# Patient Record
Sex: FEMALE | Race: WHITE | NOT HISPANIC OR LATINO | Employment: OTHER | URBAN - METROPOLITAN AREA
[De-identification: names, ages, dates, MRNs, and addresses within clinical notes are randomized per-mention and may not be internally consistent; named-entity substitution may affect disease eponyms.]

---

## 2017-01-11 ENCOUNTER — ANTICOAGULATION THERAPY VISIT (OUTPATIENT)
Dept: NURSING | Facility: CLINIC | Age: 82
End: 2017-01-11
Payer: COMMERCIAL

## 2017-01-11 DIAGNOSIS — Z79.01 LONG-TERM (CURRENT) USE OF ANTICOAGULANTS: Primary | ICD-10-CM

## 2017-01-11 DIAGNOSIS — I48.91 ATRIAL FIBRILLATION (H): ICD-10-CM

## 2017-01-11 LAB — INR POINT OF CARE: 1.9 (ref 0.86–1.14)

## 2017-01-11 PROCEDURE — 36416 COLLJ CAPILLARY BLOOD SPEC: CPT

## 2017-01-11 PROCEDURE — 99207 ZZC NO CHARGE NURSE ONLY: CPT

## 2017-01-11 PROCEDURE — 85610 PROTHROMBIN TIME: CPT | Mod: QW

## 2017-01-11 NOTE — MR AVS SNAPSHOT
Shelly Rogers   1/11/2017 3:30 PM   Anticoagulation Therapy Visit    Description:  83 year old female   Provider:   ANTICOAGULATION CLINIC   Department:   Nurse           INR as of 1/11/2017     Selected INR 1.9! (1/11/2017)      Anticoagulation Summary as of 1/11/2017     INR goal 2.0-3.0   Selected INR 1.9! (1/11/2017)   Full instructions 1/11: 5 mg; Otherwise 5 mg on Fri; 2.5 mg all other days   Next INR check 2/8/2017    Indications   Long-term (current) use of anticoagulants [Z79.01] [Z79.01]  Atrial fibrillation (H) [I48.91]         Your next Anticoagulation Clinic appointment(s)     Feb 08, 2017  2:45 PM   Anticoagulation [facility] with  [facility] CL[facility]    [address]  [place] [address]   [phone]              Contact Numbers     Clinic Number:         January 2017 Details    Sun Mon Tue Wed Thu Fri Sat     1               2               3               4               5               6               7                 8               9               10               11      5 mg   See details      12      2.5 mg         13      5 mg         14      2.5 mg           15      2.5 mg         16      2.5 mg         17      2.5 mg         18      2.5 mg         19      2.5 mg         20      5 mg         21      2.5 mg           22      2.5 mg         23      2.5 mg         24      2.5 mg         25      2.5 mg         26      2.5 mg         27      5 mg         28      2.5 mg           29      2.5 mg         30      2.5 mg         31      2.5 mg              Date Details   01/11 This INR check               How to take your warfarin dose     To take:  2.5 mg Take 0.5 of a 5 mg tablet.    To take:  5 mg Take 1 of the 5 mg tablets.           February 2017 Details    Sun Mon Tue Wed Thu Fri Sat        1      2.5 mg         2      2.5 mg         3      5 mg         4      2.5 mg           5      2.5 mg         6      2.5 mg         7      2.5  mg         8            9               10               11                 12               13               14               15               16               17               18                 19               20               21               22               23               24               25                 26               27               28                    Date Details   No additional details    Date of next INR:  2/8/2017         How to take your warfarin dose     To take:  2.5 mg Take 0.5 of a 5 mg tablet.    To take:  5 mg Take 1 of the 5 mg tablets.

## 2017-01-11 NOTE — PROGRESS NOTES
ANTICOAGULATION FOLLOW-UP CLINIC VISIT    Patient Name:  Shelly Rogers  Date:  1/11/2017  Contact Type:  Face to Face    SUBJECTIVE:     Patient Findings     Positives No Problem Findings           OBJECTIVE    INR PROTIME   Date Value Ref Range Status   01/11/2017 1.9* 0.86 - 1.14 Final       ASSESSMENT / PLAN  INR assessment THER    Recheck INR In: 4 WEEKS    INR Location Clinic      Anticoagulation Summary as of 1/11/2017     INR goal 2.0-3.0   Selected INR 1.9! (1/11/2017)   Maintenance plan 5 mg (5 mg x 1) on Fri; 2.5 mg (5 mg x 0.5) all other days   Full instructions 1/11: 5 mg; Otherwise 5 mg on Fri; 2.5 mg all other days   Weekly total 20 mg   Plan last modified Monet Jaramillo RN (3/28/2016)   Next INR check 2/8/2017   Priority INR   Target end date     Indications   Long-term (current) use of anticoagulants [Z79.01] [Z79.01]  Atrial fibrillation (H) [I48.91]         Anticoagulation Episode Summary     INR check location     Preferred lab     Send INR reminders to  ANTICOAGULATION    Comments       Anticoagulation Care Providers     Provider Role Specialty Phone number    Halley Huff MD Responsible Internal Medicine [phone]            See the Encounter Report to view Anticoagulation Flowsheet and Dosing Calendar (Go to Encounters tab in chart review, and find the Anticoagulation Therapy Visit)    Dosage adjustment made based on physician directed care plan. Slight increase today only, then resume previous dosing. Recheck 4 weeks (instead of 8).    Tana Brown RN

## 2017-02-15 ENCOUNTER — ANTICOAGULATION THERAPY VISIT (OUTPATIENT)
Dept: NURSING | Facility: CLINIC | Age: 82
End: 2017-02-15
Payer: COMMERCIAL

## 2017-02-15 DIAGNOSIS — Z79.01 LONG-TERM (CURRENT) USE OF ANTICOAGULANTS: ICD-10-CM

## 2017-02-15 DIAGNOSIS — I48.91 ATRIAL FIBRILLATION (H): ICD-10-CM

## 2017-02-15 LAB — INR POINT OF CARE: 1.8 (ref 0.86–1.14)

## 2017-02-15 PROCEDURE — 85610 PROTHROMBIN TIME: CPT | Mod: QW

## 2017-02-15 PROCEDURE — 36416 COLLJ CAPILLARY BLOOD SPEC: CPT

## 2017-02-15 PROCEDURE — 99207 ZZC NO CHARGE NURSE ONLY: CPT

## 2017-02-15 NOTE — PROGRESS NOTES
ANTICOAGULATION FOLLOW-UP CLINIC VISIT    Patient Name:  Shelly Rogers  Date:  2/15/2017  Contact Type:  Face to Face. Dose instructions given to both patient and her daughter Sandhya    SUBJECTIVE:     Patient Findings     Positives No Problem Findings           OBJECTIVE    INR Protime   Date Value Ref Range Status   02/15/2017 1.8 (A) 0.86 - 1.14 Final       ASSESSMENT / PLAN  INR assessment SUB    Recheck INR In: 2 WEEKS    INR Location Clinic      Anticoagulation Summary as of 2/15/2017     INR goal 2.0-3.0   Today's INR 1.8!   Maintenance plan 5 mg (5 mg x 1) on Wed, Sat; 2.5 mg (5 mg x 0.5) all other days   Full instructions 5 mg on Wed, Sat; 2.5 mg all other days   Weekly total 22.5 mg   Plan last modified Arely Dotson RN (2/15/2017)   Next INR check 3/2/2017   Priority INR   Target end date     Indications   Long-term (current) use of anticoagulants [Z79.01] [Z79.01]  Atrial fibrillation (H) [I48.91]         Anticoagulation Episode Summary     INR check location     Preferred lab     Send INR reminders to CS ANTICOAGULATION    Comments       Anticoagulation Care Providers     Provider Role Specialty Phone number    Halley Huff MD Responsible Internal Medicine [phone]            See the Encounter Report to view Anticoagulation Flowsheet and Dosing Calendar (Go to Encounters tab in chart review, and find the Anticoagulation Therapy Visit)    Dosage adjustment made based on physician directed care plan.    Arely Dotson RN

## 2017-02-25 ENCOUNTER — ALLIED HEALTH/NURSE VISIT (OUTPATIENT)
Dept: CARDIOLOGY | Facility: CLINIC | Age: 82
End: 2017-02-25
Payer: COMMERCIAL

## 2017-02-25 DIAGNOSIS — Z95.0 CARDIAC PACEMAKER IN SITU: Primary | ICD-10-CM

## 2017-02-25 PROCEDURE — 93296 REM INTERROG EVL PM/IDS: CPT | Performed by: INTERNAL MEDICINE

## 2017-02-25 PROCEDURE — 93294 REM INTERROG EVL PM/LDLS PM: CPT | Performed by: INTERNAL MEDICINE

## 2017-02-25 NOTE — MR AVS SNAPSHOT
After Visit Summary   2/25/2017    Shelly Rogers    MRN: [id number]           Patient Information     Date Of Birth          1/18/1933        Visit Information        Provider Department      2/25/2017 4:00 PM [facility] TECH1 [facility]        Today's Diagnoses     Cardiac pacemaker in situ    -  1       Follow-ups after your visit        Your next 10 appointments already scheduled     Mar 02, 2017  3:00 PM CST   Anticoagulation Visit with [facility] ANTICOAGULATION CLINIC   [facility] ([facility])    [address]  [facility] [address] [phone]            Jun 02, 2017  7:30 AM CDT   LAB with [facility] LAB   [facility] ([facility])    [address]  [facility] [id number] [phone]           Patient must bring picture ID.  Patient should be prepared to give a urine specimen  Please do not eat 10-12 hours before your appointment if you are coming in fasting for labs on lipids, cholesterol, or glucose (sugar).  Pregnant women should follow their Care Team instructions. Water with medications is okay. Do not drink coffee or other fluids.   If you have concerns about taking  your medications, please ask at office or if scheduling via NPR, send a message by clicking on Secure Messaging, Message Your Care Team.            Jun 02, 2017  8:15 AM CDT   Return Visit with Galindo Del Valle MD   [facility] ([facility])    [address] [place] [address] [address]  [facility] [address] [phone]              Who to contact     If you have questions or need follow up information about today's clinic visit or your schedule please contact [facility] directly at [phone].  Normal or non-critical lab and imaging results will be communicated to you by MyChart, letter or phone within 4 business days after  "the clinic has received the results. If you do not hear from us within 7 days, please contact the clinic through [employer] or phone. If you have a critical or abnormal lab result, we will notify you by phone as soon as possible.  Submit refill requests through [employer] or call your pharmacy and they will forward the refill request to us. Please allow 3 business days for your refill to be completed.          Additional Information About Your Visit        [employer] Information     [employer] lets you send messages to your doctor, view your test results, renew your prescriptions, schedule appointments and more. To sign up, go to [email or web address] . Click on \"Log in\" on the left side of the screen, which will take you to the Welcome page. Then click on \"Sign up Now\" on the right side of the page.     You will be asked to enter the access code listed below, as well as some personal information. Please follow the directions to create your username and password.     Your access code is: [email or web address]  Expires: 2017  9:01 AM     Your access code will  in 90 days. If you need help or a new code, please call your [place] clinic or [phone].        Care EveryWhere ID     This is your Care EveryWhere ID. This could be used by other organizations to access your [place] medical records  [id number]         Blood Pressure from Last 3 Encounters:   16 136/74   16 139/79   16 146/70    Weight from Last 3 Encounters:   16 78.9 kg (174 lb)   16 80.4 kg (177 lb 4.8 oz)   16 78.9 kg (174 lb)              We Performed the Following     INTERROGATION DEVICE EVAL REMOTE, PACER/ICD (53542)     PM DEVICE INTERROGATE REMOTE (35945)        Primary Care Provider Office Phone # Fax #    Halley Huff -[phone]       [facility]    [address]  [facility] [address]        Thank you!     Thank you for choosing [facility] " [facility] [place]  for your care. Our goal is always to provide you with excellent care. Hearing back from our patients is one way we can continue to improve our services. Please take a few minutes to complete the written survey that you may receive in the mail after your visit with us. Thank you!             Your Updated Medication List - Protect others around you: Learn how to safely use, store and throw away your medicines at www.disposemymeds.org.          This list is accurate as of: 2/25/17 11:59 PM.  Always use your most recent med list.                   Brand Name Dispense Instructions for use    ACE/ARB NOT PRESCRIBED (INTENTIONAL)      ACE & ARB not prescribed due to Other: HFpEF       ACETAMINOPHEN PO      Take 500 mg by mouth 3 times daily as needed (Takes at least 6 hours apart).       AMOXICILLIN PO      Take 2 g by mouth. 30 minutes prior to dental appointment.       ASPIRIN NOT PRESCRIBED    INTENTIONAL    0 each    Antiplatelet medication not prescribed intentionally due to Current anticoagulant therapy (warfarin/enoxaparin)       blood glucose monitoring lancets     1 Box    Use to check blood sugars daily       blood glucose monitoring meter device kit     1 kit    1 Device daily.       blood glucose monitoring test strip    ACCU-CHEK SMARTVIEW    1 Box    1 strip by In Vitro route daily       CENTRUM SILVER PO      Take by mouth daily       * furosemide 20 MG tablet    LASIX     Take 20 mg by mouth 2 times daily Omits PM dose occassionally       * furosemide 20 MG tablet    LASIX    180 tablet    Take 1 tablet (20 mg) by mouth 2 times daily Due for office visit. Call clinic and make appointment       glimepiride 2 MG tablet    AMARYL    90 tablet    Take 1 tablet (2 mg) by mouth every morning (before breakfast)       * levothyroxine 75 MCG tablet    SYNTHROID/LEVOTHROID    90 tablet    Take 1 tablet (75 mcg) by mouth daily Profile Rx: patient will contact pharmacy when needed       *  levothyroxine 75 MCG tablet    SYNTHROID/LEVOTHROID    90 tablet    TAKE 1 TABLET BY MOUTH DAILY       metFORMIN 500 MG tablet    GLUCOPHAGE    270 tablet    Take 1 tablet (500 mg) by mouth 3 times daily (with meals)       metoprolol 25 MG 24 hr tablet    TOPROL-XL    180 tablet    TAKE 1 TABLET BY MOUTH TWICE DAILY       NASACORT ALLERGY 24HR 55 MCG/ACT Inhaler   Generic drug:  triamcinolone      Spray 2 sprays into both nostrils daily       * order for DME     2 Package    Equipment being ordered: Compression Stockings Knee high 20/30 compression       * order for DME     1 Units    Equipment being ordered: cane with quad or prongs       STATIN NOT PRESCRIBED (INTENTIONAL)     0 each    continuous prn. Statin not prescribed intentionally due to Other: Not needed, LDL at goal <100mg/dL without therapy       warfarin 5 MG tablet    COUMADIN    90 tablet    TAKE 1/2 TO 1 TABLET BY MOUTH DAILY OR AS DIRECTED BY INR CLINIC       * Notice:  This list has 6 medication(s) that are the same as other medications prescribed for you. Read the directions carefully, and ask your doctor or other care provider to review them with you.

## 2017-02-27 NOTE — PROGRESS NOTES
Medtronic Sensia (D) Remote PPM Device Check  AP: 19 % : <1 %  Mode: DDDR        Presenting Rhythm: AP/VS  Heart Rate: Adequate rates per histogram  Sensing: Stable    Pacing Threshold: Stable    Impedance: Stable  Battery Status: 7-10.5 years  Atrial Arrhythmia: 54 mode switch episodes comprising <1% of the time. Longest episode lasted 2 hours. EGMs show mostly As=Vs with occasional brief bursts of As>Vs. Taking Warfarin. Overall ventricular rates controlled.   Ventricular Arrhythmia: None     Care Plan:F/u PPM Carelink q 3 months. Gave patients daughter results over the phone, Jeronimo,CVT

## 2017-03-02 ENCOUNTER — ANTICOAGULATION THERAPY VISIT (OUTPATIENT)
Dept: NURSING | Facility: CLINIC | Age: 82
End: 2017-03-02
Payer: COMMERCIAL

## 2017-03-02 DIAGNOSIS — Z79.01 LONG-TERM (CURRENT) USE OF ANTICOAGULANTS: ICD-10-CM

## 2017-03-02 DIAGNOSIS — I48.91 ATRIAL FIBRILLATION (H): ICD-10-CM

## 2017-03-02 LAB — INR POINT OF CARE: 2.5 (ref 0.86–1.14)

## 2017-03-02 PROCEDURE — 85610 PROTHROMBIN TIME: CPT | Mod: QW

## 2017-03-02 PROCEDURE — 99207 ZZC NO CHARGE NURSE ONLY: CPT

## 2017-03-02 PROCEDURE — 36416 COLLJ CAPILLARY BLOOD SPEC: CPT

## 2017-03-02 NOTE — PROGRESS NOTES
ANTICOAGULATION FOLLOW-UP CLINIC VISIT    Patient Name:  Shelly Rogers  Date:  3/2/2017  Contact Type:  Face to Face    SUBJECTIVE:     Patient Findings     Positives No Problem Findings           OBJECTIVE    INR Protime   Date Value Ref Range Status   03/02/2017 2.5 (A) 0.86 - 1.14 Final       ASSESSMENT / PLAN  INR assessment THER    Recheck INR In: 4 WEEKS    INR Location Clinic      Anticoagulation Summary as of 3/2/2017     INR goal 2.0-3.0   Today's INR 2.5   Maintenance plan 5 mg (5 mg x 1) on Wed, Sat; 2.5 mg (5 mg x 0.5) all other days   Full instructions 5 mg on Wed, Sat; 2.5 mg all other days   Weekly total 22.5 mg   No change documented Mana Kaplan RN   Plan last modified Arely Dotson RN (2/15/2017)   Next INR check 3/30/2017   Priority INR   Target end date     Indications   Long-term (current) use of anticoagulants [Z79.01] [Z79.01]  Atrial fibrillation (H) [I48.91]         Anticoagulation Episode Summary     INR check location     Preferred lab     Send INR reminders to  ANTICOAGULATION    Comments       Anticoagulation Care Providers     Provider Role Specialty Phone number    Halley Huff MD Responsible Internal Medicine [phone]            See the Encounter Report to view Anticoagulation Flowsheet and Dosing Calendar (Go to Encounters tab in chart review, and find the Anticoagulation Therapy Visit)    Dosage adjustment made based on physician directed care plan.    Mana Kaplan RN

## 2017-03-02 NOTE — MR AVS SNAPSHOT
Shelly Rogers   3/2/2017 3:00 PM   Anticoagulation Therapy Visit    Description:  84 year old female   Provider:  [facility]   Department:  Cs Nurse           INR as of 3/2/2017     Today's INR 2.5      Anticoagulation Summary as of 3/2/2017     INR goal 2.0-3.0   Today's INR 2.5   Full instructions 5 mg on Wed, Sat; 2.5 mg all other days   Next INR check 3/30/2017    Indications   Long-term (current) use of anticoagulants [Z79.01] [Z79.01]  Atrial fibrillation (H) [I48.91]         Your next Anticoagulation Clinic appointment(s)     Mar 30, 2017  3:00 PM CDT   Anticoagulation Visit with  ANTICOAGULATION CLINIC   [facility]    [address]  [place] [address]   [phone]              Contact Numbers     Clinic Number:         March 2017 Details    Sun Mon Tue Wed Thu Fri Sat        1               2      2.5 mg   See details      3      2.5 mg         4      5 mg           5      2.5 mg         6      2.5 mg         7      2.5 mg         8      5 mg         9      2.5 mg         10      2.5 mg         11      5 mg           12      2.5 mg         13      2.5 mg         14      2.5 mg         15      5 mg         16      2.5 mg         17      2.5 mg         18      5 mg           19      2.5 mg         20      2.5 mg         21      2.5 mg         22      5 mg         23      2.5 mg         24      2.5 mg         25      5 mg           26      2.5 mg         27      2.5 mg         28      2.5 mg         29      5 mg         30            31                 Date Details   03/02 This INR check       Date of next INR:  3/30/2017         How to take your warfarin dose     To take:  2.5 mg Take 0.5 of a 5 mg tablet.    To take:  5 mg Take 1 of the 5 mg tablets.

## 2017-03-24 ENCOUNTER — HOSPITAL ENCOUNTER (OUTPATIENT)
Facility: CLINIC | Age: 82
End: 2017-03-24
Attending: OPHTHALMOLOGY | Admitting: OPHTHALMOLOGY
Payer: MEDICARE

## 2017-03-24 DIAGNOSIS — E11.9 TYPE 2 DIABETES MELLITUS WITHOUT COMPLICATIONS (H): ICD-10-CM

## 2017-03-27 RX ORDER — GLIMEPIRIDE 2 MG/1
TABLET ORAL
Qty: 90 TABLET | Refills: 1 | Status: SHIPPED | OUTPATIENT
Start: 2017-03-27 | End: 2017-04-06

## 2017-03-27 NOTE — TELEPHONE ENCOUNTER
Prescription approved per [facility].  Verna Lujan RN

## 2017-03-27 NOTE — TELEPHONE ENCOUNTER
Pending Prescriptions:                       Disp   Refills    glimepiride (AMARYL) 2 MG tablet [Pharmac*90 tab*0            Sig: TAKE 1 TABLET BY MOUTH EVERY MORNING BEFORE           BREAKFAST             Last Written Prescription Date: 9/14/16  Last Fill Quantity: 90, # refills: 1  Last Office Visit with [facility] or [facility] prescribing provider:  9/12/16   Next 5 appointments (look out 90 days)     Apr 06, 2017  3:00 PM CDT   Pre-Op physical with Halley Huff MD   [facility]    [address] [address]   [phone]            Jun 02, 2017  8:15 AM CDT   Return Visit with Galindo Del Valle MD   [facility] [place] ([facility])    [id number]  [facility] [address] [phone]                   BP Readings from Last 3 Encounters:   09/21/16 136/74   09/12/16 139/79   05/13/16 146/70     Lab Results   Component Value Date    MICROL 9 10/13/2015     No results found for: MICROALBUMIN  Creatinine   Date Value Ref Range Status   09/12/2016 1.21 (H) 0.52 - 1.04 mg/dL Final   ]  GFR Estimate   Date Value Ref Range Status   09/12/2016 42 (L) >60 mL/min/1.7m2 Final     Comment:     Non  GFR Calc   10/13/2015 65 >60 mL/min/1.7m2 Final     Comment:     Non  GFR Calc   10/30/2014 66 >60 mL/min/1.7m2 Final     Comment:     Non  GFR Calc     GFR Estimate If Black   Date Value Ref Range Status   09/12/2016 51 (L) >60 mL/min/1.7m2 Final     Comment:      GFR Calc   10/13/2015 78 >60 mL/min/1.7m2 Final     Comment:      GFR Calc   10/30/2014 79 >60 mL/min/1.7m2 Final     Comment:      GFR Calc     Lab Results   Component Value Date    CHOL 204 10/13/2015     Lab Results   Component Value Date    HDL 44 10/13/2015     Lab Results   Component Value Date    LDL 88 10/13/2015     Lab Results   Component Value Date     TRIG 359 10/13/2015     Lab Results   Component Value Date    CHOLHDLRATIO 4.6 10/13/2015     Lab Results   Component Value Date    AST 9 09/12/2016     Lab Results   Component Value Date    ALT 21 09/12/2016     Lab Results   Component Value Date    A1C 8.0 09/12/2016    A1C 7.5 02/16/2016    A1C 9.2 10/27/2015    A1C 7.9 10/30/2014    A1C 7.6 05/19/2014     Potassium   Date Value Ref Range Status   09/12/2016 4.3 3.4 - 5.3 mmol/L Final

## 2017-04-04 DIAGNOSIS — I50.32 CHRONIC DIASTOLIC CONGESTIVE HEART FAILURE (H): ICD-10-CM

## 2017-04-04 NOTE — TELEPHONE ENCOUNTER
metoprolol (TOPROL-XL) 25 MG 24 hr tablet        Last Written Prescription Date: 12/27/2016  Last Fill Quantity: 180, # refills: 0    Last Office Visit with [facility], [facility] or [facility] prescribing provider:  9/12/2016   Future Office Visit:    Next 5 appointments (look out 90 days)     Apr 06, 2017  3:00 PM CDT   Pre-Op physical with Halley Huff MD   [facility]    [address] [address] [phone]            Jun 02, 2017  8:15 AM CDT   Return Visit with Galindo Del Valle MD   [facility] [place] ([facility]    [address]  [facility] [address] [phone]                    BP Readings from Last 3 Encounters:   09/21/16 136/74   09/12/16 139/79   05/13/16 146/70

## 2017-04-05 ENCOUNTER — ANTICOAGULATION THERAPY VISIT (OUTPATIENT)
Dept: NURSING | Facility: CLINIC | Age: 82
End: 2017-04-05
Payer: COMMERCIAL

## 2017-04-05 DIAGNOSIS — Z79.01 LONG-TERM (CURRENT) USE OF ANTICOAGULANTS: ICD-10-CM

## 2017-04-05 DIAGNOSIS — I48.91 ATRIAL FIBRILLATION (H): ICD-10-CM

## 2017-04-05 LAB — INR POINT OF CARE: 2 (ref 0.86–1.14)

## 2017-04-05 PROCEDURE — 85610 PROTHROMBIN TIME: CPT | Mod: QW

## 2017-04-05 PROCEDURE — 36416 COLLJ CAPILLARY BLOOD SPEC: CPT

## 2017-04-05 PROCEDURE — 99207 ZZC NO CHARGE NURSE ONLY: CPT

## 2017-04-05 RX ORDER — METOPROLOL SUCCINATE 25 MG/1
TABLET, EXTENDED RELEASE ORAL
Start: 2017-04-05

## 2017-04-05 NOTE — MR AVS SNAPSHOT
Shelly Rogers   4/5/2017 3:00 PM   Anticoagulation Therapy Visit    Description:  84 year old female   Provider:  [facility]   Department:  [facility] Nurse           INR as of 4/5/2017     Today's INR 2.0      Anticoagulation Summary as of 4/5/2017     INR goal 2.0-3.0   Today's INR 2.0   Full instructions 5 mg on Wed, Sat; 2.5 mg all other days   Next INR check 5/17/2017    Indications   Long-term (current) use of anticoagulants [Z79.01] [Z79.01]  Atrial fibrillation (H) [I48.91]         Your next Anticoagulation Clinic appointment(s)     May 17, 2017  3:00 PM CDT   Anticoagulation Visit with  ANTICOAGULATION CLINIC   [facility]    [address]  [place] [address]   [phone]              Contact Numbers     Clinic Number:         April 2017 Details    Sun Mon Tue Wed Thu Fri Sat           1                 2               3               4               5      5 mg   See details      6      2.5 mg         7      2.5 mg         8      5 mg           9      2.5 mg         10      2.5 mg         11      2.5 mg         12      5 mg         13      2.5 mg         14      2.5 mg         15      5 mg           16      2.5 mg         17      2.5 mg         18      2.5 mg         19      5 mg         20      2.5 mg         21      2.5 mg         22      5 mg           23      2.5 mg         24      2.5 mg         25      2.5 mg         26      5 mg         27      2.5 mg         28      2.5 mg         29      5 mg           30      2.5 mg                Date Details   04/05 This INR check               How to take your warfarin dose     To take:  2.5 mg Take 0.5 of a 5 mg tablet.    To take:  5 mg Take 1 of the 5 mg tablets.           May 2017 Details    Sun Mon Tue Wed Thu Fri Sat      1      2.5 mg         2      2.5 mg         3      5 mg         4      2.5 mg         5      2.5 mg         6      5 mg           7      2.5 mg         8      2.5 mg         9       2.5 mg         10      5 mg         11      2.5 mg         12      2.5 mg         13      5 mg           14      2.5 mg         15      2.5 mg         16      2.5 mg         17            18               19               20                 21               22               23               24               25               26               27                 28               29               30               31                   Date Details   No additional details    Date of next INR:  5/17/2017         How to take your warfarin dose     To take:  2.5 mg Take 0.5 of a 5 mg tablet.    To take:  5 mg Take 1 of the 5 mg tablets.

## 2017-04-05 NOTE — PROGRESS NOTES
ANTICOAGULATION FOLLOW-UP CLINIC VISIT    Patient Name:  Shelly Rogers  Date:  4/5/2017  Contact Type:  Face to Face    SUBJECTIVE:     Patient Findings     Positives No Problem Findings    Comments Patient having cataract surgery (right eye) on 4/11.  Pre-op with PCP tomorrow.           OBJECTIVE    INR Protime   Date Value Ref Range Status   04/05/2017 2.0 (A) 0.86 - 1.14 Final       ASSESSMENT / PLAN  INR assessment THER    Recheck INR In: 6 WEEKS    INR Location Clinic      Anticoagulation Summary as of 4/5/2017     INR goal 2.0-3.0   Today's INR 2.0   Maintenance plan 5 mg (5 mg x 1) on Wed, Sat; 2.5 mg (5 mg x 0.5) all other days   Full instructions 5 mg on Wed, Sat; 2.5 mg all other days   Weekly total 22.5 mg   No change documented Arely Dotson RN   Plan last modified Arely Dotson RN (2/15/2017)   Next INR check 5/17/2017   Priority INR   Target end date     Indications   Long-term (current) use of anticoagulants [Z79.01] [Z79.01]  Atrial fibrillation (H) [I48.91]         Anticoagulation Episode Summary     INR check location     Preferred lab     Send INR reminders to [facility] ANTICOAGULATION    Comments       Anticoagulation Care Providers     Provider Role Specialty Phone number    Halley Huff MD [facility] Internal Medicine [phone]            See the Encounter Report to view Anticoagulation Flowsheet and Dosing Calendar (Go to Encounters tab in chart review, and find the Anticoagulation Therapy Visit)    Dosage adjustment made based on physician directed care plan.    Arely Dotson RN

## 2017-04-06 ENCOUNTER — OFFICE VISIT (OUTPATIENT)
Dept: FAMILY MEDICINE | Facility: CLINIC | Age: 82
End: 2017-04-06
Payer: COMMERCIAL

## 2017-04-06 ENCOUNTER — TELEPHONE (OUTPATIENT)
Dept: FAMILY MEDICINE | Facility: CLINIC | Age: 82
End: 2017-04-06

## 2017-04-06 VITALS
SYSTOLIC BLOOD PRESSURE: 134 MMHG | HEART RATE: 85 BPM | WEIGHT: 176 LBS | DIASTOLIC BLOOD PRESSURE: 74 MMHG | HEIGHT: 62 IN | OXYGEN SATURATION: 96 % | TEMPERATURE: 98.4 F | BODY MASS INDEX: 32.39 KG/M2

## 2017-04-06 DIAGNOSIS — Z13.0 SCREENING FOR DEFICIENCY ANEMIA: ICD-10-CM

## 2017-04-06 DIAGNOSIS — I50.32 CHRONIC DIASTOLIC CONGESTIVE HEART FAILURE (H): ICD-10-CM

## 2017-04-06 DIAGNOSIS — E11.9 TYPE 2 DIABETES MELLITUS WITHOUT COMPLICATION, WITHOUT LONG-TERM CURRENT USE OF INSULIN (H): ICD-10-CM

## 2017-04-06 DIAGNOSIS — H25.9 SENILE CATARACT OF RIGHT EYE: ICD-10-CM

## 2017-04-06 DIAGNOSIS — E03.9 HYPOTHYROIDISM, UNSPECIFIED TYPE: ICD-10-CM

## 2017-04-06 DIAGNOSIS — Z01.818 PREOP GENERAL PHYSICAL EXAM: Primary | ICD-10-CM

## 2017-04-06 DIAGNOSIS — Z79.2 PROPHYLACTIC ANTIBIOTIC: Primary | ICD-10-CM

## 2017-04-06 LAB
ALBUMIN SERPL-MCNC: 3.8 G/DL (ref 3.4–5)
ALP SERPL-CCNC: 82 U/L (ref 40–150)
ALT SERPL W P-5'-P-CCNC: 17 U/L (ref 0–50)
ANION GAP SERPL CALCULATED.3IONS-SCNC: 8 MMOL/L (ref 3–14)
AST SERPL W P-5'-P-CCNC: 8 U/L (ref 0–45)
BILIRUB SERPL-MCNC: 0.5 MG/DL (ref 0.2–1.3)
BUN SERPL-MCNC: 18 MG/DL (ref 7–30)
CALCIUM SERPL-MCNC: 9.1 MG/DL (ref 8.5–10.1)
CHLORIDE SERPL-SCNC: 101 MMOL/L (ref 94–109)
CO2 SERPL-SCNC: 28 MMOL/L (ref 20–32)
CREAT SERPL-MCNC: 1 MG/DL (ref 0.52–1.04)
ERYTHROCYTE [DISTWIDTH] IN BLOOD BY AUTOMATED COUNT: 14.9 % (ref 10–15)
GFR SERPL CREATININE-BSD FRML MDRD: 53 ML/MIN/1.7M2
GLUCOSE SERPL-MCNC: 175 MG/DL (ref 70–99)
HCT VFR BLD AUTO: 40.6 % (ref 35–47)
HGB BLD-MCNC: 13.3 G/DL (ref 11.7–15.7)
MCH RBC QN AUTO: 31.6 PG (ref 26.5–33)
MCHC RBC AUTO-ENTMCNC: 32.8 G/DL (ref 31.5–36.5)
MCV RBC AUTO: 96 FL (ref 78–100)
PLATELET # BLD AUTO: 346 10E9/L (ref 150–450)
POTASSIUM SERPL-SCNC: 4 MMOL/L (ref 3.4–5.3)
PROT SERPL-MCNC: 7.4 G/DL (ref 6.8–8.8)
RBC # BLD AUTO: 4.21 10E12/L (ref 3.8–5.2)
SODIUM SERPL-SCNC: 137 MMOL/L (ref 133–144)
TSH SERPL DL<=0.05 MIU/L-ACNC: 2.09 MU/L (ref 0.4–4)
WBC # BLD AUTO: 9.4 10E9/L (ref 4–11)

## 2017-04-06 PROCEDURE — 99214 OFFICE O/P EST MOD 30 MIN: CPT | Performed by: INTERNAL MEDICINE

## 2017-04-06 PROCEDURE — 85027 COMPLETE CBC AUTOMATED: CPT | Performed by: INTERNAL MEDICINE

## 2017-04-06 PROCEDURE — 80053 COMPREHEN METABOLIC PANEL: CPT | Performed by: INTERNAL MEDICINE

## 2017-04-06 PROCEDURE — 83036 HEMOGLOBIN GLYCOSYLATED A1C: CPT | Performed by: INTERNAL MEDICINE

## 2017-04-06 PROCEDURE — 82728 ASSAY OF FERRITIN: CPT | Performed by: INTERNAL MEDICINE

## 2017-04-06 PROCEDURE — 84443 ASSAY THYROID STIM HORMONE: CPT | Performed by: INTERNAL MEDICINE

## 2017-04-06 PROCEDURE — 36415 COLL VENOUS BLD VENIPUNCTURE: CPT | Performed by: INTERNAL MEDICINE

## 2017-04-06 RX ORDER — METOPROLOL SUCCINATE 25 MG/1
25 TABLET, EXTENDED RELEASE ORAL 2 TIMES DAILY
Qty: 180 TABLET | Refills: 3 | Status: SHIPPED | OUTPATIENT
Start: 2017-04-06 | End: 2017-06-02

## 2017-04-06 RX ORDER — FUROSEMIDE 20 MG
20 TABLET ORAL 2 TIMES DAILY
Qty: 180 TABLET | Refills: 3 | Status: SHIPPED | OUTPATIENT
Start: 2017-04-06 | End: 2017-06-02

## 2017-04-06 RX ORDER — AMOXICILLIN 500 MG/1
2000 CAPSULE ORAL ONCE
Qty: 4 CAPSULE | Refills: 3 | Status: SHIPPED | OUTPATIENT
Start: 2017-04-06 | End: 2017-09-11

## 2017-04-06 RX ORDER — GLIMEPIRIDE 2 MG/1
2 TABLET ORAL
Qty: 90 TABLET | Refills: 3 | Status: SHIPPED | OUTPATIENT
Start: 2017-04-06 | End: 2017-04-11

## 2017-04-06 NOTE — TELEPHONE ENCOUNTER
Please review message below as patient is requesting ABX for emergency dental procedure tomorrow.   Tanja Templeton CMA

## 2017-04-06 NOTE — PROGRESS NOTES
[address] [address]  [phone]  Dept: [phone]    PRE-OP EVALUATION:  Today's date: 2017    Shelly Rogers (: 1933) presents for pre-operative evaluation assessment as requested by Dr. Dominguez.  She requires evaluation and anesthesia risk assessment prior to undergoing surgery/procedure for treatment of Right  eye Cataracts .  Proposed procedure: RIGHT EYE  FEMTOSECOND LASER CAPSULOTOMY, LENS FRAGMENTATION, ARCUATE INCISIONS;RIGHT EYE FEMTOSECOND LASER ASSISTED, PHACOEMULSIFICATION CLEAR CORNEA WITH STANDARD INTRAOCULAR LENS IMPLANT (MAC/TOPICAL)    Date of Surgery/ Procedure: 2017  Time of Surgery/ Procedure: 01:10pm  Hospital/Surgical Facility: [facility]  Fax number for surgical facility:   Primary Physician: Halley Huff  Type of Anesthesia Anticipated: COMBINED MAC WITH TOPICAL    Patient has a Health Care Directive or Living Will:  YES FULL CODE IN EPIC    1. YES - DO YOU HAVE A HISTORY OF HEART ATTACK, STROKE, STENT, BYPASS OR SURGERY ON AN ARTERY IN THE HEAD, NECK, HEART OR LEG? Pacemaker  2. NO - Do you ever have any pain or discomfort in your chest?  3. YES - DO YOU HAVE A HISTORY OF HEART FAILURE? CHF DX and Afib  4. YES - ARE YOUR TROUBLED BY SHORTNESS OF BREATH WHEN WALKING ON THE LEVEL, UP A SLIGHT HILL OR AT NIGHT? minimal  5. NO - Do you currently have a cold, bronchitis or other respiratory infection?  6. YES - DO YOU HAVE A COUGH, SHORTNESS OF BREATH OR WHEEZING? Slight cough  7. NO - Do you sometimes get pains in the calves of your legs when you walk?  8. NO - Do you or anyone in your family have previous history of blood clots?  9. NO - Do you or does anyone in your family have a serious bleeding problem such as prolonged bleeding following surgeries or cuts?  10. NO - Have you ever had problems with anemia or been told to take iron pills?  11. NO - Have you had any abnormal blood loss such as black, tarry or bloody  stools, or abnormal vaginal bleeding?  12. YES - HAVE YOU EVER HAD A BLOOD TRANSFUSION? HX of blood transfusion x 50+years ago post birth.  13. NO - Have you or any of your relatives ever had problems with anesthesia?  14. YES - Do you have sleep apnea, excessive snoring or daytime drowsiness? EXCESSIVE SNORING, daughter believes it is related to deviated nasal septum  15. NO - Do you have any prosthetic heart valves?  16. NO - Do you have prosthetic joints?  17. NO - Is there any chance that you may be pregnant?    HPI:                                                      Brief HPI related to upcoming procedure: Shelly is here with her daughter. Patient has long-standing history of cataracts that are significantly interfering with vision.      See problem list for active medical problems.  Problems all longstanding and stable, except as noted/documented.  See ROS for pertinent symptoms related to these conditions.                                                                                                  .    MEDICAL HISTORY:                                                      Patient Active Problem List    Diagnosis Date Noted     Long-term (current) use of anticoagulants [Z79.01] 03/28/2016     Priority: Medium     Paroxysmal atrial fibrillation (H) 10/13/2015     Priority: Medium     Chronic diastolic congestive heart failure (H) 10/13/2015     Priority: Medium     Hypothyroidism 10/13/2015     Priority: Medium     Atrial fibrillation (H) 01/21/2014     Type 2 diabetes mellitus without complications (H) 01/14/2014     DNS (deviated nasal septum) 01/14/2014     Uterine cancer (H)      s/p hyst       OA (osteoarthritis)      Health Care Home 04/19/2013     Patient Care Plan      About Me    Patient Name     Date of Birth 1/18/1933 Age 80   [place] [facility] [place] [facility] [place] [facility] [place]  Phone Call jim Arthur [phone]   Address:  Email address    Insurance  Insurance I.MILLER    [place]       Emergency Contact Ginger Terwilliger Phone [phone]   Parent/Guardian  Phone      :     My Access Plan    Medical Emergency 911   Primary Clinic Line    24 Hour Appointment Line [phone] or  [phone]) (toll-free)   24 Hour Nurse Line [phone] (toll-free)   Preferred Urgent Care    Preferred Hospital [facility]   Preferred Pharmacy              My Care Team Members    Care Team Member  Name/Specialty Clinic, Address, Phone, Fax, E-mail Date of release on file   Primary Care Provider:  Dr. Senait Elmore Clinic:[address] [facility]  [address]  [place] [address] [phone] (phone #)  [phone] (fax #) [facility]   Primary Care Coordinator: Myrtle Hutchinson RN, [facility] Care Coordinator  [phone]     [facility]  [address] [place], [place] [address]e [facility]  [address]  [facility] [address] [phone] (phone #)  [phone] (fax #) [facility]   For specialists see Care Teams                Health Care Home Complexity Tier:           Care Coordination Start Date: 4/19/2013   Frequency of Care Coordination:monthly   Form Last Updated: 4/19/2013   Patient Care Plan    Patient Acknowledgement of Plan of Care: Yes     Presenting Problem Treatment Plan and Discharge Instructions   CHF  reviwed CHF symptoms and booklet with patient 4/19/2013         The provider seeing you for these problems in the emergency setting may determine that a different course of care is necessary based on the situation.    Team Communication/Sticky Note: (Take items out when done)  Date Noted Care Team Member TO DO/Alerts to be completed                     Health care home/care coordination was discussed with the patient and he/she agrees to participate in care coordination. The patient was introduced to the care coordinator and given a patient packet to review and complete. The care coordinator will contact the patient to start care planning process.  Care coordination start date:   2013    Frequency of care coordination with care team:  monthly  SELF MANAGEMENT PLAN  Presenting Problem Signs and Symptoms Treatment Plan    chf None currently; but if  SOB, edema  call PCP with wt gain; patient has booklet and understands green, yellow, red zones    diabetes type 2  none currently  monitor glucose every morning; keep a diary and bring with to PCP visits                   Advanced Care Directives:  on file  Action Plans on File:      Shelly Marcumcker   [facility] Rec #: [id number]   Health Insurance/Plan:   : 1933   ID: [unfilled]   Primary Care Provider: Vianey Sapp       Date form completed: 2013       Primary Ethnic Culture:  American   Primary Language:   English     needed? No Language: English   Name of preferred :   Phone #:   Preferred Mode of Communication: Phone   Preferred Method of Communication: verbal   Health Care Home Complexity Tier:         Contact Information:   Mother's Name: Data Unavailable   Father's Name: Data Unavailable   Phone: [phone] (home)    Preferred Contact: daughters Sandhya Terwilliger [phone] or Malu Prajapati [phone]   Address:   [address] [place] [id number]   Siblings/Relatives: daughters    Lives with: currently daughter Sandhya     My Story  I like to be called Shelly  Health Maintenance/Preventative Procedures and Immunizations are addressed in the Health Maintenance List and should be updated  Integrated Medical Plan    Additional Standing Lab Orders (Use Health Maintenance When Possible):        Therapies:  N/A    My Multidisciplinary Care Team Members  Specialty of Care Team Member Name, Clinic, Address, Phone #, Fax #, E-mail   Primary care provider: Vianey Sapp  [address] [place] [address] [address]  [facility] [address]  [phone] (phone #)  [phone] (fax #)    Specialists with [facility]; see Care Team                    School:  N/A    Care Givers  Type of  Care Giver Phone/Fax E-mail   PCA:        Home Health Agency:       Nurse Name:       :       Guardian:         Persons You Can Contact About Me and My Medical Care  Name Relation Phone/Fax E-mail KIRSTIN Date    see above                                                 This care plan is a documentation of the individual s care as directed by the family, educators, therapists and diverse medical providers.  Contributors to the care plan include the patient, the patient s family and Vianey Sapp and the health care home team at [facility].  Please indicate any changes made to the care of this patient on this care plan and fax it to the care coordinator above.  Thank you for your attention.  Patient: Shelly Rogers__________________  SenaitVianey sanchez___________________  April 19, 2013___________________           CARDIOVASCULAR SCREENING; LDL GOAL LESS THAN 100 04/02/2013     CHF (congestive heart failure) (H) 03/19/2013     Pacemaker 03/01/2013      Past Medical History:   Diagnosis Date     Atrial fibrillation (H)     Nl LVEF, s/p ablation      Congestive heart failure, unspecified      Diabetes mellitus (H) 2004     Hemorrhoids     intermittent bleeding     Hypertension      Macular degeneration      OA (osteoarthritis)      Pacemaker 3/2013     Uterine cancer (H)     s/p hyst     Past Surgical History:   Procedure Laterality Date     C ANESTH,PACEMAKER INSERTION      dual chamber 3/27/13     CHOLECYSTECTOMY  8/2004     GYN SURGERY       HYSTERECTOMY      Ut ca      JOINT REPLACEMENT       TKR      bilat     Current Outpatient Prescriptions   Medication Sig Dispense Refill     glimepiride (AMARYL) 2 MG tablet TAKE 1 TABLET BY MOUTH EVERY MORNING BEFORE BREAKFAST 90 tablet 1     furosemide (LASIX) 20 MG tablet Take 1 tablet (20 mg) by mouth 2 times daily Due for office visit.  Call clinic and make appointment 180 tablet 0     metoprolol (TOPROL-XL) 25 MG 24 hr tablet TAKE 1 TABLET BY  MOUTH TWICE DAILY 180 tablet 0     levothyroxine (SYNTHROID/LEVOTHROID) 75 MCG tablet TAKE 1 TABLET BY MOUTH DAILY 90 tablet 3     warfarin (COUMADIN) 5 MG tablet TAKE 1/2 TO 1 TABLET BY MOUTH DAILY OR AS DIRECTED BY INR CLINIC 90 tablet 1     metFORMIN (GLUCOPHAGE) 500 MG tablet Take 1 tablet (500 mg) by mouth 3 times daily (with meals) 270 tablet 1     furosemide (LASIX) 20 MG tablet Take 20 mg by mouth 2 times daily Omits PM dose occassionally       Multiple Vitamins-Minerals (CENTRUM SILVER PO) Take by mouth daily       ASPIRIN NOT PRESCRIBED, INTENTIONAL, Antiplatelet medication not prescribed intentionally due to Current anticoagulant therapy (warfarin/enoxaparin) 0 each 0     ACE/ARB NOT PRESCRIBED, INTENTIONAL, ACE & ARB not prescribed due to Other: HFpEF       levothyroxine (SYNTHROID, LEVOTHROID) 75 MCG tablet Take 1 tablet (75 mcg) by mouth daily Profile Rx: patient will contact pharmacy when needed 90 tablet 3     order for DME Equipment being ordered: Compression Stockings  Knee high  20/30 compression 2 Package 1     order for DME Equipment being ordered: cane with quad or prongs 1 Units 1     triamcinolone (NASACORT ALLERGY 24HR) 55 MCG/ACT nasal inhaler Spray 2 sprays into both nostrils daily       blood glucose monitoring (ACCU-CHEK FASTCLIX) lancets Use to check blood sugars daily 1 Box prn     blood glucose (ACCU-CHEK SMARTVIEW) test strip 1 strip by In Vitro route daily 1 Box prn     STATIN NOT PRESCRIBED, INTENTIONAL, continuous prn. Statin not prescribed intentionally due to Other: Not needed, LDL at goal <100mg/dL without therapy 0 each 0     Blood Glucose Monitoring Suppl (ACCU-CHEK LAKIA SMARTVIEW) W/DEVICE KIT 1 Device daily. 1 kit 0     ACETAMINOPHEN PO Take 500 mg by mouth 3 times daily as needed (Takes at least 6 hours apart).        AMOXICILLIN PO Take 2 g by mouth. 30 minutes prior to dental appointment.       OTC products: None, except as noted above    Allergies   Allergen Reactions  "    Shellfish Allergy Difficulty breathing     Cats      Sneezing, watery eyes     Lisinopril Cough     No Clinical Screening - See Comments Other (See Comments)     Pt stated allergic to flowers, pt gets itchy watery eyes and stuffy nose     Seasonal Allergies Itching     Flowers     Sulfa Drugs Hives      Latex Allergy: unknown     Social History   Substance Use Topics     Smoking status: Former Smoker     Quit date: 1/1/1990     Smokeless tobacco: Never Used     Alcohol use No     History   Drug Use No       REVIEW OF SYSTEMS:                                                    C: NEGATIVE for fever, chills, change in weight  E/M: NEGATIVE for ear, mouth and throat problems  R: NEGATIVE for significant cough or SOB  CV: NEGATIVE for chest pain, palpitations or peripheral edema    Anesthesia risk assessment   No hx of anesthesia complications  No family hx of anesthesia complications    This document serves as a record of the services and decisions personally performed and made by Halley Huff MD. It was created on her behalf by Breanna Spencer, a trained medical scribe. The creation of this document is based the provider's statements to the medical scribe.    Scribmike Spencer 3:22 PM, April 6, 2017    EXAM:                                                    /74 (BP Location: Right arm, Patient Position: Chair, Cuff Size: Adult Regular)  Pulse 85  Temp 98.4  F (36.9  C) (Oral)  Ht 5' 2\" (1.575 m)  Wt 176 lb (79.8 kg)  SpO2 96%  Breastfeeding? No  BMI 32.19 kg/m2  GENERAL APPEARANCE: healthy, alert and no distress  HENT: ear canals and TM's normal, left ear canal with significant ceruminosis, nose shows deviated nasal septum and mouth without ulcers or lesions  RESP: lungs clear to auscultation - no rales, rhonchi or wheezes  CV: regular rate and rhythm, normal S1 S2, no S3 or S4 and positive  murmur, click or rub   ABDOMEN: soft, nontender, no HSM or masses and bowel sounds normal  NEURO: " Normal strength and tone for age, sensory exam grossly normal, mentation intact and speech normal    DIAGNOSTICS:                                                    No labs or EKG required for low risk surgery (cataract, skin procedure, breast biopsy, etc)    Recent Labs   Lab Test 04/05/17 03/02/17 09/12/16   1709   02/16/16   1507   10/13/15   1454  10/06/15   1536   HGB   --    --    --   13.3   --    --    --    --   13.1   PLT   --    --    --   328   --    --    --    --   334   INR  2.0*  2.5*   < >   --    < >   --    < >   --    --    NA   --    --    --   139   --    --    --   137   --    POTASSIUM   --    --    --   4.3   --    --    --   4.6   --    CR   --    --    --   1.21*   --    --    --   0.84   --    A1C   --    --    --   8.0*   --   7.5*   < >   --    --     < > = values in this interval not displayed.      IMPRESSION:                                                    Reason for surgery/procedure: cataracts   Diagnosis/reason for consult: preop clearance     The proposed surgical procedure is considered LOW risk.    REVISED CARDIAC RISK INDEX  The patient has the following serious cardiovascular risks for perioperative complications such as (MI, PE, VFib and 3  AV Block):  No serious cardiac risks  INTERPRETATION: 0 risks: Class I (very low risk - 0.4% complication rate)    The patient has the following additional risks for perioperative complications:  No identified additional risks    Shelly was seen today for pre-op exam.    Diagnoses and all orders for this visit:    Preop general physical exam  -     CBC with platelets    Senile cataract of right eye  She will be getting surgery for this     Chronic diastolic congestive heart failure (H)  Well compensated. Doing well with medication.  -     metoprolol (TOPROL-XL) 25 MG 24 hr tablet; Take 1 tablet (25 mg) by mouth 2 times daily  -     furosemide (LASIX) 20 MG tablet; Take 1 tablet (20 mg) by mouth 2 times daily    Type 2 diabetes  mellitus without complication, without long-term current use of insulin (H)  Working on better control. Will follow up on A1c.  -     Hemoglobin A1c; Future  -     glimepiride (AMARYL) 2 MG tablet; Take 1 tablet (2 mg) by mouth every morning (before breakfast)  -     Comprehensive metabolic panel  -     metFORMIN (GLUCOPHAGE) 500 MG tablet; Take 1 tablet (500 mg) by mouth 3 times daily (with meals)  Lab Results   Component Value Date    A1C 8.0 09/12/2016    A1C 7.5 02/16/2016    A1C 9.2 10/27/2015    A1C 7.9 10/30/2014    A1C 7.6 05/19/2014     Hypothyroidism, unspecified type  Controlled   -     TSH    Screening for deficiency anemia  -     Ferritin    Other orders  -     Cancel: Random Glucose; Future    RECOMMENDATIONS:                                                    --Patient is to take all scheduled medications on the day of surgery EXCEPT for modifications listed below.    Diabetes Medication Use  Glimepiride-----Hold usual  oral diabetic meds (e.g. Metformin, Actos, Glipizide) while NPO.       ACE Inhibitor or Angiotensin Receptor Blocker (ARB) Use  HOld Lasix on the morning of surgery      Patient is OPTIMAL for the proposed procedure     The information in this document, created by the medical scribe for me, accurately reflects the services I personally performed and the decisions made by me. I have reviewed and approved this document for accuracy prior to leaving the patient care area.  Halley Huff MD  3:32 PM, 04/06/17    Signed Electronically by: Halley Huff MD    Copy of this evaluation report is provided to requesting physician.    [facility] Preop Guidelines

## 2017-04-06 NOTE — TELEPHONE ENCOUNTER
Reason for Call:  Medication or medication refill:    Do you use a [facility]?  Name of the pharmacy and phone number for the current request:    [facility] [address] [place], [place] - [address] AT [facility] [address]    Name of the medication requested: Antibiotic like Amoxacillin     Other request: patient has a dental emergency and is having a dental procedure done tomorrow.  She is requesting an antibiotic due to artificial knees....    Can we leave a detailed message on this number? YES    Phone number Sandhya fernandez, can be reached at: Cell number on file:    Telephone Information:   Mobile [phone]       Best Time: any time    Call taken on 4/6/2017 at 12:57 PM by Bita Alvarez  .

## 2017-04-06 NOTE — NURSING NOTE
"Chief Complaint   Patient presents with     Pre-Op Exam       Initial /74 (BP Location: Right arm, Patient Position: Chair, Cuff Size: Adult Regular)  Pulse 85  Temp 98.4  F (36.9  C) (Oral)  Ht 5' 2\" (1.575 m)  Wt 176 lb (79.8 kg)  SpO2 96%  Breastfeeding? No  BMI 32.19 kg/m2 Estimated body mass index is 32.19 kg/(m^2) as calculated from the following:    Height as of this encounter: 5' 2\" (1.575 m).    Weight as of this encounter: 176 lb (79.8 kg).  Medication Reconciliation: complete   Tanja Roberts- CMA      "

## 2017-04-06 NOTE — MR AVS SNAPSHOT
After Visit Summary   4/6/2017    Shelly Rogers    MRN: [id number]           Patient Information     Date Of Birth          1/18/1933        Visit Information        Provider Department      4/6/2017 3:00 PM Halley Huff MD [facility]        Today's Diagnoses     Preop general physical exam    -  1    Senile cataract of right eye        Chronic diastolic congestive heart failure (H)        Type 2 diabetes mellitus without complication, without long-term current use of insulin (H)        Hypothyroidism, unspecified type        Screening for deficiency anemia          Care Instructions      Before Your Surgery      Call your surgeon if there is any change in your health. This includes signs of a cold or flu (such as a sore throat, runny nose, cough, rash or fever).    Do not smoke, drink alcohol or take over the counter medicine (unless your surgeon or primary care doctor tells you to) for the 24 hours before and after surgery.    If you take prescribed drugs: Follow your doctor s orders about which medicines to take and which to stop until after surgery.    Eating and drinking prior to surgery: follow the instructions from your surgeon    Take a shower or bath the night before surgery. Use the soap your surgeon gave you to gently clean your skin. If you do not have soap from your surgeon, use your regular soap. Do not shave or scrub the surgery site.  Wear clean pajamas and have clean sheets on your bed.    Labs today  Hold Lasix on the morning of the surgery  Hold Glimepiride on the morning of surgery   Resume after the surgery             Follow-ups after your visit        Your next 10 appointments already scheduled     Apr 11, 2017   Procedure with Calvin Dominguez MD   [facility] (--)    [address]  [facility] [address]   [phone]            Apr 11, 2017   Procedure with Calvin Dominguez MD   [facility]  (--)    [address]  [place] [address] [phone]            May 17, 2017  3:00 PM CDT   Anticoagulation Visit with [facility]   [facility] [place] ([facility] [place])    [address] [place] [address] [phone]            Jun 01, 2017  2:15 PM CDT   Remote PPM Check with LEVY TECH1   [facility] ([facility])    [facility]  [place] [address] [phone]           This appointment is for a remote check of your pacemaker.  This is not an appointment at the office.            Jun 02, 2017  7:30 AM CDT   LAB with LEVY LAB   [facility] ([facility])    [id number]  [place] [id number] [phone]           Patient must bring picture ID.  Patient should be prepared to give a urine specimen  Please do not eat 10-12 hours before your appointment if you are coming in fasting for labs on lipids, cholesterol, or glucose (sugar).  Pregnant women should follow their Care Team instructions. Water with medications is okay. Do not drink coffee or other fluids.   If you have concerns about taking  your medications, please ask at office or if scheduling via [employer], send a message by clicking on Secure Messaging, Message Your Care Team.            Jun 02, 2017  8:15 AM CDT   Return Visit with Galindo Del Valle MD   [facility] ([facility])    [id number]  [place] [address] [phone]              Future tests that were ordered for you today     Open Future Orders        Priority Expected Expires Ordered    Hemoglobin A1c Routine  4/6/2018 4/6/2017            Who to contact     If you have questions or need follow up information about today's clinic visit or your schedule please [facility] directly at [phone].  Normal or non-critical lab  "and imaging results will be communicated to you by MyChart, letter or phone within 4 business days after the clinic has received the results. If you do not hear from us within 7 days, please contact the clinic through [employer] or phone. If you have a critical or abnormal lab result, we will notify you by phone as soon as possible.  Submit refill requests through [employer] or call your pharmacy and they will forward the refill request to us. Please allow 3 business days for your refill to be completed.          Additional Information About Your Visit        [employer] Information     [employer] lets you send messages to your doctor, view your test results, renew your prescriptions, schedule appointments and more. To sign up, go to [email or web address] . Click on \"Log in\" on the left side of the screen, which will take you to the Welcome page. Then click on \"Sign up Now\" on the right side of the page.     You will be asked to enter the access code listed below, as well as some personal information. Please follow the directions to create your username and password.     Your access code is: [email or web address]  Expires: 2017 10:01 AM     Your access code will  in 90 days. If you need help or a new code, please call your [place] clinic or [phone].        Care EveryWhere ID     This is your Care EveryWhere ID. This could be used by other organizations to access your [place] medical records  [id number]        Your Vitals Were     Pulse Temperature Height Pulse Oximetry Breastfeeding? BMI (Body Mass Index)    85 98.4  F (36.9  C) (Oral) 5' 2\" (1.575 m) 96% No 32.19 kg/m2       Blood Pressure from Last 3 Encounters:   17 134/74   16 136/74   16 139/79    Weight from Last 3 Encounters:   17 176 lb (79.8 kg)   16 174 lb (78.9 kg)   16 177 lb 4.8 oz (80.4 kg)              We Performed the Following     CBC with platelets     Comprehensive metabolic panel     Ferritin     TSH        "   Today's Medication Changes          These changes are accurate as of: 4/6/17  3:33 PM.  If you have any questions, ask your nurse or doctor.               Start taking these medicines.        Dose/Directions    amoxicillin 500 MG capsule   Commonly known as:  AMOXIL   Used for:  Prophylactic antibiotic   Started by:  Halley Huff MD        Dose:  2000 mg   Take 4 capsules (2,000 mg) by mouth once for 1 dose   Quantity:  4 capsule   Refills:  3         These medicines have changed or have updated prescriptions.        Dose/Directions    furosemide 20 MG tablet   Commonly known as:  LASIX   This may have changed:  additional instructions   Used for:  Chronic diastolic congestive heart failure (H)   Changed by:  Halley Huff MD        Dose:  20 mg   Take 1 tablet (20 mg) by mouth 2 times daily   Quantity:  180 tablet   Refills:  3       glimepiride 2 MG tablet   Commonly known as:  AMARYL   This may have changed:  See the new instructions.   Used for:  Type 2 diabetes mellitus without complication, without long-term current use of insulin (H)   Changed by:  Halley Huff MD        Dose:  2 mg   Take 1 tablet (2 mg) by mouth every morning (before breakfast)   Quantity:  90 tablet   Refills:  3       metoprolol 25 MG 24 hr tablet   Commonly known as:  TOPROL-XL   This may have changed:  See the new instructions.   Used for:  Chronic diastolic congestive heart failure (H)   Changed by:  Halley Huff MD        Dose:  25 mg   Take 1 tablet (25 mg) by mouth 2 times daily   Quantity:  180 tablet   Refills:  3            Where to get your medicines      [facility] [address] [place] [address] [place] [address] [facility] [address]  [address], [place] [address]     Phone:  [phone]     amoxicillin 500 MG capsule    furosemide 20 MG tablet    glimepiride 2 MG tablet    metFORMIN 500 MG tablet    metoprolol 25 MG 24 hr tablet                Primary Care  Provider Office Phone # Fax #    Halley Huff [phone]       [facility]    [address] [facility] [address]  [facility] [address]        Thank you!     Thank you for choosing [facility]  for your care. Our goal is always to provide you with excellent care. Hearing back from our patients is one way we can continue to improve our services. Please take a few minutes to complete the written survey that you may receive in the mail after your visit with us. Thank you!             Your Updated Medication List - Protect others around you: Learn how to safely use, store and throw away your medicines at www.disposemymeds.org.          This list is accurate as of: 4/6/17  3:33 PM.  Always use your most recent med list.                   Brand Name Dispense Instructions for use    ACE/ARB NOT PRESCRIBED (INTENTIONAL)      ACE & ARB not prescribed due to Other: HFpEF       ACETAMINOPHEN PO      Take 500 mg by mouth 3 times daily as needed (Takes at least 6 hours apart).       amoxicillin 500 MG capsule    AMOXIL    4 capsule    Take 4 capsules (2,000 mg) by mouth once for 1 dose       ASPIRIN NOT PRESCRIBED    INTENTIONAL    0 each    Antiplatelet medication not prescribed intentionally due to Current anticoagulant therapy (warfarin/enoxaparin)       blood glucose monitoring lancets     1 Box    Use to check blood sugars daily       blood glucose monitoring meter device kit     1 kit    1 Device daily.       blood glucose monitoring test strip    ACCU-CHEK SMARTVIEW    1 Box    1 strip by In Vitro route daily       CENTRUM SILVER PO      Take by mouth daily       furosemide 20 MG tablet    LASIX    180 tablet    Take 1 tablet (20 mg) by mouth 2 times daily       glimepiride 2 MG tablet    AMARYL    90 tablet    Take 1 tablet (2 mg) by mouth every morning (before breakfast)       levothyroxine 75 MCG tablet    SYNTHROID/LEVOTHROID    90 tablet    TAKE 1 TABLET BY MOUTH DAILY        metFORMIN 500 MG tablet    GLUCOPHAGE    270 tablet    Take 1 tablet (500 mg) by mouth 3 times daily (with meals)       metoprolol 25 MG 24 hr tablet    TOPROL-XL    180 tablet    Take 1 tablet (25 mg) by mouth 2 times daily       NASACORT ALLERGY 24HR 55 MCG/ACT Inhaler   Generic drug:  triamcinolone      Spray 2 sprays into both nostrils daily       * order for DME     2 Package    Equipment being ordered: Compression Stockings Knee high 20/30 compression       * order for DME     1 Units    Equipment being ordered: cane with quad or prongs       STATIN NOT PRESCRIBED (INTENTIONAL)     0 each    continuous prn. Statin not prescribed intentionally due to Other: Not needed, LDL at goal <100mg/dL without therapy       warfarin 5 MG tablet    COUMADIN    90 tablet    TAKE 1/2 TO 1 TABLET BY MOUTH DAILY OR AS DIRECTED BY INR CLINIC       * Notice:  This list has 2 medication(s) that are the same as other medications prescribed for you. Read the directions carefully, and ask your doctor or other care provider to review them with you.

## 2017-04-06 NOTE — PATIENT INSTRUCTIONS
Before Your Surgery      Call your surgeon if there is any change in your health. This includes signs of a cold or flu (such as a sore throat, runny nose, cough, rash or fever).    Do not smoke, drink alcohol or take over the counter medicine (unless your surgeon or primary care doctor tells you to) for the 24 hours before and after surgery.    If you take prescribed drugs: Follow your doctor s orders about which medicines to take and which to stop until after surgery.    Eating and drinking prior to surgery: follow the instructions from your surgeon    Take a shower or bath the night before surgery. Use the soap your surgeon gave you to gently clean your skin. If you do not have soap from your surgeon, use your regular soap. Do not shave or scrub the surgery site.  Wear clean pajamas and have clean sheets on your bed.    Labs today  Hold Lasix on the morning of the surgery  Hold Glimepiride on the morning of surgery   Resume after the surgery

## 2017-04-07 LAB — FERRITIN SERPL-MCNC: 18 NG/ML (ref 8–252)

## 2017-04-07 RX ORDER — PROPARACAINE HYDROCHLORIDE 5 MG/ML
1 SOLUTION/ DROPS OPHTHALMIC ONCE
Status: CANCELLED | OUTPATIENT
Start: 2017-04-07 | End: 2017-04-07

## 2017-04-07 RX ORDER — DICLOFENAC SODIUM 1 MG/ML
1 SOLUTION/ DROPS OPHTHALMIC
Status: CANCELLED | OUTPATIENT
Start: 2017-04-07

## 2017-04-07 RX ORDER — TROPICAMIDE 10 MG/ML
1 SOLUTION/ DROPS OPHTHALMIC
Status: CANCELLED | OUTPATIENT
Start: 2017-04-07

## 2017-04-07 RX ORDER — MOXIFLOXACIN 5 MG/ML
1 SOLUTION/ DROPS OPHTHALMIC
Status: CANCELLED | OUTPATIENT
Start: 2017-04-07

## 2017-04-07 RX ORDER — PHENYLEPHRINE HYDROCHLORIDE 25 MG/ML
1 SOLUTION/ DROPS OPHTHALMIC
Status: CANCELLED | OUTPATIENT
Start: 2017-04-07

## 2017-04-11 ENCOUNTER — SURGERY (OUTPATIENT)
Age: 82
End: 2017-04-11

## 2017-04-11 ENCOUNTER — HOSPITAL ENCOUNTER (OUTPATIENT)
Facility: CLINIC | Age: 82
Discharge: HOME | End: 2017-04-11
Attending: OPHTHALMOLOGY | Admitting: OPHTHALMOLOGY
Payer: MEDICARE

## 2017-04-11 ENCOUNTER — ANESTHESIA (OUTPATIENT)
Dept: SURGERY | Facility: CLINIC | Age: 82
End: 2017-04-11
Payer: MEDICARE

## 2017-04-11 ENCOUNTER — APPOINTMENT (OUTPATIENT)
Dept: ADMISSION | Facility: CLINIC | Age: 82
End: 2017-04-11
Attending: OPHTHALMOLOGY
Payer: COMMERCIAL

## 2017-04-11 ENCOUNTER — ANESTHESIA EVENT (OUTPATIENT)
Dept: SURGERY | Facility: CLINIC | Age: 82
End: 2017-04-11
Payer: MEDICARE

## 2017-04-11 VITALS
OXYGEN SATURATION: 95 % | RESPIRATION RATE: 16 BRPM | SYSTOLIC BLOOD PRESSURE: 147 MMHG | TEMPERATURE: 98.2 F | DIASTOLIC BLOOD PRESSURE: 74 MMHG

## 2017-04-11 DIAGNOSIS — E11.9 TYPE 2 DIABETES MELLITUS WITHOUT COMPLICATION, WITHOUT LONG-TERM CURRENT USE OF INSULIN (H): ICD-10-CM

## 2017-04-11 LAB
GLUCOSE BLDC GLUCOMTR-MCNC: 139 MG/DL (ref 70–99)
HBA1C MFR BLD: 8.1 % (ref 4.3–6)

## 2017-04-11 PROCEDURE — 71000028 ZZH EYE RECOVERY PHASE 2 EACH 15 MINS: Performed by: OPHTHALMOLOGY

## 2017-04-11 PROCEDURE — 25000125 ZZHC RX 250: Performed by: OPHTHALMOLOGY

## 2017-04-11 PROCEDURE — 25000132 ZZH RX MED GY IP 250 OP 250 PS 637: Mod: GY | Performed by: OPHTHALMOLOGY

## 2017-04-11 PROCEDURE — 36000135 ZZH KERATOTOMY ARCUATE W FEMTOSECOND LASER/IMAGING FOR ATIOL: Performed by: OPHTHALMOLOGY

## 2017-04-11 PROCEDURE — 25800025 ZZH RX 258: Performed by: ANESTHESIOLOGY

## 2017-04-11 PROCEDURE — 25000125 ZZHC RX 250: Performed by: NURSE ANESTHETIST, CERTIFIED REGISTERED

## 2017-04-11 PROCEDURE — V2632 POST CHMBR INTRAOCULAR LENS: HCPCS | Performed by: OPHTHALMOLOGY

## 2017-04-11 PROCEDURE — 25000128 H RX IP 250 OP 636: Performed by: NURSE ANESTHETIST, CERTIFIED REGISTERED

## 2017-04-11 PROCEDURE — 82962 GLUCOSE BLOOD TEST: CPT

## 2017-04-11 PROCEDURE — 36000101 ZZH EYE SURGERY LEVEL 3 1ST 30 MIN: Performed by: OPHTHALMOLOGY

## 2017-04-11 PROCEDURE — 37000008 ZZH ANESTHESIA TECHNICAL FEE, 1ST 30 MIN: Performed by: OPHTHALMOLOGY

## 2017-04-11 PROCEDURE — 27210794 ZZH OR GENERAL SUPPLY STERILE: Performed by: OPHTHALMOLOGY

## 2017-04-11 PROCEDURE — 40000170 ZZH STATISTIC PRE-PROCEDURE ASSESSMENT II: Performed by: OPHTHALMOLOGY

## 2017-04-11 PROCEDURE — 25000125 ZZHC RX 250: Performed by: ANESTHESIOLOGY

## 2017-04-11 DEVICE — EYE IMP IOL AMO PCL TECNIS ZCB00 19.0: Type: IMPLANTABLE DEVICE | Site: EYE | Status: FUNCTIONAL

## 2017-04-11 RX ORDER — BALANCED SALT SOLUTION 6.4; .75; .48; .3; 3.9; 1.7 MG/ML; MG/ML; MG/ML; MG/ML; MG/ML; MG/ML
SOLUTION OPHTHALMIC PRN
Status: DISCONTINUED | OUTPATIENT
Start: 2017-04-11 | End: 2017-04-11 | Stop reason: HOSPADM

## 2017-04-11 RX ORDER — PROPARACAINE HYDROCHLORIDE 5 MG/ML
SOLUTION/ DROPS OPHTHALMIC PRN
Status: DISCONTINUED | OUTPATIENT
Start: 2017-04-11 | End: 2017-04-11 | Stop reason: HOSPADM

## 2017-04-11 RX ORDER — GLIMEPIRIDE 2 MG/1
2 TABLET ORAL 2 TIMES DAILY
Qty: 180 TABLET | Refills: 3 | Status: SHIPPED | OUTPATIENT
Start: 2017-04-11 | End: 2018-02-27

## 2017-04-11 RX ORDER — PREDNISOLONE ACETATE 1 %
SUSPENSION, DROPS(FINAL DOSAGE FORM)(ML) OPHTHALMIC (EYE) PRN
Status: DISCONTINUED | OUTPATIENT
Start: 2017-04-11 | End: 2017-04-11 | Stop reason: HOSPADM

## 2017-04-11 RX ORDER — PROPARACAINE HYDROCHLORIDE 5 MG/ML
1 SOLUTION/ DROPS OPHTHALMIC ONCE
Status: DISCONTINUED | OUTPATIENT
Start: 2017-04-11 | End: 2017-04-11 | Stop reason: HOSPADM

## 2017-04-11 RX ORDER — LIDOCAINE HYDROCHLORIDE 10 MG/ML
INJECTION, SOLUTION EPIDURAL; INFILTRATION; INTRACAUDAL; PERINEURAL PRN
Status: DISCONTINUED | OUTPATIENT
Start: 2017-04-11 | End: 2017-04-11 | Stop reason: HOSPADM

## 2017-04-11 RX ORDER — MOXIFLOXACIN 5 MG/ML
1 SOLUTION/ DROPS OPHTHALMIC
Status: COMPLETED | OUTPATIENT
Start: 2017-04-11 | End: 2017-04-11

## 2017-04-11 RX ORDER — TROPICAMIDE 10 MG/ML
1 SOLUTION/ DROPS OPHTHALMIC
Status: COMPLETED | OUTPATIENT
Start: 2017-04-11 | End: 2017-04-11

## 2017-04-11 RX ORDER — DICLOFENAC SODIUM 1 MG/ML
1 SOLUTION/ DROPS OPHTHALMIC
Status: COMPLETED | OUTPATIENT
Start: 2017-04-11 | End: 2017-04-11

## 2017-04-11 RX ORDER — SODIUM CHLORIDE, SODIUM LACTATE, POTASSIUM CHLORIDE, CALCIUM CHLORIDE 600; 310; 30; 20 MG/100ML; MG/100ML; MG/100ML; MG/100ML
500 INJECTION, SOLUTION INTRAVENOUS CONTINUOUS
Status: DISCONTINUED | OUTPATIENT
Start: 2017-04-11 | End: 2017-04-11 | Stop reason: HOSPADM

## 2017-04-11 RX ORDER — PHENYLEPHRINE HYDROCHLORIDE 25 MG/ML
1 SOLUTION/ DROPS OPHTHALMIC
Status: COMPLETED | OUTPATIENT
Start: 2017-04-11 | End: 2017-04-11

## 2017-04-11 RX ORDER — PROPARACAINE HYDROCHLORIDE 5 MG/ML
1 SOLUTION/ DROPS OPHTHALMIC ONCE
Status: COMPLETED | OUTPATIENT
Start: 2017-04-11 | End: 2017-04-11

## 2017-04-11 RX ADMIN — TROPICAMIDE 1 DROP: 10 SOLUTION/ DROPS OPHTHALMIC at 12:04

## 2017-04-11 RX ADMIN — MOXIFLOXACIN HYDROCHLORIDE 1 DROP: 5 SOLUTION/ DROPS OPHTHALMIC at 12:08

## 2017-04-11 RX ADMIN — DEXMEDETOMIDINE 4 MCG: 100 INJECTION, SOLUTION, CONCENTRATE INTRAVENOUS at 13:57

## 2017-04-11 RX ADMIN — LIDOCAINE HYDROCHLORIDE 0.5 ML: 35 GEL OPHTHALMIC at 13:57

## 2017-04-11 RX ADMIN — TROPICAMIDE 1 DROP: 10 SOLUTION/ DROPS OPHTHALMIC at 12:08

## 2017-04-11 RX ADMIN — PROPARACAINE HYDROCHLORIDE 2 DROP: 5 SOLUTION/ DROPS OPHTHALMIC at 13:43

## 2017-04-11 RX ADMIN — MOXIFLOXACIN HYDROCHLORIDE 1 DROP: 5 SOLUTION/ DROPS OPHTHALMIC at 12:04

## 2017-04-11 RX ADMIN — DICLOFENAC SODIUM 1 DROP: 1 SOLUTION/ DROPS OPHTHALMIC at 12:04

## 2017-04-11 RX ADMIN — LIDOCAINE HYDROCHLORIDE 1 ML: 10 INJECTION, SOLUTION EPIDURAL; INFILTRATION; INTRACAUDAL; PERINEURAL at 12:16

## 2017-04-11 RX ADMIN — Medication 1 DROP: at 14:05

## 2017-04-11 RX ADMIN — PROPARACAINE HYDROCHLORIDE 2 DROP: 5 SOLUTION/ DROPS OPHTHALMIC at 13:41

## 2017-04-11 RX ADMIN — MOXIFLOXACIN HYDROCHLORIDE 1 DROP: 5 SOLUTION/ DROPS OPHTHALMIC at 11:57

## 2017-04-11 RX ADMIN — PROPARACAINE HYDROCHLORIDE 1 DROP: 5 SOLUTION/ DROPS OPHTHALMIC at 11:57

## 2017-04-11 RX ADMIN — PHENYLEPHRINE HYDROCHLORIDE 1 DROP: 2.5 SOLUTION/ DROPS OPHTHALMIC at 11:57

## 2017-04-11 RX ADMIN — BALANCED SALT SOLUTION 15 ML: 6.4; .75; .48; .3; 3.9; 1.7 SOLUTION OPHTHALMIC at 13:57

## 2017-04-11 RX ADMIN — Medication 1 APPLICATOR: at 13:58

## 2017-04-11 RX ADMIN — TROPICAMIDE 1 DROP: 10 SOLUTION/ DROPS OPHTHALMIC at 11:57

## 2017-04-11 RX ADMIN — PHENYLEPHRINE HYDROCHLORIDE 1 DROP: 2.5 SOLUTION/ DROPS OPHTHALMIC at 12:04

## 2017-04-11 RX ADMIN — PHENYLEPHRINE HYDROCHLORIDE 1 DROP: 2.5 SOLUTION/ DROPS OPHTHALMIC at 12:08

## 2017-04-11 RX ADMIN — BALANCED SALT SOLUTION 15 ML: 6.4; .75; .48; .3; 3.9; 1.7 SOLUTION OPHTHALMIC at 13:52

## 2017-04-11 RX ADMIN — LIDOCAINE HYDROCHLORIDE 1 ML: 10 INJECTION, SOLUTION EPIDURAL; INFILTRATION; INTRACAUDAL; PERINEURAL at 13:57

## 2017-04-11 RX ADMIN — DICLOFENAC SODIUM 1 DROP: 1 SOLUTION/ DROPS OPHTHALMIC at 11:57

## 2017-04-11 RX ADMIN — SODIUM CHLORIDE, SODIUM LACTATE, POTASSIUM CHLORIDE, CALCIUM CHLORIDE: 600; 310; 30; 20 INJECTION, SOLUTION INTRAVENOUS at 13:50

## 2017-04-11 RX ADMIN — DEXMEDETOMIDINE 12 MCG: 100 INJECTION, SOLUTION, CONCENTRATE INTRAVENOUS at 13:53

## 2017-04-11 RX ADMIN — EPINEPHRINE 500 ML: 1 INJECTION INTRAMUSCULAR; INTRAVENOUS; SUBCUTANEOUS at 13:57

## 2017-04-11 RX ADMIN — DICLOFENAC SODIUM 1 DROP: 1 SOLUTION/ DROPS OPHTHALMIC at 12:08

## 2017-04-11 ASSESSMENT — COPD QUESTIONNAIRES: COPD: 0

## 2017-04-11 ASSESSMENT — ENCOUNTER SYMPTOMS
SEIZURES: 0
DYSRHYTHMIAS: 1

## 2017-04-11 ASSESSMENT — LIFESTYLE VARIABLES: TOBACCO_USE: 0

## 2017-04-11 NOTE — PROGRESS NOTES
Patient ' s daughter was notified   Please send result note with copy of lab result    Ferritin which is iron stores in the body is low.  We want Ferritin level greater than 50  Take OTC Ferrous Sulfate 325 mg po once daily if you tolerate.  Take with vit C as vit C helps to absorb iron.  Iron can make you constipated so take stool softener.  Recheck ferritin in 4 months  She will need work up including colonoscopy and endoscopy  Let me know if you want me to put orders for that for work up of iron deficiency  Low iron could be from hemorrhoidal bleeding.  But work up is still important.  HbA1c which is average glucose during last 3 months is above the goal  Lab Results       Component                Value               Date                       A1C                      8.1                 04/06/2017                 A1C                      8.0                 09/12/2016                 A1C                      7.5                 02/16/2016                 A1C                      9.2                 10/27/2015                 A1C                      7.9                 10/30/2014            Increase the glimepride to 2 mg twice daily  As dose is increased watch for hypoglycemic symptoms  TSH which is thyroid hormone is normal.  Stay on current dose of levothyroxine.  Recheck TSH in 6 months  CBC result which includes white count Hemoglobin and  Platelet Counts is normal.   The testing of your kidney function, liver function and electrolytes was satisfactory   Follow up appointment in 3-4 months  Please send the copy of lab results also to this patient.

## 2017-04-11 NOTE — OP NOTE
PREOPERATIVE DIAGNOSIS: Cataract, Right eye.   POSTOPERATIVE DIAGNOSIS: Cataract, Right eye.   OPERATION: Femto assisted Phacoemulsification with implantation of posterior chamber intraocular lens, Right eye.   ANESTHESIA: Monitored anesthesia care.   INDICATIONS FOR SURGERY: Shelly Rogers has noted a progressive decline in the vision of her Right eye secondary to a cataract. This has affected her ability to perform routine functions including reading. The patient has progressive cataract changes consistent with her vision and symptoms.     PROCEDURE: Informed consent was obtained from the patient preoperatively with the risks and alternatives reviewed, including the possibility of loss of vision. In the preoperative area, the patient was administered topical anesthetic consisting of 2% Xylocaine jelly. The patient was taken to the operating room. The face was prepped and draped in the usual sterile fashion. Attention was directed to the Right eye. A stab incision was made at the limbus with a 15-degree blade. Viscoat was used to replace aqueous. A keratome was used to make a limbal self-sealing incision 2.5 mm in diameter. A curvilinear capsulorrhexis was performed with the Utrata forceps. Hydrodissection was carried out. The nucleus was removed with the phacoemulsification handpiece in a four-quadrant cracking technique. The cortex was removed with the irrigation and aspiration handpiece. The posterior capsule remained intact. Provisc was used to inflate the capsular bag. A posterior chamber intraocular lens was taken from its case and inspected. It was free of defects and it was folded into the shooter. The lens was then injected into the eye by directing the leading haptic into the capsular bag. The trailing haptic was then placed in the eye with a haptic . The lens centered well. The Provisc was removed from the eye with the I&A handpiece. The eye was inflated with balanced salt solution. The wound was  inspected and found to be watertight. Topical Vigamox and Pred Forte were applied. An eye shield was placed over the eye. The patient tolerated the procedure well and left the operating area in good condition.       Implant Name Type Inv. Item Serial No.  Lot No. LRB No. Used   EYE IMP IOL EMILIA PCL TECNIS ZCB00 19.0 Lens/Eye Implant EYE IMP IOL EMILIA PCL TECNIS ZCB00 19.0 [id number] [employer]   Right 1       Calvin Dominguez M.D.

## 2017-04-11 NOTE — IP AVS SNAPSHOT
MRN:[id number]                      After Visit Summary   4/11/2017    Shelly Rogers    MRN: [id number]           Thank you!     Thank you for choosing [facility] for your care. Our goal is always to provide you with excellent care. Hearing back from our patients is one way we can continue to improve our services. Please take a few minutes to complete the written survey that you may receive in the mail after you visit with us. Thank you!        Patient Information     Date Of Birth          1/18/1933        About your hospital stay     You were admitted on:  April 11, 2017 You last received care in the:  [facility]    You were discharged on:  April 11, 2017       Who to Call     For medical emergencies, please call 911.  For non-urgent questions about your medical care, please call your primary care provider or clinic, [phone]  For questions related to your surgery, please call your surgery clinic        Attending Provider     Provider Specialty    Calvin Dominguez MD Ophthalmology       Primary Care Provider Office Phone # Fax #    Halley ALLI Huff [phone]       [facility]    [address]  [facility] [address]        Your next 10 appointments already scheduled     May 17, 2017  3:00 PM CDT   Anticoagulation Visit with [facility] ANTICOAGULATION CLINIC   [facility] ([facility])    [address] [place] [address] [phone]            Jun 01, 2017  2:15 PM CDT   Remote PPM Check with LEVY TECH1   [facility] ([facility])    [address] [place] [address] [address]  [place] [address] [phone]           This appointment is for a remote check of your pacemaker.  This is not an appointment at the office.            Jun 02, 2017  7:30 AM CDT   LAB with LEVY LAB   [facility] ([facility])    [address] [place] [address]  [place] [id number]  [facility] [address] [phone]           Patient must bring picture ID.  Patient should be prepared to give a urine specimen  Please do not eat 10-12 hours before your appointment if you are coming in fasting for labs on lipids, cholesterol, or glucose (sugar).  Pregnant women should follow their Care Team instructions. Water with medications is okay. Do not drink coffee or other fluids.   If you have concerns about taking  your medications, please ask at office or if scheduling via [employer], send a message by clicking on Secure Messaging, Message Your Care Team.            Jun 02, 2017  8:15 AM CDT   Return Visit with Galindo Del Valle MD   [facility] ([facility])    [id number]  [facility] [address] [phone]              Further instructions from your care team       [facility] Discharge  Instructions  Anesthesia ([facility])   Adult Discharge Instructions    For 24 hours after surgery    1. Get plenty of rest.  Make arrangements to have a responsible adult stay with you for at least 6 hours after you leave the hospital.  2. Do not drive or use heavy equipment for 24 hours.    3. Do not drink alcohol for 24 hours.  4. Do not sign legal documents or make important decisions for 24 hours.  5. Avoid strenuous or risky activities. You may feel lightheaded.  If so, sit for a few minutes before standing.  Have someone help you get up.   6. Conscious sedation patients may resume a regular diet..  7. Any questions of medical nature, call your physician.    [facility]  Cataract Surgery Discharge Instructions  [facility] MD BIANCA Navarro MD J. Hasan, MD C. Nichols, MD S. Schaefer, MD J. Stephens, MD J. O'Neill, MD      Start using drops when you arrive at home today    Vigamox (Tan top) - one drop in surgical eye  "3 times per day until gone.    Prednisolone acetate 1.0% (pink or white top) - one drop in surgical eye 3 times per day until gone.    Diclofenac (Grey top) - one drop in surgical eye 3 times per day until gone.      Place shield over surgical eye at bedtime for 3 nights.      The eye will feel itchy, scratchy, and vision will be blurred, you may take Tylenol for the scratchy feeling if this is bothersome.      No eye rubbing or swimming for I week.      You may resume all prescription medications as directed by your primary doctor.      Call if increasing pain, progressively worsening vision or worsening redness of surgical eye.    On-call doctor can be reached at [phone].    Pending Results     No orders found from 2017 to 2017.            Admission Information     Date & Time Provider Department Dept. Phone    2017 Calvin Dominguez MD [facility] [phone]      Your Vitals Were     Blood Pressure Temperature Respirations Pulse Oximetry          147/74 98.2  F (36.8  C) (Temporal) 16 95%        MyChart Information     [employer] lets you send messages to your doctor, view your test results, renew your prescriptions, schedule appointments and more. To sign up, go to [email or web address] . Click on \"Log in\" on the left side of the screen, which will take you to the Welcome page. Then click on \"Sign up Now\" on the right side of the page.     You will be asked to enter the access code listed below, as well as some personal information. Please follow the directions to create your username and password.     Your access code is: [email or web address]  Expires: 2017 10:01 AM     Your access code will  in 90 days. If you need help or a new code, please call your [place] clinic or [phone].        Care EveryWhere ID     This is your Care EveryWhere ID. This could be used by other organizations to access your [place] medical records  [id number]           Review of your " medicines      CONTINUE these medicines which have NOT CHANGED        Dose / Directions    ACE/ARB NOT PRESCRIBED (INTENTIONAL)   Used for:  Chronic diastolic congestive heart failure (H)        ACE & ARB not prescribed due to Other: HFpEF   Refills:  0       ACETAMINOPHEN PO        Dose:  500 mg   Take 500 mg by mouth 3 times daily as needed (Takes at least 6 hours apart).   Refills:  0       ASPIRIN NOT PRESCRIBED   Commonly known as:  INTENTIONAL   Used for:  Type 2 diabetes mellitus without complications (H)        Antiplatelet medication not prescribed intentionally due to Current anticoagulant therapy (warfarin/enoxaparin)   Quantity:  0 each   Refills:  0       blood glucose monitoring lancets   Used for:  Type 2 diabetes, HbA1c goal < 7% (H)        Use to check blood sugars daily   Quantity:  1 Box   Refills:  prn       blood glucose monitoring meter device kit   Used for:  Type 2 diabetes, HbA1c goal < 7% (H)        Dose:  1 Device   1 Device daily.   Quantity:  1 kit   Refills:  0       blood glucose monitoring test strip   Commonly known as:  ACCU-CHEK SMARTVIEW   Used for:  Type 2 diabetes, HbA1c goal < 7% (H)        Dose:  1 strip   1 strip by In Vitro route daily   Quantity:  1 Box   Refills:  prn       CENTRUM SILVER PO        Take by mouth daily   Refills:  0       furosemide 20 MG tablet   Commonly known as:  LASIX   Used for:  Chronic diastolic congestive heart failure (H)        Dose:  20 mg   Take 1 tablet (20 mg) by mouth 2 times daily   Quantity:  180 tablet   Refills:  3       glimepiride 2 MG tablet   Commonly known as:  AMARYL   Used for:  Type 2 diabetes mellitus without complication, without long-term current use of insulin (H)        Dose:  2 mg   Take 1 tablet (2 mg) by mouth 2 times daily   Quantity:  180 tablet   Refills:  3       levothyroxine 75 MCG tablet   Commonly known as:  SYNTHROID/LEVOTHROID   Used for:  Hypothyroidism, unspecified type        TAKE 1 TABLET BY MOUTH DAILY    Quantity:  90 tablet   Refills:  3       metFORMIN 500 MG tablet   Commonly known as:  GLUCOPHAGE   Used for:  Type 2 diabetes mellitus without complication, without long-term current use of insulin (H)        Dose:  500 mg   Take 1 tablet (500 mg) by mouth 3 times daily (with meals)   Quantity:  270 tablet   Refills:  2       metoprolol 25 MG 24 hr tablet   Commonly known as:  TOPROL-XL   Used for:  Chronic diastolic congestive heart failure (H)        Dose:  25 mg   Take 1 tablet (25 mg) by mouth 2 times daily   Quantity:  180 tablet   Refills:  3       NASACORT ALLERGY 24HR 55 MCG/ACT Inhaler   Generic drug:  triamcinolone        Dose:  2 spray   Spray 2 sprays into both nostrils daily   Refills:  0       * order for DME   Used for:  Bilateral edema of lower extremity        Equipment being ordered: Compression Stockings Knee high 20/30 compression   Quantity:  2 Package   Refills:  1       * order for DME   Used for:  Unsteadiness on feet        Equipment being ordered: cane with quad or prongs   Quantity:  1 Units   Refills:  1       STATIN NOT PRESCRIBED (INTENTIONAL)        continuous prn. Statin not prescribed intentionally due to Other: Not needed, LDL at goal <100mg/dL without therapy   Quantity:  0 each   Refills:  0       warfarin 5 MG tablet   Commonly known as:  COUMADIN   Used for:  Paroxysmal atrial fibrillation (H)        TAKE 1/2 TO 1 TABLET BY MOUTH DAILY OR AS DIRECTED BY INR CLINIC   Quantity:  90 tablet   Refills:  1       * Notice:  This list has 2 medication(s) that are the same as other medications prescribed for you. Read the directions carefully, and ask your doctor or other care provider to review them with you.             Protect others around you: Learn how to safely use, store and throw away your medicines at www.disposemymeds.org.             Medication List: This is a list of all your medications and when to take them. Check marks below indicate your daily home schedule. Keep this  list as a reference.      Medications           Morning Afternoon Evening Bedtime As Needed    ACE/ARB NOT PRESCRIBED (INTENTIONAL)   ACE & ARB not prescribed due to Other: HFpEF                                ACETAMINOPHEN PO   Take 500 mg by mouth 3 times daily as needed (Takes at least 6 hours apart).                                ASPIRIN NOT PRESCRIBED   Commonly known as:  INTENTIONAL   Antiplatelet medication not prescribed intentionally due to Current anticoagulant therapy (warfarin/enoxaparin)                                blood glucose monitoring lancets   Use to check blood sugars daily                                blood glucose monitoring meter device kit   1 Device daily.                                blood glucose monitoring test strip   Commonly known as:  ACCU-CHEK SMARTVIEW   1 strip by In Vitro route daily                                CENTRUM SILVER PO   Take by mouth daily                                furosemide 20 MG tablet   Commonly known as:  LASIX   Take 1 tablet (20 mg) by mouth 2 times daily                                glimepiride 2 MG tablet   Commonly known as:  AMARYL   Take 1 tablet (2 mg) by mouth 2 times daily                                levothyroxine 75 MCG tablet   Commonly known as:  SYNTHROID/LEVOTHROID   TAKE 1 TABLET BY MOUTH DAILY                                metFORMIN 500 MG tablet   Commonly known as:  GLUCOPHAGE   Take 1 tablet (500 mg) by mouth 3 times daily (with meals)                                metoprolol 25 MG 24 hr tablet   Commonly known as:  TOPROL-XL   Take 1 tablet (25 mg) by mouth 2 times daily                                NASACORT ALLERGY 24HR 55 MCG/ACT Inhaler   Spray 2 sprays into both nostrils daily   Generic drug:  triamcinolone                                * order for DME   Equipment being ordered: Compression Stockings Knee high 20/30 compression                                * order for DME   Equipment being ordered: cane with  quad or prongs                                STATIN NOT PRESCRIBED (INTENTIONAL)   continuous prn. Statin not prescribed intentionally due to Other: Not needed, LDL at goal <100mg/dL without therapy                                warfarin 5 MG tablet   Commonly known as:  COUMADIN   TAKE 1/2 TO 1 TABLET BY MOUTH DAILY OR AS DIRECTED BY INR CLINIC                                * Notice:  This list has 2 medication(s) that are the same as other medications prescribed for you. Read the directions carefully, and ask your doctor or other care provider to review them with you.

## 2017-04-11 NOTE — ANESTHESIA PREPROCEDURE EVALUATION
Procedure: Procedure(s):  PHACOEMULSIFICATION CLEAR CORNEA WITH STANDARD INTRAOCULAR LENS IMPLANT  Preop diagnosis: RIGHT EYE CATARACT    Allergies   Allergen Reactions     Shellfish Allergy Difficulty breathing     Cats      Sneezing, watery eyes     Lisinopril Cough     No Clinical Screening - See Comments Other (See Comments)     Pt stated allergic to flowers, pt gets itchy watery eyes and stuffy nose     Seasonal Allergies Itching     Flowers     Sulfa Drugs Hives     Past Medical History:   Diagnosis Date     Atrial fibrillation (H)     Nl LVEF, s/p ablation      Congestive heart failure, unspecified      Diabetes mellitus (H) 2004     Hemorrhoids     intermittent bleeding     Hypertension      Macular degeneration      OA (osteoarthritis)      Pacemaker 3/2013     Uterine cancer (H)     s/p hyst     Past Surgical History:   Procedure Laterality Date     C ANESTH,PACEMAKER INSERTION      dual chamber 3/27/13     CHOLECYSTECTOMY  8/2004     GYN SURGERY       HYSTERECTOMY      Ut ca      JOINT REPLACEMENT       TKR      bilat     Prior to Admission medications    Medication Sig Start Date End Date Taking? Authorizing Provider   glimepiride (AMARYL) 2 MG tablet Take 1 tablet (2 mg) by mouth 2 times daily 4/11/17  Yes Halley Huff MD   metoprolol (TOPROL-XL) 25 MG 24 hr tablet Take 1 tablet (25 mg) by mouth 2 times daily 4/6/17  Yes Halley Huff MD   furosemide (LASIX) 20 MG tablet Take 1 tablet (20 mg) by mouth 2 times daily 4/6/17  Yes Halley Huff MD   metFORMIN (GLUCOPHAGE) 500 MG tablet Take 1 tablet (500 mg) by mouth 3 times daily (with meals) 4/6/17  Yes Halley Huff MD   levothyroxine (SYNTHROID/LEVOTHROID) 75 MCG tablet TAKE 1 TABLET BY MOUTH DAILY 12/6/16  Yes Halley Huff MD   warfarin (COUMADIN) 5 MG tablet TAKE 1/2 TO 1 TABLET BY MOUTH DAILY OR AS DIRECTED BY INR CLINIC 12/6/16  Yes Halley Huff MD   Multiple Vitamins-Minerals (CENTRUM SILVER PO) Take by mouth daily   Yes  Reported, Patient   blood glucose monitoring (ACCU-CHEK FASTCLIX) lancets Use to check blood sugars daily 12/12/14  Yes Halley Huff MD   blood glucose (ACCU-CHEK SMARTVIEW) test strip 1 strip by In Vitro route daily 12/12/14  Yes Halley Huff MD   ASPIRIN NOT PRESCRIBED, INTENTIONAL, Antiplatelet medication not prescribed intentionally due to Current anticoagulant therapy (warfarin/enoxaparin) 12/30/15   Halley Huff MD   ACE/ARB NOT PRESCRIBED, INTENTIONAL, ACE & ARB not prescribed due to Other: HFpEF 12/30/15   Halley Huff MD   order for DME Equipment being ordered: Compression Stockings  Knee high  20/30 compression 10/13/15   Halley Huff MD   order for DME Equipment being ordered: cane with quad or prongs 10/13/15   Halley Huff MD   triamcinolone (NASACORT ALLERGY 24HR) 55 MCG/ACT nasal inhaler Spray 2 sprays into both nostrils daily    Reported, Patient   STATIN NOT PRESCRIBED, INTENTIONAL, continuous prn. Statin not prescribed intentionally due to Other: Not needed, LDL at goal <100mg/dL without therapy 4/2/13   Vianey Sapp MD   Blood Glucose Monitoring Suppl (ACCU-CHEK [place] SMARTVIEW) W/DEVICE KIT 1 Device daily. 4/1/13   Vianey Sapp MD   ACETAMINOPHEN PO Take 500 mg by mouth 3 times daily as needed (Takes at least 6 hours apart).     Unknown, Entered By History     Current Facility-Administered Medications Ordered in Epic   Medication Dose Route Frequency Last Rate Last Dose     lidocaine (AKTEN) ophthalmic gel 0.5 mL  0.5 mL Ophthalmic Once         povidone-iodine 5 % ophthalmic solution 1 drop  1 drop Ophthalmic Once         proparacaine (ALCAINE) 0.5 % ophthalmic solution 1 drop  1 drop Ophthalmic Once         lidocaine 1 % 1 mL  1 mL Other Q1H PRN   1 mL at 04/11/17 1216     sodium chloride (PF) 0.9% PF flush 3 mL  3 mL Intracatheter Q1H PRN   3 mL at 04/11/17 1216     lactated ringers infusion  500 mL Intravenous Continuous         Current  Outpatient Prescriptions Ordered in Epic   Medication     [DISCONTINUED] glimepiride (AMARYL) 2 MG tablet     Wt Readings from Last 1 Encounters:   04/06/17 79.8 kg (176 lb)     Temp Readings from Last 1 Encounters:   04/11/17 36.8  C (98.2  F) (Temporal)     BP Readings from Last 6 Encounters:   04/11/17 138/71   04/06/17 134/74   09/21/16 136/74   09/12/16 139/79   05/13/16 146/70   12/30/15 125/64     Pulse Readings from Last 4 Encounters:   04/06/17 85   09/21/16 83   09/12/16 87   05/13/16 80     Resp Readings from Last 1 Encounters:   04/11/17 16     SpO2 Readings from Last 1 Encounters:   04/11/17 95%     Recent Labs   Lab Test  04/06/17   1541  09/12/16   1709   NA  137  139   POTASSIUM  4.0  4.3   CHLORIDE  101  105   CO2  28  24   ANIONGAP  8  10   GLC  175*  146*   BUN  18  20   CR  1.00  1.21*   ASTRID  9.1  8.8     Recent Labs   Lab Test  04/06/17   1541  09/12/16   1709   WBC  9.4  7.8   HGB  13.3  13.3   PLT  346  328     Recent Labs   Lab Test 04/05/17 03/02/17   INR  2.0*  2.5*        ECHO: Left ventricular systolic function is normal. The visual ejection fraction is  estimated at 60-65%.  There is mild concentric left ventricular hypertrophy. The left atrium is  borderline dilated.  There is trace to mild mitral regurgitation.  There is moderate (2+) tricuspid regurgitation.  Right ventricular systolic pressure is elevated, consistent with mild  pulmonary hypertension.  The rhythm was normal sinus.  Contrast was used without apparent complications. TR may be mildly increased  compared to the prior study.        Anesthesia Evaluation     . Pt has had prior anesthetic. Type: General    No history of anesthetic complications          ROS/MED HX    ENT/Pulmonary:     (+)SYLVIE risk factors snores loudly, hypertension, , . .   (-) tobacco use, asthma, COPD and sleep apnea   Neurologic:      (-) seizures, CVA and migraines   Cardiovascular:     (+) hypertension----. Taking blood thinners : . CHF . CROCKETT, .  pacemaker :. dysrhythmias a-fib, .       METS/Exercise Tolerance:  3 - Able to walk 1-2 blocks without stopping   Hematologic:        (-) history of blood clots and other hematologic disorder   Musculoskeletal:   (+) arthritis, , , -       GI/Hepatic:         Renal/Genitourinary:         Endo:     (+) type II DM thyroid problem hypothyroidism, Obesity, .   (-) Type I DM   Psychiatric:         Infectious Disease:        (-) Recent Fever   Malignancy:   (+) Malignancy History of Other  Other CA uterine Remission status post         Other:                     Physical Exam  Normal systems: cardiovascular and dental    Airway   Mallampati: III  TM distance: >3 FB  Neck ROM: limited    Dental   (+) caps    Cardiovascular   Rhythm and rate: irregular and normal  (-) no murmur    Pulmonary    breath sounds clear to auscultation                    Anesthesia Plan      History & Physical Review      ASA Status:  3 .    NPO Status:  > 8 hours    Plan for MAC Reason for MAC:  Deep or markedly invasive procedure (G8)  PONV prophylaxis:  Ondansetron (or other 5HT-3)  Precedex, fentanyl      Postoperative Care  Postoperative pain management:  Oral pain medications.      Consents  Anesthetic plan, risks, benefits and alternatives discussed with:  Patient..                          .

## 2017-04-11 NOTE — H&P (VIEW-ONLY)
[address] [address]  [phone]  Dept: [phone]    PRE-OP EVALUATION:  Today's date: 2017    Shelly Rogers (: 1933) presents for pre-operative evaluation assessment as requested by Dr. Dominguez.  She requires evaluation and anesthesia risk assessment prior to undergoing surgery/procedure for treatment of Right  eye Cataracts .  Proposed procedure: RIGHT EYE  FEMTOSECOND LASER CAPSULOTOMY, LENS FRAGMENTATION, ARCUATE INCISIONS;RIGHT EYE FEMTOSECOND LASER ASSISTED, PHACOEMULSIFICATION CLEAR CORNEA WITH STANDARD INTRAOCULAR LENS IMPLANT (MAC/TOPICAL)    Date of Surgery/ Procedure: 2017  Time of Surgery/ Procedure: 01:10pm  Hospital/Surgical Facility: [facility]  Fax number for surgical facility:   Primary Physician: Halley Huff  Type of Anesthesia Anticipated: COMBINED MAC WITH TOPICAL    Patient has a Health Care Directive or Living Will:  YES FULL CODE IN EPIC    1. YES - DO YOU HAVE A HISTORY OF HEART ATTACK, STROKE, STENT, BYPASS OR SURGERY ON AN ARTERY IN THE HEAD, NECK, HEART OR LEG? Pacemaker  2. NO - Do you ever have any pain or discomfort in your chest?  3. YES - DO YOU HAVE A HISTORY OF HEART FAILURE? CHF DX and Afib  4. YES - ARE YOUR TROUBLED BY SHORTNESS OF BREATH WHEN WALKING ON THE LEVEL, UP A SLIGHT HILL OR AT NIGHT? minimal  5. NO - Do you currently have a cold, bronchitis or other respiratory infection?  6. YES - DO YOU HAVE A COUGH, SHORTNESS OF BREATH OR WHEEZING? Slight cough  7. NO - Do you sometimes get pains in the calves of your legs when you walk?  8. NO - Do you or anyone in your family have previous history of blood clots?  9. NO - Do you or does anyone in your family have a serious bleeding problem such as prolonged bleeding following surgeries or cuts?  10. NO - Have you ever had problems with anemia or been told to take iron pills?  11. NO - Have you had any abnormal blood loss such as black, tarry or bloody  stools, or abnormal vaginal bleeding?  12. YES - HAVE YOU EVER HAD A BLOOD TRANSFUSION? HX of blood transfusion x 50+years ago post birth.  13. NO - Have you or any of your relatives ever had problems with anesthesia?  14. YES - Do you have sleep apnea, excessive snoring or daytime drowsiness? EXCESSIVE SNORING, daughter believes it is related to deviated nasal septum  15. NO - Do you have any prosthetic heart valves?  16. NO - Do you have prosthetic joints?  17. NO - Is there any chance that you may be pregnant?    HPI:                                                      Brief HPI related to upcoming procedure: Shelly is here with her daughter. Patient has long-standing history of cataracts that are significantly interfering with vision.      See problem list for active medical problems.  Problems all longstanding and stable, except as noted/documented.  See ROS for pertinent symptoms related to these conditions.                                                                                                  .    MEDICAL HISTORY:                                                      Patient Active Problem List    Diagnosis Date Noted     Long-term (current) use of anticoagulants [Z79.01] 03/28/2016     Priority: Medium     Paroxysmal atrial fibrillation (H) 10/13/2015     Priority: Medium     Chronic diastolic congestive heart failure (H) 10/13/2015     Priority: Medium     Hypothyroidism 10/13/2015     Priority: Medium     Atrial fibrillation (H) 01/21/2014     Type 2 diabetes mellitus without complications (H) 01/14/2014     DNS (deviated nasal septum) 01/14/2014     Uterine cancer (H)      s/p hyst       OA (osteoarthritis)      Health Care Home 04/19/2013     Patient Care Plan      About Me    Patient Name     Date of Birth 1/18/1933 Age 80   [facility]  Phone Call jim Arthur [phone]   Address:  Email address    Insurance  Insurance I.MILLER    [place] BENNY       Emergency Contact Ginger Terwilliger Phone [phone]   Parent/Guardian  Phone      :     My Access Plan    Medical Emergency 911   Primary Clinic Line    24 Hour Appointment Line [phone] or  [phone]) (toll-free)   24 Hour Nurse Line [phone] (toll-free)   Preferred Urgent Care    Preferred Hospital [facility]   Preferred Pharmacy              My Care Team Members    Care Team Member  Name/Specialty Clinic, Address, Phone, Fax, E-mail Date of release on file   Primary Care Provider:  Dr. Senait Elmore Clinic:[address] [facility]  [address]  [place] [address] [phone] (phone #)  [phone] (fax #) [facility]   Primary Care Coordinator: Myrtle Hutchinson RN, [facility] Care Coordinator  [phone]     [facility]  [address] [place], [place] [address]e [facility]  [address]  [facility] [address] [phone] (phone #)  [phone] (fax #) [facility]   For specialists see Care Teams                Health Care Home Complexity Tier:           Care Coordination Start Date: 4/19/2013   Frequency of Care Coordination:monthly   Form Last Updated: 4/19/2013   Patient Care Plan    Patient Acknowledgement of Plan of Care: Yes     Presenting Problem Treatment Plan and Discharge Instructions   CHF  reviwed CHF symptoms and booklet with patient 4/19/2013         The provider seeing you for these problems in the emergency setting may determine that a different course of care is necessary based on the situation.    Team Communication/Sticky Note: (Take items out when done)  Date Noted Care Team Member TO DO/Alerts to be completed                     Health care home/care coordination was discussed with the patient and he/she agrees to participate in care coordination. The patient was introduced to the care coordinator and given a patient packet to review and complete. The care coordinator will contact the patient to start care planning process.  Care coordination start date:   2013    Frequency of care coordination with care team:  monthly  SELF MANAGEMENT PLAN  Presenting Problem Signs and Symptoms Treatment Plan    chf None currently; but if  SOB, edema  call PCP with wt gain; patient has booklet and understands green, yellow, red zones    diabetes type 2  none currently  monitor glucose every morning; keep a diary and bring with to PCP visits                   Advanced Care Directives:  on file  Action Plans on File:      Shelly Marcumcker   [facility] Rec #: [id number]   Health Insurance/Plan:   : 1933   ID: [unfilled]   Primary Care Provider: Vianey Sapp       Date form completed: 2013       Primary Ethnic Culture:  American   Primary Language:   English     needed? No Language: English   Name of preferred :   Phone #:   Preferred Mode of Communication: Phone   Preferred Method of Communication: verbal   Health Care Home Complexity Tier:         Contact Information:   Mother's Name: Data Unavailable   Father's Name: Data Unavailable   Phone: [phone] (home)    Preferred Contact: daughters Sandhya Terwilliger [phone] or Malu Prajapati [phone]   Address:   [address] [place] [address]   Siblings/Relatives: daughters    Lives with: currently daughter Sandhya     My Story  I like to be called Shelly  Health Maintenance/Preventative Procedures and Immunizations are addressed in the Health Maintenance List and should be updated  Integrated Medical Plan    Additional Standing Lab Orders (Use Health Maintenance When Possible):        Therapies:  N/A    My Multidisciplinary Care Team Members  Specialty of Care Team Member Name, Clinic, Address, Phone #, Fax #, E-mail   Primary care provider: Vianey Sapp  [address] [place] [address] [address]  [facility] [address]  [phone] (phone #)  [phone] (fax #)    Specialists with [facility]; see Care Team                    School:  N/A    Care Givers  Type of  Care Giver Phone/Fax E-mail   PCA:        Home Health Agency:       Nurse Name:       :       Guardian:         Persons You Can Contact About Me and My Medical Care  Name Relation Phone/Fax E-mail KIRSTIN Date    see above                                                 This care plan is a documentation of the individual s care as directed by the family, educators, therapists and diverse medical providers.  Contributors to the care plan include the patient, the patient s family and Vianey Sapp and the health care home team at [facility].  Please indicate any changes made to the care of this patient on this care plan and fax it to the care coordinator above.  Thank you for your attention.  Patient: Shelly Rogers__________________  SenaitVianey sanchez___________________  April 19, 2013___________________           CARDIOVASCULAR SCREENING; LDL GOAL LESS THAN 100 04/02/2013     CHF (congestive heart failure) (H) 03/19/2013     Pacemaker 03/01/2013      Past Medical History:   Diagnosis Date     Atrial fibrillation (H)     Nl LVEF, s/p ablation      Congestive heart failure, unspecified      Diabetes mellitus (H) 2004     Hemorrhoids     intermittent bleeding     Hypertension      Macular degeneration      OA (osteoarthritis)      Pacemaker 3/2013     Uterine cancer (H)     s/p hyst     Past Surgical History:   Procedure Laterality Date     C ANESTH,PACEMAKER INSERTION      dual chamber 3/27/13     CHOLECYSTECTOMY  8/2004     GYN SURGERY       HYSTERECTOMY      Ut ca      JOINT REPLACEMENT       TKR      bilat     Current Outpatient Prescriptions   Medication Sig Dispense Refill     glimepiride (AMARYL) 2 MG tablet TAKE 1 TABLET BY MOUTH EVERY MORNING BEFORE BREAKFAST 90 tablet 1     furosemide (LASIX) 20 MG tablet Take 1 tablet (20 mg) by mouth 2 times daily Due for office visit.  Call clinic and make appointment 180 tablet 0     metoprolol (TOPROL-XL) 25 MG 24 hr tablet TAKE 1 TABLET BY  MOUTH TWICE DAILY 180 tablet 0     levothyroxine (SYNTHROID/LEVOTHROID) 75 MCG tablet TAKE 1 TABLET BY MOUTH DAILY 90 tablet 3     warfarin (COUMADIN) 5 MG tablet TAKE 1/2 TO 1 TABLET BY MOUTH DAILY OR AS DIRECTED BY INR CLINIC 90 tablet 1     metFORMIN (GLUCOPHAGE) 500 MG tablet Take 1 tablet (500 mg) by mouth 3 times daily (with meals) 270 tablet 1     furosemide (LASIX) 20 MG tablet Take 20 mg by mouth 2 times daily Omits PM dose occassionally       Multiple Vitamins-Minerals (CENTRUM SILVER PO) Take by mouth daily       ASPIRIN NOT PRESCRIBED, INTENTIONAL, Antiplatelet medication not prescribed intentionally due to Current anticoagulant therapy (warfarin/enoxaparin) 0 each 0     ACE/ARB NOT PRESCRIBED, INTENTIONAL, ACE & ARB not prescribed due to Other: HFpEF       levothyroxine (SYNTHROID, LEVOTHROID) 75 MCG tablet Take 1 tablet (75 mcg) by mouth daily Profile Rx: patient will contact pharmacy when needed 90 tablet 3     order for DME Equipment being ordered: Compression Stockings  Knee high  20/30 compression 2 Package 1     order for DME Equipment being ordered: cane with quad or prongs 1 Units 1     triamcinolone (NASACORT ALLERGY 24HR) 55 MCG/ACT nasal inhaler Spray 2 sprays into both nostrils daily       blood glucose monitoring (ACCU-CHEK FASTCLIX) lancets Use to check blood sugars daily 1 Box prn     blood glucose (ACCU-CHEK SMARTVIEW) test strip 1 strip by In Vitro route daily 1 Box prn     STATIN NOT PRESCRIBED, INTENTIONAL, continuous prn. Statin not prescribed intentionally due to Other: Not needed, LDL at goal <100mg/dL without therapy 0 each 0     Blood Glucose Monitoring Suppl (ACCU-CHEK LAKIA SMARTVIEW) W/DEVICE KIT 1 Device daily. 1 kit 0     ACETAMINOPHEN PO Take 500 mg by mouth 3 times daily as needed (Takes at least 6 hours apart).        AMOXICILLIN PO Take 2 g by mouth. 30 minutes prior to dental appointment.       OTC products: None, except as noted above    Allergies   Allergen Reactions  "    Shellfish Allergy Difficulty breathing     Cats      Sneezing, watery eyes     Lisinopril Cough     No Clinical Screening - See Comments Other (See Comments)     Pt stated allergic to flowers, pt gets itchy watery eyes and stuffy nose     Seasonal Allergies Itching     Flowers     Sulfa Drugs Hives      Latex Allergy: unknown     Social History   Substance Use Topics     Smoking status: Former Smoker     Quit date: 1/1/1990     Smokeless tobacco: Never Used     Alcohol use No     History   Drug Use No       REVIEW OF SYSTEMS:                                                    C: NEGATIVE for fever, chills, change in weight  E/M: NEGATIVE for ear, mouth and throat problems  R: NEGATIVE for significant cough or SOB  CV: NEGATIVE for chest pain, palpitations or peripheral edema    Anesthesia risk assessment   No hx of anesthesia complications  No family hx of anesthesia complications    This document serves as a record of the services and decisions personally performed and made by Halley Huff MD. It was created on her behalf by Breanna Spencer, a trained medical scribe. The creation of this document is based the provider's statements to the medical scribe.    Scribmike Spencer 3:22 PM, April 6, 2017    EXAM:                                                    /74 (BP Location: Right arm, Patient Position: Chair, Cuff Size: Adult Regular)  Pulse 85  Temp 98.4  F (36.9  C) (Oral)  Ht 5' 2\" (1.575 m)  Wt 176 lb (79.8 kg)  SpO2 96%  Breastfeeding? No  BMI 32.19 kg/m2  GENERAL APPEARANCE: healthy, alert and no distress  HENT: ear canals and TM's normal, left ear canal with significant ceruminosis, nose shows deviated nasal septum and mouth without ulcers or lesions  RESP: lungs clear to auscultation - no rales, rhonchi or wheezes  CV: regular rate and rhythm, normal S1 S2, no S3 or S4 and positive  murmur, click or rub   ABDOMEN: soft, nontender, no HSM or masses and bowel sounds normal  NEURO: " Normal strength and tone for age, sensory exam grossly normal, mentation intact and speech normal    DIAGNOSTICS:                                                    No labs or EKG required for low risk surgery (cataract, skin procedure, breast biopsy, etc)    Recent Labs   Lab Test 04/05/17 03/02/17 09/12/16   1709   02/16/16   1507   10/13/15   1454  10/06/15   1536   HGB   --    --    --   13.3   --    --    --    --   13.1   PLT   --    --    --   328   --    --    --    --   334   INR  2.0*  2.5*   < >   --    < >   --    < >   --    --    NA   --    --    --   139   --    --    --   137   --    POTASSIUM   --    --    --   4.3   --    --    --   4.6   --    CR   --    --    --   1.21*   --    --    --   0.84   --    A1C   --    --    --   8.0*   --   7.5*   < >   --    --     < > = values in this interval not displayed.      IMPRESSION:                                                    Reason for surgery/procedure: cataracts   Diagnosis/reason for consult: preop clearance     The proposed surgical procedure is considered LOW risk.    REVISED CARDIAC RISK INDEX  The patient has the following serious cardiovascular risks for perioperative complications such as (MI, PE, VFib and 3  AV Block):  No serious cardiac risks  INTERPRETATION: 0 risks: Class I (very low risk - 0.4% complication rate)    The patient has the following additional risks for perioperative complications:  No identified additional risks    Shelly was seen today for pre-op exam.    Diagnoses and all orders for this visit:    Preop general physical exam  -     CBC with platelets    Senile cataract of right eye  She will be getting surgery for this     Chronic diastolic congestive heart failure (H)  Well compensated. Doing well with medication.  -     metoprolol (TOPROL-XL) 25 MG 24 hr tablet; Take 1 tablet (25 mg) by mouth 2 times daily  -     furosemide (LASIX) 20 MG tablet; Take 1 tablet (20 mg) by mouth 2 times daily    Type 2 diabetes  mellitus without complication, without long-term current use of insulin (H)  Working on better control. Will follow up on A1c.  -     Hemoglobin A1c; Future  -     glimepiride (AMARYL) 2 MG tablet; Take 1 tablet (2 mg) by mouth every morning (before breakfast)  -     Comprehensive metabolic panel  -     metFORMIN (GLUCOPHAGE) 500 MG tablet; Take 1 tablet (500 mg) by mouth 3 times daily (with meals)  Lab Results   Component Value Date    A1C 8.0 09/12/2016    A1C 7.5 02/16/2016    A1C 9.2 10/27/2015    A1C 7.9 10/30/2014    A1C 7.6 05/19/2014     Hypothyroidism, unspecified type  Controlled   -     TSH    Screening for deficiency anemia  -     Ferritin    Other orders  -     Cancel: Random Glucose; Future    RECOMMENDATIONS:                                                    --Patient is to take all scheduled medications on the day of surgery EXCEPT for modifications listed below.    Diabetes Medication Use  Glimepiride-----Hold usual  oral diabetic meds (e.g. Metformin, Actos, Glipizide) while NPO.       ACE Inhibitor or Angiotensin Receptor Blocker (ARB) Use  HOld Lasix on the morning of surgery      Patient is OPTIMAL for the proposed procedure     The information in this document, created by the medical scribe for me, accurately reflects the services I personally performed and the decisions made by me. I have reviewed and approved this document for accuracy prior to leaving the patient care area.  Halley Huff MD  3:32 PM, 04/06/17    Signed Electronically by: Halley Huff MD    Copy of this evaluation report is provided to requesting physician.    [facility] Preop Guidelines

## 2017-04-11 NOTE — DISCHARGE INSTRUCTIONS
[facility] Discharge  Instructions  Anesthesia ([facility])   Adult Discharge Instructions    For 24 hours after surgery    1. Get plenty of rest.  Make arrangements to have a responsible adult stay with you for at least 6 hours after you leave the hospital.  2. Do not drive or use heavy equipment for 24 hours.    3. Do not drink alcohol for 24 hours.  4. Do not sign legal documents or make important decisions for 24 hours.  5. Avoid strenuous or risky activities. You may feel lightheaded.  If so, sit for a few minutes before standing.  Have someone help you get up.   6. Conscious sedation patients may resume a regular diet..  7. Any questions of medical nature, call your physician.    [facility]  [facility] Surgery Discharge Instructions  [facility] MD BIANCA Navarro MD J. Hasan, MD C. Nichols, MD S. Schaefer, MD J. Stephens, MD J. O'Neill, MD      Start using drops when you arrive at home today    Vigamox (Tan top) - one drop in surgical eye 3 times per day until gone.    Prednisolone acetate 1.0% (pink or white top) - one drop in surgical eye 3 times per day until gone.    Diclofenac (Grey top) - one drop in surgical eye 3 times per day until gone.      Place shield over surgical eye at bedtime for 3 nights.      The eye will feel itchy, scratchy, and vision will be blurred, you may take Tylenol for the scratchy feeling if this is bothersome.      No eye rubbing or swimming for I week.      You may resume all prescription medications as directed by your primary doctor.      Call if increasing pain, progressively worsening vision or worsening redness of surgical eye.    On-call doctor can be reached at [phone].

## 2017-04-11 NOTE — ANESTHESIA CARE TRANSFER NOTE
Patient: Shelly Rogers    Procedure(s):  RIGHT EYE FEMTOSECOND LASER ASSISTED, PHACOEMULSIFICATION CLEAR CORNEA WITH STANDARD INTRAOCULAR LENS IMPLANT  - Wound Class: I-Clean    Diagnosis: RIGHT EYE CATARACT  Diagnosis Additional Information: No value filed.    Anesthesia Type:   MAC     Note:  Airway :Room Air  Patient transferred to:PACU  Comments: Transferred to [facility] recovery room in recliner with armrests up, spontaneous respirations, O2 saturation >92% on RA. All monitors and alarms on and functioning, clinically stable vital signs. Report given to recovery RN and questions answered. Patient alert and following verbal directions.      Vitals: (Last set prior to Anesthesia Care Transfer)    [facility] VITALS  4/11/2017 1340 - 4/11/2017 1413      4/11/2017             NIBP: 141/79    Pulse: 70    NIBP Mean: 89    Ht Rate: 70    SpO2: 97 %    Resp Rate (set): 10                Electronically Signed By: MERLYN Ayala CRNA  April 11, 2017  2:13 PM

## 2017-04-11 NOTE — IP AVS SNAPSHOT
[facility]    [address]    [place] [address]    Phone:  [phone]    Fax:  [phone]                                       After Visit Summary   4/11/2017    Shelly Rogers    MRN: [id number]           After Visit Summary Signature Page     I have received my discharge instructions, and my questions have been answered. I have discussed any challenges I see with this plan with the nurse or doctor.    ..........................................................................................................................................  Patient/Patient Representative Signature      ..........................................................................................................................................  Patient Representative Print Name and Relationship to Patient    ..................................................               ................................................  Date                                            Time    ..........................................................................................................................................  Reviewed by Signature/Title    ...................................................              ..............................................  Date                                                            Time

## 2017-05-22 ENCOUNTER — ANTICOAGULATION THERAPY VISIT (OUTPATIENT)
Dept: NURSING | Facility: CLINIC | Age: 82
End: 2017-05-22
Payer: COMMERCIAL

## 2017-05-22 DIAGNOSIS — Z79.01 LONG-TERM (CURRENT) USE OF ANTICOAGULANTS: ICD-10-CM

## 2017-05-22 DIAGNOSIS — I48.91 ATRIAL FIBRILLATION, UNSPECIFIED TYPE (H): ICD-10-CM

## 2017-05-22 LAB — INR POINT OF CARE: 3.4 (ref 0.86–1.14)

## 2017-05-22 PROCEDURE — 36416 COLLJ CAPILLARY BLOOD SPEC: CPT

## 2017-05-22 PROCEDURE — 85610 PROTHROMBIN TIME: CPT | Mod: QW

## 2017-05-22 PROCEDURE — 99207 ZZC NO CHARGE NURSE ONLY: CPT

## 2017-05-22 NOTE — PROGRESS NOTES
ANTICOAGULATION FOLLOW-UP CLINIC VISIT    Patient Name:  Shelly Rogers  Date:  5/22/2017  Contact Type:  Face to Face    SUBJECTIVE:     Patient Findings     Positives Change in diet/appetite, Herbal/Supplements, OTC meds    Comments Patient started taking Fish oil and Vitamin C about three weeks ago, she thought Dr. Huff told her to.  I looked back through the chart and found she was to take the Vitamin C with the Iron for absorption but she refuses to take the Iron and there  Was nothing about taking Fish oil.  She will DC both Fish Oil and Vitamin C. She takes a MVI and states she believes her Ferritin is low because she quit eating meat. She has since started to eat red meat again and will induldge in Liver and liver products going forward.  She needs to let Dr. Huff know her plan and I instructed her to do that.             OBJECTIVE    INR Protime   Date Value Ref Range Status   04/05/2017 2.0 (A) 0.86 - 1.14 Final       ASSESSMENT / PLAN  INR assessment SUPRA    Recheck INR In: 4 WEEKS    INR Location Clinic      Anticoagulation Summary as of 5/22/2017     INR goal 2.0-3.0   Today's INR    Maintenance plan 5 mg (5 mg x 1) on Wed, Sat; 2.5 mg (5 mg x 0.5) all other days   Full instructions 5 mg on Wed, Sat; 2.5 mg all other days   Weekly total 22.5 mg   Plan last modified Arely Dotson, RN (2/15/2017)   Next INR check    Priority INR   Target end date     Indications   Long-term (current) use of anticoagulants [Z79.01] [Z79.01]  Atrial fibrillation (H) [I48.91]         Anticoagulation Episode Summary     INR check location     Preferred lab     Send INR reminders to  ANTICOAGULATION    Comments       Anticoagulation Care Providers     Provider Role Specialty Phone number    Halley Huff MD Responsible Internal Medicine [phone]            See the Encounter Report to view Anticoagulation Flowsheet and Dosing Calendar (Go to Encounters tab in chart review, and find the Anticoagulation  Therapy Visit)    Dosage adjustment made based on physician directed care plan.    Valentina Scott RN

## 2017-05-22 NOTE — MR AVS SNAPSHOT
Shelly ANN Rogers   5/22/2017 3:30 PM   Anticoagulation Therapy Visit    Description:  84 year old female   Provider:   ANTICOAGULATION CLINIC   Department:   Nurse           INR as of 5/22/2017     Today's INR 3.4!      Anticoagulation Summary as of 5/22/2017     INR goal 2.0-3.0   Today's INR 3.4!   Full instructions 5 mg on Wed, Sat; 2.5 mg all other days   Next INR check 6/19/2017    Indications   Long-term (current) use of anticoagulants [Z79.01] [Z79.01]  Atrial fibrillation (H) [I48.91]         Description     Patient started taking Fish oil and Vitamin C about three weeks ago, she thought Dr. Huff told her to.  I looked back through the chart and found she was to take the Vitamin C with the Iron for absorption but she refuses to take the Iron and there  Was nothing about taking Fish oil.  She will DC both Fish Oil and Vitamin C. She takes a MVI and states she believes her Ferritin is low because she quit eating meat. She has since started to eat red meat again and will induldge in Liver and liver products going forward.  She needs to let Dr. Huff know her plan and I instructed her to do that.      Your next Anticoagulation Clinic appointment(s)     Jun 19, 2017  3:00 PM CDT   Anticoagulation Visit with  ANTICOAGULATION CLINIC   [facility])    [address]  [place] [address]   [phone]              Contact Numbers     Clinic Number:         May 2017 Details    Sun Mon Tue Wed Thu Fri Sat      1               2               3               4               5               6                 7               8               9               10               11               12               13                 14               15               16               17               18               19               20                 21               22      2.5 mg   See details      23      2.5 mg         24      5 mg         25      2.5 mg         26      2.5 mg          27      5 mg           28      2.5 mg         29      2.5 mg         30      2.5 mg         31      5 mg             Date Details   05/22 This INR check               How to take your warfarin dose     To take:  2.5 mg Take 0.5 of a 5 mg tablet.    To take:  5 mg Take 1 of the 5 mg tablets.           June 2017 Details    Sun Mon Tue Wed Thu Fri Sat         1      2.5 mg         2      2.5 mg         3      5 mg           4      2.5 mg         5      2.5 mg         6      2.5 mg         7      5 mg         8      2.5 mg         9      2.5 mg         10      5 mg           11      2.5 mg         12      2.5 mg         13      2.5 mg         14      5 mg         15      2.5 mg         16      2.5 mg         17      5 mg           18      2.5 mg         19            20               21               22               23               24                 25               26               27               28               29               30                 Date Details   No additional details    Date of next INR:  6/19/2017         How to take your warfarin dose     To take:  2.5 mg Take 0.5 of a 5 mg tablet.    To take:  5 mg Take 1 of the 5 mg tablets.

## 2017-06-01 ENCOUNTER — ALLIED HEALTH/NURSE VISIT (OUTPATIENT)
Dept: CARDIOLOGY | Facility: CLINIC | Age: 82
End: 2017-06-01
Payer: COMMERCIAL

## 2017-06-01 DIAGNOSIS — Z95.0 CARDIAC PACEMAKER IN SITU: Primary | ICD-10-CM

## 2017-06-01 PROCEDURE — 93296 REM INTERROG EVL PM/IDS: CPT | Performed by: INTERNAL MEDICINE

## 2017-06-01 PROCEDURE — 93294 REM INTERROG EVL PM/LDLS PM: CPT | Performed by: INTERNAL MEDICINE

## 2017-06-01 NOTE — MR AVS SNAPSHOT
After Visit Summary   6/1/2017    Shelly Rogers    MRN: [id number]           Patient Information     Date Of Birth          1/18/1933        Visit Information        Provider Department      6/1/2017 2:15 PM LEVY TECH1 [facility]        Today's Diagnoses     Cardiac pacemaker in situ    -  1       Follow-ups after your visit        Your next 10 appointments already scheduled     Jun 02, 2017  7:30 AM CDT   LAB with LEVY LAB   [facility] ([facility])    [address] [place] [address] [address]  [facility] [address] [phone]           Patient must bring picture ID.  Patient should be prepared to give a urine specimen  Please do not eat 10-12 hours before your appointment if you are coming in fasting for labs on lipids, cholesterol, or glucose (sugar).  Pregnant women should follow their Care Team instructions. Water with medications is okay. Do not drink coffee or other fluids.   If you have concerns about taking  your medications, please ask at office or if scheduling via [employer], send a message by clicking on Secure Messaging, Message Your Care Team.            Jun 02, 2017  8:15 AM CDT   Return Visit with Galindo Del Valle MD   [facility] ([facility])    [facility] [facility] [facility] [address]  [facility] [address] [phone]            Jun 19, 2017  3:00 PM CDT   Anticoagulation Visit with CS ANTICOAGULATION CLINIC   [facility] ([facility])    [address]  [place] [address] [phone]            Sep 06, 2017  2:30 PM CDT   Pacemaker Check with CAM MEDRANO   [facility] ([facility])    [address]  [facility] [address] [phone]              Who to contact     If you have questions or need follow up information about today's clinic visit or your schedule please contact  "[facility] [place] directly at [phone].  Normal or non-critical lab and imaging results will be communicated to you by MyChart, letter or phone within 4 business days after the clinic has received the results. If you do not hear from us within 7 days, please contact the clinic through MyChart or phone. If you have a critical or abnormal lab result, we will notify you by phone as soon as possible.  Submit refill requests through [employer] or call your pharmacy and they will forward the refill request to us. Please allow 3 business days for your refill to be completed.          Additional Information About Your Visit        [employer] Information     [employer] lets you send messages to your doctor, view your test results, renew your prescriptions, schedule appointments and more. To sign up, go to [email or web address] . Click on \"Log in\" on the left side of the screen, which will take you to the Welcome page. Then click on \"Sign up Now\" on the right side of the page.     You will be asked to enter the access code listed below, as well as some personal information. Please follow the directions to create your username and password.     Your access code is: [email or web address]  Expires: 2017  3:29 PM     Your access code will  in 90 days. If you need help or a new code, please call your [place] clinic or [phone].        Care EveryWhere ID     This is your Care EveryWhere ID. This could be used by other organizations to access your [place] medical records  [id number]         Blood Pressure from Last 3 Encounters:   17 147/74   17 134/74   16 136/74    Weight from Last 3 Encounters:   17 79.8 kg (176 lb)   16 78.9 kg (174 lb)   16 80.4 kg (177 lb 4.8 oz)              We Performed the Following     INTERROGATION DEVICE EVAL REMOTE, PACER/ICD (30904)     PM DEVICE INTERROGATE REMOTE (86446)        Primary Care Provider Office Phone # Fax #    " Halley Huff [phone]       [facility]    [address] [facility] [address]  [facility] [address]        Thank you!     Thank you for choosing [facility] [place]  for your care. Our goal is always to provide you with excellent care. Hearing back from our patients is one way we can continue to improve our services. Please take a few minutes to complete the written survey that you may receive in the mail after your visit with us. Thank you!             Your Updated Medication List - Protect others around you: Learn how to safely use, store and throw away your medicines at www.disposemymeds.org.          This list is accurate as of: 6/1/17  3:29 PM.  Always use your most recent med list.                   Brand Name Dispense Instructions for use    ACE/ARB NOT PRESCRIBED (INTENTIONAL)      ACE & ARB not prescribed due to Other: HFpEF       ACETAMINOPHEN PO      Take 500 mg by mouth 3 times daily as needed (Takes at least 6 hours apart).       ASPIRIN NOT PRESCRIBED    INTENTIONAL    0 each    Antiplatelet medication not prescribed intentionally due to Current anticoagulant therapy (warfarin/enoxaparin)       blood glucose monitoring lancets     1 Box    Use to check blood sugars daily       blood glucose monitoring meter device kit     1 kit    1 Device daily.       blood glucose monitoring test strip    ACCU-CHEK SMARTVIEW    1 Box    1 strip by In Vitro route daily       CENTRUM SILVER PO      Take by mouth daily       furosemide 20 MG tablet    LASIX    180 tablet    Take 1 tablet (20 mg) by mouth 2 times daily       glimepiride 2 MG tablet    AMARYL    180 tablet    Take 1 tablet (2 mg) by mouth 2 times daily       levothyroxine 75 MCG tablet    SYNTHROID/LEVOTHROID    90 tablet    TAKE 1 TABLET BY MOUTH DAILY       metFORMIN 500 MG tablet    GLUCOPHAGE    270 tablet    Take 1 tablet (500 mg) by mouth 3 times daily (with meals)       metoprolol  25 MG 24 hr tablet    TOPROL-XL    180 tablet    Take 1 tablet (25 mg) by mouth 2 times daily       NASACORT ALLERGY 24HR 55 MCG/ACT Inhaler   Generic drug:  triamcinolone      Spray 2 sprays into both nostrils daily       * order for DME     2 Package    Equipment being ordered: Compression Stockings Knee high 20/30 compression       * order for DME     1 Units    Equipment being ordered: cane with quad or prongs       STATIN NOT PRESCRIBED (INTENTIONAL)     0 each    continuous prn. Statin not prescribed intentionally due to Other: Not needed, LDL at goal <100mg/dL without therapy       warfarin 5 MG tablet    COUMADIN    90 tablet    TAKE 1/2 TO 1 TABLET BY MOUTH DAILY OR AS DIRECTED BY INR CLINIC       * Notice:  This list has 2 medication(s) that are the same as other medications prescribed for you. Read the directions carefully, and ask your doctor or other care provider to review them with you.

## 2017-06-01 NOTE — PROGRESS NOTES
[employer] Sensia (D) Remote PPM Device Check  AP: 19 % : <1 %  Mode: DDDR        Presenting Rhythm: AP/VS  Heart Rate: Adequate rates per histogram  Sensing: Stable    Pacing Threshold: Stable    Impedance: Stable  Battery Status: 7-10 years  Atrial Arrhythmia: 16 mode switch episodes comprising <1% of the time. 1 EGM available shows As=Vs for PAT lasting 4 minutes 52 seconds, ventricular rates controlled. Taking Warfarin.   Ventricular Arrhythmia: 2 ventricular high rates. Marker only EGMs show probable NSVT lasting 8-10 beats, rates 175-210bpm. EF 60-65% (2016). Reviewed with SARINA Valenzuela.     Care Plan: F/u annual threshold in 3 months. Gave patient's daughter results over the phone. JeronimoCVT

## 2017-06-02 ENCOUNTER — OFFICE VISIT (OUTPATIENT)
Dept: CARDIOLOGY | Facility: CLINIC | Age: 82
End: 2017-06-02
Attending: INTERNAL MEDICINE
Payer: COMMERCIAL

## 2017-06-02 VITALS
DIASTOLIC BLOOD PRESSURE: 66 MMHG | BODY MASS INDEX: 32.55 KG/M2 | SYSTOLIC BLOOD PRESSURE: 142 MMHG | HEART RATE: 82 BPM | HEIGHT: 62 IN | WEIGHT: 176.9 LBS

## 2017-06-02 DIAGNOSIS — Z95.0 CARDIAC PACEMAKER IN SITU: ICD-10-CM

## 2017-06-02 DIAGNOSIS — I48.0 PAROXYSMAL ATRIAL FIBRILLATION (H): ICD-10-CM

## 2017-06-02 DIAGNOSIS — I50.32 CHRONIC DIASTOLIC CONGESTIVE HEART FAILURE (H): Primary | ICD-10-CM

## 2017-06-02 DIAGNOSIS — I49.5 SSS (SICK SINUS SYNDROME) (H): ICD-10-CM

## 2017-06-02 LAB
ANION GAP SERPL CALCULATED.3IONS-SCNC: 11.9 MMOL/L (ref 6–17)
BUN SERPL-MCNC: 24 MG/DL (ref 7–30)
CALCIUM SERPL-MCNC: 8.9 MG/DL (ref 8.5–10.5)
CHLORIDE SERPL-SCNC: 101 MMOL/L (ref 98–107)
CO2 SERPL-SCNC: 27 MMOL/L (ref 23–29)
CREAT SERPL-MCNC: 1.21 MG/DL (ref 0.7–1.3)
GFR SERPL CREATININE-BSD FRML MDRD: 42 ML/MIN/1.7M2
GLUCOSE SERPL-MCNC: 266 MG/DL (ref 70–105)
POTASSIUM SERPL-SCNC: 3.9 MMOL/L (ref 3.5–5.1)
SODIUM SERPL-SCNC: 136 MMOL/L (ref 136–145)

## 2017-06-02 PROCEDURE — 80048 BASIC METABOLIC PNL TOTAL CA: CPT | Performed by: INTERNAL MEDICINE

## 2017-06-02 PROCEDURE — 99214 OFFICE O/P EST MOD 30 MIN: CPT | Performed by: INTERNAL MEDICINE

## 2017-06-02 PROCEDURE — 36415 COLL VENOUS BLD VENIPUNCTURE: CPT | Performed by: INTERNAL MEDICINE

## 2017-06-02 RX ORDER — FUROSEMIDE 20 MG
20 TABLET ORAL DAILY
Qty: 90 TABLET | Refills: 3 | Status: SHIPPED | OUTPATIENT
Start: 2017-06-02 | End: 2018-02-27

## 2017-06-02 RX ORDER — METOPROLOL SUCCINATE 50 MG/1
50 TABLET, EXTENDED RELEASE ORAL 2 TIMES DAILY
Qty: 180 TABLET | Refills: 3 | Status: SHIPPED | OUTPATIENT
Start: 2017-06-02 | End: 2018-02-27

## 2017-06-02 NOTE — LETTER
6/2/2017    Halley Huff MD  [facility]      [address]  [place] [address]    RE: Shelly Rogers       Dear Colleague,    I had the opportunity to see Ms. Shelly Rogers in Cardiology Clinic today at the [facility] in [place] for reevaluation of paroxysmal atrial fibrillation, sick sinus syndrome and chronic diastolic congestive heart failure.  As you may recall, she is an 84-year-old woman who was hospitalized in 03/2013 with acute congestive heart failure, likely due to new onset atrial fibrillation with rapid ventricular response.  Because of difficulty controlling her atrial fibrillation, she eventually underwent pacemaker implantation and cardioversion.  She was briefly on amiodarone but has been off that medication for several years now and doing well with metoprolol alone.  She has hypertension and type 2 diabetes as well.  She was on Xarelto for stroke prevention, but switched to warfarin for cost reasons and is doing fine with that as well.      We are checking her pacemaker regularly and have noted occasional or rare episodes of brief atrial fibrillation but nothing persistent.  She does not seem to be asymptomatic with that at all.  She has no symptoms of shortness of breath, PND, orthopnea or edema at this point.  She seems to be doing quite well but admittedly lives a rather sedentary lifestyle.      I reviewed her laboratory studies and see that her creatinine has jumped up a bit to 1.2 with a GFR of 42 and her A1c is elevated at 8.1.      PHYSICAL EXAMINATION:  Blood pressure 142/66, heart rate 82 and weight 176 pounds and stable.  Her lungs are clear.  Heart rhythm is regular.  I do not hear any cardiac murmurs or carotid bruits and she has no edema.     Outpatient Encounter Prescriptions as of 6/2/2017   Medication Sig Dispense Refill     furosemide (LASIX) 20 MG tablet Take 1 tablet (20 mg) by mouth daily 90 tablet 3     metoprolol (TOPROL-XL) 50 MG  24 hr tablet Take 1 tablet (50 mg) by mouth 2 times daily 180 tablet 3     glimepiride (AMARYL) 2 MG tablet Take 1 tablet (2 mg) by mouth 2 times daily 180 tablet 3     metFORMIN (GLUCOPHAGE) 500 MG tablet Take 1 tablet (500 mg) by mouth 3 times daily (with meals) 270 tablet 2     levothyroxine (SYNTHROID/LEVOTHROID) 75 MCG tablet TAKE 1 TABLET BY MOUTH DAILY 90 tablet 3     Multiple Vitamins-Minerals (CENTRUM SILVER PO) Take by mouth daily       triamcinolone (NASACORT ALLERGY 24HR) 55 MCG/ACT nasal inhaler Spray 2 sprays into both nostrils daily       ACETAMINOPHEN PO Take 500 mg by mouth 3 times daily as needed (Takes at least 6 hours apart).        [DISCONTINUED] metoprolol (TOPROL-XL) 25 MG 24 hr tablet Take 1 tablet (25 mg) by mouth 2 times daily 180 tablet 3     [DISCONTINUED] furosemide (LASIX) 20 MG tablet Take 1 tablet (20 mg) by mouth 2 times daily 180 tablet 3     warfarin (COUMADIN) 5 MG tablet TAKE 1/2 TO 1 TABLET BY MOUTH DAILY OR AS DIRECTED BY INR CLINIC 90 tablet 1     ASPIRIN NOT PRESCRIBED, INTENTIONAL, Antiplatelet medication not prescribed intentionally due to Current anticoagulant therapy (warfarin/enoxaparin) 0 each 0     ACE/ARB NOT PRESCRIBED, INTENTIONAL, ACE & ARB not prescribed due to Other: HFpEF       order for DME Equipment being ordered: Compression Stockings  Knee high  20/30 compression 2 Package 1     order for DME Equipment being ordered: cane with quad or prongs 1 Units 1     blood glucose monitoring (ACCU-CHEK FASTCLIX) lancets Use to check blood sugars daily 1 Box prn     blood glucose (ACCU-CHEK SMARTVIEW) test strip 1 strip by In Vitro route daily 1 Box prn     STATIN NOT PRESCRIBED, INTENTIONAL, continuous prn. Statin not prescribed intentionally due to Other: Not needed, LDL at goal <100mg/dL without therapy 0 each 0     Blood Glucose Monitoring Suppl (ACCU-CHEK LAKIA SMARTVIEW) W/DEVICE KIT 1 Device daily. 1 kit 0     No facility-administered encounter medications on file  as of 6/2/2017.       IMPRESSIONS:  Ms. Shelly Rogers is an 84-year-old woman with chronic diastolic heart failure, probably due primarily to atrial fibrillation with rapid ventricular response.  She has sick sinus syndrome and a permanent pacemaker in place for that reason, and we are controlling her atrial fibrillation well with metoprolol and these episodes seemed to be fairly infrequent.  She is asymptomatic from a cardiac standpoint.      I am concerned about her kidney function due to her diabetes which is not optimally controlled and elevated blood pressures.  I suggested she follow up with you to discuss her diabetes further.  I will increase her metoprolol to 50 mg twice daily to help ensure good control of her atrial fibrillation as well as control her blood pressure better.  I will decrease her Lasix from 20 mg twice a day to 20 mg once a day and urged her to limit her salt intake.  Hopefully, that will help her kidney function normalize as well.      Overall, I think she is doing well and we will keep an eye on her with annual visits.  I will see her next year.     Again, thank you for allowing me to participate in the care of your patient.      Sincerely,    JENNIFER MIRANDA MD     [facility]

## 2017-06-02 NOTE — MR AVS SNAPSHOT
After Visit Summary   6/2/2017    Shelly Rogers    MRN: [id number]           Patient Information     Date Of Birth          1/18/1933        Visit Information        Provider Department      6/2/2017 8:15 AM Galindo Del Valle MD [facility]        Today's Diagnoses     Chronic diastolic congestive heart failure (H)    -  1    Paroxysmal atrial fibrillation (H)        SSS (sick sinus syndrome) (H)        Cardiac pacemaker in situ           Follow-ups after your visit        Additional Services     Follow-Up with Cardiologist                 Your next 10 appointments already scheduled     Jun 19, 2017  3:00 PM CDT   Anticoagulation Visit with CS ANTICOAGULATION CLINIC   [facility] ([facility])    [address] [facility] [address] [phone]            Sep 06, 2017  2:30 PM CDT   Pacemaker Check with CAM MEDRANO   [facility] ([facility])    [address]  [facility] [address] [phone]              Future tests that were ordered for you today     Open Future Orders        Priority Expected Expires Ordered    Basic metabolic panel Routine 6/2/2018 7/7/2018 6/2/2017    CBC with platelets Routine 6/2/2018 7/7/2018 6/2/2017    Follow-Up with Cardiologist Routine 6/2/2018 10/15/2018 6/2/2017            Who to contact     If you have questions or need follow up information about today's clinic visit or your schedule please contact [facility] directly at [phone].  Normal or non-critical lab and imaging results will be communicated to you by MyChart, letter or phone within 4 business days after the clinic has received the results. If you do not hear from us within 7 days, please contact the clinic through MyChart or phone. If you have a critical or abnormal lab result, we will notify you by phone as soon as possible.  Submit  "refill requests through [employer] or call your pharmacy and they will forward the refill request to us. Please allow 3 business days for your refill to be completed.          Additional Information About Your Visit        [employer] Information     [employer] lets you send messages to your doctor, view your test results, renew your prescriptions, schedule appointments and more. To sign up, go to [email or web address] . Click on \"Log in\" on the left side of the screen, which will take you to the Welcome page. Then click on \"Sign up Now\" on the right side of the page.     You will be asked to enter the access code listed below, as well as some personal information. Please follow the directions to create your username and password.     Your access code is: [email or web address]  Expires: 2017  3:29 PM     Your access code will  in 90 days. If you need help or a new code, please call your [place] clinic or [phone].        Care EveryWhere ID     This is your Care EveryWhere ID. This could be used by other organizations to access your [place] medical records  [id number]        Your Vitals Were     Pulse Height BMI (Body Mass Index)             82 1.575 m (5' 2\") 32.36 kg/m2          Blood Pressure from Last 3 Encounters:   17 142/66   17 147/74   17 134/74    Weight from Last 3 Encounters:   17 80.2 kg (176 lb 14.4 oz)   17 79.8 kg (176 lb)   16 78.9 kg (174 lb)              We Performed the Following     Follow-Up with Cardiologist          Today's Medication Changes          These changes are accurate as of: 17  9:05 AM.  If you have any questions, ask your nurse or doctor.               These medicines have changed or have updated prescriptions.        Dose/Directions    furosemide 20 MG tablet   Commonly known as:  LASIX   This may have changed:  when to take this   Used for:  Chronic diastolic congestive heart failure (H)   Changed by:  Galindo Del Valle MD        Dose:  " 20 mg   Take 1 tablet (20 mg) by mouth daily   Quantity:  90 tablet   Refills:  3       metoprolol 50 MG 24 hr tablet   Commonly known as:  TOPROL-XL   This may have changed:    - medication strength  - how much to take   Used for:  Chronic diastolic congestive heart failure (H)   Changed by:  Galindo Del Valle MD        Dose:  50 mg   Take 1 tablet (50 mg) by mouth 2 times daily   Quantity:  180 tablet   Refills:  3            Where to get your medicines      These medications were sent to [facility] [id number] [address] [facility] [facility]  [address]  [facility], [place] [address]     Phone:  [phone]     furosemide 20 MG tablet    metoprolol 50 MG 24 hr tablet                Primary Care Provider Office Phone # Fax #    Halley Huff [phone]       [facility] [place]    [address]  [facility] [address]        Thank you!     Thank you for choosing [facility] [place]  for your care. Our goal is always to provide you with excellent care. Hearing back from our patients is one way we can continue to improve our services. Please take a few minutes to complete the written survey that you may receive in the mail after your visit with us. Thank you!             Your Updated Medication List - Protect others around you: Learn how to safely use, store and throw away your medicines at www.disposemymeds.org.          This list is accurate as of: 6/2/17  9:05 AM.  Always use your most recent med list.                   Brand Name Dispense Instructions for use    ACE/ARB NOT PRESCRIBED (INTENTIONAL)      ACE & ARB not prescribed due to Other: HFpEF       ACETAMINOPHEN PO      Take 500 mg by mouth 3 times daily as needed (Takes at least 6 hours apart).       ASPIRIN NOT PRESCRIBED    INTENTIONAL    0 each    Antiplatelet medication not prescribed intentionally due to Current anticoagulant therapy  (warfarin/enoxaparin)       blood glucose monitoring lancets     1 Box    Use to check blood sugars daily       blood glucose monitoring meter device kit     1 kit    1 Device daily.       blood glucose monitoring test strip    ACCU-CHEK SMARTVIEW    1 Box    1 strip by In Vitro route daily       CENTRUM SILVER PO      Take by mouth daily       furosemide 20 MG tablet    LASIX    90 tablet    Take 1 tablet (20 mg) by mouth daily       glimepiride 2 MG tablet    AMARYL    180 tablet    Take 1 tablet (2 mg) by mouth 2 times daily       levothyroxine 75 MCG tablet    SYNTHROID/LEVOTHROID    90 tablet    TAKE 1 TABLET BY MOUTH DAILY       metFORMIN 500 MG tablet    GLUCOPHAGE    270 tablet    Take 1 tablet (500 mg) by mouth 3 times daily (with meals)       metoprolol 50 MG 24 hr tablet    TOPROL-XL    180 tablet    Take 1 tablet (50 mg) by mouth 2 times daily       NASACORT ALLERGY 24HR 55 MCG/ACT Inhaler   Generic drug:  triamcinolone      Spray 2 sprays into both nostrils daily       * order for DME     2 Package    Equipment being ordered: Compression Stockings Knee high 20/30 compression       * order for DME     1 Units    Equipment being ordered: cane with quad or prongs       STATIN NOT PRESCRIBED (INTENTIONAL)     0 each    continuous prn. Statin not prescribed intentionally due to Other: Not needed, LDL at goal <100mg/dL without therapy       warfarin 5 MG tablet    COUMADIN    90 tablet    TAKE 1/2 TO 1 TABLET BY MOUTH DAILY OR AS DIRECTED BY INR CLINIC       * Notice:  This list has 2 medication(s) that are the same as other medications prescribed for you. Read the directions carefully, and ask your doctor or other care provider to review them with you.

## 2017-06-02 NOTE — PROGRESS NOTES
HPI and Plan:   See dictation    Orders Placed This Encounter   Procedures     Basic metabolic panel     CBC with platelets     Follow-Up with Cardiologist       Orders Placed This Encounter   Medications     furosemide (LASIX) 20 MG tablet     Sig: Take 1 tablet (20 mg) by mouth daily     Dispense:  90 tablet     Refill:  3     metoprolol (TOPROL-XL) 50 MG 24 hr tablet     Sig: Take 1 tablet (50 mg) by mouth 2 times daily     Dispense:  180 tablet     Refill:  3       Medications Discontinued During This Encounter   Medication Reason     furosemide (LASIX) 20 MG tablet Reorder     metoprolol (TOPROL-XL) 25 MG 24 hr tablet Reorder         Encounter Diagnoses   Name Primary?     Paroxysmal atrial fibrillation (H)      Chronic diastolic congestive heart failure (H) Yes     SSS (sick sinus syndrome) (H)      Cardiac pacemaker in situ        CURRENT MEDICATIONS:  Current Outpatient Prescriptions   Medication Sig Dispense Refill     furosemide (LASIX) 20 MG tablet Take 1 tablet (20 mg) by mouth daily 90 tablet 3     metoprolol (TOPROL-XL) 50 MG 24 hr tablet Take 1 tablet (50 mg) by mouth 2 times daily 180 tablet 3     glimepiride (AMARYL) 2 MG tablet Take 1 tablet (2 mg) by mouth 2 times daily 180 tablet 3     metFORMIN (GLUCOPHAGE) 500 MG tablet Take 1 tablet (500 mg) by mouth 3 times daily (with meals) 270 tablet 2     levothyroxine (SYNTHROID/LEVOTHROID) 75 MCG tablet TAKE 1 TABLET BY MOUTH DAILY 90 tablet 3     Multiple Vitamins-Minerals (CENTRUM SILVER PO) Take by mouth daily       triamcinolone (NASACORT ALLERGY 24HR) 55 MCG/ACT nasal inhaler Spray 2 sprays into both nostrils daily       ACETAMINOPHEN PO Take 500 mg by mouth 3 times daily as needed (Takes at least 6 hours apart).        [DISCONTINUED] metoprolol (TOPROL-XL) 25 MG 24 hr tablet Take 1 tablet (25 mg) by mouth 2 times daily 180 tablet 3     [DISCONTINUED] furosemide (LASIX) 20 MG tablet Take 1 tablet (20 mg) by mouth 2 times daily 180 tablet 3      warfarin (COUMADIN) 5 MG tablet TAKE 1/2 TO 1 TABLET BY MOUTH DAILY OR AS DIRECTED BY INR CLINIC 90 tablet 1     ASPIRIN NOT PRESCRIBED, INTENTIONAL, Antiplatelet medication not prescribed intentionally due to Current anticoagulant therapy (warfarin/enoxaparin) 0 each 0     ACE/ARB NOT PRESCRIBED, INTENTIONAL, ACE & ARB not prescribed due to Other: HFpEF       order for DME Equipment being ordered: Compression Stockings  Knee high  20/30 compression 2 Package 1     order for DME Equipment being ordered: cane with quad or prongs 1 Units 1     blood glucose monitoring (ACCU-CHEK FASTCLIX) lancets Use to check blood sugars daily 1 Box prn     blood glucose (ACCU-CHEK SMARTVIEW) test strip 1 strip by In Vitro route daily 1 Box prn     STATIN NOT PRESCRIBED, INTENTIONAL, continuous prn. Statin not prescribed intentionally due to Other: Not needed, LDL at goal <100mg/dL without therapy 0 each 0     Blood Glucose Monitoring Suppl (ACCU-CHEK LAKIA SMARTVIEW) W/DEVICE KIT 1 Device daily. 1 kit 0       ALLERGIES     Allergies   Allergen Reactions     Shellfish Allergy Difficulty breathing     Cats      Sneezing, watery eyes     Lisinopril Cough     No Clinical Screening - See Comments Other (See Comments)     Pt stated allergic to flowers, pt gets itchy watery eyes and stuffy nose     Seasonal Allergies Itching     Flowers     Sulfa Drugs Hives       PAST MEDICAL HISTORY:  Past Medical History:   Diagnosis Date     Atrial fibrillation (H)     Nl LVEF, s/p ablation      Congestive heart failure, unspecified      Diabetes mellitus (H) 2004     Hemorrhoids     intermittent bleeding     Hypertension      Macular degeneration      OA (osteoarthritis)      Pacemaker 3/2013     Uterine cancer (H)     s/p hyst       PAST SURGICAL HISTORY:  Past Surgical History:   Procedure Laterality Date     C ANESTH,PACEMAKER INSERTION      dual chamber 3/27/13     CHOLECYSTECTOMY  8/2004     GYN SURGERY       HYSTERECTOMY      Ut ca      JOINT  REPLACEMENT       KERATOTOMY ARCUATE WITH FEMTOSECOND LASER/IMAGING FOR ATIOL Right 4/11/2017    Procedure: KERATOTOMY ARCUATE WITH FEMTOSECOND LASER/IMAGING FOR ATIOL;  Surgeon: Calvin Dominguez MD;  Location:  [facility]     PHACOEMULSIFICATION CLEAR CORNEA WITH STANDARD INTRAOCULAR LENS IMPLANT Right 4/11/2017    Procedure: PHACOEMULSIFICATION CLEAR CORNEA WITH STANDARD INTRAOCULAR LENS IMPLANT;  Surgeon: Calvin Dominguez MD;  Location: [facility]     TKR      bilat       FAMILY HISTORY:  Family History   Problem Relation Age of Onset     GASTROINTESTINAL DISEASE Mother      bowel obstruction     Cardiovascular Father      C.A.D. Father      Cardiovascular Brother      CHF       SOCIAL HISTORY:  Social History     Social History     Marital status: Single     Spouse name: N/A     Number of children: N/A     Years of education: N/A     Social History Main Topics     Smoking status: Former Smoker     Quit date: 1/1/1990     Smokeless tobacco: Never Used     Alcohol use No     Drug use: No     Sexual activity: Not Currently     Partners: Male     Other Topics Concern     Parent/Sibling W/ Cabg, Mi Or Angioplasty Before 65f 55m? No     Caffeine Concern Yes     daily      Sleep Concern No     Special Diet Yes     low sodium, less leafy greens, low sugar     Exercise No     Seat Belt Yes     Social History Narrative       Review of Systems:  Skin:  Negative       Eyes:  Positive for glasses (macular degeneration) L eye macular degeneration  ENT:  Positive for hearing loss    Respiratory:  Positive for dyspnea on exertion rarlely   Cardiovascular:  Negative Positive for;edema    Gastroenterology: Negative      Genitourinary:  Negative      Musculoskeletal:  Negative for joint pain (toes)    Neurologic:  Positive for numbness or tingling of feet (toes)    Psychiatric:  Positive for sleep disturbances    Heme/Lymph/Imm:  Positive for allergies    Endocrine:  Positive for diabetes;thyroid disorder      Physical  "Exam:  Vitals: /66  Pulse 82  Ht 1.575 m (5' 2\")  Wt 80.2 kg (176 lb 14.4 oz)  BMI 32.36 kg/m2    Constitutional:  cooperative, alert and oriented, well developed, well nourished, in no acute distress        Skin:  warm and dry to the touch, no apparent skin lesions or masses noted        Head:  normocephalic, no masses or lesions        Eyes:  pupils equal and round, conjunctivae and lids unremarkable, sclera white, no xanthalasma, EOMS intact, no nystagmus        ENT:  no pallor or cyanosis, dentition good        Neck:  carotid pulses are full and equal bilaterally, JVP normal, no carotid bruit, no thyromegaly        Chest:  normal breath sounds, clear to auscultation, normal A-P diameter, normal symmetry, normal respiratory excursion, no use of accessory muscles          Cardiac: regular rhythm, normal S1/S2, no S3 or S4, apical impulse not displaced, no murmurs, gallops or rubs                  Abdomen:           Vascular: pulses full and equal, no bruits auscultated                                        Extremities and Back:  no deformities, clubbing, cyanosis, erythema observed;no edema              Neurological:  affect appropriate, oriented to time, person and place;no gross motor deficits              CC  Galindo Del Valle MD   [facility]  [address]  [place] [address]              "

## 2017-06-02 NOTE — PROGRESS NOTES
HISTORY OF PRESENT ILLNESS:  I had the opportunity to see Ms. Shelly Rogers in Cardiology Clinic today at the [facility] in [place] for reevaluation of paroxysmal atrial fibrillation, sick sinus syndrome and chronic diastolic congestive heart failure.  As you may recall, she is an 84-year-old woman who was hospitalized in 03/2013 with acute congestive heart failure, likely due to new onset atrial fibrillation with rapid ventricular response.  Because of difficulty controlling her atrial fibrillation, she eventually underwent pacemaker implantation and cardioversion.  She was briefly on amiodarone but has been off that medication for several years now and doing well with metoprolol alone.  She has hypertension and type 2 diabetes as well.  She was on Xarelto for stroke prevention, but switched to warfarin for cost reasons and is doing fine with that as well.      We are checking her pacemaker regularly and have noted occasional or rare episodes of brief atrial fibrillation but nothing persistent.  She does not seem to be asymptomatic with that at all.  She has no symptoms of shortness of breath, PND, orthopnea or edema at this point.  She seems to be doing quite well but admittedly lives a rather sedentary lifestyle.      I reviewed her laboratory studies and see that her creatinine has jumped up a bit to 1.2 with a GFR of 42 and her A1c is elevated at 8.1.      PHYSICAL EXAMINATION:  Blood pressure 142/66, heart rate 82 and weight 176 pounds and stable.  Her lungs are clear.  Heart rhythm is regular.  I do not hear any cardiac murmurs or carotid bruits and she has no edema.      IMPRESSIONS:  Ms. Shelly Rogers is an 84-year-old woman with chronic diastolic heart failure, probably due primarily to atrial fibrillation with rapid ventricular response.  She has sick sinus syndrome and a permanent pacemaker in place for that reason, and we are controlling her atrial fibrillation well with metoprolol  and these episodes seemed to be fairly infrequent.  She is asymptomatic from a cardiac standpoint.      I am concerned about her kidney function due to her diabetes which is not optimally controlled and elevated blood pressures.  I suggested she follow up with you to discuss her diabetes further.  I will increase her metoprolol to 50 mg twice daily to help ensure good control of her atrial fibrillation as well as control her blood pressure better.  I will decrease her Lasix from 20 mg twice a day to 20 mg once a day and urged her to limit her salt intake.  Hopefully, that will help her kidney function normalize as well.      Overall, I think she is doing well and we will keep an eye on her with annual visits.  I will see her next year.      cc:      Halley Huff MD    [facility]   [address]   [place] [address]         JENNIFER MIRANDA MD, [facility]             D: 2017 09:12   T: 2017 16:25   MT: JOSE      Name:     GABBY OLIVA   MRN:      [id number]        Account:      [id number]   :      1933           Service Date: 2017      Document: [id number]

## 2017-06-19 ENCOUNTER — ANTICOAGULATION THERAPY VISIT (OUTPATIENT)
Dept: NURSING | Facility: CLINIC | Age: 82
End: 2017-06-19
Payer: COMMERCIAL

## 2017-06-19 DIAGNOSIS — I48.0 PAROXYSMAL ATRIAL FIBRILLATION (H): ICD-10-CM

## 2017-06-19 DIAGNOSIS — Z79.01 LONG-TERM (CURRENT) USE OF ANTICOAGULANTS: ICD-10-CM

## 2017-06-19 LAB — INR POINT OF CARE: 3.5 (ref 0.86–1.14)

## 2017-06-19 PROCEDURE — 85610 PROTHROMBIN TIME: CPT | Mod: QW

## 2017-06-19 PROCEDURE — 36416 COLLJ CAPILLARY BLOOD SPEC: CPT

## 2017-06-19 PROCEDURE — 99207 ZZC NO CHARGE NURSE ONLY: CPT

## 2017-06-19 NOTE — MR AVS SNAPSHOT
Shelly Rogers   6/19/2017 3:00 PM   Anticoagulation Therapy Visit    Description:  84 year old female   Provider:  [facility]   Department:  [facility] Nurse           INR as of 6/19/2017     Today's INR 3.5!      Anticoagulation Summary as of 6/19/2017     INR goal 2.0-3.0   Today's INR 3.5!   Full instructions 5 mg on Wed; 2.5 mg all other days   Next INR check 7/13/2017    Indications   Long-term (current) use of anticoagulants [Z79.01] [Z79.01]  Atrial fibrillation (H) [I48.91]         Your next Anticoagulation Clinic appointment(s)     Jul 13, 2017  3:45 PM CDT   Anticoagulation Visit with  ANTICOAGULATION CLINIC   [facility] ([facility])    [address]  [place] [address]   [phone]              Contact Numbers     Clinic Number:         June 2017 Details    Sun Mon Tue Wed Thu Fri Sat         1               2               3                 4               5               6               7               8               9               10                 11               12               13               14               15               16               17                 18               19      2.5 mg   See details      20      2.5 mg         21      5 mg         22      2.5 mg         23      2.5 mg         24      2.5 mg           25      2.5 mg         26      2.5 mg         27      2.5 mg         28      5 mg         29      2.5 mg         30      2.5 mg           Date Details   06/19 This INR check               How to take your warfarin dose     To take:  2.5 mg Take 0.5 of a 5 mg tablet.    To take:  5 mg Take 1 of the 5 mg tablets.           July 2017 Details    Sun Mon Tue Wed Thu Fri Sat           1      2.5 mg           2      2.5 mg         3      2.5 mg         4      2.5 mg         5      5 mg         6      2.5 mg         7      2.5 mg         8      2.5 mg           9      2.5 mg         10      2.5 mg         11      2.5 mg         12      5  mg         13            14               15                 16               17               18               19               20               21               22                 23               24               25               26               27               28               29                 30               31                     Date Details   No additional details    Date of next INR:  7/13/2017         How to take your warfarin dose     To take:  2.5 mg Take 0.5 of a 5 mg tablet.    To take:  5 mg Take 1 of the 5 mg tablets.

## 2017-06-19 NOTE — PROGRESS NOTES
ANTICOAGULATION FOLLOW-UP CLINIC VISIT    Patient Name:  Shelly Rogers  Date:  6/19/2017  Contact Type:  Face to Face    SUBJECTIVE:     Patient Findings     Positives No Problem Findings           OBJECTIVE    INR Protime   Date Value Ref Range Status   06/19/2017 3.5 (A) 0.86 - 1.14 Final       ASSESSMENT / PLAN  INR assessment SUPRA    Recheck INR In: 3 WEEKS    INR Location Clinic      Anticoagulation Summary as of 6/19/2017     INR goal 2.0-3.0   Today's INR 3.5!   Maintenance plan 5 mg (5 mg x 1) on Wed; 2.5 mg (5 mg x 0.5) all other days   Full instructions 5 mg on Wed; 2.5 mg all other days   Weekly total 20 mg   Plan last modified Monet Jaramillo RN (6/19/2017)   Next INR check 7/13/2017   Priority INR   Target end date     Indications   Long-term (current) use of anticoagulants [Z79.01] [Z79.01]  Atrial fibrillation (H) [I48.91]         Anticoagulation Episode Summary     INR check location     Preferred lab     Send INR reminders to [facility]    Comments       Anticoagulation Care Providers     Provider Role Specialty Phone number    Halley Huff MD [facility] Internal Medicine [phone]            See the Encounter Report to view Anticoagulation Flowsheet and Dosing Calendar (Go to Encounters tab in chart review, and find the Anticoagulation Therapy Visit)    Patient is here with Daughter. Denies any changes with medications, supplements, diet, etc.  Dosing based on G Protocol and Provider directed care plan.      Monet Jaramillo, RN

## 2017-07-13 ENCOUNTER — ANTICOAGULATION THERAPY VISIT (OUTPATIENT)
Dept: NURSING | Facility: CLINIC | Age: 82
End: 2017-07-13
Payer: COMMERCIAL

## 2017-07-13 DIAGNOSIS — I48.0 PAROXYSMAL ATRIAL FIBRILLATION (H): ICD-10-CM

## 2017-07-13 DIAGNOSIS — Z79.01 LONG-TERM (CURRENT) USE OF ANTICOAGULANTS: ICD-10-CM

## 2017-07-13 LAB — INR POINT OF CARE: 3.1 (ref 0.86–1.14)

## 2017-07-13 PROCEDURE — 85610 PROTHROMBIN TIME: CPT | Mod: QW

## 2017-07-13 PROCEDURE — 36416 COLLJ CAPILLARY BLOOD SPEC: CPT

## 2017-07-13 PROCEDURE — 99207 ZZC NO CHARGE NURSE ONLY: CPT

## 2017-07-13 NOTE — PROGRESS NOTES
ANTICOAGULATION FOLLOW-UP CLINIC VISIT    Patient Name:  Shelly Rogers  Date:  7/13/2017  Contact Type:  Face to Face    SUBJECTIVE:        OBJECTIVE    INR Protime   Date Value Ref Range Status   07/13/2017 3.1 (A) 0.86 - 1.14 Final       ASSESSMENT / PLAN  INR assessment THER    Recheck INR In: 4 WEEKS    INR Location Clinic      Anticoagulation Summary as of 7/13/2017     INR goal 2.0-3.0   Today's INR 3.1!   Maintenance plan 5 mg (5 mg x 1) on Wed; 2.5 mg (5 mg x 0.5) all other days   Full instructions 5 mg on Wed; 2.5 mg all other days   Weekly total 20 mg   No change documented Ciera Batista RN   Plan last modified Monet Jaramillo RN (6/19/2017)   Next INR check 8/14/2017   Priority INR   Target end date     Indications   Long-term (current) use of anticoagulants [Z79.01] [Z79.01]  Atrial fibrillation (H) [I48.91]         Anticoagulation Episode Summary     INR check location     Preferred lab     Send INR reminders to [facility]    Comments       Anticoagulation Care Providers     Provider Role Specialty Phone number    Halley Huff MD Responsible Internal Medicine [phone]            See the Encounter Report to view Anticoagulation Flowsheet and Dosing Calendar (Go to Encounters tab in chart review, and find the Anticoagulation Therapy Visit)    Dosage adjustment made based on physician directed care plan.    Ciera Batista, RN

## 2017-07-13 NOTE — MR AVS SNAPSHOT
Shelly ANN Rogers   7/13/2017 3:45 PM   Anticoagulation Therapy Visit    Description:  84 year old female   Provider:   ANTICOAGULATION CLINIC   Department:   Nurse           INR as of 7/13/2017     Today's INR 3.1!      Anticoagulation Summary as of 7/13/2017     INR goal 2.0-3.0   Today's INR 3.1!   Full instructions 5 mg on Wed; 2.5 mg all other days   Next INR check 8/14/2017    Indications   Long-term (current) use of anticoagulants [Z79.01] [Z79.01]  Atrial fibrillation (H) [I48.91]         Your next Anticoagulation Clinic appointment(s)     Jul 13, 2017  3:45 PM CDT   Anticoagulation Visit with  ANTICOAGULATION CLINIC   [facility]    [address]  [place] [address]   [phone]            Aug 14, 2017  3:00 PM CDT   Anticoagulation Visit with  ANTICOAGULATION CLINIC   [facility] ([facility])    [address]  [place] [address]   [phone]              Contact Numbers     Clinic Number:         July 2017 Details    Sun Mon Tue Wed Thu Fri Sat           1                 2               3               4               5               6               7               8                 9               10               11               12               13      2.5 mg   See details      14      2.5 mg         15      2.5 mg           16      2.5 mg         17      2.5 mg         18      2.5 mg         19      5 mg         20      2.5 mg         21      2.5 mg         22      2.5 mg           23      2.5 mg         24      2.5 mg         25      2.5 mg         26      5 mg         27      2.5 mg         28      2.5 mg         29      2.5 mg           30      2.5 mg         31      2.5 mg               Date Details   07/13 This INR check               How to take your warfarin dose     To take:  2.5 mg Take 0.5 of a 5 mg tablet.    To take:  5 mg Take 1 of the 5 mg tablets.           August 2017 Details    Sun Mon Tue Wed Thu Fri Sat        1      2.5 mg         2      5 mg         3      2.5 mg         4      2.5 mg         5      2.5 mg           6      2.5 mg         7      2.5 mg         8      2.5 mg         9      5 mg         10      2.5 mg         11      2.5 mg         12      2.5 mg           13      2.5 mg         14            15               16               17               18               19                 20               21               22               23               24               25               26                 27               28               29               30               31                  Date Details   No additional details    Date of next INR:  8/14/2017         How to take your warfarin dose     To take:  2.5 mg Take 0.5 of a 5 mg tablet.    To take:  5 mg Take 1 of the 5 mg tablets.

## 2017-08-14 ENCOUNTER — ANTICOAGULATION THERAPY VISIT (OUTPATIENT)
Dept: NURSING | Facility: CLINIC | Age: 82
End: 2017-08-14
Payer: COMMERCIAL

## 2017-08-14 DIAGNOSIS — I48.0 PAROXYSMAL ATRIAL FIBRILLATION (H): ICD-10-CM

## 2017-08-14 DIAGNOSIS — Z79.01 LONG-TERM (CURRENT) USE OF ANTICOAGULANTS: ICD-10-CM

## 2017-08-14 LAB — INR POINT OF CARE: 2.1 (ref 0.86–1.14)

## 2017-08-14 PROCEDURE — 85610 PROTHROMBIN TIME: CPT | Mod: QW

## 2017-08-14 PROCEDURE — 36416 COLLJ CAPILLARY BLOOD SPEC: CPT

## 2017-08-14 PROCEDURE — 99207 ZZC NO CHARGE NURSE ONLY: CPT

## 2017-08-14 NOTE — TELEPHONE ENCOUNTER
warfarin (COUMADIN) 5 MG tablet      Last Written Prescription Date: 12/6/2016  Last Fill Qty: 90, # refills: 1  Last Office Visit with G, P or [facility] prescribing provider: 4/6/2017       Date and Result of Last PT/INR:   Lab Results   Component Value Date    INR 3.1 07/13/2017    INR 3.5 06/19/2017    INR 2.73 11/20/2014    INR 0.95 10/30/2014

## 2017-08-14 NOTE — PROGRESS NOTES
ANTICOAGULATION FOLLOW-UP CLINIC VISIT    Patient Name:  Shelly Rogers  Date:  8/14/2017  Contact Type:  Face to Face    SUBJECTIVE:        OBJECTIVE    INR Protime   Date Value Ref Range Status   08/14/2017 2.1 (A) 0.86 - 1.14 Final       ASSESSMENT / PLAN  INR assessment THER    Recheck INR In: 5 WEEKS    INR Location Clinic      Anticoagulation Summary as of 8/14/2017     INR goal 2.0-3.0   Today's INR 2.1   Maintenance plan 5 mg (5 mg x 1) on Wed; 2.5 mg (5 mg x 0.5) all other days   Full instructions 5 mg on Wed; 2.5 mg all other days   Weekly total 20 mg   No change documented Monet Jaramillo RN   Plan last modified Monet Jaramillo RN (6/19/2017)   Next INR check 9/11/2017   Priority INR   Target end date     Indications   Long-term (current) use of anticoagulants [Z79.01] [Z79.01]  Atrial fibrillation (H) [I48.91]         Anticoagulation Episode Summary     INR check location     Preferred lab     Send INR reminders to [facility]    Comments       Anticoagulation Care Providers     Provider Role Specialty Phone number    Halley Huff MD Responsible Internal Medicine [phone]            See the Encounter Report to view Anticoagulation Flowsheet and Dosing Calendar (Go to Encounters tab in chart review, and find the Anticoagulation Therapy Visit)    Dosing based on [facility] Protocol and Provider directed care plan.      Monet Jaramillo RN

## 2017-08-14 NOTE — MR AVS SNAPSHOT
Shelly Rogers   8/14/2017 3:00 PM   Anticoagulation Therapy Visit    Description:  84 year old female   Provider:   ANTICOAGULATION CLINIC   Department:  [facility] Nurse           INR as of 8/14/2017     Today's INR 2.1      Anticoagulation Summary as of 8/14/2017     INR goal 2.0-3.0   Today's INR 2.1   Full instructions 5 mg on Wed; 2.5 mg all other days   Next INR check 9/11/2017    Indications   Long-term (current) use of anticoagulants [Z79.01] [Z79.01]  Atrial fibrillation (H) [I48.91]         Your next Anticoagulation Clinic appointment(s)     Sep 11, 2017  2:45 PM CDT   Anticoagulation Visit with  [facility]ATION CLINIC   [facility]    [address]  [place] [address] [phone]              Contact Numbers     Clinic Number:         August 2017 Details    Sun Mon Tue Wed Thu Fri Sat       1               2               3               4               5                 6               7               8               9               10               11               12                 13               14      2.5 mg   See details      15      2.5 mg         16      5 mg         17      2.5 mg         18      2.5 mg         19      2.5 mg           20      2.5 mg         21      2.5 mg         22      2.5 mg         23      5 mg         24      2.5 mg         25      2.5 mg         26      2.5 mg           27      2.5 mg         28      2.5 mg         29      2.5 mg         30      5 mg         31      2.5 mg            Date Details   08/14 This INR check               How to take your warfarin dose     To take:  2.5 mg Take 0.5 of a 5 mg tablet.    To take:  5 mg Take 1 of the 5 mg tablets.           September 2017 Details    Sun Mon Tue Wed Thu Fri Sat          1      2.5 mg         2      2.5 mg           3      2.5 mg         4      2.5 mg         5      2.5 mg         6      5 mg         7      2.5 mg         8      2.5 mg         9      2.5 mg           10       2.5 mg         11            12               13               14               15               16                 17               18               19               20               21               22               23                 24               25               26               27               28               29               30                Date Details   No additional details    Date of next INR:  9/11/2017         How to take your warfarin dose     To take:  2.5 mg Take 0.5 of a 5 mg tablet.    To take:  5 mg Take 1 of the 5 mg tablets.

## 2017-08-16 RX ORDER — WARFARIN SODIUM 5 MG/1
TABLET ORAL
Qty: 90 TABLET | Refills: 0 | Status: SHIPPED | OUTPATIENT
Start: 2017-08-16 | End: 2018-02-27

## 2017-09-06 ENCOUNTER — ALLIED HEALTH/NURSE VISIT (OUTPATIENT)
Dept: CARDIOLOGY | Facility: CLINIC | Age: 82
End: 2017-09-06
Payer: COMMERCIAL

## 2017-09-06 DIAGNOSIS — I49.5 SSS (SICK SINUS SYNDROME) (H): ICD-10-CM

## 2017-09-06 DIAGNOSIS — Z95.0 CARDIAC PACEMAKER IN SITU: Primary | ICD-10-CM

## 2017-09-06 PROCEDURE — 93280 PM DEVICE PROGR EVAL DUAL: CPT | Performed by: INTERNAL MEDICINE

## 2017-09-06 NOTE — MR AVS SNAPSHOT
After Visit Summary   9/6/2017    Shelly Rogers    MRN: [id number]           Patient Information     Date Of Birth          1/18/1933        Visit Information        Provider Department      9/6/2017 2:30 PM LEVY DCRN [facility]        Today's Diagnoses     Cardiac pacemaker in situ    -  1    SSS (sick sinus syndrome) (H)           Follow-ups after your visit        Your next 10 appointments already scheduled     Sep 11, 2017  2:45 PM CDT   Anticoagulation [facility] with [facility]   [facility]    [address]  [place] [id number] [phone]            Dec 07, 2017  3:45 PM CST   Remote PPM Check with LEVY TECH1   [facility] ([facility])    [address]  [place] [address] [phone]           This appointment is for a remote check of your pacemaker.  This is not an appointment at the office.              Who to contact     If you have questions or need follow up information about today's clinic visit or your schedule please contact [facility] directly at [phone].  Normal or non-critical lab and imaging results will be communicated to you by DigitalAdvisorhart, letter or phone within 4 business days after the clinic has received the results. If you do not hear from us within 7 days, please contact the clinic through DigitalAdvisorhart or phone. If you have a critical or abnormal lab result, we will notify you by phone as soon as possible.  Submit refill requests through Kanshu or call your pharmacy and they will forward the refill request to us. Please allow 3 business days for your refill to be completed.          Additional Information About Your Visit        MyChart Information     Kanshu lets you send messages to your doctor, view your test results, renew your prescriptions, schedule appointments and  "more. To sign up, go to [email or web address] . Click on \"Log in\" on the left side of the screen, which will take you to the Welcome page. Then click on \"Sign up Now\" on the right side of the page.     You will be asked to enter the access code listed below, as well as some personal information. Please follow the directions to create your username and password.     Your access code is: [email or web address]  Expires: 2017  2:45 PM     Your access code will  in 90 days. If you need help or a new code, please call your [facility] clinic or [phone].        Care EveryWhere ID     This is your Care EveryWhere ID. This could be used by other organizations to access your [facility] medical records  [id number]         Blood Pressure from Last 3 Encounters:   17 142/66   17 147/74   17 134/74    Weight from Last 3 Encounters:   17 80.2 kg (176 lb 14.4 oz)   17 79.8 kg (176 lb)   16 78.9 kg (174 lb)              We Performed the Following     PM DEVICE PROGRAMMING EVAL, DUAL LEAD PACER ([id number])        Primary Care Provider Office Phone # Fax #    Halley CARSON MD Daria [phone] [address] [facility] [address]  [place]                [place] [address]        Equal Access to Services     [facility]: Hadii aad ku benedict Harrell, waaxda lucarolynadaha, qaybta kaalmada jeffery, matt zuniga. So [facility] [phone].    ATENCIÓN: Si habla español, tiene a quinones disposición servicios gratuitos de asistencia lingüística. Llame al [phone].    We comply with applicable federal civil rights laws and [place] laws. We do not discriminate on the basis of race, color, national origin, age, disability sex, sexual orientation or gender identity.            Thank you!     Thank you for choosing [facility]  for your care. Our goal is always to provide you with excellent care. Hearing back from our patients is one way we can " continue to improve our services. Please take a few minutes to complete the written survey that you may receive in the mail after your visit with us. Thank you!             Your Updated Medication List - Protect others around you: Learn how to safely use, store and throw away your medicines at www.disposemymeds.org.          This list is accurate as of: 9/6/17  2:45 PM.  Always use your most recent med list.                   Brand Name Dispense Instructions for use Diagnosis    ACE/ARB NOT PRESCRIBED (INTENTIONAL)      ACE & ARB not prescribed due to Other: HFpEF    Chronic diastolic congestive heart failure (H)       ACETAMINOPHEN PO      Take 500 mg by mouth 3 times daily as needed (Takes at least 6 hours apart).        ASPIRIN NOT PRESCRIBED    INTENTIONAL    0 each    Antiplatelet medication not prescribed intentionally due to Current anticoagulant therapy (warfarin/enoxaparin)    Type 2 diabetes mellitus without complications (H)       blood glucose monitoring lancets     1 Box    Use to check blood sugars daily    Type 2 diabetes, HbA1c goal < 7% (H)       blood glucose monitoring meter device kit     1 kit    1 Device daily.    Type 2 diabetes, HbA1c goal < 7% (H)       blood glucose monitoring test strip    ACCU-CHEK SMARTVIEW    1 Box    1 strip by In Vitro route daily    Type 2 diabetes, HbA1c goal < 7% (H)       CENTRUM SILVER PO      Take by mouth daily        furosemide 20 MG tablet    LASIX    90 tablet    Take 1 tablet (20 mg) by mouth daily    Chronic diastolic congestive heart failure (H)       glimepiride 2 MG tablet    AMARYL    180 tablet    Take 1 tablet (2 mg) by mouth 2 times daily    Type 2 diabetes mellitus without complication, without long-term current use of insulin (H)       levothyroxine 75 MCG tablet    SYNTHROID/LEVOTHROID    90 tablet    TAKE 1 TABLET BY MOUTH DAILY    Hypothyroidism, unspecified type       metFORMIN 500 MG tablet    GLUCOPHAGE    270 tablet    Take 1 tablet (500  mg) by mouth 3 times daily (with meals)    Type 2 diabetes mellitus without complication, without long-term current use of insulin (H)       metoprolol 50 MG 24 hr tablet    TOPROL-XL    180 tablet    Take 1 tablet (50 mg) by mouth 2 times daily    Chronic diastolic congestive heart failure (H)       NASACORT ALLERGY 24HR 55 MCG/ACT Inhaler   Generic drug:  triamcinolone      Spray 2 sprays into both nostrils daily        * order for DME     2 Package    Equipment being ordered: Compression Stockings Knee high 20/30 compression    Bilateral edema of lower extremity       * order for DME     1 Units    Equipment being ordered: cane with quad or prongs    Unsteadiness on feet       STATIN NOT PRESCRIBED (INTENTIONAL)     0 each    continuous prn. Statin not prescribed intentionally due to Other: Not needed, LDL at goal <100mg/dL without therapy        warfarin 5 MG tablet    COUMADIN    90 tablet    TAKE 1/2 TO 1 TABLET BY MOUTH DAILY OR AS DIRECTED BY INR CLINIC    Paroxysmal atrial fibrillation (H)       * Notice:  This list has 2 medication(s) that are the same as other medications prescribed for you. Read the directions carefully, and ask your doctor or other care provider to review them with you.

## 2017-09-06 NOTE — PROGRESS NOTES
Medtronic Sensia (D) Pacemaker Device Check  AP: 23 % : 0 %  Mode: DDDR         Underlying Rhythm: SR/SB  Heart Rate: good variability  Sensing: WNL    Pacing Threshold: WNL   Impedance: WNL  Battery Status: 8 yrs estimated longevity  Atrial Arrhythmia: none  Ventricular Arrhythmia: none  Setting Change: none    Care Plan: remote in 3 months, scheduled. OV with Dr. Del Valle due in June 2018.    JIL BRANCH

## 2017-09-11 ENCOUNTER — ANTICOAGULATION THERAPY VISIT (OUTPATIENT)
Dept: NURSING | Facility: CLINIC | Age: 82
End: 2017-09-11
Payer: COMMERCIAL

## 2017-09-11 ENCOUNTER — TELEPHONE (OUTPATIENT)
Dept: FAMILY MEDICINE | Facility: CLINIC | Age: 82
End: 2017-09-11

## 2017-09-11 DIAGNOSIS — I48.0 PAROXYSMAL ATRIAL FIBRILLATION (H): Primary | ICD-10-CM

## 2017-09-11 DIAGNOSIS — Z79.2 PROPHYLACTIC ANTIBIOTIC: ICD-10-CM

## 2017-09-11 DIAGNOSIS — Z79.01 LONG-TERM (CURRENT) USE OF ANTICOAGULANTS: ICD-10-CM

## 2017-09-11 DIAGNOSIS — I48.0 PAROXYSMAL ATRIAL FIBRILLATION (H): ICD-10-CM

## 2017-09-11 LAB — INR POINT OF CARE: 2.1 (ref 0.86–1.14)

## 2017-09-11 PROCEDURE — 36416 COLLJ CAPILLARY BLOOD SPEC: CPT

## 2017-09-11 PROCEDURE — 99207 ZZC NO CHARGE NURSE ONLY: CPT

## 2017-09-11 PROCEDURE — 85610 PROTHROMBIN TIME: CPT | Mod: QW

## 2017-09-11 RX ORDER — AMOXICILLIN 500 MG/1
2000 CAPSULE ORAL ONCE
Qty: 4 CAPSULE | Refills: 3 | Status: SHIPPED | OUTPATIENT
Start: 2017-09-11 | End: 2017-09-11

## 2017-09-11 NOTE — TELEPHONE ENCOUNTER
To PCP:  Patient requesting refill of Prophylactic antibiotic for dental procedure.  She will be having front tooth worked on tomorrow morning.  Also will have a couple follow up appts.  Please see pended med above.  Bilateral knee replacements.  Monet Jaramillo RN

## 2017-09-11 NOTE — PROGRESS NOTES
ANTICOAGULATION FOLLOW-UP CLINIC VISIT    Patient Name:  Shelly Rogers  Date:  9/11/2017  Contact Type:  Face to Face    SUBJECTIVE:     Patient Findings     Positives No Problem Findings           OBJECTIVE    INR Protime   Date Value Ref Range Status   09/11/2017 2.1 (A) 0.86 - 1.14 Final       ASSESSMENT / PLAN  INR assessment THER    Recheck INR In: 4 WEEKS    INR Location Clinic      Anticoagulation Summary as of 9/11/2017     INR goal 2.0-3.0   Today's INR 2.1   Maintenance plan 5 mg (5 mg x 1) on Wed; 2.5 mg (5 mg x 0.5) all other days   Full instructions 5 mg on Wed; 2.5 mg all other days   Weekly total 20 mg   No change documented Monet Jaramillo RN   Plan last modified Monet Jaramillo RN (6/19/2017)   Next INR check 10/16/2017   Priority INR   Target end date     Indications   Long-term (current) use of anticoagulants [Z79.01] [Z79.01]  Atrial fibrillation (H) [I48.91]         Anticoagulation Episode Summary     INR check location     Preferred lab     Send INR reminders to  ANTICOAGULATION    Comments       Anticoagulation Care Providers     Provider Role Specialty Phone number    Halley Huff MD Responsible Internal Medicine [phone]            See the Encounter Report to view Anticoagulation Flowsheet and Dosing Calendar (Go to Encounters tab in chart review, and find the Anticoagulation Therapy Visit)    Patient is here with her daughter.  Will be having dental work tomorrow (Onward placed). No hold needed per dentist. Dosing based on [facility] Protocol and Provider directed care plan.      Monet Jaramillo RN

## 2017-09-11 NOTE — TELEPHONE ENCOUNTER
To PCP:  Patient's INR Clinic Referral has .  Please see new INR clinic referral above.  Please sign if appropriate.  Thank you.  Monet Jaramillo RN

## 2017-09-11 NOTE — MR AVS SNAPSHOT
Shellywaldo Rogers   9/11/2017 2:45 PM   Anticoagulation Therapy Visit    Description:  84 year old female   Provider:   ANTICOAGULATION CLINIC   Department:  Cs Nurse           INR as of 9/11/2017     Today's INR 2.1      Anticoagulation Summary as of 9/11/2017     INR goal 2.0-3.0   Today's INR 2.1   Full instructions 5 mg on Wed; 2.5 mg all other days   Next INR check 10/16/2017    Indications   Long-term (current) use of anticoagulants [Z79.01] [Z79.01]  Atrial fibrillation (H) [I48.91]         Your next Anticoagulation Clinic appointment(s)     Oct 16, 2017  3:00 PM CDT   Anticoagulation Visit with  ANTICOAGULATION CLINIC   [facility]    [address]  [place] [address]   [phone]              Contact Numbers     Clinic Number:         September 2017 Details    Sun Mon Tue Wed Thu Fri Sat          1               2                 3               4               5               6               7               8               9                 10               11      2.5 mg   See details      12      2.5 mg         13      5 mg         14      2.5 mg         15      2.5 mg         16      2.5 mg           17      2.5 mg         18      2.5 mg         19      2.5 mg         20      5 mg         21      2.5 mg         22      2.5 mg         23      2.5 mg           24      2.5 mg         25      2.5 mg         26      2.5 mg         27      5 mg         28      2.5 mg         29      2.5 mg         30      2.5 mg          Date Details   09/11 This INR check               How to take your warfarin dose     To take:  2.5 mg Take 0.5 of a 5 mg tablet.    To take:  5 mg Take 1 of the 5 mg tablets.           October 2017 Details    Sun Mon Tue Wed Thu Fri Sat     1      2.5 mg         2      2.5 mg         3      2.5 mg         4      5 mg         5      2.5 mg         6      2.5 mg         7      2.5 mg           8      2.5 mg         9      2.5 mg         10      2.5  mg         11      5 mg         12      2.5 mg         13      2.5 mg         14      2.5 mg           15      2.5 mg         16            17               18               19               20               21                 22               23               24               25               26               27               28                 29               30               31                    Date Details   No additional details    Date of next INR:  10/16/2017         How to take your warfarin dose     To take:  2.5 mg Take 0.5 of a 5 mg tablet.    To take:  5 mg Take 1 of the 5 mg tablets.

## 2017-10-16 ENCOUNTER — ANTICOAGULATION THERAPY VISIT (OUTPATIENT)
Dept: NURSING | Facility: CLINIC | Age: 82
End: 2017-10-16
Payer: COMMERCIAL

## 2017-10-16 DIAGNOSIS — Z79.01 LONG-TERM (CURRENT) USE OF ANTICOAGULANTS: ICD-10-CM

## 2017-10-16 DIAGNOSIS — I48.91 ATRIAL FIBRILLATION (H): ICD-10-CM

## 2017-10-16 LAB — INR POINT OF CARE: 2.1 (ref 0.86–1.14)

## 2017-10-16 PROCEDURE — 85610 PROTHROMBIN TIME: CPT | Mod: QW

## 2017-10-16 PROCEDURE — 99207 ZZC NO CHARGE NURSE ONLY: CPT

## 2017-10-16 PROCEDURE — 36416 COLLJ CAPILLARY BLOOD SPEC: CPT

## 2017-10-16 NOTE — MR AVS SNAPSHOT
Shelly Rogers   10/16/2017 3:00 PM   Anticoagulation Therapy Visit    Description:  84 year old female   Provider:   ANTICOAGULATION CLINIC   Department:  Cs Nurse           INR as of 10/16/2017     Today's INR 2.1      Anticoagulation Summary as of 10/16/2017     INR goal 2.0-3.0   Today's INR 2.1   Full instructions 5 mg on Wed; 2.5 mg all other days   Next INR check 11/13/2017    Indications   Long-term (current) use of anticoagulants [Z79.01] [Z79.01]  Atrial fibrillation (H) [I48.91]         Your next Anticoagulation Clinic appointment(s)     Nov 13, 2017  3:30 PM CST   Anticoagulation Visit with  ANTICOAGULATION CLINIC   [facility] ([facility])    [address]  [place] [address] [phone]              Contact Numbers     Clinic Number:         October 2017 Details    Sun Mon Tue Wed Thu Fri Sat     1               2               3               4               5               6               7                 8               9               10               11               12               13               14                 15               16      2.5 mg   See details      17      2.5 mg         18      5 mg         19      2.5 mg         20      2.5 mg         21      2.5 mg           22      2.5 mg         23      2.5 mg         24      2.5 mg         25      5 mg         26      2.5 mg         27      2.5 mg         28      2.5 mg           29      2.5 mg         30      2.5 mg         31      2.5 mg              Date Details   10/16 This INR check               How to take your warfarin dose     To take:  2.5 mg Take 0.5 of a 5 mg tablet.    To take:  5 mg Take 1 of the 5 mg tablets.           November 2017 Details    Sun Mon Tue Wed Thu Fri Sat        1      5 mg         2      2.5 mg         3      2.5 mg         4      2.5 mg           5      2.5 mg         6      2.5 mg         7      2.5 mg         8      5 mg         9      2.5 mg         10      2.5 mg          11      2.5 mg           12      2.5 mg         13            14               15               16               17               18                 19               20               21               22               23               24               25                 26               27               28               29               30                  Date Details   No additional details    Date of next INR:  11/13/2017         How to take your warfarin dose     To take:  2.5 mg Take 0.5 of a 5 mg tablet.    To take:  5 mg Take 1 of the 5 mg tablets.

## 2017-11-14 DIAGNOSIS — E03.9 HYPOTHYROIDISM, UNSPECIFIED TYPE: ICD-10-CM

## 2017-11-14 RX ORDER — LEVOTHYROXINE SODIUM 75 UG/1
TABLET ORAL
Qty: 90 TABLET | Refills: 1 | Status: SHIPPED | OUTPATIENT
Start: 2017-11-14 | End: 2018-02-27

## 2017-11-20 ENCOUNTER — ANTICOAGULATION THERAPY VISIT (OUTPATIENT)
Dept: NURSING | Facility: CLINIC | Age: 82
End: 2017-11-20
Payer: COMMERCIAL

## 2017-11-20 LAB — INR POINT OF CARE: 2.3 (ref 0.86–1.14)

## 2017-11-20 PROCEDURE — 99207 ZZC NO CHARGE NURSE ONLY: CPT

## 2017-11-20 PROCEDURE — 36416 COLLJ CAPILLARY BLOOD SPEC: CPT

## 2017-11-20 PROCEDURE — 85610 PROTHROMBIN TIME: CPT | Mod: QW

## 2017-11-20 NOTE — PROGRESS NOTES
ANTICOAGULATION FOLLOW-UP CLINIC VISIT    Patient Name:  Shelly Rogers  Date:  11/20/2017  Contact Type:  Face to Face    SUBJECTIVE:     Patient Findings     Positives No Problem Findings    Comments No changes           OBJECTIVE    INR Protime   Date Value Ref Range Status   11/20/2017 2.3 (A) 0.86 - 1.14 Final       ASSESSMENT / PLAN  INR assessment THER    Recheck INR In: 5 WEEKS    INR Location Clinic      Anticoagulation Summary as of 11/20/2017     INR goal 2.0-3.0   Today's INR 2.3   Maintenance plan 5 mg (5 mg x 1) on Wed; 2.5 mg (5 mg x 0.5) all other days   Full instructions 5 mg on Wed; 2.5 mg all other days   Weekly total 20 mg   No change documented Odilia Youssef RN   Plan last modified Monet Jaramillo RN (6/19/2017)   Next INR check 12/27/2017   Priority INR   Target end date     Indications   Long-term (current) use of anticoagulants [Z79.01] [Z79.01]  Atrial fibrillation (H) [I48.91]         Anticoagulation Episode Summary     INR check location     Preferred lab     Send INR reminders to CS ANTICOAGULATION    Comments       Anticoagulation Care Providers     Provider Role Specialty Phone number    Halley Huff MD Responsible Internal Medicine [phone]            See the Encounter Report to view Anticoagulation Flowsheet and Dosing Calendar (Go to Encounters tab in chart review, and find the Anticoagulation Therapy Visit)        Odilia Youssef, RN

## 2017-11-20 NOTE — MR AVS SNAPSHOT
Shelly Rogers   11/20/2017 8:45 AM   Anticoagulation Therapy Visit    Description:  84 year old female   Provider:   ANTICOAGULATION CLINIC   Department:  Cs Nurse           INR as of 11/20/2017     Today's INR 2.3      Anticoagulation Summary as of 11/20/2017     INR goal 2.0-3.0   Today's INR 2.3   Full instructions 5 mg on Wed; 2.5 mg all other days   Next INR check 12/27/2017    Indications   Long-term (current) use of anticoagulants [Z79.01] [Z79.01]  Atrial fibrillation (H) [I48.91]         Your next Anticoagulation Clinic appointment(s)     Nov 27, 2017  3:30 PM CST   Anticoagulation Visit with  ANTICOAGULATION CLINIC   [facility] [place] ([facility])    [address]  [place] [address] [phone]              Contact Numbers     Clinic Number:         November 2017 Details    Sun Mon Tue Wed Thu Fri Sat        1               2               3               4                 5               6               7               8               9               10               11                 12               13               14               15               16               17               18                 19               20      2.5 mg   See details      21      2.5 mg         22      5 mg         23      2.5 mg         24      2.5 mg         25      2.5 mg           26      2.5 mg         27      2.5 mg         28      2.5 mg         29      5 mg         30      2.5 mg            Date Details   11/20 This INR check               How to take your warfarin dose     To take:  2.5 mg Take 0.5 of a 5 mg tablet.    To take:  5 mg Take 1 of the 5 mg tablets.           December 2017 Details    Sun Mon Tue Wed Thu Fri Sat          1      2.5 mg         2      2.5 mg           3      2.5 mg         4      2.5 mg         5      2.5 mg         6      5 mg         7      2.5 mg         8      2.5 mg         9      2.5 mg           10      2.5 mg         11      2.5 mg         12       2.5 mg         13      5 mg         14      2.5 mg         15      2.5 mg         16      2.5 mg           17      2.5 mg         18      2.5 mg         19      2.5 mg         20      5 mg         21      2.5 mg         22      2.5 mg         23      2.5 mg           24      2.5 mg         25      2.5 mg         26      2.5 mg         27            28               29               30                 31                      Date Details   No additional details    Date of next INR:  12/27/2017         How to take your warfarin dose     To take:  2.5 mg Take 0.5 of a 5 mg tablet.    To take:  5 mg Take 1 of the 5 mg tablets.

## 2017-12-07 ENCOUNTER — ALLIED HEALTH/NURSE VISIT (OUTPATIENT)
Dept: CARDIOLOGY | Facility: CLINIC | Age: 82
End: 2017-12-07
Payer: COMMERCIAL

## 2017-12-07 DIAGNOSIS — Z95.0 CARDIAC PACEMAKER IN SITU: Primary | ICD-10-CM

## 2017-12-07 PROCEDURE — 93294 REM INTERROG EVL PM/LDLS PM: CPT | Performed by: INTERNAL MEDICINE

## 2017-12-07 PROCEDURE — 93296 REM INTERROG EVL PM/IDS: CPT | Performed by: INTERNAL MEDICINE

## 2017-12-07 NOTE — MR AVS SNAPSHOT
"              After Visit Summary   12/7/2017    Shelly Rogers    MRN: [id number]           Patient Information     Date Of Birth          1/18/1933        Visit Information        Provider Department      12/7/2017 3:45 PM [facility]        Today's Diagnoses     Cardiac pacemaker in situ    -  1       Follow-ups after your visit        Your next 10 appointments already scheduled     Dec 07, 2017  3:45 PM CST   Remote PPM Check with LEVY TECH1   [facility] ([facility])    [address] [place], [place] [address]  [facility] [address] [phone]           This appointment is for a remote check of your pacemaker.  This is not an appointment at the office.              Who to contact     If you have questions or need follow up information about today's clinic visit or your schedule please contact [facility] directly at [phone].  Normal or non-critical lab and imaging results will be communicated to you by [employer], letter or phone within 4 business days after the clinic has received the results. If you do not hear from us within 7 days, please contact the clinic through [employer] or phone. If you have a critical or abnormal lab result, we will notify you by phone as soon as possible.  Submit refill requests through Balzo or call your pharmacy and they will forward the refill request to us. Please allow 3 business days for your refill to be completed.          Additional Information About Your Visit        Perfect [employer] Information     Balzo lets you send messages to your doctor, view your test results, renew your prescriptions, schedule appointments and more. To sign up, go to [email or web address] . Click on \"Log in\" on the left side of the screen, which will take you to the Welcome page. Then click on \"Sign up Now\" on the right side of the page.     You will be asked to enter the access " code listed below, as well as some personal information. Please follow the directions to create your username and password.     Your access code is: [email or web address]  Expires: 3/7/2018  2:06 PM     Your access code will  in 90 days. If you need help or a new code, please call your [place] clinic or [phone].        Care EveryWhere ID     This is your Care EveryWhere ID. This could be used by other organizations to access your [place] medical records  [id number]         Blood Pressure from Last 3 Encounters:   17 142/66   17 147/74   17 134/74    Weight from Last 3 Encounters:   17 80.2 kg (176 lb 14.4 oz)   17 79.8 kg (176 lb)   16 78.9 kg (174 lb)              We Performed the Following     INTERROGATION DEVICE EVAL REMOTE, PACER/ICD (37056)     PM DEVICE INTERROGATE REMOTE (80370)        Primary Care Provider Office Phone # Fax #    Halley ALLI Huff [phone] [address] [facility]  [facility] [address]        Equal Access to Services     GERARDO ROMAN : Hadii aad ku hadasho Sharri, waaxda luqadaha, qaybta kaalmada adeegyada, matt manningn liza marie . So [facility] [phone].    ATENCIÓN: Si habla español, tiene a quinones disposición servicios gratuitos de asistencia lingüística. Llame al [phone].    We comply with applicable federal civil rights laws and [place] laws. We do not discriminate on the basis of race, color, national origin, age, disability, sex, sexual orientation, or gender identity.            Thank you!     Thank you for choosing [facility]  for your care. Our goal is always to provide you with excellent care. Hearing back from our patients is one way we can continue to improve our services. Please take a few minutes to complete the written survey that you may receive in the mail after your visit with us. Thank you!             Your Updated Medication List -  Protect others around you: Learn how to safely use, store and throw away your medicines at www.disposemymeds.org.          This list is accurate as of: 12/7/17  2:06 PM.  Always use your most recent med list.                   Brand Name Dispense Instructions for use Diagnosis    ACE/ARB/ARNI NOT PRESCRIBED (INTENTIONAL)      ACE & ARB not prescribed due to Other: HFpEF    Chronic diastolic congestive heart failure (H)       ACETAMINOPHEN PO      Take 500 mg by mouth 3 times daily as needed (Takes at least 6 hours apart).        ASPIRIN NOT PRESCRIBED    INTENTIONAL    0 each    Antiplatelet medication not prescribed intentionally due to Current anticoagulant therapy (warfarin/enoxaparin)    Type 2 diabetes mellitus without complications (H)       blood glucose monitoring lancets     1 Box    Use to check blood sugars daily    Type 2 diabetes, HbA1c goal < 7% (H)       blood glucose monitoring meter device kit     1 kit    1 Device daily.    Type 2 diabetes, HbA1c goal < 7% (H)       blood glucose monitoring test strip    ACCU-CHEK SMARTVIEW    1 Box    1 strip by In Vitro route daily    Type 2 diabetes, HbA1c goal < 7% (H)       CENTRUM SILVER PO      Take by mouth daily        furosemide 20 MG tablet    LASIX    90 tablet    Take 1 tablet (20 mg) by mouth daily    Chronic diastolic congestive heart failure (H)       glimepiride 2 MG tablet    AMARYL    180 tablet    Take 1 tablet (2 mg) by mouth 2 times daily    Type 2 diabetes mellitus without complication, without long-term current use of insulin (H)       levothyroxine 75 MCG tablet    SYNTHROID/LEVOTHROID    90 tablet    TAKE 1 TABLET BY MOUTH DAILY    Hypothyroidism, unspecified type       metFORMIN 500 MG tablet    GLUCOPHAGE    270 tablet    Take 1 tablet (500 mg) by mouth 3 times daily (with meals)    Type 2 diabetes mellitus without complication, without long-term current use of insulin (H)       metoprolol 50 MG 24 hr tablet    TOPROL-XL    180 tablet     Take 1 tablet (50 mg) by mouth 2 times daily    Chronic diastolic congestive heart failure (H)       NASACORT ALLERGY 24HR 55 MCG/ACT Inhaler   Generic drug:  triamcinolone      Spray 2 sprays into both nostrils daily        * order for DME     2 Package    Equipment being ordered: Compression Stockings Knee high 20/30 compression    Bilateral edema of lower extremity       * order for DME     1 Units    Equipment being ordered: cane with quad or prongs    Unsteadiness on feet       STATIN NOT PRESCRIBED (INTENTIONAL)     0 each    continuous prn. Statin not prescribed intentionally due to Other: Not needed, LDL at goal <100mg/dL without therapy        warfarin 5 MG tablet    COUMADIN    90 tablet    TAKE 1/2 TO 1 TABLET BY MOUTH DAILY OR AS DIRECTED BY INR CLINIC    Paroxysmal atrial fibrillation (H)       * Notice:  This list has 2 medication(s) that are the same as other medications prescribed for you. Read the directions carefully, and ask your doctor or other care provider to review them with you.

## 2017-12-07 NOTE — PROGRESS NOTES
Medtronic Sensia (D) Remote PPM Device Check  AP: 50 % : <1 %  Mode: DDDR        Presenting Rhythm: AP/VS  Heart Rate: Adequate rates per histogram  Sensing: Stable    Pacing Threshold: Stable    Impedance: Stable  Battery Status: 6-9 years  Atrial Arrhythmia: 3 mode switch episodes, no EGMs for review.   Ventricular Arrhythmia: 1 ventricular high rate. EGM shows As=Vs for PAT lasting 6 beats, rates 190-230bpm. Reviewed with SARINA Schilling.      Care Plan: F/u PPM [facility] q 3 months. LM with results. BERNICE Decker

## 2018-01-16 ENCOUNTER — ANTICOAGULATION THERAPY VISIT (OUTPATIENT)
Dept: NURSING | Facility: CLINIC | Age: 83
End: 2018-01-16
Payer: COMMERCIAL

## 2018-01-16 DIAGNOSIS — I48.91 ATRIAL FIBRILLATION (H): ICD-10-CM

## 2018-01-16 DIAGNOSIS — Z79.01 LONG-TERM (CURRENT) USE OF ANTICOAGULANTS: ICD-10-CM

## 2018-01-16 LAB — INR POINT OF CARE: 2.4 (ref 0.86–1.14)

## 2018-01-16 PROCEDURE — 85610 PROTHROMBIN TIME: CPT | Mod: QW

## 2018-01-16 PROCEDURE — 99207 ZZC NO CHARGE NURSE ONLY: CPT

## 2018-01-16 PROCEDURE — 36416 COLLJ CAPILLARY BLOOD SPEC: CPT

## 2018-01-16 NOTE — MR AVS SNAPSHOT
Shelly Rogers   1/16/2018 2:15 PM   Anticoagulation Therapy Visit    Description:  84 year old female   Provider:   ANTICOAGULATION CLINIC   Department:  [facility] Nurse           INR as of 1/16/2018     Today's INR 2.4      Anticoagulation Summary as of 1/16/2018     INR goal 2.0-3.0   Today's INR 2.4   Full instructions 5 mg on Wed; 2.5 mg all other days   Next INR check 2/27/2018    Indications   Long-term (current) use of anticoagulants [Z79.01] [Z79.01]  Atrial fibrillation (H) [I48.91]         Your next Anticoagulation Clinic appointment(s)     Feb 27, 2018  3:00 PM CST   Anticoagulation Visit with  ANTICOAGULATION CLINIC   [facility]    [address]  [place] [address] [phone]              Contact Numbers     Clinic Number:         January 2018 Details    Sun Mon Tue Wed Thu Fri Sat      1               2               3               4               5               6                 7               8               9               10               11               12               13                 14               15               16      2.5 mg   See details      17      5 mg         18      2.5 mg         19      2.5 mg         20      2.5 mg           21      2.5 mg         22      2.5 mg         23      2.5 mg         24      5 mg         25      2.5 mg         26      2.5 mg         27      2.5 mg           28      2.5 mg         29      2.5 mg         30      2.5 mg         31      5 mg             Date Details   01/16 This INR check               How to take your warfarin dose     To take:  2.5 mg Take 0.5 of a 5 mg tablet.    To take:  5 mg Take 1 of the 5 mg tablets.           February 2018 Details    Sun Mon Tue Wed Thu Fri Sat         1      2.5 mg         2      2.5 mg         3      2.5 mg           4      2.5 mg         5      2.5 mg         6      2.5 mg         7      5 mg         8      2.5 mg         9      2.5 mg         10      2.5 mg            11      2.5 mg         12      2.5 mg         13      2.5 mg         14      5 mg         15      2.5 mg         16      2.5 mg         17      2.5 mg           18      2.5 mg         19      2.5 mg         20      2.5 mg         21      5 mg         22      2.5 mg         23      2.5 mg         24      2.5 mg           25      2.5 mg         26      2.5 mg         27            28                   Date Details   No additional details    Date of next INR:  2/27/2018         How to take your warfarin dose     To take:  2.5 mg Take 0.5 of a 5 mg tablet.    To take:  5 mg Take 1 of the 5 mg tablets.

## 2018-01-16 NOTE — PROGRESS NOTES
ANTICOAGULATION FOLLOW-UP CLINIC VISIT    Patient Name:  Shelly Rogers  Date:  1/16/2018  Contact Type:  Face to Face    SUBJECTIVE:        OBJECTIVE    INR Protime   Date Value Ref Range Status   01/16/2018 2.4 (A) 0.86 - 1.14 Final       ASSESSMENT / PLAN  INR assessment THER    Recheck INR In: 6 WEEKS    INR Location Clinic      Anticoagulation Summary as of 1/16/2018     INR goal 2.0-3.0   Today's INR 2.4   Maintenance plan 5 mg (5 mg x 1) on Wed; 2.5 mg (5 mg x 0.5) all other days   Full instructions 5 mg on Wed; 2.5 mg all other days   Weekly total 20 mg   No change documented Verna Lujan RN   Plan last modified Monet Jaramillo RN (6/19/2017)   Next INR check 2/27/2018   Priority INR   Target end date     Indications   Long-term (current) use of anticoagulants [Z79.01] [Z79.01]  Atrial fibrillation (H) [I48.91]         Anticoagulation Episode Summary     INR check location     Preferred lab     Send INR reminders to [facility] ANTICOAGULATION    Comments       Anticoagulation Care Providers     Provider Role Specialty Phone number    Halley Huff MD Responsible Internal Medicine [phone]            See the Encounter Report to view Anticoagulation Flowsheet and Dosing Calendar (Go to Encounters tab in chart review, and find the Anticoagulation Therapy Visit)    Dosage adjustment made based on physician directed care plan.  No changes to dose.     Verna Lujan RN

## 2018-02-27 ENCOUNTER — ANTICOAGULATION THERAPY VISIT (OUTPATIENT)
Dept: NURSING | Facility: CLINIC | Age: 83
End: 2018-02-27
Payer: COMMERCIAL

## 2018-02-27 ENCOUNTER — OFFICE VISIT (OUTPATIENT)
Dept: FAMILY MEDICINE | Facility: CLINIC | Age: 83
End: 2018-02-27
Payer: COMMERCIAL

## 2018-02-27 VITALS
OXYGEN SATURATION: 95 % | HEART RATE: 73 BPM | BODY MASS INDEX: 32.39 KG/M2 | WEIGHT: 176 LBS | HEIGHT: 62 IN | DIASTOLIC BLOOD PRESSURE: 70 MMHG | SYSTOLIC BLOOD PRESSURE: 130 MMHG | TEMPERATURE: 98.4 F

## 2018-02-27 DIAGNOSIS — I50.32 CHRONIC DIASTOLIC CONGESTIVE HEART FAILURE (H): ICD-10-CM

## 2018-02-27 DIAGNOSIS — E61.1 IRON DEFICIENCY: ICD-10-CM

## 2018-02-27 DIAGNOSIS — I48.0 PAROXYSMAL ATRIAL FIBRILLATION (H): ICD-10-CM

## 2018-02-27 DIAGNOSIS — Z23 NEED FOR PROPHYLACTIC VACCINATION AND INOCULATION AGAINST INFLUENZA: ICD-10-CM

## 2018-02-27 DIAGNOSIS — R30.0 DYSURIA: ICD-10-CM

## 2018-02-27 DIAGNOSIS — E03.9 HYPOTHYROIDISM, UNSPECIFIED TYPE: ICD-10-CM

## 2018-02-27 DIAGNOSIS — E11.9 TYPE 2 DIABETES MELLITUS WITHOUT COMPLICATION, WITHOUT LONG-TERM CURRENT USE OF INSULIN (H): Primary | ICD-10-CM

## 2018-02-27 LAB
ALBUMIN UR-MCNC: NEGATIVE MG/DL
APPEARANCE UR: CLEAR
BACTERIA #/AREA URNS HPF: ABNORMAL /HPF
BILIRUB UR QL STRIP: NEGATIVE
COLOR UR AUTO: YELLOW
GLUCOSE UR STRIP-MCNC: NEGATIVE MG/DL
HBA1C MFR BLD: 8 % (ref 4.3–6)
HGB UR QL STRIP: ABNORMAL
HYALINE CASTS #/AREA URNS LPF: ABNORMAL /LPF
INR POINT OF CARE: 2.4 (ref 0.86–1.14)
KETONES UR STRIP-MCNC: NEGATIVE MG/DL
LEUKOCYTE ESTERASE UR QL STRIP: ABNORMAL
NITRATE UR QL: NEGATIVE
NON-SQ EPI CELLS #/AREA URNS LPF: ABNORMAL /LPF
PH UR STRIP: 5.5 PH (ref 5–7)
RBC #/AREA URNS AUTO: ABNORMAL /HPF
SOURCE: ABNORMAL
SP GR UR STRIP: 1.01 (ref 1–1.03)
UROBILINOGEN UR STRIP-ACNC: 0.2 EU/DL (ref 0.2–1)
WBC #/AREA URNS AUTO: ABNORMAL /HPF

## 2018-02-27 PROCEDURE — 83036 HEMOGLOBIN GLYCOSYLATED A1C: CPT | Performed by: INTERNAL MEDICINE

## 2018-02-27 PROCEDURE — 81001 URINALYSIS AUTO W/SCOPE: CPT | Performed by: INTERNAL MEDICINE

## 2018-02-27 PROCEDURE — 80061 LIPID PANEL: CPT | Performed by: INTERNAL MEDICINE

## 2018-02-27 PROCEDURE — 99207 ZZC NO CHARGE NURSE ONLY: CPT

## 2018-02-27 PROCEDURE — 85610 PROTHROMBIN TIME: CPT | Mod: QW

## 2018-02-27 PROCEDURE — 90662 IIV NO PRSV INCREASED AG IM: CPT | Performed by: INTERNAL MEDICINE

## 2018-02-27 PROCEDURE — 84443 ASSAY THYROID STIM HORMONE: CPT | Performed by: INTERNAL MEDICINE

## 2018-02-27 PROCEDURE — 82043 UR ALBUMIN QUANTITATIVE: CPT | Performed by: INTERNAL MEDICINE

## 2018-02-27 PROCEDURE — 99214 OFFICE O/P EST MOD 30 MIN: CPT | Mod: 25 | Performed by: INTERNAL MEDICINE

## 2018-02-27 PROCEDURE — G0008 ADMIN INFLUENZA VIRUS VAC: HCPCS | Performed by: INTERNAL MEDICINE

## 2018-02-27 PROCEDURE — 36416 COLLJ CAPILLARY BLOOD SPEC: CPT

## 2018-02-27 PROCEDURE — 85027 COMPLETE CBC AUTOMATED: CPT | Performed by: INTERNAL MEDICINE

## 2018-02-27 PROCEDURE — 80053 COMPREHEN METABOLIC PANEL: CPT | Performed by: INTERNAL MEDICINE

## 2018-02-27 PROCEDURE — 82728 ASSAY OF FERRITIN: CPT | Performed by: INTERNAL MEDICINE

## 2018-02-27 RX ORDER — LEVOTHYROXINE SODIUM 75 UG/1
75 TABLET ORAL DAILY
Qty: 90 TABLET | Refills: 1 | Status: SHIPPED | OUTPATIENT
Start: 2018-02-27 | End: 2018-06-14

## 2018-02-27 RX ORDER — FUROSEMIDE 20 MG
20 TABLET ORAL DAILY
Qty: 90 TABLET | Refills: 3 | Status: SHIPPED | OUTPATIENT
Start: 2018-02-27 | End: 2018-06-12

## 2018-02-27 RX ORDER — GLIMEPIRIDE 2 MG/1
2 TABLET ORAL 2 TIMES DAILY
Qty: 180 TABLET | Refills: 3 | Status: SHIPPED | OUTPATIENT
Start: 2018-02-27 | End: 2019-04-25

## 2018-02-27 RX ORDER — METOPROLOL SUCCINATE 50 MG/1
50 TABLET, EXTENDED RELEASE ORAL 2 TIMES DAILY
Qty: 180 TABLET | Refills: 3 | Status: SHIPPED | OUTPATIENT
Start: 2018-02-27 | End: 2018-06-12

## 2018-02-27 RX ORDER — WARFARIN SODIUM 5 MG/1
TABLET ORAL
Qty: 90 TABLET | Refills: 3 | Status: SHIPPED | OUTPATIENT
Start: 2018-02-27 | End: 2019-05-16

## 2018-02-27 NOTE — PROGRESS NOTES
ANTICOAGULATION FOLLOW-UP CLINIC VISIT    Patient Name:  Shelly Rogers  Date:  2/27/2018  Contact Type:  Face to Face    SUBJECTIVE:     Patient Findings     Positives No Problem Findings           OBJECTIVE    INR Protime   Date Value Ref Range Status   02/27/2018 2.4 (A) 0.86 - 1.14 Final       ASSESSMENT / PLAN  INR assessment THER    Recheck INR In: 6 WEEKS    INR Location Clinic      Anticoagulation Summary as of 2/27/2018     INR goal 2.0-3.0   Today's INR 2.4   Maintenance plan 5 mg (5 mg x 1) on Wed; 2.5 mg (5 mg x 0.5) all other days   Full instructions 5 mg on Wed; 2.5 mg all other days   Weekly total 20 mg   No change documented Tana Brown RN   Plan last modified Monet Jaramillo RN (6/19/2017)   Next INR check 4/10/2018   Priority INR   Target end date     Indications   Long-term (current) use of anticoagulants [Z79.01] [Z79.01]  Atrial fibrillation (H) [I48.91]         Anticoagulation Episode Summary     INR check location     Preferred lab     Send INR reminders to  ANTICOAGULATION    Comments       Anticoagulation Care Providers     Provider Role Specialty Phone number    Halley Huff MD Responsible Internal Medicine [phone]            See the Encounter Report to view Anticoagulation Flowsheet and Dosing Calendar (Go to Encounters tab in chart review, and find the Anticoagulation Therapy Visit)    Dosage adjustment made based on physician directed care plan. INR 2.4.  No change.    Tana Brown RN

## 2018-02-27 NOTE — MR AVS SNAPSHOT
After Visit Summary   2/27/2018    Shelly Rogers    MRN: [id number]           Patient Information     Date Of Birth          1/18/1933        Visit Information        Provider Department      2/27/2018 2:30 PM Halley Huff MD [facility]        Today's Diagnoses     Type 2 diabetes mellitus without complication, without long-term current use of insulin (H)    -  1    Paroxysmal atrial fibrillation (H)        Chronic diastolic congestive heart failure (H)        Hypothyroidism, unspecified type        Iron deficiency        Dysuria          Care Instructions    Labs today  Flu shot today  I refilled your prescriptions  Take Metformin twice a day  Take Mucinex, not Mucinex-D to see if it helps with your allergies  If it doesn't work, try Zyrtec 10 mg daily  Use saline nasal spray 3 times daily  Use nasacort, as needed, for your allergies  Monitor your blood pressure once a week at home.  Bring those readings on your next visit.  Notify us if your blood pressure readings consistently stays greater than 140/90.  Follow up in 4 months  Seek sooner medical attention if there is any worsening of symptoms or problems          Follow-ups after your visit        Your next 10 appointments already scheduled     Mar 15, 2018  4:45 PM CDT   Remote PPM Check with LEVY TECH1   [facility]    [address] [place] [address] [phone]           This appointment is for a remote check of your pacemaker.  This is not an appointment at the office.              Who to contact     If you have questions or need follow up information about today's clinic visit or your schedule please contact [facility] directly at [phone].  Normal or non-critical lab and imaging results will be communicated to you by MyChart, letter or phone within 4 business days after the clinic has received the results. If you do not hear  "from us within 7 days, please contact the clinic through Bitium or phone. If you have a critical or abnormal lab result, we will notify you by phone as soon as possible.  Submit refill requests through Bitium or call your pharmacy and they will forward the refill request to us. Please allow 3 business days for your refill to be completed.          Additional Information About Your Visit        Bitium Information     Bitium lets you send messages to your doctor, view your test results, renew your prescriptions, schedule appointments and more. To sign up, go to [email or web address] . Click on \"Log in\" on the left side of the screen, which will take you to the Welcome page. Then click on \"Sign up Now\" on the right side of the page.     You will be asked to enter the access code listed below, as well as some personal information. Please follow the directions to create your username and password.     Your access code is: [email or web address]  Expires: 3/7/2018  2:06 PM     Your access code will  in 90 days. If you need help or a new code, please call your [place] clinic or [phone].        Care EveryWhere ID     This is your Care EveryWhere ID. This could be used by other organizations to access your [place] medical records  [id number]        Your Vitals Were     Pulse Temperature Height Pulse Oximetry BMI (Body Mass Index)       73 98.4  F (36.9  C) (Oral) 5' 2\" (1.575 m) 95% 32.19 kg/m2        Blood Pressure from Last 3 Encounters:   18 158/82   17 142/66   17 147/74    Weight from Last 3 Encounters:   18 176 lb (79.8 kg)   17 176 lb 14.4 oz (80.2 kg)   17 176 lb (79.8 kg)              We Performed the Following     Albumin Random Urine Quantitative with Creat Ratio     CBC with platelets     Comprehensive metabolic panel     Ferritin     Hemoglobin A1c     Lipid panel reflex to direct LDL Non-fasting     TSH with free T4 reflex     UA with Microscopic reflex to Culture  "         Today's Medication Changes          These changes are accurate as of 2/27/18  3:10 PM.  If you have any questions, ask your nurse or doctor.               These medicines have changed or have updated prescriptions.        Dose/Directions    levothyroxine 75 MCG tablet   Commonly known as:  SYNTHROID/LEVOTHROID   This may have changed:  See the new instructions.   Used for:  Hypothyroidism, unspecified type   Changed by:  Halley Huff MD        Dose:  75 mcg   Take 1 tablet (75 mcg) by mouth daily   Quantity:  90 tablet   Refills:  1       metFORMIN 500 MG tablet   Commonly known as:  GLUCOPHAGE   This may have changed:    - when to take this  - additional instructions   Used for:  Type 2 diabetes mellitus without complication, without long-term current use of insulin (H)   Changed by:  Halley Huff MD        Dose:  500 mg   Take 1 tablet (500 mg) by mouth 2 times daily (with meals)   Quantity:  180 tablet   Refills:  1       warfarin 5 MG tablet   Commonly known as:  COUMADIN   This may have changed:  See the new instructions.   Used for:  Paroxysmal atrial fibrillation (H)   Changed by:  Halley Huff MD        TAKE 1/2 TO 1 TABLET BY MOUTH DAILY OR AS DIRECTED BY INR CLINIC   Quantity:  90 tablet   Refills:  3            Where to get your medicines      [facility] [address]  [place], [place]  [address] [facility] [place] [address]     Phone:  [phone]     furosemide 20 MG tablet    glimepiride 2 MG tablet    levothyroxine 75 MCG tablet    metFORMIN 500 MG tablet    metoprolol succinate 50 MG 24 hr tablet    warfarin 5 MG tablet                Primary Care Provider Office Phone # Fax #    Halley Huff [phone] [address]  [facility] [address]        Equal Access to Services     FRANCISCO ROMAN AH: rigo Barragan, traci bates  matt gilisidoro maheraan ah. So [facility] [phone].    ATENCIÓN: Si habla leatha, tiene a quinones disposición servicios gratuitos de asistencia lingüística. Syd al [phone].    We comply with applicable federal civil rights laws and [place] laws. We do not discriminate on the basis of race, color, national origin, age, disability, sex, sexual orientation, or gender identity.            Thank you!     Thank you for choosing [facility]  for your care. Our goal is always to provide you with excellent care. Hearing back from our patients is one way we can continue to improve our services. Please take a few minutes to complete the written survey that you may receive in the mail after your visit with us. Thank you!             Your Updated Medication List - Protect others around you: Learn how to safely use, store and throw away your medicines at www.disposemymeds.org.          This list is accurate as of 2/27/18  3:10 PM.  Always use your most recent med list.                   Brand Name Dispense Instructions for use Diagnosis    ACE/ARB/ARNI NOT PRESCRIBED (INTENTIONAL)      ACE & ARB not prescribed due to Other: HFpEF    Chronic diastolic congestive heart failure (H)       ACETAMINOPHEN PO      Take 500 mg by mouth 3 times daily as needed (Takes at least 6 hours apart).        ASPIRIN NOT PRESCRIBED    INTENTIONAL    0 each    Antiplatelet medication not prescribed intentionally due to Current anticoagulant therapy (warfarin/enoxaparin)    Type 2 diabetes mellitus without complications (H)       blood glucose monitoring lancets     1 Box    Use to check blood sugars daily    Type 2 diabetes, HbA1c goal < 7% (H)       blood glucose monitoring meter device kit     1 kit    1 Device daily.    Type 2 diabetes, HbA1c goal < 7% (H)       blood glucose monitoring test strip    ACCU-CHEK SMARTVIEW    1 Box    1 strip by In Vitro route daily    Type 2 diabetes, HbA1c goal < 7% (H)        CENTRUM SILVER PO      Take by mouth daily        furosemide 20 MG tablet    LASIX    90 tablet    Take 1 tablet (20 mg) by mouth daily    Chronic diastolic congestive heart failure (H)       glimepiride 2 MG tablet    AMARYL    180 tablet    Take 1 tablet (2 mg) by mouth 2 times daily    Type 2 diabetes mellitus without complication, without long-term current use of insulin (H)       levothyroxine 75 MCG tablet    SYNTHROID/LEVOTHROID    90 tablet    Take 1 tablet (75 mcg) by mouth daily    Hypothyroidism, unspecified type       metFORMIN 500 MG tablet    GLUCOPHAGE    180 tablet    Take 1 tablet (500 mg) by mouth 2 times daily (with meals)    Type 2 diabetes mellitus without complication, without long-term current use of insulin (H)       metoprolol succinate 50 MG 24 hr tablet    TOPROL-XL    180 tablet    Take 1 tablet (50 mg) by mouth 2 times daily    Chronic diastolic congestive heart failure (H)       NASACORT ALLERGY 24HR 55 MCG/ACT Inhaler   Generic drug:  triamcinolone      Spray 2 sprays into both nostrils daily        * order for DME     2 Package    Equipment being ordered: Compression Stockings Knee high 20/30 compression    Bilateral edema of lower extremity       * order for DME     1 Units    Equipment being ordered: cane with quad or prongs    Unsteadiness on feet       STATIN NOT PRESCRIBED (INTENTIONAL)     0 each    continuous prn. Statin not prescribed intentionally due to Other: Not needed, LDL at goal <100mg/dL without therapy        warfarin 5 MG tablet    COUMADIN    90 tablet    TAKE 1/2 TO 1 TABLET BY MOUTH DAILY OR AS DIRECTED BY INR CLINIC    Paroxysmal atrial fibrillation (H)       * Notice:  This list has 2 medication(s) that are the same as other medications prescribed for you. Read the directions carefully, and ask your doctor or other care provider to review them with you.

## 2018-02-27 NOTE — NURSING NOTE
"Chief Complaint   Patient presents with     RECHECK       Initial /82 (BP Location: Right arm, Cuff Size: Adult Regular)  Pulse 73  Temp 98.4  F (36.9  C) (Oral)  Ht 5' 2\" (1.575 m)  Wt 176 lb (79.8 kg)  SpO2 95%  BMI 32.19 kg/m2 Estimated body mass index is 32.19 kg/(m^2) as calculated from the following:    Height as of this encounter: 5' 2\" (1.575 m).    Weight as of this encounter: 176 lb (79.8 kg).  Medication Reconciliation: complete     Radha Baeza MA    "

## 2018-02-27 NOTE — PROGRESS NOTES
SUBJECTIVE:   Shelly Rogers is a 85 year old female who presents to clinic today for the following health issues:    Patient is accompanied by her daughter, who helped give her history.    Diabetes Follow-up      Patient is checking blood sugars: not at all    Diabetic concerns: None     Symptoms of hypoglycemia (low blood sugar): none     Paresthesias (numbness or burning in feet) or sores: Yes toes     Date of last diabetic eye exam: 10/2017    BP Readings from Last 2 Encounters:   02/27/18 130/70   06/02/17 142/66     Hemoglobin A1C (%)   Date Value   04/06/2017 8.1 (H)   09/12/2016 8.0 (H)     LDL Cholesterol Calculated (mg/dL)   Date Value   10/13/2015 88   03/21/2013 58     LDL Cholesterol Direct (mg/dL)   Date Value   10/30/2014 105   05/19/2014 124     Takes metformin once a day only    Hypothyroidism Follow-up      Since last visit, patient describes the following symptoms: Weight stable, no hair loss, no skin changes, no constipation, no loose stools    Amount of exercise or physical activity: None    Problems taking medications regularly: No    Medication side effects: possible cough    Diet: regular (no restrictions)    Allergies  Claritin doesn't help her  Hasn't tried Zyrtec    Follows up with cardiologist at [facility] [place]    Problem list and histories reviewed & adjusted, as indicated.  Additional history: as documented    Medications and Labs reviewed in EPIC    Reviewed and updated as needed this visit by clinical staff  Tobacco  Allergies  Meds  Problems  Med Hx  Surg Hx  Fam Hx  Soc Hx        Reviewed and updated as needed this visit by Provider       ROS:  Constitutional, HEENT, cardiovascular, pulmonary, GI, , musculoskeletal, neuro, skin, endocrine and psych systems are negative, except as otherwise noted.    POSITIVE for allergies, clear sputum    This document serves as a record of the services and decisions personally performed and made by Halley Huff MD. It was  "created on her behalf by Eliz Hart, a trained medical scribe. The creation of this document is based on the provider's statements to the medical scribe.  Eliz Hart 2:48 PM February 27, 2018    OBJECTIVE:     /70  Pulse 73  Temp 98.4  F (36.9  C) (Oral)  Ht 1.575 m (5' 2\")  Wt 79.8 kg (176 lb)  SpO2 95%  BMI 32.19 kg/m2  Body mass index is 32.19 kg/(m^2).    GENERAL APPEARANCE: obese, alert and no distress  EYES: Eyes grossly normal to inspection, PERRL and conjunctivae and sclerae normal  HENT: left ceruminosis, right ear canals and TM's normal and nose and mouth without ulcers or lesions  NECK: no adenopathy  RESP: lungs clear to auscultation - no rales, rhonchi or wheezes  CV: regular rates and rhythm, normal S1 S2, no S3  PSYCH: mentation appears normal, affect normal/bright    Diagnostic Test Results:  No results found for this or any previous visit (from the past 24 hour(s)).    ASSESSMENT/PLAN:     Shelly was seen today for recheck.    Diagnoses and all orders for this visit:    Type 2 diabetes mellitus without complication, without long-term current use of insulin (H)  -     TSH with free T4 reflex  -     Lipid panel reflex to direct LDL Non-fasting  -     Hemoglobin A1c  -     Comprehensive metabolic panel  -     Albumin Random Urine Quantitative with Creat Ratio  -     metFORMIN (GLUCOPHAGE) 500 MG tablet; Take 1 tablet (500 mg) by mouth 2 times daily (with meals)  -     glimepiride (AMARYL) 2 MG tablet; Take 1 tablet (2 mg) by mouth 2 times daily  Reports she's been taking metformin once daily only  Doing well  Advised her to take it twice daily  Educated her on importance of healthy eating and exercise habits  Lab Results   Component Value Date    A1C 8.1 04/06/2017    A1C 8.0 09/12/2016    A1C 7.5 02/16/2016    A1C 9.2 10/27/2015    A1C 7.9 10/30/2014     Paroxysmal atrial fibrillation (H)  -     warfarin (COUMADIN) 5 MG tablet; TAKE 1/2 TO 1 TABLET BY MOUTH DAILY OR AS DIRECTED " BY INR CLINIC  Doing well and HR under control.  Compliant with medication  Follows up with cardiologist at [facility]    Chronic diastolic congestive heart failure (H)  -     furosemide (LASIX) 20 MG tablet; Take 1 tablet (20 mg) by mouth daily  -     metoprolol succinate (TOPROL-XL) 50 MG 24 hr tablet; Take 1 tablet (50 mg) by mouth 2 times daily  Doing well  Compliant with medication  Denies LL edema, dyspnea  Advised patient to watch her salt intake  Follows up with cardiologist at [facility]    Hypothyroidism, unspecified type  -     levothyroxine (SYNTHROID/LEVOTHROID) 75 MCG tablet; Take 1 tablet (75 mcg) by mouth daily    Iron deficiency  -     CBC with platelets  -     Ferritin  Doesn't take iron pills  Takes Centrum silver daily    Dysuria  -     UA with Microscopic reflex to Culture    Allergies  Claritin doesn't help her  Hasn't tried Zyrtec  Advised to take Mucinex, not Mucinex-D, to see if it helps with her allergies  If it doesn't work, try Zyrtec 10 mg daily  Advised use of saline nasal spray  Advised use of nasacort, as needed  Up to date on immunizations    Patient Instructions   Labs today  Flu shot today  I refilled your prescriptions  Take Metformin twice a day  Take Mucinex, not Mucinex-D to see if it helps with your allergies  If it doesn't work, try Zyrtec 10 mg daily  Use saline nasal spray 3 times daily  Use nasacort, as needed, for your allergies  Monitor your blood pressure once a week at home.  Bring those readings on your next visit.  Notify us if your blood pressure readings consistently stays greater than 140/90.  Follow up in 4 months  Seek sooner medical attention if there is any worsening of symptoms or problems    The information in this document, created by the medical scribe for me, accurately reflects the services I personally performed and the decisions made by me. I have reviewed and approved this document for accuracy prior to leaving the patient care  area.  February 27, 2018 3:12 PM    Halley Huff MD  [facility]

## 2018-02-27 NOTE — PATIENT INSTRUCTIONS
Labs today  Flu shot today  I refilled your prescriptions  Take Metformin twice a day  Take Mucinex, not Mucinex-D to see if it helps with your allergies  If it doesn't work, try Zyrtec 10 mg daily  Use saline nasal spray 3 times daily  Use nasacort, as needed, for your allergies  Monitor your blood pressure once a week at home.  Bring those readings on your next visit.  Notify us if your blood pressure readings consistently stays greater than 140/90.  Follow up in 4 months  Seek sooner medical attention if there is any worsening of symptoms or problems

## 2018-02-27 NOTE — LETTER
[facility]  [address] [facility]  [address]  [place]  [address]  Tel: [phone]    March 2, 2018    Shelly JUAREZ Ken  [address]   [facility] [address]        Nayely Bravo,    This is to inform you regarding your test result.    I made some attempts to contact you but was not able to get hold a few.  Please feel free to give us a call if you have any questions or concerns.  Urine for microalbumin is positive.  Numbers are slightly higher than before  It is important that she takes good care of her diabetes and blood pressure.  Her urine test is negative for infection but it shows presence of red blood cells.    I would like to repeat her urine test again in 2 weeks.  If the red blood cells persist then additional workup would be needed  Please make a lab appointment to get her urine test done in 2 weeks.  HbA1c which is average glucose during last 3 months is 8%.  Diabetes is not under very good control.  I would prefer A1c below 8%  Watch your diet and do regular physical activity  If you want to see a diabetic educator please let me know and I can put a referral.  Ferritin which is iron stores in the body is low.  We want Ferritin level greater than 50  Take OTC Ferrous Sulfate 325 mg po once daily or every other day if you tolerate.  Take with vit C as vit C helps to absorb iron.  Iron can make you constipated so take stool softener.  Recheck ferritin in 4 months  CBC result which includes white count Hemoglobin and  Platelet Counts is normal.   TSH which is thyroid hormone is normal.  Your total cholesterol is elevated.  The triglycerides are high. Lowering  the amount of sugar ,alcohol and sweets in the diet helps to control this.Exercise and weight loss helps.  HDL which is called good cholesterol is low.  Your LDL which is called bad cholesterol is elevated.  Eat low cholesterol low fat  diet and do regular physical activity.  The testing of your kidney function, liver function and  electrolytes was satisfactory   Glucose which is your blood sugar is slightly elevated.              Sincerely,      Dr.Nasima Daria MD,FACP / [place]      Resulted Orders   TSH with free T4 reflex   Result Value Ref Range    TSH 2.32 0.40 - 4.00 mU/L   Lipid panel reflex to direct LDL Non-fasting   Result Value Ref Range    Cholesterol 206 (H) <200 mg/dL      Comment:      Desirable:       <200 mg/dl    Triglycerides 319 (H) <150 mg/dL      Comment:      Borderline high:  150-199 mg/dl  High:             200-499 mg/dl  Very high:       >499 mg/dl      HDL Cholesterol 41 (L) >49 mg/dL    LDL Cholesterol Calculated 101 (H) <100 mg/dL      Comment:      Above desirable:  100-129 mg/dl  Borderline High:  130-159 mg/dL  High:             160-189 mg/dL  Very high:       >189 mg/dl      Non HDL Cholesterol 165 (H) <130 mg/dL      Comment:      Above Desirable:  130-159 mg/dl  Borderline high:  160-189 mg/dl  High:             190-219 mg/dl  Very high:       >219 mg/dl     Hemoglobin A1c   Result Value Ref Range    Hemoglobin A1C 8.0 (H) 4.3 - 6.0 %   Comprehensive metabolic panel   Result Value Ref Range    Sodium 137 133 - 144 mmol/L    Potassium 4.1 3.4 - 5.3 mmol/L    Chloride 102 94 - 109 mmol/L    Carbon Dioxide 24 20 - 32 mmol/L    Anion Gap 11 3 - 14 mmol/L    Glucose 103 (H) 70 - 99 mg/dL    Urea Nitrogen 13 7 - 30 mg/dL    Creatinine 0.84 0.52 - 1.04 mg/dL    GFR Estimate 64 >60 mL/min/1.7m2      Comment:      Non  GFR Calc    GFR Estimate If Black 78 >60 mL/min/1.7m2      Comment:       GFR Calc    Calcium 9.4 8.5 - 10.1 mg/dL    Bilirubin Total 0.8 0.2 - 1.3 mg/dL    Albumin 3.9 3.4 - 5.0 g/dL    Protein Total 7.5 6.8 - 8.8 g/dL    Alkaline Phosphatase 77 40 - 150 U/L    ALT 15 0 - 50 U/L    AST 8 0 - 45 U/L   UA with Microscopic reflex to Culture   Result Value Ref Range    Color Urine Yellow     Appearance Urine Clear     Glucose Urine Negative NEG^Negative mg/dL    Bilirubin  Urine Negative NEG^Negative    Ketones Urine Negative NEG^Negative mg/dL    Specific Gravity Urine 1.015 1.003 - 1.035    pH Urine 5.5 5.0 - 7.0 pH    Protein Albumin Urine Negative NEG^Negative mg/dL    Urobilinogen Urine 0.2 0.2 - 1.0 EU/dL    Nitrite Urine Negative NEG^Negative    Blood Urine Moderate (A) NEG^Negative    Leukocyte Esterase Urine Small (A) NEG^Negative    Source Midstream Urine     WBC Urine 2-5 (A) OTO2^O - 2 /HPF    RBC Urine 2-5 (A) OTO2^O - 2 /HPF    Hyaline Casts O - 2 OTO2^O - 2 /LPF    Squamous Epithelial /LPF Urine Many (A) FEW^Few /LPF    Bacteria Urine Many (A) NEG^Negative /HPF   Albumin Random Urine Quantitative with Creat Ratio   Result Value Ref Range    Creatinine Urine 80 mg/dL    Albumin Urine mg/L 58 mg/L    Albumin Urine mg/g Cr 73.06 (H) 0 - 25 mg/g Cr   CBC with platelets   Result Value Ref Range    WBC 8.5 4.0 - 11.0 10e9/L    RBC Count 4.44 3.8 - 5.2 10e12/L    Hemoglobin 13.9 11.7 - 15.7 g/dL    Hematocrit 42.5 35.0 - 47.0 %    MCV 96 78 - 100 fl    MCH 31.3 26.5 - 33.0 pg    MCHC 32.7 31.5 - 36.5 g/dL    RDW 14.9 10.0 - 15.0 %    Platelet Count 305 150 - 450 10e9/L   Ferritin   Result Value Ref Range    Ferritin 28 8 - 252 ng/mL

## 2018-02-27 NOTE — MR AVS SNAPSHOT
Shelly Rogers   2/27/2018 3:00 PM   Anticoagulation Therapy Visit    Description:  85 year old female   Provider:   ANTICOAGULATION CLINIC   Department:  Cs Nurse           INR as of 2/27/2018     Today's INR 2.4      Anticoagulation Summary as of 2/27/2018     INR goal 2.0-3.0   Today's INR 2.4   Full instructions 5 mg on Wed; 2.5 mg all other days   Next INR check 4/10/2018    Indications   Long-term (current) use of anticoagulants [Z79.01] [Z79.01]  Atrial fibrillation (H) [I48.91]         Your next Anticoagulation Clinic appointment(s)     Apr 10, 2018  3:00 PM CDT   Anticoagulation Visit with  ANTICOAGULATION CLINIC   [facility]    [address]  [place] [address]   [phone]              Contact Numbers     Clinic Number:         February 2018 Details    Sun Mon Tue Wed Thu Fri Sat         1               2               3                 4               5               6               7               8               9               10                 11               12               13               14               15               16               17                 18               19               20               21               22               23               24                 25               26               27      2.5 mg   See details      28      5 mg             Date Details   02/27 This INR check               How to take your warfarin dose     To take:  2.5 mg Take 0.5 of a 5 mg tablet.    To take:  5 mg Take 1 of the 5 mg tablets.           March 2018 Details    Sun Mon Tue Wed Thu Fri Sat         1      2.5 mg         2      2.5 mg         3      2.5 mg           4      2.5 mg         5      2.5 mg         6      2.5 mg         7      5 mg         8      2.5 mg         9      2.5 mg         10      2.5 mg           11      2.5 mg         12      2.5 mg         13      2.5 mg         14      5 mg         15      2.5 mg         16       2.5 mg         17      2.5 mg           18      2.5 mg         19      2.5 mg         20      2.5 mg         21      5 mg         22      2.5 mg         23      2.5 mg         24      2.5 mg           25      2.5 mg         26      2.5 mg         27      2.5 mg         28      5 mg         29      2.5 mg         30      2.5 mg         31      2.5 mg          Date Details   No additional details            How to take your warfarin dose     To take:  2.5 mg Take 0.5 of a 5 mg tablet.    To take:  5 mg Take 1 of the 5 mg tablets.           April 2018 Details    Sun Mon Tue Wed Thu Fri Sat     1      2.5 mg         2      2.5 mg         3      2.5 mg         4      5 mg         5      2.5 mg         6      2.5 mg         7      2.5 mg           8      2.5 mg         9      2.5 mg         10            11               12               13               14                 15               16               17               18               19               20               21                 22               23               24               25               26               27               28                 29               30                     Date Details   No additional details    Date of next INR:  4/10/2018         How to take your warfarin dose     To take:  2.5 mg Take 0.5 of a 5 mg tablet.    To take:  5 mg Take 1 of the 5 mg tablets.

## 2018-02-28 LAB
ALBUMIN SERPL-MCNC: 3.9 G/DL (ref 3.4–5)
ALP SERPL-CCNC: 77 U/L (ref 40–150)
ALT SERPL W P-5'-P-CCNC: 15 U/L (ref 0–50)
ANION GAP SERPL CALCULATED.3IONS-SCNC: 11 MMOL/L (ref 3–14)
AST SERPL W P-5'-P-CCNC: 8 U/L (ref 0–45)
BILIRUB SERPL-MCNC: 0.8 MG/DL (ref 0.2–1.3)
BUN SERPL-MCNC: 13 MG/DL (ref 7–30)
CALCIUM SERPL-MCNC: 9.4 MG/DL (ref 8.5–10.1)
CHLORIDE SERPL-SCNC: 102 MMOL/L (ref 94–109)
CHOLEST SERPL-MCNC: 206 MG/DL
CO2 SERPL-SCNC: 24 MMOL/L (ref 20–32)
CREAT SERPL-MCNC: 0.84 MG/DL (ref 0.52–1.04)
CREAT UR-MCNC: 80 MG/DL
ERYTHROCYTE [DISTWIDTH] IN BLOOD BY AUTOMATED COUNT: 14.9 % (ref 10–15)
FERRITIN SERPL-MCNC: 28 NG/ML (ref 8–252)
GFR SERPL CREATININE-BSD FRML MDRD: 64 ML/MIN/1.7M2
GLUCOSE SERPL-MCNC: 103 MG/DL (ref 70–99)
HCT VFR BLD AUTO: 42.5 % (ref 35–47)
HDLC SERPL-MCNC: 41 MG/DL
HGB BLD-MCNC: 13.9 G/DL (ref 11.7–15.7)
LDLC SERPL CALC-MCNC: 101 MG/DL
MCH RBC QN AUTO: 31.3 PG (ref 26.5–33)
MCHC RBC AUTO-ENTMCNC: 32.7 G/DL (ref 31.5–36.5)
MCV RBC AUTO: 96 FL (ref 78–100)
MICROALBUMIN UR-MCNC: 58 MG/L
MICROALBUMIN/CREAT UR: 73.06 MG/G CR (ref 0–25)
NONHDLC SERPL-MCNC: 165 MG/DL
PLATELET # BLD AUTO: 305 10E9/L (ref 150–450)
POTASSIUM SERPL-SCNC: 4.1 MMOL/L (ref 3.4–5.3)
PROT SERPL-MCNC: 7.5 G/DL (ref 6.8–8.8)
RBC # BLD AUTO: 4.44 10E12/L (ref 3.8–5.2)
SODIUM SERPL-SCNC: 137 MMOL/L (ref 133–144)
TRIGL SERPL-MCNC: 319 MG/DL
TSH SERPL DL<=0.005 MIU/L-ACNC: 2.32 MU/L (ref 0.4–4)
WBC # BLD AUTO: 8.5 10E9/L (ref 4–11)

## 2018-03-01 DIAGNOSIS — R31.29 MICROSCOPIC HEMATURIA: Primary | ICD-10-CM

## 2018-03-01 NOTE — PROGRESS NOTES

## 2018-03-02 NOTE — PROGRESS NOTES
Please notify patient by sending following letter with copy of test results      Nayely Bravo,    This is to inform you regarding your test result.    I made some attempts to contact you but was not able to get hold a few.  Please feel free to give us a call if you have any questions or concerns.  Urine for microalbumin is positive.  Numbers are slightly higher than before  It is important that she takes good care of her diabetes and blood pressure.  Her urine test is negative for infection but it shows presence of red blood cells.    I would like to repeat her urine test again in 2 weeks.  If the red blood cells persist then additional workup would be needed  Please make a lab appointment to get her urine test done in 2 weeks.  HbA1c which is average glucose during last 3 months is 8%.  Diabetes is not under very good control.  I would prefer A1c below 8%  Watch your diet and do regular physical activity  If you want to see a diabetic educator please let me know and I can put a referral.  Ferritin which is iron stores in the body is low.  We want Ferritin level greater than 50  Take OTC Ferrous Sulfate 325 mg po once daily or every other day if you tolerate.  Take with vit C as vit C helps to absorb iron.  Iron can make you constipated so take stool softener.  Recheck ferritin in 4 months  CBC result which includes white count Hemoglobin and  Platelet Counts is normal.   TSH which is thyroid hormone is normal.  Your total cholesterol is elevated.  The triglycerides are high. Lowering  the amount of sugar ,alcohol and sweets in the diet helps to control this.Exercise and weight loss helps.  HDL which is called good cholesterol is low.  Your LDL which is called bad cholesterol is elevated.  Eat low cholesterol low fat  diet and do regular physical activity.  The testing of your kidney function, liver function and electrolytes was satisfactory   Glucose which is your blood sugar is slightly  elevated.              Sincerely,      Dr.Nasima Daria MD,FACP

## 2018-03-15 ENCOUNTER — ALLIED HEALTH/NURSE VISIT (OUTPATIENT)
Dept: CARDIOLOGY | Facility: CLINIC | Age: 83
End: 2018-03-15
Payer: COMMERCIAL

## 2018-03-15 DIAGNOSIS — R82.90 NONSPECIFIC FINDING ON EXAMINATION OF URINE: Primary | ICD-10-CM

## 2018-03-15 NOTE — MR AVS SNAPSHOT
After Visit Summary   3/15/2018    Shelly Rogers    MRN: [id number]           Patient Information     Date Of Birth          1/18/1933        Visit Information        Provider Department      3/15/2018 4:45 PM [facility] TE[facility]        Today's Diagnoses     Nonspecific finding on examination of urine    -  1       Follow-ups after your visit        Your next 10 appointments already scheduled     Oct 15, 2018  9:00 AM CDT   Anticoagulation Visit with [facility] ANTICOAGULATION CLINIC   [facility] ([facility]    [address]  [place] [address] [phone]              Who to contact     If you have questions or need follow up information about today's clinic visit or your schedule please contact [facility] directly at [phone].  Normal or non-critical lab and imaging results will be communicated to you by MyChart, letter or phone within 4 business days after the clinic has received the results. If you do not hear from us within 7 days, please contact the clinic through MyChart or phone. If you have a critical or abnormal lab result, we will notify you by phone as soon as possible.  Submit refill requests through [employer] or call your pharmacy and they will forward the refill request to us. Please allow 3 business days for your refill to be completed.          Additional Information About Your Visit        Care EveryWhere ID     This is your Care EveryWhere ID. This could be used by other organizations to access your [facility] medical records  [id number]         Blood Pressure from Last 3 Encounters:   06/12/18 134/81   02/27/18 130/70   06/02/17 142/66    Weight from Last 3 Encounters:   06/12/18 81.6 kg (180 lb)   02/27/18 79.8 kg (176 lb)   06/02/17 80.2 kg (176 lb 14.4 oz)              We Performed the Following     Urine Culture Aerobic Bacterial        Primary Care Provider Office Phone # Mhb  #    Halley R MD Daria [phone]       [address] [facility] [address]  [facility] [address]        Equal Access to Services     FRANCISCO ROMAN : Hadyeyo Harrell, rigo coffman, deanasaqib sanchezsofiamacho de jesusradhamacho, matt liliin hayaaodette savageisidoro doyletim zuniga. So [facility] [phone].    ATENCIÓN: Si habla español, tiene a quinones disposición servicios gratuitos de asistencia lingüística. Llame al [phone].    We comply with applicable federal civil rights laws and [place] laws. We do not discriminate on the basis of race, color, national origin, age, disability, sex, sexual orientation, or gender identity.            Thank you!     Thank you for choosing [facility]  for your care. Our goal is always to provide you with excellent care. Hearing back from our patients is one way we can continue to improve our services. Please take a few minutes to complete the written survey that you may receive in the mail after your visit with us. Thank you!             Your Updated Medication List - Protect others around you: Learn how to safely use, store and throw away your medicines at www.disposemymeds.org.          This list is accurate as of 3/15/18 11:59 PM.  Always use your most recent med list.                   Brand Name Dispense Instructions for use Diagnosis    ACE/ARB/ARNI NOT PRESCRIBED (INTENTIONAL)      ACE & ARB not prescribed due to Other: HFpEF    Chronic diastolic congestive heart failure (H)       ACETAMINOPHEN PO      Take 500 mg by mouth 3 times daily as needed (Takes at least 6 hours apart).        ASPIRIN NOT PRESCRIBED    INTENTIONAL    0 each    Antiplatelet medication not prescribed intentionally due to Current anticoagulant therapy (warfarin/enoxaparin)    Type 2 diabetes mellitus without complications (H)       blood glucose monitoring lancets     1 Box    Use to check blood sugars daily    Type 2 diabetes, HbA1c goal < 7% (H)       blood  glucose monitoring meter device kit     1 kit    1 Device daily.    Type 2 diabetes, HbA1c goal < 7% (H)       blood glucose monitoring test strip    ACCU-CHEK SMARTVIEW    1 Box    1 strip by In Vitro route daily    Type 2 diabetes, HbA1c goal < 7% (H)       CENTRUM SILVER PO      Take by mouth daily        glimepiride 2 MG tablet    AMARYL    180 tablet    Take 1 tablet (2 mg) by mouth 2 times daily    Type 2 diabetes mellitus without complication, without long-term current use of insulin (H)       NASACORT ALLERGY 24HR 55 MCG/ACT inhaler   Generic drug:  triamcinolone      Spray 2 sprays into both nostrils daily        order for DME     2 Package    Equipment being ordered: Compression Stockings Knee high 20/30 compression    Bilateral edema of lower extremity       order for DME     1 Units    Equipment being ordered: cane with quad or prongs    Unsteadiness on feet       STATIN NOT PRESCRIBED (INTENTIONAL)     0 each    continuous prn. Statin not prescribed intentionally due to Other: Not needed, LDL at goal <100mg/dL without therapy        warfarin 5 MG tablet    COUMADIN    90 tablet    TAKE 1/2 TO 1 TABLET BY MOUTH DAILY OR AS DIRECTED BY INR CLINIC    Paroxysmal atrial fibrillation (H)

## 2018-03-15 NOTE — LETTER
[facility]  [address] [place] [address] [address] [facility]  [address]  [place]  [address]  Tel: [phone]    April 13, 2018    Shelly Rogers  [address]   [facility] [address]      Nayely Bravo,    This is to inform you regarding your test result.    Urine culture did not grow any significant organism.    Sincerely,      Dr.Nasima Daria MD,FACP  [place]          Resulted Orders   Urine Culture Aerobic Bacterial   Result Value Ref Range    Specimen Description Midstream Urine     Culture Micro >100,000 colonies/mL  mixed urogenital narayan

## 2018-03-23 ENCOUNTER — DOCUMENTATION ONLY (OUTPATIENT)
Dept: CARDIOLOGY | Facility: CLINIC | Age: 83
End: 2018-03-23

## 2018-03-23 NOTE — PROGRESS NOTES
Patient missed [employer] transmission on 3/15/18. Sent letter 3/16, no response. Sent 1 week letter today

## 2018-04-10 ENCOUNTER — ANTICOAGULATION THERAPY VISIT (OUTPATIENT)
Dept: NURSING | Facility: CLINIC | Age: 83
End: 2018-04-10
Payer: COMMERCIAL

## 2018-04-10 DIAGNOSIS — R31.29 MICROSCOPIC HEMATURIA: ICD-10-CM

## 2018-04-10 DIAGNOSIS — I48.91 ATRIAL FIBRILLATION (H): ICD-10-CM

## 2018-04-10 DIAGNOSIS — Z79.01 LONG-TERM (CURRENT) USE OF ANTICOAGULANTS: ICD-10-CM

## 2018-04-10 LAB
ALBUMIN UR-MCNC: NEGATIVE MG/DL
APPEARANCE UR: CLEAR
BACTERIA #/AREA URNS HPF: ABNORMAL /HPF
BILIRUB UR QL STRIP: NEGATIVE
COLOR UR AUTO: YELLOW
GLUCOSE UR STRIP-MCNC: NEGATIVE MG/DL
HGB UR QL STRIP: NEGATIVE
INR POINT OF CARE: 2.5 (ref 0.86–1.14)
KETONES UR STRIP-MCNC: ABNORMAL MG/DL
LEUKOCYTE ESTERASE UR QL STRIP: ABNORMAL
NITRATE UR QL: NEGATIVE
NON-SQ EPI CELLS #/AREA URNS LPF: ABNORMAL /LPF
PH UR STRIP: 5 PH (ref 5–7)
RBC #/AREA URNS AUTO: ABNORMAL /HPF
SOURCE: ABNORMAL
SP GR UR STRIP: 1.02 (ref 1–1.03)
UROBILINOGEN UR STRIP-ACNC: 0.2 EU/DL (ref 0.2–1)
WBC #/AREA URNS AUTO: ABNORMAL /HPF

## 2018-04-10 PROCEDURE — 81001 URINALYSIS AUTO W/SCOPE: CPT | Performed by: INTERNAL MEDICINE

## 2018-04-10 PROCEDURE — 85610 PROTHROMBIN TIME: CPT | Mod: QW

## 2018-04-10 PROCEDURE — 87086 URINE CULTURE/COLONY COUNT: CPT | Performed by: INTERNAL MEDICINE

## 2018-04-10 PROCEDURE — 99207 ZZC NO CHARGE NURSE ONLY: CPT

## 2018-04-10 PROCEDURE — 36416 COLLJ CAPILLARY BLOOD SPEC: CPT

## 2018-04-10 NOTE — MR AVS SNAPSHOT
Shelly Rogers   4/10/2018 3:00 PM   Anticoagulation Therapy Visit    Description:  85 year old female   Provider:   ANTICOAGULATION CLINIC   Department:  [facility]           INR as of 4/10/2018     Today's INR 2.5      Anticoagulation Summary as of 4/10/2018     INR goal 2.0-3.0   Today's INR 2.5   Full instructions 5 mg on Wed; 2.5 mg all other days   Next INR check 5/22/2018    Indications   Long-term (current) use of anticoagulants [Z79.01] [Z79.01]  Atrial fibrillation (H) [I48.91]         Your next Anticoagulation Clinic appointment(s)     May 22, 2018  3:00 PM CDT   Anticoagulation Visit with  ANTICOAGULATION CLINIC   [facility]    [address]  [place] [address]   [phone]              Contact Numbers     Clinic Number:         April 2018 Details    Sun Mon Tue Wed Thu Fri Sat     1               2               3               4               5               6               7                 8               9               10      2.5 mg   See details      11      5 mg         12      2.5 mg         13      2.5 mg         14      2.5 mg           15      2.5 mg         16      2.5 mg         17      2.5 mg         18      5 mg         19      2.5 mg         20      2.5 mg         21      2.5 mg           22      2.5 mg         23      2.5 mg         24      2.5 mg         25      5 mg         26      2.5 mg         27      2.5 mg         28      2.5 mg           29      2.5 mg         30      2.5 mg               Date Details   04/10 This INR check               How to take your warfarin dose     To take:  2.5 mg Take 0.5 of a 5 mg tablet.    To take:  5 mg Take 1 of the 5 mg tablets.           May 2018 Details    Sun Mon Tue Wed Thu Fri Sat       1      2.5 mg         2      5 mg         3      2.5 mg         4      2.5 mg         5      2.5 mg           6      2.5 mg         7      2.5 mg         8      2.5 mg         9      5 mg         10      2.5 mg          11      2.5 mg         12      2.5 mg           13      2.5 mg         14      2.5 mg         15      2.5 mg         16      5 mg         17      2.5 mg         18      2.5 mg         19      2.5 mg           20      2.5 mg         21      2.5 mg         22            23               24               25               26                 27               28               29               30               31                  Date Details   No additional details    Date of next INR:  5/22/2018         How to take your warfarin dose     To take:  2.5 mg Take 0.5 of a 5 mg tablet.    To take:  5 mg Take 1 of the 5 mg tablets.

## 2018-04-10 NOTE — PROGRESS NOTES
ANTICOAGULATION FOLLOW-UP CLINIC VISIT    Patient Name:  Shelly Rogers  Date:  4/10/2018  Contact Type:  Face to Face    SUBJECTIVE:     Patient Findings     Positives Inflammation    Comments Patient states that she noticed a red eleuterio on left forearm in last 4 days. Patient states that it is a bruise. After looking at eleuterio, it looks to be more of a derm concern. Patient denies that it itches. It is light pink in color, not raised, not uniformed [place]. About the size of silver dollar. Also reports of left thumb being red and has small blister like raises around thumb knuckle. Patient believes she may have burned her finger.     Advised neosporin for thumb and to monitor eleuterio on left forearm. If it becomes larger or becomes itchy or infected looking then to schedule to see Dr. Huff.   Patient agrees with plan.     Daughter was with patient for INR today. Patient also wanting to do UA today that has been ordered by Dr. Huff for future.   Placed patient on Lab Only schedule.            OBJECTIVE    INR Protime   Date Value Ref Range Status   04/10/2018 2.5 (A) 0.86 - 1.14 Final       ASSESSMENT / PLAN  INR assessment THER    Recheck INR In: 6 WEEKS    INR Location Clinic      Anticoagulation Summary as of 4/10/2018     INR goal 2.0-3.0   Today's INR 2.5   Maintenance plan 5 mg (5 mg x 1) on Wed; 2.5 mg (5 mg x 0.5) all other days   Full instructions 5 mg on Wed; 2.5 mg all other days   Weekly total 20 mg   No change documented Verna Lujan, RN   Plan last modified Monet Jaramillo, RN (6/19/2017)   Next INR check 5/22/2018   Priority INR   Target end date     Indications   Long-term (current) use of anticoagulants [Z79.01] [Z79.01]  Atrial fibrillation (H) [I48.91]         Anticoagulation Episode Summary     INR check location     Preferred lab     Send INR reminders to [facility] ANT[facility]AG[facility]ATION    Comments       Anticoagulation Care Providers     Provider Role Specialty Phone number    Halley Huff MD Responsible  Internal Medicine [phone]            See the Encounter Report to view Anticoagulation Flowsheet and Dosing Calendar (Go to Encounters tab in chart review, and find the Anticoagulation Therapy Visit)    Dosage adjustment made based on physician directed care plan.      Verna Lujan RN

## 2018-04-11 DIAGNOSIS — N39.0 UTI (URINARY TRACT INFECTION), UNCOMPLICATED: Primary | ICD-10-CM

## 2018-04-11 RX ORDER — CEPHALEXIN 500 MG/1
500 CAPSULE ORAL 2 TIMES DAILY
Qty: 6 CAPSULE | Refills: 0 | Status: SHIPPED | OUTPATIENT
Start: 2018-04-11 | End: 2019-02-07

## 2018-04-11 NOTE — PROGRESS NOTES
Please notify patient by sending following letter with copy of test results      Nayely Bravo,    This is to inform you regarding your test result.    I spoke to your daughter Sandhya and informed her about the result.  Your urine is positive for infection  I have sent a prescription of cephalexin twice a day for 3 days to the [facility] pharmacy.      Sincerely,      Dr.Nasima Daria MD,FACP

## 2018-04-12 LAB
BACTERIA SPEC CULT: NORMAL
SPECIMEN SOURCE: NORMAL

## 2018-04-13 NOTE — PROGRESS NOTES
Please notify patient by sending following letter with copy of test results      Nayely Bravo,    This is to inform you regarding your test result.    Urine culture did not grow any significant organism.    Sincerely,      Dr.Nasima Daria MD,FACP

## 2018-05-22 ENCOUNTER — ANTICOAGULATION THERAPY VISIT (OUTPATIENT)
Dept: NURSING | Facility: CLINIC | Age: 83
End: 2018-05-22
Payer: COMMERCIAL

## 2018-05-22 DIAGNOSIS — Z79.01 LONG-TERM (CURRENT) USE OF ANTICOAGULANTS: ICD-10-CM

## 2018-05-22 DIAGNOSIS — I48.0 PAROXYSMAL ATRIAL FIBRILLATION (H): ICD-10-CM

## 2018-05-22 LAB — INR POINT OF CARE: 2.2 (ref 0.86–1.14)

## 2018-05-22 PROCEDURE — 85610 PROTHROMBIN TIME: CPT | Mod: QW

## 2018-05-22 PROCEDURE — 99207 ZZC NO CHARGE NURSE ONLY: CPT

## 2018-05-22 PROCEDURE — 36416 COLLJ CAPILLARY BLOOD SPEC: CPT

## 2018-05-22 NOTE — MR AVS SNAPSHOT
Shelly Rogers   5/22/2018 3:00 PM   Anticoagulation Therapy Visit    Description:  85 year old female   Provider:   ANTICOAGULATION CLINIC   Department:  [facility] Nurse           INR as of 5/22/2018     Today's INR 2.2      Anticoagulation Summary as of 5/22/2018     INR goal 2.0-3.0   Today's INR 2.2   Full instructions 5 mg on Wed; 2.5 mg all other days   Next INR check 7/10/2018    Indications   Long-term (current) use of anticoagulants [Z79.01] [Z79.01]  Atrial fibrillation (H) [I48.91]         Your next Anticoagulation Clinic appointment(s)     Jul 10, 2018  3:00 PM CDT   Anticoagulation Visit with  ANTICOAGULATION CLINIC   [facility]    [address]  [place] [address] [phone]              Contact Numbers     Clinic Number:         May 2018 Details    Sun Mon Tue Wed Thu Fri Sat       1               2               3               4               5                 6               7               8               9               10               11               12                 13               14               15               16               17               18               19                 20               21               22      2.5 mg   See details      23      5 mg         24      2.5 mg         25      2.5 mg         26      2.5 mg           27      2.5 mg         28      2.5 mg         29      2.5 mg         30      5 mg         31      2.5 mg            Date Details   05/22 This INR check               How to take your warfarin dose     To take:  2.5 mg Take 0.5 of a 5 mg tablet.    To take:  5 mg Take 1 of the 5 mg tablets.           June 2018 Details    Sun Mon Tue Wed Thu Fri Sat          1      2.5 mg         2      2.5 mg           3      2.5 mg         4      2.5 mg         5      2.5 mg         6      5 mg         7      2.5 mg         8      2.5 mg         9      2.5 mg           10      2.5 mg         11      2.5 mg         12       2.5 mg         13      5 mg         14      2.5 mg         15      2.5 mg         16      2.5 mg           17      2.5 mg         18      2.5 mg         19      2.5 mg         20      5 mg         21      2.5 mg         22      2.5 mg         23      2.5 mg           24      2.5 mg         25      2.5 mg         26      2.5 mg         27      5 mg         28      2.5 mg         29      2.5 mg         30      2.5 mg          Date Details   No additional details            How to take your warfarin dose     To take:  2.5 mg Take 0.5 of a 5 mg tablet.    To take:  5 mg Take 1 of the 5 mg tablets.           July 2018 Details    Sun Mon Tue Wed Thu Fri Sat     1      2.5 mg         2      2.5 mg         3      2.5 mg         4      5 mg         5      2.5 mg         6      2.5 mg         7      2.5 mg           8      2.5 mg         9      2.5 mg         10            11               12               13               14                 15               16               17               18               19               20               21                 22               23               24               25               26               27               28                 29               30               31                    Date Details   No additional details    Date of next INR:  7/10/2018         How to take your warfarin dose     To take:  2.5 mg Take 0.5 of a 5 mg tablet.    To take:  5 mg Take 1 of the 5 mg tablets.

## 2018-05-22 NOTE — PROGRESS NOTES
ANTICOAGULATION FOLLOW-UP CLINIC VISIT    Patient Name:  Shelly Rogers  Date:  5/22/2018  Contact Type:  Face to Face    SUBJECTIVE:        OBJECTIVE    INR Protime   Date Value Ref Range Status   05/22/2018 2.2 (A) 0.86 - 1.14 Final       ASSESSMENT / PLAN  INR assessment THER    Recheck INR In: 6 WEEKS    INR Location Clinic      Anticoagulation Summary as of 5/22/2018     INR goal 2.0-3.0   Today's INR 2.2   Maintenance plan 5 mg (5 mg x 1) on Wed; 2.5 mg (5 mg x 0.5) all other days   Full instructions 5 mg on Wed; 2.5 mg all other days   Weekly total 20 mg   No change documented Monet Jaramillo RN   Plan last modified Monet Jaramillo RN (6/19/2017)   Next INR check 7/10/2018   Priority INR   Target end date     Indications   Long-term (current) use of anticoagulants [Z79.01] [Z79.01]  Atrial fibrillation (H) [I48.91]         Anticoagulation Episode Summary     INR check location     Preferred lab     Send INR reminders to [facility]    Comments       Anticoagulation Care Providers     Provider Role Specialty Phone number    Halley Huff MD Responsible Internal Medicine [phone]            See the Encounter Report to view Anticoagulation Flowsheet and Dosing Calendar (Go to Encounters tab in chart review, and find the Anticoagulation Therapy Visit)    Patient is here with her daughter.  They will be out of town 2 week around the 4th of July. She will come in after vacation for next INR. Her INRs have been stable and therapeutic. Dosing based on [facility] Protocol and Provider directed care plan.      Monet Jaramillo RN

## 2018-06-12 ENCOUNTER — OFFICE VISIT (OUTPATIENT)
Dept: FAMILY MEDICINE | Facility: CLINIC | Age: 83
End: 2018-06-12
Payer: COMMERCIAL

## 2018-06-12 VITALS
TEMPERATURE: 97.1 F | HEIGHT: 62 IN | WEIGHT: 180 LBS | SYSTOLIC BLOOD PRESSURE: 134 MMHG | BODY MASS INDEX: 33.13 KG/M2 | OXYGEN SATURATION: 95 % | HEART RATE: 81 BPM | DIASTOLIC BLOOD PRESSURE: 81 MMHG

## 2018-06-12 DIAGNOSIS — E61.1 IRON DEFICIENCY: ICD-10-CM

## 2018-06-12 DIAGNOSIS — C55 MALIGNANT NEOPLASM OF UTERUS, UNSPECIFIED SITE (H): ICD-10-CM

## 2018-06-12 DIAGNOSIS — E03.9 HYPOTHYROIDISM, UNSPECIFIED TYPE: ICD-10-CM

## 2018-06-12 DIAGNOSIS — E11.9 TYPE 2 DIABETES MELLITUS WITHOUT COMPLICATION, WITHOUT LONG-TERM CURRENT USE OF INSULIN (H): Primary | ICD-10-CM

## 2018-06-12 DIAGNOSIS — I50.32 CHRONIC DIASTOLIC CONGESTIVE HEART FAILURE (H): ICD-10-CM

## 2018-06-12 DIAGNOSIS — I48.0 PAROXYSMAL ATRIAL FIBRILLATION (H): ICD-10-CM

## 2018-06-12 LAB
HBA1C MFR BLD: 7.8 % (ref 0–5.6)
HGB BLD-MCNC: 14.4 G/DL (ref 11.7–15.7)

## 2018-06-12 PROCEDURE — 99214 OFFICE O/P EST MOD 30 MIN: CPT | Performed by: INTERNAL MEDICINE

## 2018-06-12 PROCEDURE — 80048 BASIC METABOLIC PNL TOTAL CA: CPT | Performed by: INTERNAL MEDICINE

## 2018-06-12 PROCEDURE — 84443 ASSAY THYROID STIM HORMONE: CPT | Performed by: INTERNAL MEDICINE

## 2018-06-12 PROCEDURE — 82728 ASSAY OF FERRITIN: CPT | Performed by: INTERNAL MEDICINE

## 2018-06-12 PROCEDURE — 36415 COLL VENOUS BLD VENIPUNCTURE: CPT | Performed by: INTERNAL MEDICINE

## 2018-06-12 PROCEDURE — 82043 UR ALBUMIN QUANTITATIVE: CPT | Performed by: INTERNAL MEDICINE

## 2018-06-12 PROCEDURE — 83036 HEMOGLOBIN GLYCOSYLATED A1C: CPT | Performed by: INTERNAL MEDICINE

## 2018-06-12 PROCEDURE — 85018 HEMOGLOBIN: CPT | Performed by: INTERNAL MEDICINE

## 2018-06-12 PROCEDURE — 84439 ASSAY OF FREE THYROXINE: CPT | Performed by: INTERNAL MEDICINE

## 2018-06-12 RX ORDER — METOPROLOL SUCCINATE 50 MG/1
50 TABLET, EXTENDED RELEASE ORAL 2 TIMES DAILY
Qty: 180 TABLET | Refills: 3 | Status: ON HOLD | OUTPATIENT
Start: 2018-06-12 | End: 2018-10-22

## 2018-06-12 RX ORDER — FUROSEMIDE 20 MG
20 TABLET ORAL DAILY
Qty: 90 TABLET | Refills: 3 | Status: ON HOLD | OUTPATIENT
Start: 2018-06-12 | End: 2018-10-22

## 2018-06-12 NOTE — LETTER
[facility]  [address] [facility]  [address]  [place]  [address]  Tel: [phone]    June 18, 2018    Shelly Rogers  [address]   [place] [address]        Dear MsEduardo Hooverer,    This is to inform you regarding your test result.    Urine for microalbumin is positive.  The numbers are slightly worse than before.  Taking good care of diabetes and blood pressure is important.    Sincerely,    Halley Huff MD/LIZETH          Enclosure: Lab Results  Results for orders placed or performed in visit on 06/12/18   Hemoglobin A1c   Result Value Ref Range    Hemoglobin A1C 7.8 (H) 0 - 5.6 %   Basic metabolic panel   Result Value Ref Range    Sodium 141 133 - 144 mmol/L    Potassium 4.8 3.4 - 5.3 mmol/L    Chloride 107 94 - 109 mmol/L    Carbon Dioxide 25 20 - 32 mmol/L    Anion Gap 9 3 - 14 mmol/L    Glucose 119 (H) 70 - 99 mg/dL    Urea Nitrogen 18 7 - 30 mg/dL    Creatinine 0.94 0.52 - 1.04 mg/dL    GFR Estimate 57 (L) >60 mL/min/1.7m2    GFR Estimate If Black 69 >60 mL/min/1.7m2    Calcium 9.1 8.5 - 10.1 mg/dL   TSH with free T4 reflex   Result Value Ref Range    TSH 5.11 (H) 0.40 - 4.00 mU/L   Albumin Random Urine Quantitative with Creat Ratio   Result Value Ref Range    Creatinine Urine 115 mg/dL    Albumin Urine mg/L 112 mg/L    Albumin Urine mg/g Cr 97.39 (H) 0 - 25 mg/g Cr   Ferritin   Result Value Ref Range    Ferritin 24 8 - 252 ng/mL   Hemoglobin   Result Value Ref Range    Hemoglobin 14.4 11.7 - 15.7 g/dL   T4 free   Result Value Ref Range    T4 Free 1.35 0.76 - 1.46 ng/dL

## 2018-06-12 NOTE — PATIENT INSTRUCTIONS
Labs today  I refilled your prescriptions  Check blood sugar levels twice a week at home  Think about starting Losartan 25 mg once daily to help with protein in the urine  If you start it, metoprolol dose would have to be decreased  Take iron pills 2 or 3 times weekly  Hold water pill in case of dehydration or sickness  Let me know if you want a referral for colonoscopy and endoscopy  Follow up in 4-6 months  Seek sooner medical attention if there is any worsening of symptoms or problems

## 2018-06-12 NOTE — PROGRESS NOTES
SUBJECTIVE:   Shelly Rogers is a 85 year old female who presents to clinic today for the following health issues:    Patient is accompanied by her daughter, who helped give her history.    Diabetes Follow-up      Patient is checking blood sugars: not at all    Diabetic concerns: None     Symptoms of hypoglycemia (low blood sugar): none     Paresthesias (numbness or burning in feet) or sores: Yes      Date of last diabetic eye exam: 2017    She's compliant with medication  Reports she's careful with what she's eating  Reports tingling in her toes    BP Readings from Last 2 Encounters:   06/12/18 134/81   02/27/18 130/70     Hemoglobin A1C (%)   Date Value   02/27/2018 8.0 (H)   04/06/2017 8.1 (H)     LDL Cholesterol Calculated (mg/dL)   Date Value   02/27/2018 101 (H)   10/13/2015 88     Heart Failure Follow-up    Symptoms:    Shortness of breath: none    Lower extremity edema: none    Chest pain: No    Using more pillows than normal: No    Cough at night: No    Weight:    Checking weight daily: No    Weight change: none    Cardiology visits, ER/UC, or hospital admissions since last visit: None    Medication side effects: none    Follows up with cardiology  Has at home nurse visit for pace maker checks    Hypothyroidism Follow-up      Since last visit, patient describes the following symptoms: Weight stable, no hair loss, no skin changes, no constipation, no loose stools      Amount of exercise or physical activity: None    Problems taking medications regularly: No    Medication side effects: none    Diet: regular (no restrictions)    Has stopped taking iron pills because it caused constipation  Has cut down on furosemide due to urinary frequency    Problem list and histories reviewed & adjusted, as indicated.  Additional history: as documented    Medications and Labs reviewed in EPIC    Reviewed and updated as needed this visit by clinical staff  Tobacco  Allergies  Meds  Problems       Reviewed and updated  "as needed this visit by Provider       ROS:  Constitutional, HEENT, cardiovascular, pulmonary, GI, , musculoskeletal, neuro, skin, endocrine and psych systems are negative, except as otherwise noted.    POSITIVE for tingling and numbness in toes    This document serves as a record of the services and decisions personally performed and made by Halley Huff MD. It was created on her behalf by Eliz Hart, a trained medical scribe. The creation of this document is based on the provider's statements to the medical scribe.  Eliz Hart 3:37 PM June 12, 2018    OBJECTIVE:     /81 (BP Location: Right arm, Cuff Size: Adult Large)  Pulse 81  Temp 97.1  F (36.2  C) (Tympanic)  Ht 1.575 m (5' 2\")  Wt 81.6 kg (180 lb)  SpO2 95%  BMI 32.92 kg/m2  Body mass index is 32.92 kg/(m^2).    GENERAL: overweight, alert and no distress  CV: regular rates and rhythm, occasional skipped beats  PSYCH: mentation appears normal, affect normal/bright    Diagnostic Test Results:  No results found for this or any previous visit (from the past 24 hour(s)).    Reviewed and discussed UA done on 04/10/18  ASSESSMENT/PLAN:     Shelly was seen today for thyroid problem, diabetes and heart failure.    Diagnoses and all orders for this visit:    Type 2 diabetes mellitus without complication, without long-term current use of insulin (H)  -     metFORMIN (GLUCOPHAGE) 500 MG tablet; Take 1 tablet (500 mg) by mouth 2 times daily (with meals)  -     blood glucose monitoring (NO BRAND SPECIFIED) test strip; Use to test blood sugars one times daily or as directed .has type 2 diabetes and not on insulin  -     blood glucose monitoring (NO BRAND SPECIFIED) meter device kit; Use to test blood sugar one times daily or as directed.  -     blood glucose (NO BRAND SPECIFIED) lancets standard; Use to test blood sugar 1 times daily or as directed.  -     Hemoglobin A1c  -     Basic metabolic panel  -     TSH with free T4 reflex  -     Albumin " Random Urine Quantitative with Creat Ratio  Compliant with medication  Endorses tingling sensation in toes  Patient hasn't check BS levels at home in years  She's unsure of how to check BS levels  I spent extensive amount of time educating patient about how to check BS levels at home  Explained to her that those readings could us adjust medication dose  Educated patient and daughter about a1c  Educated patient and daughter about complications of diabetes  She didn't tolerate lisinopril  Advised use of Losartan as a nephro-protective agent  Daughter had questions about Losartan and its SEs, all of which were answered  They will think about it and let me know if she wants to take it  I spent extensive amount of time providing counseling and reassurance to patient  Lab Results   Component Value Date    A1C 8.0 02/27/2018    A1C 8.1 04/06/2017    A1C 8.0 09/12/2016    A1C 7.5 02/16/2016    A1C 9.2 10/27/2015     Chronic diastolic congestive heart failure (H)  -     metoprolol succinate (TOPROL-XL) 50 MG 24 hr tablet; Take 1 tablet (50 mg) by mouth 2 times daily  -     furosemide (LASIX) 20 MG tablet; Take 1 tablet (20 mg) by mouth daily  Stable  Compliant with medication  Follows up with cardiology    Paroxysmal atrial fibrillation (H)  Follows up with cardiology  Stable  HR was normal rate and rhythm in office visit today    Hypothyroidism, unspecified type  Doing well  Compliant with medication    Iron deficiency  -     Ferritin  -     Hemoglobin  Patient stopped taking iron pills  Endorses it caused constipation  Advised trying to take iron pills 2-3 times weekly, if tolerated  Educated patient and daughter about symptoms associated with iron-deficiency anemia  Daughter reports that patient has hemorrhoidal bleeding  Patient endorses that it happens every two month with minimal bleeding  Explained that colonoscopy and endoscopy is workup for occult blood loss  Patient will think about it and let me know if she wants  a referral  Patient is 85, and if she doesn't want to pursue this, it's understandable  But if she wants I can put referral.    History of uterine cancer:  Patient had hysterectomy.    Patient Instructions   Labs today  I refilled your prescriptions  Check blood sugar levels twice a week at home  Think about starting Losartan 25 mg once daily to help with protein in the urine  If you start it, metoprolol dose would have to be decreased  Take iron pills 2 or 3 times weekly  Hold water pill in case of dehydration or sickness  Let me know if you want a referral for colonoscopy and endoscopy  Follow up in 4-6 months  Seek sooner medical attention if there is any worsening of symptoms or problems    The information in this document, created by the medical scribe for me, accurately reflects the services I personally performed and the decisions made by me. I have reviewed and approved this document for accuracy prior to leaving the patient care area.  June 12, 2018 4:07 PM    Halley Huff MD  [facility]

## 2018-06-12 NOTE — LETTER
[facility]  [address] [facility]  [address]  [place]  [address]  Tel: [phone]    June 14, 2018    Shelly JUAREZ Ken  [address]   [place] [address]        Dear Ms. Rogers,    This is to inform you regarding your test result.    I spoke to your daughter on your request and informed her regarding the results but sending you a result note also  TSH which is thyroid hormone is elevated   Increase levothyroxine to 88  mcg po daily  You are  On 75  mcg currently so we are increasing to 88 mcg po daily.  Recheck TSH in 2 months.  Orders are placed.  New script is sent  Ferritin which is iron stores in the body is low.  We want Ferritin level greater than 50  Take OTC Ferrous Sulfate 325 mg po once daily or every other day if you tolerate.  Take with vit C as vit C helps to absorb iron.  Iron can make you constipated so take stool softener.  Recheck ferritin in 4 months  We discussed about workup for iron deficiency which includes endoscopy and colonoscopy during the office visit.  If you want to pursue that please let me know.  Basic metabolic panel which includes electrolytes and kidney fucntion is normal  HbA1c which is average glucose during last 3 months is 7.8%.  It has improved compared to before.  Lab Results       Component                Value               Date                       A1C                      7.8                 06/12/2018                 A1C                      8.0                 02/27/2018                 A1C                      8.1                 04/06/2017                 A1C                      8.0                 09/12/2016                 A1C                      7.5                 02/16/2016              Follow up in 4 months    Sincerely,    Halley Huff MD/LIZETH          Enclosure: Lab Results  Results for orders placed or performed in visit on 06/12/18   Hemoglobin A1c   Result Value Ref Range    Hemoglobin A1C 7.8 (H) 0 - 5.6 %   Basic metabolic  panel   Result Value Ref Range    Sodium 141 133 - 144 mmol/L    Potassium 4.8 3.4 - 5.3 mmol/L    Chloride 107 94 - 109 mmol/L    Carbon Dioxide 25 20 - 32 mmol/L    Anion Gap 9 3 - 14 mmol/L    Glucose 119 (H) 70 - 99 mg/dL    Urea Nitrogen 18 7 - 30 mg/dL    Creatinine 0.94 0.52 - 1.04 mg/dL    GFR Estimate 57 (L) >60 mL/min/1.7m2    GFR Estimate If Black 69 >60 mL/min/1.7m2    Calcium 9.1 8.5 - 10.1 mg/dL   TSH with free T4 reflex   Result Value Ref Range    TSH 5.11 (H) 0.40 - 4.00 mU/L   Ferritin   Result Value Ref Range    Ferritin 24 8 - 252 ng/mL   Hemoglobin   Result Value Ref Range    Hemoglobin 14.4 11.7 - 15.7 g/dL   T4 free   Result Value Ref Range    T4 Free 1.35 0.76 - 1.46 ng/dL

## 2018-06-12 NOTE — MR AVS SNAPSHOT
After Visit Summary   6/12/2018    Shelly Rogers    MRN: [id number]           Patient Information     Date Of Birth          1/18/1933        Visit Information        Provider Department      6/12/2018 3:00 PM Halley Huff MD [facility]        Today's Diagnoses     Type 2 diabetes mellitus without complication, without long-term current use of insulin (H)    -  1    Chronic diastolic congestive heart failure (H)        Paroxysmal atrial fibrillation (H)        Hypothyroidism, unspecified type        Iron deficiency          Care Instructions    Labs today  I refilled your prescriptions  Check blood sugar levels twice a week at home  Think about starting Losartan 25 mg once daily to help with protein in the urine  If you start it, metoprolol dose would have to be decreased  Take iron pills 2 or 3 times weekly  Hold water pill in case of dehydration or sickness  Let me know if you want a referral for colonoscopy and endoscopy  Follow up in 4-6 months  Seek sooner medical attention if there is any worsening of symptoms or problems          Follow-ups after your visit        Your next 10 appointments already scheduled     Jul 10, 2018  3:00 PM CDT   Anticoagulation Visit with  ANTICOAGULATION CLINIC   [facility] ([facility])    [address]  [place] [id number] [phone]              Who to contact     If you have questions or need follow up information about today's clinic visit or your schedule please contact [facility] directly at [phone].  Normal or non-critical lab and imaging results will be communicated to you by MyChart, letter or phone within 4 business days after the clinic has received the results. If you do not hear from us within 7 days, please contact the clinic through MyChart or phone. If you have a critical or abnormal lab result, we will notify you by phone as soon as possible.  Submit refill requests through [employer] or  "call your pharmacy and they will forward the refill request to us. Please allow 3 business days for your refill to be completed.          Additional Information About Your Visit        MyChart Information     [employer] lets you send messages to your doctor, view your test results, renew your prescriptions, schedule appointments and more. To sign up, go to [email or web address] . Click on \"Log in\" on the left side of the screen, which will take you to the Welcome page. Then click on \"Sign up Now\" on the right side of the page.     You will be asked to enter the access code listed below, as well as some personal information. Please follow the directions to create your username and password.     Your access code is: [email or web address]  Expires: 9/10/2018  3:04 PM     Your access code will  in 90 days. If you need help or a new code, please call your [facility] clinic or [phone].        Care EveryWhere ID     This is your Care EveryWhere ID. This could be used by other organizations to access your [facility] medical records  [id number]        Your Vitals Were     Pulse Temperature Height Pulse Oximetry BMI (Body Mass Index)       81 97.1  F (36.2  C) (Tympanic) 5' 2\" (1.575 m) 95% 32.92 kg/m2        Blood Pressure from Last 3 Encounters:   18 134/81   18 130/70   17 142/66    Weight from Last 3 Encounters:   18 180 lb (81.6 kg)   18 176 lb (79.8 kg)   17 176 lb 14.4 oz (80.2 kg)              We Performed the Following     Albumin Random Urine Quantitative with Creat Ratio     Basic metabolic panel     Ferritin     Hemoglobin A1c     Hemoglobin     TSH with free T4 reflex          Today's Medication Changes          These changes are accurate as of 18  4:03 PM.  If you have any questions, ask your nurse or doctor.               Start taking these medicines.        Dose/Directions    blood glucose lancets standard   Commonly known as:  no brand specified   Used for:  Type 2 " diabetes mellitus without complication, without long-term current use of insulin (H)   Started by:  Halley Huff MD        Use to test blood sugar 1 times daily or as directed.   Quantity:  100 each   Refills:  11         These medicines have changed or have updated prescriptions.        Dose/Directions    * blood glucose monitoring meter device kit   This may have changed:  Another medication with the same name was added. Make sure you understand how and when to take each.   Used for:  Type 2 diabetes, HbA1c goal < 7% (H)   Changed by:  Halley Huff MD        Dose:  1 Device   1 Device daily.   Quantity:  1 kit   Refills:  0       * blood glucose monitoring meter device kit   Commonly known as:  no brand specified   This may have changed:  You were already taking a medication with the same name, and this prescription was added. Make sure you understand how and when to take each.   Used for:  Type 2 diabetes mellitus without complication, without long-term current use of insulin (H)   Changed by:  Halley Huff MD        Use to test blood sugar one times daily or as directed.   Quantity:  1 kit   Refills:  0       * blood glucose monitoring test strip   Commonly known as:  ACCU-CHEK SMARTVIEW   This may have changed:  Another medication with the same name was added. Make sure you understand how and when to take each.   Used for:  Type 2 diabetes, HbA1c goal < 7% (H)   Changed by:  Halley Huff MD        Dose:  1 strip   1 strip by In Vitro route daily   Quantity:  1 Box   Refills:  prn       * blood glucose monitoring test strip   Commonly known as:  no brand specified   This may have changed:  You were already taking a medication with the same name, and this prescription was added. Make sure you understand how and when to take each.   Used for:  Type 2 diabetes mellitus without complication, without long-term current use of insulin (H)   Changed by:  Halley Huff MD        Use to test blood  sugars one times daily or as directed .has type 2 diabetes and not on insulin   Quantity:  100 strip   Refills:  1       * Notice:  This list has 4 medication(s) that are the same as other medications prescribed for you. Read the directions carefully, and ask your doctor or other care provider to review them with you.         Where to get your medicines      These medications were sent to [id number] - [place], [place] - [address] AT [facility] [address]  [facility], [place] [address]     Phone:  [phone]     blood glucose lancets standard    blood glucose monitoring meter device kit    blood glucose monitoring test strip    furosemide 20 MG tablet    metFORMIN 500 MG tablet    metoprolol succinate 50 MG 24 hr tablet                Primary Care Provider Office Phone # Fax #    Halley Huff [phone] [address]  [facility] [address]        Equal Access to Services     FRANCISCO ROMAN AH: Hadii aad ku hadviolettao Sharri, waaxda luqadaha, qaybta kaalmada adeegyada, waxay liliin nigeln liza marie . [facility] [phone].    ATENCIÓN: Si habla español, tiene a quinones disposición servicios gratuitos de asistencia lingüística. [facility] [phone].    We comply with applicable federal civil rights laws and [place] laws. We do not discriminate on the basis of race, color, national origin, age, disability, sex, sexual orientation, or gender identity.            Thank you!     Thank you for choosing [facility]  for your care. Our goal is always to provide you with excellent care. Hearing back from our patients is one way we can continue to improve our services. Please take a few minutes to complete the written survey that you may receive in the mail after your visit with us. Thank you!             Your Updated Medication List - Protect others around you: Learn how to safely use, store and throw away your medicines at  www.disposemymeds.org.          This list is accurate as of 6/12/18  4:03 PM.  Always use your most recent med list.                   Brand Name Dispense Instructions for use Diagnosis    ACE/ARB/ARNI NOT PRESCRIBED (INTENTIONAL)      ACE & ARB not prescribed due to Other: HFpEF    Chronic diastolic congestive heart failure (H)       ACETAMINOPHEN PO      Take 500 mg by mouth 3 times daily as needed (Takes at least 6 hours apart).        ASPIRIN NOT PRESCRIBED    INTENTIONAL    0 each    Antiplatelet medication not prescribed intentionally due to Current anticoagulant therapy (warfarin/enoxaparin)    Type 2 diabetes mellitus without complications (H)       blood glucose lancets standard    no brand specified    100 each    Use to test blood sugar 1 times daily or as directed.    Type 2 diabetes mellitus without complication, without long-term current use of insulin (H)       blood glucose monitoring lancets     1 Box    Use to check blood sugars daily    Type 2 diabetes, HbA1c goal < 7% (H)       * blood glucose monitoring meter device kit     1 kit    1 Device daily.    Type 2 diabetes, HbA1c goal < 7% (H)       * blood glucose monitoring meter device kit    no brand specified    1 kit    Use to test blood sugar one times daily or as directed.    Type 2 diabetes mellitus without complication, without long-term current use of insulin (H)       * blood glucose monitoring test strip    ACCU-CHEK SMARTVIEW    1 Box    1 strip by In Vitro route daily    Type 2 diabetes, HbA1c goal < 7% (H)       * blood glucose monitoring test strip    no brand specified    100 strip    Use to test blood sugars one times daily or as directed .has type 2 diabetes and not on insulin    Type 2 diabetes mellitus without complication, without long-term current use of insulin (H)       CENTRUM SILVER PO      Take by mouth daily        furosemide 20 MG tablet    LASIX    90 tablet    Take 1 tablet (20 mg) by mouth daily    Chronic diastolic  congestive heart failure (H)       glimepiride 2 MG tablet    AMARYL    180 tablet    Take 1 tablet (2 mg) by mouth 2 times daily    Type 2 diabetes mellitus without complication, without long-term current use of insulin (H)       levothyroxine 75 MCG tablet    SYNTHROID/LEVOTHROID    90 tablet    Take 1 tablet (75 mcg) by mouth daily    Hypothyroidism, unspecified type       metFORMIN 500 MG tablet    GLUCOPHAGE    180 tablet    Take 1 tablet (500 mg) by mouth 2 times daily (with meals)    Type 2 diabetes mellitus without complication, without long-term current use of insulin (H)       metoprolol succinate 50 MG 24 hr tablet    TOPROL-XL    180 tablet    Take 1 tablet (50 mg) by mouth 2 times daily    Chronic diastolic congestive heart failure (H)       NASACORT ALLERGY 24HR 55 MCG/ACT Inhaler   Generic drug:  triamcinolone      Spray 2 sprays into both nostrils daily        order for DME     2 Package    Equipment being ordered: Compression Stockings Knee high 20/30 compression    Bilateral edema of lower extremity       order for DME     1 Units    Equipment being ordered: cane with quad or prongs    Unsteadiness on feet       STATIN NOT PRESCRIBED (INTENTIONAL)     0 each    continuous prn. Statin not prescribed intentionally due to Other: Not needed, LDL at goal <100mg/dL without therapy        warfarin 5 MG tablet    COUMADIN    90 tablet    TAKE 1/2 TO 1 TABLET BY MOUTH DAILY OR AS DIRECTED BY INR CLINIC    Paroxysmal atrial fibrillation (H)       * Notice:  This list has 4 medication(s) that are the same as other medications prescribed for you. Read the directions carefully, and ask your doctor or other care provider to review them with you.

## 2018-06-13 LAB
ANION GAP SERPL CALCULATED.3IONS-SCNC: 9 MMOL/L (ref 3–14)
BUN SERPL-MCNC: 18 MG/DL (ref 7–30)
CALCIUM SERPL-MCNC: 9.1 MG/DL (ref 8.5–10.1)
CHLORIDE SERPL-SCNC: 107 MMOL/L (ref 94–109)
CO2 SERPL-SCNC: 25 MMOL/L (ref 20–32)
CREAT SERPL-MCNC: 0.94 MG/DL (ref 0.52–1.04)
FERRITIN SERPL-MCNC: 24 NG/ML (ref 8–252)
GFR SERPL CREATININE-BSD FRML MDRD: 57 ML/MIN/1.7M2
GLUCOSE SERPL-MCNC: 119 MG/DL (ref 70–99)
POTASSIUM SERPL-SCNC: 4.8 MMOL/L (ref 3.4–5.3)
SODIUM SERPL-SCNC: 141 MMOL/L (ref 133–144)
T4 FREE SERPL-MCNC: 1.35 NG/DL (ref 0.76–1.46)
TSH SERPL DL<=0.005 MIU/L-ACNC: 5.11 MU/L (ref 0.4–4)

## 2018-06-14 DIAGNOSIS — E03.9 HYPOTHYROIDISM, UNSPECIFIED TYPE: ICD-10-CM

## 2018-06-14 RX ORDER — LEVOTHYROXINE SODIUM 88 UG/1
88 TABLET ORAL DAILY
Qty: 90 TABLET | Refills: 1 | Status: SHIPPED | OUTPATIENT
Start: 2018-06-14 | End: 2018-12-10

## 2018-06-14 NOTE — PROGRESS NOTES
Please notify patient by sending following letter with copy of test results      Nayely Bravo,    This is to inform you regarding your test result.    I spoke to your daughter on your request and informed her regarding the results but sending you a result note also  TSH which is thyroid hormone is elevated   Increase levothyroxine to 88  mcg po daily  You are  On 75  mcg currently so we are increasing to 88 mcg po daily.  Recheck TSH in 2 months.  Orders are placed.  New script is sent  Ferritin which is iron stores in the body is low.  We want Ferritin level greater than 50  Take OTC Ferrous Sulfate 325 mg po once daily or every other day if you tolerate.  Take with vit C as vit C helps to absorb iron.  Iron can make you constipated so take stool softener.  Recheck ferritin in 4 months  We discussed about workup for iron deficiency which includes endoscopy and colonoscopy during the office visit.  If you want to pursue that please let me know.  Basic metabolic panel which includes electrolytes and kidney fucntion is normal  HbA1c which is average glucose during last 3 months is 7.8%.  It has improved compared to before.  Lab Results       Component                Value               Date                       A1C                      7.8                 06/12/2018                 A1C                      8.0                 02/27/2018                 A1C                      8.1                 04/06/2017                 A1C                      8.0                 09/12/2016                 A1C                      7.5                 02/16/2016              Follow up in 4 months      Sincerely,      Dr.Nasima Daria MD,FACP

## 2018-06-15 LAB
CREAT UR-MCNC: 115 MG/DL
MICROALBUMIN UR-MCNC: 112 MG/L
MICROALBUMIN/CREAT UR: 97.39 MG/G CR (ref 0–25)

## 2018-06-15 NOTE — PROGRESS NOTES
Please notify patient by sending following letter with copy of test results      Nayely Bravo,    This is to inform you regarding your test result.    Urine for microalbumin is positive.  The numbers are slightly worse than before.  Taking good care of diabetes and blood pressure is important.    Sincerely,      Dr.Nasima Daria MD,FACP

## 2018-06-25 DIAGNOSIS — E11.9 TYPE 2 DIABETES MELLITUS WITHOUT COMPLICATION, WITHOUT LONG-TERM CURRENT USE OF INSULIN (H): ICD-10-CM

## 2018-06-25 NOTE — TELEPHONE ENCOUNTER
Please note that pharmacy requesting refill is a different pharmacy than the one we sent glimepiride to back in March 2018.    Andry Griggs CMA

## 2018-06-25 NOTE — TELEPHONE ENCOUNTER
"Requested Prescriptions   Pending Prescriptions Disp Refills     glimepiride (AMARYL) 2 MG tablet [Pharmacy Med Name: GLIMEPIRIDE 2MG TABLETS] 180 tablet 0    Last Written Prescription Date:  2/27/18  Last Fill Quantity: 180,  # refills: 3   Last office visit: 6/12/2018 with prescribing provider:     Future Office Visit:     Sig: TAKE 1 TABLET BY MOUTH TWICE DAILY    Sulfonylurea Agents Passed    6/25/2018  3:11 AM       Passed - Blood pressure less than 140/90 in past 6 months    BP Readings from Last 3 Encounters:   06/12/18 134/81   02/27/18 130/70   06/02/17 142/66                Passed - Patient has documented LDL within the past 12 mos.    Recent Labs   Lab Test  02/27/18   1541   LDL  101*            Passed - Patient has had a Microalbumin in the past 12 mos.    Recent Labs   Lab Test  06/12/18   1634   MICROL  112   UMALCR  97.39*            Passed - Patient has documented A1c within the specified period of time.    If HgbA1C is 8 or greater, it needs to be on file within the past 3 months.  If less than 8, must be on file within the past 6 months.     Recent Labs   Lab Test  06/12/18   1633   A1C  7.8*            Passed - Patient is age 18 or older       Passed - No active pregnancy on record       Passed - Patient has a recent creatinine (normal) within the past 12 mos.    Recent Labs   Lab Test  06/12/18   1633   CR  0.94            Passed - Patient has not had a positive pregnancy test within the past 12 mos.       Passed - Recent (6 mo) or future (30 days) visit within the authorizing provider's specialty    Patient had office visit in the last 6 months or has a visit in the next 30 days with authorizing provider or within the authorizing provider's specialty.  See \"Patient Info\" tab in inbasket, or \"Choose Columns\" in Meds & Orders section of the refill encounter.              "

## 2018-06-27 RX ORDER — GLIMEPIRIDE 2 MG/1
TABLET ORAL
Qty: 180 TABLET | Refills: 0 | OUTPATIENT
Start: 2018-06-27

## 2018-06-27 NOTE — TELEPHONE ENCOUNTER
Denied RX refill request as pt's RX was filled for a year 2/27 at preferred pharmacy.    Harrison HENRY RN

## 2018-07-10 ENCOUNTER — ANTICOAGULATION THERAPY VISIT (OUTPATIENT)
Dept: NURSING | Facility: CLINIC | Age: 83
End: 2018-07-10
Payer: COMMERCIAL

## 2018-07-10 DIAGNOSIS — I48.0 PAROXYSMAL ATRIAL FIBRILLATION (H): ICD-10-CM

## 2018-07-10 DIAGNOSIS — Z79.01 LONG-TERM (CURRENT) USE OF ANTICOAGULANTS: ICD-10-CM

## 2018-07-10 LAB — INR POINT OF CARE: 2.2 (ref 0.86–1.14)

## 2018-07-10 PROCEDURE — 99207 ZZC NO CHARGE NURSE ONLY: CPT

## 2018-07-10 PROCEDURE — 85610 PROTHROMBIN TIME: CPT | Mod: QW

## 2018-07-10 PROCEDURE — 36416 COLLJ CAPILLARY BLOOD SPEC: CPT

## 2018-07-10 NOTE — MR AVS SNAPSHOT
Shelly Rogers   7/10/2018 3:00 PM   Anticoagulation Therapy Visit    Description:  85 year old female   Provider:   ANTICOAGULATION CLINIC   Department:  [facility]           INR as of 7/10/2018     Today's INR 2.2      Anticoagulation Summary as of 7/10/2018     INR goal 2.0-3.0   Today's INR 2.2   Full warfarin instructions 5 mg on Wed; 2.5 mg all other days   Next INR check 8/21/2018    Indications   Long-term (current) use of anticoagulants [Z79.01] [Z79.01]  Atrial fibrillation (H) [I48.91]         Your next Anticoagulation Clinic appointment(s)     Aug 21, 2018  3:30 PM CDT   Anticoagulation Visit with  ANTICOAGULATION CLINIC   [facility] ([facility])    [address]  [place] [address]   [phone]              Contact Numbers     Clinic Number:         July 2018 Details    Sun Mon Tue Wed Thu Fri Sat     1               2               3               4               5               6               7                 8               9               10      2.5 mg   See details      11      5 mg         12      2.5 mg         13      2.5 mg         14      2.5 mg           15      2.5 mg         16      2.5 mg         17      2.5 mg         18      5 mg         19      2.5 mg         20      2.5 mg         21      2.5 mg           22      2.5 mg         23      2.5 mg         24      2.5 mg         25      5 mg         26      2.5 mg         27      2.5 mg         28      2.5 mg           29      2.5 mg         30      2.5 mg         31      2.5 mg              Date Details   07/10 This INR check               How to take your warfarin dose     To take:  2.5 mg Take 0.5 of a 5 mg tablet.    To take:  5 mg Take 1 of the 5 mg tablets.           August 2018 Details    Sun Mon Tue Wed Thu Fri Sat        1      5 mg         2      2.5 mg         3      2.5 mg         4      2.5 mg           5      2.5 mg         6      2.5 mg         7      2.5 mg         8      5 mg         9       2.5 mg         10      2.5 mg         11      2.5 mg           12      2.5 mg         13      2.5 mg         14      2.5 mg         15      5 mg         16      2.5 mg         17      2.5 mg         18      2.5 mg           19      2.5 mg         20      2.5 mg         21            22               23               24               25                 26               27               28               29               30               31                 Date Details   No additional details    Date of next INR:  8/21/2018         How to take your warfarin dose     To take:  2.5 mg Take 0.5 of a 5 mg tablet.    To take:  5 mg Take 1 of the 5 mg tablets.

## 2018-07-10 NOTE — PROGRESS NOTES
ANTICOAGULATION FOLLOW-UP CLINIC VISIT    Patient Name:  Shelly Rogers  Date:  7/10/2018  Contact Type:  Face to Face    SUBJECTIVE:     Patient Findings     Positives No Problem Findings           OBJECTIVE    INR Protime   Date Value Ref Range Status   07/10/2018 2.2 (A) 0.86 - 1.14 Final       ASSESSMENT / PLAN  INR assessment THER    Recheck INR In: 6 WEEKS    INR Location Clinic      Anticoagulation Summary as of 7/10/2018     INR goal 2.0-3.0   Today's INR 2.2   Warfarin maintenance plan 5 mg (5 mg x 1) on Wed; 2.5 mg (5 mg x 0.5) all other days   Full warfarin instructions 5 mg on Wed; 2.5 mg all other days   Weekly warfarin total 20 mg   No change documented Valentina Scott RN   Plan last modified Monet Jaramillo RN (6/19/2017)   Next INR check 8/21/2018   Priority INR   Target end date     Indications   Long-term (current) use of anticoagulants [Z79.01] [Z79.01]  Atrial fibrillation (H) [I48.91]         Anticoagulation Episode Summary     INR check location     Preferred lab     Send INR reminders to [facility] ANTICOAGULATION    Comments       Anticoagulation Care Providers     Provider Role Specialty Phone number    Halley Huff MD Responsible Internal Medicine [phone]            See the Encounter Report to view Anticoagulation Flowsheet and Dosing Calendar (Go to Encounters tab in chart review, and find the Anticoagulation Therapy Visit)    Dosage adjustment made based on physician directed care plan.    Valentina Scott RN

## 2018-08-21 ENCOUNTER — ANTICOAGULATION THERAPY VISIT (OUTPATIENT)
Dept: NURSING | Facility: CLINIC | Age: 83
End: 2018-08-21
Payer: COMMERCIAL

## 2018-08-21 DIAGNOSIS — Z79.01 LONG-TERM (CURRENT) USE OF ANTICOAGULANTS: ICD-10-CM

## 2018-08-21 DIAGNOSIS — I48.91 ATRIAL FIBRILLATION (H): ICD-10-CM

## 2018-08-21 LAB — INR POINT OF CARE: 1.7 (ref 0.86–1.14)

## 2018-08-21 PROCEDURE — 36416 COLLJ CAPILLARY BLOOD SPEC: CPT

## 2018-08-21 PROCEDURE — 85610 PROTHROMBIN TIME: CPT | Mod: QW

## 2018-08-21 PROCEDURE — 99207 ZZC NO CHARGE NURSE ONLY: CPT

## 2018-08-21 NOTE — PROGRESS NOTES
ANTICOAGULATION FOLLOW-UP CLINIC VISIT    Patient Name:  Shelly Rogers  Date:  8/21/2018  Contact Type:  Face to Face    SUBJECTIVE:     Patient Findings     Positives No Problem Findings           OBJECTIVE    INR Protime   Date Value Ref Range Status   08/21/2018 1.7 (A) 0.86 - 1.14 Final       ASSESSMENT / PLAN  INR assessment SUB    Recheck INR In: 2 WEEKS    INR Location Clinic      Anticoagulation Summary as of 8/21/2018     INR goal 2.0-3.0   Today's INR 1.7!   Warfarin maintenance plan 5 mg (5 mg x 1) on Wed; 2.5 mg (5 mg x 0.5) all other days   Full warfarin instructions 8/21: 5 mg; Otherwise 5 mg on Wed; 2.5 mg all other days   Weekly warfarin total 20 mg   Plan last modified Monet Jaramillo RN (6/19/2017)   Next INR check 9/6/2018   Priority INR   Target end date     Indications   Long-term (current) use of anticoagulants [Z79.01] [Z79.01]  Atrial fibrillation (H) [I48.91]         Anticoagulation Episode Summary     INR check location     Preferred lab     Send INR reminders to  ANTICOAGULATION    Comments       Anticoagulation Care Providers     Provider Role Specialty Phone number    Halley Huff MD Responsible Internal Medicine [phone]            See the Encounter Report to view Anticoagulation Flowsheet and Dosing Calendar (Go to Encounters tab in chart review, and find the Anticoagulation Therapy Visit)    No changes in meds/diet. No missed/extra warfarin doses. No unusual bruising/bleeding or other changes. Dosage adjustment made based on physician directed care plan. Recheck INR in 2 weeks or sooner if concerns.     Odilia Youssef RN

## 2018-08-21 NOTE — MR AVS SNAPSHOT
Shelly Rogers   8/21/2018 3:30 PM   Anticoagulation Therapy Visit    Description:  85 year old female   Provider:  [facility]   Department:  [facility]           INR as of 8/21/2018     Today's INR 1.7!      Anticoagulation Summary as of 8/21/2018     INR goal 2.0-3.0   Today's INR 1.7!   Full warfarin instructions 8/21: 5 mg; Otherwise 5 mg on Wed; 2.5 mg all other days   Next INR check 9/4/2018    Indications   Long-term (current) use of anticoagulants [Z79.01] [Z79.01]  Atrial fibrillation (H) [I48.91]         Your next Anticoagulation Clinic appointment(s)     Sep 06, 2018  3:00 PM CDT   Anticoagulation Visit with  ANTICOAGULATION CLINIC   [facility] [place] ([facility])    [address]  [place] [address]   [phone]              Contact Numbers     Clinic Number:         August 2018 Details    Sun Mon Tue Wed Thu Fri Sat        1               2               3               4                 5               6               7               8               9               10               11                 12               13               14               15               16               17               18                 19               20               21      5 mg   See details      22      5 mg         23      2.5 mg         24      2.5 mg         25      2.5 mg           26      2.5 mg         27      2.5 mg         28      2.5 mg         29      5 mg         30      2.5 mg         31      2.5 mg           Date Details   08/21 This INR check               How to take your warfarin dose     To take:  2.5 mg Take 0.5 of a 5 mg tablet.    To take:  5 mg Take 1 of the 5 mg tablets.           September 2018 Details    Sun Mon Tue Wed Thu Fri Sat           1      2.5 mg           2      2.5 mg         3      2.5 mg         4            5               6               7               8                 9               10               11               12                13               14               15                 16               17               18               19               20               21               22                 23               24               25               26               27               28               29                 30                      Date Details   No additional details    Date of next INR:  9/4/2018         How to take your warfarin dose     To take:  2.5 mg Take 0.5 of a 5 mg tablet.

## 2018-09-06 ENCOUNTER — ANTICOAGULATION THERAPY VISIT (OUTPATIENT)
Dept: NURSING | Facility: CLINIC | Age: 83
End: 2018-09-06
Payer: COMMERCIAL

## 2018-09-06 DIAGNOSIS — I48.91 ATRIAL FIBRILLATION (H): ICD-10-CM

## 2018-09-06 DIAGNOSIS — Z79.01 LONG-TERM (CURRENT) USE OF ANTICOAGULANTS: ICD-10-CM

## 2018-09-06 LAB — INR POINT OF CARE: 2.4 (ref 0.86–1.14)

## 2018-09-06 PROCEDURE — 85610 PROTHROMBIN TIME: CPT | Mod: QW

## 2018-09-06 PROCEDURE — 99207 ZZC NO CHARGE NURSE ONLY: CPT

## 2018-09-06 PROCEDURE — 36416 COLLJ CAPILLARY BLOOD SPEC: CPT

## 2018-09-06 NOTE — MR AVS SNAPSHOT
Shelly Rogers   9/6/2018 3:00 PM   Anticoagulation Therapy Visit    Description:  85 year old female   Provider:   ANTICOAGULATION CLINIC   Department:  [facility] Nurse           INR as of 9/6/2018     Today's INR 2.4      Anticoagulation Summary as of 9/6/2018     INR goal 2.0-3.0   Today's INR 2.4   Full warfarin instructions 5 mg on Wed; 2.5 mg all other days   Next INR check 10/15/2018    Indications   Long-term (current) use of anticoagulants [Z79.01] [Z79.01]  Atrial fibrillation (H) [I48.91]         Your next Anticoagulation Clinic appointment(s)     Oct 15, 2018  9:00 AM CDT   Anticoagulation Visit with  ANTICOAGULATION CLINIC   [facility]    [address]  [place] [address]   [phone]              Contact Numbers     Clinic Number:         September 2018 Details    Sun Mon Tue Wed Thu Fri Sat           1                 2               3               4               5               6      2.5 mg   See details      7      2.5 mg         8      2.5 mg           9      2.5 mg         10      2.5 mg         11      2.5 mg         12      5 mg         13      2.5 mg         14      2.5 mg         15      2.5 mg           16      2.5 mg         17      2.5 mg         18      2.5 mg         19      5 mg         20      2.5 mg         21      2.5 mg         22      2.5 mg           23      2.5 mg         24      2.5 mg         25      2.5 mg         26      5 mg         27      2.5 mg         28      2.5 mg         29      2.5 mg           30      2.5 mg                Date Details   09/06 This INR check               How to take your warfarin dose     To take:  2.5 mg Take 0.5 of a 5 mg tablet.    To take:  5 mg Take 1 of the 5 mg tablets.           October 2018 Details    Sun Mon Tue Wed Thu Fri Sat      1      2.5 mg         2      2.5 mg         3      5 mg         4      2.5 mg         5      2.5 mg         6      2.5 mg           7      2.5 mg         8       2.5 mg         9      2.5 mg         10      5 mg         11      2.5 mg         12      2.5 mg         13      2.5 mg           14      2.5 mg         15            16               17               18               19               20                 21               22               23               24               25               26               27                 28               29               30               31                   Date Details   No additional details    Date of next INR:  10/15/2018         How to take your warfarin dose     To take:  2.5 mg Take 0.5 of a 5 mg tablet.    To take:  5 mg Take 1 of the 5 mg tablets.

## 2018-09-06 NOTE — PROGRESS NOTES
ANTICOAGULATION FOLLOW-UP CLINIC VISIT    Patient Name:  Shelly Rogers  Date:  9/6/2018  Contact Type:  Face to Face    SUBJECTIVE:     Patient Findings     Positives No Problem Findings           OBJECTIVE    INR Protime   Date Value Ref Range Status   08/21/2018 1.7 (A) 0.86 - 1.14 Final       ASSESSMENT / PLAN  INR assessment THER    Recheck INR In: 6 WEEKS    INR Location Clinic      Anticoagulation Summary as of 9/6/2018     INR goal 2.0-3.0   Today's INR    Warfarin maintenance plan 5 mg (5 mg x 1) on Wed; 2.5 mg (5 mg x 0.5) all other days   Full warfarin instructions 5 mg on Wed; 2.5 mg all other days   Weekly warfarin total 20 mg   No change documented Ciera Batista RN   Plan last modified Monet Jaramillo RN (6/19/2017)   Next INR check 10/15/2018   Priority INR   Target end date     Indications   Long-term (current) use of anticoagulants [Z79.01] [Z79.01]  Atrial fibrillation (H) [I48.91]         Anticoagulation Episode Summary     INR check location     Preferred lab     Send INR reminders to  ANTICOAGULATION    Comments       Anticoagulation Care Providers     Provider Role Specialty Phone number    Halley Huff MD Responsible Internal Medicine [phone]            See the Encounter Report to view Anticoagulation Flowsheet and Dosing Calendar (Go to Encounters tab in chart review, and find the Anticoagulation Therapy Visit)    Dosage adjustment made based on physician directed care plan.    Ciera Batista RN

## 2018-09-13 ENCOUNTER — DOCUMENTATION ONLY (OUTPATIENT)
Dept: CARDIOLOGY | Facility: CLINIC | Age: 83
End: 2018-09-13

## 2018-09-13 NOTE — PROGRESS NOTES
Overdue orders for follow up and labs with Dr. Del Valle from 6/2018. Order dates extended. Messaged scheduling to call pt to arrange follow up. SARINA Horan 8:59 AM 09/13/18

## 2018-09-24 ENCOUNTER — OFFICE VISIT (OUTPATIENT)
Dept: FAMILY MEDICINE | Facility: CLINIC | Age: 83
End: 2018-09-24
Payer: COMMERCIAL

## 2018-09-24 VITALS
BODY MASS INDEX: 32.97 KG/M2 | HEIGHT: 62 IN | WEIGHT: 179.2 LBS | DIASTOLIC BLOOD PRESSURE: 69 MMHG | HEART RATE: 105 BPM | OXYGEN SATURATION: 95 % | TEMPERATURE: 97.6 F | SYSTOLIC BLOOD PRESSURE: 111 MMHG

## 2018-09-24 DIAGNOSIS — L85.3 XEROSIS CUTIS: Primary | ICD-10-CM

## 2018-09-24 PROCEDURE — 99213 OFFICE O/P EST LOW 20 MIN: CPT | Performed by: NURSE PRACTITIONER

## 2018-09-24 RX ORDER — TRIAMCINOLONE ACETONIDE 1 MG/G
CREAM TOPICAL
Qty: 15 G | Refills: 0 | Status: SHIPPED | OUTPATIENT
Start: 2018-09-24 | End: 2018-10-26

## 2018-09-24 NOTE — MR AVS SNAPSHOT
After Visit Summary   9/24/2018    Shelly Rogers    MRN: [id number]           Patient Information     Date Of Birth          1/18/1933        Visit Information        Provider Department      9/24/2018 3:30 PM Maryan Reynoso APRN CNP [facility]        Today's Diagnoses     Atopic dermatitis, unspecified type    -  1      Care Instructions    Use the anti itch cream on the itchy spots, just a thin application, 3 times a day for 2 weeks then stop for 2 weeks and may use again after that for a week or so at a time  Begin using amlactin lotion on your arms and legs twice a day          Follow-ups after your visit        Your next 10 appointments already scheduled     Oct 15, 2018  9:00 AM CDT   Anticoagulation Visit with  [facility]AGULATION CLINIC   [facility]    [address]  [place] [address] [phone]              Who to contact     If you have questions or need follow up information about today's clinic visit or your schedule please contact [facility] directly at [phone].  Normal or non-critical lab and imaging results will be communicated to you by MyChart, letter or phone within 4 business days after the clinic has received the results. If you do not hear from us within 7 days, please contact the clinic through MyChart or phone. If you have a critical or abnormal lab result, we will notify you by phone as soon as possible.  Submit refill requests through [employer] or call your pharmacy and they will forward the refill request to us. Please allow 3 business days for your refill to be completed.          Additional Information About Your Visit        Care EveryWhere ID     This is your Care EveryWhere ID. This could be used by other organizations to access your [facility] medical records  [id number]        Your Vitals Were     Pulse Temperature Height Pulse Oximetry BMI (Body Mass Index)       105 97.6  F (36.4  C) (Oral) 5'  "2\" (1.575 m) 95% 32.78 kg/m2        Blood Pressure from Last 3 Encounters:   09/24/18 111/69   06/12/18 134/81   02/27/18 130/70    Weight from Last 3 Encounters:   09/24/18 179 lb 3.2 oz (81.3 kg)   06/12/18 180 lb (81.6 kg)   02/27/18 176 lb (79.8 kg)              Today, you had the following     No orders found for display         Today's Medication Changes          These changes are accurate as of 9/24/18  4:01 PM.  If you have any questions, ask your nurse or doctor.               Start taking these medicines.        Dose/Directions    triamcinolone 0.1 % cream   Commonly known as:  KENALOG   Used for:  Atopic dermatitis, unspecified type   Started by:  Maryan Reynoso APRN CNP        Apply sparingly to affected area three times daily for 14 days.   Quantity:  15 g   Refills:  0            Where to get your medicines      These medications were sent to [facility] [id number] [place] [address] [address] [facility]  [address]  [facility] [place] [address]     Phone:  [phone]     triamcinolone 0.1 % cream                Primary Care Provider Office Phone # Fax #    Halley Huff [phone] [address]  [facility] [address]        Equal Access to Services     [facility]: Hadii ruth bahtia hadviolettao Sharri, waaxda luqadaha, qaybta kaalmada adedion, matt zuniga. So [facility] [phone].    ATENCIÓN: Si habla español, tiene a quinones disposición servicios gratuitos de asistencia lingüística. Llame al [phone].    We comply with applicable federal civil rights laws and [place] laws. We do not discriminate on the basis of race, color, national origin, age, disability, sex, sexual orientation, or gender identity.            Thank you!     Thank you for choosing [facility]  for your care. Our goal is always to provide you with excellent care. Hearing back from our patients is one way we can continue " to improve our services. Please take a few minutes to complete the written survey that you may receive in the mail after your visit with us. Thank you!             Your Updated Medication List - Protect others around you: Learn how to safely use, store and throw away your medicines at www.disposemymeds.org.          This list is accurate as of 9/24/18  4:01 PM.  Always use your most recent med list.                   Brand Name Dispense Instructions for use Diagnosis    ACE/ARB/ARNI NOT PRESCRIBED (INTENTIONAL)      ACE & ARB not prescribed due to Other: HFpEF    Chronic diastolic congestive heart failure (H)       ACETAMINOPHEN PO      Take 500 mg by mouth 3 times daily as needed (Takes at least 6 hours apart).        ASPIRIN NOT PRESCRIBED    INTENTIONAL    0 each    Antiplatelet medication not prescribed intentionally due to Current anticoagulant therapy (warfarin/enoxaparin)    Type 2 diabetes mellitus without complications (H)       blood glucose lancets standard    no brand specified    100 each    Use to test blood sugar 1 times daily or as directed.    Type 2 diabetes mellitus without complication, without long-term current use of insulin (H)       blood glucose monitoring lancets     1 Box    Use to check blood sugars daily    Type 2 diabetes, HbA1c goal < 7% (H)       * blood glucose monitoring meter device kit     1 kit    1 Device daily.    Type 2 diabetes, HbA1c goal < 7% (H)       * blood glucose monitoring meter device kit    no brand specified    1 kit    Use to test blood sugar one times daily or as directed.    Type 2 diabetes mellitus without complication, without long-term current use of insulin (H)       * blood glucose monitoring test strip    ACCU-CHEK SMARTVIEW    1 Box    1 strip by In Vitro route daily    Type 2 diabetes, HbA1c goal < 7% (H)       * blood glucose monitoring test strip    no brand specified    100 strip    Use to test blood sugars one times daily or as directed .has type 2  diabetes and not on insulin    Type 2 diabetes mellitus without complication, without long-term current use of insulin (H)       CENTRUM SILVER PO      Take by mouth daily        furosemide 20 MG tablet    LASIX    90 tablet    Take 1 tablet (20 mg) by mouth daily    Chronic diastolic congestive heart failure (H)       glimepiride 2 MG tablet    AMARYL    180 tablet    Take 1 tablet (2 mg) by mouth 2 times daily    Type 2 diabetes mellitus without complication, without long-term current use of insulin (H)       levothyroxine 88 MCG tablet    SYNTHROID/LEVOTHROID    90 tablet    Take 1 tablet (88 mcg) by mouth daily    Hypothyroidism, unspecified type       metFORMIN 500 MG tablet    GLUCOPHAGE    180 tablet    Take 1 tablet (500 mg) by mouth 2 times daily (with meals)    Type 2 diabetes mellitus without complication, without long-term current use of insulin (H)       metoprolol succinate 50 MG 24 hr tablet    TOPROL-XL    180 tablet    Take 1 tablet (50 mg) by mouth 2 times daily    Chronic diastolic congestive heart failure (H)       NASACORT ALLERGY 24HR 55 MCG/ACT inhaler   Generic drug:  triamcinolone      Spray 2 sprays into both nostrils daily        order for DME     2 Package    Equipment being ordered: Compression Stockings Knee high 20/30 compression    Bilateral edema of lower extremity       order for DME     1 Units    Equipment being ordered: cane with quad or prongs    Unsteadiness on feet       STATIN NOT PRESCRIBED (INTENTIONAL)     0 each    continuous prn. Statin not prescribed intentionally due to Other: Not needed, LDL at goal <100mg/dL without therapy        triamcinolone 0.1 % cream    KENALOG    15 g    Apply sparingly to affected area three times daily for 14 days.    Atopic dermatitis, unspecified type       warfarin 5 MG tablet    COUMADIN    90 tablet    TAKE 1/2 TO 1 TABLET BY MOUTH DAILY OR AS DIRECTED BY INR CLINIC    Paroxysmal atrial fibrillation (H)       * Notice:  This list has 4  medication(s) that are the same as other medications prescribed for you. Read the directions carefully, and ask your doctor or other care provider to review them with you.

## 2018-09-24 NOTE — PROGRESS NOTES
SUBJECTIVE:   Shelly Rogers is a 85 year old female who presents to clinic today for the following health issues:      Rash      Duration: for at least a year Shelly has been complaining to her daughter of itchy skin and occasionally some spots develop where she scratches.       Description  Location: left arm, and left leg mostly   Itching: moderate    Intensity:  mild    Accompanying signs and symptoms: dry patches, red sores with scabs     History (similar episodes/previous evaluation): None    Precipitating or alleviating factors:  New exposures:  None  Recent travel: no      Therapies tried and outcome: CBD oil - helped with itching, aquaphor - helped with dryness       Problem list and histories reviewed & adjusted, as indicated.  Additional history: as documented    Patient Active Problem List   Diagnosis     CHF (congestive heart failure) (H)     CARDIOVASCULAR SCREENING; LDL GOAL LESS THAN 100     Health Care Home     Uterine cancer (H)     Pacemaker     OA (osteoarthritis)     Type 2 diabetes mellitus without complications (H)     DNS (deviated nasal septum)     Atrial fibrillation (H)     Paroxysmal atrial fibrillation (H)     Chronic diastolic congestive heart failure (H)     Hypothyroidism     Long-term (current) use of anticoagulants [Z79.01]     Past Surgical History:   Procedure Laterality Date     C ANESTH,PACEMAKER INSERTION      dual chamber 3/27/13     CHOLECYSTECTOMY  8/2004     GYN SURGERY       HYSTERECTOMY      Ut ca      JOINT REPLACEMENT       KERATOTOMY ARCUATE WITH FEMTOSECOND LASER/IMAGING FOR ATIOL Right 4/11/2017    Procedure: KERATOTOMY ARCUATE WITH FEMTOSECOND LASER/IMAGING FOR ATIOL;  Surgeon: Calvin Dominguez MD;  Location: [facility]     PHACOEMULSIFICATION CLEAR CORNEA WITH STANDARD INTRAOCULAR LENS IMPLANT Right 4/11/2017    Procedure: PHACOEMULSIFICATION CLEAR CORNEA WITH STANDARD INTRAOCULAR LENS IMPLANT;  Surgeon: Calvin Dominguez MD;  Location: [facility]     TKR       alesha       Social History   Substance Use Topics     Smoking status: Former Smoker     Quit date: 1/1/1990     Smokeless tobacco: Never Used     Alcohol use No     Family History   Problem Relation Age of Onset     GASTROINTESTINAL DISEASE Mother      bowel obstruction     Cardiovascular Father      C.A.D. Father      Cardiovascular Brother      CHF         Current Outpatient Prescriptions   Medication Sig Dispense Refill     triamcinolone (KENALOG) 0.1 % cream Apply sparingly to affected area three times daily for 14 days. 15 g 0     ACE/ARB NOT PRESCRIBED, INTENTIONAL, ACE & ARB not prescribed due to Other: HFpEF       ACETAMINOPHEN PO Take 500 mg by mouth 3 times daily as needed (Takes at least 6 hours apart).        ASPIRIN NOT PRESCRIBED, INTENTIONAL, Antiplatelet medication not prescribed intentionally due to Current anticoagulant therapy (warfarin/enoxaparin) 0 each 0     blood glucose (ACCU-CHEK SMARTVIEW) test strip 1 strip by In Vitro route daily 1 Box prn     blood glucose (NO BRAND SPECIFIED) lancets standard Use to test blood sugar 1 times daily or as directed. 100 each 11     blood glucose monitoring (ACCU-CHEK FASTCLIX) lancets Use to check blood sugars daily 1 Box prn     blood glucose monitoring (NO BRAND SPECIFIED) meter device kit Use to test blood sugar one times daily or as directed. 1 kit 0     blood glucose monitoring (NO BRAND SPECIFIED) test strip Use to test blood sugars one times daily or as directed .has type 2 diabetes and not on insulin 100 strip 1     Blood Glucose Monitoring Suppl (ACCU-CHEK [place]AK[place] SMARTVIEW) W/DEVICE KIT 1 Device daily. 1 kit 0     furosemide (LASIX) 20 MG tablet Take 1 tablet (20 mg) by mouth daily 90 tablet 3     glimepiride (AMARYL) 2 MG tablet Take 1 tablet (2 mg) by mouth 2 times daily 180 tablet 3     levothyroxine (SYNTHROID/LEVOTHROID) 88 MCG tablet Take 1 tablet (88 mcg) by mouth daily 90 tablet 1     metFORMIN (GLUCOPHAGE) 500 MG tablet Take 1 tablet (500  "mg) by mouth 2 times daily (with meals) 180 tablet 3     metoprolol succinate (TOPROL-XL) 50 MG 24 hr tablet Take 1 tablet (50 mg) by mouth 2 times daily 180 tablet 3     Multiple Vitamins-Minerals (CENTRUM SILVER PO) Take by mouth daily       order for DME Equipment being ordered: Compression Stockings  Knee high  20/30 compression 2 Package 1     order for DME Equipment being ordered: cane with quad or prongs 1 Units 1     STATIN NOT PRESCRIBED, INTENTIONAL, continuous prn. Statin not prescribed intentionally due to Other: Not needed, LDL at goal <100mg/dL without therapy 0 each 0     triamcinolone (NASACORT ALLERGY 24HR) 55 MCG/ACT nasal inhaler Spray 2 sprays into both nostrils daily       warfarin (COUMADIN) 5 MG tablet TAKE 1/2 TO 1 TABLET BY MOUTH DAILY OR AS DIRECTED BY INR CLINIC 90 tablet 3     Allergies   Allergen Reactions     Shellfish Allergy Difficulty breathing     Cats      Sneezing, watery eyes     Lisinopril Cough     No Clinical Screening - See Comments Other (See Comments)     Pt stated allergic to flowers, pt gets itchy watery eyes and stuffy nose     Seasonal Allergies Itching     Flowers     Sulfa Drugs Hives       Reviewed and updated as needed this visit by clinical staff  Tobacco       Reviewed and updated as needed this visit by Provider         ROS:  Constitutional, HEENT, cardiovascular, pulmonary, gi and gu systems are negative, except as otherwise noted.    OBJECTIVE:     /69  Pulse 105  Temp 97.6  F (36.4  C) (Oral)  Ht 5' 2\" (1.575 m)  Wt 179 lb 3.2 oz (81.3 kg)  SpO2 95%  BMI 32.78 kg/m2  Body mass index is 32.78 kg/(m^2).  GENERAL: healthy, alert and no distress  EYES: Eyes grossly normal to inspection, PERRL and conjunctivae and sclerae normal  MS: no gross musculoskeletal defects noted, no edema  SKIN: has several scratched spots (<12 in all) of left shoulder and upper arm and left thigh and calfwith no vesicles or pustules,, and no erythematous base , skin is " generally dry   NEURO: Normal strength and tone, mentation intact and speech normal  PSYCH: mentation appears normal, affect normal/bright    Diagnostic Test Results:  none     ASSESSMENT/PLAN:           ICD-10-CM    1. Xerosis cutis L85.3        Patient Instructions   Use the anti itch cream on the itchy spots, just a thin application, 3 times a day for 2 weeks then stop for 2 weeks and may use again after that for a week or so at a time  Begin using amlactin lotion on your arms and legs twice a day      MERLYN Moody [facility]

## 2018-09-24 NOTE — PATIENT INSTRUCTIONS
Use the anti itch cream on the itchy spots, just a thin application, 3 times a day for 2 weeks then stop for 2 weeks and may use again after that for a week or so at a time  Begin using amlactin lotion on your arms and legs twice a day

## 2018-09-24 NOTE — NURSING NOTE
"Chief Complaint   Patient presents with     Derm Problem     /69  Pulse 105  Temp 97.6  F (36.4  C) (Oral)  Ht 5' 2\" (1.575 m)  Wt 179 lb 3.2 oz (81.3 kg)  SpO2 95%  BMI 32.78 kg/m2 Estimated body mass index is 32.78 kg/(m^2) as calculated from the following:    Height as of this encounter: 5' 2\" (1.575 m).    Weight as of this encounter: 179 lb 3.2 oz (81.3 kg).  Medication Reconciliation: complete      Health Maintenance that is potentially due pending provider review:  NONE    n/a    KHAI Light  "

## 2018-09-25 ASSESSMENT — PATIENT HEALTH QUESTIONNAIRE - PHQ9: SUM OF ALL RESPONSES TO PHQ QUESTIONS 1-9: 10

## 2018-10-15 ENCOUNTER — ANTICOAGULATION THERAPY VISIT (OUTPATIENT)
Dept: NURSING | Facility: CLINIC | Age: 83
End: 2018-10-15
Payer: COMMERCIAL

## 2018-10-15 ENCOUNTER — TELEPHONE (OUTPATIENT)
Dept: FAMILY MEDICINE | Facility: CLINIC | Age: 83
End: 2018-10-15

## 2018-10-15 DIAGNOSIS — I48.0 PAROXYSMAL ATRIAL FIBRILLATION (H): Primary | ICD-10-CM

## 2018-10-15 DIAGNOSIS — I48.91 ATRIAL FIBRILLATION (H): ICD-10-CM

## 2018-10-15 LAB — INR POINT OF CARE: 1.9 (ref 0.86–1.14)

## 2018-10-15 PROCEDURE — 85610 PROTHROMBIN TIME: CPT | Mod: QW

## 2018-10-15 PROCEDURE — 36416 COLLJ CAPILLARY BLOOD SPEC: CPT

## 2018-10-15 PROCEDURE — 99207 ZZC NO CHARGE NURSE ONLY: CPT

## 2018-10-15 NOTE — TELEPHONE ENCOUNTER
Please sign annual INR Referral--pended.   Please CLOSE encounter after completed.     Thank you,  Ciera FLEMING RN,BSN

## 2018-10-15 NOTE — MR AVS SNAPSHOT
Shelly Rogers   10/15/2018 9:00 AM   Anticoagulation Therapy Visit    Description:  85 year old female   Provider:  [facility]   Department:  [facility]           INR as of 10/15/2018     Today's INR 1.9!      Anticoagulation Summary as of 10/15/2018     INR goal 2.0-3.0   Today's INR 1.9!   Full warfarin instructions 5 mg on Wed; 2.5 mg all other days   Next INR check 11/1/2018    Indications   Long-term (current) use of anticoagulants [Z79.01] [Z79.01]  Atrial fibrillation (H) [I48.91]         Your next Anticoagulation Clinic appointment(s)     Nov 01, 2018  3:00 PM CDT   Anticoagulation Visit with  ANTICOAGULATION CLINIC   [facility] ([facility])    [address]  [place] [address]   [phone]              Contact Numbers     Clinic Number:         October 2018 Details    Sun Mon Tue Wed Thu Fri Sat      1               2               3               4               5               6                 7               8               9               10               11               12               13                 14               15      2.5 mg   See details      16      2.5 mg         17      5 mg         18      2.5 mg         19      2.5 mg         20      2.5 mg           21      2.5 mg         22      2.5 mg         23      2.5 mg         24      5 mg         25      2.5 mg         26      2.5 mg         27      2.5 mg           28      2.5 mg         29      2.5 mg         30      2.5 mg         31      5 mg             Date Details   10/15 This INR check               How to take your warfarin dose     To take:  2.5 mg Take 0.5 of a 5 mg tablet.    To take:  5 mg Take 1 of the 5 mg tablets.           November 2018 Details    Sun Mon Tue Wed Thu Fri Sat         1            2               3                 4               5               6               7               8               9               10                 11               12                13               14               15               16               17                 18               19               20               21               22               23               24                 25               26               27               28               29               30                 Date Details   No additional details    Date of next INR:  11/1/2018         How to take your warfarin dose     To take:  2.5 mg Take 0.5 of a 5 mg tablet.

## 2018-10-15 NOTE — PROGRESS NOTES
ANTICOAGULATION FOLLOW-UP CLINIC VISIT    Patient Name:  Shelly Rogers  Date:  10/15/2018  Contact Type:  Face to Face    SUBJECTIVE:     Patient Findings     Positives No Problem Findings           OBJECTIVE    INR Protime   Date Value Ref Range Status   10/15/2018 1.9 (A) 0.86 - 1.14 Final       ASSESSMENT / PLAN  INR assessment THER    Recheck INR In: 3 WEEKS    INR Location Clinic      Anticoagulation Summary as of 10/15/2018     INR goal 2.0-3.0   Today's INR 1.9!   Warfarin maintenance plan 5 mg (5 mg x 1) on Wed; 2.5 mg (5 mg x 0.5) all other days   Full warfarin instructions 5 mg on Wed; 2.5 mg all other days   Weekly warfarin total 20 mg   No change documented Odilia Youssef RN   Plan last modified Monet Jaramillo RN (6/19/2017)   Next INR check 11/1/2018   Priority INR   Target end date     Indications   Long-term (current) use of anticoagulants [Z79.01] [Z79.01]  Atrial fibrillation (H) [I48.91]         Anticoagulation Episode Summary     INR check location     Preferred lab     Send INR reminders to [facility]    Comments       Anticoagulation Care Providers     Provider Role Specialty Phone number    Halley Huff MD Responsible Internal Medicine [phone]            See the Encounter Report to view Anticoagulation Flowsheet and Dosing Calendar (Go to Encounters tab in chart review, and find the Anticoagulation Therapy Visit)    No changes in meds/diet. No missed/extra warfarin doses. No unusual bruising/bleeding or other changes. Pt will continue same warfarin dose and recheck INR in 3 weeks, or sooner if concerns.     Pt aware if signs of clotting (pain, tenderness, swelling, color change in leg or arm, SOB) and bleeding occur (blood in stool, urine, large bruising, bleeding gums, nosebleeds) to have INR check sooner. If sx severe report to ER or concerned for stroke call 911. If general questions or concerns arise, call clinic.    Odilia Youssef RN

## 2018-10-19 ENCOUNTER — APPOINTMENT (OUTPATIENT)
Dept: ULTRASOUND IMAGING | Facility: CLINIC | Age: 83
DRG: 308 | End: 2018-10-19
Attending: INTERNAL MEDICINE
Payer: MEDICARE

## 2018-10-19 ENCOUNTER — APPOINTMENT (OUTPATIENT)
Dept: CARDIOLOGY | Facility: CLINIC | Age: 83
DRG: 308 | End: 2018-10-19
Attending: INTERNAL MEDICINE
Payer: MEDICARE

## 2018-10-19 ENCOUNTER — DOCUMENTATION ONLY (OUTPATIENT)
Dept: CARDIOLOGY | Facility: CLINIC | Age: 83
End: 2018-10-19

## 2018-10-19 ENCOUNTER — HOSPITAL ENCOUNTER (INPATIENT)
Facility: CLINIC | Age: 83
LOS: 3 days | Discharge: HOME | DRG: 308 | End: 2018-10-22
Attending: EMERGENCY MEDICINE | Admitting: INTERNAL MEDICINE
Payer: MEDICARE

## 2018-10-19 ENCOUNTER — APPOINTMENT (OUTPATIENT)
Dept: GENERAL RADIOLOGY | Facility: CLINIC | Age: 83
DRG: 308 | End: 2018-10-19
Attending: EMERGENCY MEDICINE
Payer: MEDICARE

## 2018-10-19 DIAGNOSIS — I50.32 CHRONIC DIASTOLIC CONGESTIVE HEART FAILURE (H): ICD-10-CM

## 2018-10-19 DIAGNOSIS — E11.9 TYPE 2 DIABETES MELLITUS WITHOUT COMPLICATION, WITHOUT LONG-TERM CURRENT USE OF INSULIN (H): ICD-10-CM

## 2018-10-19 DIAGNOSIS — R00.0 TACHYCARDIA: ICD-10-CM

## 2018-10-19 DIAGNOSIS — F43.22 ADJUSTMENT DISORDER WITH ANXIOUS MOOD: Primary | ICD-10-CM

## 2018-10-19 DIAGNOSIS — I49.5 SSS (SICK SINUS SYNDROME) (H): Primary | ICD-10-CM

## 2018-10-19 DIAGNOSIS — J18.9 PNEUMONIA OF RIGHT LOWER LOBE DUE TO INFECTIOUS ORGANISM: ICD-10-CM

## 2018-10-19 DIAGNOSIS — I48.20 CHRONIC ATRIAL FIBRILLATION (H): ICD-10-CM

## 2018-10-19 PROBLEM — I50.9 HEART FAILURE (H): Status: ACTIVE | Noted: 2018-10-19

## 2018-10-19 LAB
ALBUMIN SERPL-MCNC: 3.6 G/DL (ref 3.4–5)
ALP SERPL-CCNC: 130 U/L (ref 40–150)
ALT SERPL W P-5'-P-CCNC: 19 U/L (ref 0–50)
ANION GAP SERPL CALCULATED.3IONS-SCNC: 9 MMOL/L (ref 3–14)
AST SERPL W P-5'-P-CCNC: 16 U/L (ref 0–45)
BASOPHILS # BLD AUTO: 0 10E9/L (ref 0–0.2)
BASOPHILS NFR BLD AUTO: 0.3 %
BILIRUB SERPL-MCNC: 1.2 MG/DL (ref 0.2–1.3)
BUN SERPL-MCNC: 17 MG/DL (ref 7–30)
BURR CELLS BLD QL SMEAR: SLIGHT
CALCIUM SERPL-MCNC: 8.9 MG/DL (ref 8.5–10.1)
CHLORIDE SERPL-SCNC: 99 MMOL/L (ref 94–109)
CO2 SERPL-SCNC: 25 MMOL/L (ref 20–32)
CREAT SERPL-MCNC: 0.87 MG/DL (ref 0.52–1.04)
DIFFERENTIAL METHOD BLD: ABNORMAL
EOSINOPHIL # BLD AUTO: 0.1 10E9/L (ref 0–0.7)
EOSINOPHIL NFR BLD AUTO: 0.5 %
ERYTHROCYTE [DISTWIDTH] IN BLOOD BY AUTOMATED COUNT: 15.9 % (ref 10–15)
GFR SERPL CREATININE-BSD FRML MDRD: 61 ML/MIN/1.7M2
GLUCOSE BLDC GLUCOMTR-MCNC: 110 MG/DL (ref 70–99)
GLUCOSE BLDC GLUCOMTR-MCNC: 122 MG/DL (ref 70–99)
GLUCOSE BLDC GLUCOMTR-MCNC: 131 MG/DL (ref 70–99)
GLUCOSE SERPL-MCNC: 177 MG/DL (ref 70–99)
HBA1C MFR BLD: 8.2 % (ref 0–5.6)
HCT VFR BLD AUTO: 44.3 % (ref 35–47)
HGB BLD-MCNC: 14.4 G/DL (ref 11.7–15.7)
IMM GRANULOCYTES # BLD: 0 10E9/L (ref 0–0.4)
IMM GRANULOCYTES NFR BLD: 0.3 %
INR PPP: 2.28 (ref 0.86–1.14)
INTERPRETATION ECG - MUSE: NORMAL
LYMPHOCYTES # BLD AUTO: 1.7 10E9/L (ref 0.8–5.3)
LYMPHOCYTES NFR BLD AUTO: 18.1 %
MAGNESIUM SERPL-MCNC: 2.1 MG/DL (ref 1.6–2.3)
MCH RBC QN AUTO: 29.7 PG (ref 26.5–33)
MCHC RBC AUTO-ENTMCNC: 32.5 G/DL (ref 31.5–36.5)
MCV RBC AUTO: 91 FL (ref 78–100)
MONOCYTES # BLD AUTO: 0.9 10E9/L (ref 0–1.3)
MONOCYTES NFR BLD AUTO: 10 %
NEUTROPHILS # BLD AUTO: 6.7 10E9/L (ref 1.6–8.3)
NEUTROPHILS NFR BLD AUTO: 70.8 %
NRBC # BLD AUTO: 0 10*3/UL
NRBC BLD AUTO-RTO: 0 /100
NT-PROBNP SERPL-MCNC: 2356 PG/ML (ref 0–1800)
PLATELET # BLD AUTO: 181 10E9/L (ref 150–450)
PLATELET # BLD EST: ABNORMAL 10*3/UL
POTASSIUM SERPL-SCNC: 5 MMOL/L (ref 3.4–5.3)
PROCALCITONIN SERPL-MCNC: 0.05 NG/ML
PROT SERPL-MCNC: 7 G/DL (ref 6.8–8.8)
RBC # BLD AUTO: 4.85 10E12/L (ref 3.8–5.2)
SODIUM SERPL-SCNC: 133 MMOL/L (ref 133–144)
T4 FREE SERPL-MCNC: 1.96 NG/DL (ref 0.76–1.46)
TROPONIN I SERPL-MCNC: <0.015 UG/L (ref 0–0.04)
TSH SERPL DL<=0.005 MIU/L-ACNC: 6.76 MU/L (ref 0.4–4)
WBC # BLD AUTO: 9.4 10E9/L (ref 4–11)

## 2018-10-19 PROCEDURE — 85610 PROTHROMBIN TIME: CPT | Performed by: EMERGENCY MEDICINE

## 2018-10-19 PROCEDURE — 84145 PROCALCITONIN (PCT): CPT | Performed by: EMERGENCY MEDICINE

## 2018-10-19 PROCEDURE — 85025 COMPLETE CBC W/AUTO DIFF WBC: CPT | Performed by: EMERGENCY MEDICINE

## 2018-10-19 PROCEDURE — 84443 ASSAY THYROID STIM HORMONE: CPT | Performed by: EMERGENCY MEDICINE

## 2018-10-19 PROCEDURE — 93306 TTE W/DOPPLER COMPLETE: CPT | Mod: 26 | Performed by: INTERNAL MEDICINE

## 2018-10-19 PROCEDURE — 99223 1ST HOSP IP/OBS HIGH 75: CPT | Mod: AI | Performed by: INTERNAL MEDICINE

## 2018-10-19 PROCEDURE — 84145 PROCALCITONIN (PCT): CPT | Performed by: INTERNAL MEDICINE

## 2018-10-19 PROCEDURE — 71046 X-RAY EXAM CHEST 2 VIEWS: CPT

## 2018-10-19 PROCEDURE — 96361 HYDRATE IV INFUSION ADD-ON: CPT

## 2018-10-19 PROCEDURE — 25500064 ZZH RX 255 OP 636: Performed by: INTERNAL MEDICINE

## 2018-10-19 PROCEDURE — 25000132 ZZH RX MED GY IP 250 OP 250 PS 637: Mod: GY | Performed by: INTERNAL MEDICINE

## 2018-10-19 PROCEDURE — 96365 THER/PROPH/DIAG IV INF INIT: CPT

## 2018-10-19 PROCEDURE — 84439 ASSAY OF FREE THYROXINE: CPT | Performed by: EMERGENCY MEDICINE

## 2018-10-19 PROCEDURE — 84484 ASSAY OF TROPONIN QUANT: CPT | Performed by: EMERGENCY MEDICINE

## 2018-10-19 PROCEDURE — 00000146 ZZHCL STATISTIC GLUCOSE BY METER IP

## 2018-10-19 PROCEDURE — 83880 ASSAY OF NATRIURETIC PEPTIDE: CPT | Performed by: EMERGENCY MEDICINE

## 2018-10-19 PROCEDURE — 99285 EMERGENCY DEPT VISIT HI MDM: CPT | Mod: 25

## 2018-10-19 PROCEDURE — A9270 NON-COVERED ITEM OR SERVICE: HCPCS | Mod: GY | Performed by: INTERNAL MEDICINE

## 2018-10-19 PROCEDURE — 96375 TX/PRO/DX INJ NEW DRUG ADDON: CPT

## 2018-10-19 PROCEDURE — 83036 HEMOGLOBIN GLYCOSYLATED A1C: CPT | Performed by: EMERGENCY MEDICINE

## 2018-10-19 PROCEDURE — 87040 BLOOD CULTURE FOR BACTERIA: CPT | Performed by: EMERGENCY MEDICINE

## 2018-10-19 PROCEDURE — 99222 1ST HOSP IP/OBS MODERATE 55: CPT | Mod: 25 | Performed by: INTERNAL MEDICINE

## 2018-10-19 PROCEDURE — 21000000 ZZH R&B IMCU HEART CARE

## 2018-10-19 PROCEDURE — 25000128 H RX IP 250 OP 636: Performed by: INTERNAL MEDICINE

## 2018-10-19 PROCEDURE — 83735 ASSAY OF MAGNESIUM: CPT | Performed by: INTERNAL MEDICINE

## 2018-10-19 PROCEDURE — 25000128 H RX IP 250 OP 636: Performed by: EMERGENCY MEDICINE

## 2018-10-19 PROCEDURE — 80053 COMPREHEN METABOLIC PANEL: CPT | Performed by: EMERGENCY MEDICINE

## 2018-10-19 PROCEDURE — 93306 TTE W/DOPPLER COMPLETE: CPT

## 2018-10-19 PROCEDURE — 83735 ASSAY OF MAGNESIUM: CPT | Performed by: EMERGENCY MEDICINE

## 2018-10-19 PROCEDURE — 76604 US EXAM CHEST: CPT

## 2018-10-19 RX ORDER — ONDANSETRON 4 MG/1
4 TABLET, ORALLY DISINTEGRATING ORAL EVERY 6 HOURS PRN
Status: DISCONTINUED | OUTPATIENT
Start: 2018-10-19 | End: 2018-10-22 | Stop reason: HOSPADM

## 2018-10-19 RX ORDER — AMOXICILLIN 250 MG
2 CAPSULE ORAL 2 TIMES DAILY PRN
Status: DISCONTINUED | OUTPATIENT
Start: 2018-10-19 | End: 2018-10-22 | Stop reason: HOSPADM

## 2018-10-19 RX ORDER — POTASSIUM CHLORIDE 7.45 MG/ML
10 INJECTION INTRAVENOUS
Status: DISCONTINUED | OUTPATIENT
Start: 2018-10-19 | End: 2018-10-22 | Stop reason: HOSPADM

## 2018-10-19 RX ORDER — NALOXONE HYDROCHLORIDE 0.4 MG/ML
.1-.4 INJECTION, SOLUTION INTRAMUSCULAR; INTRAVENOUS; SUBCUTANEOUS
Status: DISCONTINUED | OUTPATIENT
Start: 2018-10-19 | End: 2018-10-22 | Stop reason: HOSPADM

## 2018-10-19 RX ORDER — FUROSEMIDE 10 MG/ML
40 INJECTION INTRAMUSCULAR; INTRAVENOUS ONCE
Status: COMPLETED | OUTPATIENT
Start: 2018-10-19 | End: 2018-10-19

## 2018-10-19 RX ORDER — ONDANSETRON 2 MG/ML
4 INJECTION INTRAMUSCULAR; INTRAVENOUS EVERY 6 HOURS PRN
Status: DISCONTINUED | OUTPATIENT
Start: 2018-10-19 | End: 2018-10-22 | Stop reason: HOSPADM

## 2018-10-19 RX ORDER — MAGNESIUM SULFATE HEPTAHYDRATE 40 MG/ML
4 INJECTION, SOLUTION INTRAVENOUS EVERY 4 HOURS PRN
Status: DISCONTINUED | OUTPATIENT
Start: 2018-10-19 | End: 2018-10-22 | Stop reason: HOSPADM

## 2018-10-19 RX ORDER — CEFTRIAXONE 2 G/1
2 INJECTION, POWDER, FOR SOLUTION INTRAMUSCULAR; INTRAVENOUS ONCE
Status: COMPLETED | OUTPATIENT
Start: 2018-10-19 | End: 2018-10-19

## 2018-10-19 RX ORDER — POTASSIUM CHLORIDE 1500 MG/1
20-40 TABLET, EXTENDED RELEASE ORAL
Status: DISCONTINUED | OUTPATIENT
Start: 2018-10-19 | End: 2018-10-22 | Stop reason: HOSPADM

## 2018-10-19 RX ORDER — DILTIAZEM HYDROCHLORIDE 60 MG/1
60 CAPSULE, EXTENDED RELEASE ORAL 2 TIMES DAILY
Status: DISCONTINUED | OUTPATIENT
Start: 2018-10-19 | End: 2018-10-20

## 2018-10-19 RX ORDER — GLIMEPIRIDE 2 MG/1
2 TABLET ORAL 2 TIMES DAILY
Status: DISCONTINUED | OUTPATIENT
Start: 2018-10-19 | End: 2018-10-22 | Stop reason: HOSPADM

## 2018-10-19 RX ORDER — LEVOTHYROXINE SODIUM 88 UG/1
88 TABLET ORAL DAILY
Status: DISCONTINUED | OUTPATIENT
Start: 2018-10-20 | End: 2018-10-20

## 2018-10-19 RX ORDER — MAGNESIUM SULFATE HEPTAHYDRATE 40 MG/ML
2 INJECTION, SOLUTION INTRAVENOUS DAILY PRN
Status: DISCONTINUED | OUTPATIENT
Start: 2018-10-19 | End: 2018-10-22 | Stop reason: HOSPADM

## 2018-10-19 RX ORDER — POTASSIUM CL/LIDO/0.9 % NACL 10MEQ/0.1L
10 INTRAVENOUS SOLUTION, PIGGYBACK (ML) INTRAVENOUS
Status: DISCONTINUED | OUTPATIENT
Start: 2018-10-19 | End: 2018-10-22 | Stop reason: HOSPADM

## 2018-10-19 RX ORDER — FUROSEMIDE 10 MG/ML
20 INJECTION INTRAMUSCULAR; INTRAVENOUS
Status: DISCONTINUED | OUTPATIENT
Start: 2018-10-19 | End: 2018-10-21

## 2018-10-19 RX ORDER — ACETAMINOPHEN 325 MG/1
650 TABLET ORAL EVERY 4 HOURS PRN
Status: DISCONTINUED | OUTPATIENT
Start: 2018-10-19 | End: 2018-10-22 | Stop reason: HOSPADM

## 2018-10-19 RX ORDER — NICOTINE POLACRILEX 4 MG
15-30 LOZENGE BUCCAL
Status: DISCONTINUED | OUTPATIENT
Start: 2018-10-19 | End: 2018-10-22 | Stop reason: HOSPADM

## 2018-10-19 RX ORDER — POTASSIUM CHLORIDE 1.5 G/1.58G
20-40 POWDER, FOR SOLUTION ORAL
Status: DISCONTINUED | OUTPATIENT
Start: 2018-10-19 | End: 2018-10-22 | Stop reason: HOSPADM

## 2018-10-19 RX ORDER — POLYETHYLENE GLYCOL 3350 17 G/17G
17 POWDER, FOR SOLUTION ORAL DAILY PRN
Status: DISCONTINUED | OUTPATIENT
Start: 2018-10-19 | End: 2018-10-22 | Stop reason: HOSPADM

## 2018-10-19 RX ORDER — METOPROLOL TARTRATE 1 MG/ML
5-15 INJECTION, SOLUTION INTRAVENOUS EVERY 6 HOURS PRN
Status: DISCONTINUED | OUTPATIENT
Start: 2018-10-19 | End: 2018-10-22 | Stop reason: HOSPADM

## 2018-10-19 RX ORDER — BISACODYL 10 MG
10 SUPPOSITORY, RECTAL RECTAL DAILY PRN
Status: DISCONTINUED | OUTPATIENT
Start: 2018-10-19 | End: 2018-10-22 | Stop reason: HOSPADM

## 2018-10-19 RX ORDER — DEXTROSE MONOHYDRATE 25 G/50ML
25-50 INJECTION, SOLUTION INTRAVENOUS
Status: DISCONTINUED | OUTPATIENT
Start: 2018-10-19 | End: 2018-10-22 | Stop reason: HOSPADM

## 2018-10-19 RX ORDER — POTASSIUM CHLORIDE 29.8 MG/ML
20 INJECTION INTRAVENOUS
Status: DISCONTINUED | OUTPATIENT
Start: 2018-10-19 | End: 2018-10-22 | Stop reason: HOSPADM

## 2018-10-19 RX ORDER — METOPROLOL SUCCINATE 50 MG/1
50 TABLET, EXTENDED RELEASE ORAL 2 TIMES DAILY
Status: DISCONTINUED | OUTPATIENT
Start: 2018-10-19 | End: 2018-10-19

## 2018-10-19 RX ORDER — VIT C/E/ZN/COPPR/LUTEIN/ZEAXAN 60 MG-6 MG
1 CAPSULE ORAL DAILY
COMMUNITY
End: 2022-01-01

## 2018-10-19 RX ORDER — AMOXICILLIN 250 MG
1 CAPSULE ORAL 2 TIMES DAILY PRN
Status: DISCONTINUED | OUTPATIENT
Start: 2018-10-19 | End: 2018-10-22 | Stop reason: HOSPADM

## 2018-10-19 RX ADMIN — SODIUM CHLORIDE 500 ML: 9 INJECTION, SOLUTION INTRAVENOUS at 06:36

## 2018-10-19 RX ADMIN — ACETAMINOPHEN 650 MG: 325 TABLET, FILM COATED ORAL at 11:23

## 2018-10-19 RX ADMIN — GLIMEPIRIDE 2 MG: 2 TABLET ORAL at 21:39

## 2018-10-19 RX ADMIN — DILTIAZEM HYDROCHLORIDE 60 MG: 60 CAPSULE, EXTENDED RELEASE ORAL at 11:18

## 2018-10-19 RX ADMIN — DILTIAZEM HYDROCHLORIDE 60 MG: 60 CAPSULE, EXTENDED RELEASE ORAL at 21:39

## 2018-10-19 RX ADMIN — FUROSEMIDE 20 MG: 10 INJECTION, SOLUTION INTRAVENOUS at 13:00

## 2018-10-19 RX ADMIN — ACETAMINOPHEN 650 MG: 325 TABLET, FILM COATED ORAL at 19:42

## 2018-10-19 RX ADMIN — HUMAN ALBUMIN MICROSPHERES AND PERFLUTREN 3 ML: 10; .22 INJECTION, SOLUTION INTRAVENOUS at 11:08

## 2018-10-19 RX ADMIN — METOPROLOL SUCCINATE 75 MG: 50 TABLET, EXTENDED RELEASE ORAL at 21:39

## 2018-10-19 RX ADMIN — METOPROLOL SUCCINATE 50 MG: 50 TABLET, EXTENDED RELEASE ORAL at 09:51

## 2018-10-19 RX ADMIN — GLIMEPIRIDE 2 MG: 2 TABLET ORAL at 11:19

## 2018-10-19 RX ADMIN — FUROSEMIDE 20 MG: 10 INJECTION, SOLUTION INTRAVENOUS at 18:26

## 2018-10-19 RX ADMIN — FUROSEMIDE 40 MG: 10 INJECTION, SOLUTION INTRAVENOUS at 09:06

## 2018-10-19 RX ADMIN — CEFTRIAXONE SODIUM 2 G: 2 INJECTION, POWDER, FOR SOLUTION INTRAMUSCULAR; INTRAVENOUS at 08:02

## 2018-10-19 ASSESSMENT — ENCOUNTER SYMPTOMS
MYALGIAS: 0
SHORTNESS OF BREATH: 1
PALPITATIONS: 0
ARTHRALGIAS: 0
NERVOUS/ANXIOUS: 1

## 2018-10-19 ASSESSMENT — ACTIVITIES OF DAILY LIVING (ADL)
FALL_HISTORY_WITHIN_LAST_SIX_MONTHS: NO
SWALLOWING: 0-->SWALLOWS FOODS/LIQUIDS WITHOUT DIFFICULTY
COGNITION: 0 - NO COGNITION ISSUES REPORTED
RETIRED_COMMUNICATION: 0-->UNDERSTANDS/COMMUNICATES WITHOUT DIFFICULTY
BATHING: 0-->INDEPENDENT
RETIRED_EATING: 0-->INDEPENDENT
AMBULATION: 1-->ASSISTIVE EQUIPMENT
TOILETING: 0-->INDEPENDENT
DRESS: 0-->INDEPENDENT
TRANSFERRING: 0-->INDEPENDENT

## 2018-10-19 ASSESSMENT — PAIN DESCRIPTION - DESCRIPTORS
DESCRIPTORS: CRAMPING
DESCRIPTORS: CRAMPING

## 2018-10-19 NOTE — H&P
History and Physical     Shelly Rogers MRN# [id number]   YOB: 1933 Age: 85 year old      Date of Admission:  10/19/2018    Primary care provider: Halley Huff          Assessment and Plan:   Shelly Rogers is a 85 year old female with history of paroxysmal atrial fibrillation, heart failure with preserved EF, type 2 diabetes mellitus, hypothyroidism who presents with progressive symptoms over several months, characterized by anxiousness shortness of breath with paroxysmal nocturnal dyspnea and lower extremity swelling.  At presentation she is in atrial fibrillation with a heart rate in the 120s, chest x-ray reveals a right pleural effusion and pulmonary vascular congestion by my read, although there is suggestion of a right lower lobe infiltrate.  She has had no fevers, chills, chest pain or new cough to suggest pneumonia.     Acute decompensated heart failure - echocardiogram on May 2016 reveals an EF of 60-65% with mild concentric LVH LA borderline dilated, trace MR, moderate TR, mild hypertension, NSR  Paroxysmal atrial fibrillation, RVR-likely contributing to above  Anticoagulation on Coumadin -INR 2.28 at admission   Possible right lower lobe infiltrate -without other clinical findings suggestive of pneumonia.   -Lasix 40 mg IV given in the ED.  I am going to continue this at 20 mg IV twice daily so she will receive 2 more doses today.  -Place Patel catheter if needed for strict I's and O's.  -Follow daily weights and daily BMP.  -Echocardiogram  -Check TSH and magnesium  -Pulmonary ultrasound to evaluate pleural effusion.  -Holding PTA Coumadin as she may require thoracentesis  -For heart rate control I am going to add diltiazem ER 60 mg twice daily, to PTA metoprolol XL 50 mg twice daily.   -IV as needed metoprolol ordered.   -May require further IV medications to get heart rate under control.  -Cardiology consulted for their input regarding atrial fibrillation management.     Question of  "Anxiety - versus reaction to above.   - monitor with treatment before initiating evaluation and treatment of anxiety.     DM, type II -blood sugar at admission 177.  A1c 7.8 on 6/12/18   -Continue PTA glimepiride   -Hold PTA metformin  -Sliding scale insulin plus blood sugar checks 4 times daily ordered.  Adjust insulin as needed    Hypothyroidism  -Check TSH and continue PTA levothyroxine.    DVT prophylaxis -INR therapeutic at admission on Coumadin    CODE STATUS -discussed patient would like to be a full code.                    Chief Complaint:   Anxious.         History of Present Illness:   Shelly Rogers is a 85 year old female with history of paroxysmal atrial fibrillation, heart failure with preserved EF, type 2 diabetes mellitus, hypothyroidism who presents with progressive symptoms over several months.  Patient called her daughter yesterday as she felt \"anxious\".  She has difficulty.  Expressing exactly what her symptoms were but states that she was having trouble catching her breath.  When she was sleeping she felt like she could not fall asleep but she might not wake up that she would stop breathing.  She also has shortness of breath during the day.  She can no longer do her housework, such as vacuuming due to shortness of breath.  She was able to do this 6 months ago.  She has chronic lower extremity swelling and she is not sure if this is gotten worse although her daughter feels it has.  As far as weight gain she states she actually thinks she lost 4 pounds in last 6 months because she has not been eating at all.  She has had a loss of appetite.  She has a chronic cough no change in this no fevers or chills.  No chest pain.  She denies any palpitation and states she is unable to detect when she is in atrial fibrillation, but she also states while giving her history that she felt she was probably in atrial fibrillation.     Notably, patient's daughter notes that Shelly has been very anxious over the " last 6 months. Shelly agrees with this states that she gets angry very easily.  They actually were attributing most of her symptoms to anxiety/depression.,       At presentation to the ED she is afebrile with a temperature of 97.8.  Her EKG  reveals atrial fibrillation (personally reviewed) with a heart rate of 120 and no significant ST changes to suggest acute ischemia.  She has a negative troponin.  Her electrolytes otherwise normal no magnesium has yet been obtained.  CBC is unremarkable.  INR is 2.28 chest x-ray reviewed by myself reveals bilateral  pulmonary vascular congestion with interstitial edema with and right pleural effusion, questionable infiltrate in the right vs secondary to effusion.           Past Medical History:   Atrial fibrillation - S/P ablation, PM  - Chronic anticoagulation with Coumadin  Heart failure - echocardiogram on May 2016 reveals an EF of 60-65% with mild concentric LVH LA borderline dilated, trace MR, moderate TR, mild hypertension, NSR  H/o uterine cancer-S/P hysterectomy  Type II DM  Hypothyroidism  Hypertension  Osteoarthritis  Macular degeneration  No known history of coronary artery disease          Past Surgical History:     Past Surgical History:   Procedure Laterality Date     C ANESTH,PACEMAKER INSERTION      dual chamber 3/27/13     CHOLECYSTECTOMY  8/2004     GYN SURGERY       HYSTERECTOMY      Ut ca      JOINT REPLACEMENT       KERATOTOMY ARCUATE WITH FEMTOSECOND LASER/IMAGING FOR ATIOL Right 4/11/2017    Procedure: KERATOTOMY ARCUATE WITH FEMTOSECOND LASER/IMAGING FOR ATIOL;  Surgeon: Calvin Dominguez MD;  Location: [facility]     PHACOEMULSIFICATION CLEAR CORNEA WITH STANDARD INTRAOCULAR LENS IMPLANT Right 4/11/2017    Procedure: PHACOEMULSIFICATION CLEAR CORNEA WITH STANDARD INTRAOCULAR LENS IMPLANT;  Surgeon: Calvin Dominguez MD;  Location: [facility]     TKR      bilat            Social History:     Social History   Substance Use Topics     Smoking status:  Former Smoker     Quit date: 1990     Smokeless tobacco: Never Used     Alcohol use No   She is .  She lives in senior housing.  She is not in assisted living.  She has been using a cane recently due to imbalance related to vision trouble secondary to macular degeneration.           Family History:     Family History   Problem Relation Age of Onset     GASTROINTESTINAL DISEASE Mother      bowel obstruction     Cardiovascular Father      C.A.D. Father      Cardiovascular Brother      CHF   All the above was confirmed with patient.  There was a family history of early heart disease in both her father and brother.  Her mother  from a cerebral hemorrhage.          Immunizations:     Immunization History   Administered Date(s) Administered     Influenza (High Dose) 3 valent vaccine 2013, 10/30/2014, 10/13/2015, 2016, 2018     Pneumo Conj 13-V (2010&after) 2013, 10/10/2013     Pneumococcal 23 valent 2016     TDAP Vaccine (Adacel) 2013     Zoster vaccine, live 10/13/2015            Allergies:     Allergies   Allergen Reactions     Shellfish Allergy Difficulty breathing     Cats      Sneezing, watery eyes     Lisinopril Cough     No Clinical Screening - See Comments Other (See Comments)     Pt stated allergic to flowers, pt gets itchy watery eyes and stuffy nose     Seasonal Allergies Itching     Flowers     Sulfa Drugs Hives             Medications:     Prescription Medications as of 10/19/2018             ACE/ARB NOT PRESCRIBED, INTENTIONAL, ACE & ARB not prescribed due to Other: HFpEF    ACETAMINOPHEN PO Take 500 mg by mouth 3 times daily as needed (Takes at least 6 hours apart).     ASPIRIN NOT PRESCRIBED, INTENTIONAL, Antiplatelet medication not prescribed intentionally due to Current anticoagulant therapy (warfarin/enoxaparin)    blood glucose (ACCU-CHEK SMARTVIEW) test strip 1 strip by In Vitro route daily    blood glucose (NO BRAND SPECIFIED) lancets standard Use  to test blood sugar 1 times daily or as directed.    blood glucose monitoring (ACCU-CHEK FASTCLIX) lancets Use to check blood sugars daily    blood glucose monitoring (NO BRAND SPECIFIED) meter device kit Use to test blood sugar one times daily or as directed.    blood glucose monitoring (NO BRAND SPECIFIED) test strip Use to test blood sugars one times daily or as directed .has type 2 diabetes and not on insulin    Blood Glucose Monitoring Suppl (ACCU-CHEK LAKIA SMARTVIEW) W/DEVICE KIT 1 Device daily.    furosemide (LASIX) 20 MG tablet Take 1 tablet (20 mg) by mouth daily    glimepiride (AMARYL) 2 MG tablet Take 1 tablet (2 mg) by mouth 2 times daily    levothyroxine (SYNTHROID/LEVOTHROID) 88 MCG tablet Take 1 tablet (88 mcg) by mouth daily    metFORMIN (GLUCOPHAGE) 500 MG tablet Take 1 tablet (500 mg) by mouth 2 times daily (with meals)    metoprolol succinate (TOPROL-XL) 50 MG 24 hr tablet Take 1 tablet (50 mg) by mouth 2 times daily    Multiple Vitamins-Minerals (CENTRUM SILVER PO) Take by mouth daily    order for DME Equipment being ordered: Compression Stockings  Knee high  20/30 compression    order for DME Equipment being ordered: cane with quad or prongs    STATIN NOT PRESCRIBED, INTENTIONAL, continuous prn. Statin not prescribed intentionally due to Other: Not needed, LDL at goal <100mg/dL without therapy    triamcinolone (KENALOG) 0.1 % cream Apply sparingly to affected area three times daily for 14 days.    triamcinolone (NASACORT ALLERGY 24HR) 55 MCG/ACT nasal inhaler Spray 2 sprays into both nostrils daily    warfarin (COUMADIN) 5 MG tablet TAKE 1/2 TO 1 TABLET BY MOUTH DAILY OR AS DIRECTED BY INR CLINIC      Facility Administered Medications as of 10/19/2018             0.9% sodium chloride BOLUS Inject 500 mLs into the vein once    azithromycin (ZITHROMAX) 500 mg in sodium chloride 0.9 % 250 mL intermittent infusion Inject 500 mg into the vein every 24 hours    cefTRIAXone (ROCEPHIN) 2 g vial to  attach to  ml bag for ADULTS or NS 50 ml bag for PEDS Inject 2 g into the vein once    furosemide (LASIX) injection 40 mg Inject 4 mLs (40 mg) into the vein once                Review of Systems:   The 10 point Review of Systems is negative other than noted in the HPI              Physical Exam:   Blood pressure 129/68, pulse 114, temperature 97.8  F (36.6  C), temperature source Oral, resp. rate 20, SpO2 95 %, not currently breastfeeding.    Constitutional:   awake, alert, cooperative, no apparent distress, and appears stated age     Eyes:   Lids and lashes normal, pupils equal, round and reactive to light, extra ocular muscles intact, sclera clear, conjunctiva normal     ENT:   Normocephalic, without obvious abnormality, atramatic, oral pharynx with moist mucus membranes, tonsils without erythema or exudates .     Neck:   Supple, symmetrical, trachea midline, no adenopathy, thyroid symmetric, not enlarged and no tenderness, skin normal     Lungs:   No increased work of breathing, good air exchange, clear to auscultation bilaterally, no wheezing, crackles and decreased lung sounds at the lower lung fields bilaterally.      Cardiovascular:   Irregular rate and rhythm, normal S1 and S2, no S3 or S4, and no murmur noted. Extremities are warm.  2+ edema.      Abdomen:   Normal bowel sounds, soft, non-distended, non-tender, no masses palpated, no hepatosplenomegally     Musculoskeletal:   There is no redness, warmth, or swelling of the joints.  Central obesity.     Neurologic:   Awake, alert, oriented to name, place and time.  Cranial nerves II-XII are grossly intact.  Moving a 4 extremities without gross focal weakness.     Neuropsychiatric:   General: normal, calm and normal eye contact     Skin:   no bruising or bleeding, no redness, warmth, or swelling and no rashes              Data:     Results for orders placed or performed during the hospital encounter of 10/19/18   XR Chest 2 Views    Narrative    CHEST 2  VIEWS  10/19/2018 6:29 AM     HISTORY: Atrial fibrillation and shortness of breath.    COMPARISON: 3/27/2013.    FINDINGS: A moderate-sized region of hazy opacities in the right lung  base. The left lung is clear. Small right pleural effusion. Probable  cardiomegaly. Atherosclerotic calcification in the thoracic aorta.  Left anterior chest wall cardiac device with lead tips in the right  atrium and right ventricle.      Impression    IMPRESSION:  1. A moderate-sized region of hazy opacities in the right lung base  that could represent pneumonia or atelectasis.  2. Small right pleural effusion.    ALLISON FLORES MD   CBC with platelets differential   Result Value Ref Range    WBC 9.4 4.0 - 11.0 10e9/L    RBC Count 4.85 3.8 - 5.2 10e12/L    Hemoglobin 14.4 11.7 - 15.7 g/dL    Hematocrit 44.3 35.0 - 47.0 %    MCV 91 78 - 100 fl    MCH 29.7 26.5 - 33.0 pg    MCHC 32.5 31.5 - 36.5 g/dL    RDW 15.9 (H) 10.0 - 15.0 %    Platelet Count 181 150 - 450 10e9/L    Diff Method Automated Method     % Neutrophils 70.8 %    % Lymphocytes 18.1 %    % Monocytes 10.0 %    % Eosinophils 0.5 %    % Basophils 0.3 %    % Immature Granulocytes 0.3 %    Nucleated RBCs 0 0 /100    Absolute Neutrophil 6.7 1.6 - 8.3 10e9/L    Absolute Lymphocytes 1.7 0.8 - 5.3 10e9/L    Absolute Monocytes 0.9 0.0 - 1.3 10e9/L    Absolute Eosinophils 0.1 0.0 - 0.7 10e9/L    Absolute Basophils 0.0 0.0 - 0.2 10e9/L    Abs Immature Granulocytes 0.0 0 - 0.4 10e9/L    Absolute Nucleated RBC 0.0     Aftab Cells Slight     Platelet Estimate       Automated count confirmed.  Platelet morphology is normal.   Comprehensive metabolic panel   Result Value Ref Range    Sodium 133 133 - 144 mmol/L    Potassium 5.0 3.4 - 5.3 mmol/L    Chloride 99 94 - 109 mmol/L    Carbon Dioxide 25 20 - 32 mmol/L    Anion Gap 9 3 - 14 mmol/L    Glucose 177 (H) 70 - 99 mg/dL    Urea Nitrogen 17 7 - 30 mg/dL    Creatinine 0.87 0.52 - 1.04 mg/dL    GFR Estimate 61 >60 mL/min/1.7m2    GFR  Estimate If Black 74 >60 mL/min/1.7m2    Calcium 8.9 8.5 - 10.1 mg/dL    Bilirubin Total 1.2 0.2 - 1.3 mg/dL    Albumin 3.6 3.4 - 5.0 g/dL    Protein Total 7.0 6.8 - 8.8 g/dL    Alkaline Phosphatase 130 40 - 150 U/L    ALT 19 0 - 50 U/L    AST 16 0 - 45 U/L   Troponin I   Result Value Ref Range    Troponin I ES <0.015 0.000 - 0.045 ug/L   INR   Result Value Ref Range    INR 2.28 (H) 0.86 - 1.14   Nt probnp inpatient   Result Value Ref Range    N-Terminal Pro BNP Inpatient 2356 (H) 0 - 1800 pg/mL   EKG 12-lead, tracing only   Result Value Ref Range    Interpretation ECG Click View Image link to view waveform and result

## 2018-10-19 NOTE — Clinical Note
Admitting Physician: AMERICO MCKEON [[id number]]   Bed Type: Medical Telemetry [45]   Special needs: Private Room [11]

## 2018-10-19 NOTE — CONSULTS
[facility]    Cardiology Consultation     Date of Admission:  10/19/2018  Date of Consult (When I saw the patient): 10/19/18    Assessment & Plan   Shelly Rogers is a 85 year old female who was admitted on 10/19/2018.    1-acute on chronic diastolic heart failure.  History is somewhat difficult and helped by the daughter.  However I think she has been developing worsening peripheral edema probably some orthopnea and dyspnea on exertion.  Weight up significantly from 6 months ago but not much different from 3 months ago.  Echo today reviewed.  Continues to show normal left ventricular function but some decreased right ventricular function.    Recommendation-  -agree with enhanced diuresis.  From talking with her daughter, suspect that she has not been on her Lasix at home.      2-history of sick sinus syndrome and PAF.  As of December 2017 she was not having any significant recurrent AF.  Interrogation of pacemaker at this time now shows that in the last 9 months she spent more time in atrial fibrillation with more than 5000 mode switches and probably more than 50% of the time in atrial fibrillation.  It does not tell me when this occurred.  Interrogation does state that only 10% of the time was her heart rate above 100 so probably not going rapidly most of the time.    Recommendation  -continue medical therapy.  We will increase her beta-blocker a little bit to try to get somewhat better rate control is even talking to her in bed she is running almost 100 as an average.  She is already on anticoagulation.  She has been unaware of the atrial fibrillation so would pursue a rate control strategy currently.    Memo Kerns M.D.    Primary Care Physician   Halley Huff    Reason for Consult   Reason for consult: I was asked by Dr. Velazquez to evaluate this patient for atrial fibrillation and shortness of breath.    History of Present Illness   Shelly Rogers is a 85 year old female who presents with  complaints of shortness of breath and dizziness.  There is a prior history of sick sinus syndrome, pacemaker, paroxysmal atrial fibrillation, diastolic heart failure, ongoing diabetes mellitus.    History is from her but also helped by her daughter, as she called her daughter last night from home.  Her daughter notes that she is been having increasingly concerning bouts of anxiety and doing less the last few months.  Daughter thinks, and the patient mostly confirms, that she has not probably been taking her Lasix but states she has been taking her other medications.  She has not been sleeping well at night and alternately blames some of this on she just does not feel good and also on some shortness of breath.  She notes chronic pedal edema but it has worsened.  She gives some history concerning for orthopnea and dyspnea on exertion.  She is not eating well and complains about this, yet her weight has not gone down, which may suggest increased fluid volume balancing this out.  Her daughter states she is complaining of being quite dizzy last night, although she is vague about this.  She does not complain about that today and states she is not short of breath sitting around in bed today.  She has not been walking in the room.  She denies falls syncope or near syncope.  She states she is taking her warfarin reliably and has had no bleeding.  INR of 2.28 seems to confirm this history.  Exam shows bibasilar crackles.  Difficult to tell but no definite JVD.  There is 3+ pitting pretibial edema.  Chest x-ray does show cephalization pulmonary veins, a new significant right pleural effusion.    Her pacemaker was interrogated.  She has been in atrial fibrillation 42% of the time over the last 12 months.  However in December 2017 she was also interrogated and if not had any atrial fibrillation at that point so all this is been in about the last 9 months.  Office visits earlier in the year seem to show she was in sinus rhythm  some much his atrial fibrillation may be more recent.  Average heart rate not clear.  But only noted to have 10% of beats greater than 100 heart rate.    Echocardiogram reviewed today.  Left ventricular function normal.  Possibly some decreased right ventricular function.  No hemodynamically significant valvular disease.    Patient Active Problem List   Diagnosis     CHF (congestive heart failure) (H)     CARDIOVASCULAR SCREENING; LDL GOAL LESS THAN 100     Health Care Home     Uterine cancer (H)     Pacemaker     OA (osteoarthritis)     Type 2 diabetes mellitus without complications (H)     DNS (deviated nasal septum)     Atrial fibrillation (H)     Paroxysmal atrial fibrillation (H)     Chronic diastolic congestive heart failure (H)     Hypothyroidism     Long-term (current) use of anticoagulants [Z79.01]     Heart failure (H)       Past Medical History   I have reviewed this patient's medical history and updated it with pertinent information if needed.   Past Medical History:   Diagnosis Date     Atrial fibrillation (H)     Nl LVEF, s/p ablation      Congestive heart failure, unspecified      Diabetes mellitus (H) 2004     Hemorrhoids     intermittent bleeding     Hypertension      Macular degeneration      OA (osteoarthritis)      Pacemaker 3/2013     Uterine cancer (H)     s/p hyst       Past Surgical History   I have reviewed this patient's surgical history and updated it with pertinent information if needed.  Past Surgical History:   Procedure Laterality Date     C ANESTH,PACEMAKER INSERTION      dual chamber 3/27/13     CHOLECYSTECTOMY  8/2004     GYN SURGERY       HYSTERECTOMY      Ut ca      JOINT REPLACEMENT       KERATOTOMY ARCUATE WITH FEMTOSECOND LASER/IMAGING FOR ATIOL Right 4/11/2017    Procedure: KERATOTOMY ARCUATE WITH FEMTOSECOND LASER/IMAGING FOR ATIOL;  Surgeon: Calvin Dominguez MD;  Location: [facility]     PHACOEMULSIFICATION CLEAR CORNEA WITH STANDARD INTRAOCULAR LENS IMPLANT Right  2017    Procedure: PHACOEMULSIFICATION CLEAR CORNEA WITH STANDARD INTRAOCULAR LENS IMPLANT;  Surgeon: Calvin Dominguez MD;  Location: [facility]       Prior to Admission Medications   Prior to Admission Medications   Prescriptions Last Dose Informant Patient Reported? Taking?   ACE/ARB NOT PRESCRIBED, INTENTIONAL,   No No   Sig: ACE & ARB not prescribed due to Other: HFpEF   ACETAMINOPHEN PO  Self Yes Yes   Sig: Take 500 mg by mouth 3 times daily as needed (Takes at least 6 hours apart).    ASPIRIN NOT PRESCRIBED, INTENTIONAL,   No No   Sig: Antiplatelet medication not prescribed intentionally due to Current anticoagulant therapy (warfarin/enoxaparin)   Blood Glucose Monitoring Suppl (ACCU-CHEK LAKIA SMARTVIEW) W/DEVICE KIT   Yes No   Si Device daily.   STATIN NOT PRESCRIBED, INTENTIONAL,   Yes No   Sig: continuous prn. Statin not prescribed intentionally due to Other: Not needed, LDL at goal <100mg/dL without therapy   blood glucose (ACCU-CHEK SMARTVIEW) test strip   No No   Si strip by In Vitro route daily   blood glucose (NO BRAND SPECIFIED) lancets standard   No No   Sig: Use to test blood sugar 1 times daily or as directed.   blood glucose monitoring (ACCU-CHEK FASTCLIX) lancets   No No   Sig: Use to check blood sugars daily   blood glucose monitoring (NO BRAND SPECIFIED) meter device kit   No No   Sig: Use to test blood sugar one times daily or as directed.   blood glucose monitoring (NO BRAND SPECIFIED) test strip   No No   Sig: Use to test blood sugars one times daily or as directed .has type 2 diabetes and not on insulin   furosemide (LASIX) 20 MG tablet 10/18/2018 at Unknown time Self No Yes   Sig: Take 1 tablet (20 mg) by mouth daily   glimepiride (AMARYL) 2 MG tablet 10/18/2018 at Unknown time Self No Yes   Sig: Take 1 tablet (2 mg) by mouth 2 times daily   levothyroxine (SYNTHROID/LEVOTHROID) 88 MCG tablet 10/19/2018 at am Self No Yes   Sig: Take 1 tablet (88 mcg) by  mouth daily   metFORMIN (GLUCOPHAGE) 500 MG tablet 10/18/2018 at Unknown time Self No Yes   Sig: Take 1 tablet (500 mg) by mouth 2 times daily (with meals)   metoprolol succinate (TOPROL-XL) 50 MG 24 hr tablet 10/18/2018 at Unknown time Self No Yes   Sig: Take 1 tablet (50 mg) by mouth 2 times daily   multivitamin  with lutein (OCUVITE WITH LTEIN) CAPS per capsule 10/18/2018 at Unknown time Self Yes Yes   Sig: Take 1 capsule by mouth daily   order for DME   No No   Sig: Equipment being ordered: Compression Stockings  Knee high  20/30 compression   order for DME   No No   Sig: Equipment being ordered: cane with quad or prongs   triamcinolone (KENALOG) 0.1 % cream 10/18/2018 at Unknown time Self No Yes   Sig: Apply sparingly to affected area three times daily for 14 days.   triamcinolone (NASACORT ALLERGY 24HR) 55 MCG/ACT nasal inhaler Past Month at Unknown time Self Yes Yes   Sig: Spray 2 sprays into both nostrils daily as needed (during allergy season)    warfarin (COUMADIN) 5 MG tablet 10/18/2018 at Unknown time Self No Yes   Sig: TAKE 1/2 TO 1 TABLET BY MOUTH DAILY OR AS DIRECTED BY INR CLINIC   Patient taking differently: Take 2.5-5 mg by mouth daily TAKE 1/2 TO 1 TABLET BY MOUTH DAILY OR AS DIRECTED BY INR CLINIC      Facility-Administered Medications: None     Current Facility-Administered Medications   Medication Dose Route Frequency     diltiazem  60 mg Oral BID     furosemide  20 mg Intravenous BID     glimepiride  2 mg Oral BID     [START ON 10/20/2018] influenza Vac Split High-Dose  0.5 mL Intramuscular Prior to discharge     insulin aspart  1-7 Units Subcutaneous TID AC     insulin aspart  1-5 Units Subcutaneous At Bedtime     [START ON 10/20/2018] levothyroxine  88 mcg Oral Daily     metoprolol succinate  50 mg Oral BID     Current Facility-Administered Medications   Medication Last Rate     Allergies   Allergies   Allergen Reactions     Shellfish Allergy Difficulty breathing     Cats      Sneezing,  "watery eyes     Lisinopril Cough     No Clinical Screening - See Comments Other (See Comments)     Pt stated allergic to flowers, pt gets itchy watery eyes and stuffy nose     Seasonal Allergies Itching     Flowers     Sulfa Drugs Hives       Social History    reports that she quit smoking about 28 years ago. She has never used smokeless tobacco. She reports that she does not drink alcohol or use illicit drugs.    Family History   Family History   Problem Relation Age of Onset     GASTROINTESTINAL DISEASE Mother      bowel obstruction     Cardiovascular Father      C.A.D. Father      Cardiovascular Brother      CHF       Review of Systems   The 10 point Review of Systems is negative other than noted in the HPI or here.     Physical Exam   Vital Signs with Ranges  Temp:  [97  F (36.1  C)-98.3  F (36.8  C)] 98.3  F (36.8  C)  Pulse:  [107-126] 126  Heart Rate:  [] 105  Resp:  [9-29] 9  BP: (125-143)/() 136/89  SpO2:  [93 %-95 %] 95 %  There were no vitals filed for this visit.       Vitals: /89  Pulse 126  Temp 98.3  F (36.8  C) (Oral)  Resp 9  Ht 1.575 m (5' 2\")  SpO2 95%  BMI 32.78 kg/m2    Constitutional: Resting comfortably, no distress    Skin: Grossly clear    Head/Eyes/ENT: No cyanosis, pallor, trauma, icterus    Neck:   Supple.  Normal carotid upstrokes without bruits.  No mass    Chest:   Diminished air movement.  Bibasilar crackles.  No wheeze or increased effort    Cardiac: Irregular rapid rhythm.  No murmurs or heave.  I do not see JVD but she has an obese neck and it is difficult to assess (BMI is 33)    Abdomen: Obese, nontender, nondistended, no bruit    Extremities and Back: 2-3+ pitting edema pretibial two thirds away to the knees and 1-2+ above that.  No ulceration erythema or cellulitis    Neurological:    Alert and oriented.  Extraocular motions intact.  Speech fluent.  Symmetrical facies.  Upper and lower motor strength grossly intact and nonfocal      Recent Labs  Lab " 10/19/18  0620   TROPI <0.015         Recent Labs  Lab 10/19/18  0620 10/15/18   WBC 9.4  --    HGB 14.4  --    MCV 91  --      --    INR 2.28* 1.9*     --    POTASSIUM 5.0  --    CHLORIDE 99  --    CO2 25  --    BUN 17  --    CR 0.87  --    GFRESTIMATED 61  --    GFRESTBLACK 74  --    ANIONGAP 9  --    ASTRID 8.9  --    *  --    ALBUMIN 3.6  --    PROTTOTAL 7.0  --    BILITOTAL 1.2  --    ALKPHOS 130  --    ALT 19  --    AST 16  --    TROPI <0.015  --        Imaging:  Recent Results (from the past 48 hour(s))   XR Chest 2 Views    Narrative    CHEST 2 VIEWS  10/19/2018 6:29 AM     HISTORY: Atrial fibrillation and shortness of breath.    COMPARISON: 3/27/2013.    FINDINGS: A moderate-sized region of hazy opacities in the right lung  base. The left lung is clear. Small right pleural effusion. Probable  cardiomegaly. Atherosclerotic calcification in the thoracic aorta.  Left anterior chest wall cardiac device with lead tips in the right  atrium and right ventricle.      Impression    IMPRESSION:  1. A moderate-sized region of hazy opacities in the right lung base  that could represent pneumonia or atelectasis.  2. Small right pleural effusion.    ALLISON FLORES MD   US Chest Pleural Effusion Imaging    Narrative    US CHEST/PLEURAL EFFUSION IMAGING  10/19/2018 12:09 PM     HISTORY:  Shortness of breath.    COMPARISON: Chest x-ray dated 10/19/2018    FINDINGS: There is a moderate to large right pleural effusion and a  small left pleural effusion.    JEVON KANG MD       Echo:  Recent Results (from the past 4320 hour(s))   ECHO COMPLETE WITH OPTISON    Narrative    [id number]  [id number]  [id number]  [id number]^[email or web address]^AMERICO^E           [facility]  Echocardiography Laboratory  [address] [place], [place] [id number]        Name: GABBY OLIVA  MRN: [id number]  : 1933  Study Date: 10/19/2018 10:33 AM  Age: 85 yrs  Gender: Female  Patient Location: [facility]  Reason For Study: Heart  FailureIntermediate Coronary Syndrome  Ordering Physician: AMERICO MCKEON  Referring Physician: YANET LYNN MD  Performed By: Alisa Hoyt LAI     BSA: 1.8 m2  Height: 64 in  Weight: 174 lb  HR: 79  BP: 130/93 mmHg  _____________________________________________________________________________  __        Procedure  Complete Portable Echo Adult. Contrast Optison.  _____________________________________________________________________________  __        Interpretation Summary     The left ventricle is normal in size.  The visual ejection fraction is estimated at 55-60%.  No regional wall motion abnormalities noted.  The right ventricle is moderately dilated.  Mildly decreased right ventricular systolic function  There is moderate (2+) tricuspid regurgitation.  Small pericardial effusion  The rhythm was rapid atrial fibrillation.  _____________________________________________________________________________  __        Left Ventricle  The left ventricle is normal in size. There is normal left ventricular wall  thickness. The visual ejection fraction is estimated at 55-60%. Diastolic  Doppler findings (E/E' ratio and/or other parameters) suggest left ventricular  filling pressures are indeterminate. No regional wall motion abnormalities  noted.     Right Ventricle  There is a catheter/pacemaker lead seen in the right ventricle. The right  ventricle is moderately dilated. Mildly decreased right ventricular systolic  function.     Atria  The left atrium is borderline dilated. Right atrial size is normal. There is  no color Doppler evidence of an atrial shunt.     Mitral Valve  The mitral valve leaflets are mildly thickened. There is mild (1+) mitral  regurgitation.        Tricuspid Valve  There is moderate (2+) tricuspid regurgitation. The right ventricular systolic  pressure is approximated at 13.5 mmHg plus the right atrial pressure. Dilated  IVC (>2.5cm) with <50% respiratory collapse; right atrial pressure  is  estimated at 15-20mmHg.     Aortic Valve  There is trivial trileaflet aortic sclerosis. There is trace aortic  regurgitation.     Pulmonic Valve  There is trace pulmonic valvular regurgitation.     Vessels  Normal size aorta. The aortic root is normal size.     Pericardium  Small pericardial effusion.        Rhythm  The rhythm was rapid atrial fibrillation.  _____________________________________________________________________________  __  MMode/2D Measurements & Calculations  IVSd: 1.1 cm     LVIDd: 3.8 cm  LVIDs: 2.1 cm  LVPWd: 1.0 cm  LVPWs: 1.5 cm  FS: 43.9 %  LV mass(C)d: 124.1 grams  LV mass(C)dI: 67.3 grams/m2  Ao root diam: 2.9 cm  LA dimension: 4.4 cm  asc Aorta Diam: 3.2 cm  LA/Ao: 1.5  LA Volume (BP): 55.0 ml  LA Volume Index (BP): 29.9 ml/m2  RWT: 0.54           Doppler Measurements & Calculations  MV E max salomón: 97.0 cm/sec  MV dec time: 0.17 sec  TR max salomón: 181.7 cm/sec  TR max P.5 mmHg  E/E' av.2  Lateral E/e': 13.2  Medial E/e': 13.1           _____________________________________________________________________________  __           Report approved by: Juan Martinez 10/19/2018 11:27 AM

## 2018-10-19 NOTE — PLAN OF CARE
Problem: Patient Care Overview  Goal: Plan of Care/Patient Progress Review  Outcome: Improving  VSS. Up to BR with SBA, hannah well. No SOB noted. Sats 94-96% on RA. Noted 3-4+ edema LE's, req freq encouragement to elevate legs. Family at bedside. Cont to monitor.

## 2018-10-19 NOTE — IP AVS SNAPSHOT
MRN:[id number]                      After Visit Summary   10/19/2018    Shelly Rogers    MRN: [id number]           Thank you!     Thank you for choosing [facility] for your care. Our goal is always to provide you with excellent care. Hearing back from our patients is one way we can continue to improve our services. Please take a few minutes to complete the written survey that you may receive in the mail after you visit with us. Thank you!        Patient Information     Date Of Birth          1/18/1933        Designated Caregiver       Most Recent Value    Caregiver    Will someone help with your care after discharge? yes    Name of designated caregiver GINGER TERWILLIGER    Phone number of caregiver [phone]    Caregiver address [address] [place] [id number]      About your hospital stay     You were admitted on:  October 19, 2018 You last received care in the:  [facility]    You were discharged on:  October 22, 2018        Reason for your hospital stay       You were admitted with fluid overload from heart failure.                  Who to Call     For medical emergencies, please call 911.  For non-urgent questions about your medical care, please call your primary care provider or clinic, [phone]          Attending Provider     Provider Specialty    Luís Tian MD Emergency Medicine    Itzel Velazquez MD Internal Medicine       Primary Care Provider Office Phone # Fax #    Halley ALLI Huff [phone]      After Care Instructions     Activity       Your activity upon discharge: activity as tolerated            Diet       Moderate Consistent CHO (4-6 CHO units/meal); 2 gm NA Diet            Discharge Instructions       Heart Failure Instructions for Patients and Families: Please read and check off each of these important instructions as you do them when you get home.     Weight and Symptoms    ___ Put a scale in your  "bathroom.    ___ Post a weight chart or calendar next to your scale.    ___ Weigh yourself everyday as soon as you get up in the morning (before breakfast).  You should only be wearing your pajamas.  Write your weight on the chart/calendar.    ___ Bring your weight chart/calendar with you to all appointments.    ___ Call your doctor or nurse practitioner if you gain 2 pounds (in 1 day) or 5 pounds in (1 week) from your goal \"good\" weight.  Your good weight is also called your \"dry\" weight.  Your doctor or nurse will tell you what your good weight should be.    ___ Call your doctor or nurse practitioner if you have shortness of breath that gets worse over time, leg swelling or fatigue.    Medications and Diet    ___ Make sure to take your medication as prescribed.    ___ Bring a current list of your medication and all of your medicine bottles with you to all appointments.    ___ Limit fluids if you still have swelling or shortness of breath, or if your doctor tells you to do so.      ___ Eat less than 2000 mg of sodium (salt) every day. Read food labels, and do not add salt to meals. Remember, if you eat less salt you retain less fluid.    ___ Follow a heart healthy diet that is low in saturated fat.         Activity and Suggested Lifestyle Changes   ___ Stay active. Talk to your doctor about an exercise program that is safe for your heart.    ___ Stop smoking. Reduce alcohol use.      ___ Lose weight if you are overweight. Extra weight puts a lot of stress on the heart.    Control for Leg Swelling   ___ Keep your legs elevated (raised) as needed for swelling. If swelling is uncomfortable or elevation doesn't help, ask your doctor about using ACE wraps or support stockings.      What is the [facility] Clinic?     Cardiomyopathy  Optimization  Rehabilitation  Education    The [facility] Clinic is a heart failure specialty clinic within [facility].  It is an outpatient disease management program that is based on a " phase-by-phase approach, which is tailored to each patient's individual needs.  The cardiologist, nurse practitioner, physician assistant and nurses provide an ongoing outpatient care and treatment plan that guides heart failure and cardiomyopathy patients from evaluation and education to stabilization. This team works with your current primary care doctor and cardiologist to help you:    Avoid hospitalizations  Slow the progression of the disease  Improve length and quality of life  Know who and when to call if heart failure symptoms appear  Receive easy access to quality health care and advice  Better understand your condition and treatment  Decrease the tremendous cost burden of heart failure care  Detect future heart problems before they become life threatening    Your C.O.R.E. Clinic Team will continue to educate you on your heart failure and may adjust medications based on your vital signs, lab work, and how you are feeling.  Therefore, it is very important to bring the following to all C.O.R.E. appointments:    An accurate list of your medications  Your medicine bottles  Your weight chart/calendar    [facility]):    [phone]  [facility]):   [phone]  [facility] ([place]): [phone]                  Follow-up Appointments     Follow-up and recommended labs and tests        Follow up with primary care provider, Halley Huff, within 7 days to evaluate medication change and for hospital follow- up.  The following labs/tests are recommended: BMP, INR, TSH and free T4 within a week.  Follow up with [facility]. They should call with this appointment. If you see them within a week. You can delay your follow up with your PCP but should have INR within the week.   Strongly recommend you go to sleep evaluation to evaluate for SYLVIE.                  Your next 10 appointments already scheduled     Oct 26, 2018  1:30 PM CDT    [facility] MERLYN [facility] C[facility]    [address] [facility] [id number] [phone]           Bring a current list of meds and any records pertaining to this visit. For Physicals, please bring immunization records and any forms needing to be filled out. Please arrive 10 minutes early to complete paperwork.            Nov 01, 2018  3:00 PM CDT   Anticoagulation Visit with [facility]   [facility]    [address] [facility] [id number] [phone]            Nov 12, 2018  1:00 PM CST   New Sleep Patient with Bennett Ezra Goltz, PA-C   [facility] ([facility] [place])    [address] [id number] [phone]              Additional Services     Follow-Up with cardiac sub-specialty clinic                 Further instructions from your care team           Discharge Instructions for Heart Failure  The heart is a muscle that pumps oxygen-rich blood to all parts of the body. When you have heart failure, the heart is not able to pump as well as it should. Blood and fluid may back up into the lungs (congestive heart failure), and some parts of the body don t get enough oxygen-rich blood to work normally. These problems lead to the symptoms of heart failure. Heart failure can occur due to an injury to the heart or from natural processes.  You can control symptoms of heart failure with some lifestyle changes and by following your doctor's advice.  Activity  Ask your healthcare provider about an exercise program. You can benefit from simple activities such as walking or gardening. Exercising most days of the week can make you feel better. Don't be discouraged if your progress is slow at first. Rest as needed. Stop activity if you develop symptoms such as chest pain, lightheadedness, or significant shortness of breath. Find activities that  you enjoy, such as brisk walking, dancing, swimming, or gardening. These will help you stay active and strengthen your heart.  Diet  Follow a heart healthy diet. And make sure to limit the salt (sodium) in your diet. Salt causes your body to hold water. This makes your heart work harder as there is more fluid for the heart to pump. Limit your salt by doing the following:    Limit canned, dried, packaged, and fast foods.    Don't add salt to your food.    Season foods with herbs instead of salt.    Watch how much liquids you drink. Drinking too much can make heart failure worse. Talk with your health care provider about how much you should drink each day.    Limit the amount of alcohol you drink. It may harm your heart. Women should have no more than 1 drink a day and men should have no more than 2.    When you eat out, request that your meals have no added salt.  Tobacco  If you smoke, it's very important to quit. Smoking increases your chances of having a heart attack by harming the blood vessels that provide oxygen to your heart. This makes heart failure worse. Quitting smoking is the number one thing you can do to improve your health. Enroll in a stop-smoking program to improve your chances of success. Talk with your healthcare provider about medicines or nicotine replacement therapy to help you quit smoking. Ask your healthcare provider about smoking cessation support groups.  Medicine  Take your medicines exactly as prescribed. Learn the names and purpose of each of your medicines. Keep an accurate medicine list and current dosages with you at all times. Don't skip doses. If you miss a dose of your medicine, take it as soon as you remember. If you miss a dose and it's almost time for your next dose, just wait and take your next dose at the normal time. Don't take a double dose. If you are unsure, call your doctor's office. Make sure not to mix up your medicines or forget what you've taken the same day.  Weight  monitoring  Weigh yourself every day. A sudden weight gain can mean your heart failure is getting worse. Weigh yourself at the same time of day and in the same kind of clothes. Ideally, weigh yourself first thing in the morning after you empty your bladder, but before you eat breakfast. Your healthcare provider will show you how to track your weight. He or she will also discuss with you when you should call if you have a sudden, unexpected increase in your weight.  In general, your healthcare provider may ask you to report if your weight goes up by more than 2 pounds in 1 day,  5 pounds in 1 week, or whatever weight gain you were told by your doctor. This is a sign that you are retaining more fluid than you should be. Clues to weight gain include checking your ankles for swelling, or noticing you are short of breath when you lie down.  Follow-up care  Make a follow-up appointment as directed. Depending on the type and severity of heart failure you have, you may need follow-up as early as 7 days from hospital discharge. Keep appointments for checkups and lab tests that are needed to check your medicines and condition.  Recognize that your health and even survival depend on your following medical recommendations.  Symptoms  Heart failure can cause a variety of symptoms, including:    Shortness of breath    Trouble breathing at night, especially when you lie down    Swelling in the legs and feet or in the belly (abdomen)    Becoming easily fatigued    Irregular or rapid heartbeat    Weakness or lightheadedness    Swelling of the neck veins  It is important to know what to do if symptoms get worse or if you develop signs of worsening heart failure.     When to call your healthcare provider  Call your healthcare provider right away if you have any of these signs of worsening heart failure:    Sudden weight gain (more than 2 pounds in 1 day or 5 pounds in 1 week, or whatever weight gain you were told to report by your  doctor)    Trouble breathing not related to being active    New or increased swelling of your legs or ankles    Swelling or pain in your abdomen    Breathing trouble at night (waking up short of breath, needing more pillows to breathe)    Frequent coughing that doesn't go away    Feeling much more tired than usual  Call 911  Call 911 right away if you have:    Severe shortness of breath, such that you can't catch your breath even while resting    Severe chest pain that does not resolve with rest or nitroglycerin    Pink, foamy mucus with cough and shortness of breath    A continuous rapid or irregular heartbeat    Passing out or fainting    Stroke symptoms such as sudden numbness or weakness on one side of your face, arm, or leg or sudden confusion, trouble speaking or vision changes   Date Last Reviewed: 3/21/2016    [id number] [employer]. [address] [place] [address]. All rights reserved. This information is not intended as a substitute for professional medical care. Always follow your healthcare professional's instructions.          General Recommendations To Control Heart Failure When You Get Home     Heart Failure Instructions for Patients and Families: Please read and check off each of these important instructions as you do them when you get home.     Weight and Symptoms    ___ Put a scale in your bathroom.    ___ Post a weight chart or calendar next to your scale.    ___ Weigh yourself everyday as soon as you get up in the morning (before breakfast).  You should only be wearing your pajamas.  Write your weight on the chart/calendar.    ___ Bring your weight chart/calendar with you to all appointments.    ___ Call your doctor or nurse practitioner if you gain 2 pounds (in 1 day) or 5 pounds in (1 week) from your goal  good  weight.  Your good weight is also called your  dry  weight.  Your doctor or nurse will tell you what your good weight should be.    ___ Call your doctor or nurse  practitioner if you have shortness of breath that gets worse over time, leg swelling or fatigue.    Medications and Diet    ___ Make sure to take your medication as prescribed.    ___ Bring a current list of your medication and all of your medicine bottles with you to all appointments.    ___ Limit fluids if you still have swelling or shortness of breath, or if your doctor tells you to do so.      ___ Eat less than 2000 mg of sodium (salt) every day. Read food labels, and do not add salt to meals. Remember, if you eat less salt you retain less fluid.    ___ Follow a heart healthy diet that is low in saturated fat.         Activity and Suggested Lifestyle Changes   ___ Stay active. Talk to your doctor about an exercise program that is safe for your heart.    ___ Stop smoking. Reduce alcohol use.      ___ Lose weight if you are overweight. Extra weight puts a lot of stress on the heart.    Control for Leg Swelling  ___ Keep your legs elevated (raised) as needed for swelling. If swelling is uncomfortable or elevation doesn t help, ask your doctor about using ACE wraps or support stockings.      What is the [facility] Clinic?     Cardiomyopathy  Optimization  Rehabilitation  Education    The [facility] Clinic is a heart failure specialty clinic within [facility].  It is an outpatient disease management program that is based on a phase-by-phase approach, which is tailored to each patient s individual needs.  The cardiologist, nurse practitioner, physician assistant and nurses provide an ongoing outpatient care and treatment plan that guides heart failure and cardiomyopathy patients from evaluation and education to stabilization. This team works with your current primary care doctor and cardiologist to help you:      Avoid hospitalizations    Slow the progression of the disease    Improve length and quality of life    Know who and when to call if heart failure symptoms appear    Receive easy access to quality health  "care and advice    Better understand your condition and treatment    Decrease the tremendous cost burden of heart failure care    Detect future heart problems before they become life threatening    Your [facility] Clinic Team will continue to educate you on your heart failure and may adjust medications based on your vital signs, lab work, and how you are feeling.  Therefore, it is very important to bring the following to all C.O.R.E. appointments:    - An accurate list of your medications  - Your medicine bottles  - Your weight chart/calendar    [facility] ([place]):    [phone]  [facility] ([place]):    [phone]  [facility] ([place]):  [phone]        Pending Results     Date and Time Order Name Status Description    10/21/2018 1008 Cytology non gyn In process     10/21/2018 1008 Fluid Culture Aerobic Bacterial In process     10/19/2018 0729 Blood culture Preliminary     10/19/2018 0729 Blood culture Preliminary     10/19/2018 0620 T4 free In process             Statement of Approval     Ordered          10/22/18 0033  I have reviewed and agree with all the recommendations and orders detailed in this document.  EFFECTIVE NOW     Approved and electronically signed by:  Itzel Velazquez MD             Admission Information     Date & Time Provider Department Dept. Phone    10/19/2018 Itzel Velazquez MD [facility] [phone]      Your Vitals Were     Blood Pressure Pulse Temperature Respirations Height Weight    125/71 (BP Location: Left arm) 90 97.6  F (36.4  C) (Oral) 16 1.575 m (5' 2\") 80.5 kg (177 lb 6.4 oz)    Pulse Oximetry BMI (Body Mass Index)                94% 32.45 kg/m2          Care EveryWhere ID     This is your Care EveryWhere ID. This could be used by other organizations to access your [facility] medical records  [id number]        Equal Access to Services     FRANCISCO ROMAN AH: Marina mcmullen " Sodestiny, waturnerda luqadaha, qaybta kaalmada jeffery, matt lopezodette nadia. So [facility] [phone].    ATENCIÓN: Si satinderla leatha, tiene a quinones disposición servicios gratuitos de asistencia lingüística. Syd al [phone].    We comply with applicable federal civil rights laws and [place] laws. We do not discriminate on the basis of race, color, national origin, age, disability, sex, sexual orientation, or gender identity.               Review of your medicines      START taking        Dose / Directions    diltiazem 120 MG 24 hr capsule   Commonly known as:  DILACOR XR   Used for:  Chronic atrial fibrillation (H)   Notes to Patient:  Heart rhythm         Dose:  120 mg   Take 1 capsule (120 mg) by mouth At Bedtime   Quantity:  30 capsule   Refills:  0       mirtazapine 7.5 MG Tabs tablet   Commonly known as:  REMERON   Used for:  Adjustment disorder with anxious mood        Dose:  7.5 mg   Take 1 tablet (7.5 mg) by mouth At Bedtime   Quantity:  30 tablet   Refills:  0       OLANZapine 2.5 MG tablet   Commonly known as:  zyPREXA   Used for:  Adjustment disorder with anxious mood        Dose:  2.5 mg   Take 1 tablet (2.5 mg) by mouth daily as needed (insomnia, agitation)   Quantity:  30 tablet   Refills:  1         CONTINUE these medicines which may have CHANGED, or have new prescriptions. If we are uncertain of the size of tablets/capsules you have at home, strength may be listed as something that might have changed.        Dose / Directions    furosemide 20 MG tablet   Commonly known as:  LASIX   This may have changed:  when to take this   Used for:  Chronic diastolic congestive heart failure (H)   Notes to Patient:  Water pill         Dose:  20 mg   Take 1 tablet (20 mg) by mouth 2 times daily   Quantity:  90 tablet   Refills:  3       metoprolol succinate 50 MG 24 hr tablet   Commonly known as:  TOPROL-XL   This may have changed:  how much to take   Used for:  Chronic diastolic congestive  heart failure (H)   Notes to Patient:  Heart blood pressure         Dose:  75 mg   Take 1.5 tablets (75 mg) by mouth 2 times daily   Quantity:  180 tablet   Refills:  3       warfarin 5 MG tablet   Commonly known as:  COUMADIN   This may have changed:    - how much to take  - how to take this  - when to take this  - additional instructions   Used for:  Paroxysmal atrial fibrillation (H)        TAKE 1/2 TO 1 TABLET BY MOUTH DAILY OR AS DIRECTED BY INR CLINIC   Quantity:  90 tablet   Refills:  3         CONTINUE these medicines which have NOT CHANGED        Dose / Directions    ACE/ARB/ARNI NOT PRESCRIBED (INTENTIONAL)   Used for:  Chronic diastolic congestive heart failure (H)        ACE & ARB not prescribed due to Other: HFpEF   Refills:  0       ACETAMINOPHEN PO   Notes to Patient:  Do not exceed more than 4000mg of Tylenol in 24 hours         Dose:  500 mg   Take 500 mg by mouth 3 times daily as needed (Takes at least 6 hours apart).   Refills:  0       ASPIRIN NOT PRESCRIBED   Commonly known as:  INTENTIONAL   Used for:  Type 2 diabetes mellitus without complications (H)        Antiplatelet medication not prescribed intentionally due to Current anticoagulant therapy (warfarin/enoxaparin)   Quantity:  0 each   Refills:  0       blood glucose lancets standard   Commonly known as:  no brand specified   Used for:  Type 2 diabetes mellitus without complication, without long-term current use of insulin (H)        Use to test blood sugar 1 times daily or as directed.   Quantity:  100 each   Refills:  11       blood glucose monitoring lancets   Used for:  Type 2 diabetes, HbA1c goal < 7% (H)        Use to check blood sugars daily   Quantity:  1 Box   Refills:  prn       * blood glucose monitoring meter device kit   Used for:  Type 2 diabetes, HbA1c goal < 7% (H)        Dose:  1 Device   1 Device daily.   Quantity:  1 kit   Refills:  0       * blood glucose monitoring meter device kit   Commonly known as:  no brand  specified   Used for:  Type 2 diabetes mellitus without complication, without long-term current use of insulin (H)        Use to test blood sugar one times daily or as directed.   Quantity:  1 kit   Refills:  0       * blood glucose monitoring test strip   Commonly known as:  ACCU-CHEK SMARTVIEW   Used for:  Type 2 diabetes, HbA1c goal < 7% (H)        Dose:  1 strip   1 strip by In Vitro route daily   Quantity:  1 Box   Refills:  prn       * blood glucose monitoring test strip   Commonly known as:  no brand specified   Used for:  Type 2 diabetes mellitus without complication, without long-term current use of insulin (H)        Use to test blood sugars one times daily or as directed .has type 2 diabetes and not on insulin   Quantity:  100 strip   Refills:  1       glimepiride 2 MG tablet   Commonly known as:  AMARYL   Used for:  Type 2 diabetes mellitus without complication, without long-term current use of insulin (H)   Notes to Patient:  Diabetes         Dose:  2 mg   Take 1 tablet (2 mg) by mouth 2 times daily   Quantity:  180 tablet   Refills:  3       levothyroxine 88 MCG tablet   Commonly known as:  SYNTHROID/LEVOTHROID   Used for:  Hypothyroidism, unspecified type   Notes to Patient:  hypothyroidism         Dose:  88 mcg   Take 1 tablet (88 mcg) by mouth daily   Quantity:  90 tablet   Refills:  1       metFORMIN 500 MG tablet   Commonly known as:  GLUCOPHAGE   Used for:  Type 2 diabetes mellitus without complication, without long-term current use of insulin (H)   Notes to Patient:  Diabetes         Dose:  500 mg   Take 1 tablet (500 mg) by mouth 2 times daily (with meals)   Quantity:  180 tablet   Refills:  3       multivitamin  with lutein Caps per capsule   Notes to Patient:  Not given in Hospital resume as before         Dose:  1 capsule   Take 1 capsule by mouth daily   Refills:  0       NASACORT ALLERGY 24HR 55 MCG/ACT inhaler   Generic drug:  triamcinolone        Dose:  2 spray   Spray 2 sprays into both  nostrils daily as needed (during allergy season)   Refills:  0       order for DME   Used for:  Bilateral edema of lower extremity        Equipment being ordered: Compression Stockings Knee high 20/30 compression   Quantity:  2 Package   Refills:  1       order for DME   Used for:  Unsteadiness on feet        Equipment being ordered: cane with quad or prongs   Quantity:  1 Units   Refills:  1       STATIN NOT PRESCRIBED (INTENTIONAL)        continuous prn. Statin not prescribed intentionally due to Other: Not needed, LDL at goal <100mg/dL without therapy   Quantity:  0 each   Refills:  0       triamcinolone 0.1 % cream   Commonly known as:  KENALOG   Notes to Patient:  Resume as ordered         Apply sparingly to affected area three times daily for 14 days.   Quantity:  15 g   Refills:  0       * Notice:  This list has 4 medication(s) that are the same as other medications prescribed for you. Read the directions carefully, and ask your doctor or other care provider to review them with you.         Where to get your medicines      These medications were sent to [facility] [id number] [place] [address] FLORESITA ROSENBERG AT [facility] [address] [place] [address]     Phone:  [phone]     diltiazem 120 MG 24 hr capsule    furosemide 20 MG tablet    metoprolol succinate 50 MG 24 hr tablet    mirtazapine 7.5 MG Tabs tablet    OLANZapine 2.5 MG tablet                Protect others around you: Learn how to safely use, store and throw away your medicines at www.disposemymeds.org.             Medication List: This is a list of all your medications and when to take them. Check marks below indicate your daily home schedule. Keep this list as a reference.      Medications           Morning Afternoon Evening Bedtime As Needed    ACE/ARB/ARNI NOT PRESCRIBED (INTENTIONAL)   ACE & ARB not prescribed due to Other: HFpEF                                ACETAMINOPHEN PO   Take 500 mg by mouth 3 times  daily as needed (Takes at least 6 hours apart).   Last time this was given:  650 mg on 10/19/2018  7:42 PM   Notes to Patient:  Do not exceed more than 4000mg of Tylenol in 24 hours                                 ASPIRIN NOT PRESCRIBED   Commonly known as:  INTENTIONAL   Antiplatelet medication not prescribed intentionally due to Current anticoagulant therapy (warfarin/enoxaparin)                                blood glucose lancets standard   Commonly known as:  no brand specified   Use to test blood sugar 1 times daily or as directed.                                blood glucose monitoring lancets   Use to check blood sugars daily                                * blood glucose monitoring meter device kit   1 Device daily.                                * blood glucose monitoring meter device kit   Commonly known as:  no brand specified   Use to test blood sugar one times daily or as directed.                                * blood glucose monitoring test strip   Commonly known as:  ACCU-CHEK SMARTVIEW   1 strip by In Vitro route daily                                * blood glucose monitoring test strip   Commonly known as:  no brand specified   Use to test blood sugars one times daily or as directed .has type 2 diabetes and not on insulin                                diltiazem 120 MG 24 hr capsule   Commonly known as:  DILACOR XR   Take 1 capsule (120 mg) by mouth At Bedtime   Last time this was given:  120 mg on 10/21/2018  9:20 PM   Next Dose Due:  10/22/18   Notes to Patient:  Heart rhythm                         8pm  Due today            furosemide 20 MG tablet   Commonly known as:  LASIX   Take 1 tablet (20 mg) by mouth 2 times daily   Last time this was given:  20 mg on 10/22/2018  4:36 PM   Next Dose Due:  10/22/16   Notes to Patient:  Water pill             8am           4pm               glimepiride 2 MG tablet   Commonly known as:  AMARYL   Take 1 tablet (2 mg) by mouth 2 times daily   Last time this  was given:  2 mg on 10/22/2018  8:13 AM   Next Dose Due:  10/22/18   Notes to Patient:  Diabetes             8am               8pm  Due today            levothyroxine 88 MCG tablet   Commonly known as:  SYNTHROID/LEVOTHROID   Take 1 tablet (88 mcg) by mouth daily   Last time this was given:  88 mcg on 10/20/2018 10:15 AM   Next Dose Due:  10/23/18   Notes to Patient:  hypothyroidism             8am                         metFORMIN 500 MG tablet   Commonly known as:  GLUCOPHAGE   Take 1 tablet (500 mg) by mouth 2 times daily (with meals)   Next Dose Due:  10/22/18   Notes to Patient:  Diabetes             8am           6pm  Due Today   With supper                metoprolol succinate 50 MG 24 hr tablet   Commonly known as:  TOPROL-XL   Take 1.5 tablets (75 mg) by mouth 2 times daily   Last time this was given:  75 mg on 10/22/2018  8:13 AM   Next Dose Due:  10/22/18   Notes to Patient:  Heart blood pressure             8am               8pm  Due today            mirtazapine 7.5 MG Tabs tablet   Commonly known as:  REMERON   Take 1 tablet (7.5 mg) by mouth At Bedtime   Last time this was given:  7.5 mg on 10/21/2018  9:20 PM   Next Dose Due:  10/22/18                        8pm  Due today            multivitamin  with lutein Caps per capsule   Take 1 capsule by mouth daily   Notes to Patient:  Not given in Hospital resume as before                                 NASACORT ALLERGY 24HR 55 MCG/ACT inhaler   Spray 2 sprays into both nostrils daily as needed (during allergy season)   Generic drug:  triamcinolone                                   OLANZapine 2.5 MG tablet   Commonly known as:  zyPREXA   Take 1 tablet (2.5 mg) by mouth daily as needed (insomnia, agitation)   Last time this was given:  2.5 mg on 10/22/2018  2:57 AM                                   order for DME   Equipment being ordered: Compression Stockings Knee high 20/30 compression                                order for DME   Equipment being ordered:  cane with quad or prongs                                STATIN NOT PRESCRIBED (INTENTIONAL)   continuous prn. Statin not prescribed intentionally due to Other: Not needed, LDL at goal <100mg/dL without therapy                                triamcinolone 0.1 % cream   Commonly known as:  KENALOG   Apply sparingly to affected area three times daily for 14 days.   Notes to Patient:  Resume as ordered                                 warfarin 5 MG tablet   Commonly known as:  COUMADIN   TAKE 1/2 TO 1 TABLET BY MOUTH DAILY OR AS DIRECTED BY INR CLINIC   Last time this was given:  5 mg on 10/22/2018  4:37 PM   Next Dose Due:  10/23/18                                * Notice:  This list has 4 medication(s) that are the same as other medications prescribed for you. Read the directions carefully, and ask your doctor or other care provider to review them with you.

## 2018-10-19 NOTE — PROGRESS NOTES
Reviewed procal which is low and ultrasound showing moderate to large pleural effusion on the R, arguing against PNA.     Plan  - Continue current treatment for CHF.   - Continue to hold antibiotics  - Hold coumadin in anticipation for thoracentesis.

## 2018-10-19 NOTE — ED PROVIDER NOTES
History     Chief Complaint:  Shortness of breath     HPI   Shelly Rogers is a 85 year old female with a history of A-fib, congestive heart failure, and pacemaker who presents via EMS to the Emergency Department today for evaluation of shortness of breath. The patient reports she has a history of A-fib and that last night around 10 PM she began to feel very short of breath and anxious. She could not feel palpitations at that time. No pain. She has not missed any doses of her medications.     Allergies:  Lisinopril: cough  Sulfa drugs: hives     Medications:    Lasix  Amaryl  Levothyroxine  Metformin  Toprol  Centrum  Coumadin     Past Medical History:    Atrial fibrillation  Congestive heart failure  Diabetes  Hemorrhoids  Hypertension  Macular degeneration  Osteoarthritis  Pacemaker  Uterine cancer    Past Surgical History:    Pacemaker insertion  Cholecystectomy  GYN surgery  Hysterectomy  Joint replacement  Right keratotomy  Phacoemulsification with intraocular lens implant right  TKR bilateral    Family History:    Bowel obstruction  Cardiovascular  CAD  CHF    Social History:  Smoking status: Former smoker quit date 1/1/1990  Alcohol use: No  Marital Status:  Single [1]     Review of Systems   Respiratory: Positive for shortness of breath.    Cardiovascular: Negative for palpitations.   Musculoskeletal: Negative for arthralgias and myalgias.   Psychiatric/Behavioral: The patient is nervous/anxious.    All other systems reviewed and are negative.    Physical Exam     Patient Vitals for the past 24 hrs:   BP Temp Temp src Pulse Heart Rate Resp SpO2   10/19/18 0906 132/90 - - 107 - 20 94 %   10/19/18 0900 132/90 - - - - - -   10/19/18 0845 - - - - 107 18 -   10/19/18 0830 137/89 - - - 135 18 -   10/19/18 0800 (!) 136/100 - - - 116 16 93 %   10/19/18 0730 125/85 - - - 112 18 93 %   10/19/18 0700 (!) 143/104 - - - 130 22 94 %   10/19/18 0621 - 97.8  F (36.6  C) Oral - - - -   10/19/18 0612 129/68 - - 114 - 20 95  %       Physical Exam  Vitals: reviewed by me  General: Pt seen on hospital [facility], cooperative, and alert to conversation  Eyes: Tracking well, clear conjunctiva BL  ENT: MMM, midline trachea.   Lungs: Minimal tachypnea noted, scant crackles heard, no wheezing.  Good air sounds on both sides.  CV: Rate as above, regular rhythm.    Abd: Soft, non tender, no guarding, no rebound. Non distended  MSK: No evidence of trauma, 2+ pitting edema to bilateral lower extremities distal to the knee.  Skin: No rash, normal turgor and temperature  Neuro: Clear speech and no facial droop.  Psych: Not RIS, no e/o AH/VH      Emergency Department Course     ECG (6:12:58):  Rate 120 bpm. MN interval *. QRS duration 78. QT/QTc 334/472. P-R-T axes * 110 111. Atrial fibrillation with rapid ventricular response. Right axis deviation. Low voltage QRS. Cannot rule out anteroseptal infarct, age undetermined. Abnormal ECG. Interpreted at 0620 by Luís Tian.    Imaging:  Radiographic findings were communicated with the patient who voiced understanding of the findings.  XR Chest 2 Views  IMPRESSION:  1. A moderate-sized region of hazy opacities in the right lung base  that could represent pneumonia or atelectasis.  2. Small right pleural effusion. As read by radiology.    Laboratory:  CBC: WNL (WBC 9.4, HGB 14.4, )  CMP: Glucose 177 (H) o/w WNL (Creatinine 0.87)    Troponin I: <0.015  INR: 2.28 (H)  Nt probnp inpatient: 2356 (H)  Procalcitonin: 0.05  Magnesium: 2.1  TSH with free T4: 6.76 (H)  T4 free: 1.96 (H)    Blood culture: In process    Interventions:  0636:  mL IV Bolus  0802: Rocephin 2 G IV  0906: Lasix 40 MG IV    Emergency Department Course:  Past medical records, nursing notes, and vitals reviewed.  0609: I performed an exam of the patient and obtained history, as documented above.    IV inserted and blood drawn.    The patient was sent for a chest x-ray while in the emergency department, findings  above.    0721: I rechecked the patient. Explained findings to the patient.    Findings and plan explained to the Patient who consents to admission.     0639: Discussed the patient with Dr. Velazquez, who will admit the patient to a med [facility] bed for further monitoring, evaluation, and treatment.     0838: I spoke to Jamil of [facility] regarding this patient.    Impression & Plan      Medical Decision Making:  This 85 year old female presents to the Emergency Room with shortness of breath. Initially the differential was concerning for A-fib with RVR, but her rate was really only around 110 and 120, and her X-ray shows what appears to be a large right-sided pneumonia. Patient does have increasing oxygen needs here, and I feel as though her A-fib with RVR may be more accurately interpreted as A-fib with tachycardia as she has an acute stress. As such, I have not given any rate controlling medications, as she is stable here in the ER with a rate that varies between 100 and 130.  She is receiving a mild amount of fluid. I can not give too much because of her CHF history. No evidence of CHF exacerbation today. Her BNP is below her baseline. Her troponin is undetectable, and I see no evidence of ACS. Will give judicious fluid as well as IV antibiotics. Patient can be appropriately admitted to the floor, with telemetry. Spoke to Dr. Velazquez, who generously accepted the patient.     Diagnosis:    ICD-10-CM   1. Pneumonia of right lower lobe due to infectious organism (H) J18.1   2. Chronic atrial fibrillation (H) I48.2   3. Tachycardia R00.0     Disposition:  Admitted    Eliz López  10/19/2018    EMERGENCY DEPARTMENT  Scribe Disclosure:  I, Eliz López, am serving as a scribe at 6:09 AM on 10/19/2018 to document services personally performed by Luís Tian based on my observations and the provider's statements to me.        Luís Tian MD  10/19/18 7318

## 2018-10-19 NOTE — IP AVS SNAPSHOT
[facility]    [address] [place] [address]., [address]    [place] [address]    Phone:  [phone]                                       After Visit Summary   10/19/2018    Shelly Rogers    MRN: [id number]           After Visit Summary Signature Page     I have received my discharge instructions, and my questions have been answered. I have discussed any challenges I see with this plan with the nurse or doctor.    ..........................................................................................................................................  Patient/Patient Representative Signature      ..........................................................................................................................................  Patient Representative Print Name and Relationship to Patient    ..................................................               ................................................  Date                                   Time    ..........................................................................................................................................  Reviewed by Signature/Title    ...................................................              ..............................................  Date                                               Time          22EPIC Rev 08/18

## 2018-10-19 NOTE — PHARMACY-ADMISSION MEDICATION HISTORY
Admission medication history interview status for the 10/19/2018  admission is complete. See EPIC admission navigator for prior to admission medications     Medication history source reliability:Good    Actions taken by pharmacist (provider contacted, etc):interviewed patient and daughter with EPIC list     Additional medication history information not noted on PTA med list :takes synthroid in the middle of the night and already had today, hasn't had other meds    Medication reconciliation/reorder completed by provider prior to medication history? No    Time spent in this activity: 15 minutes    Prior to Admission medications    Medication Sig Last Dose Taking? Auth Provider   ACETAMINOPHEN PO Take 500 mg by mouth 3 times daily as needed (Takes at least 6 hours apart).   Yes Unknown, Entered By History   furosemide (LASIX) 20 MG tablet Take 1 tablet (20 mg) by mouth daily 10/18/2018 at Unknown time Yes Halley Huff MD   glimepiride (AMARYL) 2 MG tablet Take 1 tablet (2 mg) by mouth 2 times daily 10/18/2018 at Unknown time Yes Halley Huff MD   levothyroxine (SYNTHROID/LEVOTHROID) 88 MCG tablet Take 1 tablet (88 mcg) by mouth daily 10/19/2018 at am Yes Halley Huff MD   metFORMIN (GLUCOPHAGE) 500 MG tablet Take 1 tablet (500 mg) by mouth 2 times daily (with meals) 10/18/2018 at Unknown time Yes Halley Huff MD   metoprolol succinate (TOPROL-XL) 50 MG 24 hr tablet Take 1 tablet (50 mg) by mouth 2 times daily 10/18/2018 at Unknown time Yes Halley Huff MD   Multiple Vitamins-Minerals (CENTRUM SILVER PO) with leutine Take by mouth daily 10/18/2018 at Unknown time Yes Reported, Patient   triamcinolone (KENALOG) 0.1 % cream Apply sparingly to affected area three times daily for 14 days. 10/18/2018 at Unknown time Yes Maryan Reynoso APRN CNP   triamcinolone (NASACORT ALLERGY 24HR) 55 MCG/ACT nasal inhaler Spray 2 sprays into both nostrils daily as needed (during allergy season)  Past Month at  Unknown time Yes Reported, Patient   warfarin (COUMADIN) 5 MG tablet TAKE 1/2 TO 1 TABLET BY MOUTH DAILY OR AS DIRECTED BY INR CLINIC  Patient taking differently: Take 2.5-5 mg by mouth daily TAKE 1/2 TO 1 TABLET BY MOUTH DAILY OR AS DIRECTED BY INR CLINIC 10/18/2018 at Unknown time Yes Halley Huff MD   ACE/ARB NOT PRESCRIBED, INTENTIONAL, ACE & ARB not prescribed due to Other: HFpEF   Halley Huff MD   ASPIRIN NOT PRESCRIBED, INTENTIONAL, Antiplatelet medication not prescribed intentionally due to Current anticoagulant therapy (warfarin/enoxaparin)   Halley Huff MD   blood glucose (ACCU-CHEK SMARTVIEW) test strip 1 strip by In Vitro route daily   Halley Huff MD   blood glucose (NO BRAND SPECIFIED) lancets standard Use to test blood sugar 1 times daily or as directed.   Halley Huff MD   blood glucose monitoring (ACCU-CHEK FASTCLIX) lancets Use to check blood sugars daily   Halley Huff MD   blood glucose monitoring (NO BRAND SPECIFIED) meter device kit Use to test blood sugar one times daily or as directed.   Halley Huff MD   blood glucose monitoring (NO BRAND SPECIFIED) test strip Use to test blood sugars one times daily or as directed .has type 2 diabetes and not on insulin   Halley Huff MD   Blood Glucose Monitoring Suppl (ACCU-CHEK LAKIA SMARTVIEW) W/DEVICE KIT 1 Device daily.   Vianey Sapp MD   order for DME Equipment being ordered: Compression Stockings  Knee high  20/30 compression   Halley Huff MD   order for DME Equipment being ordered: cane with quad or prongs   Halley Huff MD   STATIN NOT PRESCRIBED, INTENTIONAL, continuous prn. Statin not prescribed intentionally due to Other: Not needed, LDL at goal <100mg/dL without therapy   Vianey Sapp MD

## 2018-10-19 NOTE — ED NOTES
Bed: ED25  Expected date:   Expected time:   Means of arrival:   Comments:  436 85f pacemaker/anxiety/SOB

## 2018-10-19 NOTE — PROGRESS NOTES
[employer] S[employer]ia     -------[facility] - in hospital for AF-------      AP: 34 % : 2 %  Mode: DDDR         Presenting Rhythm: AF  Heart Rate: adequate, ~10% of beats greater than 100 bpm   Sensing: A-low, V-not obtained    Pacing Threshold: A-not obtained, V-WNL    Impedance: WNL  Battery Status: 6 years  Atrial Arrhythmia: Since 9/6/17 she's had 4983 mode switches for a burden of 42%  Ventricular Arrhythmia: 2 VHR- brief PAT    Care Plan: Patient was inactivated from our clinic -  Placed order for device check. SK

## 2018-10-19 NOTE — PLAN OF CARE
Problem: Patient Care Overview  Goal: Plan of Care/Patient Progress Review  Outcome: No Change  Up in the room with assist, tolerating food, no chest pain, daughters are in the room. Patient is in Afib, and SOB with activity.

## 2018-10-20 LAB
ANION GAP SERPL CALCULATED.3IONS-SCNC: 5 MMOL/L (ref 3–14)
BUN SERPL-MCNC: 16 MG/DL (ref 7–30)
CALCIUM SERPL-MCNC: 8.4 MG/DL (ref 8.5–10.1)
CHLORIDE SERPL-SCNC: 96 MMOL/L (ref 94–109)
CO2 SERPL-SCNC: 31 MMOL/L (ref 20–32)
CREAT SERPL-MCNC: 0.77 MG/DL (ref 0.52–1.04)
GFR SERPL CREATININE-BSD FRML MDRD: 71 ML/MIN/1.7M2
GLUCOSE BLDC GLUCOMTR-MCNC: 128 MG/DL (ref 70–99)
GLUCOSE BLDC GLUCOMTR-MCNC: 146 MG/DL (ref 70–99)
GLUCOSE BLDC GLUCOMTR-MCNC: 165 MG/DL (ref 70–99)
GLUCOSE BLDC GLUCOMTR-MCNC: 206 MG/DL (ref 70–99)
GLUCOSE SERPL-MCNC: 118 MG/DL (ref 70–99)
INR PPP: 2.24 (ref 0.86–1.14)
POTASSIUM SERPL-SCNC: 4.2 MMOL/L (ref 3.4–5.3)
SODIUM SERPL-SCNC: 132 MMOL/L (ref 133–144)

## 2018-10-20 PROCEDURE — 25000132 ZZH RX MED GY IP 250 OP 250 PS 637: Mod: GY | Performed by: INTERNAL MEDICINE

## 2018-10-20 PROCEDURE — 99207 ZZC CDG-CODE CATEGORY CHANGED: CPT | Performed by: INTERNAL MEDICINE

## 2018-10-20 PROCEDURE — 25000128 H RX IP 250 OP 636: Performed by: INTERNAL MEDICINE

## 2018-10-20 PROCEDURE — 85610 PROTHROMBIN TIME: CPT | Performed by: INTERNAL MEDICINE

## 2018-10-20 PROCEDURE — 90662 IIV NO PRSV INCREASED AG IM: CPT | Performed by: INTERNAL MEDICINE

## 2018-10-20 PROCEDURE — A9270 NON-COVERED ITEM OR SERVICE: HCPCS | Mod: GY | Performed by: INTERNAL MEDICINE

## 2018-10-20 PROCEDURE — 00000146 ZZHCL STATISTIC GLUCOSE BY METER IP

## 2018-10-20 PROCEDURE — 36415 COLL VENOUS BLD VENIPUNCTURE: CPT | Performed by: INTERNAL MEDICINE

## 2018-10-20 PROCEDURE — 99232 SBSQ HOSP IP/OBS MODERATE 35: CPT | Performed by: INTERNAL MEDICINE

## 2018-10-20 PROCEDURE — 99233 SBSQ HOSP IP/OBS HIGH 50: CPT | Performed by: INTERNAL MEDICINE

## 2018-10-20 PROCEDURE — 21000001 ZZH R&B HEART CARE

## 2018-10-20 PROCEDURE — 80048 BASIC METABOLIC PNL TOTAL CA: CPT | Performed by: INTERNAL MEDICINE

## 2018-10-20 PROCEDURE — 25000131 ZZH RX MED GY IP 250 OP 636 PS 637: Mod: GY | Performed by: INTERNAL MEDICINE

## 2018-10-20 RX ORDER — DILTIAZEM HYDROCHLORIDE 120 MG/1
120 CAPSULE, EXTENDED RELEASE ORAL AT BEDTIME
Status: DISCONTINUED | OUTPATIENT
Start: 2018-10-20 | End: 2018-10-22 | Stop reason: HOSPADM

## 2018-10-20 RX ADMIN — DILTIAZEM HYDROCHLORIDE 60 MG: 60 CAPSULE, EXTENDED RELEASE ORAL at 10:11

## 2018-10-20 RX ADMIN — GLIMEPIRIDE 2 MG: 2 TABLET ORAL at 10:14

## 2018-10-20 RX ADMIN — DILTIAZEM HYDROCHLORIDE 120 MG: 120 CAPSULE, EXTENDED RELEASE ORAL at 21:38

## 2018-10-20 RX ADMIN — GLIMEPIRIDE 2 MG: 2 TABLET ORAL at 21:39

## 2018-10-20 RX ADMIN — METOPROLOL SUCCINATE 75 MG: 50 TABLET, EXTENDED RELEASE ORAL at 21:38

## 2018-10-20 RX ADMIN — FUROSEMIDE 20 MG: 10 INJECTION, SOLUTION INTRAVENOUS at 16:08

## 2018-10-20 RX ADMIN — LEVOTHYROXINE SODIUM 88 MCG: 88 TABLET ORAL at 10:15

## 2018-10-20 RX ADMIN — INFLUENZA A VIRUS A/MICHIGAN/45/2015 X-275 (H1N1) ANTIGEN (FORMALDEHYDE INACTIVATED), INFLUENZA A VIRUS A/SINGAPORE/INFIMH-16-0019/2016 IVR-186 (H3N2) ANTIGEN (FORMALDEHYDE INACTIVATED), AND INFLUENZA B VIRUS B/MARYLAND/15/2016 BX-69A (A B/COLORADO/6/2017-LIKE VIRUS) ANTIGEN (FORMALDEHYDE INACTIVATED) 0.5 ML: 60; 60; 60 INJECTION, SUSPENSION INTRAMUSCULAR at 10:51

## 2018-10-20 RX ADMIN — FUROSEMIDE 20 MG: 10 INJECTION, SOLUTION INTRAVENOUS at 10:11

## 2018-10-20 RX ADMIN — INSULIN ASPART 1 UNITS: 100 INJECTION, SOLUTION INTRAVENOUS; SUBCUTANEOUS at 18:24

## 2018-10-20 RX ADMIN — METOPROLOL SUCCINATE 75 MG: 50 TABLET, EXTENDED RELEASE ORAL at 10:12

## 2018-10-20 RX ADMIN — INSULIN ASPART 2 UNITS: 100 INJECTION, SOLUTION INTRAVENOUS; SUBCUTANEOUS at 13:40

## 2018-10-20 ASSESSMENT — ACTIVITIES OF DAILY LIVING (ADL)
ADLS_ACUITY_SCORE: 11

## 2018-10-20 NOTE — PROGRESS NOTES
[facility]    Cardiology Progress Note     Assessment & Plan   Shelly Rogers is a 85 year old female who was admitted on 10/19/2018.  Cardiology was consulted for recommendations regarding heart failure.    Active Problems:  1.  Acute on chronic diastolic heart failure.  2.  Sick sinus syndrome and paroxysmal atrial fibrillation status post permanent pacemaker implantation  3.  Diabetes mellitus      Plan:   From a heart failure perspective, she is doing much better today.  She was net -2.8 L for the day yesterday despite fairly low doses of Lasix IV.  This does make me suspect that she has not been on her outpatient Lasix on a regular basis.  Her echocardiogram showed preserved left ventricular systolic function along with mildly reduced right ventricular systolic function.  There is no significant valvular heart disease.    Moving forward, I would recommend an additional day of aggressive diuresis with IV Lasix.  The primary team is considering therapeutic thoracentesis of the right sided effusion which I think is quite reasonable.  Her Coumadin can be held temporarily though she is in atrial fibrillation on my exam today. We can transition to an oral form of Lasix tomorrow.  Her heart rates have been under reasonable control on metoprolol 75 mg twice daily.  We will likely continue this dose moving forward.    Cardiology will continue to follow along.  Please page with any questions or concerns.    Reji Gamez MD    Interval History   She is overall doing quite well today.  She feels considerably better today as compared to yesterday.  She denies any significant shortness of breath at this point.  Her lower extremity edema has significantly improved.    Physical Exam   Temp: 97.7  F (36.5  C) Temp src: Oral BP: 138/69 Pulse: 86 Heart Rate: 84 Resp: 19 SpO2: 95 % O2 Device: Nasal cannula    Vitals:    10/20/18 0500   Weight: 81.1 kg (178 lb 12.8 oz)     Vital Signs with Ranges  Temp:  [97.6  F  (36.4  C)-98.5  F (36.9  C)] 97.7  F (36.5  C)  Pulse:  [86] 86  Heart Rate:  [] 84  Resp:  [9-25] 19  BP: ()/(64-75) 138/69  SpO2:  [93 %-96 %] 95 %  I/O last 3 completed shifts:  In: 930 [P.O.:930]  Out: 4300 [Urine:4300]    Constitutional: No apparent distress.   Eyes: No xanthelasma or conjunctivitis  Respiratory: Mild crackles at the bases bilaterally.  Cardiovascular: Irregularly-regular rhythm with a normal rate. Normal S1 and S2. No murmurs. No extra heart sounds.  Difficult to assess JVP due to body habitus  Extremities: 1-2+ pitting edema bilaterally.  Neurologic: Moving all extremities. No facial assymmetry.  Psychiatric: Alert and oriented. Answers questions appropriately.     Medications       diltiazem  60 mg Oral BID     furosemide  20 mg Intravenous BID     glimepiride  2 mg Oral BID     insulin aspart  1-7 Units Subcutaneous TID AC     insulin aspart  1-5 Units Subcutaneous At Bedtime     levothyroxine  88 mcg Oral Daily     metoprolol succinate  75 mg Oral BID       Data   Results for orders placed or performed during the hospital encounter of 10/19/18 (from the past 24 hour(s))   US Chest Pleural Effusion Imaging    Narrative    US CHEST/PLEURAL EFFUSION IMAGING  10/19/2018 12:09 PM     HISTORY:  Shortness of breath.    COMPARISON: Chest x-ray dated 10/19/2018    FINDINGS: There is a moderate to large right pleural effusion and a  small left pleural effusion.    JEVON KANG MD   Glucose by meter   Result Value Ref Range    Glucose 110 (H) 70 - 99 mg/dL   Glucose by meter   Result Value Ref Range    Glucose 122 (H) 70 - 99 mg/dL   Basic metabolic panel   Result Value Ref Range    Sodium 132 (L) 133 - 144 mmol/L    Potassium 4.2 3.4 - 5.3 mmol/L    Chloride 96 94 - 109 mmol/L    Carbon Dioxide 31 20 - 32 mmol/L    Anion Gap 5 3 - 14 mmol/L    Glucose 118 (H) 70 - 99 mg/dL    Urea Nitrogen 16 7 - 30 mg/dL    Creatinine 0.77 0.52 - 1.04 mg/dL    GFR Estimate 71 >60 mL/min/1.7m2    GFR  Estimate If Black 86 >60 mL/min/1.7m2    Calcium 8.4 (L) 8.5 - 10.1 mg/dL   Glucose by meter   Result Value Ref Range    Glucose 128 (H) 70 - 99 mg/dL

## 2018-10-20 NOTE — H&P
[facility] [place]    Hospitalist Progress Note    Date of Service (when I saw the patient): 10/20/2018    Assessment & Plan   Shelly Rogers is a 85 year old female with history of paroxysmal atrial fibrillation, heart failure with preserved EF, type 2 diabetes mellitus, hypothyroidism who presents with progressive symptoms over several months, characterized by anxiousness shortness of breath with paroxysmal nocturnal dyspnea and lower extremity swelling.  At presentation she is in atrial fibrillation with a heart rate in the 120s, chest x-ray reveals a right pleural effusion and pulmonary vascular congestion by my read, although there is suggestion of a right lower lobe infiltrate.  She has had no fevers, chills, chest pain or new cough to suggest pneumonia.     Acute decompensated right heart failure / diastolic dysfunction - echocardiogram on May 2016 reveals an EF of 60-65% with mild concentric LVH LA borderline dilated, trace MR, moderate TR, mild hypertension, Echo on 10/20/18 - EF 55-60% RV moderately dilated with decreased fxn, 2+ TR  R mod to large effusion by U/s on 10/20/18  Paroxysmal atrial fibrillation, RVR- likely contributing to above. PM interrogation shows frequent afib with rare rates >120.   Anticoagulated with coumadin -INR 2.28 at admission    - Lasix 40 mg x 1 @ admission > 20 mg IV BID   - Over 4.8 L UOP since admission. First wt was today. BMP looks okay.   - Strict I's and O's, daily weights  - Holding PTA Coumadin for thoracentesis      Recent Labs  Lab 10/20/18  1259 10/19/18  0620 10/15/18   INR 2.24* 2.28* 1.9*     - HR controlled on PTA metoprolol XL 75 mg twice daily (increased dose) + diltiazem 60 mg BID (change to 120)   - PRN IV metoprolol   - Sleep study at discharge.     Question of Anxiety - versus reaction to above.   - monitor with treatment before initiating evaluation and treatment of anxiety.     DM, type II -blood sugar at admission 177.  A1c 7.8 on 6/12/18    -Continue PTA glimepiride   -Hold PTA metformin  -Sliding scale insulin plus blood sugar checks 4 times daily ordered.  Adjust insulin as needed      Recent Labs  Lab 10/20/18  1704 10/20/18  1154 10/20/18  0722 10/20/18  0503 10/19/18  2137 10/19/18  1717 10/19/18  1129 10/19/18  0620   GLC  --   --   --  118*  --   --   --  177*   * 206* 128*  --  122* 110* 131*  --        Hypothyroidism - Both TSH up at 6.76, FT4 at 1.96 elevated?  Doubt central hyperthyroidism and mild. Check total T3. Hold levothyroxine today.     DVT prophylaxis - INR therapeutic at admission on Coumadin    CODE STATUS -discussed patient would like to be a full code.    Disposition: Expected discharge Monday    Communication: Discussed with daughter on 10/20/18    Itzel Velazquez  [phone] (p)  Text Page (7AM - 6PM)     Interval History   Feeling much better, actually slept well, without SOB.     -Data reviewed today: I reviewed all new labs and imaging results over the last 24 hours. I personally reviewed the EKG tracing showing Afib with CVR.    Physical Exam   Temp: 97.9  F (36.6  C) Temp src: Oral BP: 121/66 Pulse: 86 Heart Rate: 86 Resp: 18 SpO2: 99 % O2 Device: None (Room air)    Vitals:    10/20/18 0500   Weight: 81.1 kg (178 lb 12.8 oz)     Vital Signs with Ranges  Temp:  [97.6  F (36.4  C)-98.5  F (36.9  C)] 97.9  F (36.6  C)  Pulse:  [86] 86  Heart Rate:  [77-92] 86  Resp:  [12-25] 18  BP: ()/(64-75) 121/66  SpO2:  [93 %-99 %] 99 %  I/O last 3 completed shifts:  In: 1060 [P.O.:1060]  Out: 3625 [Urine:3625]    Constitutional:  NAD,   Neuropsyche:  alert and oriented, answers questions appropriately.   Respiratory:  Breathing comfortably, good air exchange, no wheezes, no crackles.   Cardiovascular:  Irregular rate and rhythm, 1-2+ edema.  GI:  soft, NT/ND, BS normal  Skin/Integumen:  No acute rash or sign of bleeding.     Medications   All medications reviewed on 10/20/18        diltiazem  60 mg Oral BID      furosemide  20 mg Intravenous BID     glimepiride  2 mg Oral BID     insulin aspart  1-7 Units Subcutaneous TID AC     insulin aspart  1-5 Units Subcutaneous At Bedtime     metoprolol succinate  75 mg Oral BID       Data     Recent Labs  Lab 10/20/18  1259 10/20/18  0503 10/19/18  0620 10/15/18   WBC  --   --  9.4  --    HGB  --   --  14.4  --    MCV  --   --  91  --    PLT  --   --  181  --    INR 2.24*  --  2.28* 1.9*   NA  --  132* 133  --    POTASSIUM  --  4.2 5.0  --    CHLORIDE  --  96 99  --    CO2  --  31 25  --    BUN  --  16 17  --    CR  --  0.77 0.87  --    ANIONGAP  --  5 9  --    ASTRID  --  8.4* 8.9  --    GLC  --  118* 177*  --    ALBUMIN  --   --  3.6  --    PROTTOTAL  --   --  7.0  --    BILITOTAL  --   --  1.2  --    ALKPHOS  --   --  130  --    ALT  --   --  19  --    AST  --   --  16  --    TROPI  --   --  <0.015  --        No results found for this or any previous visit (from the past 24 hour(s)).

## 2018-10-20 NOTE — PLAN OF CARE
Problem: Patient Care Overview  Goal: Plan of Care/Patient Progress Review  Pt A&Ox4, forgetful at times. VSS on RA, tele A-fib CVR. Pt denies CP, dizziness, and SOB. Mild CROCKETT when amb to BR, SBA. BLE +2-3. Plan to hold coumadin for possible thoracentesis. Will continue to monitor.

## 2018-10-20 NOTE — PLAN OF CARE
Problem: Patient Care Overview  Goal: Plan of Care/Patient Progress Review  Outcome: Improving  Pt able to make needs known. SBA in room. Voiding via toilet. Strict I&Os. No SOB or CP. Encouraged pt to elevate lower extremities. Tylenol PO given for restless legs- pt states it helped.

## 2018-10-20 NOTE — PLAN OF CARE
VSS. AOx4. Up with SBA, IV SL. Tele Afib CVR with HR of 80. Plan: Check INR tomorrow. Thora when INR is low enough.

## 2018-10-21 LAB
ANION GAP SERPL CALCULATED.3IONS-SCNC: 8 MMOL/L (ref 3–14)
BUN SERPL-MCNC: 21 MG/DL (ref 7–30)
CALCIUM SERPL-MCNC: 8.3 MG/DL (ref 8.5–10.1)
CHLORIDE SERPL-SCNC: 95 MMOL/L (ref 94–109)
CO2 SERPL-SCNC: 29 MMOL/L (ref 20–32)
CREAT SERPL-MCNC: 0.88 MG/DL (ref 0.52–1.04)
GFR SERPL CREATININE-BSD FRML MDRD: 61 ML/MIN/1.7M2
GLUCOSE BLDC GLUCOMTR-MCNC: 144 MG/DL (ref 70–99)
GLUCOSE BLDC GLUCOMTR-MCNC: 151 MG/DL (ref 70–99)
GLUCOSE BLDC GLUCOMTR-MCNC: 167 MG/DL (ref 70–99)
GLUCOSE BLDC GLUCOMTR-MCNC: 202 MG/DL (ref 70–99)
GLUCOSE BLDC GLUCOMTR-MCNC: 231 MG/DL (ref 70–99)
GLUCOSE SERPL-MCNC: 161 MG/DL (ref 70–99)
INR PPP: 1.97 (ref 0.86–1.14)
LDH SERPL L TO P-CCNC: 401 U/L (ref 81–234)
POTASSIUM SERPL-SCNC: 4.5 MMOL/L (ref 3.4–5.3)
PROT SERPL-MCNC: 6.8 G/DL (ref 6.8–8.8)
SODIUM SERPL-SCNC: 132 MMOL/L (ref 133–144)
T3 SERPL-MCNC: 66 NG/DL (ref 60–181)

## 2018-10-21 PROCEDURE — 99221 1ST HOSP IP/OBS SF/LOW 40: CPT | Performed by: PSYCHIATRY & NEUROLOGY

## 2018-10-21 PROCEDURE — 80048 BASIC METABOLIC PNL TOTAL CA: CPT | Performed by: INTERNAL MEDICINE

## 2018-10-21 PROCEDURE — A9270 NON-COVERED ITEM OR SERVICE: HCPCS | Mod: GY | Performed by: INTERNAL MEDICINE

## 2018-10-21 PROCEDURE — 21000001 ZZH R&B HEART CARE

## 2018-10-21 PROCEDURE — 25000132 ZZH RX MED GY IP 250 OP 250 PS 637: Mod: GY | Performed by: INTERNAL MEDICINE

## 2018-10-21 PROCEDURE — 99232 SBSQ HOSP IP/OBS MODERATE 35: CPT | Performed by: INTERNAL MEDICINE

## 2018-10-21 PROCEDURE — 84155 ASSAY OF PROTEIN SERUM: CPT | Performed by: INTERNAL MEDICINE

## 2018-10-21 PROCEDURE — A9270 NON-COVERED ITEM OR SERVICE: HCPCS | Mod: GY | Performed by: STUDENT IN AN ORGANIZED HEALTH CARE EDUCATION/TRAINING PROGRAM

## 2018-10-21 PROCEDURE — 84480 ASSAY TRIIODOTHYRONINE (T3): CPT | Performed by: INTERNAL MEDICINE

## 2018-10-21 PROCEDURE — 25000132 ZZH RX MED GY IP 250 OP 250 PS 637: Mod: GY | Performed by: STUDENT IN AN ORGANIZED HEALTH CARE EDUCATION/TRAINING PROGRAM

## 2018-10-21 PROCEDURE — 85610 PROTHROMBIN TIME: CPT | Performed by: INTERNAL MEDICINE

## 2018-10-21 PROCEDURE — 36415 COLL VENOUS BLD VENIPUNCTURE: CPT | Performed by: INTERNAL MEDICINE

## 2018-10-21 PROCEDURE — 00000146 ZZHCL STATISTIC GLUCOSE BY METER IP

## 2018-10-21 PROCEDURE — 83615 LACTATE (LD) (LDH) ENZYME: CPT | Performed by: INTERNAL MEDICINE

## 2018-10-21 PROCEDURE — 99207 ZZC CDG-MDM COMPONENT: MEETS LOW - DOWN CODED: CPT | Performed by: INTERNAL MEDICINE

## 2018-10-21 RX ORDER — LORAZEPAM 0.5 MG/1
0.5 TABLET ORAL
Status: COMPLETED | OUTPATIENT
Start: 2018-10-21 | End: 2018-10-21

## 2018-10-21 RX ORDER — FUROSEMIDE 10 MG/ML
20 INJECTION INTRAMUSCULAR; INTRAVENOUS
Status: DISCONTINUED | OUTPATIENT
Start: 2018-10-21 | End: 2018-10-21

## 2018-10-21 RX ORDER — FUROSEMIDE 10 MG/ML
40 INJECTION INTRAMUSCULAR; INTRAVENOUS
Status: DISCONTINUED | OUTPATIENT
Start: 2018-10-21 | End: 2018-10-21

## 2018-10-21 RX ORDER — MIRTAZAPINE 7.5 MG/1
7.5 TABLET, FILM COATED ORAL AT BEDTIME
Status: DISCONTINUED | OUTPATIENT
Start: 2018-10-21 | End: 2018-10-21

## 2018-10-21 RX ORDER — MIRTAZAPINE 7.5 MG/1
7.5 TABLET, FILM COATED ORAL AT BEDTIME
Status: DISCONTINUED | OUTPATIENT
Start: 2018-10-21 | End: 2018-10-22 | Stop reason: HOSPADM

## 2018-10-21 RX ORDER — FUROSEMIDE 20 MG
20 TABLET ORAL
Status: DISCONTINUED | OUTPATIENT
Start: 2018-10-21 | End: 2018-10-22 | Stop reason: HOSPADM

## 2018-10-21 RX ADMIN — MIRTAZAPINE 7.5 MG: 7.5 TABLET ORAL at 21:20

## 2018-10-21 RX ADMIN — INSULIN ASPART 2 UNITS: 100 INJECTION, SOLUTION INTRAVENOUS; SUBCUTANEOUS at 18:16

## 2018-10-21 RX ADMIN — INSULIN ASPART 1 UNITS: 100 INJECTION, SOLUTION INTRAVENOUS; SUBCUTANEOUS at 09:02

## 2018-10-21 RX ADMIN — FUROSEMIDE 20 MG: 20 TABLET ORAL at 15:59

## 2018-10-21 RX ADMIN — LORAZEPAM 0.5 MG: 0.5 TABLET ORAL at 02:53

## 2018-10-21 RX ADMIN — GLIMEPIRIDE 2 MG: 2 TABLET ORAL at 08:58

## 2018-10-21 RX ADMIN — METOPROLOL SUCCINATE 75 MG: 50 TABLET, EXTENDED RELEASE ORAL at 21:20

## 2018-10-21 RX ADMIN — FUROSEMIDE 20 MG: 20 TABLET ORAL at 08:58

## 2018-10-21 RX ADMIN — INSULIN ASPART 2 UNITS: 100 INJECTION, SOLUTION INTRAVENOUS; SUBCUTANEOUS at 12:45

## 2018-10-21 RX ADMIN — GLIMEPIRIDE 2 MG: 2 TABLET ORAL at 21:20

## 2018-10-21 RX ADMIN — METOPROLOL SUCCINATE 75 MG: 50 TABLET, EXTENDED RELEASE ORAL at 08:58

## 2018-10-21 RX ADMIN — Medication 2.5 MG: at 22:19

## 2018-10-21 RX ADMIN — Medication 1 MG: at 00:31

## 2018-10-21 RX ADMIN — DILTIAZEM HYDROCHLORIDE 120 MG: 120 CAPSULE, EXTENDED RELEASE ORAL at 21:20

## 2018-10-21 ASSESSMENT — ACTIVITIES OF DAILY LIVING (ADL)
ADLS_ACUITY_SCORE: 12
ADLS_ACUITY_SCORE: 11
ADLS_ACUITY_SCORE: 12
ADLS_ACUITY_SCORE: 11

## 2018-10-21 NOTE — PROVIDER NOTIFICATION
"MD Notification    Notified Person: MD    Notified Person Name: Dr. Gaitanh    Notification Date/Time: 10/21 @ 02:45 AM     Notification Interaction: Paged     Purpose of Notification: Pt reporting symptoms of anxiety w/ associated inability to sleep, pt fidgeting/restless in bed, reporting needing to get out of bed, per patient \"I can't stop my mind from racing\"     Orders Received: PRN oral ativan 0.5 mg one time     Comments:    "

## 2018-10-21 NOTE — PROGRESS NOTES
[facility]    Cardiology Progress Note     Assessment & Plan   Shelly Rogers is a 85 year old female who was admitted on 10/19/2018.  Cardiology was consulted for recommendations regarding heart failure.     Active Problems:  1.  Acute on chronic diastolic heart failure.  2.  Sick sinus syndrome and paroxysmal atrial fibrillation status post permanent pacemaker implantation  3.  Diabetes mellitus    She continues to diurese quite well.  She was -2.6 L yesterday on Lasix 20 mg IV twice a day.  Her left lung is completely clear.  Her right lung has some decreased breath sounds at the base consistent with her pleural effusion.  Reasonable to consider therapeutic thoracentesis under the guidance of the primary team.  Her Coumadin can be held temporarily and then resumed afterwards.    We will going to change her from Lasix IV to Lasix 20 mg oral today.  We will start a twice daily dosing and then reduce to once a day dosing at time of discharge likely dependent upon her outputs.  Cardiology will continue to follow along.  Please page with any questions or concerns.    Reji Gamez MD    Interval History   Mrs. Rogers had a bit of a rough night.  She did not sleep too much.  This is primarily due to some bad dreams as well as problems with her nose.  She feels a little short of wind from the standpoint of being able to breathing through her nose but otherwise feels fairly stable.  She continues to make excellent urine and was net -2.6 L for the day yesterday.    Physical Exam   Temp: 97.6  F (36.4  C) Temp src: Oral BP: 99/65 Pulse: 77 Heart Rate: 77 Resp: 18 SpO2: 92 % O2 Device: None (Room air)    Vitals:    10/20/18 0500 10/21/18 0400   Weight: 81.1 kg (178 lb 12.8 oz) 81.1 kg (178 lb 12.8 oz)     Vital Signs with Ranges  Temp:  [97.6  F (36.4  C)-97.9  F (36.6  C)] 97.6  F (36.4  C)  Pulse:  [77] 77  Heart Rate:  [77-93] 77  Resp:  [18-19] 18  BP: ()/(57-75) 99/65  SpO2:  [91 %-99 %] 92  %  I/O last 3 completed shifts:  In: 680 [P.O.:680]  Out: 2875 [Urine:2875]    Constitutional: No apparent distress.   Eyes: No xanthelasma or conjunctivitis  Respiratory:  Left lung clear.  Decreased breath sounds at the right lung base.  Cardiovascular: Irregularly-regular rhythm with a normal rate. Normal S1 and S2. No murmurs. No extra heart sounds.  Difficult to assess JVP due to body habitus  Extremities: 1+ pitting edema bilaterally.  Neurologic: Moving all extremities. No facial assymmetry.  Psychiatric: Alert and oriented. Answers questions appropriately.     Medications       diltiazem  120 mg Oral At Bedtime     furosemide  20 mg Intravenous BID     glimepiride  2 mg Oral BID     insulin aspart  1-7 Units Subcutaneous TID AC     insulin aspart  1-5 Units Subcutaneous At Bedtime     metoprolol succinate  75 mg Oral BID       Data   Results for orders placed or performed during the hospital encounter of 10/19/18 (from the past 24 hour(s))   Glucose by meter   Result Value Ref Range    Glucose 206 (H) 70 - 99 mg/dL   INR   Result Value Ref Range    INR 2.24 (H) 0.86 - 1.14   Glucose by meter   Result Value Ref Range    Glucose 146 (H) 70 - 99 mg/dL   Glucose by meter   Result Value Ref Range    Glucose 165 (H) 70 - 99 mg/dL   Glucose by meter   Result Value Ref Range    Glucose 151 (H) 70 - 99 mg/dL   INR   Result Value Ref Range    INR 1.97 (H) 0.86 - 1.14   Basic metabolic panel   Result Value Ref Range    Sodium 132 (L) 133 - 144 mmol/L    Potassium 4.5 3.4 - 5.3 mmol/L    Chloride 95 94 - 109 mmol/L    Carbon Dioxide 29 20 - 32 mmol/L    Anion Gap 8 3 - 14 mmol/L    Glucose 161 (H) 70 - 99 mg/dL    Urea Nitrogen 21 7 - 30 mg/dL    Creatinine 0.88 0.52 - 1.04 mg/dL    GFR Estimate 61 >60 mL/min/1.7m2    GFR Estimate If Black 74 >60 mL/min/1.7m2    Calcium 8.3 (L) 8.5 - 10.1 mg/dL

## 2018-10-21 NOTE — H&P
[facility]    Hospitalist Progress Note    Date of Service (when I saw the patient): 10/21/2018    Assessment & Plan   Shelly Rogers is a 85 year old female with history of paroxysmal atrial fibrillation, heart failure with preserved EF, type 2 diabetes mellitus, hypothyroidism who presents with progressive symptoms over several months, characterized by anxiousness shortness of breath with paroxysmal nocturnal dyspnea and lower extremity swelling.  At presentation she is in atrial fibrillation with a heart rate in the 120s, chest x-ray reveals a right pleural effusion and pulmonary vascular congestion by my read, although there is suggestion of a right lower lobe infiltrate.  She has had no fevers, chills, chest pain or new cough to suggest pneumonia.     Acute decompensated right heart failure / diastolic dysfunction - echocardiogram on May 2016 reveals an EF of 60-65% with mild concentric LVH LA borderline dilated, trace MR, moderate TR, mild hypertension, Echo on 10/20/18 - EF 55-60% RV moderately dilated with decreased fxn, 2+ TR  R mod to large effusion by U/s on 10/20/18  Paroxysmal atrial fibrillation, RVR- likely contributing to above. PM interrogation shows frequent afib with rare rates >120.   Anticoagulated with coumadin -INR 2.28 at admission    - Lasix 40 mg x 1 @ admission > 20 mg IV BID   - Continues to have over 2.5 L UOP daily, -5.8 net output since admission. Wt stable, No wt at admission 81.1 on 10/21, unchanged.   - Cardiology changed to lasix 20 mg PO BID on 10/21/18, plan for once daily dosing at discharge.   - Strict I's and O's, daily weights  - Holding PTA Coumadin for thoracentesis, ordered for 10/22/18.       Recent Labs  Lab 10/21/18  0606 10/20/18  1259 10/19/18  0620 10/15/18   INR 1.97* 2.24* 2.28* 1.9*     - HR controlled on PTA metoprolol XL 75 mg twice daily (increased dose) + diltiazem 120 daily.  - PRN IV metoprolol   - Sleep study at discharge. Consult placed  with Social Work to assist in arranging this on 10/21/18. Thank you.      Anxiety, Insomnia - On. 10/21/18, patient states she had a rough night, could not sleep. Took melantonin, without relief, required ativan 0.5 mg x 1, patient thought this was Ambien.  Despite improvement in breathing, patient is continuing to feel anxious and agitated, espiecally at night. Had trouble sleeping. Mind racing. Per daughter this is very unlike her mother, and started a few months ago. She seems to be   - Discussed and started mirtazapine 7.5 mg daily on 10/21/18.   - Low-dose Ambien will be available PRN  - Psychiatry consult placed 10/21/18.   - Evaluating thyroid below, but doubt contributing.     DM, type II -blood sugar at admission 177.  A1c 7.8 on 6/12/18   - Continue PTA glimepiride   - Holding PTA metformin  - Sliding scale insulin plus blood sugar checks 4 times daily ordered.  Adjust insulin as needed      Recent Labs  Lab 10/21/18  0741 10/21/18  0606 10/21/18  0133 10/20/18  2109 10/20/18  1704 10/20/18  1154 10/20/18  0722 10/20/18  0503  10/19/18  0620   GLC  --  161*  --   --   --   --   --  118*  --  177*   *  --  151* 165* 146* 206* 128*  --   < >  --    < > = values in this interval not displayed.    Mild hyponatremia -  133 at admission, 132 now. Of unlikely clinical significance. Monitoring.     Hypothyroidism - Both TSH up at 6.76, FT4 at 1.96 elevated?  Doubt central hyperthyroidism and mild. Check total T3.   - Holding levothyroxine since 10/20.   - T3 ordered and is pending on 10/21/18.   - Once I have this, I may just repeat labs vs discus with endocrinologist.     DVT prophylaxis - INR therapeutic at admission on Coumadin and remains near 2. AMBULATE.     CODE STATUS -discussed patient would like to be a full code.    Disposition: Expected discharge Monday, 10/21    Communication: Discussed with daughter, cardiologist on 10/21/18    Itzel CHARLIEEduardo Velazquez  [phone] (p)  Text Page (7AM - 6PM)      Interval History   Had a rough night. See history above.     -Data reviewed today: I reviewed all new labs and imaging results over the last 24 hours. I personally reviewed the EKG tracing showing Afib with CVR.    Physical Exam   Temp: 97.6  F (36.4  C) Temp src: Oral BP: 109/62 Pulse: 82 Heart Rate: 77 Resp: 18 SpO2: 92 % O2 Device: None (Room air)    Vitals:    10/20/18 0500 10/21/18 0400   Weight: 81.1 kg (178 lb 12.8 oz) 81.1 kg (178 lb 12.8 oz)     Vital Signs with Ranges  Temp:  [97.6  F (36.4  C)-97.9  F (36.6  C)] 97.6  F (36.4  C)  Pulse:  [77-82] 82  Heart Rate:  [77-93] 77  Resp:  [18-19] 18  BP: ()/(57-75) 109/62  SpO2:  [91 %-99 %] 92 %  I/O last 3 completed shifts:  In: 680 [P.O.:680]  Out: 2875 [Urine:2875]    Constitutional:  NAD,   Neuropsyche:  alert and oriented, answers questions appropriately.   Respiratory:  Breathing comfortably, good air exchange, decreased on right base, no wheezes, no crackles.   Cardiovascular:  Irregular rate and rhythm, 1+ edema.  GI:  soft, NT/ND, BS normal  Skin/Integumen:  No acute rash or sign of bleeding.     Medications   All medications reviewed on 10/21/18        diltiazem  120 mg Oral At Bedtime     furosemide  20 mg Oral BID     glimepiride  2 mg Oral BID     insulin aspart  1-7 Units Subcutaneous TID AC     insulin aspart  1-5 Units Subcutaneous At Bedtime     metoprolol succinate  75 mg Oral BID       Data     Recent Labs  Lab 10/21/18  0606 10/20/18  1259 10/20/18  0503 10/19/18  0620   WBC  --   --   --  9.4   HGB  --   --   --  14.4   MCV  --   --   --  91   PLT  --   --   --  181   INR 1.97* 2.24*  --  2.28*   *  --  132* 133   POTASSIUM 4.5  --  4.2 5.0   CHLORIDE 95  --  96 99   CO2 29  --  31 25   BUN 21  --  16 17   CR 0.88  --  0.77 0.87   ANIONGAP 8  --  5 9   ASTRID 8.3*  --  8.4* 8.9   *  --  118* 177*   ALBUMIN  --   --   --  3.6   PROTTOTAL  --   --   --  7.0   BILITOTAL  --   --   --  1.2   ALKPHOS  --   --   --  130   ALT   --   --   --  19   AST  --   --   --  16   TROPI  --   --   --  <0.015       No results found for this or any previous visit (from the past 24 hour(s)).

## 2018-10-21 NOTE — PLAN OF CARE
Problem: Patient Care Overview  Goal: Plan of Care/Patient Progress Review  Outcome: No Change  A&O, occasional disorganized thinking overnight. VSS on room air. Tele: A fib w/ CVR, HR 90's. Denies CP. CROCKETT. C/o anxiety causing inability to sleep overnight. One time dose of 0.5 mg lorazepam given. Up w/ assist of 1. Plan for R sided thoracentesis Monday and discharge to follow.
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
## 2018-10-21 NOTE — PLAN OF CARE
Problem: Patient Care Overview  Goal: Plan of Care/Patient Progress Review  Outcome: Improving  A&O.  VSS ex soft BPs, stable.  Room air.  Tele: Afib CVR w/ occ V-pacing.  Denies pain.  Tolerating Mod CHO/2gm NA diet.  Up with SBA.  PO lasix.  Bgm before meals, 4 units total given this shift.  Psych consulted for anxiety and sleeplessness.  LS dim at bases, but clear, plan for RT thoracentesis tomorrow.  Plan for sleep study at discharge.

## 2018-10-21 NOTE — PLAN OF CARE
Problem: Patient Care Overview  Goal: Plan of Care/Patient Progress Review  Outcome: No Change  Patient A&O but forgetful and [place], Denies chest pain/tightness/pressure and on tele A-Fib CVR and VSS on Metoprolol 75 mg and Diltiazem 120 mg,  Lungs are fine crackles at bases and on RA, up with assist of 1 , on cardiac diet, skin is marcela , voiding well and last BM 10/20. IV is in R arm and SL. Plan for possible R thoracentesis for large pleural effusion.

## 2018-10-22 ENCOUNTER — APPOINTMENT (OUTPATIENT)
Dept: GENERAL RADIOLOGY | Facility: CLINIC | Age: 83
DRG: 308 | End: 2018-10-22
Attending: PHYSICIAN ASSISTANT
Payer: MEDICARE

## 2018-10-22 ENCOUNTER — APPOINTMENT (OUTPATIENT)
Dept: ULTRASOUND IMAGING | Facility: CLINIC | Age: 83
DRG: 308 | End: 2018-10-22
Attending: INTERNAL MEDICINE
Payer: MEDICARE

## 2018-10-22 VITALS
DIASTOLIC BLOOD PRESSURE: 71 MMHG | BODY MASS INDEX: 32.65 KG/M2 | SYSTOLIC BLOOD PRESSURE: 125 MMHG | WEIGHT: 177.4 LBS | TEMPERATURE: 97.6 F | RESPIRATION RATE: 16 BRPM | HEIGHT: 62 IN | HEART RATE: 90 BPM | OXYGEN SATURATION: 94 %

## 2018-10-22 LAB
ANION GAP SERPL CALCULATED.3IONS-SCNC: 5 MMOL/L (ref 3–14)
APPEARANCE FLD: NORMAL
BASOPHILS NFR FLD MANUAL: 1 %
BUN SERPL-MCNC: 15 MG/DL (ref 7–30)
CALCIUM SERPL-MCNC: 8 MG/DL (ref 8.5–10.1)
CHLORIDE SERPL-SCNC: 99 MMOL/L (ref 94–109)
CO2 SERPL-SCNC: 30 MMOL/L (ref 20–32)
COLOR FLD: YELLOW
CREAT SERPL-MCNC: 0.81 MG/DL (ref 0.52–1.04)
GFR SERPL CREATININE-BSD FRML MDRD: 67 ML/MIN/1.7M2
GLUCOSE BLDC GLUCOMTR-MCNC: 115 MG/DL (ref 70–99)
GLUCOSE BLDC GLUCOMTR-MCNC: 124 MG/DL (ref 70–99)
GLUCOSE BLDC GLUCOMTR-MCNC: 201 MG/DL (ref 70–99)
GLUCOSE FLD-MCNC: 188 MG/DL
GLUCOSE SERPL-MCNC: 129 MG/DL (ref 70–99)
GRAM STN SPEC: NORMAL
GRAM STN SPEC: NORMAL
INR PPP: 1.7 (ref 0.86–1.14)
LDH FLD L TO P-CCNC: 65 U/L
LYMPHOCYTES NFR FLD MANUAL: 82 %
NEUTS BAND NFR FLD MANUAL: 17 %
POTASSIUM SERPL-SCNC: 3.8 MMOL/L (ref 3.4–5.3)
PROT FLD-MCNC: 2.5 G/DL
SODIUM SERPL-SCNC: 134 MMOL/L (ref 133–144)
SPECIMEN SOURCE FLD: NORMAL
SPECIMEN SOURCE: NORMAL
WBC # FLD AUTO: 184 /UL

## 2018-10-22 PROCEDURE — 25000132 ZZH RX MED GY IP 250 OP 250 PS 637: Mod: GY | Performed by: INTERNAL MEDICINE

## 2018-10-22 PROCEDURE — 80048 BASIC METABOLIC PNL TOTAL CA: CPT | Performed by: INTERNAL MEDICINE

## 2018-10-22 PROCEDURE — A9270 NON-COVERED ITEM OR SERVICE: HCPCS | Mod: GY | Performed by: INTERNAL MEDICINE

## 2018-10-22 PROCEDURE — 88112 CYTOPATH CELL ENHANCE TECH: CPT | Mod: 26 | Performed by: INTERNAL MEDICINE

## 2018-10-22 PROCEDURE — 99239 HOSP IP/OBS DSCHRG MGMT >30: CPT | Performed by: INTERNAL MEDICINE

## 2018-10-22 PROCEDURE — 00000102 ZZHCL STATISTIC CYTO WRIGHT STAIN TC: Performed by: INTERNAL MEDICINE

## 2018-10-22 PROCEDURE — 27210190 US THORACENTESIS

## 2018-10-22 PROCEDURE — 88305 TISSUE EXAM BY PATHOLOGIST: CPT | Performed by: INTERNAL MEDICINE

## 2018-10-22 PROCEDURE — 00000146 ZZHCL STATISTIC GLUCOSE BY METER IP

## 2018-10-22 PROCEDURE — 99232 SBSQ HOSP IP/OBS MODERATE 35: CPT | Performed by: INTERNAL MEDICINE

## 2018-10-22 PROCEDURE — 87205 SMEAR GRAM STAIN: CPT | Performed by: INTERNAL MEDICINE

## 2018-10-22 PROCEDURE — 85610 PROTHROMBIN TIME: CPT | Performed by: INTERNAL MEDICINE

## 2018-10-22 PROCEDURE — 83615 LACTATE (LD) (LDH) ENZYME: CPT | Performed by: INTERNAL MEDICINE

## 2018-10-22 PROCEDURE — 88112 CYTOPATH CELL ENHANCE TECH: CPT | Performed by: INTERNAL MEDICINE

## 2018-10-22 PROCEDURE — 89051 BODY FLUID CELL COUNT: CPT | Performed by: INTERNAL MEDICINE

## 2018-10-22 PROCEDURE — 40000863 ZZH STATISTIC RADIOLOGY XRAY, US, CT, MAR, NM

## 2018-10-22 PROCEDURE — 82945 GLUCOSE OTHER FLUID: CPT | Performed by: INTERNAL MEDICINE

## 2018-10-22 PROCEDURE — 0W993ZZ DRAINAGE OF RIGHT PLEURAL CAVITY, PERCUTANEOUS APPROACH: ICD-10-PCS | Performed by: PHYSICIAN ASSISTANT

## 2018-10-22 PROCEDURE — 25000125 ZZHC RX 250: Performed by: INTERNAL MEDICINE

## 2018-10-22 PROCEDURE — 84157 ASSAY OF PROTEIN OTHER: CPT | Performed by: INTERNAL MEDICINE

## 2018-10-22 PROCEDURE — 00000155 ZZHCL STATISTIC H-CELL BLOCK W/STAIN: Performed by: INTERNAL MEDICINE

## 2018-10-22 PROCEDURE — 87070 CULTURE OTHR SPECIMN AEROBIC: CPT | Performed by: INTERNAL MEDICINE

## 2018-10-22 PROCEDURE — 88305 TISSUE EXAM BY PATHOLOGIST: CPT | Mod: 26 | Performed by: INTERNAL MEDICINE

## 2018-10-22 PROCEDURE — 40000986 XR CHEST 1 VW

## 2018-10-22 PROCEDURE — 36415 COLL VENOUS BLD VENIPUNCTURE: CPT | Performed by: INTERNAL MEDICINE

## 2018-10-22 RX ORDER — MIRTAZAPINE 7.5 MG/1
7.5 TABLET, FILM COATED ORAL AT BEDTIME
Qty: 30 TABLET | Refills: 0 | Status: SHIPPED | OUTPATIENT
Start: 2018-10-22 | End: 2019-01-10

## 2018-10-22 RX ORDER — DILTIAZEM HYDROCHLORIDE 120 MG/1
120 CAPSULE, EXTENDED RELEASE ORAL AT BEDTIME
Qty: 30 CAPSULE | Refills: 0 | Status: SHIPPED | OUTPATIENT
Start: 2018-10-22 | End: 2019-02-07

## 2018-10-22 RX ORDER — WARFARIN SODIUM 5 MG/1
5 TABLET ORAL
Status: COMPLETED | OUTPATIENT
Start: 2018-10-22 | End: 2018-10-22

## 2018-10-22 RX ORDER — OLANZAPINE 2.5 MG/1
2.5 TABLET, FILM COATED ORAL DAILY PRN
Qty: 30 TABLET | Refills: 1 | Status: SHIPPED | OUTPATIENT
Start: 2018-10-22 | End: 2019-01-10

## 2018-10-22 RX ORDER — LIDOCAINE HYDROCHLORIDE 10 MG/ML
5 INJECTION, SOLUTION EPIDURAL; INFILTRATION; INTRACAUDAL; PERINEURAL ONCE
Status: COMPLETED | OUTPATIENT
Start: 2018-10-22 | End: 2018-10-22

## 2018-10-22 RX ORDER — OLANZAPINE 2.5 MG/1
2.5 TABLET, FILM COATED ORAL AT BEDTIME
Status: DISCONTINUED | OUTPATIENT
Start: 2018-10-22 | End: 2018-10-22 | Stop reason: HOSPADM

## 2018-10-22 RX ORDER — FUROSEMIDE 20 MG
20 TABLET ORAL 2 TIMES DAILY
Qty: 90 TABLET | Refills: 3 | Status: SHIPPED | OUTPATIENT
Start: 2018-10-22 | End: 2018-10-31

## 2018-10-22 RX ORDER — METOPROLOL SUCCINATE 50 MG/1
75 TABLET, EXTENDED RELEASE ORAL 2 TIMES DAILY
Qty: 180 TABLET | Refills: 3 | Status: SHIPPED | OUTPATIENT
Start: 2018-10-22 | End: 2018-11-14

## 2018-10-22 RX ADMIN — FUROSEMIDE 20 MG: 20 TABLET ORAL at 16:36

## 2018-10-22 RX ADMIN — FUROSEMIDE 20 MG: 20 TABLET ORAL at 08:13

## 2018-10-22 RX ADMIN — LIDOCAINE HYDROCHLORIDE 5 ML: 10 INJECTION, SOLUTION EPIDURAL; INFILTRATION; INTRACAUDAL; PERINEURAL at 10:33

## 2018-10-22 RX ADMIN — GLIMEPIRIDE 2 MG: 2 TABLET ORAL at 08:13

## 2018-10-22 RX ADMIN — INSULIN ASPART 2 UNITS: 100 INJECTION, SOLUTION INTRAVENOUS; SUBCUTANEOUS at 12:02

## 2018-10-22 RX ADMIN — OLANZAPINE 2.5 MG: 2.5 TABLET, FILM COATED ORAL at 02:57

## 2018-10-22 RX ADMIN — METOPROLOL SUCCINATE 75 MG: 50 TABLET, EXTENDED RELEASE ORAL at 08:13

## 2018-10-22 RX ADMIN — WARFARIN SODIUM 5 MG: 5 TABLET ORAL at 16:37

## 2018-10-22 RX ADMIN — Medication 1 MG: at 02:57

## 2018-10-22 ASSESSMENT — ACTIVITIES OF DAILY LIVING (ADL)
ADLS_ACUITY_SCORE: 12
ADLS_ACUITY_SCORE: 11
ADLS_ACUITY_SCORE: 12

## 2018-10-22 NOTE — PLAN OF CARE
Problem: Patient Care Overview  Goal: Plan of Care/Patient Progress Review  Outcome: Declining  Pt very restless and anxious first half of shift. Impulsive and aggressive towards staff at times. Pt usually directable- question if prn dose of ambien given made pt more confused? One time dose of Zyprexa given and seemed to calm pt some. Up SBA to commode. NPO for thoracentesis today. Continue to monitor.

## 2018-10-22 NOTE — DISCHARGE INSTRUCTIONS
Discharge Instructions for Heart Failure  The heart is a muscle that pumps oxygen-rich blood to all parts of the body. When you have heart failure, the heart is not able to pump as well as it should. Blood and fluid may back up into the lungs (congestive heart failure), and some parts of the body don t get enough oxygen-rich blood to work normally. These problems lead to the symptoms of heart failure. Heart failure can occur due to an injury to the heart or from natural processes.  You can control symptoms of heart failure with some lifestyle changes and by following your doctor's advice.  Activity  Ask your healthcare provider about an exercise program. You can benefit from simple activities such as walking or gardening. Exercising most days of the week can make you feel better. Don't be discouraged if your progress is slow at first. Rest as needed. Stop activity if you develop symptoms such as chest pain, lightheadedness, or significant shortness of breath. Find activities that you enjoy, such as brisk walking, dancing, swimming, or gardening. These will help you stay active and strengthen your heart.  Diet  Follow a heart healthy diet. And make sure to limit the salt (sodium) in your diet. Salt causes your body to hold water. This makes your heart work harder as there is more fluid for the heart to pump. Limit your salt by doing the following:    Limit canned, dried, packaged, and fast foods.    Don't add salt to your food.    Season foods with herbs instead of salt.    Watch how much liquids you drink. Drinking too much can make heart failure worse. Talk with your health care provider about how much you should drink each day.    Limit the amount of alcohol you drink. It may harm your heart. Women should have no more than 1 drink a day and men should have no more than 2.    When you eat out, request that your meals have no added salt.  Tobacco  If you smoke, it's very important to quit. Smoking increases your  chances of having a heart attack by harming the blood vessels that provide oxygen to your heart. This makes heart failure worse. Quitting smoking is the number one thing you can do to improve your health. Enroll in a stop-smoking program to improve your chances of success. Talk with your healthcare provider about medicines or nicotine replacement therapy to help you quit smoking. Ask your healthcare provider about smoking cessation support groups.  Medicine  Take your medicines exactly as prescribed. Learn the names and purpose of each of your medicines. Keep an accurate medicine list and current dosages with you at all times. Don't skip doses. If you miss a dose of your medicine, take it as soon as you remember. If you miss a dose and it's almost time for your next dose, just wait and take your next dose at the normal time. Don't take a double dose. If you are unsure, call your doctor's office. Make sure not to mix up your medicines or forget what you've taken the same day.  Weight monitoring  Weigh yourself every day. A sudden weight gain can mean your heart failure is getting worse. Weigh yourself at the same time of day and in the same kind of clothes. Ideally, weigh yourself first thing in the morning after you empty your bladder, but before you eat breakfast. Your healthcare provider will show you how to track your weight. He or she will also discuss with you when you should call if you have a sudden, unexpected increase in your weight.  In general, your healthcare provider may ask you to report if your weight goes up by more than 2 pounds in 1 day,  5 pounds in 1 week, or whatever weight gain you were told by your doctor. This is a sign that you are retaining more fluid than you should be. Clues to weight gain include checking your ankles for swelling, or noticing you are short of breath when you lie down.  Follow-up care  Make a follow-up appointment as directed. Depending on the type and severity of heart  failure you have, you may need follow-up as early as 7 days from hospital discharge. Keep appointments for checkups and lab tests that are needed to check your medicines and condition.  Recognize that your health and even survival depend on your following medical recommendations.  Symptoms  Heart failure can cause a variety of symptoms, including:    Shortness of breath    Trouble breathing at night, especially when you lie down    Swelling in the legs and feet or in the belly (abdomen)    Becoming easily fatigued    Irregular or rapid heartbeat    Weakness or lightheadedness    Swelling of the neck veins  It is important to know what to do if symptoms get worse or if you develop signs of worsening heart failure.     When to call your healthcare provider  Call your healthcare provider right away if you have any of these signs of worsening heart failure:    Sudden weight gain (more than 2 pounds in 1 day or 5 pounds in 1 week, or whatever weight gain you were told to report by your doctor)    Trouble breathing not related to being active    New or increased swelling of your legs or ankles    Swelling or pain in your abdomen    Breathing trouble at night (waking up short of breath, needing more pillows to breathe)    Frequent coughing that doesn't go away    Feeling much more tired than usual  Call 911  Call 911 right away if you have:    Severe shortness of breath, such that you can't catch your breath even while resting    Severe chest pain that does not resolve with rest or nitroglycerin    Pink, foamy mucus with cough and shortness of breath    A continuous rapid or irregular heartbeat    Passing out or fainting    Stroke symptoms such as sudden numbness or weakness on one side of your face, arm, or leg or sudden confusion, trouble speaking or vision changes   Date Last Reviewed: 3/21/2016    0188-3043 [employer]. [address] [place] [address]. All rights reserved. This information is  not intended as a substitute for professional medical care. Always follow your healthcare professional's instructions.

## 2018-10-22 NOTE — PLAN OF CARE
Problem: Cardiac: Heart Failure (Adult)  Goal: Signs and Symptoms of Listed Potential Problems Will be Absent, Minimized or Managed (Cardiac: Heart Failure)  Signs and symptoms of listed potential problems will be absent, minimized or managed by discharge/transition of care (reference Cardiac: Heart Failure (Adult) CPG).  Heart Failure Care Pathway  GOALS TO BE MET BEFORE DISCHARGE:    1. Decrease congestion and/or edema with diuretic therapy to achieve near      optimal volume status.            Dyspnea improved:  Yes            Edema improved:     Yes        Net I/O and Weights since admission:          09/22 2300 - 10/22 2259  In: 2200 [P.O.:2200]  Out: 9525 [Urine:9525]  Net: -7325            Vitals:    10/20/18 0500 10/21/18 0400 10/22/18 0513   Weight: 81.1 kg (178 lb 12.8 oz) 81.1 kg (178 lb 12.8 oz) 80.5 kg (177 lb 6.4 oz)       2.  O2 sats > 92% on RA or at prior home O2 therapy level.          Current oxygenation status:       SpO2: 94 %         O2 Device: None (Room air),  Oxygen Delivery: 2 LPM         Able to wean O2 this shift to keep sats > 92%:  Yes RA       Does patient use Home O2? No    3.  Tolerates ambulation and mobility near baseline: Yes        How many times did the patient ambulate with nursing staff this shift? 1  A&Ox4,  Forgetful, and cooperative. Teli A-fib with CVR, Vss, denies pain. Discharge instruction reviewed with pt and daughter and answered all questions. Heart failure packet reviewed and given. Pt and her daughter verbalized understanding of all discharge instructions and follow ups. Extra copy of discharge paper given to her daughter. Pt was discharged home with her daughter at 1715.    Please review the Heart Failure Care Pathway for additional HF goal outcomes.    Milla Kent RN  10/22/2018

## 2018-10-22 NOTE — PLAN OF CARE
Problem: Patient Care Overview  Goal: Plan of Care/Patient Progress Review  Outcome: Improving  Pt has been pain free with V/signs stable and maintaining her O2 sats in the mid 90's on Room air. Thoracentesis was done today with 900 ml removed per Rt side with dressing clean and dry. Pt appears to be more alert and oriented this afternoon but anxious to be discharge to her apartment this evening. Pt received Ambien last evening and may have caused Pt's  restlessness and confusion. Daughter ( Sandhya) at bedside. Coumadin to be restarted since it was on hold today for procedure. Pt will resume [facility] after discharge. Remains in A- Fib with control Ventricular response.

## 2018-10-22 NOTE — DISCHARGE SUMMARY
[facility]    Discharge Summary  Hospitalist    Date of Admission:  10/19/2018  Date of Discharge:  10/22/2018  Discharging Provider: Itzel Velazquez  Date of Service (when I saw the patient): 10/22/18    History of Present Illness   Shelly Rogers is a 85 year old female with history of paroxysmal atrial fibrillation, heart failure with preserved EF, type 2 diabetes mellitus, hypothyroidism who presents with progressive symptoms over several months, characterized by anxiousness shortness of breath with paroxysmal nocturnal dyspnea and lower extremity swelling.  At presentation she is in atrial fibrillation with a heart rate in the 120s, chest x-ray reveals a right pleural effusion and pulmonary vascular congestion by my read, although there is suggestion of a right lower lobe infiltrate.  She has had no fevers, chills, chest pain or new cough to suggest pneumonia.     Hospital Course      Organized by Discharge Diagnosis  Shelly Rogers was admitted on 10/19/2018.  The following problems were addressed during her hospitalization:    Acute decompensated right heart failure / diastolic dysfunction - echocardiogram on May 2016 reveals an EF of 60-65% with mild concentric LVH LA borderline dilated, trace MR, moderate TR, mild hypertension, Echo on 10/20/18 - EF 55-60% RV moderately dilated with decreased fxn, 2+ TR  R mod to large effusion by U/s on 10/20/18  Paroxysmal atrial fibrillation, RVR- likely contributing to above. PM interrogation shows frequent afib with rare rates >120.   Anticoagulated with coumadin - INR 2.28 at admission    - Lasix 40 mg x 1 @ admission > 20 mg IV BID > 20 mg PO BID on 7/21  - Excellent diuresis, -7.3 net output since admission. Wt not done on admission. 81.1 kg on HD#2, 80.5 kg at discharge.   - Discharge on lasix 20 mg BID (up from PTA dose of once daily).   - No K required during this stay.  - S/p R thoracentesis with 900 ml of fluid removed. Transudate by Light's  Criteria.   - Held PTA Coumadin for thoracentesis on 10/22/18. Resume at discharge with INR in one week with f/u with PCP.     Recent Labs  Lab 10/22/18  0651 10/21/18  0606 10/20/18  1259 10/19/18  0620   INR 1.70* 1.97* 2.24* 2.28*     - HR controlled on PTA metoprolol XL 75 mg twice daily (increased dose) + diltiazem 120 daily (new).     Suspect SYLVIE, - Likely as primary cause of R heart failure. Patient has history of snoring, poor sleepy and suspected witnessed apnea.   - Sleep evaluation scheduled prior to discharge        Adjustment Disorder with Anxiety, Insomnia - Despite improvement in breathing, patient is continuing to feel anxious and agitated, espiecally at night. Has trouble sleeping. Mind racing. Per daughter this is very unlike her mother, and started a few months ago. She seems to be Discussed and started mirtazapine 7.5 mg daily on 10/21/18. On the evening of 7/21 she took mirtazapine. Was A+O but was unable to sleep, so took Ambien. After taking Ambien she became acutely confused and had hallucinations and required Zyprexa 2.5 mg once. This helped her calm down and sleep. Seen by psychiatry during her stay and agreed with Mirtazapine.   - At discharge, continue Remeron 7.5 mg q HS. If tolerating well, increase to 15 mg q HS at follow up.   - Zyprexa ordered at discharge.   - Ambien added to allergy list.      DM, type II -blood sugar at admission 177.  A1c 7.8 on 6/12/18   - Continue PTA glimepiride   - Resume  PTA metformin at discharge.      Recent Labs  Lab 10/22/18  1158 10/22/18  0752 10/22/18  0651 10/22/18  0222 10/21/18  2116 10/21/18  1709 10/21/18  1243  10/21/18  0606  10/20/18  0503  10/19/18  0620   GLC  --   --  129*  --   --   --   --   --  161*  --  118*  --  177*   * 115*  --  124* 167* 202* 231*  < >  --   < >  --   < >  --    < > = values in this interval not displayed.     Mild hyponatremia -  133 at admission, 132 now. Of unlikely clinical significance. Monitoring.       Hypothyroidism - Both TSH up at 6.76, FT4 at 1.96 elevated. Suspect lab error or euthyroid sick abnormality. Doubt central hyperthyroidism (very rare) and abnormalities mild. Total T3 was normal.    - Resume levothyroxine since 10/20.   - Repeat TSH, T4 as outpatient.     Communication: Discussed with daughter on 10/22/18    Itzel Velazquez    Significant Results and Procedures   S/p R thoracentesis of 900 ml claudio fluid on 10/22/18.     Pending Results   This should be followed by per PCP at follow up.   Unresulted Labs Ordered in the Past 30 Days of this Admission     Date and Time Order Name Status Description    10/21/2018 1008 Cytology non gyn In process     10/21/2018 1008 Gram stain In process     10/21/2018 1008 Fluid Culture Aerobic Bacterial In process     10/19/2018 0729 Blood culture Preliminary     10/19/2018 0729 Blood culture Preliminary           Code Status   DNR / DNI       Primary Care Physician   Halley Huff    Physical Exam   Temp: 97.4  F (36.3  C) Temp src: Oral BP: 109/63   Heart Rate: 88 Resp: 16 SpO2: 93 % O2 Device: None (Room air) Oxygen Delivery: 2 LPM  Vitals:    10/20/18 0500 10/21/18 0400 10/22/18 0513   Weight: 81.1 kg (178 lb 12.8 oz) 81.1 kg (178 lb 12.8 oz) 80.5 kg (177 lb 6.4 oz)     Vital Signs with Ranges  Temp:  [97.4  F (36.3  C)-98.3  F (36.8  C)] 97.4  F (36.3  C)  Heart Rate:  [80-91] 88  Resp:  [16-19] 16  BP: (109-147)/(55-91) 109/63  SpO2:  [91 %-95 %] 93 %  I/O last 3 completed shifts:  In: 590 [P.O.:590]  Out: 1800 [Urine:1800]    Constitutional: NAD,   Neuropsyche:  alert and oriented, answers questions appropriately, very talkative.    Respiratory:  Breathing comfortably, good air exchange, few scattered wheezes, no crackles.   Cardiovascular:  Irregular rate and rhythm, trace-1+ edema.  GI:  soft, NT/ND, BS normal, central obesity  Skin/Integumen:  No acute rash, bruising or sign of bleeding.     Discharge Disposition   Discharged to home  Condition at  "discharge: Good    Consultations This Hospital Stay   CARDIOLOGY IP CONSULT  CARE TRANSITION RN/SW IP CONSULT  SOCIAL WORK IP CONSULT  PSYCHIATRY IP CONSULT  PSYCHIATRY IP CONSULT  PHARMACY TO DOSE WARFARIN    Time Spent on this Encounter   I, Itzel Velazquez, personally saw the patient today and spent greater than 30 minutes discharging this patient.    Discharge Orders     Reason for your hospital stay   You were admitted with fluid overload from heart failure.     Activity   Your activity upon discharge: activity as tolerated     Follow-up and recommended labs and tests    Follow up with primary care provider, Halley Huff, within 7 days to evaluate medication change and for hospital follow- up.  The following labs/tests are recommended: BMP, INR, TSH and free T4 within a week.     Discharge Instructions   Heart Failure Instructions for Patients and Families: Please read and check off each of these important instructions as you do them when you get home.     Weight and Symptoms    ___ Put a scale in your bathroom.    ___ Post a weight chart or calendar next to your scale.    ___ Weigh yourself everyday as soon as you get up in the morning (before breakfast).  You should only be wearing your pajamas.  Write your weight on the chart/calendar.    ___ Bring your weight chart/calendar with you to all appointments.    ___ Call your doctor or nurse practitioner if you gain 2 pounds (in 1 day) or 5 pounds in (1 week) from your goal \"good\" weight.  Your good weight is also called your \"dry\" weight.  Your doctor or nurse will tell you what your good weight should be.    ___ Call your doctor or nurse practitioner if you have shortness of breath that gets worse over time, leg swelling or fatigue.    Medications and Diet    ___ Make sure to take your medication as prescribed.    ___ Bring a current list of your medication and all of your medicine bottles with you to all appointments.    ___ Limit fluids if you still have " swelling or shortness of breath, or if your doctor tells you to do so.      ___ Eat less than 2000 mg of sodium (salt) every day. Read food labels, and do not add salt to meals. Remember, if you eat less salt you retain less fluid.    ___ Follow a heart healthy diet that is low in saturated fat.        Activity and Suggested Lifestyle Changes   ___ Stay active. Talk to your doctor about an exercise program that is safe for your heart.    ___ Stop smoking. Reduce alcohol use.     ___ Lose weight if you are overweight. Extra weight puts a lot of stress on the heart.    Control for Leg Swelling   ___ Keep your legs elevated (raised) as needed for swelling. If swelling is uncomfortable or elevation doesn't help, ask your doctor about using ACE wraps or support stockings.      What is the [facility]?     Cardiomyopathy  Optimization  Rehabilitation  Education    The [facility] is a heart failure specialty clinic within [facility].  It is an outpatient disease management program that is based on a phase-by-phase approach, which is tailored to each patient's individual needs.  The cardiologist, nurse practitioner, physician assistant and nurses provide an ongoing outpatient care and treatment plan that guides heart failure and cardiomyopathy patients from evaluation and education to stabilization. This team works with your current primary care doctor and cardiologist to help you:    Avoid hospitalizations  Slow the progression of the disease  Improve length and quality of life  Know who and when to call if heart failure symptoms appear  Receive easy access to quality health care and advice  Better understand your condition and treatment  Decrease the tremendous cost burden of heart failure care  Detect future heart problems before they become life threatening    Your [facility] Clinic Team will continue to educate you on your heart failure and may adjust medications based on your vital signs, lab work, and  how you are feeling.  Therefore, it is very important to bring the following to all C.O.R.E[facility] appointments:    An accurate list of your medications  Your medicine bottles  Your weight chart/calendar    [facility] ([place]): [phone]  [facility] ([place]): [phone]  [facility] ([place]): [phone]     Diet   Moderate Consistent CHO (4-6 CHO units/meal); 2 gm NA Diet       Discharge Medications   Current Discharge Medication List      START taking these medications    Details   diltiazem (DILACOR XR) 120 MG 24 hr capsule Take 1 capsule (120 mg) by mouth At Bedtime  Qty: 30 capsule, Refills: 0    Associated Diagnoses: Chronic atrial fibrillation (H)      mirtazapine (REMERON) 7.5 MG TABS tablet Take 1 tablet (7.5 mg) by mouth At Bedtime  Qty: 30 tablet, Refills: 0    Associated Diagnoses: Adjustment disorder with anxious mood      OLANZapine (ZYPREXA) 2.5 MG tablet Take 1 tablet (2.5 mg) by mouth daily as needed (insomnia, agitation)  Qty: 30 tablet, Refills: 1    Associated Diagnoses: Adjustment disorder with anxious mood         CONTINUE these medications which have CHANGED    Details   furosemide (LASIX) 20 MG tablet Take 1 tablet (20 mg) by mouth 2 times daily  Qty: 90 tablet, Refills: 3    Associated Diagnoses: Chronic diastolic congestive heart failure (H)      metoprolol succinate (TOPROL-XL) 50 MG 24 hr tablet Take 1.5 tablets (75 mg) by mouth 2 times daily  Qty: 180 tablet, Refills: 3    Associated Diagnoses: Chronic diastolic congestive heart failure (H)         CONTINUE these medications which have NOT CHANGED    Details   ACETAMINOPHEN PO Take 500 mg by mouth 3 times daily as needed (Takes at least 6 hours apart).       glimepiride (AMARYL) 2 MG tablet Take 1 tablet (2 mg) by mouth 2 times daily  Qty: 180 tablet, Refills: 3    Comments: Profile Rx: patient will contact pharmacy when needed  Associated Diagnoses: Type 2 diabetes  mellitus without complication, without long-term current use of insulin (H)      levothyroxine (SYNTHROID/LEVOTHROID) 88 MCG tablet Take 1 tablet (88 mcg) by mouth daily  Qty: 90 tablet, Refills: 1    Comments: Dose is increased, disregard previous script.  Associated Diagnoses: Hypothyroidism, unspecified type      metFORMIN (GLUCOPHAGE) 500 MG tablet Take 1 tablet (500 mg) by mouth 2 times daily (with meals)  Qty: 180 tablet, Refills: 3    Comments: Profile Rx: patient will contact pharmacy when needed  Associated Diagnoses: Type 2 diabetes mellitus without complication, without long-term current use of insulin (H)      multivitamin  with lutein (OCUVITE WITH LTEIN) CAPS per capsule Take 1 capsule by mouth daily      triamcinolone (KENALOG) 0.1 % cream Apply sparingly to affected area three times daily for 14 days.  Qty: 15 g, Refills: 0      triamcinolone (NASACORT ALLERGY 24HR) 55 MCG/ACT nasal inhaler Spray 2 sprays into both nostrils daily as needed (during allergy season)       warfarin (COUMADIN) 5 MG tablet TAKE 1/2 TO 1 TABLET BY MOUTH DAILY OR AS DIRECTED BY INR CLINIC  Qty: 90 tablet, Refills: 3    Comments: Profile Rx: patient will contact pharmacy when needed  Associated Diagnoses: Paroxysmal atrial fibrillation (H)      ACE/ARB NOT PRESCRIBED, INTENTIONAL, ACE & ARB not prescribed due to Other: HFpEF    Associated Diagnoses: Chronic diastolic congestive heart failure (H)      ASPIRIN NOT PRESCRIBED, INTENTIONAL, Antiplatelet medication not prescribed intentionally due to Current anticoagulant therapy (warfarin/enoxaparin)  Qty: 0 each, Refills: 0    Associated Diagnoses: Type 2 diabetes mellitus without complications (H)      !! blood glucose (ACCU-CHEK SMARTVIEW) test strip 1 strip by In Vitro route daily  Qty: 1 Box, Refills: prn    Associated Diagnoses: Type 2 diabetes, HbA1c goal < 7% (H)      blood glucose (NO BRAND SPECIFIED) lancets standard Use to test blood sugar 1 times daily or as  directed.  Qty: 100 each, Refills: 11    Associated Diagnoses: Type 2 diabetes mellitus without complication, without long-term current use of insulin (H)      blood glucose monitoring (ACCU-CHEK FASTCLIX) lancets Use to check blood sugars daily  Qty: 1 Box, Refills: prn    Associated Diagnoses: Type 2 diabetes, HbA1c goal < 7% (H)      blood glucose monitoring (NO BRAND SPECIFIED) meter device kit Use to test blood sugar one times daily or as directed.  Qty: 1 kit, Refills: 0    Associated Diagnoses: Type 2 diabetes mellitus without complication, without long-term current use of insulin (H)      !! blood glucose monitoring (NO BRAND SPECIFIED) test strip Use to test blood sugars one times daily or as directed .has type 2 diabetes and not on insulin  Qty: 100 strip, Refills: 1    Associated Diagnoses: Type 2 diabetes mellitus without complication, without long-term current use of insulin (H)      Blood Glucose Monitoring Suppl (ACCU-CHEK LAKIA SMARTVIEW) W/DEVICE KIT 1 Device daily.  Qty: 1 kit, Refills: 0    Associated Diagnoses: Type 2 diabetes, HbA1c goal < 7% (H)      !! order for DME Equipment being ordered: Compression Stockings  Knee high  20/30 compression  Qty: 2 Package, Refills: 1    Associated Diagnoses: Bilateral edema of lower extremity      !! order for DME Equipment being ordered: cane with quad or prongs  Qty: 1 Units, Refills: 1    Associated Diagnoses: Unsteadiness on feet      STATIN NOT PRESCRIBED, INTENTIONAL, continuous prn. Statin not prescribed intentionally due to Other: Not needed, LDL at goal <100mg/dL without therapy  Qty: 0 each, Refills: 0       !! - Potential duplicate medications found. Please discuss with provider.        Allergies   Allergies   Allergen Reactions     Shellfish Allergy Difficulty breathing     Cats      Sneezing, watery eyes     Lisinopril Cough     No Clinical Screening - See Comments Other (See Comments)     Pt stated allergic to flowers, pt gets itchy watery eyes  and stuffy nose     Seasonal Allergies Itching     Flowers     Sulfa Drugs Hives     Data     IMAGING RESULTS FROM THIS HOSPITAL STAY:  Results for orders placed or performed during the hospital encounter of 10/19/18   XR Chest 2 Views    Narrative    CHEST 2 VIEWS  10/19/2018 6:29 AM     HISTORY: Atrial fibrillation and shortness of breath.    COMPARISON: 3/27/2013.    FINDINGS: A moderate-sized region of hazy opacities in the right lung  base. The left lung is clear. Small right pleural effusion. Probable  cardiomegaly. Atherosclerotic calcification in the thoracic aorta.  Left anterior chest wall cardiac device with lead tips in the right  atrium and right ventricle.      Impression    IMPRESSION:  1. A moderate-sized region of hazy opacities in the right lung base  that could represent pneumonia or atelectasis.  2. Small right pleural effusion.    ALLISON FLORES MD   US Chest Pleural Effusion Imaging    Narrative    US CHEST/PLEURAL EFFUSION IMAGING  10/19/2018 12:09 PM     HISTORY:  Shortness of breath.    COMPARISON: Chest x-ray dated 10/19/2018    FINDINGS: There is a moderate to large right pleural effusion and a  small left pleural effusion.    JEVON KANG MD   US Thoracentesis    Narrative     EXAM: US THORACENTESIS       10/22/2018 10:50 AM       HISTORY: Pleural effusion.      PROCEDURE:  Written informed consent was obtained from the patient  prior to the procedure. The risks and benefits including bleeding,  infection and pneumothorax were discussed and the patient wished to  continue. Initial ultrasound images demonstrated a right pleural fluid  collection. The skin overlying this collection was marked, prepped,  draped and anesthetized in usual sterile fashion utilizing 10 mL of 1%  lidocaine.  Thoracentesis catheter was then placed into the pleural  fluid collection with return of  900 mL claudio-colored fluid. Patient  tolerated the procedure well. Followup chest x-ray was ordered.      US  guidance was utilized to enter the  right  pleural space for  thoracentesis with permanent image recoding.      Impression    IMPRESSION:  Ultrasound-guided right thoracentesis.     XI GARCIA PA-C       MOST RECENT LAB RESULTS:  Most Recent 3 CBC's:  Recent Labs   Lab Test  10/19/18   0620  06/12/18   1633  02/27/18   1541  04/06/17   1541   WBC  9.4   --   8.5  9.4   HGB  14.4  14.4  13.9  13.3   MCV  91   --   96  96   PLT  181   --   305  346      Most Recent 3 BMP's:  Recent Labs   Lab Test  10/22/18   0651  10/21/18   0606  10/20/18   0503   NA  134  132*  132*   POTASSIUM  3.8  4.5  4.2   CHLORIDE  99  95  96   CO2  30  29  31   BUN  15  21  16   CR  0.81  0.88  0.77   ANIONGAP  5  8  5   ASTRID  8.0*  8.3*  8.4*   GLC  129*  161*  118*     Most Recent 3 Troponin's:  Recent Labs   Lab Test  10/19/18   0620  03/24/13   1057  03/24/13   0539   TROPI  <0.015  <0.012  <0.012     Most Recent 3 INR's:  Recent Labs   Lab Test  10/22/18   0651  10/21/18   0606  10/20/18   1259   INR  1.70*  1.97*  2.24*     Most Recent 2 LFT's:  Recent Labs   Lab Test  10/19/18   0620  02/27/18   1541   AST  16  8   ALT  19  15   ALKPHOS  130  77   BILITOTAL  1.2  0.8       Most Recent 6 Bacteria Isolates From Any Culture (See EPIC Reports for Culture Details):  Lab Test  10/19/18   0758  10/19/18   0749   CULT  No growth after 3 days  No growth after 3 days     Most Recent TSH, T4 and HgbA1c: Recent Labs   Lab Test  10/19/18   0620   TSH  6.76*   T4  1.96*   A1C  8.2*     Results for GABBY OLIVA (MRN [id number]) as of 10/22/2018 14:45   Ref. Range 10/21/2018 06:06   Triiodothyronine (T3) Latest Ref Range: 60 - 181 ng/dL 66

## 2018-10-22 NOTE — PROGRESS NOTES
[facility]  Cardiology Progress Note    Date of Service (when I saw the patient): 10/22/2018       Assessment & Plan   Shelly Rogers is a 85 year old female who was admitted on 10/19/2018 with shortness of breath.     1.  : Acute on chronic diastolic heart failure  - net neg 1.1 liter yesterday, 6.6 liters since admission.  - Echo confirms normal LV function 55-60%, no regional wall motion abnormalities, moderate TR  -  Moderate / Lg right pleural effusion and small left pleural effusion  -  right thoracentesis today with 900cc removed   - Peripheral edema improving  - Continue on Lasix, diltiazem, and metoprolol  - Creatinine stable at 0.81    2.  : Paroxysmal atrial fibrillation   - Rate controlled   - Anticoagulated on Coumadin, Held for thoracentesis   - Resume coumadin when bleeding risk is low.     3.  : SSS / s/p PPM  - Recent device interrogation confirmed a 42% A-fib burden  - Rate controlled after Metoprolol increased to 75mg BID        MERLYN Mora CNP  Text Page  (M-F, 7:30 am - 4:00 pm)    Interval History   Sitting up on the side of the bed. Family at bedside. Breathing better after thoracentesis today. Denies chest pain, shortness of breath, palpitations, PND, orthopnea. LE improving with diuresis.     Physical Exam   Temp: 97.4  F (36.3  C) Temp src: Oral BP: 122/67   Heart Rate: 84 Resp: 16 SpO2: 93 % O2 Device: None (Room air) Oxygen Delivery: 2 LPM  Vitals:    10/20/18 0500 10/21/18 0400 10/22/18 0513   Weight: 81.1 kg (178 lb 12.8 oz) 81.1 kg (178 lb 12.8 oz) 80.5 kg (177 lb 6.4 oz)     Vital Signs with Ranges  Temp:  [97.4  F (36.3  C)-98.3  F (36.8  C)] 97.4  F (36.3  C)  Heart Rate:  [80-91] 84  Resp:  [16-19] 16  BP: (112-147)/(55-91) 122/67  SpO2:  [91 %-95 %] 93 %  I/O last 3 completed shifts:  In: 390 [P.O.:390]  Out: 1500 [Urine:1500]    GEN:  In general, this is a well nourished elderly female, in no acute distress   HEENT:  Pupils equal, round. Sclerae  nonicteric. Clear oropharynx. Mucous membranes moist.  NECK: Supple, no masses appreciated. Trachea midline. No JVD   C/V:  Regular rate and irregular rhythm, no murmur, rub or gallop. No S3 or RV heave.   RESP: Respirations are unlabored. No use of accessory muscles. Clear to auscultation bilaterally without wheezing, rales, or rhonchi.  GI: Abdomen soft, nontender, nondistended. No HSM appreciated.   EXTREM: 1+  LE edema   NEURO: Alert and oriented, cooperative.  No obvious focal deficits.   PSYCH: Normal affect. Appropriate with verbal responses.   SKIN: Warm and dry. No rashes or petechiae appreciated.       Medications       diltiazem  120 mg Oral At Bedtime     furosemide  20 mg Oral BID     glimepiride  2 mg Oral BID     insulin aspart  1-7 Units Subcutaneous TID AC     insulin aspart  1-5 Units Subcutaneous At Bedtime     metoprolol succinate  75 mg Oral BID     mirtazapine  7.5 mg Oral At Bedtime     OLANZapine  2.5 mg Oral At Bedtime     sodium chloride (PF)  3 mL Intracatheter Q8H       Data   Reviewed       Jen Barone, MERLYN CNP 10/22/2018

## 2018-10-22 NOTE — CONSULTS
Order received to assist with scheduling outpatient sleep study upon discharge.  Will continue to follow along and schedule once discharge date determined.    Care Transition Initial Assessment - RN    Met with: Patient and daughter Ginger who assists with driving patient to appointments, etc.  Order received to assist with scheduling sleep study upon discharge.  Appointment scheduled and added to AVS.  Patient and Greger updated and pre-appointment material given to patient to fill out prior to appointment.    Patient does live alone in an apartment in a Senior [facility] building, but does have the ability to have services if needed.  Patient occasionally uses a cane and cooks her own meals.  Patient does not drive and Ginger takes her to all of her appointments.    DATA   Active Problems:    Heart failure (H)       Cognitive Status: awake, alert and oriented.     Contact information and PCP information verified: Yes  Lives With: alone     Insurance concerns: No Insurance issues identified  ASSESSMENT  Patient currently receives the following services:  none        Identified issues/concerns regarding health management: per patient's daughter, Ginger patient is a bit forgetful and Ginger is requesting that she has copy of all discharge paperwork with appointment details.    PLAN  Financial costs for the patient include TBD .  Patient/family is agreeable to the plan?  Yes  Patient anticipates discharging to home .     Patient anticipates needs for home equipment: Does not know  Plan/Disposition: Home   Appointments: to be scheduled.  Sleep Study scheduled already      Care  (CTS) will continue to follow as needed.

## 2018-10-22 NOTE — PROGRESS NOTES
Care Suites Post Procedure Summary (without sedation)     Immediately prior to starting the procedure a Time Out was conducted with procedural staff and re-confirmed the patient s name, procedure, and site/side.      Consent obtained from relative daughter after discussing the risks, benefits and alternatives.  Consent obtained via phone prior to my arrival to dept.      Procedure: Paracentesis    Procedure Interventions:    Fluid (cc) removed: Yes, 900 ml med claudio pleural fluid removed    Tube/Drain placed: No.    Patient tolerance: Patient tolerated the procedure well with no immediate complications.    Post-procedure report given to: Rn on station and pt transferred to unit by radiology transport.  Pt in stable condition, pain free.    CXR complete.  No pneumothorax per Souleymane COSTELLO.      (See Doc Flow-sheets and MAR for additional information)

## 2018-10-22 NOTE — PROGRESS NOTES
RADIOLOGY PROCEDURE NOTE  Patient name: Shelly Rogers  MRN: [id number]  : 1933    Pre-procedure diagnosis: Pleural effusion  Post-procedure diagnosis: Same    Procedure Date/Time: 2018  11:01 AM  Procedure: Right thoracentesis  Estimated blood loss: None  Specimen(s) collected with description: pleural fluid  The patient tolerated the procedure well with no immediate complications.  Significant findings:none    See imaging dictation for procedural details.    Provider name: Souleymane Morel  Assistant(s):None

## 2018-10-22 NOTE — PROGRESS NOTES
X cover    Called about aggressive behavior    Ordered zyprexa 2.5 mg po x 1 dose      Naveen Merritt MD

## 2018-10-22 NOTE — PROVIDER NOTIFICATION
On call hospitalist paged for increased agitation and aggression. Pt restless, setting bed alarm off frequently and swinging/cursing at staff. Pt redirected and was able to be put back in bed. Order for zyprexa placed- will give and monitor closely.

## 2018-10-22 NOTE — PLAN OF CARE
Problem: Patient Care Overview  Goal: Plan of Care/Patient Progress Review  Outcome: No Change  Patient A&O but confused after sundown and Nikolai, Denies chest pain/tightness/pressure and on tele A-Fib CVR and VSS on Metoprolol 75 mg and Diltiazem 120 mg,  Lungs are diminished at bases and on 2L of NC for upnea, up with assist of 1 , on cardiac diet, skin is marcela , voiding well and last BM 10/20. IV is in R arm and SL, Remeron started for insomnia and Ambien given. Plan for possible R thoracentesis for large pleural effusion.

## 2018-10-23 ENCOUNTER — TELEPHONE (OUTPATIENT)
Dept: FAMILY MEDICINE | Facility: CLINIC | Age: 83
End: 2018-10-23

## 2018-10-23 ENCOUNTER — CARE COORDINATION (OUTPATIENT)
Dept: LAB | Facility: CLINIC | Age: 83
End: 2018-10-23

## 2018-10-23 NOTE — PROGRESS NOTES
Called pt's daughter, Sandhya, to reach out and extend appointment for [facility] Enrollment for pt. Explained what [facility] is and why patient might be appropriate. Pt's daughter agreeable to CORE Enrollment. Scheduled pt for OV on 10/31 at 0900 for labs and 0930 with [facility]. Gave RN phone number and instructed when and how pt should weigh herself daily and importance of low sodium diet. Instructed daughter to call us PRN for any questions or symptoms she may notice prior to upcoming OV. Samantha Gomez RN October 23, 2018, 12:18 PM

## 2018-10-23 NOTE — TELEPHONE ENCOUNTER
"Hospital/TCU/ED for chronic condition Discharge Protocol    \"Hi, my name is Bree SHAKIRA Delgadillo, a registered nurse, and I am calling from [facility].  I am calling to follow up and see how things are going for you after your recent emergency visit/hospital/TCU stay.\"    Tell me how you are doing now that you are home?\" Spoke with pt's daughter Sandhya (CTC on file) Sandhya states that pt came home last night and she is doing well. Sandhya reports that pt is breathing well and seems to be more like herself again. Sandhya states that pt is scheduled for follow up in our clinic on Friday 10/26/18 and has multiple appointments scheduled for follow up with cardiology.      Discharge Instructions          \"Has an appointment with your primary care provider been scheduled?\"   Yes. (confirm)    \"When you see the provider, I would recommend that you bring your medications with you.\"    Medications    \"Tell me what changed about your medicines when you discharged?\"    Changes to chronic meds?    0-1    \"What questions do you have about your medications?\"    None     New diagnoses of heart failure, COPD, diabetes, or MI?    No          On warfarin: \"Were you given any recommendations for follow-up with the anticoagulation clinic?\" Yes - Anticoagulation clinic appointment is already scheduled at appropriate interval    Medication reconciliation completed? Yes  Was MTM referral placed (*Make sure to put transitions as reason for referral)?   No    Call Summary    \"What questions or concerns do you have about your recent visit and your follow-up care?\"     none    \"If you have questions or things don't continue to improve, we encourage you contact us through the main clinic number (give number).  Even if the clinic is not open, triage nurses are available 24/7 to help you.     We would like you to know that our clinic has extended hours (provide information).  We also have urgent care (provide details on closest location and " "hours/contact info)\"      \"Thank you for your time and take care!\"           "

## 2018-10-23 NOTE — TELEPHONE ENCOUNTER
Adjustment Disorder With Anxious Mood, Pneumonia Of Right Lower Lobe Due To Infectious Organism (H) 10/22/2018 6 mo ED/IP 0/1  [phone] (home)

## 2018-10-24 ENCOUNTER — TELEPHONE (OUTPATIENT)
Dept: CARDIOLOGY | Facility: CLINIC | Age: 83
End: 2018-10-24

## 2018-10-24 LAB — COPATH REPORT: NORMAL

## 2018-10-25 LAB
BACTERIA SPEC CULT: NO GROWTH
BACTERIA SPEC CULT: NO GROWTH
Lab: NORMAL
Lab: NORMAL
SPECIMEN SOURCE: NORMAL
SPECIMEN SOURCE: NORMAL

## 2018-10-26 ENCOUNTER — OFFICE VISIT (OUTPATIENT)
Dept: FAMILY MEDICINE | Facility: CLINIC | Age: 83
End: 2018-10-26
Payer: COMMERCIAL

## 2018-10-26 VITALS
BODY MASS INDEX: 32.94 KG/M2 | WEIGHT: 179 LBS | HEIGHT: 62 IN | SYSTOLIC BLOOD PRESSURE: 131 MMHG | DIASTOLIC BLOOD PRESSURE: 75 MMHG | OXYGEN SATURATION: 97 % | HEART RATE: 84 BPM | TEMPERATURE: 96.7 F

## 2018-10-26 DIAGNOSIS — I50.32 CHRONIC DIASTOLIC CONGESTIVE HEART FAILURE (H): ICD-10-CM

## 2018-10-26 DIAGNOSIS — Z09 HOSPITAL DISCHARGE FOLLOW-UP: Primary | ICD-10-CM

## 2018-10-26 DIAGNOSIS — I50.813 ACUTE ON CHRONIC RIGHT-SIDED CONGESTIVE HEART FAILURE (H): ICD-10-CM

## 2018-10-26 DIAGNOSIS — H61.23 BILATERAL IMPACTED CERUMEN: ICD-10-CM

## 2018-10-26 DIAGNOSIS — I48.20 CHRONIC ATRIAL FIBRILLATION (H): ICD-10-CM

## 2018-10-26 PROCEDURE — 99495 TRANSJ CARE MGMT MOD F2F 14D: CPT | Mod: 25 | Performed by: NURSE PRACTITIONER

## 2018-10-26 PROCEDURE — 69210 REMOVE IMPACTED EAR WAX UNI: CPT | Mod: 50 | Performed by: NURSE PRACTITIONER

## 2018-10-26 NOTE — MR AVS SNAPSHOT
After Visit Summary   10/26/2018    Shelly Rogers    MRN: [id number]           Patient Information     Date Of Birth          1/18/1933        Visit Information        Provider Department      10/26/2018 1:30 PM Maryan Reynoso APRN CNP [facility]        Today's Diagnoses     Bilateral impacted cerumen    -  1    Acute on chronic right-sided congestive heart failure (H)        Chronic diastolic congestive heart failure (H)        Chronic atrial fibrillation (H)           Follow-ups after your visit        Your next 10 appointments already scheduled     Oct 31, 2018  9:00 AM CDT   LAB with [facility]   [facility] [place] ([facility])    [address]  [place] [address] [phone]           Please do not eat 10-12 hours before your appointment if you are coming in fasting for labs on lipids, cholesterol, or glucose (sugar). This does not apply to pregnant women. Water, hot tea and black coffee (with nothing added) are okay. Do not drink other fluids, diet soda or chew gum.            Oct 31, 2018  9:30 AM CDT   CORE Enrollment with Eliz Goodwin PA-C   [facility] ([facility])    [address]  [place] [address] [phone] OPT 2            Nov 01, 2018  3:00 PM CDT   Anticoagulation [facility] with [facility]    [address]  [place] [address] [phone]            Nov 12, 2018  1:00 PM CST   New Sleep Patient with Bennett Ezra Goltz, PA-C   [facility] ([facility])    [address]  [place] [address]   [phone]            Nov 14, 2018  9:45 AM CST   Pacemaker Check with CAM MEDRANO   [facility] ([facility])    [address]  [place] [address]  "  [phone] OPT 2            Nov 14, 2018 10:30 AM CST   LAB with LEVY LAB   [facility] ([facility])    [address]  [place]  [place] [id number] [phone]           Please do not eat 10-12 hours before your appointment if you are coming in fasting for labs on lipids, cholesterol, or glucose (sugar). This does not apply to pregnant women. Water, hot tea and black coffee (with nothing added) are okay. Do not drink other fluids, diet soda or chew gum.            Nov 14, 2018  1:30 PM CST   Core MD Return with Galindo Del Valle MD   [facility] ([facility])    [address]  [place] [id number] [phone] OPT 2              Who to contact     If you have questions or need follow up information about today's clinic visit or your schedule please contact [facility] directly at [phone].  Normal or non-critical lab and imaging results will be communicated to you by MyChart, letter or phone within 4 business days after the clinic has received the results. If you do not hear from us within 7 days, please contact the clinic through MyChart or phone. If you have a critical or abnormal lab result, we will notify you by phone as soon as possible.  Submit refill requests through [employer] or call your pharmacy and they will forward the refill request to us. Please allow 3 business days for your refill to be completed.          Additional Information About Your Visit        Care EveryWhere ID     This is your Care EveryWhere ID. This could be used by other organizations to access your [facility] medical records  [id number]        Your Vitals Were     Pulse Temperature Height Pulse Oximetry BMI (Body Mass Index)       84 96.7  F (35.9  C) (Tympanic) 5' 2\" (1.575 m) 97% 32.74 kg/m2        Blood Pressure from Last 3 Encounters:   10/26/18 131/75   10/22/18 125/71   09/24/18 111/69    " Weight from Last 3 Encounters:   10/26/18 179 lb (81.2 kg)   10/22/18 177 lb 6.4 oz (80.5 kg)   09/24/18 179 lb 3.2 oz (81.3 kg)              We Performed the Following     Basic metabolic panel  (Ca, Cl, CO2, Creat, Gluc, K, Na, BUN)     REMOVE IMPACTED CERUMEN          Today's Medication Changes          These changes are accurate as of 10/26/18  2:10 PM.  If you have any questions, ask your nurse or doctor.               These medicines have changed or have updated prescriptions.        Dose/Directions    warfarin 5 MG tablet   Commonly known as:  COUMADIN   This may have changed:    - how much to take  - how to take this  - when to take this  - additional instructions   Used for:  Paroxysmal atrial fibrillation (H)        TAKE 1/2 TO 1 TABLET BY MOUTH DAILY OR AS DIRECTED BY INR CLINIC   Quantity:  90 tablet   Refills:  3         Stop taking these medicines if you haven't already. Please contact your care team if you have questions.     order for DME   Stopped by:  Maryan Reynoso APRN CNP           order for DME   Stopped by:  Maryan Reynoso APRN CNP           triamcinolone 0.1 % cream   Commonly known as:  KENALOG   Stopped by:  Maryan Reynoso APRN CNP                    Primary Care Provider Office Phone # Fax #    Halley ALLI Huff [phone] [address] [facility] [address]  [place] [address]        Equal Access to Services     [facility] AH: Hadii aad ku hadasho Sodestiny, waaxda luqadaha, qaybta kaalmada adeisidoroyada, matt marie . So [facility] [phone].    ATENCIÓN: Si habla español, tiene a quinones disposición servicios gratuitos de asistencia lingüística. Llame al [phone].    We comply with applicable federal civil rights laws and [place] laws. We do not discriminate on the basis of race, color, national origin, age, disability, sex, sexual orientation, or gender identity.            Thank you!     Thank you for choosing [facility]  [facility]  for your care. Our goal is always to provide you with excellent care. Hearing back from our patients is one way we can continue to improve our services. Please take a few minutes to complete the written survey that you may receive in the mail after your visit with us. Thank you!             Your Updated Medication List - Protect others around you: Learn how to safely use, store and throw away your medicines at www.disposemymeds.org.          This list is accurate as of 10/26/18  2:10 PM.  Always use your most recent med list.                   Brand Name Dispense Instructions for use Diagnosis    ACE/ARB/ARNI NOT PRESCRIBED (INTENTIONAL)      ACE & ARB not prescribed due to Other: HFpEF    Chronic diastolic congestive heart failure (H)       ACETAMINOPHEN PO      Take 500 mg by mouth 3 times daily as needed (Takes at least 6 hours apart).        ASPIRIN NOT PRESCRIBED    INTENTIONAL    0 each    Antiplatelet medication not prescribed intentionally due to Current anticoagulant therapy (warfarin/enoxaparin)    Type 2 diabetes mellitus without complications (H)       blood glucose lancets standard    no brand specified    100 each    Use to test blood sugar 1 times daily or as directed.    Type 2 diabetes mellitus without complication, without long-term current use of insulin (H)       blood glucose monitoring lancets     1 Box    Use to check blood sugars daily    Type 2 diabetes, HbA1c goal < 7% (H)       * blood glucose monitoring meter device kit     1 kit    1 Device daily.    Type 2 diabetes, HbA1c goal < 7% (H)       * blood glucose monitoring meter device kit    no brand specified    1 kit    Use to test blood sugar one times daily or as directed.    Type 2 diabetes mellitus without complication, without long-term current use of insulin (H)       * blood glucose monitoring test strip    ACCU-CHEK SMARTVIEW    1 Box    1 strip by In Vitro route daily    Type 2 diabetes, HbA1c goal < 7% (H)       * blood  glucose monitoring test strip    no brand specified    100 strip    Use to test blood sugars one times daily or as directed .has type 2 diabetes and not on insulin    Type 2 diabetes mellitus without complication, without long-term current use of insulin (H)       diltiazem 120 MG 24 hr capsule    DILACOR XR    30 capsule    Take 1 capsule (120 mg) by mouth At Bedtime    Chronic atrial fibrillation (H)       furosemide 20 MG tablet    LASIX    90 tablet    Take 1 tablet (20 mg) by mouth 2 times daily    Chronic diastolic congestive heart failure (H)       glimepiride 2 MG tablet    AMARYL    180 tablet    Take 1 tablet (2 mg) by mouth 2 times daily    Type 2 diabetes mellitus without complication, without long-term current use of insulin (H)       levothyroxine 88 MCG tablet    SYNTHROID/LEVOTHROID    90 tablet    Take 1 tablet (88 mcg) by mouth daily    Hypothyroidism, unspecified type       metFORMIN 500 MG tablet    GLUCOPHAGE    180 tablet    Take 1 tablet (500 mg) by mouth 2 times daily (with meals)    Type 2 diabetes mellitus without complication, without long-term current use of insulin (H)       metoprolol succinate 50 MG 24 hr tablet    TOPROL-XL    180 tablet    Take 1.5 tablets (75 mg) by mouth 2 times daily    Chronic diastolic congestive heart failure (H)       mirtazapine 7.5 MG Tabs tablet    REMERON    30 tablet    Take 1 tablet (7.5 mg) by mouth At Bedtime    Adjustment disorder with anxious mood       multivitamin  with lutein Caps per capsule      Take 1 capsule by mouth daily        NASACORT ALLERGY 24HR 55 MCG/ACT inhaler   Generic drug:  triamcinolone      Spray 2 sprays into both nostrils daily as needed (during allergy season)        OLANZapine 2.5 MG tablet    zyPREXA    30 tablet    Take 1 tablet (2.5 mg) by mouth daily as needed (insomnia, agitation)    Adjustment disorder with anxious mood       STATIN NOT PRESCRIBED (INTENTIONAL)     0 each    continuous prn. Statin not prescribed  intentionally due to Other: Not needed, LDL at goal <100mg/dL without therapy        warfarin 5 MG tablet    COUMADIN    90 tablet    TAKE 1/2 TO 1 TABLET BY MOUTH DAILY OR AS DIRECTED BY INR CLINIC    Paroxysmal atrial fibrillation (H)       * Notice:  This list has 4 medication(s) that are the same as other medications prescribed for you. Read the directions carefully, and ask your doctor or other care provider to review them with you.

## 2018-10-26 NOTE — NURSING NOTE
Patient identified using two patient identifiers.  Ear exam showing wax occlusion completed by provider.  Solution: warm water was placed in the both ear(s) via irrigation tool: elephant ear.    Radha Baeza MA

## 2018-10-26 NOTE — PROGRESS NOTES
SUBJECTIVE:   Shelly Rogers is a 85 year old female who presents to clinic today for the following health issues:          Hospital Follow-up Visit:    Hospital/Nursing Home/IP Rehab Facility: [facility]  Date of Admission: 10/19/18  Date of Discharge: 10/22/18  Reason(s) for Admission: A fib- S/p R thoracentesis of 900 ml claudio fluid on 10/22/18.             Problems taking medications regularly:  None       Medication changes since discharge: None       Problems adhering to non-medication therapy:  None  Radha Baeza MA    Summary of hospitalization:  [facility] discharge summary reviewed  Diagnostic Tests/Treatments reviewed.  Follow up needed: none  Other Healthcare Providers Involved in Patient s Care:   cardiology, sleep center  Update since discharge: improved.  Much improved, breathing easier, mood improved, more energy , sleeping poorly still but better  Also less anxious on new medication remeron 7.5 mg;  Has only used the zyprex 2.5mg once and didn't think it was effective    Post Discharge Medication Reconciliation: discharge medications reconciled, continue medications without change.  She has 4 new meds remeron , diltiazem, furosemide increased to bid, and zyprexa- all reviewed with her and daughter    Plan of care communicated with patient and daughter     Coding guidelines for this visit:  Type of Medical   Decision Making Face-to-Face Visit       within 7 Days of discharge Face-to-Face Visit        within 14 days of discharge   Moderate Complexity [id number] [id number]   High Complexity [id number]          Has sleep study scheduled for the 12th   And appt for CORE clinic Dr Del Valle and pacemaker check on the 14th    Problem list and histories reviewed & adjusted, as indicated.  Additional history: as documented    Patient Active Problem List   Diagnosis     CHF (congestive heart failure) (H)     CARDIOVASCULAR SCREENING; LDL GOAL LESS THAN 100     Health Care Home     Uterine  cancer (H)     Pacemaker     OA (osteoarthritis)     Type 2 diabetes mellitus without complications (H)     DNS (deviated nasal septum)     Atrial fibrillation (H)     Paroxysmal atrial fibrillation (H)     Chronic diastolic congestive heart failure (H)     Hypothyroidism     Long-term (current) use of anticoagulants [Z79.01]     Heart failure (H)     Past Surgical History:   Procedure Laterality Date     C ANESTH,PACEMAKER INSERTION      dual chamber 3/27/13     CHOLECYSTECTOMY  8/2004     GYN SURGERY       HYSTERECTOMY      Ut ca      JOINT REPLACEMENT       KERATOTOMY ARCUATE WITH FEMTOSECOND LASER/IMAGING FOR ATIOL Right 4/11/2017    Procedure: KERATOTOMY ARCUATE WITH FEMTOSECOND LASER/IMAGING FOR ATIOL;  Surgeon: Calvin Dominguez MD;  Location: [facility]     PHACOEMULSIFICATION CLEAR CORNEA WITH STANDARD INTRAOCULAR LENS IMPLANT Right 4/11/2017    Procedure: PHACOEMULSIFICATION CLEAR CORNEA WITH STANDARD INTRAOCULAR LENS IMPLANT;  Surgeon: Calvin Dominguez MD;  Location: [facility]     TKR      bilat       Social History   Substance Use Topics     Smoking status: Former Smoker     Quit date: 1/1/1990     Smokeless tobacco: Never Used     Alcohol use No     Family History   Problem Relation Age of Onset     GASTROINTESTINAL DISEASE Mother      bowel obstruction     Cardiovascular Father      C.A.D. Father      Cardiovascular Brother      CHF         Current Outpatient Prescriptions   Medication Sig Dispense Refill     ACETAMINOPHEN PO Take 500 mg by mouth 3 times daily as needed (Takes at least 6 hours apart).        ASPIRIN NOT PRESCRIBED, INTENTIONAL, Antiplatelet medication not prescribed intentionally due to Current anticoagulant therapy (warfarin/enoxaparin) 0 each 0     blood glucose (ACCU-CHEK SMARTVIEW) test strip 1 strip by In Vitro route daily 1 Box prn     blood glucose (NO BRAND SPECIFIED) lancets standard Use to test blood sugar 1 times daily or as directed. 100 each 11     blood glucose  monitoring (ACCU-CHEK FASTCLIX) lancets Use to check blood sugars daily 1 Box prn     blood glucose monitoring (NO BRAND SPECIFIED) meter device kit Use to test blood sugar one times daily or as directed. 1 kit 0     blood glucose monitoring (NO BRAND SPECIFIED) test strip Use to test blood sugars one times daily or as directed .has type 2 diabetes and not on insulin 100 strip 1     Blood Glucose Monitoring Suppl (ACCU-CHEK LAKIA SMARTVIEW) W/DEVICE KIT 1 Device daily. 1 kit 0     diltiazem (DILACOR XR) 120 MG 24 hr capsule Take 1 capsule (120 mg) by mouth At Bedtime 30 capsule 0     furosemide (LASIX) 20 MG tablet Take 1 tablet (20 mg) by mouth 2 times daily 90 tablet 3     glimepiride (AMARYL) 2 MG tablet Take 1 tablet (2 mg) by mouth 2 times daily 180 tablet 3     levothyroxine (SYNTHROID/LEVOTHROID) 88 MCG tablet Take 1 tablet (88 mcg) by mouth daily 90 tablet 1     metFORMIN (GLUCOPHAGE) 500 MG tablet Take 1 tablet (500 mg) by mouth 2 times daily (with meals) 180 tablet 3     metoprolol succinate (TOPROL-XL) 50 MG 24 hr tablet Take 1.5 tablets (75 mg) by mouth 2 times daily 180 tablet 3     mirtazapine (REMERON) 7.5 MG TABS tablet Take 1 tablet (7.5 mg) by mouth At Bedtime 30 tablet 0     multivitamin  with lutein (OCUVITE WITH LTEIN) CAPS per capsule Take 1 capsule by mouth daily       OLANZapine (ZYPREXA) 2.5 MG tablet Take 1 tablet (2.5 mg) by mouth daily as needed (insomnia, agitation) 30 tablet 1     warfarin (COUMADIN) 5 MG tablet TAKE 1/2 TO 1 TABLET BY MOUTH DAILY OR AS DIRECTED BY INR CLINIC (Patient taking differently: Take 2.5-5 mg by mouth daily TAKE 1/2 TO 1 TABLET BY MOUTH DAILY OR AS DIRECTED BY INR CLINIC) 90 tablet 3     ACE/ARB NOT PRESCRIBED, INTENTIONAL, ACE & ARB not prescribed due to Other: HFpEF       STATIN NOT PRESCRIBED, INTENTIONAL, continuous prn. Statin not prescribed intentionally due to Other: Not needed, LDL at goal <100mg/dL without therapy 0 each 0     triamcinolone (NASACORT  "ALLERGY 24HR) 55 MCG/ACT nasal inhaler Spray 2 sprays into both nostrils daily as needed (during allergy season)        Allergies   Allergen Reactions     Shellfish Allergy Difficulty breathing     Ambien [Zolpidem] Other (See Comments)     Agitation with hallucinations     Cats      Sneezing, watery eyes     Lisinopril Cough     No Clinical Screening - See Comments Other (See Comments)     Pt stated allergic to flowers, pt gets itchy watery eyes and stuffy nose     Seasonal Allergies Itching     Flowers     Sulfa Drugs Hives       Reviewed and updated as needed this visit by clinical staff       Reviewed and updated as needed this visit by Provider         ROS:  Constitutional, HEENT, cardiovascular, pulmonary, GI, , musculoskeletal, neuro, skin, endocrine and psych systems are negative, except as otherwise noted.    OBJECTIVE:     /75 (BP Location: Left arm, Cuff Size: Adult Large)  Pulse 84  Temp 96.7  F (35.9  C) (Tympanic)  Ht 5' 2\" (1.575 m)  Wt 179 lb (81.2 kg)  SpO2 97%  BMI 32.74 kg/m2  Body mass index is 32.74 kg/(m^2).  GENERAL: healthy, alert and no distress  EYES: Eyes grossly normal to inspection, PERRL and conjunctivae and sclerae normal  HENT: ear canals cerumen occluded and lavaged by MA;, nose and mouth without ulcers or lesions  NECK: no adenopathy, no asymmetry, masses, or scars and thyroid normal to palpation  RESP: lungs clear to auscultation - no rales, rhonchi or wheezes  CV:irregularly irregular rate and rhythm, normal S1 S2, no S3 or S4, no murmur, click or rub, trace peripheral edema  ABDOMEN: soft, nontender, no hepatosplenomegaly, no masses and bowel sounds normal  MS: no gross musculoskeletal defects noted  SKIN: no suspicious lesions or rashes  NEURO: Normal strength and tone, mentation intact and speech normal  PSYCH: mentation appears normal, affect normal/bright    Diagnostic Test Results:  bmp    ASSESSMENT/PLAN:       ICD-10-CM    1. Hospital discharge follow-up Z09  "   2. Acute on chronic right-sided congestive heart failure (H) I50.813 Basic metabolic panel  (Ca, Cl, CO2, Creat, Gluc, K, Na, BUN)   3. Chronic diastolic congestive heart failure (H) I50.32    4. Chronic atrial fibrillation (H) I48.2    5. Bilateral impacted cerumen H61.23 REMOVE IMPACTED CERUMEN     She will continue current cares without change and followup with CORE clinic and sleep study and pacemaker interrogation     MERLYN Moody [facility]

## 2018-10-27 LAB
BACTERIA SPEC CULT: NO GROWTH
SPECIMEN SOURCE: NORMAL

## 2018-10-31 ENCOUNTER — OFFICE VISIT (OUTPATIENT)
Dept: CARDIOLOGY | Facility: CLINIC | Age: 83
End: 2018-10-31
Payer: COMMERCIAL

## 2018-10-31 VITALS
WEIGHT: 174 LBS | HEART RATE: 90 BPM | OXYGEN SATURATION: 96 % | BODY MASS INDEX: 32.02 KG/M2 | HEIGHT: 62 IN | DIASTOLIC BLOOD PRESSURE: 68 MMHG | SYSTOLIC BLOOD PRESSURE: 112 MMHG

## 2018-10-31 DIAGNOSIS — E87.1 HYPONATREMIA: ICD-10-CM

## 2018-10-31 DIAGNOSIS — I48.0 PAROXYSMAL ATRIAL FIBRILLATION (H): ICD-10-CM

## 2018-10-31 DIAGNOSIS — E11.9 TYPE 2 DIABETES MELLITUS WITHOUT COMPLICATION, WITHOUT LONG-TERM CURRENT USE OF INSULIN (H): ICD-10-CM

## 2018-10-31 DIAGNOSIS — I50.32 CHRONIC DIASTOLIC CONGESTIVE HEART FAILURE (H): ICD-10-CM

## 2018-10-31 DIAGNOSIS — I50.32 CHRONIC DIASTOLIC CONGESTIVE HEART FAILURE (H): Primary | ICD-10-CM

## 2018-10-31 LAB
ANION GAP SERPL CALCULATED.3IONS-SCNC: 8.5 MMOL/L (ref 6–17)
BUN SERPL-MCNC: 16 MG/DL (ref 7–30)
CALCIUM SERPL-MCNC: 8.9 MG/DL (ref 8.5–10.5)
CHLORIDE SERPL-SCNC: 98 MMOL/L (ref 98–107)
CO2 SERPL-SCNC: 29 MMOL/L (ref 23–29)
CREAT SERPL-MCNC: 1.14 MG/DL (ref 0.7–1.3)
GFR SERPL CREATININE-BSD FRML MDRD: 45 ML/MIN/1.7M2
GLUCOSE SERPL-MCNC: 309 MG/DL (ref 70–105)
POTASSIUM SERPL-SCNC: 4.5 MMOL/L (ref 3.5–5.1)
SODIUM SERPL-SCNC: 131 MMOL/L (ref 136–145)

## 2018-10-31 PROCEDURE — 99214 OFFICE O/P EST MOD 30 MIN: CPT | Performed by: PHYSICIAN ASSISTANT

## 2018-10-31 PROCEDURE — 36415 COLL VENOUS BLD VENIPUNCTURE: CPT | Performed by: INTERNAL MEDICINE

## 2018-10-31 PROCEDURE — 80048 BASIC METABOLIC PNL TOTAL CA: CPT | Performed by: INTERNAL MEDICINE

## 2018-10-31 RX ORDER — SPIRONOLACTONE 25 MG/1
25 TABLET ORAL DAILY
Qty: 30 TABLET | Refills: 1 | Status: SHIPPED | OUTPATIENT
Start: 2018-10-31 | End: 2019-02-07

## 2018-10-31 RX ORDER — FUROSEMIDE 20 MG
20 TABLET ORAL 2 TIMES DAILY
Qty: 90 TABLET | Refills: 3 | COMMUNITY
Start: 2018-10-31 | End: 2019-07-03

## 2018-10-31 NOTE — PROGRESS NOTES
Cardiology Clinic Progress Note    Shelly Rogers MRN# [id number]   YOB: 1933 Age: 85 year old     Reason for visit: CORE Enrollment, hospital f/u visit           Assessment and Plan:     In summary, the patient presents today to establish with the CORE clinic after a recent hospitalization for HFpEF, felt triggered by increasing rapid PAF burden and questionable compliance with PTA Lasix. Her weight is down several lbs from discharge, and she's feeling markedly better. However she still has marked pitting edema and some LLL rales.     1. HFpEF   - ECHO: EF 55-60%, E/E' avg 13.2, small pericardial effusion   - ACC/AHA Stage: C; FC II    - Etiology: tachy-mediated, diabetes, probable SYLVIE; diastolic dysfn contributing to RV failure   - RV: moderate dilated with mildly reduced fn; RVSP 13.5 mmHg   - Valves: moderate TR   - Volume:     Admit Wt: 179 lbs    Current Wt: 174 lbs    Dry Wt: likely ~ 165 - 170, although she hasn't weighed this since 2014    O2 Requirements: RA    Diuretics: Lasix 20 BID   - Rhythm: NSR, PAF,  ~2%, Ap ~ 30%   - QRS: narrow   - Device: Dual chamber PPM   - Other: Stop-Bang Score: high - sleep study pending    2. PAF/SSS s/p PPM - appears to be in rate-controlled AF per auscultation today, on Toprol 75 BID. INR's followed by PCP.   3. Mild hyponatremia   4. DMII - with uncontrolled (non-fasting) glucose today. Admits to increased fruit intake. Per PCP.   5. Hypothyroidism - now on levothyroxine.     Plan:  - Reduce Lasix from 20 BID to 20 daily and add Aldactone 25 mg daily.  - 48h zio patch to assess rate response on higher dose of Toprol. Will continue a rate control strategy for the time being as long as her HF remains well-controlled.  - Discussed low-salt, low-sugar and low-potassium diet (specifically, fruit reduction), daily weights and when to call.   - Repeat BMP in one week. Encouraged to monitor for clinical signs of hyponatremia in the meantime.   - RTC as scheduled  to see Dr. Del Valle in two weeks with BMP, CBC and iron panel prior   - Consider repeat TTE in 2 months to reassess effusion  - Encouraged to have sleep study performed (she plans to have this done within the next two weeks)         History of Presenting Illness:      Shelly Rogers is a pleasant 85 year old patient of  Dr. Del Valle who presents today for a CORE Enrollment visit after a recent admission for HFpEF.    She has a pertinent PMH including  1. PAF/SSS, s/p PPM and cardioversion in 2013, briefly on amio, but with low-burden AF since that time on metoprolol, anticoagulated on warfarin  2. HFpEF, initially diagnosed in 2013 in the setting of new-onset rapid AF, controlled with low-dose Lasix since that time  3. HTN  4. DMII  5. CKD  6. Hypothyroidism   She was recently admitted from 10/19 - 10/22 with acute on chronic HFpEF. She had been experiencing chronic fatigue and dyspnea x four - five months, and also admitted to poor oral intake recently and despite this her admit weight was stable at 178 lbs. There was also questionable compliance with her PTA Lasix 20 mg daily. CXR revealed congestion with a R pleural effusion. TTE revealed a preserved LVEF with mild RV dysfunction, moderate TR - overall stable save for a small pericardial effusion. Of note, the rhythm was rapid AF at the time of the study. Pacemaker interrogation revealed an increasing AF burden over the prior 9 months, with an average HR ~ 100 bpm. TFT s were mildly abnormal and levothyroxine was started. Her PTA Toprol was increased to 75 mg BID. She was initiated on IV Lasix 20 BID with a net -I/O of 7L, and also underwent R thoracentesis with 900 ccs of transudative fluid removed. Despite this her weight was recorded as unchanged at discharge. She was sent home with oral Lasix 20 mg BID. Of note, a sleep study was arranged, this has not yet been performed.  Today, she presents with her daughter Sandhya. Her weight is down to 174 lbs. BMP is notable  "for mild hyponatremia at 131 and elevated blood glucose at 309. She did have some sodium levels in the low 130's during her admission. She notes a marked improvement in her mood and motivation since home. Her edema is also much-improved and now near her baseline. States she's had bad luck with compression stockings in the past and does try to elevate her legs during the day.  She still has a bit of a mildly productive cough, residual from her hospitalization, although feels this is mostly related to upper airway congestion. She may have some PND. She does snore at night and actually thinks she activated her \"clapper\" lamp with her snoring in the past. Admits to daytime somnolence. She denies chest pain, orthopnea, palpitations, near syncope or syncope.   She lives independently at an assisted living facility and manages her own medications. She has some macular degeneration which has caused some balance issues over time and therefore uses a cane to ambulate when she's out of her home, and doesn't drive. She otherwise doesn't feel limited in her ambulation or activities. She's had no falls, and uses fall precautions. She's avoiding salt in her diet. She does admit to more fruit intake recently and had some orange juice and honey on her toast this morning. No ETOH or tobacco at present.           Review of Systems:     12-pt ROS is negative except for as noted in the HPI.           Physical Exam:     Vitals: /68  Pulse 90  Ht 1.575 m (5' 2\")  Wt 78.9 kg (174 lb)  SpO2 96%  BMI 31.83 kg/m2  Wt Readings from Last 3 Encounters:   10/31/18 78.9 kg (174 lb)   10/26/18 81.2 kg (179 lb)   10/22/18 80.5 kg (177 lb 6.4 oz)       Constitutional:  Patient is pleasant, alert, cooperative, and in NAD.  HEENT:  NCAT. PERRLA. EOM's intact. No masses or thyromegaly. Teeth in normal repair.   Neck:  CVP is difficult to assess due to body habitus, no appreciable JVD or HJR  Pulmonary: Normal respiratory effort. LLL rales " appreciated.  Cardiac: Irregularly irregular, grade 2/6 sm at the LSB  Abdomen:  Non-tender abdomen with normoactive bowel sounds, no hepatosplenomegaly appreciated.   Vascular: Pulses in the upper extremities are 2+ and equal, lower extremity pulses are diminished bilaterally.  Extremities: 2+ pitting edema pedal - knee bilaterally, L>R  Skin:  No rashes or lesions appreciated.   Neurological:  No gross motor or sensory deficits.   Psych: Appropriate affect.        Data:     Cardiac Diagnostics reviewed:  Type Date Result   TTE 10/19/18 The left ventricle is normal in size.  The visual ejection fraction is estimated at 55-60%.  No regional wall motion abnormalities noted.  The right ventricle is moderately dilated.  Mildly decreased right ventricular systolic function  There is moderate (2+) tricuspid regurgitation.  Small pericardial effusion  The rhythm was rapid atrial fibrillation.    5/5/16 Left ventricular systolic function is normal. The visual ejection fraction is  estimated at 60-65%.  There is mild concentric left ventricular hypertrophy. The left atrium is borderline dilated.  There is trace to mild mitral regurgitation.  There is moderate (2+) tricuspid regurgitation.  Right ventricular systolic pressure is elevated, consistent with mild  pulmonary hypertension.  The rhythm was normal sinus.  Contrast was used without apparent complications. TR may be mildly increased  compared to the prior study.   EKG 10/19/18 AF, low voltage QRS     Device  HyperQuesttronic PPM interrogation (inpatient)   ~ 2%, Ap ~ 30%       Recent Labs   Lab Test  10/19/18   0620  06/12/18   1633  02/27/18   1541   10/13/15   1454  10/30/14   1434   03/21/13   0525   LDL   --    --   101*   --   88  105   < >  58   HDL   --    --   41*   --   44*   --    --   40*   NHDL   --    --   165*   --    --    --    --    --    CHOL   --    --   206*   --   204*   --    --   125   TRIG   --    --   319*   --   359*   --    --   137   TSH  6.76*   5.11*  2.32   < >  1.20  0.97   < >   --    DARI   --   24  28   < >   --    --    --    --     < > = values in this interval not displayed.     Lab Results   Component Value Date    CHOL 206 (H) 02/27/2018    HDL 41 (L) 02/27/2018     (H) 02/27/2018    TRIG 319 (H) 02/27/2018    CHOLHDLRATIO 4.6 10/13/2015       Lab Results   Component Value Date    WBC 9.4 10/19/2018    RBC 4.85 10/19/2018    HGB 14.4 10/19/2018    HCT 44.3 10/19/2018    MCV 91 10/19/2018    MCH 29.7 10/19/2018    MCHC 32.5 10/19/2018    RDW 15.9 (H) 10/19/2018     10/19/2018       Lab Results   Component Value Date     (L) 10/31/2018    POTASSIUM 4.5 10/31/2018    CHLORIDE 98 10/31/2018    CO2 29 10/31/2018    ANIONGAP 8.5 10/31/2018     (H) 10/31/2018    BUN 16 10/31/2018    CR 1.14 10/31/2018    GFRESTIMATED 45 (L) 10/31/2018    GFRESTBLACK 55 (L) 10/31/2018    ASTRID 8.9 10/31/2018      Lab Results   Component Value Date    AST 16 10/19/2018    ALT 19 10/19/2018       Lab Results   Component Value Date    A1C 8.2 (H) 10/19/2018       Lab Results   Component Value Date    INR 1.70 (H) 10/22/2018    INR 1.97 (H) 10/21/2018          Problem List:     Patient Active Problem List   Diagnosis     CHF (congestive heart failure) (H)     CARDIOVASCULAR SCREENING; LDL GOAL LESS THAN 100     Health Care Home     Uterine cancer (H)     Pacemaker     OA (osteoarthritis)     Type 2 diabetes mellitus without complications (H)     DNS (deviated nasal septum)     Atrial fibrillation (H)     Paroxysmal atrial fibrillation (H)     Chronic diastolic congestive heart failure (H)     Hypothyroidism     Long-term (current) use of anticoagulants [Z79.01]     Heart failure (H)            Medications:     Current Outpatient Prescriptions   Medication Sig Dispense Refill     ACE/ARB NOT PRESCRIBED, INTENTIONAL, ACE & ARB not prescribed due to Other: HFpEF       ACETAMINOPHEN PO Take 500 mg by mouth 3 times daily as needed (Takes at least 6 hours  apart).        ASPIRIN NOT PRESCRIBED, INTENTIONAL, Antiplatelet medication not prescribed intentionally due to Current anticoagulant therapy (warfarin/enoxaparin) 0 each 0     blood glucose (ACCU-CHEK SMARTVIEW) test strip 1 strip by In Vitro route daily 1 Box prn     blood glucose (NO BRAND SPECIFIED) lancets standard Use to test blood sugar 1 times daily or as directed. 100 each 11     blood glucose monitoring (ACCU-CHEK FASTCLIX) lancets Use to check blood sugars daily 1 Box prn     blood glucose monitoring (NO BRAND SPECIFIED) meter device kit Use to test blood sugar one times daily or as directed. 1 kit 0     blood glucose monitoring (NO BRAND SPECIFIED) test strip Use to test blood sugars one times daily or as directed .has type 2 diabetes and not on insulin 100 strip 1     Blood Glucose Monitoring Suppl (ACCU-CHEK LAKIA SMARTVIEW) W/DEVICE KIT 1 Device daily. 1 kit 0     diltiazem (DILACOR XR) 120 MG 24 hr capsule Take 1 capsule (120 mg) by mouth At Bedtime 30 capsule 0     furosemide (LASIX) 20 MG tablet Take 1 tablet (20 mg) by mouth 2 times daily 90 tablet 3     glimepiride (AMARYL) 2 MG tablet Take 1 tablet (2 mg) by mouth 2 times daily 180 tablet 3     levothyroxine (SYNTHROID/LEVOTHROID) 88 MCG tablet Take 1 tablet (88 mcg) by mouth daily 90 tablet 1     metFORMIN (GLUCOPHAGE) 500 MG tablet Take 1 tablet (500 mg) by mouth 2 times daily (with meals) 180 tablet 3     metoprolol succinate (TOPROL-XL) 50 MG 24 hr tablet Take 1.5 tablets (75 mg) by mouth 2 times daily 180 tablet 3     mirtazapine (REMERON) 7.5 MG TABS tablet Take 1 tablet (7.5 mg) by mouth At Bedtime 30 tablet 0     multivitamin  with lutein (OCUVITE WITH LTEIN) CAPS per capsule Take 1 capsule by mouth daily       OLANZapine (ZYPREXA) 2.5 MG tablet Take 1 tablet (2.5 mg) by mouth daily as needed (insomnia, agitation) 30 tablet 1     STATIN NOT PRESCRIBED, INTENTIONAL, continuous prn. Statin not prescribed intentionally due to Other: Not  needed, LDL at goal <100mg/dL without therapy 0 each 0     triamcinolone (NASACORT ALLERGY 24HR) 55 MCG/ACT nasal inhaler Spray 2 sprays into both nostrils daily as needed (during allergy season)        warfarin (COUMADIN) 5 MG tablet TAKE 1/2 TO 1 TABLET BY MOUTH DAILY OR AS DIRECTED BY INR CLINIC (Patient taking differently: Take 2.5-5 mg by mouth daily TAKE 1/2 TO 1 TABLET BY MOUTH DAILY OR AS DIRECTED BY INR CLINIC) 90 tablet 3          Past Medical History:     Past Medical History:   Diagnosis Date     Atrial fibrillation (H)     Nl LVEF, s/p ablation      Congestive heart failure, unspecified      Diabetes mellitus (H) 2004     Hemorrhoids     intermittent bleeding     Hypertension      Macular degeneration      OA (osteoarthritis)      Pacemaker 3/2013     Uterine cancer (H)     s/p hyst     Past Surgical History:   Procedure Laterality Date     C ANESTH,PACEMAKER INSERTION      dual chamber 3/27/13     CHOLECYSTECTOMY  8/2004     GYN SURGERY       HYSTERECTOMY      Ut ca      JOINT REPLACEMENT       KERATOTOMY ARCUATE WITH FEMTOSECOND LASER/IMAGING FOR ATIOL Right 4/11/2017    Procedure: KERATOTOMY ARCUATE WITH FEMTOSECOND LASER/IMAGING FOR ATIOL;  Surgeon: Calvin Dominguez MD;  Location: [facility]     PHACOEMULSIFICATION CLEAR CORNEA WITH STANDARD INTRAOCULAR LENS IMPLANT Right 4/11/2017    Procedure: PHACOEMULSIFICATION CLEAR CORNEA WITH STANDARD INTRAOCULAR LENS IMPLANT;  Surgeon: Calvin Dominguez MD;  Location: [facility]     TKR      bilat     Family History   Problem Relation Age of Onset     GASTROINTESTINAL DISEASE Mother      bowel obstruction     Cardiovascular Father      C.A.D. Father      Cardiovascular Brother      CHF     Social History     Social History     Marital status: Single     Spouse name: N/A     Number of children: N/A     Years of education: N/A     Occupational History     Not on file.     Social History Main Topics     Smoking status: Former Smoker     Quit date: 1/1/1990      Smokeless tobacco: Never Used     Alcohol use No     Drug use: No     Sexual activity: Not Currently     Partners: Male     Other Topics Concern     Parent/Sibling W/ Cabg, Mi Or Angioplasty Before 65f 55m? No     Caffeine Concern Yes     daily      Sleep Concern No     Special Diet Yes     low sodium, less leafy greens, low sugar     Exercise No     Seat Belt Yes     Social History Narrative            Allergies:   Shellfish allergy; Ambien [zolpidem]; Cats; Lisinopril; No clinical screening - see comments; Seasonal allergies; and Sulfa drugs      Eliz Goodwin PA-C  [facility]  Pager: [phone]

## 2018-10-31 NOTE — LETTER
10/31/2018    Halley Huff MD  [address]  [place]                [place] [address]    RE: Shelly Rogers       Dear Colleague,    I had the pleasure of seeing Shelly Rogers in the [facility].    Cardiology Clinic Progress Note    Shelly Rogers MRN# [id number]   YOB: 1933 Age: 85 year old     Reason for visit: CORE Enrollment, hospital f/u visit           Assessment and Plan:     In summary, the patient presents today to establish with the CORE clinic after a recent hospitalization for HFpEF, felt triggered by increasing rapid PAF burden and questionable compliance with PTA Lasix. Her weight is down several lbs from discharge, and she's feeling markedly better. However she still has marked pitting edema and some LLL rales.     1. HFpEF   - ECHO: EF 55-60%, E/E' avg 13.2, small pericardial effusion   - ACC/AHA Stage: C; FC II    - Etiology: tachy-mediated, diabetes, probable SYLVIE; diastolic dysfn contributing to RV failure   - RV: moderate dilated with mildly reduced fn; RVSP 13.5 mmHg   - Valves: moderate TR   - Volume:     Admit Wt: 179 lbs    Current Wt: 174 lbs    Dry Wt: likely ~ 165 - 170, although she hasn't weighed this since 2014    O2 Requirements: RA    Diuretics: Lasix 20 BID   - Rhythm: NSR, PAF,  ~2%, Ap ~ 30%   - QRS: narrow   - Device: Dual chamber PPM   - Other: Stop-Bang Score: high - sleep study pending    2. PAF/SSS s/p PPM - appears to be in rate-controlled AF per auscultation today, on Toprol 75 BID. INR's followed by PCP.   3. Mild hyponatremia   4. DMII - with uncontrolled (non-fasting) glucose today. Admits to increased fruit intake. Per PCP.   5. Hypothyroidism - now on levothyroxine.     Plan:  - Reduce Lasix from 20 BID to 20 daily and add Aldactone 25 mg daily.  - 48h zio patch to assess rate response on higher dose of Toprol. Will continue a rate control strategy for the time being as long as her HF remains well-controlled.  -  Discussed low-salt, low-sugar and low-potassium diet (specifically, fruit reduction), daily weights and when to call.   - Repeat BMP in one week. Encouraged to monitor for clinical signs of hyponatremia in the meantime.   - RTC as scheduled to see Dr. Del Valle in two weeks with BMP, CBC and iron panel prior   - Consider repeat TTE in 2 months to reassess effusion  - Encouraged to have sleep study performed (she plans to have this done within the next two weeks)         History of Presenting Illness:      Shelly Rogers is a pleasant 85 year old patient of  Dr. Del Valle who presents today for a CORE Enrollment visit after a recent admission for HFpEF.    She has a pertinent PMH including  1. PAF/SSS, s/p PPM and cardioversion in 2013, briefly on amio, but with low-burden AF since that time on metoprolol, anticoagulated on warfarin  2. HFpEF, initially diagnosed in 2013 in the setting of new-onset rapid AF, controlled with low-dose Lasix since that time  3. HTN  4. DMII  5. CKD  6. Hypothyroidism   She was recently admitted from 10/19 - 10/22 with acute on chronic HFpEF. She had been experiencing chronic fatigue and dyspnea x four - five months, and also admitted to poor oral intake recently and despite this her admit weight was stable at 178 lbs. There was also questionable compliance with her PTA Lasix 20 mg daily. CXR revealed congestion with a R pleural effusion. TTE revealed a preserved LVEF with mild RV dysfunction, moderate TR - overall stable save for a small pericardial effusion. Of note, the rhythm was rapid AF at the time of the study. Pacemaker interrogation revealed an increasing AF burden over the prior 9 months, with an average HR ~ 100 bpm. TFT s were mildly abnormal and levothyroxine was started. Her PTA Toprol was increased to 75 mg BID. She was initiated on IV Lasix 20 BID with a net -I/O of 7L, and also underwent R thoracentesis with 900 ccs of transudative fluid removed. Despite this her weight was  "recorded as unchanged at discharge. She was sent home with oral Lasix 20 mg BID. Of note, a sleep study was arranged, this has not yet been performed.  Today, she presents with her daughter Sandhya. Her weight is down to 174 lbs. BMP is notable for mild hyponatremia at 131 and elevated blood glucose at 309. She did have some sodium levels in the low 130's during her admission. She notes a marked improvement in her mood and motivation since home. Her edema is also much-improved and now near her baseline. States she's had bad luck with compression stockings in the past and does try to elevate her legs during the day.  She still has a bit of a mildly productive cough, residual from her hospitalization, although feels this is mostly related to upper airway congestion. She may have some PND. She does snore at night and actually thinks she activated her \"clapper\" lamp with her snoring in the past. Admits to daytime somnolence. She denies chest pain, orthopnea, palpitations, near syncope or syncope.   She lives independently at an assisted living facility and manages her own medications. She has some macular degeneration which has caused some balance issues over time and therefore uses a cane to ambulate when she's out of her home, and doesn't drive. She otherwise doesn't feel limited in her ambulation or activities. She's had no falls, and uses fall precautions. She's avoiding salt in her diet. She does admit to more fruit intake recently and had some orange juice and honey on her toast this morning. No ETOH or tobacco at present.           Review of Systems:     12-pt ROS is negative except for as noted in the HPI.           Physical Exam:     Vitals: /68  Pulse 90  Ht 1.575 m (5' 2\")  Wt 78.9 kg (174 lb)  SpO2 96%  BMI 31.83 kg/m2  Wt Readings from Last 3 Encounters:   10/31/18 78.9 kg (174 lb)   10/26/18 81.2 kg (179 lb)   10/22/18 80.5 kg (177 lb 6.4 oz)       Constitutional:  Patient is pleasant, alert, " cooperative, and in NAD.  HEENT:  NCAT. PERRLA. EOM's intact. No masses or thyromegaly. Teeth in normal repair.   Neck:  CVP is difficult to assess due to body habitus, no appreciable JVD or HJR  Pulmonary: Normal respiratory effort. LLL rales appreciated.  Cardiac: Irregularly irregular, grade 2/6 sm at the LSB  Abdomen:  Non-tender abdomen with normoactive bowel sounds, no hepatosplenomegaly appreciated.   Vascular: Pulses in the upper extremities are 2+ and equal, lower extremity pulses are diminished bilaterally.  Extremities: 2+ pitting edema pedal - knee bilaterally, L>R  Skin:  No rashes or lesions appreciated.   Neurological:  No gross motor or sensory deficits.   Psych: Appropriate affect.        Data:     Cardiac Diagnostics reviewed:  Type Date Result   TTE 10/19/18 The left ventricle is normal in size.  The visual ejection fraction is estimated at 55-60%.  No regional wall motion abnormalities noted.  The right ventricle is moderately dilated.  Mildly decreased right ventricular systolic function  There is moderate (2+) tricuspid regurgitation.  Small pericardial effusion  The rhythm was rapid atrial fibrillation.    5/5/16 Left ventricular systolic function is normal. The visual ejection fraction is  estimated at 60-65%.  There is mild concentric left ventricular hypertrophy. The left atrium is borderline dilated.  There is trace to mild mitral regurgitation.  There is moderate (2+) tricuspid regurgitation.  Right ventricular systolic pressure is elevated, consistent with mild  pulmonary hypertension.  The rhythm was normal sinus.  Contrast was used without apparent complications. TR may be mildly increased  compared to the prior study.   EKG 10/19/18 AF, low voltage QRS     Device  [employer] PPM interrogation (inpatient)   ~ 2%, Ap ~ 30%       Recent Labs   Lab Test  10/19/18   0620  06/12/18   1633  02/27/18   1541   10/13/15   1454  10/30/14   1434   03/21/13   0525   LDL   --    --   101*   --    88  105   < >  58   HDL   --    --   41*   --   44*   --    --   40*   NHDL   --    --   165*   --    --    --    --    --    CHOL   --    --   206*   --   204*   --    --   125   TRIG   --    --   319*   --   359*   --    --   137   TSH  6.76*  5.11*  2.32   < >  1.20  0.97   < >   --    DARI   --   24  28   < >   --    --    --    --     < > = values in this interval not displayed.     Lab Results   Component Value Date    CHOL 206 (H) 02/27/2018    HDL 41 (L) 02/27/2018     (H) 02/27/2018    TRIG 319 (H) 02/27/2018    CHOLHDLRATIO 4.6 10/13/2015       Lab Results   Component Value Date    WBC 9.4 10/19/2018    RBC 4.85 10/19/2018    HGB 14.4 10/19/2018    HCT 44.3 10/19/2018    MCV 91 10/19/2018    MCH 29.7 10/19/2018    MCHC 32.5 10/19/2018    RDW 15.9 (H) 10/19/2018     10/19/2018       Lab Results   Component Value Date     (L) 10/31/2018    POTASSIUM 4.5 10/31/2018    CHLORIDE 98 10/31/2018    CO2 29 10/31/2018    ANIONGAP 8.5 10/31/2018     (H) 10/31/2018    BUN 16 10/31/2018    CR 1.14 10/31/2018    GFRESTIMATED 45 (L) 10/31/2018    GFRESTBLACK 55 (L) 10/31/2018    ASTRID 8.9 10/31/2018      Lab Results   Component Value Date    AST 16 10/19/2018    ALT 19 10/19/2018       Lab Results   Component Value Date    A1C 8.2 (H) 10/19/2018       Lab Results   Component Value Date    INR 1.70 (H) 10/22/2018    INR 1.97 (H) 10/21/2018          Problem List:     Patient Active Problem List   Diagnosis     CHF (congestive heart failure) (H)     CARDIOVASCULAR SCREENING; LDL GOAL LESS THAN 100     Health Care Home     Uterine cancer (H)     Pacemaker     OA (osteoarthritis)     Type 2 diabetes mellitus without complications (H)     DNS (deviated nasal septum)     Atrial fibrillation (H)     Paroxysmal atrial fibrillation (H)     Chronic diastolic congestive heart failure (H)     Hypothyroidism     Long-term (current) use of anticoagulants [Z79.01]     Heart failure (H)            Medications:      Current Outpatient Prescriptions   Medication Sig Dispense Refill     ACE/ARB NOT PRESCRIBED, INTENTIONAL, ACE & ARB not prescribed due to Other: HFpEF       ACETAMINOPHEN PO Take 500 mg by mouth 3 times daily as needed (Takes at least 6 hours apart).        ASPIRIN NOT PRESCRIBED, INTENTIONAL, Antiplatelet medication not prescribed intentionally due to Current anticoagulant therapy (warfarin/enoxaparin) 0 each 0     blood glucose (ACCU-CHEK SMARTVIEW) test strip 1 strip by In Vitro route daily 1 Box prn     blood glucose (NO BRAND SPECIFIED) lancets standard Use to test blood sugar 1 times daily or as directed. 100 each 11     blood glucose monitoring (ACCU-CHEK FASTCLIX) lancets Use to check blood sugars daily 1 Box prn     blood glucose monitoring (NO BRAND SPECIFIED) meter device kit Use to test blood sugar one times daily or as directed. 1 kit 0     blood glucose monitoring (NO BRAND SPECIFIED) test strip Use to test blood sugars one times daily or as directed .has type 2 diabetes and not on insulin 100 strip 1     Blood Glucose Monitoring Suppl (ACCU-CHEK LAKIA SMARTVIEW) W/DEVICE KIT 1 Device daily. 1 kit 0     diltiazem (DILACOR XR) 120 MG 24 hr capsule Take 1 capsule (120 mg) by mouth At Bedtime 30 capsule 0     furosemide (LASIX) 20 MG tablet Take 1 tablet (20 mg) by mouth 2 times daily 90 tablet 3     glimepiride (AMARYL) 2 MG tablet Take 1 tablet (2 mg) by mouth 2 times daily 180 tablet 3     levothyroxine (SYNTHROID/LEVOTHROID) 88 MCG tablet Take 1 tablet (88 mcg) by mouth daily 90 tablet 1     metFORMIN (GLUCOPHAGE) 500 MG tablet Take 1 tablet (500 mg) by mouth 2 times daily (with meals) 180 tablet 3     metoprolol succinate (TOPROL-XL) 50 MG 24 hr tablet Take 1.5 tablets (75 mg) by mouth 2 times daily 180 tablet 3     mirtazapine (REMERON) 7.5 MG TABS tablet Take 1 tablet (7.5 mg) by mouth At Bedtime 30 tablet 0     multivitamin  with lutein (OCUVITE WITH LTEIN) CAPS per capsule Take 1 capsule by  mouth daily       OLANZapine (ZYPREXA) 2.5 MG tablet Take 1 tablet (2.5 mg) by mouth daily as needed (insomnia, agitation) 30 tablet 1     STATIN NOT PRESCRIBED, INTENTIONAL, continuous prn. Statin not prescribed intentionally due to Other: Not needed, LDL at goal <100mg/dL without therapy 0 each 0     triamcinolone (NASACORT ALLERGY 24HR) 55 MCG/ACT nasal inhaler Spray 2 sprays into both nostrils daily as needed (during allergy season)        warfarin (COUMADIN) 5 MG tablet TAKE 1/2 TO 1 TABLET BY MOUTH DAILY OR AS DIRECTED BY INR CLINIC (Patient taking differently: Take 2.5-5 mg by mouth daily TAKE 1/2 TO 1 TABLET BY MOUTH DAILY OR AS DIRECTED BY INR CLINIC) 90 tablet 3          Past Medical History:     Past Medical History:   Diagnosis Date     Atrial fibrillation (H)     Nl LVEF, s/p ablation      Congestive heart failure, unspecified      Diabetes mellitus (H) 2004     Hemorrhoids     intermittent bleeding     Hypertension      Macular degeneration      OA (osteoarthritis)      Pacemaker 3/2013     Uterine cancer (H)     s/p hyst     Past Surgical History:   Procedure Laterality Date     C ANESTH,PACEMAKER INSERTION      dual chamber 3/27/13     CHOLECYSTECTOMY  8/2004     GYN SURGERY       HYSTERECTOMY      Ut ca      JOINT REPLACEMENT       KERATOTOMY ARCUATE WITH FEMTOSECOND LASER/IMAGING FOR ATIOL Right 4/11/2017    Procedure: KERATOTOMY ARCUATE WITH FEMTOSECOND LASER/IMAGING FOR ATIOL;  Surgeon: Calvin Dominguez MD;  Location: [facility]     PHACOEMULSIFICATION CLEAR CORNEA WITH STANDARD INTRAOCULAR LENS IMPLANT Right 4/11/2017    Procedure: PHACOEMULSIFICATION CLEAR CORNEA WITH STANDARD INTRAOCULAR LENS IMPLANT;  Surgeon: Calvin Dominguez MD;  Location: [facility]     TKR      bilat     Family History   Problem Relation Age of Onset     GASTROINTESTINAL DISEASE Mother      bowel obstruction     Cardiovascular Father      C.A.D. Father      Cardiovascular Brother      CHF     Social History     Social  History     Marital status: Single     Spouse name: N/A     Number of children: N/A     Years of education: N/A     Occupational History     Not on file.     Social History Main Topics     Smoking status: Former Smoker     Quit date: 1/1/1990     Smokeless tobacco: Never Used     Alcohol use No     Drug use: No     Sexual activity: Not Currently     Partners: Male     Other Topics Concern     Parent/Sibling W/ Cabg, Mi Or Angioplasty Before 65f 55m? No     Caffeine Concern Yes     daily      Sleep Concern No     Special Diet Yes     low sodium, less leafy greens, low sugar     Exercise No     Seat Belt Yes     Social History Narrative            Allergies:   Shellfish allergy; Ambien [zolpidem]; Cats; Lisinopril; No clinical screening - see comments; Seasonal allergies; and Sulfa drugs      Eliz Goodwin PA-C  [facility]  Pager: [phone]      Thank you for allowing me to participate in the care of your patient.      Sincerely,     Eliz Goodwin PA-C     [facility]    cc:   No referring provider defined for this encounter.

## 2018-10-31 NOTE — NURSING NOTE
The After Visit Summary was reviewed with the patient and her daughter Sandhya following their office visit with PRAVIN Fink. Patient was educated about any medication changes, the importance of following a low sodium diet, importance of recording daily weights, and when to call CORE clinic. Patient verbalized understanding of the information and agrees to call with any questions or concerns.     Labs: Lab results from today were reviewed with the patient during the office visit. A copy of the results were provided on the After Visit Summary.     Return appointment: Patient was given instructions on when and how to schedule their next office visit with the CORE clinic. BMP in 1 week, Ziopatch today is possible, and follow up with Dr. Del Valle as scheduled in 2 weeks with repeat labs.     Medication changes: Decrease Lasix to 20mg once daily, add Spironolactone 25mg daily.      Keisha Vides, RN  CORE Clinic RN Care Coordinator  [facility]  [phone]

## 2018-10-31 NOTE — PATIENT INSTRUCTIONS
Call CORE nurse for any questions or concerns:  [phone]   *If you have concerns after hours, please call [phone], option 2 to speak with on call Cardiologist.    1. Medication changes and/or recommendations from today:    - Reduce furosemide from 20 mg twice daily to 20 mg once daily  - Start the new medication called Aldactone 25 mg once daily    2. Follow up plan:   - We placed a heart monitor today to see how your rates are doing on the higher dose of Metoprolol  - Have your blood drawn in one week. You can bring your heart monitor back with you at that time.   - Return for another blood test and a visit with Dr. Del Valle in two weeks  - Remember to try and avoid salt and reduce the amount of fruit in your diet, especially on the new medication!  - Have your sleep study done, this will be helpful.      3. Weigh yourself daily and write it down.     4. Call CORE nurse if your weight is up more than 2 pounds in one day or 5 pounds in one week.     5. Call CORE nurse if you feel more short of breath, have more abdominal bloating, or leg swelling.     6. Continue low sodium diet (less than 2000 mg daily). If you eat less salt, you will retain less fluid.     7. Alcohol can weaken your heart further. You should avoid alcohol or limit its use to special times, such as a holiday or birthday.      8. Do NOT take Aleve or ibuprofen without talking to your doctor first.      9. Lab Results:   Component      Latest Ref Rng & Units 10/31/2018   Sodium      136 - 145 mmol/L 131 (L)   Potassium      3.5 - 5.1 mmol/L 4.5   Chloride      98 - 107 mmol/L 98   Carbon Dioxide      23 - 29 mmol/L 29   Anion Gap      6 - 17 mmol/L 8.5   Glucose      70 - 105 mg/dL 309 (H)   Urea Nitrogen      7 - 30 mg/dL 16   Creatinine      0.70 - 1.30 mg/dL 1.14   GFR Estimate      >60 mL/min/1.7m2 45 (L)   GFR Estimate If Black      >60 mL/min/1.7m2 55 (L)   Calcium      8.5 - 10.5 mg/dL 8.9      [facility] Clinic: Cardiomyopathy, Optimization,  Rehabilitation, Education  The [facility] Clinic is a heart failure specialty clinic within the [facility] where you will work with specialized nurse practitioners, physician assistants, doctors, and registered nurses. They are dedicated to helping patients with heart failure to carefully adjust medications, receive education, and learn who and when to call if symptoms develop. They specialize in helping you better understand your condition, slow the progression of your disease, improve the length and quality of your life, help you detect future heart problems before they become life threatening, and avoid hospitalizations.

## 2018-10-31 NOTE — MR AVS SNAPSHOT
After Visit Summary   10/31/2018    Shelly Rogers    MRN: [id number]           Patient Information     Date Of Birth          1/18/1933        Visit Information        Provider Department      10/31/2018 9:30 AM Eliz Goodwin PA-C [facility] Instructions    Call [facility] nurse for any questions or concerns:  [phone]   *If you have concerns after hours, please call [phone], option 2 to speak with on call Cardiologist.    1. Medication changes and/or recommendations from today:    - Reduce furosemide from 20 mg twice daily to 20 mg once daily  - Start the new medication called Aldactone 25 mg once daily    2. Follow up plan:   - We placed a heart monitor today to see how your rates are doing on the higher dose of Metoprolol  - Have your blood drawn in one week. You can bring your heart monitor back with you at that time.   - Return for another blood test and a visit with Dr. Del Valle in two weeks  - Remember to try and avoid salt and reduce the amount of fruit in your diet, especially on the new medication!  - Have your sleep study done, this will be helpful.      3. Weigh yourself daily and write it down.     4. Call [facility] nurse if your weight is up more than 2 pounds in one day or 5 pounds in one week.     5. Call [facility] nurse if you feel more short of breath, have more abdominal bloating, or leg swelling.     6. Continue low sodium diet (less than 2000 mg daily). If you eat less salt, you will retain less fluid.     7. Alcohol can weaken your heart further. You should avoid alcohol or limit its use to special times, such as a holiday or birthday.      8. Do NOT take Aleve or ibuprofen without talking to your doctor first.      9. Lab Results:   Component      Latest Ref Rng & Units 10/31/2018   Sodium      136 - 145 mmol/L 131 (L)   Potassium      3.5 - 5.1 mmol/L 4.5   Chloride      98 - 107 mmol/L 98   Carbon Dioxide      23 - 29 mmol/L 29    Anion Gap      6 - 17 mmol/L 8.5   Glucose      70 - 105 mg/dL 309 (H)   Urea Nitrogen      7 - 30 mg/dL 16   Creatinine      0.70 - 1.30 mg/dL 1.14   GFR Estimate      >60 mL/min/1.7m2 45 (L)   GFR Estimate If Black      >60 mL/min/1.7m2 55 (L)   Calcium      8.5 - 10.5 mg/dL 8.9      [facility]: Cardiomyopathy, Optimization, Rehabilitation, Education  The [facility] is a heart failure specialty clinic within the [facility] where you will work with specialized nurse practitioners, physician assistants, doctors, and registered nurses. They are dedicated to helping patients with heart failure to carefully adjust medications, receive education, and learn who and when to call if symptoms develop. They specialize in helping you better understand your condition, slow the progression of your disease, improve the length and quality of your life, help you detect future heart problems before they become life threatening, and avoid hospitalizations.          Follow-ups after your visit        Your next 10 appointments already scheduled     Nov 01, 2018  3:00 PM CDT   Anticoagulation Visit with  ANTICOAGULATION CLINIC   [facility] ([facility])    [address]  [place] [address]   [phone]            Nov 12, 2018  1:00 PM CST   New Sleep Patient with Bennett Ezra Goltz, PA-C   [facility] ([facility])    [address]  [place] [address]   [phone]            Nov 14, 2018  9:45 AM CST   Pacemaker Check with LEVY MAITEN   [facility] ([facility])    [address]  [place] [address] [phone] OPT 2            Nov 14, 2018 10:30 AM CST   LAB with LEVY LAB   [facility] [place] ([facility])    [address]  [place]  [place] [address] [phone]           Please do not eat 10-12 hours before your  "appointment if you are coming in fasting for labs on lipids, cholesterol, or glucose (sugar). This does not apply to pregnant women. Water, hot tea and black coffee (with nothing added) are okay. Do not drink other fluids, diet soda or chew gum.            Nov 14, 2018  1:30 PM CST   Core MD Return with Galindo Del Valle MD   [facility] ([facility])    [address] [place], [place] [id number]  [facility] [id number] [phone] OPT 2              Who to contact     If you have questions or need follow up information about today's clinic visit or your schedule please contact [facility] directly at [phone].  Normal or non-critical lab and imaging results will be communicated to you by MyChart, letter or phone within 4 business days after the clinic has received the results. If you do not hear from us within 7 days, please contact the clinic through MyChart or phone. If you have a critical or abnormal lab result, we will notify you by phone as soon as possible.  Submit refill requests through [employer] or call your pharmacy and they will forward the refill request to us. Please allow 3 business days for your refill to be completed.          Additional Information About Your Visit        Care EveryWhere ID     This is your Care EveryWhere ID. This could be used by other organizations to access your [facility] medical records  [id number]        Your Vitals Were     Pulse Height Pulse Oximetry BMI (Body Mass Index)          90 1.575 m (5' 2\") 96% 31.83 kg/m2         Blood Pressure from Last 3 Encounters:   10/31/18 112/68   10/26/18 131/75   10/22/18 125/71    Weight from Last 3 Encounters:   10/31/18 78.9 kg (174 lb)   10/26/18 81.2 kg (179 lb)   10/22/18 80.5 kg (177 lb 6.4 oz)              Today, you had the following     No orders found for display         Today's Medication Changes          These changes are accurate as of 10/31/18 10:34 " AM.  If you have any questions, ask your nurse or doctor.               These medicines have changed or have updated prescriptions.        Dose/Directions    warfarin 5 MG tablet   Commonly known as:  COUMADIN   This may have changed:    - how much to take  - how to take this  - when to take this  - additional instructions   Used for:  Paroxysmal atrial fibrillation (H)        TAKE 1/2 TO 1 TABLET BY MOUTH DAILY OR AS DIRECTED BY INR CLINIC   Quantity:  90 tablet   Refills:  3                Primary Care Provider Office Phone # Fax #    Halley Huff [phone] [address] [address]        Equal Access to Services     [facility]: Hadii aad ku hadasho Sharri, waaxda luqadaha, qaybta kaalmada lizayada, matt marie . So [facility] [place] [phone].    ATENCIÓN: Si habla español, tiene a quinones disposición servicios gratuitos de asistencia lingüística. Llame al [phone].    We comply with applicable federal civil rights laws and [place] laws. We do not discriminate on the basis of race, color, national origin, age, disability, sex, sexual orientation, or gender identity.            Thank you!     Thank you for choosing [facility]  for your care. Our goal is always to provide you with excellent care. Hearing back from our patients is one way we can continue to improve our services. Please take a few minutes to complete the written survey that you may receive in the mail after your visit with us. Thank you!             Your Updated Medication List - Protect others around you: Learn how to safely use, store and throw away your medicines at www.disposemymeds.org.          This list is accurate as of 10/31/18 10:34 AM.  Always use your most recent med list.                   Brand Name Dispense Instructions for use Diagnosis    ACE/ARB/ARNI NOT PRESCRIBED (INTENTIONAL)      ACE & ARB not prescribed  due to Other: HFpEF    Chronic diastolic congestive heart failure (H)       ACETAMINOPHEN PO      Take 500 mg by mouth 3 times daily as needed (Takes at least 6 hours apart).        ASPIRIN NOT PRESCRIBED    INTENTIONAL    0 each    Antiplatelet medication not prescribed intentionally due to Current anticoagulant therapy (warfarin/enoxaparin)    Type 2 diabetes mellitus without complications (H)       blood glucose lancets standard    no brand specified    100 each    Use to test blood sugar 1 times daily or as directed.    Type 2 diabetes mellitus without complication, without long-term current use of insulin (H)       blood glucose monitoring lancets     1 Box    Use to check blood sugars daily    Type 2 diabetes, HbA1c goal < 7% (H)       * blood glucose monitoring meter device kit     1 kit    1 Device daily.    Type 2 diabetes, HbA1c goal < 7% (H)       * blood glucose monitoring meter device kit    no brand specified    1 kit    Use to test blood sugar one times daily or as directed.    Type 2 diabetes mellitus without complication, without long-term current use of insulin (H)       * blood glucose monitoring test strip    ACCU-CHEK SMARTVIEW    1 Box    1 strip by In Vitro route daily    Type 2 diabetes, HbA1c goal < 7% (H)       * blood glucose monitoring test strip    no brand specified    100 strip    Use to test blood sugars one times daily or as directed .has type 2 diabetes and not on insulin    Type 2 diabetes mellitus without complication, without long-term current use of insulin (H)       diltiazem 120 MG 24 hr capsule    DILACOR XR    30 capsule    Take 1 capsule (120 mg) by mouth At Bedtime    Chronic atrial fibrillation (H)       furosemide 20 MG tablet    LASIX    90 tablet    Take 1 tablet (20 mg) by mouth 2 times daily    Chronic diastolic congestive heart failure (H)       glimepiride 2 MG tablet    AMARYL    180 tablet    Take 1 tablet (2 mg) by mouth 2 times daily    Type 2 diabetes mellitus  without complication, without long-term current use of insulin (H)       levothyroxine 88 MCG tablet    SYNTHROID/LEVOTHROID    90 tablet    Take 1 tablet (88 mcg) by mouth daily    Hypothyroidism, unspecified type       metFORMIN 500 MG tablet    GLUCOPHAGE    180 tablet    Take 1 tablet (500 mg) by mouth 2 times daily (with meals)    Type 2 diabetes mellitus without complication, without long-term current use of insulin (H)       metoprolol succinate 50 MG 24 hr tablet    TOPROL-XL    180 tablet    Take 1.5 tablets (75 mg) by mouth 2 times daily    Chronic diastolic congestive heart failure (H)       mirtazapine 7.5 MG Tabs tablet    REMERON    30 tablet    Take 1 tablet (7.5 mg) by mouth At Bedtime    Adjustment disorder with anxious mood       multivitamin  with lutein Caps per capsule      Take 1 capsule by mouth daily        NASACORT ALLERGY 24HR 55 MCG/ACT inhaler   Generic drug:  triamcinolone      Spray 2 sprays into both nostrils daily as needed (during allergy season)        OLANZapine 2.5 MG tablet    zyPREXA    30 tablet    Take 1 tablet (2.5 mg) by mouth daily as needed (insomnia, agitation)    Adjustment disorder with anxious mood       STATIN NOT PRESCRIBED (INTENTIONAL)     0 each    continuous prn. Statin not prescribed intentionally due to Other: Not needed, LDL at goal <100mg/dL without therapy        warfarin 5 MG tablet    COUMADIN    90 tablet    TAKE 1/2 TO 1 TABLET BY MOUTH DAILY OR AS DIRECTED BY INR CLINIC    Paroxysmal atrial fibrillation (H)       * Notice:  This list has 4 medication(s) that are the same as other medications prescribed for you. Read the directions carefully, and ask your doctor or other care provider to review them with you.

## 2018-10-31 NOTE — LETTER
10/31/2018    Halley Huff MD  [address]  [place]                [place] [address]    RE: Shelly Rogers       Dear Colleague,    I had the pleasure of seeing Shelly Rogers in the [facility].    Cardiology Clinic Progress Note    Shelly Rogers MRN# [id number]   YOB: 1933 Age: 85 year old     Reason for visit: CORE Enrollment, hospital f/u visit           Assessment and Plan:     In summary, the patient presents today to establish with the CORE clinic after a recent hospitalization for HFpEF, felt triggered by increasing rapid PAF burden and questionable compliance with PTA Lasix. Her weight is down several lbs from discharge, and she's feeling markedly better. However she still has marked pitting edema and some LLL rales.     1. HFpEF   - ECHO: EF 55-60%, E/E' avg 13.2, small pericardial effusion   - ACC/AHA Stage: C; FC II    - Etiology: tachy-mediated, diabetes, probable SYLVIE; diastolic dysfn contributing to RV failure   - RV: moderate dilated with mildly reduced fn; RVSP 13.5 mmHg   - Valves: moderate TR   - Volume:     Admit Wt: 179 lbs    Current Wt: 174 lbs    Dry Wt: likely ~ 165 - 170, although she hasn't weighed this since 2014    O2 Requirements: RA    Diuretics: Lasix 20 BID   - Rhythm: NSR, PAF,  ~2%, Ap ~ 30%   - QRS: narrow   - Device: Dual chamber PPM   - Other: Stop-Bang Score: high - sleep study pending    2. PAF/SSS s/p PPM - appears to be in rate-controlled AF per auscultation today, on Toprol 75 BID. INR's followed by PCP.   3. Mild hyponatremia   4. DMII - with uncontrolled (non-fasting) glucose today. Admits to increased fruit intake. Per PCP.   5. Hypothyroidism - now on levothyroxine.     Plan:  - Reduce Lasix from 20 BID to 20 daily and add Aldactone 25 mg daily.  - 48h zio patch to assess rate response on higher dose of Toprol. Will continue a rate control strategy for the time being as long as her HF remains well-controlled.  -  Discussed low-salt, low-sugar and low-potassium diet (specifically, fruit reduction), daily weights and when to call.   - Repeat BMP in one week. Encouraged to monitor for clinical signs of hyponatremia in the meantime.   - RTC as scheduled to see Dr. Del Valle in two weeks with BMP, CBC and iron panel prior   - Consider repeat TTE in 2 months to reassess effusion  - Encouraged to have sleep study performed (she plans to have this done within the next two weeks)         History of Presenting Illness:      Shelly Rogers is a pleasant 85 year old patient of  Dr. Del Valle who presents today for a CORE Enrollment visit after a recent admission for HFpEF.    She has a pertinent PMH including  1. PAF/SSS, s/p PPM and cardioversion in 2013, briefly on amio, but with low-burden AF since that time on metoprolol, anticoagulated on warfarin  2. HFpEF, initially diagnosed in 2013 in the setting of new-onset rapid AF, controlled with low-dose Lasix since that time  3. HTN  4. DMII  5. CKD  6. Hypothyroidism   She was recently admitted from 10/19 - 10/22 with acute on chronic HFpEF. She had been experiencing chronic fatigue and dyspnea x four - five months, and also admitted to poor oral intake recently and despite this her admit weight was stable at 178 lbs. There was also questionable compliance with her PTA Lasix 20 mg daily. CXR revealed congestion with a R pleural effusion. TTE revealed a preserved LVEF with mild RV dysfunction, moderate TR - overall stable save for a small pericardial effusion. Of note, the rhythm was rapid AF at the time of the study. Pacemaker interrogation revealed an increasing AF burden over the prior 9 months, with an average HR ~ 100 bpm. TFT s were mildly abnormal and levothyroxine was started. Her PTA Toprol was increased to 75 mg BID. She was initiated on IV Lasix 20 BID with a net -I/O of 7L, and also underwent R thoracentesis with 900 ccs of transudative fluid removed. Despite this her weight was  "recorded as unchanged at discharge. She was sent home with oral Lasix 20 mg BID. Of note, a sleep study was arranged, this has not yet been performed.  Today, she presents with her daughter Sandhya. Her weight is down to 174 lbs. BMP is notable for mild hyponatremia at 131 and elevated blood glucose at 309. She did have some sodium levels in the low 130's during her admission. She notes a marked improvement in her mood and motivation since home. Her edema is also much-improved and now near her baseline. States she's had bad luck with compression stockings in the past and does try to elevate her legs during the day.  She still has a bit of a mildly productive cough, residual from her hospitalization, although feels this is mostly related to upper airway congestion. She may have some PND. She does snore at night and actually thinks she activated her \"clapper\" lamp with her snoring in the past. Admits to daytime somnolence. She denies chest pain, orthopnea, palpitations, near syncope or syncope.   She lives independently at an assisted living facility and manages her own medications. She has some macular degeneration which has caused some balance issues over time and therefore uses a cane to ambulate when she's out of her home, and doesn't drive. She otherwise doesn't feel limited in her ambulation or activities. She's had no falls, and uses fall precautions. She's avoiding salt in her diet. She does admit to more fruit intake recently and had some orange juice and honey on her toast this morning. No ETOH or tobacco at present.           Review of Systems:     12-pt ROS is negative except for as noted in the HPI.           Physical Exam:     Vitals: /68  Pulse 90  Ht 1.575 m (5' 2\")  Wt 78.9 kg (174 lb)  SpO2 96%  BMI 31.83 kg/m2  Wt Readings from Last 3 Encounters:   10/31/18 78.9 kg (174 lb)   10/26/18 81.2 kg (179 lb)   10/22/18 80.5 kg (177 lb 6.4 oz)       Constitutional:  Patient is pleasant, alert, " cooperative, and in NAD.  HEENT:  NCAT. PERRLA. EOM's intact. No masses or thyromegaly. Teeth in normal repair.   Neck:  CVP is difficult to assess due to body habitus, no appreciable JVD or HJR  Pulmonary: Normal respiratory effort. LLL rales appreciated.  Cardiac: Irregularly irregular, grade 2/6 sm at the LSB  Abdomen:  Non-tender abdomen with normoactive bowel sounds, no hepatosplenomegaly appreciated.   Vascular: Pulses in the upper extremities are 2+ and equal, lower extremity pulses are diminished bilaterally.  Extremities: 2+ pitting edema pedal - knee bilaterally, L>R  Skin:  No rashes or lesions appreciated.   Neurological:  No gross motor or sensory deficits.   Psych: Appropriate affect.        Data:     Cardiac Diagnostics reviewed:  Type Date Result   TTE 10/19/18 The left ventricle is normal in size.  The visual ejection fraction is estimated at 55-60%.  No regional wall motion abnormalities noted.  The right ventricle is moderately dilated.  Mildly decreased right ventricular systolic function  There is moderate (2+) tricuspid regurgitation.  Small pericardial effusion  The rhythm was rapid atrial fibrillation.    5/5/16 Left ventricular systolic function is normal. The visual ejection fraction is  estimated at 60-65%.  There is mild concentric left ventricular hypertrophy. The left atrium is borderline dilated.  There is trace to mild mitral regurgitation.  There is moderate (2+) tricuspid regurgitation.  Right ventricular systolic pressure is elevated, consistent with mild  pulmonary hypertension.  The rhythm was normal sinus.  Contrast was used without apparent complications. TR may be mildly increased  compared to the prior study.   EKG 10/19/18 AF, low voltage QRS     Device  [employer] PPM interrogation (inpatient)   ~ 2%, Ap ~ 30%       Recent Labs   Lab Test  10/19/18   0620  06/12/18   1633  02/27/18   1541   10/13/15   1454  10/30/14   1434   03/21/13   0525   LDL   --    --   101*   --    88  105   < >  58   HDL   --    --   41*   --   44*   --    --   40*   NHDL   --    --   165*   --    --    --    --    --    CHOL   --    --   206*   --   204*   --    --   125   TRIG   --    --   319*   --   359*   --    --   137   TSH  6.76*  5.11*  2.32   < >  1.20  0.97   < >   --    DARI   --   24  28   < >   --    --    --    --     < > = values in this interval not displayed.     Lab Results   Component Value Date    CHOL 206 (H) 02/27/2018    HDL 41 (L) 02/27/2018     (H) 02/27/2018    TRIG 319 (H) 02/27/2018    CHOLHDLRATIO 4.6 10/13/2015       Lab Results   Component Value Date    WBC 9.4 10/19/2018    RBC 4.85 10/19/2018    HGB 14.4 10/19/2018    HCT 44.3 10/19/2018    MCV 91 10/19/2018    MCH 29.7 10/19/2018    MCHC 32.5 10/19/2018    RDW 15.9 (H) 10/19/2018     10/19/2018       Lab Results   Component Value Date     (L) 10/31/2018    POTASSIUM 4.5 10/31/2018    CHLORIDE 98 10/31/2018    CO2 29 10/31/2018    ANIONGAP 8.5 10/31/2018     (H) 10/31/2018    BUN 16 10/31/2018    CR 1.14 10/31/2018    GFRESTIMATED 45 (L) 10/31/2018    GFRESTBLACK 55 (L) 10/31/2018    ASTRID 8.9 10/31/2018      Lab Results   Component Value Date    AST 16 10/19/2018    ALT 19 10/19/2018       Lab Results   Component Value Date    A1C 8.2 (H) 10/19/2018       Lab Results   Component Value Date    INR 1.70 (H) 10/22/2018    INR 1.97 (H) 10/21/2018          Problem List:     Patient Active Problem List   Diagnosis     CHF (congestive heart failure) (H)     CARDIOVASCULAR SCREENING; LDL GOAL LESS THAN 100     Health Care Home     Uterine cancer (H)     Pacemaker     OA (osteoarthritis)     Type 2 diabetes mellitus without complications (H)     DNS (deviated nasal septum)     Atrial fibrillation (H)     Paroxysmal atrial fibrillation (H)     Chronic diastolic congestive heart failure (H)     Hypothyroidism     Long-term (current) use of anticoagulants [Z79.01]     Heart failure (H)            Medications:      Current Outpatient Prescriptions   Medication Sig Dispense Refill     ACE/ARB NOT PRESCRIBED, INTENTIONAL, ACE & ARB not prescribed due to Other: HFpEF       ACETAMINOPHEN PO Take 500 mg by mouth 3 times daily as needed (Takes at least 6 hours apart).        ASPIRIN NOT PRESCRIBED, INTENTIONAL, Antiplatelet medication not prescribed intentionally due to Current anticoagulant therapy (warfarin/enoxaparin) 0 each 0     blood glucose (ACCU-CHEK SMARTVIEW) test strip 1 strip by In Vitro route daily 1 Box prn     blood glucose (NO BRAND SPECIFIED) lancets standard Use to test blood sugar 1 times daily or as directed. 100 each 11     blood glucose monitoring (ACCU-CHEK FASTCLIX) lancets Use to check blood sugars daily 1 Box prn     blood glucose monitoring (NO BRAND SPECIFIED) meter device kit Use to test blood sugar one times daily or as directed. 1 kit 0     blood glucose monitoring (NO BRAND SPECIFIED) test strip Use to test blood sugars one times daily or as directed .has type 2 diabetes and not on insulin 100 strip 1     Blood Glucose Monitoring Suppl (ACCU-CHEK LAKIA SMARTVIEW) W/DEVICE KIT 1 Device daily. 1 kit 0     diltiazem (DILACOR XR) 120 MG 24 hr capsule Take 1 capsule (120 mg) by mouth At Bedtime 30 capsule 0     furosemide (LASIX) 20 MG tablet Take 1 tablet (20 mg) by mouth 2 times daily 90 tablet 3     glimepiride (AMARYL) 2 MG tablet Take 1 tablet (2 mg) by mouth 2 times daily 180 tablet 3     levothyroxine (SYNTHROID/LEVOTHROID) 88 MCG tablet Take 1 tablet (88 mcg) by mouth daily 90 tablet 1     metFORMIN (GLUCOPHAGE) 500 MG tablet Take 1 tablet (500 mg) by mouth 2 times daily (with meals) 180 tablet 3     metoprolol succinate (TOPROL-XL) 50 MG 24 hr tablet Take 1.5 tablets (75 mg) by mouth 2 times daily 180 tablet 3     mirtazapine (REMERON) 7.5 MG TABS tablet Take 1 tablet (7.5 mg) by mouth At Bedtime 30 tablet 0     multivitamin  with lutein (OCUVITE WITH LTEIN) CAPS per capsule Take 1 capsule by  mouth daily       OLANZapine (ZYPREXA) 2.5 MG tablet Take 1 tablet (2.5 mg) by mouth daily as needed (insomnia, agitation) 30 tablet 1     STATIN NOT PRESCRIBED, INTENTIONAL, continuous prn. Statin not prescribed intentionally due to Other: Not needed, LDL at goal <100mg/dL without therapy 0 each 0     triamcinolone (NASACORT ALLERGY 24HR) 55 MCG/ACT nasal inhaler Spray 2 sprays into both nostrils daily as needed (during allergy season)        warfarin (COUMADIN) 5 MG tablet TAKE 1/2 TO 1 TABLET BY MOUTH DAILY OR AS DIRECTED BY INR CLINIC (Patient taking differently: Take 2.5-5 mg by mouth daily TAKE 1/2 TO 1 TABLET BY MOUTH DAILY OR AS DIRECTED BY INR CLINIC) 90 tablet 3          Past Medical History:     Past Medical History:   Diagnosis Date     Atrial fibrillation (H)     Nl LVEF, s/p ablation      Congestive heart failure, unspecified      Diabetes mellitus (H) 2004     Hemorrhoids     intermittent bleeding     Hypertension      Macular degeneration      OA (osteoarthritis)      Pacemaker 3/2013     Uterine cancer (H)     s/p hyst     Past Surgical History:   Procedure Laterality Date     C ANESTH,PACEMAKER INSERTION      dual chamber 3/27/13     CHOLECYSTECTOMY  8/2004     GYN SURGERY       HYSTERECTOMY      Ut ca      JOINT REPLACEMENT       KERATOTOMY ARCUATE WITH FEMTOSECOND LASER/IMAGING FOR ATIOL Right 4/11/2017    Procedure: KERATOTOMY ARCUATE WITH FEMTOSECOND LASER/IMAGING FOR ATIOL;  Surgeon: Calvin Dominguez MD;  Location: [facility]     PHACOEMULSIFICATION CLEAR CORNEA WITH STANDARD INTRAOCULAR LENS IMPLANT Right 4/11/2017    Procedure: PHACOEMULSIFICATION CLEAR CORNEA WITH STANDARD INTRAOCULAR LENS IMPLANT;  Surgeon: Calvin Dominguez MD;  Location: [facility]     TKR      bilat     Family History   Problem Relation Age of Onset     GASTROINTESTINAL DISEASE Mother      bowel obstruction     Cardiovascular Father      C.A.D. Father      Cardiovascular Brother      CHF     Social History     Social  History     Marital status: Single     Spouse name: N/A     Number of children: N/A     Years of education: N/A     Occupational History     Not on file.     Social History Main Topics     Smoking status: Former Smoker     Quit date: 1/1/1990     Smokeless tobacco: Never Used     Alcohol use No     Drug use: No     Sexual activity: Not Currently     Partners: Male     Other Topics Concern     Parent/Sibling W/ Cabg, Mi Or Angioplasty Before 65f 55m? No     Caffeine Concern Yes     daily      Sleep Concern No     Special Diet Yes     low sodium, less leafy greens, low sugar     Exercise No     Seat Belt Yes     Social History Narrative            Allergies:   Shellfish allergy; Ambien [zolpidem]; Cats; Lisinopril; No clinical screening - see comments; Seasonal allergies; and Sulfa drugs      Eliz Goodwin PA-C  [facility]  Pager: [phone]      Thank you for allowing me to participate in the care of your patient.    Sincerely,     Eliz Goodwin PA-C     Formerly [facility]

## 2018-11-01 ENCOUNTER — ANTICOAGULATION THERAPY VISIT (OUTPATIENT)
Dept: NURSING | Facility: CLINIC | Age: 83
End: 2018-11-01
Payer: COMMERCIAL

## 2018-11-01 DIAGNOSIS — I48.20 CHRONIC ATRIAL FIBRILLATION (H): ICD-10-CM

## 2018-11-01 LAB — INR POINT OF CARE: 1.9 (ref 0.86–1.14)

## 2018-11-01 PROCEDURE — 36416 COLLJ CAPILLARY BLOOD SPEC: CPT

## 2018-11-01 PROCEDURE — 99207 ZZC NO CHARGE NURSE ONLY: CPT

## 2018-11-01 PROCEDURE — 85610 PROTHROMBIN TIME: CPT | Mod: QW

## 2018-11-01 NOTE — MR AVS SNAPSHOT
Shellywaldo Rogers   11/1/2018 3:00 PM   Anticoagulation Therapy Visit    Description:  85 year old female   Provider:  [facility]   Department:   Nurse           INR as of 11/1/2018     Today's INR 1.9!      Anticoagulation Summary as of 11/1/2018     INR goal 2.0-3.0   Today's INR 1.9!   Full warfarin instructions 11/5: 5 mg; Otherwise 5 mg on Wed; 2.5 mg all other days   Next INR check 11/8/2018    Indications   Long-term (current) use of anticoagulants [Z79.01] [Z79.01]  Atrial fibrillation (H) [I48.91]         Your next Anticoagulation Clinic appointment(s)     Nov 08, 2018  1:00 PM CST   Anticoagulation Visit with  ANTICOAGULATION CLINIC   [facility] [place] ([facility])    [address]  [place] [address]   [phone]              Contact Numbers     Clinic Number:         November 2018 Details    Sun Mon Tue Wed Thu Fri Sat         1      2.5 mg   See details      2      2.5 mg         3      2.5 mg           4      2.5 mg         5      5 mg         6      2.5 mg         7      5 mg         8            9               10                 11               12               13               14               15               16               17                 18               19               20               21               22               23               24                 25               26               27               28               29               30                 Date Details   11/01 This INR check       Date of next INR:  11/8/2018         How to take your warfarin dose     To take:  2.5 mg Take 0.5 of a 5 mg tablet.    To take:  5 mg Take 1 of the 5 mg tablets.

## 2018-11-01 NOTE — PROGRESS NOTES
ANTICOAGULATION FOLLOW-UP CLINIC VISIT    Patient Name:  Shelly Rogers  Date:  11/1/2018  Contact Type:  Face to Face    SUBJECTIVE:     Patient Findings     Positives Change in medications    Comments New on Aldactone (decreases INR) so will increase slightly and watch closely.           OBJECTIVE    INR Protime   Date Value Ref Range Status   11/01/2018 1.9 (A) 0.86 - 1.14 Final       ASSESSMENT / PLAN  No question data found.  Anticoagulation Summary as of 11/1/2018     INR goal 2.0-3.0   Today's INR 1.9!   Warfarin maintenance plan 5 mg (5 mg x 1) on Wed; 2.5 mg (5 mg x 0.5) all other days   Full warfarin instructions 11/5: 5 mg; Otherwise 5 mg on Wed; 2.5 mg all other days   Weekly warfarin total 20 mg   Plan last modified Monet Jaramillo RN (6/19/2017)   Next INR check 11/8/2018   Priority INR   Target end date     Indications   Long-term (current) use of anticoagulants [Z79.01] [Z79.01]  Atrial fibrillation (H) [I48.91]         Anticoagulation Episode Summary     INR check location     Preferred lab     Send INR reminders to [facility]    Comments       Anticoagulation Care Providers     Provider Role Specialty Phone number    Halley Huff MD Responsible Internal Medicine [phone]            See the Encounter Report to view Anticoagulation Flowsheet and Dosing Calendar (Go to Encounters tab in chart review, and find the Anticoagulation Therapy Visit)    Dosage adjustment made based on physician directed care plan.    Valentina Scott RN

## 2018-11-02 ENCOUNTER — HOSPITAL ENCOUNTER (OUTPATIENT)
Dept: CARDIOLOGY | Facility: CLINIC | Age: 83
Discharge: HOME | End: 2018-11-02
Attending: PHYSICIAN ASSISTANT | Admitting: PHYSICIAN ASSISTANT
Payer: MEDICARE

## 2018-11-02 DIAGNOSIS — I48.20 CHRONIC ATRIAL FIBRILLATION (H): ICD-10-CM

## 2018-11-02 DIAGNOSIS — I48.20 CHRONIC ATRIAL FIBRILLATION (H): Primary | ICD-10-CM

## 2018-11-02 PROCEDURE — 0296T ZIO PATCH HOLTER: CPT | Performed by: PHYSICIAN ASSISTANT

## 2018-11-02 PROCEDURE — 0298T ZZC EXT ECG > 48HR TO 21 DAY REVIEW AND INTERPRETATN: CPT | Performed by: INTERNAL MEDICINE

## 2018-11-02 NOTE — PROGRESS NOTES
Received notification that 48hr Zio patches are not performed here, could either do 48-hr holter or 3-day Zio patch.  Review with KIMBERLY Barba who states she would prefer to do 3-day Zio patch for pt's ease of use. Order entered in Epic.    SARINA Mars 9:00 AM 11/2/2018

## 2018-11-08 ENCOUNTER — ANTICOAGULATION THERAPY VISIT (OUTPATIENT)
Dept: NURSING | Facility: CLINIC | Age: 83
End: 2018-11-08
Payer: COMMERCIAL

## 2018-11-08 DIAGNOSIS — I50.32 CHRONIC DIASTOLIC CONGESTIVE HEART FAILURE (H): ICD-10-CM

## 2018-11-08 LAB
ANION GAP SERPL CALCULATED.3IONS-SCNC: 13.4 MMOL/L (ref 6–17)
BUN SERPL-MCNC: 15 MG/DL (ref 7–30)
CALCIUM SERPL-MCNC: 9.8 MG/DL (ref 8.5–10.5)
CHLORIDE SERPL-SCNC: 96 MMOL/L (ref 98–107)
CO2 SERPL-SCNC: 26 MMOL/L (ref 23–29)
CREAT SERPL-MCNC: 1.04 MG/DL (ref 0.7–1.3)
ERYTHROCYTE [DISTWIDTH] IN BLOOD BY AUTOMATED COUNT: 16.3 % (ref 10–15)
FERRITIN SERPL-MCNC: 34 NG/ML (ref 8–252)
GFR SERPL CREATININE-BSD FRML MDRD: 50 ML/MIN/1.7M2
GLUCOSE SERPL-MCNC: 135 MG/DL (ref 70–105)
HCT VFR BLD AUTO: 42.3 % (ref 35–47)
HGB BLD-MCNC: 13.6 G/DL (ref 11.7–15.7)
INR POINT OF CARE: 2.2 (ref 0.86–1.14)
IRON SATN MFR SERPL: 15 % (ref 15–46)
IRON SERPL-MCNC: 57 UG/DL (ref 35–180)
MCH RBC QN AUTO: 29.5 PG (ref 26.5–33)
MCHC RBC AUTO-ENTMCNC: 32.2 G/DL (ref 31.5–36.5)
MCV RBC AUTO: 92 FL (ref 78–100)
PLATELET # BLD AUTO: 237 10E9/L (ref 150–450)
POTASSIUM SERPL-SCNC: 4.4 MMOL/L (ref 3.5–5.1)
RBC # BLD AUTO: 4.61 10E12/L (ref 3.8–5.2)
SODIUM SERPL-SCNC: 131 MMOL/L (ref 136–145)
TIBC SERPL-MCNC: 389 UG/DL (ref 240–430)
WBC # BLD AUTO: 7.4 10E9/L (ref 4–11)

## 2018-11-08 PROCEDURE — 80048 BASIC METABOLIC PNL TOTAL CA: CPT | Performed by: PHYSICIAN ASSISTANT

## 2018-11-08 PROCEDURE — 82728 ASSAY OF FERRITIN: CPT | Performed by: PHYSICIAN ASSISTANT

## 2018-11-08 PROCEDURE — 36416 COLLJ CAPILLARY BLOOD SPEC: CPT

## 2018-11-08 PROCEDURE — 85610 PROTHROMBIN TIME: CPT | Mod: QW

## 2018-11-08 PROCEDURE — 85027 COMPLETE CBC AUTOMATED: CPT | Performed by: PHYSICIAN ASSISTANT

## 2018-11-08 PROCEDURE — 83550 IRON BINDING TEST: CPT | Performed by: PHYSICIAN ASSISTANT

## 2018-11-08 PROCEDURE — 83540 ASSAY OF IRON: CPT | Performed by: PHYSICIAN ASSISTANT

## 2018-11-08 NOTE — MR AVS SNAPSHOT
Shelly Rogers   11/8/2018 1:00 PM   Anticoagulation Therapy Visit    Description:  85 year old female   Provider:  [facility]   Department:  [facility] Nurse           INR as of 11/8/2018     Today's INR 2.2      Anticoagulation Summary as of 11/8/2018     INR goal 2.0-3.0   Today's INR 2.2   Full warfarin instructions 5 mg on Mon, Wed; 2.5 mg all other days   Next INR check 11/28/2018    Indications   Long-term (current) use of anticoagulants [Z79.01] [Z79.01]  Atrial fibrillation (H) [I48.91]         Your next Anticoagulation Clinic appointment(s)     Nov 28, 2018  3:30 PM CST   Anticoagulation Visit with  ANTICO[facility]ULATION CLINIC   [facility]    [address]  [place] [address]   [phone]              Contact Numbers     Clinic Number:         November 2018 Details    Sun Mon Tue Wed Thu Fri Sat         1               2               3                 4               5               6               7               8      2.5 mg   See details      9      2.5 mg         10      2.5 mg           11      2.5 mg         12      5 mg         13      2.5 mg         14      5 mg         15      2.5 mg         16      2.5 mg         17      2.5 mg           18      2.5 mg         19      5 mg         20      2.5 mg         21      5 mg         22      2.5 mg         23      2.5 mg         24      2.5 mg           25      2.5 mg         26      5 mg         27      2.5 mg         28            29               30                 Date Details   11/08 This INR check       Date of next INR:  11/28/2018         How to take your warfarin dose     To take:  2.5 mg Take 0.5 of a 5 mg tablet.    To take:  5 mg Take 1 of the 5 mg tablets.

## 2018-11-08 NOTE — PROGRESS NOTES
ANTICOAGULATION FOLLOW-UP CLINIC VISIT    Patient Name:  Shelly Rogers  Date:  11/8/2018  Contact Type:  Face to Face    SUBJECTIVE:        OBJECTIVE    INR Protime   Date Value Ref Range Status   11/08/2018 2.2 (A) 0.86 - 1.14 Final       ASSESSMENT / PLAN  INR assessment THER    Recheck INR In: 3 WEEKS    INR Location Clinic      Anticoagulation Summary as of 11/8/2018     INR goal 2.0-3.0   Today's INR 2.2   Warfarin maintenance plan 5 mg (5 mg x 1) on Mon, Wed; 2.5 mg (5 mg x 0.5) all other days   Full warfarin instructions 5 mg on Mon, Wed; 2.5 mg all other days   Weekly warfarin total 22.5 mg   Plan last modified Ciera Batista RN (11/8/2018)   Next INR check 11/28/2018   Priority INR   Target end date     Indications   Long-term (current) use of anticoagulants [Z79.01] [Z79.01]  Atrial fibrillation (H) [I48.91]         Anticoagulation Episode Summary     INR check location     Preferred lab     Send INR reminders to  ANTICOAGULATION    Comments       Anticoagulation Care Providers     Provider Role Specialty Phone number    Halley Huff MD [facility] Internal Medicine [phone]            See the Encounter Report to view Anticoagulation Flowsheet and Dosing Calendar (Go to Encounters tab in chart review, and find the Anticoagulation Therapy Visit)    Dosage adjustment made based on physician directed care plan.    Ciera Batista RN

## 2018-11-12 ENCOUNTER — OFFICE VISIT (OUTPATIENT)
Dept: SLEEP MEDICINE | Facility: CLINIC | Age: 83
End: 2018-11-12
Payer: COMMERCIAL

## 2018-11-12 VITALS
DIASTOLIC BLOOD PRESSURE: 79 MMHG | TEMPERATURE: 97.9 F | RESPIRATION RATE: 16 BRPM | WEIGHT: 171.5 LBS | HEIGHT: 63 IN | OXYGEN SATURATION: 97 % | SYSTOLIC BLOOD PRESSURE: 117 MMHG | BODY MASS INDEX: 30.39 KG/M2 | HEART RATE: 79 BPM

## 2018-11-12 DIAGNOSIS — I50.32 CHRONIC DIASTOLIC CONGESTIVE HEART FAILURE (H): ICD-10-CM

## 2018-11-12 DIAGNOSIS — R06.83 SNORING: Primary | ICD-10-CM

## 2018-11-12 DIAGNOSIS — F51.04 CHRONIC INSOMNIA: ICD-10-CM

## 2018-11-12 DIAGNOSIS — G47.50 PARASOMNIA: ICD-10-CM

## 2018-11-12 DIAGNOSIS — I48.0 PAROXYSMAL ATRIAL FIBRILLATION (H): ICD-10-CM

## 2018-11-12 PROCEDURE — 99215 OFFICE O/P EST HI 40 MIN: CPT | Performed by: PHYSICIAN ASSISTANT

## 2018-11-12 NOTE — NURSING NOTE
"Chief Complaint   Patient presents with     Sleep Problem     Problem sleeping, up all night, breathing       Initial /79  Pulse 79  Temp 97.9  F (36.6  C) (Oral)  Resp 16  Ht 1.588 m (5' 2.5\")  Wt 77.8 kg (171 lb 8 oz)  SpO2 97%  BMI 30.87 kg/m2 Estimated body mass index is 30.87 kg/(m^2) as calculated from the following:    Height as of this encounter: 1.588 m (5' 2.5\").    Weight as of this encounter: 77.8 kg (171 lb 8 oz).    Medication Reconciliation: complete     ESS   Neck 38cm  Anat Herrera      "

## 2018-11-12 NOTE — MR AVS SNAPSHOT
"              After Visit Summary   11/12/2018    Shelly Rogers    MRN: [id number]           Patient Information     Date Of Birth          1/18/1933        Visit Information        Provider Department      11/12/2018 1:00 PM Goltz, Bennett Ezra, PA-C [facility] [place]        Today's Diagnoses     Snoring    -  1    Paroxysmal atrial fibrillation (H)        Chronic diastolic congestive heart failure (H)        Parasomnia          Care Instructions    MY TREATMENT INFORMATION FOR SLEEP APNEA-  Shellywaldo Rogers    DOCTOR : Bennett Ezra Goltz, PA-C  SLEEP CENTER : [place]     MY CONTACT NUMBER: [phone]    Am I having a sleep study at a sleep center?  Make sure you have an appointment for the study before you leave!    Am I having a home sleep study?  Watch this video:  [email or web address]  Please verify your insurance coverage with your insurance carrier    Frequently asked questions:  1. What is Obstructive Sleep Apnea (SYLVIE)? SYLVIE is the most common type of sleep apnea. Apnea means, \"without breath.\"  Apnea is most often caused by narrowing or collapse of the upper airway as muscles relax during sleep.   Almost everyone has occasional apneas. Most people with sleep apnea have had brief interruptions at night frequently for many years.  The severity of sleep apnea is related to how frequent and severe the events are.   2. What are the consequences of SYLVIE? Symptoms include: feeling sleepy during the day, snoring loudly, gasping or stopping of breathing, trouble sleeping, and occasionally morning headaches or heartburn at night.  Sleepiness can be serious and even increase the risk of falling asleep while driving. Other health consequences may include development of high blood pressure and other cardiovascular disease in persons who are susceptible. Untreated SYLVIE  can contribute to heart disease, stroke and diabetes.   3. What are the treatment " options? In most situations, sleep apnea is a lifelong disease that must be managed with daily therapy. Medications are not effective for sleep apnea and surgery is generally not considered until other therapies have been tried. Your treatment is your choice . Continuous Positive Airway (CPAP) works right away and is the therapy that is effective in nearly everyone. An oral device to hold your jaw forward is usually the next most reliable option. Other options include postioning devices (to keep you off your back), weight loss, and surgery including a tongue pacing device. There is more detail about some of these options below.    Important tips for using CPAP and similar devices   Know your equipment:  CPAP is continuous positive airway pressure that prevents obstructive sleep apnea by keeping the throat from collapsing while you are sleeping. In most cases, the device is  smart  and can slowly self-adjusts if your throat collapses and keeps a record every day of how well you are treated-this information is available to you and your care team.  BPAP is bilevel positive airway pressure that keeps your throat open and also assists each breath with a pressure boost to maintain adequate breathing.  Special kinds of BPAP are used in patients who have inadequate breathing from lung or heart disease. In most cases, the device is  smart  and can slowly self-adjusts to assist breathing. Like CPAP, the device keeps a record of how well you are treated.  Your mask is your connection to the device. You get to choose what feels most comfortable and the staff will help to make sure if fits. Here: are some examples of the different masks that are available:       Key points to remember on your journey with sleep apnea:  1. Sleep study.  PAP devices often need to be adjusted during a sleep study to show that they are effective and adjusted right.  2. Good tips to remember: Try wearing just the mask during a quiet time during the  day so your body adapts to wearing it. A humidifier is recommended for comfort in most cases to prevent drying of your nose and throat. Allergy medication from your provider may help you if you are having nasal congestion.  3. Getting settled-in. It takes more than one night for most of us to get used to wearing a mask. Try wearing just the mask during a quiet time during the day so your body adapts to wearing it. A humidifier is recommended for comfort in most cases. Our team will work with you carefully on the first day and will be in contact within 4 days and again at 2 and 4 weeks for advice and remote device adjustments. Your therapy is evaluated by the device each day.   4. Use it every night. The more you are able to sleep naturally for 7-8 hours, the more likely you will have good sleep and to prevent health risks or symptoms from sleep apnea. Even if you use it 4 hours it helps. Occasionally all of us are unable to use a medical therapy, in sleep apnea, it is not dangerous to miss one night.   5. Communicate. Call our skilled team on the number provided on the first day if your visit for problems that make it difficult to wear the device. Over 2 out of 3 patients can learn to wear the device long-term with help from our team. Remember to call our team or your sleep providers if you are unable to wear the device as we may have other solutions for those who cannot adapt to mask CPAP therapy. It is recommended that you sleep your sleep provider within the first 3 months and yearly after that if you are not having problems.   Take care of your equipment. Make sure you clean your mask and tubing using directions every day and that your filter and mask are replaced as recommended or if they are not working.     BESIDES CPAP, WHAT OTHER THERAPIES ARE THERE?    Positioning Device  Positioning devices are generally used when sleep apnea is mild and only occurs on your back.This example shows a pillow that straps  around the waist. It may be appropriate for those whose sleep study shows milder sleep apnea that occurs primarily when lying flat on one's back. Preliminary studies have shown benefit but effectiveness at home may need to be verified by a home sleep test. These devices are generally not covered by medical insurance.  Examples of devices that maintain sleeping on the back to prevent snoring and mild sleep apnea.    Belt type body positioner  Http://[employer]/    Electronic reminder  Http://nightshifttherapy.com/  Http://[email or web address] Evidence.[employer].au/    Oral Appliance  What is oral appliance therapy?  An oral appliance device fits on your teeth at night like a retainer used after having braces. The device is made by a specialized dentist and requires several visits over 1-2 months before a manufactured device is made to fit your teeth and is adjusted to prevent your sleep apnea. Once an oral device is working properly, snoring should be improved. A home sleep test may be recommended at that time if to determine whether the sleep apnea is adequately treated.       Some things to remember:  -Oral devices are often, but not always, covered by your medical insurance. Be sure to check with your insurance provider.   -If you are referred for oral therapy, you will be given a list of specialized dentists to consider or you may choose to visit the Web site of the American Academy of Dental Sleep Medicine  -Oral devices are less likely to work if you have severe sleep apnea or are extremely overweight.     More detailed information  An oral appliance is a small acrylic device that fits over the upper and lower teeth  (similar to a retainer or a mouth guard). This device slightly moves jaw forward, which moves the base of the tongue forward, opens the airway, improves breathing for effective treat snoring and obstructive sleep apnea in perhaps 7 out of 10 people .  The best working devices are custom-made by a dental device   after a mold is made of the teeth 1, 2, 3.  When is an oral appliance indicated?  Oral appliance therapy is recommended as a first-line treatment for patients with primary snoring, mild sleep apnea, and for patients with moderate sleep apnea who prefer appliance therapy to use of CPAP4, 5. Severity of sleep apnea is determined by sleep testing and is based on the number of respiratory events per hour of sleep.   How successful is oral appliance therapy?  The success rate of oral appliance therapy in patients with mild sleep apnea is 75-80% while in patients with moderate sleep apnea it is 50-70%. The chance of success in patients with severe sleep apnea is 40-50%. The research also shows that oral appliances have a beneficial effect on the cardiovascular health of SYLVIE patients at the same magnitude as CPAP therapy7.  Oral appliances should be a second-line treatment in cases of severe sleep apnea, but if not completely successful then a combination therapy utilizing CPAP plus oral appliance therapy may be effective. Oral appliances tend to be effective in a broad range of patients although studies show that the patients who have the highest success are females, younger patients, those with milder disease, and less severe obesity. 3, 6.   Finding a dentist that practices dental sleep medicine  Specific training is available through the American Academy of Dental Sleep Medicine for dentists interested in working in the field of sleep. To find a dentist who is educated in the field of sleep and the use of oral appliances, near you, visit the Web site of the American Academy of Dental Sleep Medicine.    References  1. Angely, et al. Objectively measured vs self-reported compliance during oral appliance therapy for sleep-disordered breathing. Chest 2013; 144(5): 3285-6636.  2. Frida et al. Objective measurement of compliance during oral appliance therapy for sleep-disordered breathing. Thorax  2013; 68(1): 91-96.  3. Brenna et al. Mandibular advancement devices in 620 men and women with SYLVIE and snoring: tolerability and predictors of treatment success. Chest 2004; 125: 3044-9159.  4. Lance et al. Oral appliances for snoring and SYLVIE: a review. Sleep 2006; 29: 244-262.  5. Ciara et al. Oral appliance treatment for SYLVIE: an update. J Clin Sleep Med 2014; 10(2): 215-227.  6. Areils et al. Predictors of OSAH treatment outcome. J Dent Res 2007; 86: 5280-5334.    Weight Loss:    Weight loss is a long-term strategy that may improve sleep apnea in some patients.    Weight management is a personal decision and the decision should be based on your interest and the potential benefits.  If you are interested in exploring weight loss strategies, the following discussion covers the impact on weight loss on sleep apnea and the approaches that may be successful.    Being overweight does not necessarily mean you will have health consequences.  Those who have BMI over 35 or over 27 with existing medical conditions carries greater risk.   Weight loss decreases severity of sleep apnea in most people with obesity. For those with mild obesity who have developed snoring with weight gain, even 15-30 pound weight loss can improve and occasionally eliminate sleep apnea.  Structured and life-long dietary and health habits are necessary to lose weight and keep healthier weight levels.     Though there may be significant health benefits from weight loss, long-term weight loss is very difficult to achieve- studies show success with dietary management in less than 10% of people. In addition, substantial weight loss may require years of dietary control and may be difficult if patients have severe obesity. In these cases, surgical management may be considered.  Finally, older individuals who have tolerated obesity without health complications may be less likely to benefit from weight loss strategies.      Your BMI is Body  mass index is 30.87 kg/(m^2).  Weight management is a personal decision.  If you are interested in exploring weight loss strategies, the following discussion covers the approaches that may be successful. Body mass index (BMI) is one way to tell whether you are at a healthy weight, overweight, or obese. It measures your weight in relation to your height.  A BMI of 18.5 to 24.9 is in the healthy range. A person with a BMI of 25 to 29.9 is considered overweight, and someone with a BMI of 30 or greater is considered obese. More than two-thirds of American adults are considered overweight or obese.  Being overweight or obese increases the risk for further weight gain. Excess weight may lead to heart disease and diabetes.  Creating and following plans for healthy eating and physical activity may help you improve your health.  Weight control is part of healthy lifestyle and includes exercise, emotional health, and healthy eating habits. Careful eating habits lifelong are the mainstay of weight control. Though there are significant health benefits from weight loss, long-term weight loss with diet alone may be very difficult to achieve- studies show long-term success with dietary management in less than 10% of people. Attaining a healthy weight may be especially difficult to achieve in those with severe obesity. In some cases, medications, devices and surgical management might be considered.  What can you do?  If you are overweight or obese and are interested in methods for weight loss, you should discuss this with your provider.     Consider reducing daily calorie intake by 500 calories.     Keep a food journal.     Avoiding skipping meals, consider cutting portions instead.    Diet combined with exercise helps maintain muscle while optimizing fat loss. Strength training is particularly important for building and maintaining muscle mass. Exercise helps reduce stress, increase energy, and improves fitness. Increasing  exercise without diet control, however, may not burn enough calories to loose weight.       Start walking three days a week 10-20 minutes at a time    Work towards walking thirty minutes five days a week     Eventually, increase the speed of your walking for 1-2 minutes at time    In addition, we recommend that you review healthy lifestyles and methods for weight loss available through the National Institutes of Health patient information sites:  http://win.niddk.nih.gov/publications/index.htm    And look into health and wellness programs that may be available through your health insurance provider, employer, local community center, or tracee club.    Weight management plan: Patient was referred to their PCP to discuss a diet and exercise plan.  Surgery:    Surgery for obstructive sleep apnea is considered generally only when other therapies fail to work. Surgery may be discussed with you if you are having a difficult time tolerating CPAP and or when there is an abnormal structure that requires surgical correction.  Nose and throat surgeries often enlarge the airway to prevent collapse.  Most of these surgeries create pain for 1-2 weeks and up to half of the most common surgeries are not effective throughout life.  You should carefully discuss the benefits and drawbacks to surgery with your sleep provider and surgeon to determine if it is the best solution for you.   More information  Surgery for SYLVIE is directed at areas that are responsible for narrowing or complete obstruction of the airway during sleep.  There are a wide range of procedures available to enlarge and/or stabilize the airway to prevent blockage of breathing in the three major areas where it can occur: the palate, tongue, and nasal regions.  Successful surgical treatment depends on the accurate identification of the factors responsible for obstructive sleep apnea in each person.  A personalized approach is required because there is no single treatment  that works well for everyone.  Because of anatomic variation, consultation with an examination by a sleep surgeon is a critical first step in determining what surgical options are best for each patient.  In some cases, examination during sedation may be recommended in order to guide the selection of procedures.  Patients will be counseled about risks and benefits as well as the typical recovery course after surgery. Surgery is typically not a cure for a person s SYLVIE.  However, surgery will often significantly improve one s SYLVIE severity (termed  success rate ).  Even in the absence of a cure, surgery will decrease the cardiovascular risk associated with OSA7; improve overall quality of life8 (sleepiness, functionality, sleep quality, etc).  Palate Procedures:  Patients with SYLVIE often have narrowing of their airway in the region of their tonsils and uvula.  The goals of palate procedures are to widen the airway in this region as well as to help the tissues resist collapse.  Modern palate procedure techniques focus on tissue conservation and soft tissue rearrangement, rather than tissue removal.  Often the uvula is preserved in this procedure. Residual sleep apnea is common in patient after pharyngoplasty with an average reduction in sleep apnea events of 33%2.    Tongue Procedures:  ExamWhile patients are awake, the muscles that surround the throat are active and keep this region open for breathing. These muscles relax during sleep, allowing the tongue and other structures to collapse and block breathing.  There are several different tongue procedures available.  Selection of a tongue base procedure depends on characteristics seen on physical exam.  Generally, procedures are aimed at removing bulky tissues in this area or preventing the back of the tongue from falling back during sleep.  Success rates for tongue surgery range from 50-62%3.  Hypoglossal Nerve Stimulation:  Hypoglossal nerve stimulation has recently  received approval from the [place] Food and Drug Administration for the treatment of obstructive sleep apnea.  This is based on research showing that the system was safe and effective in treating sleep apnea6.  Results showed that the median AHI score decreased 68%, from 29.3 to 9.0. This therapy uses an implant system that senses breathing patterns and delivers mild stimulation to airway muscles, which keeps the airway open during sleep.  The system consists of three fully implanted components: a small generator (similar in size to a pacemaker), a breathing sensor, and a stimulation lead.  Using a small handheld remote, a patient turns the therapy on before bed and off upon awakening.    Candidates for this device must be greater than 22 years of age, have moderate to severe SYLVIE (AHI between 20-65), BMI less than 32, have tried CPAP/oral appliance without success, and have appropriate upper airway anatomy (determined by a sleep endoscopy performed by Dr. Faith).  Hypoglossal Nerve Stimulation Pathway:    The sleep surgeon s office will work with the patient through the insurance prior-authorization process (including communications and appeals).    Nasal Procedures:  Nasal obstruction can interfere with nasal breathing during the day and night.  Studies have shown that relief of nasal obstruction can improve the ability of some patients to tolerate positive airway pressure therapy for obstructive sleep apnea1.  Treatment options include medications such as nasal saline, topical corticosteroid and antihistamine sprays, and oral medications such as antihistamines or decongestants. Non-surgical treatments can include external nasal dilators for selected patients. If these are not successful by themselves, surgery can improve the nasal airway either alone or in combination with these other options.  Combination Procedures:  Combination of surgical procedures and other treatments may be recommended, particularly if  patients have more than one area of narrowing or persistent positional disease.  The success rate of combination surgery ranges from 66-80%2,3.    References  1. Peña JUAREZ. The Role of the Nose in Snoring and Obstructive Sleep Apnoea: An Update.  Eur Arch Otorhinolaryngol. 2011; 268: 1365-73.  2.  Linda SM; Betty JA; Philippe JR; Pallanch JF; Daiana MB; Ariane SG; Shanice DAMON. Surgical modifications of the upper airway for obstructive sleep apnea in adults: a systematic review and meta-analysis. SLEEP 2010;33(10):4066-5757. Sean GUERRA. Hypopharyngeal surgery in obstructive sleep apnea: an evidence-based medicine review.  Arch Otolaryngol Head Neck Surg. 2006 Feb;132(2):206-13.  3. Bjorn YH1, Dayana Y, Reginaldo KOFI. The efficacy of anatomically based multilevel surgery for obstructive sleep apnea. Otolaryngol Head Neck Surg. 2003 Oct;129(4):327-35.  4. Sean GUERRA, Goldberg A. Hypopharyngeal Surgery in Obstructive Sleep Apnea: An Evidence-Based Medicine Review. Arch Otolaryngol Head Neck Surg. 2006 Feb;132(2):206-13.  5. Addison PJ et al. Upper-Airway Stimulation for Obstructive Sleep Apnea.  N Engl J Med. 2014 Jan 9;370(2):139-49.  6. Carlos Eduardo Y et al. Increased Incidence of Cardiovascular Disease in Middle-aged Men with Obstructive Sleep Apnea. Am J Respir Crit Care Med; 2002 166: 159-165  7. Latasha EM et al. Studying Life Effects and Effectiveness of Palatopharyngoplasty (SLEEP) study: Subjective Outcomes of Isolated Uvulopalatopharyngoplasty. Otolaryngol Head Neck Surg. 2011; 144: 623-631.            Follow-ups after your visit        Follow-up notes from your care team     Return in about 2 weeks (around 11/26/2018) for Sleep Study Review.      Your next 10 appointments already scheduled     Nov 14, 2018  9:45 AM CST   Pacemaker Check with CAM ARORAN   [facility] ([facility])    [address] [place] [id number] [phone] OPT 2            Nov 14, 2018  10:30 AM CST   LAB with LEVY LAB   [facility] [place] ([facility])    [address]  [place]  [place] [address] [phone]           Please do not eat 10-12 hours before your appointment if you are coming in fasting for labs on lipids, cholesterol, or glucose (sugar). This does not apply to pregnant women. Water, hot tea and black coffee (with nothing added) are okay. Do not drink other fluids, diet soda or chew gum.            Nov 14, 2018  1:30 PM CST   Core MD Return with Galindo Del Valle MD   [facility] ([facility])    [address]  [place] [address] [phone] OPT 2            Nov 28, 2018  3:30 PM CST   Anticoagulation Visit with [facility]   [facility]    [address]  [place] [address] [phone]            Dec 17, 2018  8:30 PM CST   PSG Split with BED 2 SH SLEEP   [facility] ([facility])    [address]  [address]  [place] [address] [phone]            Contreras 10, 2019  9:30 AM CST   Return Sleep Patient with Bennett Ezra Goltz, PA-C   [facility] ([facility])    [address]  [address]  [facility] [id number] [phone]              Future tests that were ordered for you today     Open Future Orders        Priority Expected Expires Ordered    Comprehensive Sleep Study Routine  5/11/2019 11/12/2018            Who to contact     If you have questions or need follow up information about today's clinic visit or your schedule please contact [facility] directly at [phone].  Normal or non-critical lab and imaging results will be communicated to you by MyChart, letter or phone within 4 business days after the clinic has received the results. If you do not hear from us within 7 days, please contact the  "clinic through [employer] or phone. If you have a critical or abnormal lab result, we will notify you by phone as soon as possible.  Submit refill requests through [employer] or call your pharmacy and they will forward the refill request to us. Please allow 3 business days for your refill to be completed.          Additional Information About Your Visit        Care EveryWhere ID     This is your Care EveryWhere ID. This could be used by other organizations to access your [facility] medical records  [id number]        Your Vitals Were     Pulse Temperature Respirations Height Pulse Oximetry BMI (Body Mass Index)    79 97.9  F (36.6  C) (Oral) 16 1.588 m (5' 2.5\") 97% 30.87 kg/m2       Blood Pressure from Last 3 Encounters:   11/12/18 117/79   10/31/18 112/68   10/26/18 131/75    Weight from Last 3 Encounters:   11/12/18 77.8 kg (171 lb 8 oz)   10/31/18 78.9 kg (174 lb)   10/26/18 81.2 kg (179 lb)                 Today's Medication Changes          These changes are accurate as of 11/12/18  2:13 PM.  If you have any questions, ask your nurse or doctor.               These medicines have changed or have updated prescriptions.        Dose/Directions    warfarin 5 MG tablet   Commonly known as:  COUMADIN   This may have changed:    - how much to take  - how to take this  - when to take this  - additional instructions   Used for:  Paroxysmal atrial fibrillation (H)        TAKE 1/2 TO 1 TABLET BY MOUTH DAILY OR AS DIRECTED BY INR CLINIC   Quantity:  90 tablet   Refills:  3                Primary Care Provider Office Phone # Fax #    Halley ALLI Huff [phone] [address] [facility] [address]  [facility] [address]        Equal Access to Services     [facility]: Hadii ruth Harrell, rigo coffman, qaybta danielalmamatt chow. So [facility] [phone].    ATENCIÓN: Si habla español, tiene a quinones disposición servicios gratuitos de asistencia lingüística. " Syd jasso [phone].    We comply with applicable federal civil rights laws and [place] laws. We do not discriminate on the basis of race, color, national origin, age, disability, sex, sexual orientation, or gender identity.            Thank you!     Thank you for choosing [facility]  for your care. Our goal is always to provide you with excellent care. Hearing back from our patients is one way we can continue to improve our services. Please take a few minutes to complete the written survey that you may receive in the mail after your visit with us. Thank you!             Your Updated Medication List - Protect others around you: Learn how to safely use, store and throw away your medicines at www.disposemymeds.org.          This list is accurate as of 11/12/18  2:13 PM.  Always use your most recent med list.                   Brand Name Dispense Instructions for use Diagnosis    ACE/ARB/ARNI NOT PRESCRIBED (INTENTIONAL)      ACE & ARB not prescribed due to Other: HFpEF    Chronic diastolic congestive heart failure (H)       ACETAMINOPHEN PO      Take 500 mg by mouth 3 times daily as needed (Takes at least 6 hours apart).        ASPIRIN NOT PRESCRIBED    INTENTIONAL    0 each    Antiplatelet medication not prescribed intentionally due to Current anticoagulant therapy (warfarin/enoxaparin)    Type 2 diabetes mellitus without complications (H)       blood glucose lancets standard    no brand specified    100 each    Use to test blood sugar 1 times daily or as directed.    Type 2 diabetes mellitus without complication, without long-term current use of insulin (H)       blood glucose monitoring lancets     1 Box    Use to check blood sugars daily    Type 2 diabetes, HbA1c goal < 7% (H)       * blood glucose monitoring meter device kit     1 kit    1 Device daily.    Type 2 diabetes, HbA1c goal < 7% (H)       * blood glucose monitoring meter device kit    no brand specified    1 kit    Use to test  blood sugar one times daily or as directed.    Type 2 diabetes mellitus without complication, without long-term current use of insulin (H)       * blood glucose monitoring test strip    ACCU-CHEK SMARTVIEW    1 Box    1 strip by In Vitro route daily    Type 2 diabetes, HbA1c goal < 7% (H)       * blood glucose monitoring test strip    no brand specified    100 strip    Use to test blood sugars one times daily or as directed .has type 2 diabetes and not on insulin    Type 2 diabetes mellitus without complication, without long-term current use of insulin (H)       diltiazem 120 MG 24 hr capsule    DILACOR XR    30 capsule    Take 1 capsule (120 mg) by mouth At Bedtime    Chronic atrial fibrillation (H)       furosemide 20 MG tablet    LASIX    90 tablet    Take 1 tablet (20 mg) by mouth daily    Chronic diastolic congestive heart failure (H)       glimepiride 2 MG tablet    AMARYL    180 tablet    Take 1 tablet (2 mg) by mouth 2 times daily    Type 2 diabetes mellitus without complication, without long-term current use of insulin (H)       levothyroxine 88 MCG tablet    SYNTHROID/LEVOTHROID    90 tablet    Take 1 tablet (88 mcg) by mouth daily    Hypothyroidism, unspecified type       metFORMIN 500 MG tablet    GLUCOPHAGE    180 tablet    Take 1 tablet (500 mg) by mouth 2 times daily (with meals)    Type 2 diabetes mellitus without complication, without long-term current use of insulin (H)       metoprolol succinate 50 MG 24 hr tablet    TOPROL-XL    180 tablet    Take 1.5 tablets (75 mg) by mouth 2 times daily    Chronic diastolic congestive heart failure (H)       mirtazapine 7.5 MG Tabs tablet    REMERON    30 tablet    Take 1 tablet (7.5 mg) by mouth At Bedtime    Adjustment disorder with anxious mood       multivitamin  with lutein Caps per capsule      Take 1 capsule by mouth daily        NASACORT ALLERGY 24HR 55 MCG/ACT inhaler   Generic drug:  triamcinolone      Spray 2 sprays into both nostrils daily as  needed (during allergy season)        OLANZapine 2.5 MG tablet    zyPREXA    30 tablet    Take 1 tablet (2.5 mg) by mouth daily as needed (insomnia, agitation)    Adjustment disorder with anxious mood       spironolactone 25 MG tablet    ALDACTONE    30 tablet    Take 1 tablet (25 mg) by mouth daily        STATIN NOT PRESCRIBED (INTENTIONAL)     0 each    continuous prn. Statin not prescribed intentionally due to Other: Not needed, LDL at goal <100mg/dL without therapy        warfarin 5 MG tablet    COUMADIN    90 tablet    TAKE 1/2 TO 1 TABLET BY MOUTH DAILY OR AS DIRECTED BY INR CLINIC    Paroxysmal atrial fibrillation (H)       * Notice:  This list has 4 medication(s) that are the same as other medications prescribed for you. Read the directions carefully, and ask your doctor or other care provider to review them with you.

## 2018-11-12 NOTE — PROGRESS NOTES
"    Sleep Consultation:    Date on this visit: 11/12/2018    Shelly Rogers is sent by Itzel Velazquez for a sleep consultation regarding SYLVIE.    Primary Physician: Halley Huff     Shelly Rogers reports difficulty sleeping for many years, worse this summer with CHF. Her medical history is significant for paroxysmal atrial fibrillation, pacemaker, chronic diastolic CHF (ECHO: EF 55-60%, E/E' avg 13.2, small pericardial effusion), DM 2, hypothyroidism, uterine cancer. She presents with her daughter.    Shelly goes to sleep at 9:00 PM during the week. She wakes up at 5-7:00 AM without an alarm. She falls asleep in 5 minutes.  Shelly denies difficulty falling asleep. She has been using 7.5 mg mirtazapine since being in the hospital. She has a prescription for 2.5 mg olanzapine but she rarely takes it. She takes that at 8:30 PM.  She wakes up every 1-2 times for 3-4 hours before falling back to sleep.  Shelly wakes up to go to the bathroom. She does chores and watches TVs. She may lay in bed awake for an hour. On weekends, her sleep schedule is the same.  Patient gets an average of 4 hours of sleep per night. She says her whole life, she has only needed 5 hours.    Patient does not use electronics in bed, watch TV in bed, worry in bed about anything and read in bed. Her mind will wander about random things.    Shelly is not currently working. She used to work as an . She lives alone in a penitentiary home.      Shelly does snore every night and snoring is loud. She has a lamp on the other side of the room with the \"clapper\" and she thinks she turned the light on with her snoring. Patient does not have a regular bed partner. There is no report of gasping, snorting or witnessed apneas.  Patient sleeps on her back and side. She has occasional snort arousals, morning dry mouth, infrequent restless legs and leg cramps, denies no morning confusion. Shelly denies any bruxism, sleep walking, dream enactment, sleep " "paralysis, cataplexy and hypnogogic/hypnopompic hallucinations. She has had \"goofy\" dreams for a couple of months. She has had a change in personality around July, but is feeling better since being out of the hospital. Her daughter spent the night (the first night after her hospital stay) and heard her yelling in her sleep. She takes metoprolol twice daily. She has been on that for at least 5 years. She denies anosmia.    Shelly denies difficulty breathing through her nose, claustrophobia, reflux at night, heartburn and depression.  She has seasonal allergies and constant post nasal drainage. She has a deviated septum. She has been using Nasacort and antihistamines in the summer. She also uses a saline nasal spray occasionally.    Shelly has lost 5 pounds in 5 years.  Patient describes themself as neither a morning or night person.  At her best, she used to sleep 10:30 PM and wake at 6:15 AM. She may have been awake for a couple of hours in the night. She did not nap then though. That was 30 years ago. Patient's Thornton Sleepiness score 10/24 consistent with mild daytime sleepiness.      Shelly naps daily for 60 minutes after lunch (inadvertently), feels refreshed after naps. She takes rare other inadvertant naps. She will lay in bed in the middle of the afternoon to try to reduce swelling in her feet. She used to fall asleep, but does not now.  She does not drive and denies dozing as a passenger. She uses 9 cups/day of coffee. Caffeine can be at any time of day.     Allergies:    Allergies   Allergen Reactions     Shellfish Allergy Difficulty breathing     Ambien [Zolpidem] Other (See Comments)     Agitation with hallucinations     Cats      Sneezing, watery eyes     Lisinopril Cough     No Clinical Screening - See Comments Other (See Comments)     Pt stated allergic to flowers, pt gets itchy watery eyes and stuffy nose     Seasonal Allergies Itching     Flowers     Sulfa Drugs Hives       Medications:    Current " Outpatient Prescriptions   Medication Sig Dispense Refill     ACE/ARB NOT PRESCRIBED, INTENTIONAL, ACE & ARB not prescribed due to Other: HFpEF       ACETAMINOPHEN PO Take 500 mg by mouth 3 times daily as needed (Takes at least 6 hours apart).        ASPIRIN NOT PRESCRIBED, INTENTIONAL, Antiplatelet medication not prescribed intentionally due to Current anticoagulant therapy (warfarin/enoxaparin) 0 each 0     blood glucose (ACCU-CHEK SMARTVIEW) test strip 1 strip by In Vitro route daily 1 Box prn     blood glucose (NO BRAND SPECIFIED) lancets standard Use to test blood sugar 1 times daily or as directed. 100 each 11     blood glucose monitoring (ACCU-CHEK FASTCLIX) lancets Use to check blood sugars daily 1 Box prn     blood glucose monitoring (NO BRAND SPECIFIED) meter device kit Use to test blood sugar one times daily or as directed. 1 kit 0     blood glucose monitoring (NO BRAND SPECIFIED) test strip Use to test blood sugars one times daily or as directed .has type 2 diabetes and not on insulin 100 strip 1     Blood Glucose Monitoring Suppl (ACCU-CHEK LAKIA SMARTVIEW) W/DEVICE KIT 1 Device daily. 1 kit 0     diltiazem (DILACOR XR) 120 MG 24 hr capsule Take 1 capsule (120 mg) by mouth At Bedtime 30 capsule 0     furosemide (LASIX) 20 MG tablet Take 1 tablet (20 mg) by mouth daily 90 tablet 3     glimepiride (AMARYL) 2 MG tablet Take 1 tablet (2 mg) by mouth 2 times daily 180 tablet 3     levothyroxine (SYNTHROID/LEVOTHROID) 88 MCG tablet Take 1 tablet (88 mcg) by mouth daily 90 tablet 1     metFORMIN (GLUCOPHAGE) 500 MG tablet Take 1 tablet (500 mg) by mouth 2 times daily (with meals) 180 tablet 3     metoprolol succinate (TOPROL-XL) 50 MG 24 hr tablet Take 1.5 tablets (75 mg) by mouth 2 times daily 180 tablet 3     mirtazapine (REMERON) 7.5 MG TABS tablet Take 1 tablet (7.5 mg) by mouth At Bedtime 30 tablet 0     multivitamin  with lutein (OCUVITE WITH LTEIN) CAPS per capsule Take 1 capsule by mouth daily        OLANZapine (ZYPREXA) 2.5 MG tablet Take 1 tablet (2.5 mg) by mouth daily as needed (insomnia, agitation) 30 tablet 1     spironolactone (ALDACTONE) 25 MG tablet Take 1 tablet (25 mg) by mouth daily 30 tablet 1     STATIN NOT PRESCRIBED, INTENTIONAL, continuous prn. Statin not prescribed intentionally due to Other: Not needed, LDL at goal <100mg/dL without therapy 0 each 0     triamcinolone (NASACORT ALLERGY 24HR) 55 MCG/ACT nasal inhaler Spray 2 sprays into both nostrils daily as needed (during allergy season)        warfarin (COUMADIN) 5 MG tablet TAKE 1/2 TO 1 TABLET BY MOUTH DAILY OR AS DIRECTED BY INR CLINIC (Patient taking differently: Take 2.5-5 mg by mouth daily TAKE 1/2 TO 1 TABLET BY MOUTH DAILY OR AS DIRECTED BY INR CLINIC) 90 tablet 3       Problem List:  Patient Active Problem List    Diagnosis Date Noted     Heart failure (H) 10/19/2018     Priority: Medium     Long-term (current) use of anticoagulants [Z79.01] 03/28/2016     Priority: Medium     Paroxysmal atrial fibrillation (H) 10/13/2015     Priority: Medium     Chronic diastolic congestive heart failure (H) 10/13/2015     Priority: Medium     Hypothyroidism 10/13/2015     Priority: Medium     Atrial fibrillation (H) 01/21/2014     Priority: Medium     Type 2 diabetes mellitus without complications (H) 01/14/2014     Priority: Medium     DNS (deviated nasal septum) 01/14/2014     Priority: Medium     Uterine cancer (H)      Priority: Medium     s/p hyst       OA (osteoarthritis)      Priority: Medium     Health Care Home 04/19/2013     Priority: Medium     Patient Care Plan      About Me    Patient Name     Date of Birth 1/18/1933 Age 80   [facility]  Phone Call daughter Ginger [phone]   Address:  Email address    Insurance  Insurance I.MILLER    [facility]      Emergency Contact Gregjohnathan Terwilliger Phone [phone]   Parent/Guardian  Phone      :     My Access Plan    Medical Emergency 911   Primary  Clinic Line    24 Hour Appointment Line [phone] or  [phone]) (toll-free)   24 Hour Nurse Line [phone] (toll-free)   Preferred Urgent Care    Preferred Hospital [facility]   Preferred Pharmacy              My Care Team Members    Care Team Member  Name/Specialty Clinic, Address, Phone, Fax, E-mail Date of release on file   Primary Care Provider:  Dr. Senait Elmore Clinic:[address]  [address]  [place] [address] [phone] (phone #)  [phone] (fax #) [place]   Primary Care Coordinator: Myrtle Hutchinson RN, MS  [facility] Care Coordinator  [phone]     [place] [id number] [facility]  [address]  [place] [address] [phone] (phone #)  [phone] (fax #) [place]   For specialists see Care Teams                Health Care Home Complexity Tier:           Care Coordination Start Date: 4/19/2013   Frequency of Care Coordination:monthly   Form Last Updated: 4/19/2013   Patient Care Plan    Patient Acknowledgement of Plan of Care: Yes     Presenting Problem Treatment Plan and Discharge Instructions   CHF  reviwed CHF symptoms and booklet with patient 4/19/2013         The provider seeing you for these problems in the emergency setting may determine that a different course of care is necessary based on the situation.    Team Communication/Sticky Note: (Take items out when done)  Date Noted Care Team Member TO DO/Alerts to be completed                     Health care home/care coordination was discussed with the patient and he/she agrees to participate in care coordination. The patient was introduced to the care coordinator and given a patient packet to review and complete. The care coordinator will contact the patient to start care planning process.  Care coordination start date:  4/19/2013    Frequency of care coordination with care team:  monthly  SELF MANAGEMENT PLAN  Presenting Problem Signs and Symptoms Treatment Plan    chf None  currently; but if  SOB, edema  call PCP with wt gain; patient has booklet and understands green, yellow, red zones    diabetes type 2  none currently  monitor glucose every morning; keep a diary and bring with to PCP visits                   Advanced Care Directives:  on file  Action Plans on File:      Shelly ANN Ken   [facility] Rec #: [id number]   Health Insurance/Plan:   : 1933   ID: [unfilled]   Primary Care Provider: Vianey Sapp       Date form completed: 2013       Primary Ethnic Culture:  American   Primary Language:   English     needed? No Language: English   Name of preferred :   Phone #:   Preferred Mode of Communication: Phone   Preferred Method of Communication: verbal   Health Care Home Complexity Tier:         Contact Information:   Mother's Name: Data Unavailable   Father's Name: Data Unavailable   Phone: [phone] (home)    Preferred Contact: daughters Sandhya Vanlliger [phone] or Malu Augustineer [phone]   Address:   [address] [place] [id number]   Siblings/Relatives: daughters    Lives with: currently daughter Sandhya     My Story  I like to be called Shelly  Health Maintenance/Preventative Procedures and Immunizations are addressed in the Health Maintenance List and should be updated  Integrated Medical Plan    Additional Standing Lab Orders (Use Health Maintenance When Possible):        Therapies:  N/A    My Multidisciplinary Care Team Members  Specialty of Care Team Member Name, Clinic, Address, Phone #, Fax #, E-mail   Primary care provider: Vianey Sapp  [address]  [facility] [address]  [phone] (phone #)  [phone] (fax #)    Specialists with [facility]; see Care Team                    School:  N/A    Care Givers  Type of Care Giver Phone/Fax E-mail   PCA:        Home Health Agency:       Nurse Name:       :       Guardian:         Persons You Can Contact About Me  and My Medical Care  Name Relation Phone/Fax E-mail KIRSTIN Date    see above                                                 This care plan is a documentation of the individual s care as directed by the family, educators, therapists and diverse medical providers.  Contributors to the care plan include the patient, the patient s family and Vianey Sapp and the health care home team at [facility].  Please indicate any changes made to the care of this patient on this care plan and fax it to the care coordinator above.  Thank you for your attention.  Patient: Shelly Rogers__________________  Vianey Sapp___________________  April 19, 2013___________________           CARDIOVASCULAR SCREENING; LDL GOAL LESS THAN 100 04/02/2013     Priority: Medium     CHF (congestive heart failure) (H) 03/19/2013     Priority: Medium     Pacemaker 03/01/2013     Priority: Medium        Past Medical/Surgical History:  Past Medical History:   Diagnosis Date     Atrial fibrillation (H)     Nl LVEF, s/p ablation      Congestive heart failure, unspecified      Diabetes mellitus (H) 2004     Hemorrhoids     intermittent bleeding     Hypertension      Macular degeneration      OA (osteoarthritis)      Pacemaker 3/2013     Uterine cancer (H)     s/p hyst     Past Surgical History:   Procedure Laterality Date     C ANESTH,PACEMAKER INSERTION      dual chamber 3/27/13     CHOLECYSTECTOMY  8/2004     GYN SURGERY       HYSTERECTOMY      Ut ca      JOINT REPLACEMENT       KERATOTOMY ARCUATE WITH FEMTOSECOND LASER/IMAGING FOR ATIOL Right 4/11/2017    Procedure: KERATOTOMY ARCUATE WITH FEMTOSECOND LASER/IMAGING FOR ATIOL;  Surgeon: Calvin Dominguez MD;  Location: [facility]     PHACOEMULSIFICATION CLEAR CORNEA WITH STANDARD INTRAOCULAR LENS IMPLANT Right 4/11/2017    Procedure: PHACOEMULSIFICATION CLEAR CORNEA WITH STANDARD INTRAOCULAR LENS IMPLANT;  Surgeon: Calvin Dominguez MD;  Location: [facility]     TKR      bilat        Social History:  Social History     Social History     Marital status: Single     Spouse name: N/A     Number of children: N/A     Years of education: N/A     Occupational History     Not on file.     Social History Main Topics     Smoking status: Former Smoker     Quit date: 1/1/1990     Smokeless tobacco: Never Used     Alcohol use No     Drug use: No     Sexual activity: Not Currently     Partners: Male     Other Topics Concern     Parent/Sibling W/ Cabg, Mi Or Angioplasty Before 65f 55m? No     Caffeine Concern Yes     daily      Sleep Concern No     Special Diet Yes     low sodium, less leafy greens, low sugar     Exercise No     Seat Belt Yes     Social History Narrative       Family History:  Family History   Problem Relation Age of Onset     GASTROINTESTINAL DISEASE Mother      bowel obstruction     Cardiovascular Father      C.A.D. Father      Cardiovascular Brother      CHF       Review of Systems:  A complete review of systems reviewed by me is negative with the exeption of what has been mentioned in the history of present illness.  CONSTITUTIONAL: NEGATIVE for weight gain/loss, fever, chills, sweats or night sweats, drug allergies.  EYES: NEGATIVE for double vision.  EYES:  POSITIVE for changes in vision, blind spots and macular degeneration  ENT: NEGATIVE for ear pain, sore throat, sinus pain, runny nose, bloody nose  ENT:  POSITIVE for  post-nasal drip  CARDIAC: NEGATIVE for fast heartbeats or fluttering in chest, chest pain or pressure, breathlessness when lying flat.  CARDIAC:  POSITIVE for  swollen legs and swollen feet, HTN, heart disease  NEUROLOGIC: NEGATIVE headaches, weakness or numbness in the arms or legs.  NEUROLOGIC:  POSITIVE for  Numbness in toes  DERMATOLOGIC: NEGATIVE for rashes, new moles or change in mole(s)  PULMONARY: NEGATIVE SOB at rest, coughing up blood, wheezing or whistling when breathing.    PULMONARY:  POSITIVE for  SOB with activity, dry cough and productive  "cough  GASTROINTESTINAL: NEGATIVE for nausea or vomitting, loose or watery stools, fat or grease in stools, abdominal pain, bowel movements black in color or blood noted.  GASTROINTESTINAL:  POSITIVE for  constipation  GENITOURINARY: NEGATIVE for pain during urination, blood in urine, urinating more frequently than usual, irregular menstrual periods.  MUSCULOSKELETAL: NEGATIVE for muscle pain, bone or joint pain, swollen joints.  ENDOCRINE: NEGATIVE for increased thirst or urination.  ENDOCRINE:  POSITIVE for  diabetes  LYMPHATIC: NEGATIVE for swollen lymph nodes, lumps or bumps in the breasts or nipple discharge.    Physical Examination:  Vitals: /79  Pulse 79  Temp 97.9  F (36.6  C) (Oral)  Resp 16  Ht 1.588 m (5' 2.5\")  Wt 77.8 kg (171 lb 8 oz)  SpO2 97%  BMI 30.87 kg/m2    Neck Cir (cm): 38 cm    No flowsheet data found.    AUDREY Total Score: 19 (11/12/18 1252)    GENERAL APPEARANCE: healthy, alert, no distress and cooperative  EYES: Eyes grossly normal to inspection, PERRL, conjunctivae and sclerae normal and lids and lashes normal  HENT: nose and mouth without ulcers or lesions, oropharynx crowded, soft palate dependent and tongue base enlarged  NECK: no adenopathy, no asymmetry, masses, or scars, thyroid normal to palpation and trachea midline and normal to palpation  RESP: lungs clear to auscultation - no rales, rhonchi or wheezes  CV: no murmur, click or rub and irregularly irregular rhythm  LYMPHATICS: no cervical adenopathy  MS: extremities normal- no gross deformities noted, compression stockings on  NEURO: Normal strength and tone, mentation intact, speech normal and cranial nerves 2-12 intact  Mallampati Class: IV.  Tonsillar Stage: 1  hidden by pillars.    Impression/Plan:    (R06.83) Snoring  (primary encounter diagnosis), (I48.0) Paroxysmal atrial fibrillation (H), (I50.32) Chronic diastolic congestive heart failure (H)  Comment: Shelly presents at the request of her cardiologist for " "evaluation of sleep apnea. She was recently in the hospital with an exacerbation of diastolic CHF, possibly secondary to atrial fibrillation. She does snore frequently and loudly. She has not had observed gasps, snorts, or pauses in breathing. She naps for an hour per day and her ESS is 10/24. She has HTN and is over 50 (85). She has a very crowded airway. Negative risk factors include; no observed apnea, BMI <35 (30), neck <40 cm (38 cm), female gender. She is at risk for central apnea given her a-fib. LVEF was 55-60%.  Plan: Comprehensive Sleep Study        Split night PSG with CPAP/BiPAP titration if indicated. ASV is an option if indicated. She can bring a tablet of mirtazapine for the night of the study.    (G47.50) Parasomnia  Comment: She has been having regular nightmares. Her daughter observed yelling and moaning in her sleep. There is not evidence of movements, but she is rarely observed while sleeping. She has not fallen out of bed or woke up while moving. This has been present for a couple of months and seems to coincide with changes in mood related to her a-fib/CHF. Her mood has improved with treating her heart issues, but she was still observed to yell the day after getting out of the hospital. She has been on mirtazapine and infrequent olanzapine since being in the hospital.  Plan: Comprehensive Sleep Study        We will screen for abnormal muscle tone in REM. She was advised to stop the mirtazapine and olanzapine.      (F51.04) Chronic insomnia  Comment: She sleeps for a couple of hours in the evening and another couple of hours in the morning. She naps for an hour in the afternoon as well. When sleeping \"well,\" she used to sleep about 10:30 PM to 6:15 AM, but thinks she may have had a couple of hour awakening then as well. She thinks she has always only needed about 5 hours. She was given mirtazapine and olanzapine after the hospital visit. She has only been taking the mirtazapine and does not " notice much benefit. She had hallucinations with zolpidem while in the hospital.  Plan: She was encouraged to try to reduce sleeping in the daytime to increase her sleep drive at night. Only be in bed for sleep and be consistent with your bedtime and wake time. Stop the sleeping medications.      Literature provided regarding sleep apnea.      She will follow up with me in approximately two weeks after her sleep study has been competed to review the results and discuss plan of care.       Polysomnography reviewed.  Obstructive sleep apnea reviewed.  Complications of untreated sleep apnea were reviewed.  45 minutes was spent during this visit, over 50% in counseling and coordination of care.   Bennett Goltz, PA-C    CC: Itzel Huff MD

## 2018-11-12 NOTE — PATIENT INSTRUCTIONS
"MY TREATMENT INFORMATION FOR SLEEP APNEA-  Shelly Rogers    DOCTOR : Bennett Ezra Goltz, PA-C  SLEEP CENTER : [facility]     MY CONTACT NUMBER: [phone]    Am I having a sleep study at a sleep center?  Make sure you have an appointment for the study before you leave!    Am I having a home sleep study?  Watch this video:  https://www.[employer].com/watch?v=CteI_GhyP9g&list=[email or web address]  Please verify your insurance coverage with your insurance carrier    Frequently asked questions:  1. What is Obstructive Sleep Apnea (SYLVIE)? SYLVIE is the most common type of sleep apnea. Apnea means, \"without breath.\"  Apnea is most often caused by narrowing or collapse of the upper airway as muscles relax during sleep.   Almost everyone has occasional apneas. Most people with sleep apnea have had brief interruptions at night frequently for many years.  The severity of sleep apnea is related to how frequent and severe the events are.   2. What are the consequences of SYLVIE? Symptoms include: feeling sleepy during the day, snoring loudly, gasping or stopping of breathing, trouble sleeping, and occasionally morning headaches or heartburn at night.  Sleepiness can be serious and even increase the risk of falling asleep while driving. Other health consequences may include development of high blood pressure and other cardiovascular disease in persons who are susceptible. Untreated SYLVIE  can contribute to heart disease, stroke and diabetes.   3. What are the treatment options? In most situations, sleep apnea is a lifelong disease that must be managed with daily therapy. Medications are not effective for sleep apnea and surgery is generally not considered until other therapies have been tried. Your treatment is your choice . Continuous Positive Airway (CPAP) works right away and is the therapy that is effective in nearly everyone. An oral device to hold your jaw forward is usually the next most reliable option. Other options " include postioning devices (to keep you off your back), weight loss, and surgery including a tongue pacing device. There is more detail about some of these options below.    Important tips for using CPAP and similar devices   Know your equipment:  CPAP is continuous positive airway pressure that prevents obstructive sleep apnea by keeping the throat from collapsing while you are sleeping. In most cases, the device is  smart  and can slowly self-adjusts if your throat collapses and keeps a record every day of how well you are treated-this information is available to you and your care team.  BPAP is bilevel positive airway pressure that keeps your throat open and also assists each breath with a pressure boost to maintain adequate breathing.  Special kinds of BPAP are used in patients who have inadequate breathing from lung or heart disease. In most cases, the device is  smart  and can slowly self-adjusts to assist breathing. Like CPAP, the device keeps a record of how well you are treated.  Your mask is your connection to the device. You get to choose what feels most comfortable and the staff will help to make sure if fits. Here: are some examples of the different masks that are available:       Key points to remember on your journey with sleep apnea:  1. Sleep study.  PAP devices often need to be adjusted during a sleep study to show that they are effective and adjusted right.  2. Good tips to remember: Try wearing just the mask during a quiet time during the day so your body adapts to wearing it. A humidifier is recommended for comfort in most cases to prevent drying of your nose and throat. Allergy medication from your provider may help you if you are having nasal congestion.  3. Getting settled-in. It takes more than one night for most of us to get used to wearing a mask. Try wearing just the mask during a quiet time during the day so your body adapts to wearing it. A humidifier is recommended for comfort in most  cases. Our team will work with you carefully on the first day and will be in contact within 4 days and again at 2 and 4 weeks for advice and remote device adjustments. Your therapy is evaluated by the device each day.   4. Use it every night. The more you are able to sleep naturally for 7-8 hours, the more likely you will have good sleep and to prevent health risks or symptoms from sleep apnea. Even if you use it 4 hours it helps. Occasionally all of us are unable to use a medical therapy, in sleep apnea, it is not dangerous to miss one night.   5. Communicate. Call our skilled team on the number provided on the first day if your visit for problems that make it difficult to wear the device. Over 2 out of 3 patients can learn to wear the device long-term with help from our team. Remember to call our team or your sleep providers if you are unable to wear the device as we may have other solutions for those who cannot adapt to mask CPAP therapy. It is recommended that you sleep your sleep provider within the first 3 months and yearly after that if you are not having problems.   Take care of your equipment. Make sure you clean your mask and tubing using directions every day and that your filter and mask are replaced as recommended or if they are not working.     BESIDES CPAP, WHAT OTHER THERAPIES ARE THERE?    Positioning Device  Positioning devices are generally used when sleep apnea is mild and only occurs on your back.This example shows a pillow that straps around the waist. It may be appropriate for those whose sleep study shows milder sleep apnea that occurs primarily when lying flat on one's back. Preliminary studies have shown benefit but effectiveness at home may need to be verified by a home sleep test. These devices are generally not covered by medical insurance.  Examples of devices that maintain sleeping on the back to prevent snoring and mild sleep apnea.    Belt type body  positioner  Http://[employer]/    Electronic reminder  Http://nightshifttherapy.com/  Http://www.[employer].com.au/    Oral Appliance  What is oral appliance therapy?  An oral appliance device fits on your teeth at night like a retainer used after having braces. The device is made by a specialized dentist and requires several visits over 1-2 months before a manufactured device is made to fit your teeth and is adjusted to prevent your sleep apnea. Once an oral device is working properly, snoring should be improved. A home sleep test may be recommended at that time if to determine whether the sleep apnea is adequately treated.       Some things to remember:  -Oral devices are often, but not always, covered by your medical insurance. Be sure to check with your insurance provider.   -If you are referred for oral therapy, you will be given a list of specialized dentists to consider or you may choose to visit the Web site of the American Academy of Dental Sleep Medicine  -Oral devices are less likely to work if you have severe sleep apnea or are extremely overweight.     More detailed information  An oral appliance is a small acrylic device that fits over the upper and lower teeth  (similar to a retainer or a mouth guard). This device slightly moves jaw forward, which moves the base of the tongue forward, opens the airway, improves breathing for effective treat snoring and obstructive sleep apnea in perhaps 7 out of 10 people .  The best working devices are custom-made by a dental device  after a mold is made of the teeth 1, 2, 3.  When is an oral appliance indicated?  Oral appliance therapy is recommended as a first-line treatment for patients with primary snoring, mild sleep apnea, and for patients with moderate sleep apnea who prefer appliance therapy to use of CPAP4, 5. Severity of sleep apnea is determined by sleep testing and is based on the number of respiratory events per hour of sleep.   How successful  is oral appliance therapy?  The success rate of oral appliance therapy in patients with mild sleep apnea is 75-80% while in patients with moderate sleep apnea it is 50-70%. The chance of success in patients with severe sleep apnea is 40-50%. The research also shows that oral appliances have a beneficial effect on the cardiovascular health of SYLVIE patients at the same magnitude as CPAP therapy7.  Oral appliances should be a second-line treatment in cases of severe sleep apnea, but if not completely successful then a combination therapy utilizing CPAP plus oral appliance therapy may be effective. Oral appliances tend to be effective in a broad range of patients although studies show that the patients who have the highest success are females, younger patients, those with milder disease, and less severe obesity. 3, 6.   Finding a dentist that practices dental sleep medicine  Specific training is available through the American Academy of Dental Sleep Medicine for dentists interested in working in the field of sleep. To find a dentist who is educated in the field of sleep and the use of oral appliances, near you, visit the Web site of the American Academy of Dental Sleep Medicine.    References  1. Angely et al. Objectively measured vs self-reported compliance during oral appliance therapy for sleep-disordered breathing. Chest 2013; 144(5): 5320-9484.  2. Frida et al. Objective measurement of compliance during oral appliance therapy for sleep-disordered breathing. Thorax 2013; 68(1): 91-96.  3. Brenna, et al. Mandibular advancement devices in 620 men and women with SYLVIE and snoring: tolerability and predictors of treatment success. Chest 2004; 125: 4026-0490.  4. Lance, et al. Oral appliances for snoring and SYLVIE: a review. Sleep 2006; 29: 244-262.  5. Ciara, et al. Oral appliance treatment for SYLVIE: an update. J Clin Sleep Med 2014; 10(2): 215-227.  6. Arelis et al. Predictors of OSAH treatment  outcome. J Gallia Res 2007; 86: 5258-8753.    Weight Loss:    Weight loss is a long-term strategy that may improve sleep apnea in some patients.    Weight management is a personal decision and the decision should be based on your interest and the potential benefits.  If you are interested in exploring weight loss strategies, the following discussion covers the impact on weight loss on sleep apnea and the approaches that may be successful.    Being overweight does not necessarily mean you will have health consequences.  Those who have BMI over 35 or over 27 with existing medical conditions carries greater risk.   Weight loss decreases severity of sleep apnea in most people with obesity. For those with mild obesity who have developed snoring with weight gain, even 15-30 pound weight loss can improve and occasionally eliminate sleep apnea.  Structured and life-long dietary and health habits are necessary to lose weight and keep healthier weight levels.     Though there may be significant health benefits from weight loss, long-term weight loss is very difficult to achieve- studies show success with dietary management in less than 10% of people. In addition, substantial weight loss may require years of dietary control and may be difficult if patients have severe obesity. In these cases, surgical management may be considered.  Finally, older individuals who have tolerated obesity without health complications may be less likely to benefit from weight loss strategies.      Your BMI is Body mass index is 30.87 kg/(m^2).  Weight management is a personal decision.  If you are interested in exploring weight loss strategies, the following discussion covers the approaches that may be successful. Body mass index (BMI) is one way to tell whether you are at a healthy weight, overweight, or obese. It measures your weight in relation to your height.  A BMI of 18.5 to 24.9 is in the healthy range. A person with a BMI of 25 to 29.9 is  considered overweight, and someone with a BMI of 30 or greater is considered obese. More than two-thirds of American adults are considered overweight or obese.  Being overweight or obese increases the risk for further weight gain. Excess weight may lead to heart disease and diabetes.  Creating and following plans for healthy eating and physical activity may help you improve your health.  Weight control is part of healthy lifestyle and includes exercise, emotional health, and healthy eating habits. Careful eating habits lifelong are the mainstay of weight control. Though there are significant health benefits from weight loss, long-term weight loss with diet alone may be very difficult to achieve- studies show long-term success with dietary management in less than 10% of people. Attaining a healthy weight may be especially difficult to achieve in those with severe obesity. In some cases, medications, devices and surgical management might be considered.  What can you do?  If you are overweight or obese and are interested in methods for weight loss, you should discuss this with your provider.     Consider reducing daily calorie intake by 500 calories.     Keep a food journal.     Avoiding skipping meals, consider cutting portions instead.    Diet combined with exercise helps maintain muscle while optimizing fat loss. Strength training is particularly important for building and maintaining muscle mass. Exercise helps reduce stress, increase energy, and improves fitness. Increasing exercise without diet control, however, may not burn enough calories to loose weight.       Start walking three days a week 10-20 minutes at a time    Work towards walking thirty minutes five days a week     Eventually, increase the speed of your walking for 1-2 minutes at time    In addition, we recommend that you review healthy lifestyles and methods for weight loss available through the National Institutes of Health patient information  sites:  http://win.niddk.nih.gov/publications/index.htm    And look into health and wellness programs that may be available through your health insurance provider, employer, local community center, or tracee club.    Weight management plan: Patient was referred to their PCP to discuss a diet and exercise plan.  Surgery:    Surgery for obstructive sleep apnea is considered generally only when other therapies fail to work. Surgery may be discussed with you if you are having a difficult time tolerating CPAP and or when there is an abnormal structure that requires surgical correction.  Nose and throat surgeries often enlarge the airway to prevent collapse.  Most of these surgeries create pain for 1-2 weeks and up to half of the most common surgeries are not effective throughout life.  You should carefully discuss the benefits and drawbacks to surgery with your sleep provider and surgeon to determine if it is the best solution for you.   More information  Surgery for SYLVIE is directed at areas that are responsible for narrowing or complete obstruction of the airway during sleep.  There are a wide range of procedures available to enlarge and/or stabilize the airway to prevent blockage of breathing in the three major areas where it can occur: the palate, tongue, and nasal regions.  Successful surgical treatment depends on the accurate identification of the factors responsible for obstructive sleep apnea in each person.  A personalized approach is required because there is no single treatment that works well for everyone.  Because of anatomic variation, consultation with an examination by a sleep surgeon is a critical first step in determining what surgical options are best for each patient.  In some cases, examination during sedation may be recommended in order to guide the selection of procedures.  Patients will be counseled about risks and benefits as well as the typical recovery course after surgery. Surgery is typically  not a cure for a person s SYLVIE.  However, surgery will often significantly improve one s SYLVIE severity (termed  success rate ).  Even in the absence of a cure, surgery will decrease the cardiovascular risk associated with OSA7; improve overall quality of life8 (sleepiness, functionality, sleep quality, etc).  Palate Procedures:  Patients with SYLVIE often have narrowing of their airway in the region of their tonsils and uvula.  The goals of palate procedures are to widen the airway in this region as well as to help the tissues resist collapse.  Modern palate procedure techniques focus on tissue conservation and soft tissue rearrangement, rather than tissue removal.  Often the uvula is preserved in this procedure. Residual sleep apnea is common in patient after pharyngoplasty with an average reduction in sleep apnea events of 33%2.    Tongue Procedures:  ExamWhile patients are awake, the muscles that surround the throat are active and keep this region open for breathing. These muscles relax during sleep, allowing the tongue and other structures to collapse and block breathing.  There are several different tongue procedures available.  Selection of a tongue base procedure depends on characteristics seen on physical exam.  Generally, procedures are aimed at removing bulky tissues in this area or preventing the back of the tongue from falling back during sleep.  Success rates for tongue surgery range from 50-62%3.  Hypoglossal Nerve Stimulation:  Hypoglossal nerve stimulation has recently received approval from the [place] Food and Drug Administration for the treatment of obstructive sleep apnea.  This is based on research showing that the system was safe and effective in treating sleep apnea6.  Results showed that the median AHI score decreased 68%, from 29.3 to 9.0. This therapy uses an implant system that senses breathing patterns and delivers mild stimulation to airway muscles, which keeps the airway open during  sleep.  The system consists of three fully implanted components: a small generator (similar in size to a pacemaker), a breathing sensor, and a stimulation lead.  Using a small handheld remote, a patient turns the therapy on before bed and off upon awakening.    Candidates for this device must be greater than 22 years of age, have moderate to severe SYLVIE (AHI between 20-65), BMI less than 32, have tried CPAP/oral appliance without success, and have appropriate upper airway anatomy (determined by a sleep endoscopy performed by Dr. Faith).  Hypoglossal Nerve Stimulation Pathway:    The sleep surgeon s office will work with the patient through the insurance prior-authorization process (including communications and appeals).    Nasal Procedures:  Nasal obstruction can interfere with nasal breathing during the day and night.  Studies have shown that relief of nasal obstruction can improve the ability of some patients to tolerate positive airway pressure therapy for obstructive sleep apnea1.  Treatment options include medications such as nasal saline, topical corticosteroid and antihistamine sprays, and oral medications such as antihistamines or decongestants. Non-surgical treatments can include external nasal dilators for selected patients. If these are not successful by themselves, surgery can improve the nasal airway either alone or in combination with these other options.  Combination Procedures:  Combination of surgical procedures and other treatments may be recommended, particularly if patients have more than one area of narrowing or persistent positional disease.  The success rate of combination surgery ranges from 66-80%2,3.    References  1. Peña JUAREZ. The Role of the Nose in Snoring and Obstructive Sleep Apnoea: An Update.  Eur Arch Otorhinolaryngol. 2011; 268: 1365-73.  2.  Linda SM; Betty JA; Philippe JR; Pallanch JF; Daiana FRYE; Ariane ROMAN; Shanice DAMON. Surgical modifications of the upper airway for obstructive  sleep apnea in adults: a systematic review and meta-analysis. SLEEP 2010;33(10):2576-4459. Sean GUERRA. Hypopharyngeal surgery in obstructive sleep apnea: an evidence-based medicine review.  Arch Otolaryngol Head Neck Surg. 2006 Feb;132(2):206-13.  3. Bjorn YH1, Dayana Y, Reginaldo KOFI. The efficacy of anatomically based multilevel surgery for obstructive sleep apnea. Otolaryngol Head Neck Surg. 2003 Oct;129(4):327-35.  4. Kezirian E, Goldberg A. Hypopharyngeal Surgery in Obstructive Sleep Apnea: An Evidence-Based Medicine Review. Arch Otolaryngol Head Neck Surg. 2006 Feb;132(2):206-13.  5. Addison BRUNSON et al. Upper-Airway Stimulation for Obstructive Sleep Apnea.  N Engl J Med. 2014 Jan 9;370(2):139-49.  6. Carlos Eduardo Y et al. Increased Incidence of Cardiovascular Disease in Middle-aged Men with Obstructive Sleep Apnea. Am J Respir Crit Care Med; 2002 166: 159-165  7. Latasha TEJADA et al. Studying Life Effects and Effectiveness of Palatopharyngoplasty (SLEEP) study: Subjective Outcomes of Isolated Uvulopalatopharyngoplasty. Otolaryngol Head Neck Surg. 2011; 144: 623-631.

## 2018-11-14 ENCOUNTER — OFFICE VISIT (OUTPATIENT)
Dept: CARDIOLOGY | Facility: CLINIC | Age: 83
End: 2018-11-14
Attending: INTERNAL MEDICINE
Payer: COMMERCIAL

## 2018-11-14 VITALS
WEIGHT: 173.2 LBS | DIASTOLIC BLOOD PRESSURE: 80 MMHG | OXYGEN SATURATION: 95 % | BODY MASS INDEX: 30.69 KG/M2 | SYSTOLIC BLOOD PRESSURE: 128 MMHG | HEIGHT: 63 IN | HEART RATE: 85 BPM

## 2018-11-14 DIAGNOSIS — R00.0 TACHYCARDIA: ICD-10-CM

## 2018-11-14 DIAGNOSIS — Z95.0 CARDIAC PACEMAKER IN SITU: ICD-10-CM

## 2018-11-14 DIAGNOSIS — I49.5 SSS (SICK SINUS SYNDROME) (H): ICD-10-CM

## 2018-11-14 DIAGNOSIS — Z95.0 CARDIAC PACEMAKER IN SITU: Primary | ICD-10-CM

## 2018-11-14 DIAGNOSIS — E11.9 TYPE 2 DIABETES MELLITUS WITHOUT COMPLICATION, WITHOUT LONG-TERM CURRENT USE OF INSULIN (H): ICD-10-CM

## 2018-11-14 DIAGNOSIS — I50.32 CHRONIC DIASTOLIC CONGESTIVE HEART FAILURE (H): Primary | ICD-10-CM

## 2018-11-14 DIAGNOSIS — I10 BENIGN ESSENTIAL HYPERTENSION: ICD-10-CM

## 2018-11-14 DIAGNOSIS — I48.0 PAROXYSMAL ATRIAL FIBRILLATION (H): ICD-10-CM

## 2018-11-14 LAB
ANION GAP SERPL CALCULATED.3IONS-SCNC: 10.6 MMOL/L (ref 6–17)
BUN SERPL-MCNC: 18 MG/DL (ref 7–30)
CALCIUM SERPL-MCNC: 9.6 MG/DL (ref 8.5–10.5)
CHLORIDE SERPL-SCNC: 97 MMOL/L (ref 98–107)
CO2 SERPL-SCNC: 28 MMOL/L (ref 23–29)
CREAT SERPL-MCNC: 1.08 MG/DL (ref 0.7–1.3)
GFR SERPL CREATININE-BSD FRML MDRD: 48 ML/MIN/1.7M2
GLUCOSE SERPL-MCNC: 308 MG/DL (ref 70–105)
POTASSIUM SERPL-SCNC: 4.6 MMOL/L (ref 3.5–5.1)
SODIUM SERPL-SCNC: 131 MMOL/L (ref 136–145)

## 2018-11-14 PROCEDURE — 36415 COLL VENOUS BLD VENIPUNCTURE: CPT | Performed by: INTERNAL MEDICINE

## 2018-11-14 PROCEDURE — 80048 BASIC METABOLIC PNL TOTAL CA: CPT | Performed by: INTERNAL MEDICINE

## 2018-11-14 PROCEDURE — 99214 OFFICE O/P EST MOD 30 MIN: CPT | Mod: 25 | Performed by: INTERNAL MEDICINE

## 2018-11-14 PROCEDURE — 93280 PM DEVICE PROGR EVAL DUAL: CPT | Performed by: INTERNAL MEDICINE

## 2018-11-14 RX ORDER — METOPROLOL SUCCINATE 100 MG/1
100 TABLET, EXTENDED RELEASE ORAL 2 TIMES DAILY
Qty: 180 TABLET | Refills: 3 | Status: SHIPPED | OUTPATIENT
Start: 2018-11-14 | End: 2019-09-20

## 2018-11-14 NOTE — LETTER
11/14/2018    Halley Huff MD  [address]  [facility] [address]    RE: Shelly Rogers       Dear Colleague,    I had the pleasure of seeing Shelly Rogers in the [facility].    HPI and Plan:   See dictation    Orders Placed This Encounter   Procedures     Basic metabolic panel     Basic metabolic panel     Follow-Up with [facility] - [place] visit     Follow-Up with [facility] - Return MD visit       Orders Placed This Encounter   Medications     metoprolol succinate (TOPROL-XL) 100 MG 24 hr tablet     Sig: Take 1 tablet (100 mg) by mouth 2 times daily     Dispense:  180 tablet     Refill:  3       Medications Discontinued During This Encounter   Medication Reason     metoprolol succinate (TOPROL-XL) 50 MG 24 hr tablet Reorder         Encounter Diagnoses   Name Primary?     Chronic diastolic congestive heart failure (H) Yes     Paroxysmal atrial fibrillation (H)      Tachycardia      Type 2 diabetes mellitus without complication, without long-term current use of insulin (H)      SSS (sick sinus syndrome) (H)      Cardiac pacemaker in situ      Benign essential hypertension        CURRENT MEDICATIONS:  Current Outpatient Prescriptions   Medication Sig Dispense Refill     ACETAMINOPHEN PO Take 500 mg by mouth 3 times daily as needed (Takes at least 6 hours apart).        diltiazem (DILACOR XR) 120 MG 24 hr capsule Take 1 capsule (120 mg) by mouth At Bedtime 30 capsule 0     furosemide (LASIX) 20 MG tablet Take 1 tablet (20 mg) by mouth daily 90 tablet 3     glimepiride (AMARYL) 2 MG tablet Take 1 tablet (2 mg) by mouth 2 times daily 180 tablet 3     levothyroxine (SYNTHROID/LEVOTHROID) 88 MCG tablet Take 1 tablet (88 mcg) by mouth daily 90 tablet 1     metFORMIN (GLUCOPHAGE) 500 MG tablet Take 1 tablet (500 mg) by mouth 2 times daily (with meals) 180 tablet 3     metoprolol succinate (TOPROL-XL) 100 MG 24 hr tablet Take 1 tablet (100 mg) by mouth 2 times daily  180 tablet 3     mirtazapine (REMERON) 7.5 MG TABS tablet Take 1 tablet (7.5 mg) by mouth At Bedtime 30 tablet 0     multivitamin  with lutein (OCUVITE WITH LTEIN) CAPS per capsule Take 1 capsule by mouth daily       OLANZapine (ZYPREXA) 2.5 MG tablet Take 1 tablet (2.5 mg) by mouth daily as needed (insomnia, agitation) 30 tablet 1     spironolactone (ALDACTONE) 25 MG tablet Take 1 tablet (25 mg) by mouth daily 30 tablet 1     triamcinolone (NASACORT ALLERGY 24HR) 55 MCG/ACT nasal inhaler Spray 2 sprays into both nostrils daily as needed (during allergy season)        warfarin (COUMADIN) 5 MG tablet TAKE 1/2 TO 1 TABLET BY MOUTH DAILY OR AS DIRECTED BY INR CLINIC (Patient taking differently: Take 2.5-5 mg by mouth daily TAKE 1/2 TO 1 TABLET BY MOUTH DAILY OR AS DIRECTED BY INR CLINIC) 90 tablet 3     ACE/ARB NOT PRESCRIBED, INTENTIONAL, ACE & ARB not prescribed due to Other: HFpEF       ASPIRIN NOT PRESCRIBED, INTENTIONAL, Antiplatelet medication not prescribed intentionally due to Current anticoagulant therapy (warfarin/enoxaparin) 0 each 0     blood glucose (ACCU-CHEK SMARTVIEW) test strip 1 strip by In Vitro route daily 1 Box prn     blood glucose (NO BRAND SPECIFIED) lancets standard Use to test blood sugar 1 times daily or as directed. 100 each 11     blood glucose monitoring (ACCU-CHEK FASTCLIX) lancets Use to check blood sugars daily 1 Box prn     blood glucose monitoring (NO BRAND SPECIFIED) meter device kit Use to test blood sugar one times daily or as directed. 1 kit 0     blood glucose monitoring (NO BRAND SPECIFIED) test strip Use to test blood sugars one times daily or as directed .has type 2 diabetes and not on insulin 100 strip 1     Blood Glucose Monitoring Suppl (ACCU-CHEK LAKIA SMARTVIEW) W/DEVICE KIT 1 Device daily. 1 kit 0     STATIN NOT PRESCRIBED, INTENTIONAL, continuous prn. Statin not prescribed intentionally due to Other: Not needed, LDL at goal <100mg/dL without therapy 0 each 0      [DISCONTINUED] metoprolol succinate (TOPROL-XL) 50 MG 24 hr tablet Take 1.5 tablets (75 mg) by mouth 2 times daily 180 tablet 3       ALLERGIES     Allergies   Allergen Reactions     Shellfish Allergy Difficulty breathing     Ambien [Zolpidem] Other (See Comments)     Agitation with hallucinations     Cats      Sneezing, watery eyes     Lisinopril Cough     No Clinical Screening - See Comments Other (See Comments)     Pt stated allergic to flowers, pt gets itchy watery eyes and stuffy nose     Seasonal Allergies Itching     Flowers     Sulfa Drugs Hives       PAST MEDICAL HISTORY:  Past Medical History:   Diagnosis Date     Atrial fibrillation (H)     Nl LVEF, s/p ablation      Congestive heart failure, unspecified      Diabetes mellitus (H) 2004     Hemorrhoids     intermittent bleeding     Hypertension      Macular degeneration      OA (osteoarthritis)      Pacemaker 3/2013     Uterine cancer (H)     s/p hyst       PAST SURGICAL HISTORY:  Past Surgical History:   Procedure Laterality Date     C ANESTH,PACEMAKER INSERTION      dual chamber 3/27/13     CHOLECYSTECTOMY  8/2004     GYN SURGERY       HYSTERECTOMY      Ut ca      JOINT REPLACEMENT       KERATOTOMY ARCUATE WITH FEMTOSECOND LASER/IMAGING FOR ATIOL Right 4/11/2017    Procedure: KERATOTOMY ARCUATE WITH FEMTOSECOND LASER/IMAGING FOR ATIOL;  Surgeon: Calvin Dominguez MD;  Location: [facility]     PHACOEMULSIFICATION CLEAR CORNEA WITH STANDARD INTRAOCULAR LENS IMPLANT Right 4/11/2017    Procedure: PHACOEMULSIFICATION CLEAR CORNEA WITH STANDARD INTRAOCULAR LENS IMPLANT;  Surgeon: Calvin Dominguez MD;  Location: [facility]     TKR      bilat       FAMILY HISTORY:  Family History   Problem Relation Age of Onset     GASTROINTESTINAL DISEASE Mother      bowel obstruction     Cardiovascular Father      C.A.D. Father      Cardiovascular Brother      CHF       SOCIAL HISTORY:  Social History     Social History     Marital status: Single     Spouse name: N/A      "Number of children: N/A     Years of education: N/A     Social History Main Topics     Smoking status: Former Smoker     Quit date: 1/1/1990     Smokeless tobacco: Never Used     Alcohol use No     Drug use: No     Sexual activity: Not Currently     Partners: Male     Other Topics Concern     Parent/Sibling W/ Cabg, Mi Or Angioplasty Before 65f 55m? No     Caffeine Concern Yes     daily      Sleep Concern No     Special Diet Yes     low sodium, less leafy greens, low sugar     Exercise No     Seat Belt Yes     Social History Narrative       Review of Systems:  Skin:  Negative       Eyes:  Positive for glasses (macular degeneration) L eye macular degeneration  ENT:  Positive for hearing loss;postnasal drainage    Respiratory:  Positive for cough chronic   Cardiovascular:    Positive for;edema    Gastroenterology: Negative      Genitourinary:  Negative      Musculoskeletal:  Negative      Neurologic:  Positive for numbness or tingling of feet (toes)    Psychiatric:  Positive for sleep disturbances    Heme/Lymph/Imm:  Positive for allergies    Endocrine:  Positive for diabetes;thyroid disorder      Physical Exam:  Vitals: /80  Pulse 85  Ht 1.588 m (5' 2.5\")  Wt 78.6 kg (173 lb 3.2 oz)  SpO2 95%  BMI 31.17 kg/m2    Constitutional:  cooperative, alert and oriented, well developed, well nourished, in no acute distress        Skin:  warm and dry to the touch, no apparent skin lesions or masses noted          Head:  normocephalic, no masses or lesions        Eyes:  pupils equal and round, conjunctivae and lids unremarkable, sclera white, no xanthalasma, EOMS intact, no nystagmus        Lymph:      ENT:  no pallor or cyanosis, dentition good        Neck:  carotid pulses are full and equal bilaterally, JVP normal, no carotid bruit        Respiratory:  normal breath sounds, clear to auscultation, normal A-P diameter, normal symmetry, normal respiratory excursion, no use of accessory muscles         Cardiac:   " irregularly irregular rhythm   no presence of murmur          pulses full and equal, no bruits auscultated                                        GI:  abdomen soft;BS normoactive        Extremities and Muscular Skeletal:  no deformities, clubbing, cyanosis, erythema observed   bilateral LE edema;2+          Neurological:  no gross motor deficits;affect appropriate        Psych:  oriented to time, person and place        CC  Itzel Velazquez MD  [address] [address]                Thank you for allowing me to participate in the care of your patient.      Sincerely,     JENNIFER MIRANDA MD     [facility]    cc:   Itzel Velazquez MD  [address] [address]

## 2018-11-14 NOTE — LETTER
11/14/2018      Halley Huff MD  [address] [facility] [address]  [facility] [address]      RE: Shelly Marcumcker       Dear Colleague,    I had the pleasure of seeing Shelly Rogers in the [facility].    Service Date: 11/14/2018      HISTORY OF PRESENT ILLNESS:  I had the opportunity to see Ms. Shelly Rogers in Cardiology Clinic today at the [facility] in [place] for re-evaluation of chronic diastolic heart failure complicated by paroxysmal atrial fibrillation and sick sinus syndrome requiring permanent pacemaker implantation.  I last saw her in 06/2017.  Unfortunately, she missed her followup visit in the [facility] due to some communication difficulties.  She started having trouble over the late summer and into the fall with worsening lower extremity swelling and shortness of breath.  Eventually, she was hospitalized at [facility] for acute exacerbation of diastolic heart failure between 10/19 and 10/22/2018.  She had a significant right pleural effusion requiring thoracentesis with removal of 900 mL of fluid.  Overall, she was treated with diuretics for a total of 9 pounds of weight loss.  With this, she improved significantly and was breathing normally again at the time of discharge.  She has chronic bilateral lower extremity edema and this had improved back to baseline as well.      She cannot tell me specifically why her heart failure worsened, but she believes that it is probably due to an inappropriately high sodium content in her diet.  She seemed to be taking her medications as prescribed.  I see that she was unable to complete her pacemaker checks for a number of months due to some technical difficulties with the home monitoring device.  Her last check was in 12/2017 and did not show much of any atrial fibrillation.  However, her next check in 10/2017 showed about a 45% burden of atrial fibrillation, frequently with rapid ventricular  response.  It was presumed that this increase in atrial fibrillation and increase in heart rate probably contributed to her heart failure development.      Since her discharge, she has maintained a stable weight.  She is weighing herself on a daily basis.  She has been trying to stay away from salt as much as possible and has been feeling well.  Her breathing is better than it was at the time of discharge and she is able to get around without too much difficulty.  She does continue to have some lower extremity edema, but is back to using her lower extremity compression stockings.  Overall, she is pleased with her current progress.      At the last visit, her Lasix was decreased to 20 mg a day and spironolactone 25 mg a day was added to her program.  Her creatinine is stable at 1.08, BUN 18, and potassium 4.6.      I reviewed her latest pacemaker check which was done today before my visit with her, she continues to have fairly rapid heart rates, frequently above 100 beats per minute at least 10-15% of the time.      I also reviewed her latest echocardiogram on 10/19/2018 and she continues to have normal left ventricular size and function with some decrease in right ventricular function and moderate tricuspid regurgitation.  At the time of the echo, her atrial fibrillation was somewhat rapid.        On examination here today, her blood pressure was 128/80, heart rate 85 and weight 173 pounds which is 1 pound less than 2 weeks ago when she was here last.  Her lungs are clear.  Her heart rhythm is irregular without significant cardiac murmur.  She has 2+ bilateral lower extremity edema with compression stockings in place.      IMPRESSIONS:  Ms. Shelly Rogers is an 85-year-old woman with chronic diastolic heart failure and recent acute exacerbation requiring hospitalization due to shortness of breath.  She is doing very well now after discharge since she was treated with IV diuretics for a 9 pound weight loss in  combination with a thoracentesis of nearly 1 liter of fluid.      She is doing a much better job of staying away from salt in her diet.  Her atrial fibrillation was rapid prior to her hospitalization and much more frequent than it had been in the past.  She is still having some rapid heart beating issues.  I will increase her metoprolol succinate to 100 mg p.o. b.i.d. from 75 b.i.d.  She will continue diltiazem 120 mg daily as well.  I will make no other medication changes and have her continue to follow up with us in the [facility] in 3 months.      cc:   Halley Huff MD   [address]  [address]         JENNIFER MIRANDA MD, [facility]             D: 2018   T: 2018   MT: MARCO      Name:     GABBY OLIVA   MRN:      [id number]        Account:      [id number]   :      1933           Service Date: 2018      Document: [id number]         Outpatient Encounter Prescriptions as of 2018   Medication Sig Dispense Refill     ACETAMINOPHEN PO Take 500 mg by mouth 3 times daily as needed (Takes at least 6 hours apart).        diltiazem (DILACOR XR) 120 MG 24 hr capsule Take 1 capsule (120 mg) by mouth At Bedtime 30 capsule 0     furosemide (LASIX) 20 MG tablet Take 1 tablet (20 mg) by mouth daily 90 tablet 3     glimepiride (AMARYL) 2 MG tablet Take 1 tablet (2 mg) by mouth 2 times daily 180 tablet 3     levothyroxine (SYNTHROID/LEVOTHROID) 88 MCG tablet Take 1 tablet (88 mcg) by mouth daily 90 tablet 1     metFORMIN (GLUCOPHAGE) 500 MG tablet Take 1 tablet (500 mg) by mouth 2 times daily (with meals) 180 tablet 3     metoprolol succinate (TOPROL-XL) 100 MG 24 hr tablet Take 1 tablet (100 mg) by mouth 2 times daily 180 tablet 3     mirtazapine (REMERON) 7.5 MG TABS tablet Take 1 tablet (7.5 mg) by mouth At Bedtime 30 tablet 0     multivitamin  with lutein (OCUVITE WITH LTEIN) CAPS per capsule Take 1 capsule by mouth daily        OLANZapine (ZYPREXA) 2.5 MG tablet Take 1 tablet (2.5 mg) by mouth daily as needed (insomnia, agitation) 30 tablet 1     spironolactone (ALDACTONE) 25 MG tablet Take 1 tablet (25 mg) by mouth daily 30 tablet 1     triamcinolone (NASACORT ALLERGY 24HR) 55 MCG/ACT nasal inhaler Spray 2 sprays into both nostrils daily as needed (during allergy season)        warfarin (COUMADIN) 5 MG tablet TAKE 1/2 TO 1 TABLET BY MOUTH DAILY OR AS DIRECTED BY INR CLINIC (Patient taking differently: Take 2.5-5 mg by mouth daily TAKE 1/2 TO 1 TABLET BY MOUTH DAILY OR AS DIRECTED BY INR CLINIC) 90 tablet 3     ACE/ARB NOT PRESCRIBED, INTENTIONAL, ACE & ARB not prescribed due to Other: HFpEF       ASPIRIN NOT PRESCRIBED, INTENTIONAL, Antiplatelet medication not prescribed intentionally due to Current anticoagulant therapy (warfarin/enoxaparin) 0 each 0     blood glucose (ACCU-CHEK SMARTVIEW) test strip 1 strip by In Vitro route daily 1 Box prn     blood glucose (NO BRAND SPECIFIED) lancets standard Use to test blood sugar 1 times daily or as directed. 100 each 11     blood glucose monitoring (ACCU-CHEK FASTCLIX) lancets Use to check blood sugars daily 1 Box prn     blood glucose monitoring (NO BRAND SPECIFIED) meter device kit Use to test blood sugar one times daily or as directed. 1 kit 0     blood glucose monitoring (NO BRAND SPECIFIED) test strip Use to test blood sugars one times daily or as directed .has type 2 diabetes and not on insulin 100 strip 1     Blood Glucose Monitoring Suppl (ACCU-CHEK LAKIA SMARTVIEW) W/DEVICE KIT 1 Device daily. 1 kit 0     STATIN NOT PRESCRIBED, INTENTIONAL, continuous prn. Statin not prescribed intentionally due to Other: Not needed, LDL at goal <100mg/dL without therapy 0 each 0     [DISCONTINUED] metoprolol succinate (TOPROL-XL) 50 MG 24 hr tablet Take 1.5 tablets (75 mg) by mouth 2 times daily 180 tablet 3     No facility-administered encounter medications on file as of 11/14/2018.        Again, thank  you for allowing me to participate in the care of your patient.      Sincerely,    JENNIFER MIRANDA MD     [facility]

## 2018-11-14 NOTE — MR AVS SNAPSHOT
After Visit Summary   11/14/2018    Shelly Rogers    MRN: [id number]           Patient Information     Date Of Birth          1/18/1933        Visit Information        Provider Department      11/14/2018 1:30 PM Galindo Del Valle MD [facility]        Today's Diagnoses     Chronic diastolic congestive heart failure (H)    -  1    Paroxysmal atrial fibrillation (H)        Tachycardia        Type 2 diabetes mellitus without complication, without long-term current use of insulin (H)        SSS (sick sinus syndrome) (H)        Cardiac pacemaker in situ        Benign essential hypertension          Care Instructions    Call the [facility]  if your weight goes up by 3 lbs in a day or 5 lbs in a week.     Keep staying away from the salt.     Increase the Metoprolol XL to 100 mg twice daily.             Follow-ups after your visit        Additional Services     Follow-Up with [facility] - [place] visit           Follow-Up with [facility] - Return MD visit                 Your next 10 appointments already scheduled     Nov 28, 2018  3:30 PM CST   Anticoagulation Visit with  ANTICOAGULATION CLINIC   [facility] ([facility])    [address]  [place] [address] [phone]            Dec 17, 2018  8:30 PM CST   PSG Split with BED 2 SH SLEEP   [facility] ([facility])    [address]  [place] [id number] [phone]            Contreras 10, 2019  9:30 AM CST   Return Sleep Patient with Bennett Ezra Goltz, PA-C   [facility] ([facility])    [address]  [place] [id number] [phone]            Feb 26, 2019  3:30 PM CST   Remote PPM Check with LEVY TECH1   [facility] ([facility])    [address]  [place] [id number] [phone] OPT 2           This  "appointment is for a remote check of your pacemaker.  This is not an appointment at the office.              Future tests that were ordered for you today     Open Future Orders        Priority Expected Expires Ordered    Follow-Up with [facility] - [place] visit Routine 2/12/2019 11/14/2019 11/14/2018    Follow-Up with [facility] - Return MD visit Routine 5/13/2019 11/14/2019 11/14/2018    Basic metabolic panel Routine 2/12/2019 11/14/2019 11/14/2018    Basic metabolic panel Routine 5/13/2019 11/14/2019 11/14/2018            Who to contact     If you have questions or need follow up information about today's clinic visit or your schedule please contact [facility] directly at [phone].  Normal or non-critical lab and imaging results will be communicated to you by MyChart, letter or phone within 4 business days after the clinic has received the results. If you do not hear from us within 7 days, please contact the clinic through MyChart or phone. If you have a critical or abnormal lab result, we will notify you by phone as soon as possible.  Submit refill requests through [facility] or call your pharmacy and they will forward the refill request to us. Please allow 3 business days for your refill to be completed.          Additional Information About Your Visit        Care EveryWhere ID     This is your Care EveryWhere ID. This could be used by other organizations to access your [place] medical records  [id number]        Your Vitals Were     Pulse Height Pulse Oximetry BMI (Body Mass Index)          85 1.588 m (5' 2.5\") 95% 31.17 kg/m2         Blood Pressure from Last 3 Encounters:   11/14/18 143/79   11/12/18 117/79   10/31/18 112/68    Weight from Last 3 Encounters:   11/14/18 78.6 kg (173 lb 3.2 oz)   11/12/18 77.8 kg (171 lb 8 oz)   10/31/18 78.9 kg (174 lb)              We Performed the Following     Follow-Up with cardiac sub-specialty clinic          Today's Medication " Changes          These changes are accurate as of 11/14/18  2:06 PM.  If you have any questions, ask your nurse or doctor.               These medicines have changed or have updated prescriptions.        Dose/Directions    metoprolol succinate 100 MG 24 hr tablet   Commonly known as:  TOPROL-XL   This may have changed:    - medication strength  - how much to take   Used for:  Chronic diastolic congestive heart failure (H)   Changed by:  Galindo Del Valle MD        Dose:  100 mg   Take 1 tablet (100 mg) by mouth 2 times daily   Quantity:  180 tablet   Refills:  3       warfarin 5 MG tablet   Commonly known as:  COUMADIN   This may have changed:    - how much to take  - how to take this  - when to take this  - additional instructions   Used for:  Paroxysmal atrial fibrillation (H)        TAKE 1/2 TO 1 TABLET BY MOUTH DAILY OR AS DIRECTED BY INR CLINIC   Quantity:  90 tablet   Refills:  3            Where to get your medicines      These medications were sent to [facility] - [place] - [address] [facility] [address]  [facility] [facility] [address] [place] [address]     Phone:  [phone]     metoprolol succinate 100 MG 24 hr tablet                Primary Care Provider Office Phone # Fax #    Halley Huff [phone] [address]  [place]                [place] [address]        Equal Access to Services     [facility] AH: Hadii aad ku hadasho Sharri, waaxda luqadaha, qaybta kaalmada adeegyada, waxay madi haykamari marie . So [facility] [phone].    ATENCIÓN: Si habla español, tiene a quinones disposición servicios gratuitos de asistencia lingüística. Llame al [phone].    We comply with applicable federal civil rights laws and [place] laws. We do not discriminate on the basis of race, color, national origin, age, disability, sex, sexual orientation, or gender identity.            Thank you!     Thank you for choosing [facility]  [facility]   [place]  for your care. Our goal is always to provide you with excellent care. Hearing back from our patients is one way we can continue to improve our services. Please take a few minutes to complete the written survey that you may receive in the mail after your visit with us. Thank you!             Your Updated Medication List - Protect others around you: Learn how to safely use, store and throw away your medicines at www.disposemymeds.org.          This list is accurate as of 11/14/18  2:06 PM.  Always use your most recent med list.                   Brand Name Dispense Instructions for use Diagnosis    ACE/ARB/ARNI NOT PRESCRIBED (INTENTIONAL)      ACE & ARB not prescribed due to Other: HFpEF    Chronic diastolic congestive heart failure (H)       ACETAMINOPHEN PO      Take 500 mg by mouth 3 times daily as needed (Takes at least 6 hours apart).        ASPIRIN NOT PRESCRIBED    INTENTIONAL    0 each    Antiplatelet medication not prescribed intentionally due to Current anticoagulant therapy (warfarin/enoxaparin)    Type 2 diabetes mellitus without complications (H)       blood glucose lancets standard    no brand specified    100 each    Use to test blood sugar 1 times daily or as directed.    Type 2 diabetes mellitus without complication, without long-term current use of insulin (H)       blood glucose monitoring lancets     1 Box    Use to check blood sugars daily    Type 2 diabetes, HbA1c goal < 7% (H)       * blood glucose monitoring meter device kit     1 kit    1 Device daily.    Type 2 diabetes, HbA1c goal < 7% (H)       * blood glucose monitoring meter device kit    no brand specified    1 kit    Use to test blood sugar one times daily or as directed.    Type 2 diabetes mellitus without complication, without long-term current use of insulin (H)       * blood glucose monitoring test strip    ACCU-CHEK SMARTVIEW    1 Box    1 strip by In Vitro route daily    Type 2 diabetes, HbA1c goal < 7% (H)        * blood glucose monitoring test strip    no brand specified    100 strip    Use to test blood sugars one times daily or as directed .has type 2 diabetes and not on insulin    Type 2 diabetes mellitus without complication, without long-term current use of insulin (H)       diltiazem 120 MG 24 hr capsule    DILACOR XR    30 capsule    Take 1 capsule (120 mg) by mouth At Bedtime    Chronic atrial fibrillation (H)       furosemide 20 MG tablet    LASIX    90 tablet    Take 1 tablet (20 mg) by mouth daily    Chronic diastolic congestive heart failure (H)       glimepiride 2 MG tablet    AMARYL    180 tablet    Take 1 tablet (2 mg) by mouth 2 times daily    Type 2 diabetes mellitus without complication, without long-term current use of insulin (H)       levothyroxine 88 MCG tablet    SYNTHROID/LEVOTHROID    90 tablet    Take 1 tablet (88 mcg) by mouth daily    Hypothyroidism, unspecified type       metFORMIN 500 MG tablet    GLUCOPHAGE    180 tablet    Take 1 tablet (500 mg) by mouth 2 times daily (with meals)    Type 2 diabetes mellitus without complication, without long-term current use of insulin (H)       metoprolol succinate 100 MG 24 hr tablet    TOPROL-XL    180 tablet    Take 1 tablet (100 mg) by mouth 2 times daily    Chronic diastolic congestive heart failure (H)       mirtazapine 7.5 MG Tabs tablet    REMERON    30 tablet    Take 1 tablet (7.5 mg) by mouth At Bedtime    Adjustment disorder with anxious mood       multivitamin  with lutein Caps per capsule      Take 1 capsule by mouth daily        NASACORT ALLERGY 24HR 55 MCG/ACT inhaler   Generic drug:  triamcinolone      Spray 2 sprays into both nostrils daily as needed (during allergy season)        OLANZapine 2.5 MG tablet    zyPREXA    30 tablet    Take 1 tablet (2.5 mg) by mouth daily as needed (insomnia, agitation)    Adjustment disorder with anxious mood       spironolactone 25 MG tablet    ALDACTONE    30 tablet    Take 1 tablet (25 mg) by mouth  daily        STATIN NOT PRESCRIBED (INTENTIONAL)     0 each    continuous prn. Statin not prescribed intentionally due to Other: Not needed, LDL at goal <100mg/dL without therapy        warfarin 5 MG tablet    COUMADIN    90 tablet    TAKE 1/2 TO 1 TABLET BY MOUTH DAILY OR AS DIRECTED BY INR CLINIC    Paroxysmal atrial fibrillation (H)       * Notice:  This list has 4 medication(s) that are the same as other medications prescribed for you. Read the directions carefully, and ask your doctor or other care provider to review them with you.

## 2018-11-14 NOTE — PROGRESS NOTES
Service Date: 11/14/2018      HISTORY OF PRESENT ILLNESS:  I had the opportunity to see Ms. Shelly Rogers in Cardiology Clinic today at the [facility] in [place] for re-evaluation of chronic diastolic heart failure complicated by paroxysmal atrial fibrillation and sick sinus syndrome requiring permanent pacemaker implantation.  I last saw her in 06/2017.  Unfortunately, she missed her followup visit in the [facility] due to some communication difficulties.  She started having trouble over the late summer and into the fall with worsening lower extremity swelling and shortness of breath.  Eventually, she was hospitalized at [facility] for acute exacerbation of diastolic heart failure between 10/19 and 10/22/2018.  She had a significant right pleural effusion requiring thoracentesis with removal of 900 mL of fluid.  Overall, she was treated with diuretics for a total of 9 pounds of weight loss.  With this, she improved significantly and was breathing normally again at the time of discharge.  She has chronic bilateral lower extremity edema and this had improved back to baseline as well.      She cannot tell me specifically why her heart failure worsened, but she believes that it is probably due to an inappropriately high sodium content in her diet.  She seemed to be taking her medications as prescribed.  I see that she was unable to complete her pacemaker checks for a number of months due to some technical difficulties with the home monitoring device.  Her last check was in 12/2017 and did not show much of any atrial fibrillation.  However, her next check in 10/2017 showed about a 45% burden of atrial fibrillation, frequently with rapid ventricular response.  It was presumed that this increase in atrial fibrillation and increase in heart rate probably contributed to her heart failure development.      Since her discharge, she has maintained a stable weight.  She is weighing herself on  a daily basis.  She has been trying to stay away from salt as much as possible and has been feeling well.  Her breathing is better than it was at the time of discharge and she is able to get around without too much difficulty.  She does continue to have some lower extremity edema, but is back to using her lower extremity compression stockings.  Overall, she is pleased with her current progress.      At the last visit, her Lasix was decreased to 20 mg a day and spironolactone 25 mg a day was added to her program.  Her creatinine is stable at 1.08, BUN 18, and potassium 4.6.      I reviewed her latest pacemaker check which was done today before my visit with her, she continues to have fairly rapid heart rates, frequently above 100 beats per minute at least 10-15% of the time.      I also reviewed her latest echocardiogram on 10/19/2018 and she continues to have normal left ventricular size and function with some decrease in right ventricular function and moderate tricuspid regurgitation.  At the time of the echo, her atrial fibrillation was somewhat rapid.        On examination here today, her blood pressure was 128/80, heart rate 85 and weight 173 pounds which is 1 pound less than 2 weeks ago when she was here last.  Her lungs are clear.  Her heart rhythm is irregular without significant cardiac murmur.  She has 2+ bilateral lower extremity edema with compression stockings in place.      IMPRESSIONS:  Ms. Shelly Rogers is an 85-year-old woman with chronic diastolic heart failure and recent acute exacerbation requiring hospitalization due to shortness of breath.  She is doing very well now after discharge since she was treated with IV diuretics for a 9 pound weight loss in combination with a thoracentesis of nearly 1 liter of fluid.      She is doing a much better job of staying away from salt in her diet.  Her atrial fibrillation was rapid prior to her hospitalization and much more frequent than it had been in the  past.  She is still having some rapid heart beating issues.  I will increase her metoprolol succinate to 100 mg p.o. b.i.d. from 75 b.i.d.  She will continue diltiazem 120 mg daily as well.  I will make no other medication changes and have her continue to follow up with us in the [facility] Clinic in 3 months.      cc:   [place] [address]         JENNIFER MIRANDA MD, [facility]             D: 2018   T: 2018   MT: MARCO      Name:     GABBY OLIVA   MRN:      [id number]        Account:      [id number]   :      1933           Service Date: 2018      Document: [id number]

## 2018-11-14 NOTE — PATIENT INSTRUCTIONS
Call the [facility]  if your weight goes up by 3 lbs in a day or 5 lbs in a week.     Keep staying away from the salt.     Increase the Metoprolol XL to 100 mg twice daily.

## 2018-11-14 NOTE — PROGRESS NOTES
HPI and Plan:   See dictation    Orders Placed This Encounter   Procedures     Basic metabolic panel     Basic metabolic panel     Follow-Up with [facility] - [place] visit     Follow-Up with [facility] - Return MD visit       Orders Placed This Encounter   Medications     metoprolol succinate (TOPROL-XL) 100 MG 24 hr tablet     Sig: Take 1 tablet (100 mg) by mouth 2 times daily     Dispense:  180 tablet     Refill:  3       Medications Discontinued During This Encounter   Medication Reason     metoprolol succinate (TOPROL-XL) 50 MG 24 hr tablet Reorder         Encounter Diagnoses   Name Primary?     Chronic diastolic congestive heart failure (H) Yes     Paroxysmal atrial fibrillation (H)      Tachycardia      Type 2 diabetes mellitus without complication, without long-term current use of insulin (H)      SSS (sick sinus syndrome) (H)      Cardiac pacemaker in situ      Benign essential hypertension        CURRENT MEDICATIONS:  Current Outpatient Prescriptions   Medication Sig Dispense Refill     ACETAMINOPHEN PO Take 500 mg by mouth 3 times daily as needed (Takes at least 6 hours apart).        diltiazem (DILACOR XR) 120 MG 24 hr capsule Take 1 capsule (120 mg) by mouth At Bedtime 30 capsule 0     furosemide (LASIX) 20 MG tablet Take 1 tablet (20 mg) by mouth daily 90 tablet 3     glimepiride (AMARYL) 2 MG tablet Take 1 tablet (2 mg) by mouth 2 times daily 180 tablet 3     levothyroxine (SYNTHROID/LEVOTHROID) 88 MCG tablet Take 1 tablet (88 mcg) by mouth daily 90 tablet 1     metFORMIN (GLUCOPHAGE) 500 MG tablet Take 1 tablet (500 mg) by mouth 2 times daily (with meals) 180 tablet 3     metoprolol succinate (TOPROL-XL) 100 MG 24 hr tablet Take 1 tablet (100 mg) by mouth 2 times daily 180 tablet 3     mirtazapine (REMERON) 7.5 MG TABS tablet Take 1 tablet (7.5 mg) by mouth At Bedtime 30 tablet 0     multivitamin  with lutein (OCUVITE WITH LTEIN) CAPS per capsule Take 1 capsule by mouth daily       OLANZapine (ZYPREXA)  2.5 MG tablet Take 1 tablet (2.5 mg) by mouth daily as needed (insomnia, agitation) 30 tablet 1     spironolactone (ALDACTONE) 25 MG tablet Take 1 tablet (25 mg) by mouth daily 30 tablet 1     triamcinolone (NASACORT ALLERGY 24HR) 55 MCG/ACT nasal inhaler Spray 2 sprays into both nostrils daily as needed (during allergy season)        warfarin (COUMADIN) 5 MG tablet TAKE 1/2 TO 1 TABLET BY MOUTH DAILY OR AS DIRECTED BY INR CLINIC (Patient taking differently: Take 2.5-5 mg by mouth daily TAKE 1/2 TO 1 TABLET BY MOUTH DAILY OR AS DIRECTED BY INR CLINIC) 90 tablet 3     ACE/ARB NOT PRESCRIBED, INTENTIONAL, ACE & ARB not prescribed due to Other: HFpEF       ASPIRIN NOT PRESCRIBED, INTENTIONAL, Antiplatelet medication not prescribed intentionally due to Current anticoagulant therapy (warfarin/enoxaparin) 0 each 0     blood glucose (ACCU-CHEK SMARTVIEW) test strip 1 strip by In Vitro route daily 1 Box prn     blood glucose (NO BRAND SPECIFIED) lancets standard Use to test blood sugar 1 times daily or as directed. 100 each 11     blood glucose monitoring (ACCU-CHEK FASTCLIX) lancets Use to check blood sugars daily 1 Box prn     blood glucose monitoring (NO BRAND SPECIFIED) meter device kit Use to test blood sugar one times daily or as directed. 1 kit 0     blood glucose monitoring (NO BRAND SPECIFIED) test strip Use to test blood sugars one times daily or as directed .has type 2 diabetes and not on insulin 100 strip 1     Blood Glucose Monitoring Suppl (ACCU-CHEK [place]AKIA SMARTVIEW) W/DEVICE KIT 1 Device daily. 1 kit 0     STATIN NOT PRESCRIBED, INTENTIONAL, continuous prn. Statin not prescribed intentionally due to Other: Not needed, LDL at goal <100mg/dL without therapy 0 each 0     [DISCONTINUED] metoprolol succinate (TOPROL-XL) 50 MG 24 hr tablet Take 1.5 tablets (75 mg) by mouth 2 times daily 180 tablet 3       ALLERGIES     Allergies   Allergen Reactions     Shellfish Allergy Difficulty breathing     Ambien [Zolpidem]  Other (See Comments)     Agitation with hallucinations     Cats      Sneezing, watery eyes     Lisinopril Cough     No Clinical Screening - See Comments Other (See Comments)     Pt stated allergic to flowers, pt gets itchy watery eyes and stuffy nose     Seasonal Allergies Itching     Flowers     Sulfa Drugs Hives       PAST MEDICAL HISTORY:  Past Medical History:   Diagnosis Date     Atrial fibrillation (H)     Nl LVEF, s/p ablation      Congestive heart failure, unspecified      Diabetes mellitus (H) 2004     Hemorrhoids     intermittent bleeding     Hypertension      Macular degeneration      OA (osteoarthritis)      Pacemaker 3/2013     Uterine cancer (H)     s/p hyst       PAST SURGICAL HISTORY:  Past Surgical History:   Procedure Laterality Date     C ANESTH,PACEMAKER INSERTION      dual chamber 3/27/13     CHOLECYSTECTOMY  8/2004     GYN SURGERY       HYSTERECTOMY      Ut ca      JOINT REPLACEMENT       KERATOTOMY ARCUATE WITH FEMTOSECOND LASER/IMAGING FOR ATIOL Right 4/11/2017    Procedure: KERATOTOMY ARCUATE WITH FEMTOSECOND LASER/IMAGING FOR ATIOL;  Surgeon: Calvin Dominguez MD;  Location: [facility]     PHACOEMULSIFICATION CLEAR CORNEA WITH STANDARD INTRAOCULAR LENS IMPLANT Right 4/11/2017    Procedure: PHACOEMULSIFICATION CLEAR CORNEA WITH STANDARD INTRAOCULAR LENS IMPLANT;  Surgeon: Calvin Dominguez MD;  Location: [facility]     TKR      bilat       FAMILY HISTORY:  Family History   Problem Relation Age of Onset     GASTROINTESTINAL DISEASE Mother      bowel obstruction     Cardiovascular Father      C.A.D. Father      Cardiovascular Brother      CHF       SOCIAL HISTORY:  Social History     Social History     Marital status: Single     Spouse name: N/A     Number of children: N/A     Years of education: N/A     Social History Main Topics     Smoking status: Former Smoker     Quit date: 1/1/1990     Smokeless tobacco: Never Used     Alcohol use No     Drug use: No     Sexual activity: Not Currently  "    Partners: Male     Other Topics Concern     Parent/Sibling W/ Cabg, Mi Or Angioplasty Before 65f 55m? No     Caffeine Concern Yes     daily      Sleep Concern No     Special Diet Yes     low sodium, less leafy greens, low sugar     Exercise No     Seat Belt Yes     Social History Narrative       Review of Systems:  Skin:  Negative       Eyes:  Positive for glasses (macular degeneration) L eye macular degeneration  ENT:  Positive for hearing loss;postnasal drainage    Respiratory:  Positive for cough chronic   Cardiovascular:    Positive for;edema    Gastroenterology: Negative      Genitourinary:  Negative      Musculoskeletal:  Negative      Neurologic:  Positive for numbness or tingling of feet (toes)    Psychiatric:  Positive for sleep disturbances    Heme/Lymph/Imm:  Positive for allergies    Endocrine:  Positive for diabetes;thyroid disorder      Physical Exam:  Vitals: /80  Pulse 85  Ht 1.588 m (5' 2.5\")  Wt 78.6 kg (173 lb 3.2 oz)  SpO2 95%  BMI 31.17 kg/m2    Constitutional:  cooperative, alert and oriented, well developed, well nourished, in no acute distress        Skin:  warm and dry to the touch, no apparent skin lesions or masses noted          Head:  normocephalic, no masses or lesions        Eyes:  pupils equal and round, conjunctivae and lids unremarkable, sclera white, no xanthalasma, EOMS intact, no nystagmus        Lymph:      ENT:  no pallor or cyanosis, dentition good        Neck:  carotid pulses are full and equal bilaterally, JVP normal, no carotid bruit        Respiratory:  normal breath sounds, clear to auscultation, normal A-P diameter, normal symmetry, normal respiratory excursion, no use of accessory muscles         Cardiac:   irregularly irregular rhythm   no presence of murmur          pulses full and equal, no bruits auscultated                                        GI:  abdomen soft;BS normoactive        Extremities and Muscular Skeletal:  no deformities, clubbing, " cyanosis, erythema observed   bilateral LE edema;2+          Neurological:  no gross motor deficits;affect appropriate        Psych:  oriented to time, person and place        CC  Itzel Velazquez MD  [address] [id number]

## 2018-11-14 NOTE — MR AVS SNAPSHOT
After Visit Summary   11/14/2018    Shelly Rogers    MRN: [id number]           Patient Information     Date Of Birth          1/18/1933        Visit Information        Provider Department      11/14/2018 9:45 AM LEVY DCRN [facility]        Today's Diagnoses     Cardiac pacemaker in situ    -  1    SSS (sick sinus syndrome) (H)           Follow-ups after your visit        Your next 10 appointments already scheduled     Nov 14, 2018 10:30 AM CST   LAB with LEVY LAB   [facility] [place] ([facility])    [address]  [place]  [place] [address]   [phone]           Please do not eat 10-12 hours before your appointment if you are coming in fasting for labs on lipids, cholesterol, or glucose (sugar). This does not apply to pregnant women. Water, hot tea and black coffee (with nothing added) are okay. Do not drink other fluids, diet soda or chew gum.            Nov 14, 2018  1:30 PM CST   Core MD Return with Galindo Del Valle MD   [facility] ([facility])    [address]  [place] [address]   [phone] OPT 2            Nov 28, 2018  3:30 PM CST   Anticoagulation Visit with [facility]    [address]  [place] [address] [phone]            Dec 17, 2018  8:30 PM CST   PSG Split with BED 2 SH SLEEP   [facility] ([facility])    [address]  [facility] [address] [phone]            Contreras 10, 2019  9:30 AM CST   Return Sleep Patient with Bennett Ezra Goltz, PA-C   [place] ([facility])    [address]  [facility] [address]   [phone]            Feb 26, 2019  3:30 PM CST   Remote PPM Check with LEVY TECH1   [facility]    [place] ([facility])    [address]  [place] [id number] [phone] OPT 2           This appointment is for a remote check of your pacemaker.  This is not an appointment at the office.              Who to contact     If you have questions or need follow up information about today's clinic visit or your schedule please contact [facility]   [place] directly at [phone].  Normal or non-critical lab and imaging results will be communicated to you by MyChart, letter or phone within 4 business days after the clinic has received the results. If you do not hear from us within 7 days, please contact the clinic through MyChart or phone. If you have a critical or abnormal lab result, we will notify you by phone as soon as possible.  Submit refill requests through [facility] or call your pharmacy and they will forward the refill request to us. Please allow 3 business days for your refill to be completed.          Additional Information About Your Visit        Care EveryWhere ID     This is your Care EveryWhere ID. This could be used by other organizations to access your [facility] medical records  [id number]         Blood Pressure from Last 3 Encounters:   11/12/18 117/79   10/31/18 112/68   10/26/18 131/75    Weight from Last 3 Encounters:   11/12/18 77.8 kg (171 lb 8 oz)   10/31/18 78.9 kg (174 lb)   10/26/18 81.2 kg (179 lb)              We Performed the Following     Follow-Up with Device Clinic     PM DEVICE PROGRAMMING EVAL, DUAL LEAD PACER ([id number])          Today's Medication Changes          These changes are accurate as of 11/14/18 10:11 AM.  If you have any questions, ask your nurse or doctor.               These medicines have changed or have updated prescriptions.        Dose/Directions    warfarin 5 MG tablet   Commonly known as:  COUMADIN   This may have changed:    - how much to take  - how to take this  - when to take this  - additional instructions    Used for:  Paroxysmal atrial fibrillation (H)        TAKE 1/2 TO 1 TABLET BY MOUTH DAILY OR AS DIRECTED BY INR CLINIC   Quantity:  90 tablet   Refills:  3                Primary Care Provider Office Phone # Fax #    Halley Huff [phone] [address]  [facility] [address]        Equal Access to Services     : Hadii aad ku hadasho Soomaali, waaxda luqadaha, qaybta kaalmada adeegyada, waxay idiin hayaan adeeg kharash la'aan . So [facility] [phone].    ATENCIÓN: Si habla español, tiene a quinones disposición servicios gratuitos de asistencia lingüística. Vickame al [phone].    We comply with applicable federal civil rights laws and [place] laws. We do not discriminate on the basis of race, color, national origin, age, disability, sex, sexual orientation, or gender identity.            Thank you!     Thank you for choosing [facility]  for your care. Our goal is always to provide you with excellent care. Hearing back from our patients is one way we can continue to improve our services. Please take a few minutes to complete the written survey that you may receive in the mail after your visit with us. Thank you!             Your Updated Medication List - Protect others around you: Learn how to safely use, store and throw away your medicines at www.disposemymeds.org.          This list is accurate as of 11/14/18 10:11 AM.  Always use your most recent med list.                   Brand Name Dispense Instructions for use Diagnosis    ACE/ARB/ARNI NOT PRESCRIBED (INTENTIONAL)      ACE & ARB not prescribed due to Other: HFpEF    Chronic diastolic congestive heart failure (H)       ACETAMINOPHEN PO      Take 500 mg by mouth 3 times daily as needed (Takes at least 6 hours apart).        ASPIRIN NOT PRESCRIBED    INTENTIONAL    0 each    Antiplatelet medication not prescribed intentionally due to Current anticoagulant therapy  (warfarin/enoxaparin)    Type 2 diabetes mellitus without complications (H)       blood glucose lancets standard    no brand specified    100 each    Use to test blood sugar 1 times daily or as directed.    Type 2 diabetes mellitus without complication, without long-term current use of insulin (H)       blood glucose monitoring lancets     1 Box    Use to check blood sugars daily    Type 2 diabetes, HbA1c goal < 7% (H)       * blood glucose monitoring meter device kit     1 kit    1 Device daily.    Type 2 diabetes, HbA1c goal < 7% (H)       * blood glucose monitoring meter device kit    no brand specified    1 kit    Use to test blood sugar one times daily or as directed.    Type 2 diabetes mellitus without complication, without long-term current use of insulin (H)       * blood glucose monitoring test strip    ACCU-CHEK SMARTVIEW    1 Box    1 strip by In Vitro route daily    Type 2 diabetes, HbA1c goal < 7% (H)       * blood glucose monitoring test strip    no brand specified    100 strip    Use to test blood sugars one times daily or as directed .has type 2 diabetes and not on insulin    Type 2 diabetes mellitus without complication, without long-term current use of insulin (H)       diltiazem 120 MG 24 hr capsule    DILACOR XR    30 capsule    Take 1 capsule (120 mg) by mouth At Bedtime    Chronic atrial fibrillation (H)       furosemide 20 MG tablet    LASIX    90 tablet    Take 1 tablet (20 mg) by mouth daily    Chronic diastolic congestive heart failure (H)       glimepiride 2 MG tablet    AMARYL    180 tablet    Take 1 tablet (2 mg) by mouth 2 times daily    Type 2 diabetes mellitus without complication, without long-term current use of insulin (H)       levothyroxine 88 MCG tablet    SYNTHROID/LEVOTHROID    90 tablet    Take 1 tablet (88 mcg) by mouth daily    Hypothyroidism, unspecified type       metFORMIN 500 MG tablet    GLUCOPHAGE    180 tablet    Take 1 tablet (500 mg) by mouth 2 times daily (with  meals)    Type 2 diabetes mellitus without complication, without long-term current use of insulin (H)       metoprolol succinate 50 MG 24 hr tablet    TOPROL-XL    180 tablet    Take 1.5 tablets (75 mg) by mouth 2 times daily    Chronic diastolic congestive heart failure (H)       mirtazapine 7.5 MG Tabs tablet    REMERON    30 tablet    Take 1 tablet (7.5 mg) by mouth At Bedtime    Adjustment disorder with anxious mood       multivitamin  with lutein Caps per capsule      Take 1 capsule by mouth daily        NASACORT ALLERGY 24HR 55 MCG/ACT inhaler   Generic drug:  triamcinolone      Spray 2 sprays into both nostrils daily as needed (during allergy season)        OLANZapine 2.5 MG tablet    zyPREXA    30 tablet    Take 1 tablet (2.5 mg) by mouth daily as needed (insomnia, agitation)    Adjustment disorder with anxious mood       spironolactone 25 MG tablet    ALDACTONE    30 tablet    Take 1 tablet (25 mg) by mouth daily        STATIN NOT PRESCRIBED (INTENTIONAL)     0 each    continuous prn. Statin not prescribed intentionally due to Other: Not needed, LDL at goal <100mg/dL without therapy        warfarin 5 MG tablet    COUMADIN    90 tablet    TAKE 1/2 TO 1 TABLET BY MOUTH DAILY OR AS DIRECTED BY INR CLINIC    Paroxysmal atrial fibrillation (H)       * Notice:  This list has 4 medication(s) that are the same as other medications prescribed for you. Read the directions carefully, and ask your doctor or other care provider to review them with you.

## 2018-11-14 NOTE — PROGRESS NOTES
Medtronic Sensia (D) Pacemaker Device Check    AP: 33 % : 3 %  Mode: DDDR         Underlying Rhythm: AF 60s-70s  Heart Rate: adequate variability, ~10-15% of beats greater than 100 bpm   Sensing: A-low V-WNL    Pacing Threshold: A-not obtained V-WNL   Impedance: WNL  Battery Status: estimated 6 years   Device Site: remains well healed   Atrial Arrhythmia: Since 9/2017 shes had 6511 mode switches for a burden of 45%. Denies any symptoms associated with this, remains on warfarin   Ventricular Arrhythmia: 4 HVR - marker only EGMs show brief PAT. One did show more VS than A, though. All episodes less than 10 seconds   Setting Change: no changes made today     Care Plan: Remote on 2/26, she is seeing Dr Del Valle today, too. They will go home and plug in the remote monitor so she doesn't get lost to follow up again. SK

## 2018-11-28 ENCOUNTER — ANTICOAGULATION THERAPY VISIT (OUTPATIENT)
Dept: NURSING | Facility: CLINIC | Age: 83
End: 2018-11-28
Payer: COMMERCIAL

## 2018-11-28 DIAGNOSIS — I48.91 ATRIAL FIBRILLATION (H): ICD-10-CM

## 2018-11-28 LAB — INR POINT OF CARE: 3 (ref 0.86–1.14)

## 2018-11-28 PROCEDURE — 85610 PROTHROMBIN TIME: CPT | Mod: QW

## 2018-11-28 PROCEDURE — 36416 COLLJ CAPILLARY BLOOD SPEC: CPT

## 2018-11-28 PROCEDURE — 99207 ZZC NO CHARGE NURSE ONLY: CPT

## 2018-11-28 NOTE — MR AVS SNAPSHOT
Shellywaldo Rogers   11/28/2018 3:30 PM   Anticoagulation Therapy Visit    Description:  85 year old female   Provider:  [facility]   Department:  [facility] Nurse           INR as of 11/28/2018     Today's INR 3.0      Anticoagulation Summary as of 11/28/2018     INR goal 2.0-3.0   Today's INR 3.0   Full warfarin instructions 5 mg on Mon, Wed; 2.5 mg all other days   Next INR check 12/12/2018    Indications   Long-term (current) use of anticoagulants [Z79.01] [Z79.01]  Atrial fibrillation (H) [I48.91]         Your next Anticoagulation Clinic appointment(s)     Dec 10, 2018  3:00 PM CST   Antico[facility]  [facility] CL[facility]    [address]  [place] [address]   [phone]              Contact Numbers     Clinic Number:         November 2018 Details    Sun Mon Tue Wed Thu Fri Sat         1               2               3                 4               5               6               7               8               9               10                 11               12               13               14               15               16               17                 18               19               20               21               22               23               24                 25               26               27               28      5 mg   See details      29      2.5 mg         30      2.5 mg           Date Details   11/28 This INR check               How to take your warfarin dose     To take:  2.5 mg Take 0.5 of a 5 mg tablet.    To take:  5 mg Take 1 of the 5 mg tablets.           December 2018 Details    Sun Mon Tue Wed Thu Fri Sat           1      2.5 mg           2      2.5 mg         3      5 mg         4      2.5 mg         5      5 mg         6      2.5 mg         7      2.5 mg         8      2.5 mg           9      2.5 mg         10      5 mg         11      2.5 mg         12            13               14                15                 16               17               18               19               20               21               22                 23               24               25               26               27               28               29                 30               31                     Date Details   No additional details    Date of next INR:  12/12/2018         How to take your warfarin dose     To take:  2.5 mg Take 0.5 of a 5 mg tablet.    To take:  5 mg Take 1 of the 5 mg tablets.

## 2018-11-28 NOTE — PROGRESS NOTES
ANTICOAGULATION FOLLOW-UP CLINIC VISIT    Patient Name:  Shelly Rogers  Date:  11/28/2018  Contact Type:  Face to Face    SUBJECTIVE:     Patient Findings     Positives Change in diet/appetite (Lost 20 lb since recent ER visit, fluid and less appetite - can't eat much sugar or sodium so food is less flavorful, discussed non-K+ food flavoring alternatives pt can buy)           OBJECTIVE    INR Protime   Date Value Ref Range Status   11/28/2018 3.0 (A) 0.86 - 1.14 Final       ASSESSMENT / PLAN  INR assessment THER    Recheck INR In: 2 WEEKS    INR Location Clinic      Anticoagulation Summary as of 11/28/2018     INR goal 2.0-3.0   Today's INR 3.0   Warfarin maintenance plan 5 mg (5 mg x 1) on Mon, Wed; 2.5 mg (5 mg x 0.5) all other days   Full warfarin instructions 5 mg on Mon, Wed; 2.5 mg all other days   Weekly warfarin total 22.5 mg   No change documented Odilia Youssef RN   Plan last modified Ciera Batista RN (11/8/2018)   Next INR check 12/12/2018   Priority INR   Target end date     Indications   Long-term (current) use of anticoagulants [Z79.01] [Z79.01]  Atrial fibrillation (H) [I48.91]         Anticoagulation Episode Summary     INR check location     Preferred lab     Send INR reminders to [facility]    Comments       Anticoagulation Care Providers     Provider Role Specialty Phone number    Halley Huff MD Responsible Internal Medicine [phone]            See the Encounter Report to view Anticoagulation Flowsheet and Dosing Calendar (Go to Encounters tab in chart review, and find the Anticoagulation Therapy Visit)    Dosage adjustment made based on physician directed care plan. Pt will continue same maintenance dosing and recheck INR in 2 weeks, sooner if concerns. Has lost 20lb (fluid loss plus weight loss due to decreased appetite with low sodium/low sugar diet (too bland). Discussed non-potassium food flavorings patient can purchase to help with her appetite.    Pt aware if signs of  clotting (pain, tenderness, swelling, color change in leg or arm, SOB) and bleeding occur (blood in stool, urine, large bruising, bleeding gums, nosebleeds) to have INR check sooner. If sx severe report to ER or concerned for stroke call 911. If general questions or concerns arise, call clinic.    Odilia Youssef RN

## 2018-12-10 ENCOUNTER — TELEPHONE (OUTPATIENT)
Dept: FAMILY MEDICINE | Facility: CLINIC | Age: 83
End: 2018-12-10

## 2018-12-10 ENCOUNTER — ANTICOAGULATION THERAPY VISIT (OUTPATIENT)
Dept: NURSING | Facility: CLINIC | Age: 83
End: 2018-12-10
Payer: COMMERCIAL

## 2018-12-10 DIAGNOSIS — I48.91 ATRIAL FIBRILLATION (H): ICD-10-CM

## 2018-12-10 DIAGNOSIS — E03.9 HYPOTHYROIDISM, UNSPECIFIED TYPE: ICD-10-CM

## 2018-12-10 LAB — INR POINT OF CARE: 2.9 (ref 0.86–1.14)

## 2018-12-10 PROCEDURE — 99207 ZZC NO CHARGE NURSE ONLY: CPT

## 2018-12-10 PROCEDURE — 36416 COLLJ CAPILLARY BLOOD SPEC: CPT

## 2018-12-10 PROCEDURE — 85610 PROTHROMBIN TIME: CPT | Mod: QW

## 2018-12-10 NOTE — PROGRESS NOTES
ANTICOAGULATION FOLLOW-UP CLINIC VISIT    Patient Name:  Shelly Rogers  Date:  12/10/2018  Contact Type:  Face to Face    SUBJECTIVE:     Patient Findings     Positives:   No Problem Findings           OBJECTIVE    INR Protime   Date Value Ref Range Status   12/10/2018 2.9 (A) 0.86 - 1.14 Final       ASSESSMENT / PLAN  INR assessment THER    Recheck INR In: 4 WEEKS    INR Location Clinic      Anticoagulation Summary  As of 12/10/2018    INR goal:   2.0-3.0   TTR:   82.9 % (2.5 y)   INR used for dosin.9 (12/10/2018)   Warfarin maintenance plan:   5 mg (5 mg x 1) every Mon, Wed; 2.5 mg (5 mg x 0.5) all other days   Full warfarin instructions:   5 mg every Mon, Wed; 2.5 mg all other days   Weekly warfarin total:   22.5 mg   No change documented:   Odilia Youssef RN   Plan last modified:   Ciera Batista RN (2018)   Next INR check:   2019   Priority:   INR   Target end date:       Indications    Long-term (current) use of anticoagulants [Z79.01] [Z79.01]  Atrial fibrillation (H) [I48.91]             Anticoagulation Episode Summary     INR check location:       Preferred lab:       Send INR reminders to:   [facility]    Comments:         Anticoagulation Care Providers     Provider Role Specialty Phone number    Halley Huff MD [facility] Internal Medicine [phone]            See the Encounter Report to view Anticoagulation Flowsheet and Dosing Calendar (Go to Encounters tab in chart review, and find the Anticoagulation Therapy Visit)    No changes in meds/diet. No missed/extra warfarin doses. No unusual bruising/bleeding or other changes. Pt will continue same warfarin dose and recheck INR in 4 weeks, or sooner if concerns.     Pt aware if signs of clotting (pain, tenderness, swelling, color change in leg or arm, SOB) and bleeding occur (blood in stool, urine, large bruising, bleeding gums, nosebleeds) to have INR check sooner. If sx severe report to ER or concerned for stroke call 911.  If general questions or concerns arise, call clinic.    Odilia Youssef RN

## 2018-12-11 NOTE — TELEPHONE ENCOUNTER
"levothyroxine (SYNTHROID/LEVOTHROID) 88 MCG tablet 90 tablet 1 6/14/2018   Last Written Prescription Date:  6/14/18  Last Fill Quantity: 90,  # refills: 1   Last office visit: 10/26/2018 with prescribing provider:  Daria   Future Office Visit:  none  Requested Prescriptions   Pending Prescriptions Disp Refills     levothyroxine (SYNTHROID/LEVOTHROID) 88 MCG tablet [Pharmacy Med Name: LEVOTHYROXINE 0.088MG (88MCG) TAB] 90 tablet 0     Sig: TAKE 1 TABLET BY MOUTH DAILY    Thyroid Protocol Failed - 12/10/2018 10:14 AM       Failed - Normal TSH on file in past 12 months    Recent Labs   Lab Test 10/19/18  0620   TSH 6.76*             Passed - Patient is 12 years or older       Passed - Recent (12 mo) or future (30 days) visit within the authorizing provider's specialty    Patient had office visit in the last 12 months or has a visit in the next 30 days with authorizing provider or within the authorizing provider's specialty.  See \"Patient Info\" tab in inbasket, or \"Choose Columns\" in Meds & Orders section of the refill encounter.             Passed - No active pregnancy on record    If patient is pregnant or has had a positive pregnancy test, please check TSH.         Passed - No positive pregnancy test in past 12 months    If patient is pregnant or has had a positive pregnancy test, please check TSH.          [facility] Follow-up to PHQ 9/24/2018   PHQ-9 9. Suicide Ideation past 2 weeks Not at all     "

## 2018-12-12 RX ORDER — LEVOTHYROXINE SODIUM 88 UG/1
88 TABLET ORAL DAILY
Qty: 90 TABLET | Refills: 0 | Status: SHIPPED | OUTPATIENT
Start: 2018-12-12 | End: 2019-03-17

## 2018-12-12 NOTE — TELEPHONE ENCOUNTER
TC's    Please call and schedule Lab Only appt at her earlier convenience.    Thank you,  Harrison HENRY RN

## 2018-12-12 NOTE — TELEPHONE ENCOUNTER
Routing refill request to provider for review/approval because:  Labs out of range:    TSH   Date Value Ref Range Status   10/19/2018 6.76 (H) 0.40 - 4.00 mU/L Final     No result notes with most recent lab results.    Not sure if dose was ever changed-or any other adjustments?      Ciera FLEMING RN,BSN

## 2018-12-12 NOTE — TELEPHONE ENCOUNTER
She is due for labs   Last TSH was done in the hospital  Medication approved   But she needs lab appointment at her earliest convenience.  Dr.Nasima Daria MD

## 2018-12-17 ENCOUNTER — THERAPY VISIT (OUTPATIENT)
Dept: SLEEP MEDICINE | Facility: CLINIC | Age: 83
End: 2018-12-17
Payer: COMMERCIAL

## 2018-12-17 DIAGNOSIS — R06.83 SNORING: ICD-10-CM

## 2018-12-17 DIAGNOSIS — I50.32 CHRONIC DIASTOLIC CONGESTIVE HEART FAILURE (H): ICD-10-CM

## 2018-12-17 DIAGNOSIS — G47.50 PARASOMNIA: ICD-10-CM

## 2018-12-17 DIAGNOSIS — I48.0 PAROXYSMAL ATRIAL FIBRILLATION (H): ICD-10-CM

## 2018-12-17 PROCEDURE — 95810 POLYSOM 6/> YRS 4/> PARAM: CPT | Performed by: PSYCHIATRY & NEUROLOGY

## 2018-12-18 NOTE — PROGRESS NOTES
Diagnostic PSG completed per provider order.  Patient did not meet criteria for PAP therapy, as she did not achieve enough sleep to start treatment.

## 2018-12-18 NOTE — PATIENT INSTRUCTIONS
[place] [facility]    1. Your sleep study will be reviewed by a sleep physician within the next few days.     2. Please follow up in the sleep clinic as scheduled, or, make an appointment with your sleep provider to be seen within two weeks to discuss the results of the sleep study.    3. If you have any questions or problems with your treatment plan, please contact your sleep clinic provider at [phone] to further manage your condition.    4. Please review your attached medication list, and, at your follow-up appointment advise your sleep clinic provider about any changes.    5. Go to [email or web address] for more information about your sleep problems.    Madalyn Mccullough, RPSGT  December 18, 2018

## 2018-12-21 LAB — SLPCOMP: NORMAL

## 2018-12-21 NOTE — PROCEDURES
" SLEEP STUDY INTERPRETATION  DIAGNOSTIC POLYSOMNOGRAPHY REPORT      Patient: GABBY OLIVA  YOB: 1933  Study Date: 12/17/2018  MRN: [id number]  Referring Provider: MD Velazquez Molly E.  Ordering Provider: Goltz, PA-C, Bennett    Indications for Polysomnography: The patient is a 85 y old Female who is 5' 2\" and weighs 171.0 lbs. Her BMI is 31.1, Catlin sleepiness scale 10.0 and neck circumference is 38.0 cm. Relevant medical history includes atrial arrhythmia, diastolic heart failure, parasomnias, nightmares. A diagnostic polysomnogram was performed to evaluate for sleep apnea/PLMS/RLS/RBD/CSA/hypoventilation/hypoxemia.    Polysomnogram Data: A full night polysomnogram recorded the standard physiologic parameters including EEG, EOG, EMG, ECG, nasal and oral airflow. Respiratory parameters of chest and abdominal movements were recorded with respiratory inductance plethysmography. Oxygen saturation was recorded by pulse oximetry. Hypopnea scoring rule used: 1B 4%.    Sleep Architecture: Sleep fragmentation  The total recording time of the polysomnogram was 393.4 minutes. The total sleep time was 77.0 minutes. Sleep latency was 150.7 minutes. REM latency was 120.0 minutes. Arousal index was 101.3 arousals per hour. Sleep efficiency was 19.6%. Wake after sleep onset was 164.0 minutes. The patient spent 18.2% of total sleep time in Stage N1, 64.3% in Stage N2, 2.6% in Stage N3, and 14.9% in REM. Time in REM supine was - minutes.    Respiration: Severe sleep disordered breathing best characterized as mixed (combination of both obstructive and central phenomena).  Central events were suggestive of Cheyne Simons periodic breathing consisteint with heart disease. She also had sleep related hypoxemia.  Of note the patient did not have REM supine during the study.      Events ? The polysomnogram revealed a presence of 26 obstructive, 66 central, and 23 mixed apneas resulting in an apnea index of 89.6 events per " hour. There were 2 obstructive hypopneas and 2 central hypopneas resulting in an obstructive hypopnea index of 1.6 and central hypopnea index of 1.6 events per hour. The combined apnea/hypopnea index was 92.7 events per hour (central apnea/hypopnea index was 53.0 events per hour). The REM AHI was 93.9 events per hour. The supine AHI was - events per hour. The RERA index was - events per hour.  The RDI was 92.7 events per hour.    Snoring - was reported as intermittent.    Respiratory rate and pattern - was notable for Cheyne-Simons respiratory rate and pattern.    Sustained Sleep Associated Hypoventilation - Transcutaneous carbon dioxide monitoring was not used.    Sleep Associated Hypoxemia - (Greater than 5 minutes O2 sat at or below 88%) was present. Baseline oxygen saturation was 93.8%. Lowest oxygen saturation was 71.0%. Time spent less than or equal to 88% was 10.1 minutes. Time spent less than or equal to 89% was 12.6 minutes.    Movement Activity:     Periodic Limb Activity - There were 6 PLMs during the entire study. The PLM index was 4.7 movements per hour. The PLM Arousal Index was 3.9 per hour.    REM EMG Activity - Excessive muscle activity was not present in the absence of sleep disordered breathing.    Nocturnal Behavior - Abnormal sleep related behaviors were not noted.    Bruxism - None apparent.    Cardiac Summary: Irregular irregular rhythm with narrow QRS complexes with an absence of P waves consistent with the patients cardiac history of Afib.  Intermittent cam and tachycardia. Occasional wide QRS complexes suggestive of PVC s.  The average pulse rate was 80.8 bpm. The minimum pulse rate was 27.0 bpm while the maximum pulse rate was 113.0 bpm.      Assessment:     Severe sleep disordered breathing best characterized as mixed (combination of both obstructive and central phenomena).  Central events were suggestive of Cheyne Simons periodic breathing consisteint with heart disease. She also had  sleep related hypoxemia.  Of note the patient did not have REM supine during the study.    Recommendations:    Considering poor sleep consolidation recommend repeat polysomnography with full night titration of positive airway pressure therapy for the control of sleep disordered breathing.  o Start with low dose CPAP and (if clinically appropriate) ASV therapy and or supplemental O2.      Advice regarding the risks of drowsy driving.    Suggest optimizing sleep schedule and avoiding sleep deprivation.    Weight management     Diagnostic Code(s): G47.33, G47.36, R06.3, G47.9      Luís Carter MD 12-

## 2019-01-07 ENCOUNTER — DOCUMENTATION ONLY (OUTPATIENT)
Dept: LAB | Facility: CLINIC | Age: 84
End: 2019-01-07

## 2019-01-07 ENCOUNTER — ANTICOAGULATION THERAPY VISIT (OUTPATIENT)
Dept: NURSING | Facility: CLINIC | Age: 84
End: 2019-01-07
Payer: COMMERCIAL

## 2019-01-07 DIAGNOSIS — E03.9 HYPOTHYROIDISM, UNSPECIFIED TYPE: Primary | ICD-10-CM

## 2019-01-07 DIAGNOSIS — E03.9 HYPOTHYROIDISM, UNSPECIFIED TYPE: ICD-10-CM

## 2019-01-07 DIAGNOSIS — I48.91 ATRIAL FIBRILLATION (H): ICD-10-CM

## 2019-01-07 DIAGNOSIS — I50.813 ACUTE ON CHRONIC RIGHT-SIDED CONGESTIVE HEART FAILURE (H): ICD-10-CM

## 2019-01-07 LAB — INR POINT OF CARE: 2.5 (ref 0.86–1.14)

## 2019-01-07 PROCEDURE — 85610 PROTHROMBIN TIME: CPT | Mod: QW

## 2019-01-07 PROCEDURE — 80048 BASIC METABOLIC PNL TOTAL CA: CPT | Performed by: NURSE PRACTITIONER

## 2019-01-07 PROCEDURE — 84439 ASSAY OF FREE THYROXINE: CPT | Performed by: INTERNAL MEDICINE

## 2019-01-07 PROCEDURE — 99207 ZZC NO CHARGE NURSE ONLY: CPT

## 2019-01-07 PROCEDURE — 36416 COLLJ CAPILLARY BLOOD SPEC: CPT

## 2019-01-07 PROCEDURE — 84443 ASSAY THYROID STIM HORMONE: CPT | Performed by: INTERNAL MEDICINE

## 2019-01-07 NOTE — LETTER
[facility]  [address] [facility]  [address]  [place]  [address]  Tel: [phone]    January 8, 2019    Shelly Rogers  [address] [facility]  [facility] [address]        Dear Ms. Rogers,    Abdon Bravo, this result came to me today though I was not the provider who would order it.  Please make certain that whoever needed this test done yesterday gets the results.      If you have any further questions or problems, please contact our office.      Sincerely,    Maryan Reynoso, NP/SML          Enclosure: Lab Results  Results for orders placed or performed in visit on 01/07/19   Basic metabolic panel  (Ca, Cl, CO2, Creat, Gluc, K, Na, BUN)   Result Value Ref Range    Sodium 135 133 - 144 mmol/L    Potassium 4.2 3.4 - 5.3 mmol/L    Chloride 101 94 - 109 mmol/L    Carbon Dioxide 28 20 - 32 mmol/L    Anion Gap 6 3 - 14 mmol/L    Glucose 102 (H) 70 - 99 mg/dL    Urea Nitrogen 18 7 - 30 mg/dL    Creatinine 0.89 0.52 - 1.04 mg/dL    GFR Estimate 59 (L) >60 mL/min/[1.73_m2]    GFR Estimate If Black 68 >60 mL/min/[1.73_m2]    Calcium 8.8 8.5 - 10.1 mg/dL

## 2019-01-07 NOTE — LETTER
[facility]  [address] [place] [address] [address] [facility]  [address]  [place]  [address]  Tel: [phone]    January 15, 2019    Shelly Rogers  [address] [facility] [address]        Dear Ms. Rogers,    This is to inform you regarding your test result.    TSH which is your thyroid hormone is elevated but your free T4 is also elevated  Stay on current dose of levothyroxine and I would like you to come in 2 months and get another level checked  Orders are placed.  please make a lab only appointment  If you have any further questions or problems, please contact our office.      Sincerely,    Halley Huff MD/LIZETH          Enclosure: Lab Results  Results for orders placed or performed in visit on 01/07/19   Basic metabolic panel  (Ca, Cl, CO2, Creat, Gluc, K, Na, BUN)   Result Value Ref Range    Sodium 135 133 - 144 mmol/L    Potassium 4.2 3.4 - 5.3 mmol/L    Chloride 101 94 - 109 mmol/L    Carbon Dioxide 28 20 - 32 mmol/L    Anion Gap 6 3 - 14 mmol/L    Glucose 102 (H) 70 - 99 mg/dL    Urea Nitrogen 18 7 - 30 mg/dL    Creatinine 0.89 0.52 - 1.04 mg/dL    GFR Estimate 59 (L) >60 mL/min/[1.73_m2]    GFR Estimate If Black 68 >60 mL/min/[1.73_m2]    Calcium 8.8 8.5 - 10.1 mg/dL   TSH with free T4 reflex   Result Value Ref Range    TSH 5.89 (H) 0.40 - 4.00 mU/L   T4 free   Result Value Ref Range    T4 Free 1.61 (H) 0.76 - 1.46 ng/dL

## 2019-01-07 NOTE — PROGRESS NOTES
ANTICOAGULATION FOLLOW-UP CLINIC VISIT    Patient Name:  Shelly Rogers  Date:  2019  Contact Type:  Face to Face    SUBJECTIVE:     Patient Findings     Positives:   Other complaints (increased ankle edema - advised follow up with PCP and discussed reduced sodium in diet)           OBJECTIVE    INR Protime   Date Value Ref Range Status   2019 2.5 (A) 0.86 - 1.14 Final       ASSESSMENT / PLAN  INR assessment THER    Recheck INR In: 4 WEEKS    INR Location Clinic      Anticoagulation Summary  As of 2019    INR goal:   2.0-3.0   TTR:   83.4 % (2.6 y)   INR used for dosin.5 (2019)   Warfarin maintenance plan:   5 mg (5 mg x 1) every Mon, Wed; 2.5 mg (5 mg x 0.5) all other days   Full warfarin instructions:   5 mg every Mon, Wed; 2.5 mg all other days   Weekly warfarin total:   22.5 mg   No change documented:   Odilia Youssef RN   Plan last modified:   Ciera Batista RN (2018)   Next INR check:   2019   Priority:   INR   Target end date:       Indications    Long-term (current) use of anticoagulants [Z79.01] [Z79.01]  Atrial fibrillation (H) [I48.91]             Anticoagulation Episode Summary     INR check location:       Preferred lab:       Send INR reminders to:   CS ANTICOAGULATION    Comments:         Anticoagulation Care Providers     Provider Role Specialty Phone number    Halley Huff MD [facility] Internal Medicine [phone]            See the Encounter Report to view Anticoagulation Flowsheet and Dosing Calendar (Go to Encounters tab in chart review, and find the Anticoagulation Therapy Visit)    Pt will continue same warfarin dose and recheck INR in 4 weeks, or sooner if concerns.     Pt aware if signs of clotting (pain, tenderness, swelling, color change in leg or arm, SOB) and bleeding occur (blood in stool, urine, large bruising, bleeding gums, nosebleeds) to have INR check sooner. If sx severe report to ER or concerned for stroke call 911. If general questions  or concerns arise, call clinic.    Odilia Youssef RN

## 2019-01-07 NOTE — LETTER
[facility]  [address] [place] [address] [address] [facility]  [address]  [place]  [address]  Tel: [phone]    January 8, 2019    Shelly Rogers  [address] [facility] [address]        Dear Ms. Rogers,    Basic metabolic panel which includes electrolytes and kidney fucntion is satisfactory .  Your sodium level has improved and is normal now  Your kidney function also improving compared to before.  If you have any further questions or problems, please contact our office.      Sincerely,    Halley Huff MD/LIZETH          Enclosure: Lab Results  Results for orders placed or performed in visit on 01/07/19   Basic metabolic panel  (Ca, Cl, CO2, Creat, Gluc, K, Na, BUN)   Result Value Ref Range    Sodium 135 133 - 144 mmol/L    Potassium 4.2 3.4 - 5.3 mmol/L    Chloride 101 94 - 109 mmol/L    Carbon Dioxide 28 20 - 32 mmol/L    Anion Gap 6 3 - 14 mmol/L    Glucose 102 (H) 70 - 99 mg/dL    Urea Nitrogen 18 7 - 30 mg/dL    Creatinine 0.89 0.52 - 1.04 mg/dL    GFR Estimate 59 (L) >60 mL/min/[1.73_m2]    GFR Estimate If Black 68 >60 mL/min/[1.73_m2]    Calcium 8.8 8.5 - 10.1 mg/dL

## 2019-01-07 NOTE — PROGRESS NOTES
Patient had labs drawn, but requested for a TSH test.  Please place orders for lab.  Thank you lab,            [place]      Physical Exam

## 2019-01-08 LAB
ANION GAP SERPL CALCULATED.3IONS-SCNC: 6 MMOL/L (ref 3–14)
BUN SERPL-MCNC: 18 MG/DL (ref 7–30)
CALCIUM SERPL-MCNC: 8.8 MG/DL (ref 8.5–10.1)
CHLORIDE SERPL-SCNC: 101 MMOL/L (ref 94–109)
CO2 SERPL-SCNC: 28 MMOL/L (ref 20–32)
CREAT SERPL-MCNC: 0.89 MG/DL (ref 0.52–1.04)
GFR SERPL CREATININE-BSD FRML MDRD: 59 ML/MIN/{1.73_M2}
GLUCOSE SERPL-MCNC: 102 MG/DL (ref 70–99)
POTASSIUM SERPL-SCNC: 4.2 MMOL/L (ref 3.4–5.3)
SODIUM SERPL-SCNC: 135 MMOL/L (ref 133–144)
T4 FREE SERPL-MCNC: 1.61 NG/DL (ref 0.76–1.46)
TSH SERPL DL<=0.005 MIU/L-ACNC: 5.89 MU/L (ref 0.4–4)

## 2019-01-08 NOTE — RESULT ENCOUNTER NOTE
Hi Shelly, this result came to me today though I was not the provider who would order it.  Please make certain that whoever needed this test done yesterday gets the results.

## 2019-01-10 ENCOUNTER — OFFICE VISIT (OUTPATIENT)
Dept: SLEEP MEDICINE | Facility: CLINIC | Age: 84
End: 2019-01-10
Payer: COMMERCIAL

## 2019-01-10 VITALS
HEIGHT: 63 IN | RESPIRATION RATE: 16 BRPM | OXYGEN SATURATION: 96 % | DIASTOLIC BLOOD PRESSURE: 80 MMHG | BODY MASS INDEX: 28.62 KG/M2 | SYSTOLIC BLOOD PRESSURE: 126 MMHG | WEIGHT: 161.5 LBS | HEART RATE: 64 BPM

## 2019-01-10 DIAGNOSIS — R06.3 CENTRAL SLEEP APNEA WITH CHEYNE-STOKES RESPIRATION: ICD-10-CM

## 2019-01-10 DIAGNOSIS — G47.33 OBSTRUCTIVE APNEA: Primary | ICD-10-CM

## 2019-01-10 PROCEDURE — 99214 OFFICE O/P EST MOD 30 MIN: CPT | Performed by: PHYSICIAN ASSISTANT

## 2019-01-10 ASSESSMENT — MIFFLIN-ST. JEOR: SCORE: 1138.75

## 2019-01-10 NOTE — PROGRESS NOTES
Sleep Study Follow-Up Visit:    Date on this visit: 1/10/2019    Shelly Rogers comes in today for follow-up of her sleep study done on 12/17/2018 at the [facility] [place] for difficulty sleeping for many years, worse this summer with CHF. Her medical history is significant for paroxysmal atrial fibrillation, pacemaker, chronic diastolic CHF (ECHO: EF 55-60%, E/E' avg 13.2, small pericardial effusion), DM 2, hypothyroidism, uterine cancer. She presents with her daughter.    Sleep latency 150.7 minutes without Ambien.  REM achieved.   REM latency 120 minutes.  Sleep efficiency 19.6%. Total sleep time 77 minutes.    Sleep architecture:  Stage 1, 18.2% (5%), stage 2, 64.3% (45-55%), stage 3, 2.6% (15-20%), stage REM, 14.9% (20-25%).  AHI was 92.7/hr (57.1% centrals-with a Cheyne Simons pattern), with desaturations down to 71%. S/He spent 12.6 minutes below 90% SpO2, 10.1 minutes below 89%. RDI 92.7/hr.  REM RDI 93.9/hr, consistent with severe REM SYLVIE.  Supine RDI N/A.  Periodic Limb Movement Index 4.7/hr, 3.9/hour were associated with arousals.       CPAP titration: PAP was not attempted given low sleep time. These findings were reviewed with the patient.  She tried zolpidem and lorazepam in the hospital. She had hallucinations and delusions on those medications. She has tried mirtazapine and that was tolerated ok. It was not greatly effective. She did not have benefit from melatonin.  She had restless legs that night but rarely has them at home.    She has a lot of nasal congestion, rhinorrhea and cough. She uses a saline nasal spray. She uses Nasacort in the summer.   Her sleep schedule is irregular. She sleeps whenever she is tired. It does not bother her to be awake in the night. She says she has nothing to do. She can't drive or shop. She says she can barely walk (which is an exaggeration). She does not have much she is interested in doing in the day. Her daughter says she was very crabby when she was  retaining fluid in the past and her mood was much better once she was diuresed. She is getting more crabby again, which makes her daughter wonder about her fluid load.   She has a productive cough from an upper respiratory infection. Her weight is 10 pounds lower today than in November. Her swelling is not too bad today per her daughter. She does have 2+ edema with compression stockings today. It was worse a couple of days ago. She does watch her diet.    Past medical/surgical history, family history, social history, medications and allergies were reviewed.      Problem List:  Patient Active Problem List    Diagnosis Date Noted     Heart failure (H) 10/19/2018     Priority: Medium     Long-term (current) use of anticoagulants [Z79.01] 03/28/2016     Priority: Medium     Paroxysmal atrial fibrillation (H) 10/13/2015     Priority: Medium     Chronic diastolic congestive heart failure (H) 10/13/2015     Priority: Medium     Hypothyroidism 10/13/2015     Priority: Medium     Atrial fibrillation (H) 01/21/2014     Priority: Medium     Type 2 diabetes mellitus without complications (H) 01/14/2014     Priority: Medium     DNS (deviated nasal septum) 01/14/2014     Priority: Medium     Uterine cancer (H)      Priority: Medium     s/p hyst       OA (osteoarthritis)      Priority: Medium     Health Care Home 04/19/2013     Priority: Medium     Patient Care Plan      About Me    Patient Name     Date of Birth 1/18/1933 Age 80   Home Phone Moving to [employer] [facility] Cell  Phone Call daughter Sandhya [phone]   Address:  Email address    Insurance  Insurance BEELN ZAPATA      Emergency Contact Sandhya Vanlliger Phone [phone]   Parent/Guardian  Phone      :     My Access Plan    Medical Emergency 832   Primary Clinic Line    24 Hour Appointment Line [phone] or  [phone]) (toll-free)   24 Hour Nurse Line [phone] (toll-free)   Preferred Urgent Care    Preferred Hospital  [facility]   Preferred Pharmacy              My Care Team Members    Care Team Member  Name/Specialty Clinic, Address, Phone, Fax, E-mail Date of release on file   Primary Care Provider:  Dr. Senait [facility]:[place] [id number] [facility]  [address]  [facility] [address] [phone] (phone #)  [phone] (fax #) [place]   Primary Care Coordinator: Myrtle Hutchinson RN, MS  [facility] Care Coordinator  [phone]     [facility]  [address] [facility]  [address]  [facility] [address]  [phone] (phone #)  [phone] (fax #) [place]   For specialists see Care Teams                Health Care Home Complexity Tier:           Care Coordination Start Date: 4/19/2013   Frequency of Care Coordination:monthly   Form Last Updated: 4/19/2013   Patient Care Plan    Patient Acknowledgement of Plan of Care: Yes     Presenting Problem Treatment Plan and Discharge Instructions   CHF  reviwed CHF symptoms and booklet with patient 4/19/2013         The provider seeing you for these problems in the emergency setting may determine that a different course of care is necessary based on the situation.    Team Communication/Sticky Note: (Take items out when done)  Date Noted Care Team Member TO DO/Alerts to be completed                     Health care home/care coordination was discussed with the patient and he/she agrees to participate in care coordination. The patient was introduced to the care coordinator and given a patient packet to review and complete. The care coordinator will contact the patient to start care planning process.  Care coordination start date:  4/19/2013    Frequency of care coordination with care team:  monthly  SELF MANAGEMENT PLAN  Presenting Problem Signs and Symptoms Treatment Plan    chf None currently; but if  SOB, edema  call PCP with wt gain; patient has booklet and understands green, yellow, red zones    diabetes type 2  none currently  monitor glucose every morning; keep a diary  and bring with to PCP visits                   Advanced Care Directives:  on file  Action Plans on File:      Shelly Rogers   [facility] Rec #: [id number]   Health Insurance/Plan:   : 1933   ID: [unfilled]   Primary Care Provider: Vianey Sapp       Date form completed: 2013       Primary Ethnic Culture:  American   Primary Language:   English     needed? No Language: English   Name of preferred :   Phone #:   Preferred Mode of Communication: Phone   Preferred Method of Communication: verbal   Health Care Home Complexity Tier:         Contact Information:   Mother's Name: Data Unavailable   Father's Name: Data Unavailable   Phone: [phone] (home)    Preferred Contact: daughters Sandhya Nanceiger [phone] or Malu Prajapati [phone]   Address:   [address] [place] [address]  [facility] [address]   Siblings/Relatives: daughters    Lives with: currently daughter Sandhya     My Story  I like to be called Shelly  Health Maintenance/Preventative Procedures and Immunizations are addressed in the Health Maintenance List and should be updated  Integrated Medical Plan    Additional Standing Lab Orders (Use Health Maintenance When Possible):        Therapies:  N/A    My Multidisciplinary Care Team Members  Specialty of Care Team Member Name, Clinic, Address, Phone #, Fax #, E-mail   Primary care provider: Vianey Sapp  [address] [place] [address]  [address]  [facility] [address]  [phone] (phone #)  [phone] (fax #)    Specialists with [facility]; see Care Team                    School:  N/A    Care Givers  Type of Care Giver Phone/Fax E-mail   PCA:        Home Health Agency:       Nurse Name:       :       Guardian:         Persons You Can Contact About Me and My Medical Care  Name Relation Phone/Fax E-mail KIRSTIN Date    see above                                                 This care plan is a documentation of the individual s care as directed by  the family, educators, therapists and diverse medical providers.  Contributors to the care plan include the patient, the patient s family and Vianey Sapp and the health care home team at [facility].  Please indicate any changes made to the care of this patient on this care plan and fax it to the care coordinator above.  Thank you for your attention.  Patient: Shelly Rogers__________________  Vianey Sapp___________________  April 19, 2013___________________           CARDIOVASCULAR SCREENING; LDL GOAL LESS THAN 100 04/02/2013     Priority: Medium     CHF (congestive heart failure) (H) 03/19/2013     Priority: Medium     Pacemaker 03/01/2013     Priority: Medium        Impression/Plan:    (G47.33) Obstructive apnea  (primary encounter diagnosis), (R06.3) Central sleep apnea with Cheyne-Simons respiration  Comment: This study showed severe obstructive and central apnea, AHI 92/hr with desaturations to 71%. She only slept 77 minutes and her sleep was very fragmented. Her motivation to use CPAP and treat her sleep is somewhat low.  Plan: Comprehensive Sleep Study        Full night CPAP/ASV titration. She may have some left over mirtazapine which she can bring to the study. They will call me if they cannot find the leftover mirtazapine. We talked about trying to find activities she is interested in during the day to help keep her awake, which should improve her night time sleep.      She will follow up with me in about 2 week(s) after the test.     Twenty-five minutes spent with patient, all of which were spent face-to-face counseling, consulting, coordinating plan of care.      Bennett Goltz, PA-C    CC: MD Halley Barrett MD

## 2019-01-10 NOTE — PATIENT INSTRUCTIONS
Your BMI is Body mass index is 31.17 kg/m .  Weight management is a personal decision.  If you are interested in exploring weight loss strategies, the following discussion covers the approaches that may be successful. Body mass index (BMI) is one way to tell whether you are at a healthy weight, overweight, or obese. It measures your weight in relation to your height.  A BMI of 18.5 to 24.9 is in the healthy range. A person with a BMI of 25 to 29.9 is considered overweight, and someone with a BMI of 30 or greater is considered obese. More than two-thirds of American adults are considered overweight or obese.  Being overweight or obese increases the risk for further weight gain. Excess weight may lead to heart disease and diabetes.  Creating and following plans for healthy eating and physical activity may help you improve your health.  Weight control is part of healthy lifestyle and includes exercise, emotional health, and healthy eating habits. Careful eating habits lifelong are the mainstay of weight control. Though there are significant health benefits from weight loss, long-term weight loss with diet alone may be very difficult to achieve- studies show long-term success with dietary management in less than 10% of people. Attaining a healthy weight may be especially difficult to achieve in those with severe obesity. In some cases, medications, devices and surgical management might be considered.  What can you do?  If you are overweight or obese and are interested in methods for weight loss, you should discuss this with your provider.     Consider reducing daily calorie intake by 500 calories.     Keep a food journal.     Avoiding skipping meals, consider cutting portions instead.    Diet combined with exercise helps maintain muscle while optimizing fat loss. Strength training is particularly important for building and maintaining muscle mass. Exercise helps reduce stress, increase energy, and improves fitness.  Increasing exercise without diet control, however, may not burn enough calories to loose weight.       Start walking three days a week 10-20 minutes at a time    Work towards walking thirty minutes five days a week     Eventually, increase the speed of your walking for 1-2 minutes at time    In addition, we recommend that you review healthy lifestyles and methods for weight loss available through the National Institutes of Health patient information sites:  http://win.niddk.nih.gov/publications/index.htm    And look into health and wellness programs that may be available through your health insurance provider, employer, local community center, or tracee club.

## 2019-01-10 NOTE — NURSING NOTE
"Chief Complaint   Patient presents with     Study Results     PSG f/u       Initial /80   Pulse 64   Resp 16   Ht 1.588 m (5' 2.5\")   SpO2 96%   BMI 31.17 kg/m   Estimated body mass index is 31.17 kg/m  as calculated from the following:    Height as of this encounter: 1.588 m (5' 2.5\").    Weight as of 11/14/18: 78.6 kg (173 lb 3.2 oz).    Medication Reconciliation: complete     ESS   Anat Herrera        "

## 2019-01-14 DIAGNOSIS — E03.9 HYPOTHYROIDISM, UNSPECIFIED TYPE: Primary | ICD-10-CM

## 2019-01-14 NOTE — RESULT ENCOUNTER NOTE
Please notify patient by sending following letter with copy of test results      Nayely Bravo,    This is to inform you regarding your test result.    TSH which is your thyroid hormone is elevated but your free T4 is also elevated  Stay on current dose of levothyroxine and I would like you to come in 2 months and get another level checked  Orders are placed.  please make a lab only appointment    Sincerely,      Dr.Nasima Daria MD,FACP

## 2019-02-04 ENCOUNTER — ANTICOAGULATION THERAPY VISIT (OUTPATIENT)
Dept: NURSING | Facility: CLINIC | Age: 84
End: 2019-02-04
Payer: MEDICARE

## 2019-02-04 DIAGNOSIS — I48.91 ATRIAL FIBRILLATION (H): ICD-10-CM

## 2019-02-04 LAB — INR POINT OF CARE: 1.9 (ref 0.86–1.14)

## 2019-02-04 PROCEDURE — 36416 COLLJ CAPILLARY BLOOD SPEC: CPT

## 2019-02-04 PROCEDURE — 99207 ZZC NO CHARGE NURSE ONLY: CPT

## 2019-02-04 PROCEDURE — 85610 PROTHROMBIN TIME: CPT | Mod: QW

## 2019-02-04 NOTE — PROGRESS NOTES
ANTICOAGULATION FOLLOW-UP CLINIC VISIT    Patient Name:  Shelly Rogers  Date:  2019  Contact Type:  Face to Face    SUBJECTIVE:     Patient Findings     Positives:   No Problem Findings           OBJECTIVE    INR Protime   Date Value Ref Range Status   2019 1.9 (A) 0.86 - 1.14 Final       ASSESSMENT / PLAN  INR assessment THER    Recheck INR In: 3 WEEKS    INR Location Clinic      Anticoagulation Summary  As of 2019    INR goal:   2.0-3.0   TTR:   83.4 % (2.7 y)   INR used for dosin.9! (2019)   Warfarin maintenance plan:   5 mg (5 mg x 1) every Mon, Wed; 2.5 mg (5 mg x 0.5) all other days   Full warfarin instructions:   5 mg every Mon, Wed; 2.5 mg all other days   Weekly warfarin total:   22.5 mg   No change documented:   Odilia Youssef RN   Plan last modified:   Ciera Batista RN (2018)   Next INR check:   2019   Priority:   INR   Target end date:       Indications    Long-term (current) use of anticoagulants [Z79.01] [Z79.01]  Atrial fibrillation (H) [I48.91]             Anticoagulation Episode Summary     INR check location:       Preferred lab:       Send INR reminders to:   [facility] ANTICOAGULATION    Comments:         Anticoagulation Care Providers     Provider Role Specialty Phone number    Halley Huff MD [facility] Internal [facility] [phone]            See the Encounter Report to view Anticoagulation Flowsheet and Dosing Calendar (Go to Encounters tab in chart review, and find the Anticoagulation Therapy Visit)    No changes in meds/diet. No missed/extra warfarin doses. No unusual bruising/bleeding or other changes. Pt will continue same warfarin dose and recheck INR in 3 week(s), or sooner if concerns.     Pt aware if signs of clotting (pain, tenderness, swelling, color change in leg or arm, SOB) and bleeding occur (blood in stool, urine, large bruising, bleeding gums, nosebleeds) to have INR check sooner. If sx severe report to ER or concerned for stroke call 911.  If general questions or concerns arise, call clinic.    Odilia Youssef RN

## 2019-02-07 ENCOUNTER — OFFICE VISIT (OUTPATIENT)
Dept: CARDIOLOGY | Facility: CLINIC | Age: 84
End: 2019-02-07
Attending: INTERNAL MEDICINE
Payer: MEDICARE

## 2019-02-07 VITALS
HEIGHT: 63 IN | BODY MASS INDEX: 28.7 KG/M2 | SYSTOLIC BLOOD PRESSURE: 120 MMHG | OXYGEN SATURATION: 94 % | WEIGHT: 162 LBS | DIASTOLIC BLOOD PRESSURE: 76 MMHG | HEART RATE: 80 BPM

## 2019-02-07 DIAGNOSIS — I50.32 CHRONIC DIASTOLIC CONGESTIVE HEART FAILURE (H): ICD-10-CM

## 2019-02-07 DIAGNOSIS — I48.0 PAROXYSMAL ATRIAL FIBRILLATION (H): Primary | ICD-10-CM

## 2019-02-07 LAB
ANION GAP SERPL CALCULATED.3IONS-SCNC: 12.4 MMOL/L (ref 6–17)
BUN SERPL-MCNC: 18 MG/DL (ref 7–30)
CALCIUM SERPL-MCNC: 9.4 MG/DL (ref 8.5–10.5)
CHLORIDE SERPL-SCNC: 100 MMOL/L (ref 98–107)
CO2 SERPL-SCNC: 28 MMOL/L (ref 23–29)
CREAT SERPL-MCNC: 1.29 MG/DL (ref 0.7–1.3)
GFR SERPL CREATININE-BSD FRML MDRD: 39 ML/MIN/{1.73_M2}
GLUCOSE SERPL-MCNC: 280 MG/DL (ref 70–105)
POTASSIUM SERPL-SCNC: 4.4 MMOL/L (ref 3.5–5.1)
SODIUM SERPL-SCNC: 136 MMOL/L (ref 136–145)

## 2019-02-07 PROCEDURE — 80048 BASIC METABOLIC PNL TOTAL CA: CPT | Performed by: INTERNAL MEDICINE

## 2019-02-07 PROCEDURE — 99214 OFFICE O/P EST MOD 30 MIN: CPT | Performed by: PHYSICIAN ASSISTANT

## 2019-02-07 PROCEDURE — 36415 COLL VENOUS BLD VENIPUNCTURE: CPT | Performed by: INTERNAL MEDICINE

## 2019-02-07 ASSESSMENT — MIFFLIN-ST. JEOR: SCORE: 1136.02

## 2019-02-07 NOTE — LETTER
2/7/2019    Halley Huff MD  [address]  [place]                [place] [address]    RE: Shelly Rogers       Dear Colleague,    I had the pleasure of seeing Shelly Rogers in the [facility].          Cardiology Clinic Progress Note    Shelly Rogers MRN# [id number]   YOB: 1933 Age: 85 year old     Reason for visit: CORE f/u visit           Assessment and Plan:     In summary, the patient presents today to establish with the CORE clinic after a recent hospitalization for HFpEF, felt triggered by increasing rapid PAF burden and questionable compliance with PTA Lasix. Since then, she's had ongoing compliance issues (failed to start Aldactone, thinks she's never taken diltiazem, and has been taking PRN extra doses of Lasix) but overall is feeling extremely well and has lost about 15 lbs over the past three months, largely attributable to adhering to a low-sodium diet. I had hoped to eventually get her to a clinic weight around 165 lbs, and it appears she's achieved it. Her sleep apnea symptoms have notably improved with this.    1. HFpEF   - ECHO: EF 55-60%, E/E' avg 13.2, small pericardial effusion   - ACC/AHA Stage: C; FC II    - Etiology: tachy-mediated, diabetes, SYLVIE; diastolic dysfn contributing to RV failure   - RV: moderate dilated with mildly reduced fn; RVSP 13.5 mmHg   - Valves: moderate TR   - Volume: Euvolemic    Admit Wt: 179 lbs    Current Wt: 162 lbs (154 lbs @ home)    Dry Wt: likely ~ 160 lbs    Diuretics: Lasix 20 daily, extra dose PRN (essentially 3x weekly)   - Rhythm: NSR, PAF,  ~2%. Recent Zio showed average HR 80 bpm, Toprol up-titrated thereafter   - QRS: narrow   - Device: Dual chamber PPM   - Other: Severe mixed sleep apnea, improved symptoms with weight loss; refuses CPAP    2. PAF/SSS s/p PPM - appears to be in borderline rate-controlled AF per auscultation today, on Toprol 100 BID. INR's followed by PCP.   3. Mild hyponatremia   4.  Uncontrolled DMII - last a1c 8.2 in 10/2018  5. Hypothyroidism - now on levothyroxine. Recently high TSH and T4f; plan to repeat levels in another month with PCP.     Plan:  - No changes today. We discussed that she may continue to use a PRN extra dose of Lasix for ankle swelling, as long as her pending BMP is stable. She records daily weights and will continue to do so. If she precipitously drops weight in the future, or becomes lightheaded or dizzy, or if she finds herself needing to take extra doses of Lasix more often, she will notify us.  Her daughter Sandhya will also help to keep an eye on this, given her history of non-compliance.   - Will continue rate control strategy with Toprol, continue to monitor on pacemaker interrogations.   - Discussed low-salt diet, with maintaining adequate protein intake.   - Reminded to repeat TFT's in one month with PCP and f/u for ongoing management of Diabetes.  - RTC with CORE clinic in 3 months.         History of Presenting Illness:      Shelly Rogers is a pleasant 85 year old patient of  Dr. Del Valle who presents today for a CORE clinic f/u visit after a recent admission for HFpEF.    She has a pertinent PMH including  1. HFpEF, initially diagnosed in 2013 in the setting of new-onset rapid AF, controlled with low-dose Lasix since that time, until admission in October 2018. This was felt in the setting of lasix noncompliance and increasing burden of AF. Admit wt was 178 lbs. TTE at that time revealed a preserved LVEF with mild RV dysfunction, moderate TR - overall stable save for a small pericardial effusion. Of note, the rhythm was rapid AF at the time of the study. She was diuresed, and Toprol was up-titrated. Ultimately discharged with Lasix 20 BID.   2. PAF/SSS, s/p PPM and cardioversion in 2013. Rate control strategy with Toprol. Anticoagulated on warfarin.  3. Hx of R pleural effusion, s/p thoracentesis 10/2018  4. HTN  5. Poorly-controlled DMII  6. CKD  7.  "Hypothyroidism, on levothyroxine  8. Hyponatremia, mild, with some sodium levels in the low 130's during admission   When I saw her in hospital f/u on 10/31/18, her weight was 174 lbs. BMP was notable for mild hyponatremia at 131 and elevated blood glucose at 309. She did have some sodium levels in the low 130's during her admission. She notes a marked improvement in her mood and motivation since home. Her edema is also much-improved and now near her baseline. States she's had bad luck with compression stockings in the past and does try to elevate her legs during the day.  She still has a bit of a mildly productive cough, residual from her hospitalization, although feels this is mostly related to upper airway congestion. She may have some PND. She does snore at night and actually thinks she activated her \"clapper\" lamp with her snoring in the past, and admitted to daytime somnolence.   I switched her from Lasix 20 BID to 20 mg daily, and added Aldactone 25 mg daily. I also set her up for a sleep study, but this was unable to be completed in full due to inadequate REM sleep. The information they did collect however, was suggestive of severe mixed sleep apnea. Shelly refuses a repeat study and/or CPAP use. I additionally obtained a zio monitor, which showed an average HR of 80 bpm in AF.  She saw Dr. Del Valle in November 2018, and her Toprol was increased from 75 BID to 100 mg BID. She admitted to a fairly high sodium consumption and was counseled on a low sodium diet.    Today, she tells me she's taking her Lasix 20 mg daily but takes an extra dose about three times weekly for more ankle swelling. She never did start the Aldactone. She also hasn't been taking diltiazem, she tells me, for a long time - she doesn't recognize the name and thinks she's maybe never been on it. Despite this, her daughter Sandhya tells me she's sharp and good at managing her meds still. She feels incredibly well at this time. She's ten lbs down " "from her last visit, nearly 20 lbs per her home weights. This appears to have been a slow decline. BMP is pending. She states she's now started reading labels and is following a <2g/day sodium diet, and thinks her weight loss is probably due to that. She tells me she eats a lot of carbs and fruits, but avoids \"sugar.\" She feels she's probably getting some form of protein in every day. Her daughter thinks she appears more agile and spry at this weight, and indeed, the patient denies symptoms consistent with sleep apnea at this time. She wears compression stockings occasionally, but overall her LE edema isn't too bothersome to her.   She lives independently at an assisted living facility and manages her own medications. She has some macular degeneration which has caused some balance issues over time and therefore uses a cane to ambulate when she's out of her home, and doesn't drive. She otherwise doesn't feel limited in her ambulation or activities. She's had no falls, and uses fall precautions. No ETOH or tobacco at present.           Review of Systems:     12-pt ROS is negative except for as noted in the HPI.           Physical Exam:     Vitals: /76   Pulse 80   Ht 1.588 m (5' 2.5\")   Wt 73.5 kg (162 lb)   SpO2 94%   BMI 29.16 kg/m    Wt Readings from Last 3 Encounters:   02/07/19 73.5 kg (162 lb)   01/10/19 73.3 kg (161 lb 8 oz)   11/14/18 78.6 kg (173 lb 3.2 oz)       Constitutional:  Patient is pleasant, alert, cooperative, and in NAD.  HEENT:  NCAT. PERRLA. EOM's intact.  Neck:  CVP appears normal  Pulmonary: Normal respiratory effort. CTAB.  Cardiac: Irregularly irregular, grade 2/6 sm at the LSB  Abdomen:  Non-tender abdomen with normoactive bowel sounds, no hepatosplenomegaly appreciated.   Vascular: Pulses in the upper extremities are 2+ and equal, lower extremity pulses are diminished bilaterally.  Extremities: 1+ pitting edema pedal - knee bilaterally.  Skin:  No rashes or lesions appreciated. "   Neurological:  No gross motor or sensory deficits.   Psych: Appropriate affect.        Data:     Cardiac Diagnostics reviewed:  Type Date Result   TTE 10/19/18 The left ventricle is normal in size.  The visual ejection fraction is estimated at 55-60%.  No regional wall motion abnormalities noted.  The right ventricle is moderately dilated.  Mildly decreased right ventricular systolic function  There is moderate (2+) tricuspid regurgitation.  Small pericardial effusion  The rhythm was rapid atrial fibrillation.    5/5/16 Left ventricular systolic function is normal. The visual ejection fraction is  estimated at 60-65%.  There is mild concentric left ventricular hypertrophy. The left atrium is borderline dilated.  There is trace to mild mitral regurgitation.  There is moderate (2+) tricuspid regurgitation.  Right ventricular systolic pressure is elevated, consistent with mild  pulmonary hypertension.  The rhythm was normal sinus.  Contrast was used without apparent complications. TR may be mildly increased  compared to the prior study.   EKG 10/19/18 AF, low voltage QRS     Device  StoreDot PPM interrogation (inpatient)   ~ 2%, Ap ~ 30%     Sleep study  12/17/18 Respiration: Severe sleep disordered breathing best characterized as mixed (combination of both obstructive and central phenomena). Central events were suggestive of Cheyne Simons periodic breathing consisteint with heart disease. She also had sleep related hypoxemia. Of note the patient did not have REM supine during the study.     Events ? The polysomnogram revealed a presence of 26 obstructive, 66 central, and 23 mixed apneas resulting in an apnea index of 89.6 events per hour. There were 2 obstructive hypopneas and 2 central hypopneas resulting in an obstructive hypopnea index of 1.6 and central hypopnea index of 1.6 events per hour. The combined apnea/hypopnea index was 92.7 events per hour (central apnea/hypopnea index was 53.0 events per hour). The  REM AHI was 93.9 events per hour. The supine AHI was - events per hour. The RERA index was - events per hour. The RDI was 92.7 events per hour.      Recent Labs   Lab Test 01/07/19  1528 11/08/18  1206 10/19/18  0620 06/12/18  1633 02/27/18  1541  10/13/15  1454 10/30/14  1434  03/21/13  0525   LDL  --   --   --   --  101*  --  88 105   < > 58   HDL  --   --   --   --  41*  --  44*  --   --  40*   NHDL  --   --   --   --  165*  --   --   --   --   --    CHOL  --   --   --   --  206*  --  204*  --   --  125   TRIG  --   --   --   --  319*  --  359*  --   --  137   TSH 5.89*  --  6.76* 5.11* 2.32   < > 1.20 0.97   < >  --    IRON  --  57  --   --   --   --   --   --   --   --    FEB  --  389  --   --   --   --   --   --   --   --    IRONSAT  --  15  --   --   --   --   --   --   --   --    DARI  --  34  --  24 28   < >  --   --   --   --     < > = values in this interval not displayed.     Lab Results   Component Value Date    CHOL 206 (H) 02/27/2018    HDL 41 (L) 02/27/2018     (H) 02/27/2018    TRIG 319 (H) 02/27/2018    CHOLHDLRATIO 4.6 10/13/2015       Lab Results   Component Value Date    WBC 7.4 11/08/2018    RBC 4.61 11/08/2018    HGB 13.6 11/08/2018    HCT 42.3 11/08/2018    MCV 92 11/08/2018    MCH 29.5 11/08/2018    MCHC 32.2 11/08/2018    RDW 16.3 (H) 11/08/2018     11/08/2018       Lab Results   Component Value Date     01/07/2019    POTASSIUM 4.2 01/07/2019    CHLORIDE 101 01/07/2019    CO2 28 01/07/2019    ANIONGAP 6 01/07/2019     (H) 01/07/2019    BUN 18 01/07/2019    CR 0.89 01/07/2019    GFRESTIMATED 59 (L) 01/07/2019    GFRESTBLACK 68 01/07/2019    ASTRID 8.8 01/07/2019      Lab Results   Component Value Date    AST 16 10/19/2018    ALT 19 10/19/2018       Lab Results   Component Value Date    A1C 8.2 (H) 10/19/2018       Lab Results   Component Value Date    INR 1.9 (A) 02/04/2019    INR 2.5 (A) 01/07/2019    INR 1.70 (H) 10/22/2018    INR 1.97 (H) 10/21/2018          Problem  List:     Patient Active Problem List   Diagnosis     CHF (congestive heart failure) (H)     CARDIOVASCULAR SCREENING; LDL GOAL LESS THAN 100     Health Care Home     Uterine cancer (H)     Pacemaker     OA (osteoarthritis)     Type 2 diabetes mellitus without complications (H)     DNS (deviated nasal septum)     Atrial fibrillation (H)     Paroxysmal atrial fibrillation (H)     Chronic diastolic congestive heart failure (H)     Hypothyroidism     Long-term (current) use of anticoagulants [Z79.01]     Heart failure (H)            Medications:     Current Outpatient Medications   Medication Sig Dispense Refill     ACE/ARB NOT PRESCRIBED, INTENTIONAL, ACE & ARB not prescribed due to Other: HFpEF       ACETAMINOPHEN PO Take 500 mg by mouth 3 times daily as needed (Takes at least 6 hours apart).        ASPIRIN NOT PRESCRIBED, INTENTIONAL, Antiplatelet medication not prescribed intentionally due to Current anticoagulant therapy (warfarin/enoxaparin) 0 each 0     blood glucose (ACCU-CHEK SMARTVIEW) test strip 1 strip by In Vitro route daily 1 Box prn     blood glucose (NO BRAND SPECIFIED) lancets standard Use to test blood sugar 1 times daily or as directed. 100 each 11     blood glucose monitoring (ACCU-CHEK FASTCLIX) lancets Use to check blood sugars daily 1 Box prn     blood glucose monitoring (NO BRAND SPECIFIED) meter device kit Use to test blood sugar one times daily or as directed. 1 kit 0     blood glucose monitoring (NO BRAND SPECIFIED) test strip Use to test blood sugars one times daily or as directed .has type 2 diabetes and not on insulin 100 strip 1     Blood Glucose Monitoring Suppl (ACCU-CHEK LAKIA SMARTVIEW) W/DEVICE KIT 1 Device daily. 1 kit 0     diltiazem (DILACOR XR) 120 MG 24 hr capsule Take 1 capsule (120 mg) by mouth At Bedtime 30 capsule 0     furosemide (LASIX) 20 MG tablet Take 1 tablet (20 mg) by mouth daily 90 tablet 3     glimepiride (AMARYL) 2 MG tablet Take 1 tablet (2 mg) by mouth 2 times  daily 180 tablet 3     levothyroxine (SYNTHROID/LEVOTHROID) 88 MCG tablet Take 1 tablet (88 mcg) by mouth daily Due for labs please make appointment 90 tablet 0     metFORMIN (GLUCOPHAGE) 500 MG tablet Take 1 tablet (500 mg) by mouth 2 times daily (with meals) 180 tablet 3     metoprolol succinate (TOPROL-XL) 100 MG 24 hr tablet Take 1 tablet (100 mg) by mouth 2 times daily 180 tablet 3     multivitamin  with lutein (OCUVITE WITH LTEIN) CAPS per capsule Take 1 capsule by mouth daily       STATIN NOT PRESCRIBED, INTENTIONAL, continuous prn. Statin not prescribed intentionally due to Other: Not needed, LDL at goal <100mg/dL without therapy 0 each 0     warfarin (COUMADIN) 5 MG tablet TAKE 1/2 TO 1 TABLET BY MOUTH DAILY OR AS DIRECTED BY INR CLINIC (Patient taking differently: Take 2.5-5 mg by mouth daily TAKE 1/2 TO 1 TABLET BY MOUTH DAILY OR AS DIRECTED BY INR CLINIC) 90 tablet 3     spironolactone (ALDACTONE) 25 MG tablet Take 1 tablet (25 mg) by mouth daily (Patient not taking: Reported on 2/7/2019) 30 tablet 1          Past Medical History:     Past Medical History:   Diagnosis Date     Atrial fibrillation (H)     Nl LVEF, s/p ablation      Congestive heart failure, unspecified      Diabetes mellitus (H) 2004     Hemorrhoids     intermittent bleeding     Hypertension      Macular degeneration      OA (osteoarthritis)      Pacemaker 3/2013     Uterine cancer (H)     s/p hyst     Past Surgical History:   Procedure Laterality Date     C ANESTH,PACEMAKER INSERTION      dual chamber 3/27/13     CHOLECYSTECTOMY  8/2004     GYN SURGERY       HYSTERECTOMY      Ut ca      JOINT REPLACEMENT       KERATOTOMY ARCUATE WITH FEMTOSECOND LASER/IMAGING FOR ATIOL Right 4/11/2017    Procedure: KERATOTOMY ARCUATE WITH FEMTOSECOND LASER/IMAGING FOR ATIOL;  Surgeon: Calvin Dominguez MD;  Location: [facility]     PHACOEMULSIFICATION CLEAR CORNEA WITH STANDARD INTRAOCULAR LENS IMPLANT Right 4/11/2017    Procedure: PHACOEMULSIFICATION  CLEAR CORNEA WITH STANDARD INTRAOCULAR LENS IMPLANT;  Surgeon: Calvin Dominguez MD;  Location: [facility]     Family History   Problem Relation Age of Onset     Gastrointestinal Disease Mother         bowel obstruction     Cardiovascular Father      C.A.D. Father      Cardiovascular Brother         CHF     Social History     Socioeconomic History     Marital status: Single     Spouse name: Not on file     Number of children: Not on file     Years of education: Not on file     Highest education level: Not on file   Social Needs     Financial resource strain: Not on file     Food insecurity - worry: Not on file     Food insecurity - inability: Not on file     Transportation needs - medical: Not on file     Transportation needs - non-medical: Not on file   Occupational History     Not on file   Tobacco Use     Smoking status: Former Smoker     Last attempt to quit: 1990     Years since quittin.1     Smokeless tobacco: Never Used   Substance and Sexual Activity     Alcohol use: No     Alcohol/week: 0.0 oz     Drug use: No     Sexual activity: Not Currently     Partners: Male   Other Topics Concern     Parent/sibling w/ CABG, MI or angioplasty before 65F 55M? No      Service Not Asked     Blood Transfusions Not Asked     Caffeine Concern Yes     Comment: daily      Occupational Exposure Not Asked     Hobby Hazards Not Asked     Sleep Concern No     Stress Concern Not Asked     Weight Concern Not Asked     Special Diet Yes     Comment: low sodium, less leafy greens, low sugar     Back Care Not Asked     Exercise No     Bike Helmet Not Asked     Seat Belt Yes     Self-Exams Not Asked   Social History Narrative     Not on file            Allergies:   Shellfish allergy; Ambien [zolpidem]; Cats; Lisinopril; No clinical screening - see comments; Seasonal allergies; and Sulfa drugs      Eliz Goodwin PA-C  [facility]  Pager: [phone]  ank you for allowing me to participate in the  care of your patient.      Sincerely,     Eliz Goodwin PA-C     [facility]    cc:   Galindo Del Valle MD  [address]  [place] [id number]

## 2019-02-07 NOTE — PROGRESS NOTES
Cardiology Clinic Progress Note    Shelly Rogers MRN# [id number]   YOB: 1933 Age: 85 year old     Reason for visit: CORE f/u visit           Assessment and Plan:     In summary, the patient presents today to establish with the CORE clinic after a recent hospitalization for HFpEF, felt triggered by increasing rapid PAF burden and questionable compliance with PTA Lasix. Since then, she's had ongoing compliance issues (failed to start Aldactone, thinks she's never taken diltiazem, and has been taking PRN extra doses of Lasix) but overall is feeling extremely well and has lost about 15 lbs over the past three months, largely attributable to adhering to a low-sodium diet. I had hoped to eventually get her to a clinic weight around 165 lbs, and it appears she's achieved it. Her sleep apnea symptoms have notably improved with this.    1. HFpEF   - ECHO: EF 55-60%, E/E' avg 13.2, small pericardial effusion   - ACC/AHA Stage: C; FC II    - Etiology: tachy-mediated, diabetes, SYLVIE; diastolic dysfn contributing to RV failure   - RV: moderate dilated with mildly reduced fn; RVSP 13.5 mmHg   - Valves: moderate TR   - Volume: Euvolemic    Admit Wt: 179 lbs    Current Wt: 162 lbs (154 lbs @ home)    Dry Wt: likely ~ 160 lbs    Diuretics: Lasix 20 daily, extra dose PRN (essentially 3x weekly)   - Rhythm: NSR, PAF,  ~2%. Recent Zio showed average HR 80 bpm, Toprol up-titrated thereafter   - QRS: narrow   - Device: Dual chamber PPM   - Other: Severe mixed sleep apnea, improved symptoms with weight loss; refuses CPAP    2. PAF/SSS s/p PPM - appears to be in borderline rate-controlled AF per auscultation today, on Toprol 100 BID. INR's followed by PCP.   3. Mild hyponatremia   4. Uncontrolled DMII - last a1c 8.2 in 10/2018  5. Hypothyroidism - now on levothyroxine. Recently high TSH and T4f; plan to repeat levels in another month with PCP.     Plan:  - No changes today. We discussed that she may continue to use a PRN  extra dose of Lasix for ankle swelling, as long as her pending BMP is stable. She records daily weights and will continue to do so. If she precipitously drops weight in the future, or becomes lightheaded or dizzy, or if she finds herself needing to take extra doses of Lasix more often, she will notify us.  Her daughter Sandhya will also help to keep an eye on this, given her history of non-compliance.   - Will continue rate control strategy with Toprol, continue to monitor on pacemaker interrogations.   - Discussed low-salt diet, with maintaining adequate protein intake.   - Reminded to repeat TFT's in one month with PCP and f/u for ongoing management of Diabetes.  - RTC with CORE clinic in 3 months.         History of Presenting Illness:      Shelly Rogers is a pleasant 85 year old patient of  Dr. Del Valle who presents today for a CORE clinic f/u visit after a recent admission for HFpEF.    She has a pertinent PMH including  1. HFpEF, initially diagnosed in 2013 in the setting of new-onset rapid AF, controlled with low-dose Lasix since that time, until admission in October 2018. This was felt in the setting of lasix noncompliance and increasing burden of AF. Admit wt was 178 lbs. TTE at that time revealed a preserved LVEF with mild RV dysfunction, moderate TR - overall stable save for a small pericardial effusion. Of note, the rhythm was rapid AF at the time of the study. She was diuresed, and Toprol was up-titrated. Ultimately discharged with Lasix 20 BID.   2. PAF/SSS, s/p PPM and cardioversion in 2013. Rate control strategy with Toprol. Anticoagulated on warfarin.  3. Hx of R pleural effusion, s/p thoracentesis 10/2018  4. HTN  5. Poorly-controlled DMII  6. CKD  7. Hypothyroidism, on levothyroxine  8. Hyponatremia, mild, with some sodium levels in the low 130's during admission   When I saw her in hospital f/u on 10/31/18, her weight was 174 lbs. BMP was notable for mild hyponatremia at 131 and elevated blood  "glucose at 309. She did have some sodium levels in the low 130's during her admission. She notes a marked improvement in her mood and motivation since home. Her edema is also much-improved and now near her baseline. States she's had bad luck with compression stockings in the past and does try to elevate her legs during the day.  She still has a bit of a mildly productive cough, residual from her hospitalization, although feels this is mostly related to upper airway congestion. She may have some PND. She does snore at night and actually thinks she activated her \"clapper\" lamp with her snoring in the past, and admitted to daytime somnolence.   I switched her from Lasix 20 BID to 20 mg daily, and added Aldactone 25 mg daily. I also set her up for a sleep study, but this was unable to be completed in full due to inadequate REM sleep. The information they did collect however, was suggestive of severe mixed sleep apnea. Shelly refuses a repeat study and/or CPAP use. I additionally obtained a zio monitor, which showed an average HR of 80 bpm in AF.  She saw Dr. Del Valle in November 2018, and her Toprol was increased from 75 BID to 100 mg BID. She admitted to a fairly high sodium consumption and was counseled on a low sodium diet.    Today, she tells me she's taking her Lasix 20 mg daily but takes an extra dose about three times weekly for more ankle swelling. She never did start the Aldactone. She also hasn't been taking diltiazem, she tells me, for a long time - she doesn't recognize the name and thinks she's maybe never been on it. Despite this, her daughter Sandhya tells me she's sharp and good at managing her meds still. She feels incredibly well at this time. She's ten lbs down from her last visit, nearly 20 lbs per her home weights. This appears to have been a slow decline. BMP is pending. She states she's now started reading labels and is following a <2g/day sodium diet, and thinks her weight loss is probably due to " "that. She tells me she eats a lot of carbs and fruits, but avoids \"sugar.\" She feels she's probably getting some form of protein in every day. Her daughter thinks she appears more agile and spry at this weight, and indeed, the patient denies symptoms consistent with sleep apnea at this time. She wears compression stockings occasionally, but overall her LE edema isn't too bothersome to her.   She lives independently at an assisted living facility and manages her own medications. She has some macular degeneration which has caused some balance issues over time and therefore uses a cane to ambulate when she's out of her home, and doesn't drive. She otherwise doesn't feel limited in her ambulation or activities. She's had no falls, and uses fall precautions. No ETOH or tobacco at present.           Review of Systems:     12-pt ROS is negative except for as noted in the HPI.           Physical Exam:     Vitals: /76   Pulse 80   Ht 1.588 m (5' 2.5\")   Wt 73.5 kg (162 lb)   SpO2 94%   BMI 29.16 kg/m    Wt Readings from Last 3 Encounters:   02/07/19 73.5 kg (162 lb)   01/10/19 73.3 kg (161 lb 8 oz)   11/14/18 78.6 kg (173 lb 3.2 oz)       Constitutional:  Patient is pleasant, alert, cooperative, and in NAD.  HEENT:  NCAT. PERRLA. EOM's intact.  Neck:  CVP appears normal  Pulmonary: Normal respiratory effort. CTAB.  Cardiac: Irregularly irregular, grade 2/6 sm at the LSB  Abdomen:  Non-tender abdomen with normoactive bowel sounds, no hepatosplenomegaly appreciated.   Vascular: Pulses in the upper extremities are 2+ and equal, lower extremity pulses are diminished bilaterally.  Extremities: 1+ pitting edema pedal - knee bilaterally.  Skin:  No rashes or lesions appreciated.   Neurological:  No gross motor or sensory deficits.   Psych: Appropriate affect.        Data:     Cardiac Diagnostics reviewed:  Type Date Result   TTE 10/19/18 The left ventricle is normal in size.  The visual ejection fraction is estimated at " 55-60%.  No regional wall motion abnormalities noted.  The right ventricle is moderately dilated.  Mildly decreased right ventricular systolic function  There is moderate (2+) tricuspid regurgitation.  Small pericardial effusion  The rhythm was rapid atrial fibrillation.    5/5/16 Left ventricular systolic function is normal. The visual ejection fraction is  estimated at 60-65%.  There is mild concentric left ventricular hypertrophy. The left atrium is borderline dilated.  There is trace to mild mitral regurgitation.  There is moderate (2+) tricuspid regurgitation.  Right ventricular systolic pressure is elevated, consistent with mild  pulmonary hypertension.  The rhythm was normal sinus.  Contrast was used without apparent complications. TR may be mildly increased  compared to the prior study.   EKG 10/19/18 AF, low voltage QRS     Device  Credit [employer] PPM interrogation (inpatient)   ~ 2%, Ap ~ 30%     Sleep study  12/17/18 Respiration: Severe sleep disordered breathing best characterized as mixed (combination of both obstructive and central phenomena). Central events were suggestive of Cheyne Simons periodic breathing consisteint with heart disease. She also had sleep related hypoxemia. Of note the patient did not have REM supine during the study.     Events ? The polysomnogram revealed a presence of 26 obstructive, 66 central, and 23 mixed apneas resulting in an apnea index of 89.6 events per hour. There were 2 obstructive hypopneas and 2 central hypopneas resulting in an obstructive hypopnea index of 1.6 and central hypopnea index of 1.6 events per hour. The combined apnea/hypopnea index was 92.7 events per hour (central apnea/hypopnea index was 53.0 events per hour). The REM AHI was 93.9 events per hour. The supine AHI was - events per hour. The RERA index was - events per hour. The RDI was 92.7 events per hour.      Recent Labs   Lab Test 01/07/19  1528 11/08/18  1206 10/19/18  0620 06/12/18  1633  02/27/18  1541  10/13/15  1454 10/30/14  1434  03/21/13  0525   LDL  --   --   --   --  101*  --  88 105   < > 58   HDL  --   --   --   --  41*  --  44*  --   --  40*   NHDL  --   --   --   --  165*  --   --   --   --   --    CHOL  --   --   --   --  206*  --  204*  --   --  125   TRIG  --   --   --   --  319*  --  359*  --   --  137   TSH 5.89*  --  6.76* 5.11* 2.32   < > 1.20 0.97   < >  --    IRON  --  57  --   --   --   --   --   --   --   --    FEB  --  389  --   --   --   --   --   --   --   --    IRONSAT  --  15  --   --   --   --   --   --   --   --    DARI  --  34  --  24 28   < >  --   --   --   --     < > = values in this interval not displayed.     Lab Results   Component Value Date    CHOL 206 (H) 02/27/2018    HDL 41 (L) 02/27/2018     (H) 02/27/2018    TRIG 319 (H) 02/27/2018    CHOLHDLRATIO 4.6 10/13/2015       Lab Results   Component Value Date    WBC 7.4 11/08/2018    RBC 4.61 11/08/2018    HGB 13.6 11/08/2018    HCT 42.3 11/08/2018    MCV 92 11/08/2018    MCH 29.5 11/08/2018    MCHC 32.2 11/08/2018    RDW 16.3 (H) 11/08/2018     11/08/2018       Lab Results   Component Value Date     01/07/2019    POTASSIUM 4.2 01/07/2019    CHLORIDE 101 01/07/2019    CO2 28 01/07/2019    ANIONGAP 6 01/07/2019     (H) 01/07/2019    BUN 18 01/07/2019    CR 0.89 01/07/2019    GFRESTIMATED 59 (L) 01/07/2019    GFRESTBLACK 68 01/07/2019    ASTRID 8.8 01/07/2019      Lab Results   Component Value Date    AST 16 10/19/2018    ALT 19 10/19/2018       Lab Results   Component Value Date    A1C 8.2 (H) 10/19/2018       Lab Results   Component Value Date    INR 1.9 (A) 02/04/2019    INR 2.5 (A) 01/07/2019    INR 1.70 (H) 10/22/2018    INR 1.97 (H) 10/21/2018          Problem List:     Patient Active Problem List   Diagnosis     CHF (congestive heart failure) (H)     CARDIOVASCULAR SCREENING; LDL GOAL LESS THAN 100     Health Care Home     Uterine cancer (H)     Pacemaker     OA (osteoarthritis)      Type 2 diabetes mellitus without complications (H)     DNS (deviated nasal septum)     Atrial fibrillation (H)     Paroxysmal atrial fibrillation (H)     Chronic diastolic congestive heart failure (H)     Hypothyroidism     Long-term (current) use of anticoagulants [Z79.01]     Heart failure (H)            Medications:     Current Outpatient Medications   Medication Sig Dispense Refill     ACE/ARB NOT PRESCRIBED, INTENTIONAL, ACE & ARB not prescribed due to Other: HFpEF       ACETAMINOPHEN PO Take 500 mg by mouth 3 times daily as needed (Takes at least 6 hours apart).        ASPIRIN NOT PRESCRIBED, INTENTIONAL, Antiplatelet medication not prescribed intentionally due to Current anticoagulant therapy (warfarin/enoxaparin) 0 each 0     blood glucose (ACCU-CHEK SMARTVIEW) test strip 1 strip by In Vitro route daily 1 Box prn     blood glucose (NO BRAND SPECIFIED) lancets standard Use to test blood sugar 1 times daily or as directed. 100 each 11     blood glucose monitoring (ACCU-CHEK FASTCLIX) lancets Use to check blood sugars daily 1 Box prn     blood glucose monitoring (NO BRAND SPECIFIED) meter device kit Use to test blood sugar one times daily or as directed. 1 kit 0     blood glucose monitoring (NO BRAND SPECIFIED) test strip Use to test blood sugars one times daily or as directed .has type 2 diabetes and not on insulin 100 strip 1     Blood Glucose Monitoring Suppl (ACCU-CHEK LAKIA SMARTVIEW) W/DEVICE KIT 1 Device daily. 1 kit 0     diltiazem (DILACOR XR) 120 MG 24 hr capsule Take 1 capsule (120 mg) by mouth At Bedtime 30 capsule 0     furosemide (LASIX) 20 MG tablet Take 1 tablet (20 mg) by mouth daily 90 tablet 3     glimepiride (AMARYL) 2 MG tablet Take 1 tablet (2 mg) by mouth 2 times daily 180 tablet 3     levothyroxine (SYNTHROID/LEVOTHROID) 88 MCG tablet Take 1 tablet (88 mcg) by mouth daily Due for labs please make appointment 90 tablet 0     metFORMIN (GLUCOPHAGE) 500 MG tablet Take 1 tablet (500 mg) by  mouth 2 times daily (with meals) 180 tablet 3     metoprolol succinate (TOPROL-XL) 100 MG 24 hr tablet Take 1 tablet (100 mg) by mouth 2 times daily 180 tablet 3     multivitamin  with lutein (OCUVITE WITH LTEIN) CAPS per capsule Take 1 capsule by mouth daily       STATIN NOT PRESCRIBED, INTENTIONAL, continuous prn. Statin not prescribed intentionally due to Other: Not needed, LDL at goal <100mg/dL without therapy 0 each 0     warfarin (COUMADIN) 5 MG tablet TAKE 1/2 TO 1 TABLET BY MOUTH DAILY OR AS DIRECTED BY INR CLINIC (Patient taking differently: Take 2.5-5 mg by mouth daily TAKE 1/2 TO 1 TABLET BY MOUTH DAILY OR AS DIRECTED BY INR CLINIC) 90 tablet 3     spironolactone (ALDACTONE) 25 MG tablet Take 1 tablet (25 mg) by mouth daily (Patient not taking: Reported on 2/7/2019) 30 tablet 1          Past Medical History:     Past Medical History:   Diagnosis Date     Atrial fibrillation (H)     Nl LVEF, s/p ablation      Congestive heart failure, unspecified      Diabetes mellitus (H) 2004     Hemorrhoids     intermittent bleeding     Hypertension      Macular degeneration      OA (osteoarthritis)      Pacemaker 3/2013     Uterine cancer (H)     s/p hyst     Past Surgical History:   Procedure Laterality Date     C ANESTH,PACEMAKER INSERTION      dual chamber 3/27/13     CHOLECYSTECTOMY  8/2004     GYN SURGERY       HYSTERECTOMY      Ut ca      JOINT REPLACEMENT       KERATOTOMY ARCUATE WITH FEMTOSECOND LASER/IMAGING FOR ATIOL Right 4/11/2017    Procedure: KERATOTOMY ARCUATE WITH FEMTOSECOND LASER/IMAGING FOR ATIOL;  Surgeon: Calvin Dominguez MD;  Location: [facility]     PHACOEMULSIFICATION CLEAR CORNEA WITH STANDARD INTRAOCULAR LENS IMPLANT Right 4/11/2017    Procedure: PHACOEMULSIFICATION CLEAR CORNEA WITH STANDARD INTRAOCULAR LENS IMPLANT;  Surgeon: Calvin Dominguez MD;  Location: [facility]     TKR      bilat     Family History   Problem Relation Age of Onset     Gastrointestinal Disease Mother         bowel  obstruction     Cardiovascular Father      GOYO Father      Cardiovascular Brother         CHF     Social History     Socioeconomic History     Marital status: Single     Spouse name: Not on file     Number of children: Not on file     Years of education: Not on file     Highest education level: Not on file   Social Needs     Financial resource strain: Not on file     Food insecurity - worry: Not on file     Food insecurity - inability: Not on file     Transportation needs - medical: Not on file     Transportation needs - non-medical: Not on file   Occupational History     Not on file   Tobacco Use     Smoking status: Former Smoker     Last attempt to quit: 1990     Years since quittin.1     Smokeless tobacco: Never Used   Substance and Sexual Activity     Alcohol use: No     Alcohol/week: 0.0 oz     Drug use: No     Sexual activity: Not Currently     Partners: Male   Other Topics Concern     Parent/sibling w/ CABG, MI or angioplasty before 65F 55M? No      Service Not Asked     Blood Transfusions Not Asked     Caffeine Concern Yes     Comment: daily      Occupational Exposure Not Asked     Hobby Hazards Not Asked     Sleep Concern No     Stress Concern Not Asked     Weight Concern Not Asked     Special Diet Yes     Comment: low sodium, less leafy greens, low sugar     Back Care Not Asked     Exercise No     Bike Helmet Not Asked     Seat Belt Yes     Self-Exams Not Asked   Social History Narrative     Not on file            Allergies:   Shellfish allergy; Ambien [zolpidem]; Cats; Lisinopril; No clinical screening - see comments; Seasonal allergies; and Sulfa drugs      Eliz Godowin PA-C  [facility]  Pager: [phone]

## 2019-02-07 NOTE — LETTER
2/7/2019    Halley Huff MD  [address]  [place]                [place] [address]    RE: Shelly Rogers       Dear Colleague,    I had the pleasure of seeing Shelly Rogers in the [facility].          Cardiology Clinic Progress Note    Shelly Rogers MRN# [id number]   YOB: 1933 Age: 85 year old     Reason for visit: CORE f/u visit           Assessment and Plan:     In summary, the patient presents today to establish with the CORE clinic after a recent hospitalization for HFpEF, felt triggered by increasing rapid PAF burden and questionable compliance with PTA Lasix. Since then, she's had ongoing compliance issues (failed to start Aldactone, thinks she's never taken diltiazem, and has been taking PRN extra doses of Lasix) but overall is feeling extremely well and has lost about 15 lbs over the past three months, largely attributable to adhering to a low-sodium diet. I had hoped to eventually get her to a clinic weight around 165 lbs, and it appears she's achieved it. Her sleep apnea symptoms have notably improved with this.    1. HFpEF   - ECHO: EF 55-60%, E/E' avg 13.2, small pericardial effusion   - ACC/AHA Stage: C; FC II    - Etiology: tachy-mediated, diabetes, SYLVIE; diastolic dysfn contributing to RV failure   - RV: moderate dilated with mildly reduced fn; RVSP 13.5 mmHg   - Valves: moderate TR   - Volume: Euvolemic    Admit Wt: 179 lbs    Current Wt: 162 lbs (154 lbs @ home)    Dry Wt: likely ~ 160 lbs    Diuretics: Lasix 20 daily, extra dose PRN (essentially 3x weekly)   - Rhythm: NSR, PAF,  ~2%. Recent Zio showed average HR 80 bpm, Toprol up-titrated thereafter   - QRS: narrow   - Device: Dual chamber PPM   - Other: Severe mixed sleep apnea, improved symptoms with weight loss; refuses CPAP    2. PAF/SSS s/p PPM - appears to be in borderline rate-controlled AF per auscultation today, on Toprol 100 BID. INR's followed by PCP.   3. Mild hyponatremia   4.  Uncontrolled DMII - last a1c 8.2 in 10/2018  5. Hypothyroidism - now on levothyroxine. Recently high TSH and T4f; plan to repeat levels in another month with PCP.     Plan:  - No changes today. We discussed that she may continue to use a PRN extra dose of Lasix for ankle swelling, as long as her pending BMP is stable. She records daily weights and will continue to do so. If she precipitously drops weight in the future, or becomes lightheaded or dizzy, or if she finds herself needing to take extra doses of Lasix more often, she will notify us.  Her daughter Sandhya will also help to keep an eye on this, given her history of non-compliance.   - Will continue rate control strategy with Toprol, continue to monitor on pacemaker interrogations.   - Discussed low-salt diet, with maintaining adequate protein intake.   - Reminded to repeat TFT's in one month with PCP and f/u for ongoing management of Diabetes.  - RTC with CORE clinic in 3 months.         History of Presenting Illness:      Shelly Rogers is a pleasant 85 year old patient of  Dr. Del Valle who presents today for a CORE clinic f/u visit after a recent admission for HFpEF.    She has a pertinent PMH including  1. HFpEF, initially diagnosed in 2013 in the setting of new-onset rapid AF, controlled with low-dose Lasix since that time, until admission in October 2018. This was felt in the setting of lasix noncompliance and increasing burden of AF. Admit wt was 178 lbs. TTE at that time revealed a preserved LVEF with mild RV dysfunction, moderate TR - overall stable save for a small pericardial effusion. Of note, the rhythm was rapid AF at the time of the study. She was diuresed, and Toprol was up-titrated. Ultimately discharged with Lasix 20 BID.   2. PAF/SSS, s/p PPM and cardioversion in 2013. Rate control strategy with Toprol. Anticoagulated on warfarin.  3. Hx of R pleural effusion, s/p thoracentesis 10/2018  4. HTN  5. Poorly-controlled DMII  6. CKD  7.  "Hypothyroidism, on levothyroxine  8. Hyponatremia, mild, with some sodium levels in the low 130's during admission   When I saw her in hospital f/u on 10/31/18, her weight was 174 lbs. BMP was notable for mild hyponatremia at 131 and elevated blood glucose at 309. She did have some sodium levels in the low 130's during her admission. She notes a marked improvement in her mood and motivation since home. Her edema is also much-improved and now near her baseline. States she's had bad luck with compression stockings in the past and does try to elevate her legs during the day.  She still has a bit of a mildly productive cough, residual from her hospitalization, although feels this is mostly related to upper airway congestion. She may have some PND. She does snore at night and actually thinks she activated her \"clapper\" lamp with her snoring in the past, and admitted to daytime somnolence.   I switched her from Lasix 20 BID to 20 mg daily, and added Aldactone 25 mg daily. I also set her up for a sleep study, but this was unable to be completed in full due to inadequate REM sleep. The information they did collect however, was suggestive of severe mixed sleep apnea. Shelly refuses a repeat study and/or CPAP use. I additionally obtained a zio monitor, which showed an average HR of 80 bpm in AF.  She saw Dr. Del Valle in November 2018, and her Toprol was increased from 75 BID to 100 mg BID. She admitted to a fairly high sodium consumption and was counseled on a low sodium diet.    Today, she tells me she's taking her Lasix 20 mg daily but takes an extra dose about three times weekly for more ankle swelling. She never did start the Aldactone. She also hasn't been taking diltiazem, she tells me, for a long time - she doesn't recognize the name and thinks she's maybe never been on it. Despite this, her daughter Sandhya tells me she's sharp and good at managing her meds still. She feels incredibly well at this time. She's ten lbs down " "from her last visit, nearly 20 lbs per her home weights. This appears to have been a slow decline. BMP is pending. She states she's now started reading labels and is following a <2g/day sodium diet, and thinks her weight loss is probably due to that. She tells me she eats a lot of carbs and fruits, but avoids \"sugar.\" She feels she's probably getting some form of protein in every day. Her daughter thinks she appears more agile and spry at this weight, and indeed, the patient denies symptoms consistent with sleep apnea at this time. She wears compression stockings occasionally, but overall her LE edema isn't too bothersome to her.   She lives independently at an assisted living facility and manages her own medications. She has some macular degeneration which has caused some balance issues over time and therefore uses a cane to ambulate when she's out of her home, and doesn't drive. She otherwise doesn't feel limited in her ambulation or activities. She's had no falls, and uses fall precautions. No ETOH or tobacco at present.           Review of Systems:     12-pt ROS is negative except for as noted in the HPI.           Physical Exam:     Vitals: /76   Pulse 80   Ht 1.588 m (5' 2.5\")   Wt 73.5 kg (162 lb)   SpO2 94%   BMI 29.16 kg/m    Wt Readings from Last 3 Encounters:   02/07/19 73.5 kg (162 lb)   01/10/19 73.3 kg (161 lb 8 oz)   11/14/18 78.6 kg (173 lb 3.2 oz)       Constitutional:  Patient is pleasant, alert, cooperative, and in NAD.  HEENT:  NCAT. PERRLA. EOM's intact.  Neck:  CVP appears normal  Pulmonary: Normal respiratory effort. CTAB.  Cardiac: Irregularly irregular, grade 2/6 sm at the LSB  Abdomen:  Non-tender abdomen with normoactive bowel sounds, no hepatosplenomegaly appreciated.   Vascular: Pulses in the upper extremities are 2+ and equal, lower extremity pulses are diminished bilaterally.  Extremities: 1+ pitting edema pedal - knee bilaterally.  Skin:  No rashes or lesions appreciated. "   Neurological:  No gross motor or sensory deficits.   Psych: Appropriate affect.        Data:     Cardiac Diagnostics reviewed:  Type Date Result   TTE 10/19/18 The left ventricle is normal in size.  The visual ejection fraction is estimated at 55-60%.  No regional wall motion abnormalities noted.  The right ventricle is moderately dilated.  Mildly decreased right ventricular systolic function  There is moderate (2+) tricuspid regurgitation.  Small pericardial effusion  The rhythm was rapid atrial fibrillation.    5/5/16 Left ventricular systolic function is normal. The visual ejection fraction is  estimated at 60-65%.  There is mild concentric left ventricular hypertrophy. The left atrium is borderline dilated.  There is trace to mild mitral regurgitation.  There is moderate (2+) tricuspid regurgitation.  Right ventricular systolic pressure is elevated, consistent with mild  pulmonary hypertension.  The rhythm was normal sinus.  Contrast was used without apparent complications. TR may be mildly increased  compared to the prior study.   EKG 10/19/18 AF, low voltage QRS     Device  Urgent.ly PPM interrogation (inpatient)   ~ 2%, Ap ~ 30%     Sleep study  12/17/18 Respiration: Severe sleep disordered breathing best characterized as mixed (combination of both obstructive and central phenomena). Central events were suggestive of Cheyne Simons periodic breathing consisteint with heart disease. She also had sleep related hypoxemia. Of note the patient did not have REM supine during the study.     Events ? The polysomnogram revealed a presence of 26 obstructive, 66 central, and 23 mixed apneas resulting in an apnea index of 89.6 events per hour. There were 2 obstructive hypopneas and 2 central hypopneas resulting in an obstructive hypopnea index of 1.6 and central hypopnea index of 1.6 events per hour. The combined apnea/hypopnea index was 92.7 events per hour (central apnea/hypopnea index was 53.0 events per hour). The  REM AHI was 93.9 events per hour. The supine AHI was - events per hour. The RERA index was - events per hour. The RDI was 92.7 events per hour.      Recent Labs   Lab Test 01/07/19  1528 11/08/18  1206 10/19/18  0620 06/12/18  1633 02/27/18  1541  10/13/15  1454 10/30/14  1434  03/21/13  0525   LDL  --   --   --   --  101*  --  88 105   < > 58   HDL  --   --   --   --  41*  --  44*  --   --  40*   NHDL  --   --   --   --  165*  --   --   --   --   --    CHOL  --   --   --   --  206*  --  204*  --   --  125   TRIG  --   --   --   --  319*  --  359*  --   --  137   TSH 5.89*  --  6.76* 5.11* 2.32   < > 1.20 0.97   < >  --    IRON  --  57  --   --   --   --   --   --   --   --    FEB  --  389  --   --   --   --   --   --   --   --    IRONSAT  --  15  --   --   --   --   --   --   --   --    DARI  --  34  --  24 28   < >  --   --   --   --     < > = values in this interval not displayed.     Lab Results   Component Value Date    CHOL 206 (H) 02/27/2018    HDL 41 (L) 02/27/2018     (H) 02/27/2018    TRIG 319 (H) 02/27/2018    CHOLHDLRATIO 4.6 10/13/2015       Lab Results   Component Value Date    WBC 7.4 11/08/2018    RBC 4.61 11/08/2018    HGB 13.6 11/08/2018    HCT 42.3 11/08/2018    MCV 92 11/08/2018    MCH 29.5 11/08/2018    MCHC 32.2 11/08/2018    RDW 16.3 (H) 11/08/2018     11/08/2018       Lab Results   Component Value Date     01/07/2019    POTASSIUM 4.2 01/07/2019    CHLORIDE 101 01/07/2019    CO2 28 01/07/2019    ANIONGAP 6 01/07/2019     (H) 01/07/2019    BUN 18 01/07/2019    CR 0.89 01/07/2019    GFRESTIMATED 59 (L) 01/07/2019    GFRESTBLACK 68 01/07/2019    ASTRID 8.8 01/07/2019      Lab Results   Component Value Date    AST 16 10/19/2018    ALT 19 10/19/2018       Lab Results   Component Value Date    A1C 8.2 (H) 10/19/2018       Lab Results   Component Value Date    INR 1.9 (A) 02/04/2019    INR 2.5 (A) 01/07/2019    INR 1.70 (H) 10/22/2018    INR 1.97 (H) 10/21/2018          Problem  List:     Patient Active Problem List   Diagnosis     CHF (congestive heart failure) (H)     CARDIOVASCULAR SCREENING; LDL GOAL LESS THAN 100     Health Care Home     Uterine cancer (H)     Pacemaker     OA (osteoarthritis)     Type 2 diabetes mellitus without complications (H)     DNS (deviated nasal septum)     Atrial fibrillation (H)     Paroxysmal atrial fibrillation (H)     Chronic diastolic congestive heart failure (H)     Hypothyroidism     Long-term (current) use of anticoagulants [Z79.01]     Heart failure (H)            Medications:     Current Outpatient Medications   Medication Sig Dispense Refill     ACE/ARB NOT PRESCRIBED, INTENTIONAL, ACE & ARB not prescribed due to Other: HFpEF       ACETAMINOPHEN PO Take 500 mg by mouth 3 times daily as needed (Takes at least 6 hours apart).        ASPIRIN NOT PRESCRIBED, INTENTIONAL, Antiplatelet medication not prescribed intentionally due to Current anticoagulant therapy (warfarin/enoxaparin) 0 each 0     blood glucose (ACCU-CHEK SMARTVIEW) test strip 1 strip by In Vitro route daily 1 Box prn     blood glucose (NO BRAND SPECIFIED) lancets standard Use to test blood sugar 1 times daily or as directed. 100 each 11     blood glucose monitoring (ACCU-CHEK FASTCLIX) lancets Use to check blood sugars daily 1 Box prn     blood glucose monitoring (NO BRAND SPECIFIED) meter device kit Use to test blood sugar one times daily or as directed. 1 kit 0     blood glucose monitoring (NO BRAND SPECIFIED) test strip Use to test blood sugars one times daily or as directed .has type 2 diabetes and not on insulin 100 strip 1     Blood Glucose Monitoring Suppl (ACCU-CHEK LAKIA SMARTVIEW) W/DEVICE KIT 1 Device daily. 1 kit 0     diltiazem (DILACOR XR) 120 MG 24 hr capsule Take 1 capsule (120 mg) by mouth At Bedtime 30 capsule 0     furosemide (LASIX) 20 MG tablet Take 1 tablet (20 mg) by mouth daily 90 tablet 3     glimepiride (AMARYL) 2 MG tablet Take 1 tablet (2 mg) by mouth 2 times  daily 180 tablet 3     levothyroxine (SYNTHROID/LEVOTHROID) 88 MCG tablet Take 1 tablet (88 mcg) by mouth daily Due for labs please make appointment 90 tablet 0     metFORMIN (GLUCOPHAGE) 500 MG tablet Take 1 tablet (500 mg) by mouth 2 times daily (with meals) 180 tablet 3     metoprolol succinate (TOPROL-XL) 100 MG 24 hr tablet Take 1 tablet (100 mg) by mouth 2 times daily 180 tablet 3     multivitamin  with lutein (OCUVITE WITH LTEIN) CAPS per capsule Take 1 capsule by mouth daily       STATIN NOT PRESCRIBED, INTENTIONAL, continuous prn. Statin not prescribed intentionally due to Other: Not needed, LDL at goal <100mg/dL without therapy 0 each 0     warfarin (COUMADIN) 5 MG tablet TAKE 1/2 TO 1 TABLET BY MOUTH DAILY OR AS DIRECTED BY INR CLINIC (Patient taking differently: Take 2.5-5 mg by mouth daily TAKE 1/2 TO 1 TABLET BY MOUTH DAILY OR AS DIRECTED BY INR CLINIC) 90 tablet 3     spironolactone (ALDACTONE) 25 MG tablet Take 1 tablet (25 mg) by mouth daily (Patient not taking: Reported on 2/7/2019) 30 tablet 1          Past Medical History:     Past Medical History:   Diagnosis Date     Atrial fibrillation (H)     Nl LVEF, s/p ablation      Congestive heart failure, unspecified      Diabetes mellitus (H) 2004     Hemorrhoids     intermittent bleeding     Hypertension      Macular degeneration      OA (osteoarthritis)      Pacemaker 3/2013     Uterine cancer (H)     s/p hyst     Past Surgical History:   Procedure Laterality Date     C ANESTH,PACEMAKER INSERTION      dual chamber 3/27/13     CHOLECYSTECTOMY  8/2004     GYN SURGERY       HYSTERECTOMY      Ut ca      JOINT REPLACEMENT       KERATOTOMY ARCUATE WITH FEMTOSECOND LASER/IMAGING FOR ATIOL Right 4/11/2017    Procedure: KERATOTOMY ARCUATE WITH FEMTOSECOND LASER/IMAGING FOR ATIOL;  Surgeon: Calvin Dominguez MD;  Location: [facility]     PHACOEMULSIFICATION CLEAR CORNEA WITH STANDARD INTRAOCULAR LENS IMPLANT Right 4/11/2017    Procedure: PHACOEMULSIFICATION  CLEAR CORNEA WITH STANDARD INTRAOCULAR LENS IMPLANT;  Surgeon: Calvin Dominguez MD;  Location: [facility]     Family History   Problem Relation Age of Onset     Gastrointestinal Disease Mother         bowel obstruction     Cardiovascular Father      C.A.D. Father      Cardiovascular Brother         CHF     Social History     Socioeconomic History     Marital status: Single     Spouse name: Not on file     Number of children: Not on file     Years of education: Not on file     Highest education level: Not on file   Social Needs     Financial resource strain: Not on file     Food insecurity - worry: Not on file     Food insecurity - inability: Not on file     Transportation needs - medical: Not on file     Transportation needs - non-medical: Not on file   Occupational History     Not on file   Tobacco Use     Smoking status: Former Smoker     Last attempt to quit: 1990     Years since quittin.1     Smokeless tobacco: Never Used   Substance and Sexual Activity     Alcohol use: No     Alcohol/week: 0.0 oz     Drug use: No     Sexual activity: Not Currently     Partners: Male   Other Topics Concern     Parent/sibling w/ CABG, MI or angioplasty before 65F 55M? No      Service Not Asked     Blood Transfusions Not Asked     Caffeine Concern Yes     Comment: daily      Occupational Exposure Not Asked     Hobby Hazards Not Asked     Sleep Concern No     Stress Concern Not Asked     Weight Concern Not Asked     Special Diet Yes     Comment: low sodium, less leafy greens, low sugar     Back Care Not Asked     Exercise No     Bike Helmet Not Asked     Seat Belt Yes     Self-Exams Not Asked   Social History Narrative     Not on file            Allergies:   Shellfish allergy; Ambien [zolpidem]; Cats; Lisinopril; No clinical screening - see comments; Seasonal allergies; and Sulfa drugs      Eliz Goodwin PA-C  [facility]  Pager: [phone]    Thank you for allowing me to participate in  the care of your patient.    Sincerely,     Eliz Goodwin PA-C     [facility]

## 2019-02-07 NOTE — PATIENT INSTRUCTIONS
Call CORE nurse for any questions or concerns Mon-Fri 8am-4pm:                                                [phone]                                       For concerns after hours:                                                 [phone]     Medication changes: No changes today. Let's have you continue to take the extra dose of Lasix as needed for leg swelling. If you notice that you're needing to do this more than 3x weekly, please call me.   Lab results: I will call you about the kidney function test. Remember to have your thyroid test re-checked in one month with your PCP.    We will plan to see you back in 3 months, as long as everything continues to go well!

## 2019-02-25 ENCOUNTER — ANTICOAGULATION THERAPY VISIT (OUTPATIENT)
Dept: NURSING | Facility: CLINIC | Age: 84
End: 2019-02-25
Payer: MEDICARE

## 2019-02-25 LAB — INR POINT OF CARE: 1.6 (ref 0.86–1.14)

## 2019-02-25 PROCEDURE — 85610 PROTHROMBIN TIME: CPT | Mod: QW

## 2019-02-25 PROCEDURE — 36416 COLLJ CAPILLARY BLOOD SPEC: CPT

## 2019-02-25 PROCEDURE — 99207 ZZC NO CHARGE NURSE ONLY: CPT

## 2019-02-25 NOTE — PROGRESS NOTES
ANTICOAGULATION FOLLOW-UP CLINIC VISIT    Patient Name:  Shelly Rogers  Date:  2019  Contact Type:  Face to Face    SUBJECTIVE:     Patient Findings     Positives:   Change in diet/appetite           OBJECTIVE    INR Protime   Date Value Ref Range Status   2019 1.6 (A) 0.86 - 1.14 Final       ASSESSMENT / PLAN  INR assessment SUB    Recheck INR In: 1 WEEK    INR Location Clinic      Anticoagulation Summary  As of 2019    INR goal:   2.0-3.0   TTR:   81.7 % (2.7 y)   INR used for dosin.6! (2019)   Warfarin maintenance plan:   5 mg (5 mg x 1) every Mon, Wed, Fri; 2.5 mg (5 mg x 0.5) all other days   Full warfarin instructions:   : 7.5 mg; Otherwise 5 mg every Mon, Wed, Fri; 2.5 mg all other days   Weekly warfarin total:   25 mg   Plan last modified:   Bree Delgadillo RN (2019)   Next INR check:   3/5/2019   Priority:   INR   Target end date:       Indications    Long-term (current) use of anticoagulants [Z79.01] [Z79.01]  Atrial fibrillation (H) [I48.91]             Anticoagulation Episode Summary     INR check location:       Preferred lab:       Send INR reminders to:   [facility]    Comments:         Anticoagulation Care Providers     Provider Role Specialty Phone number    Halley Huff MD [facility] [phone]            See the Encounter Report to view Anticoagulation Flowsheet and Dosing Calendar (Go to Encounters tab in chart review, and find the Anticoagulation Therapy Visit)    Pt is 1.6 today. Pt is here with her daughter. Pt reports that her diet has changed. She is not eating as much food and she has cut salt out of her diet. Pt advised to take 7.5 mg tonight 19 (1 1/2 tablet) then 5 mg on , and  then 2.5 mg all the other days. Recheck in 1 week. Shelly aware if signs of clotting (pain, tenderness, swelling, color change in leg or arm, SOB) and bleeding occur (blood in stool, urine, large bruising, bleeding  gums, nosebleeds) to have INR check sooner. If sx severe report to ER or concerned for stroke call 911. If general questions or concerns arise, call clinic.         Bree Delgadillo RN

## 2019-02-26 ENCOUNTER — TELEPHONE (OUTPATIENT)
Dept: CARDIOLOGY | Facility: CLINIC | Age: 84
End: 2019-02-26

## 2019-02-26 NOTE — TELEPHONE ENCOUNTER
"Messaged device team to send today's remote device/rate histogram to Eliz Goodwin PAC. LVM for dtr Sandhya that will await today's device check and, per Eliz, if rates ok will not need addnl heart monitor.       ----- Message from Eliz Goodwin PA-C sent at 2/26/2019 12:32 PM CST -----  Abdon Nunez,  Let's ask the device nurses to get a rate histogram on her remote check today. If that looks OK, then no need.  Thanks.    ----- Message -----  From: Mayra Garrett, RN  Sent: 2/26/2019  11:40 AM  To: Indy Alvarado, KIMBERLY Macias!    Did you want another ziopatch for Shelly? She last wore one 11/2/18 which showed avg HR 80 AF. Your note from 2/7 states, \"Will continue rate control strategy with Toprol, continue to monitor on pacemaker interrogations.\"  You see her back 5/1.     Mayra Ibanez  ----- Message -----  From: Indy Alvarado  Sent: 2/26/2019  10:42 AM  To: [facility] Nurse    Eliz Goodwin placed order for ziopatch. Is it still needed? Pt wore 1 in Nov. Please call daughter to discuss. I did make follow-up with core in May.    Holly Garrett RN BSN   1:14 PM 02/26/19              "

## 2019-03-04 NOTE — TELEPHONE ENCOUNTER
FYI: patient's remote check did not come through last week. She was having trouble with her remote monitor and had to call tech support. We will send the HR histogram to Eliz COSTELLO when the problem is resolved and the remote check is received.

## 2019-03-05 ENCOUNTER — ANTICOAGULATION THERAPY VISIT (OUTPATIENT)
Dept: NURSING | Facility: CLINIC | Age: 84
End: 2019-03-05
Payer: MEDICARE

## 2019-03-05 LAB — INR POINT OF CARE: 2.6 (ref 0.86–1.14)

## 2019-03-05 PROCEDURE — 36416 COLLJ CAPILLARY BLOOD SPEC: CPT

## 2019-03-05 PROCEDURE — 85610 PROTHROMBIN TIME: CPT | Mod: QW

## 2019-03-05 NOTE — PROGRESS NOTES
ANTICOAGULATION FOLLOW-UP CLINIC VISIT    Patient Name:  Shelly Rogers  Date:  3/5/2019  Contact Type:  Face to Face    SUBJECTIVE:     Patient Findings     Positives:   No Problem Findings           OBJECTIVE    INR Protime   Date Value Ref Range Status   2019 2.6 (A) 0.86 - 1.14 Final       ASSESSMENT / PLAN  INR assessment THER    Recheck INR In: 2 WEEKS    INR Location Clinic      Anticoagulation Summary  As of 3/5/2019    INR goal:   2.0-3.0   TTR:   81.5 % (2.8 y)   INR used for dosin.6 (3/5/2019)   Warfarin maintenance plan:   5 mg (5 mg x 1) every Mon, Wed, Fri; 2.5 mg (5 mg x 0.5) all other days   Full warfarin instructions:   5 mg every Mon, Wed, Fri; 2.5 mg all other days   Weekly warfarin total:   25 mg   No change documented:   Bree Delgadillo RN   Plan last modified:   Bree Delgadillo RN (2019)   Next INR check:   3/19/2019   Priority:   INR   Target end date:       Indications    Long-term (current) use of anticoagulants [Z79.01] [Z79.01]  Atrial fibrillation (H) [I48.91]             Anticoagulation Episode Summary     INR check location:       Preferred lab:       Send INR reminders to:   [facility]    Comments:         Anticoagulation Care Providers     Provider Role Specialty Phone number    Halley Huff MD [facility] [phone]            See the Encounter Report to view Anticoagulation Flowsheet and Dosing Calendar (Go to Encounters tab in chart review, and find the Anticoagulation Therapy Visit)    Pt is 2.6 today. Pt and her granddaughter advised to continue maintenance dose of 5 mg on ,  and  and 2.5 mg all the other days. Recheck in 2 weeks. Pt denies any changes in health. Shelly aware if signs of clotting (pain, tenderness, swelling, color change in leg or arm, SOB) and bleeding occur (blood in stool, urine, large bruising, bleeding gums, nosebleeds) to have INR check sooner. If sx severe report to ER or  concerned for stroke call 911. If general questions or concerns arise, call clinic.         Bree Delgadillo RN

## 2019-03-06 ENCOUNTER — DOCUMENTATION ONLY (OUTPATIENT)
Dept: CARDIOLOGY | Facility: CLINIC | Age: 84
End: 2019-03-06

## 2019-03-06 NOTE — TELEPHONE ENCOUNTER
Patient missed [facility] transmission on 2/26/19. Sent letter 2/27, no response. Sent 1 week letter today

## 2019-03-17 DIAGNOSIS — E03.9 HYPOTHYROIDISM, UNSPECIFIED TYPE: ICD-10-CM

## 2019-03-18 NOTE — TELEPHONE ENCOUNTER
"Last Written Prescription Date:  12/12/2018  Last Fill Quantity: 90,  # refills: 0   Last office visit: 10/26/2018 with prescribing provider:  Daria   Future Office Visit:      Requested Prescriptions   Pending Prescriptions Disp Refills     levothyroxine (SYNTHROID/LEVOTHROID) 88 MCG tablet [Pharmacy Med Name: LEVOTHYROXINE 0.088MG (88MCG) TAB] 90 tablet 0     Sig: TAKE 1 TABLET(88 MCG) BY MOUTH DAILY    Thyroid Protocol Failed - 3/17/2019 11:05 AM       Failed - Normal TSH on file in past 12 months    Recent Labs   Lab Test 01/07/19  1528   TSH 5.89*             Passed - Patient is 12 years or older       Passed - Recent (12 mo) or future (30 days) visit within the authorizing provider's specialty    Patient had office visit in the last 12 months or has a visit in the next 30 days with authorizing provider or within the authorizing provider's specialty.  See \"Patient Info\" tab in inbasket, or \"Choose Columns\" in Meds & Orders section of the refill encounter.             Passed - Medication is active on med list       Passed - No active pregnancy on record    If patient is pregnant or has had a positive pregnancy test, please check TSH.         Passed - No positive pregnancy test in past 12 months    If patient is pregnant or has had a positive pregnancy test, please check TSH.            "

## 2019-03-19 ENCOUNTER — ANTICOAGULATION THERAPY VISIT (OUTPATIENT)
Dept: NURSING | Facility: CLINIC | Age: 84
End: 2019-03-19
Payer: MEDICARE

## 2019-03-19 DIAGNOSIS — E03.9 HYPOTHYROIDISM, UNSPECIFIED TYPE: ICD-10-CM

## 2019-03-19 LAB — INR POINT OF CARE: 2.4 (ref 0.86–1.14)

## 2019-03-19 PROCEDURE — 84443 ASSAY THYROID STIM HORMONE: CPT | Performed by: INTERNAL MEDICINE

## 2019-03-19 PROCEDURE — 36416 COLLJ CAPILLARY BLOOD SPEC: CPT

## 2019-03-19 PROCEDURE — 85610 PROTHROMBIN TIME: CPT | Mod: QW

## 2019-03-19 NOTE — PROGRESS NOTES
ANTICOAGULATION FOLLOW-UP CLINIC VISIT    Patient Name:  Shelly Rogers  Date:  3/19/2019  Contact Type:  Face to Face    SUBJECTIVE:     Patient Findings            OBJECTIVE    INR Protime   Date Value Ref Range Status   2019 2.4 (A) 0.86 - 1.14 Final       ASSESSMENT / PLAN  INR assessment THER    Recheck INR In: 3 WEEKS    INR Location Clinic      Anticoagulation Summary  As of 3/19/2019    INR goal:   2.0-3.0   TTR:   81.7 % (2.8 y)   INR used for dosin.4 (3/19/2019)   Warfarin maintenance plan:   5 mg (5 mg x 1) every Mon, Wed, Fri; 2.5 mg (5 mg x 0.5) all other days   Full warfarin instructions:   5 mg every Mon, Wed, Fri; 2.5 mg all other days   Weekly warfarin total:   25 mg   No change documented:   Bree Delgadillo RN   Plan last modified:   Bree Delgadillo RN (2019)   Next INR check:   2019   Priority:   INR   Target end date:       Indications    Long-term (current) use of anticoagulants [Z79.01] [Z79.01]  Atrial fibrillation (H) [I48.91]             Anticoagulation Episode Summary     INR check location:       Preferred lab:       Send INR reminders to:    ANTICOAGULATION    Comments:         Anticoagulation Care Providers     Provider Role Specialty Phone number    Halley Huff MD [facility] Internal Medicine [phone]            See the Encounter Report to view Anticoagulation Flowsheet and Dosing Calendar (Go to Encounters tab in chart review, and find the Anticoagulation Therapy Visit)    Pt is 2.4 today. Pt is here with her daughter. Pt advised to continue maintenance dose of 5 mg on ,,  and 2.5 mg all the other days. Recheck in 3 weeks. Pt sent to lab for TSH since she was overdue. Pt has requested a refill of levothyroxine but advised that she needs to wait until  gets the result of the TSH to determine levothyroxine dose. Pt reports that she has about 1 week of levothyroxine medication for now. Will send a reminder to PCP. Pt  denies any changes in diet,activity,medication or health. Shelly and her daughter aware if signs of clotting (pain, tenderness, swelling, color change in leg or arm, SOB) and bleeding occur (blood in stool, urine, large bruising, bleeding gums, nosebleeds) to have INR check sooner. If sx severe report to ER or concerned for stroke call 911. If general questions or concerns arise, call clinic.         Bree Delgadillo RN

## 2019-03-19 NOTE — TELEPHONE ENCOUNTER
Need 2 month TSH recheck as noted in lab 1/7/19 By PCP.  Pt is scheduled for INR today.  Added in apt notes to go to lab after-  Added her in to lab apt also.    Waiting for results for refill.      Sara Steele RN

## 2019-03-19 NOTE — LETTER
[address] [facility]  [address]  [place]  [address]  Tel: [phone]    March 20, 2019    Shelly Rogers  [address] [place] [id number]        Dear Ms. Rogers,    This is to inform you regarding your test result.    TSH which is thyroid hormone is normal.  Stay on current dose of levothyroxine.  Recheck TSH in 6 months    Sincerely,    Halley Huff MD/LIZETH          Enclosure: Lab Results  Results for orders placed or performed in visit on 03/19/19   TSH with free T4 reflex   Result Value Ref Range    TSH 3.72 0.40 - 4.00 mU/L

## 2019-03-20 ENCOUNTER — ANCILLARY PROCEDURE (OUTPATIENT)
Dept: CARDIOLOGY | Facility: CLINIC | Age: 84
End: 2019-03-20
Attending: INTERNAL MEDICINE
Payer: MEDICARE

## 2019-03-20 ENCOUNTER — TELEPHONE (OUTPATIENT)
Dept: CARDIOLOGY | Facility: CLINIC | Age: 84
End: 2019-03-20

## 2019-03-20 DIAGNOSIS — I48.91 ATRIAL FIBRILLATION (H): Primary | ICD-10-CM

## 2019-03-20 DIAGNOSIS — I48.91 ATRIAL FIBRILLATION (H): ICD-10-CM

## 2019-03-20 LAB — TSH SERPL DL<=0.005 MIU/L-ACNC: 3.72 MU/L (ref 0.4–4)

## 2019-03-20 PROCEDURE — 93294 REM INTERROG EVL PM/LDLS PM: CPT | Performed by: INTERNAL MEDICINE

## 2019-03-20 PROCEDURE — 93296 REM INTERROG EVL PM/IDS: CPT | Performed by: INTERNAL MEDICINE

## 2019-03-20 RX ORDER — LEVOTHYROXINE SODIUM 88 UG/1
88 TABLET ORAL DAILY
Qty: 90 TABLET | Refills: 2 | Status: SHIPPED | OUTPATIENT
Start: 2019-03-20 | End: 2019-06-13

## 2019-03-20 NOTE — TELEPHONE ENCOUNTER
Eliz,    Here are the results from your patient's remote transmission. Rate histograms provided below.            [employer] Sensia (D) Remote PPM Device Check  AP: 19%    : 12%  Mode: DDDR        Presenting Rhythm: AFib with VS events, rates 70-145bpm  Heart Rate: Rates   Sensing: stable      Pacing Threshold: stable      Impedance: stable  Battery Status: 3.5-7 years  Atrial Arrhythmia: Patient in mode switch 97% of the time. Longest episode lasted 19 hours 40 minutes, ventricular rates 60-170bpm. Taking Warfarin and Metoprolol.   Ventricular Arrhythmia: 1 ventricular high rate. EGM shows VS events lasting 15 beats, rates 180-210bpm. EF 55-60% (2018).     Care Plan: Will notify Eliz Buddy with results. F/u PPM [facility] q 3 months. BERNICE Decker

## 2019-03-20 NOTE — RESULT ENCOUNTER NOTE
Please notify patient by sending following letter with copy of test results      Nayely Shelly,    This is to inform you regarding your test result.    TSH which is thyroid hormone is normal.  Stay on current dose of levothyroxine.  Recheck TSH in 6 months      Sincerely,      Dr.Nasima Daria MD,FACP

## 2019-03-24 NOTE — TELEPHONE ENCOUNTER
Thanks, Roopa. Can I review the EGM for the VS high rate you listed below? (VT vs RVR)  She needs to start diltiazem 120 mg at bedtime (on pt's med list, but she believes she was never taking). Can you relay this to her, Bree? Thank you.

## 2019-03-25 RX ORDER — DILTIAZEM HYDROCHLORIDE 120 MG/1
120 CAPSULE, EXTENDED RELEASE ORAL AT BEDTIME
Qty: 90 CAPSULE | Refills: 3 | Status: SHIPPED | OUTPATIENT
Start: 2019-03-25 | End: 2019-05-01

## 2019-03-25 NOTE — TELEPHONE ENCOUNTER
Call to patient, spoke w/her daughter. Reviewed results of latest remote transmission and recommendations for diltiazem her Eliz Goodwin PA-C. She verbalized understanding and requested Rx be [facility] to [facility].  Bree Pang RN on 3/25/2019 at 2:55 PM

## 2019-03-26 ENCOUNTER — TELEPHONE (OUTPATIENT)
Dept: FAMILY MEDICINE | Facility: CLINIC | Age: 84
End: 2019-03-26

## 2019-03-26 NOTE — TELEPHONE ENCOUNTER
Reason for Call:  Other patient request    Detailed comments: patient needs to change her pharmacy for all prescriptions from her on out.    New pharmacy:    [facility] in [place]    Please do not use any other pharmacy for prescriptions.    Phone Number Patient can be reached at: Home number on file [phone] (home)    Best Time: anytime    Can we leave a detailed message on this number? YES    Call taken on 3/26/2019 at 8:47 AM by Bita Alvarez  .

## 2019-03-29 NOTE — TELEPHONE ENCOUNTER
Looks like VT. EF preserved, on BB therapy. As long as she's asymptomatic, no changes at this time. Thanks.

## 2019-04-05 LAB
MDC_IDC_LEAD_IMPLANT_DT: NORMAL
MDC_IDC_LEAD_IMPLANT_DT: NORMAL
MDC_IDC_LEAD_LOCATION: NORMAL
MDC_IDC_LEAD_LOCATION: NORMAL
MDC_IDC_LEAD_MFG: NORMAL
MDC_IDC_LEAD_MFG: NORMAL
MDC_IDC_LEAD_MODEL: NORMAL
MDC_IDC_LEAD_MODEL: NORMAL
MDC_IDC_LEAD_POLARITY_TYPE: NORMAL
MDC_IDC_LEAD_POLARITY_TYPE: NORMAL
MDC_IDC_LEAD_SERIAL: NORMAL
MDC_IDC_LEAD_SERIAL: NORMAL
MDC_IDC_MSMT_BATTERY_DTM: NORMAL
MDC_IDC_MSMT_BATTERY_IMPEDANCE: 952 OHM
MDC_IDC_MSMT_BATTERY_REMAINING_LONGEVITY: 63 MO
MDC_IDC_MSMT_BATTERY_STATUS: NORMAL
MDC_IDC_MSMT_BATTERY_VOLTAGE: 2.78 V
MDC_IDC_MSMT_LEADCHNL_RA_IMPEDANCE_VALUE: 410 OHM
MDC_IDC_MSMT_LEADCHNL_RA_PACING_THRESHOLD_AMPLITUDE: 0.88 V
MDC_IDC_MSMT_LEADCHNL_RA_PACING_THRESHOLD_PULSEWIDTH: 0.4 MS
MDC_IDC_MSMT_LEADCHNL_RV_IMPEDANCE_VALUE: 454 OHM
MDC_IDC_MSMT_LEADCHNL_RV_PACING_THRESHOLD_AMPLITUDE: 0.5 V
MDC_IDC_MSMT_LEADCHNL_RV_PACING_THRESHOLD_PULSEWIDTH: 0.4 MS
MDC_IDC_PG_IMPLANT_DTM: NORMAL
MDC_IDC_PG_MFG: NORMAL
MDC_IDC_PG_MODEL: NORMAL
MDC_IDC_PG_SERIAL: NORMAL
MDC_IDC_PG_TYPE: NORMAL
MDC_IDC_SESS_CLINIC_NAME: NORMAL
MDC_IDC_SESS_DTM: NORMAL
MDC_IDC_SESS_TYPE: NORMAL
MDC_IDC_SET_BRADY_AT_MODE_SWITCH_MODE: NORMAL
MDC_IDC_SET_BRADY_AT_MODE_SWITCH_RATE: 175 {BEATS}/MIN
MDC_IDC_SET_BRADY_LOWRATE: 60 {BEATS}/MIN
MDC_IDC_SET_BRADY_MAX_SENSOR_RATE: 120 {BEATS}/MIN
MDC_IDC_SET_BRADY_MAX_TRACKING_RATE: 120 {BEATS}/MIN
MDC_IDC_SET_BRADY_MODE: NORMAL
MDC_IDC_SET_BRADY_PAV_DELAY_LOW: 350 MS
MDC_IDC_SET_BRADY_SAV_DELAY_LOW: 350 MS
MDC_IDC_SET_LEADCHNL_RA_PACING_AMPLITUDE: 1.5 V
MDC_IDC_SET_LEADCHNL_RA_PACING_ANODE_ELECTRODE_1: NORMAL
MDC_IDC_SET_LEADCHNL_RA_PACING_ANODE_LOCATION_1: NORMAL
MDC_IDC_SET_LEADCHNL_RA_PACING_CAPTURE_MODE: NORMAL
MDC_IDC_SET_LEADCHNL_RA_PACING_CATHODE_ELECTRODE_1: NORMAL
MDC_IDC_SET_LEADCHNL_RA_PACING_CATHODE_LOCATION_1: NORMAL
MDC_IDC_SET_LEADCHNL_RA_PACING_POLARITY: NORMAL
MDC_IDC_SET_LEADCHNL_RA_PACING_PULSEWIDTH: 0.4 MS
MDC_IDC_SET_LEADCHNL_RA_SENSING_ANODE_ELECTRODE_1: NORMAL
MDC_IDC_SET_LEADCHNL_RA_SENSING_ANODE_LOCATION_1: NORMAL
MDC_IDC_SET_LEADCHNL_RA_SENSING_CATHODE_ELECTRODE_1: NORMAL
MDC_IDC_SET_LEADCHNL_RA_SENSING_CATHODE_LOCATION_1: NORMAL
MDC_IDC_SET_LEADCHNL_RA_SENSING_POLARITY: NORMAL
MDC_IDC_SET_LEADCHNL_RA_SENSING_SENSITIVITY: 0.18 MV
MDC_IDC_SET_LEADCHNL_RV_PACING_AMPLITUDE: 2 V
MDC_IDC_SET_LEADCHNL_RV_PACING_ANODE_ELECTRODE_1: NORMAL
MDC_IDC_SET_LEADCHNL_RV_PACING_ANODE_LOCATION_1: NORMAL
MDC_IDC_SET_LEADCHNL_RV_PACING_CAPTURE_MODE: NORMAL
MDC_IDC_SET_LEADCHNL_RV_PACING_CATHODE_ELECTRODE_1: NORMAL
MDC_IDC_SET_LEADCHNL_RV_PACING_CATHODE_LOCATION_1: NORMAL
MDC_IDC_SET_LEADCHNL_RV_PACING_POLARITY: NORMAL
MDC_IDC_SET_LEADCHNL_RV_PACING_PULSEWIDTH: 0.4 MS
MDC_IDC_SET_LEADCHNL_RV_SENSING_ANODE_ELECTRODE_1: NORMAL
MDC_IDC_SET_LEADCHNL_RV_SENSING_ANODE_LOCATION_1: NORMAL
MDC_IDC_SET_LEADCHNL_RV_SENSING_CATHODE_ELECTRODE_1: NORMAL
MDC_IDC_SET_LEADCHNL_RV_SENSING_CATHODE_LOCATION_1: NORMAL
MDC_IDC_SET_LEADCHNL_RV_SENSING_POLARITY: NORMAL
MDC_IDC_SET_LEADCHNL_RV_SENSING_SENSITIVITY: 1.4 MV
MDC_IDC_SET_ZONE_DETECTION_INTERVAL: 342.86 MS
MDC_IDC_SET_ZONE_DETECTION_INTERVAL: 342.86 MS
MDC_IDC_SET_ZONE_TYPE: NORMAL
MDC_IDC_SET_ZONE_TYPE: NORMAL
MDC_IDC_STAT_AT_BURDEN_PERCENT: 97.3 %
MDC_IDC_STAT_AT_DTM_END: NORMAL
MDC_IDC_STAT_AT_DTM_START: NORMAL
MDC_IDC_STAT_AT_MODE_SW_COUNT: 4957
MDC_IDC_STAT_BRADY_AP_VP_PERCENT: 10 %
MDC_IDC_STAT_BRADY_AP_VS_PERCENT: 9 %
MDC_IDC_STAT_BRADY_AS_VP_PERCENT: 3 %
MDC_IDC_STAT_BRADY_AS_VS_PERCENT: 78 %
MDC_IDC_STAT_BRADY_DTM_END: NORMAL
MDC_IDC_STAT_BRADY_DTM_START: NORMAL
MDC_IDC_STAT_EPISODE_RECENT_COUNT: 1
MDC_IDC_STAT_EPISODE_RECENT_COUNT: NORMAL
MDC_IDC_STAT_EPISODE_RECENT_COUNT_DTM_END: NORMAL
MDC_IDC_STAT_EPISODE_RECENT_COUNT_DTM_END: NORMAL
MDC_IDC_STAT_EPISODE_RECENT_COUNT_DTM_START: NORMAL
MDC_IDC_STAT_EPISODE_RECENT_COUNT_DTM_START: NORMAL
MDC_IDC_STAT_EPISODE_TYPE: NORMAL
MDC_IDC_STAT_EPISODE_TYPE: NORMAL

## 2019-04-22 ENCOUNTER — CARE COORDINATION (OUTPATIENT)
Dept: CARDIOLOGY | Facility: CLINIC | Age: 84
End: 2019-04-22

## 2019-04-22 NOTE — PROGRESS NOTES
Incoming call from pt's dtr Sandhya. Sandhya reported that pt does not want to add another med, and that diltiazem was cost prohibitive. Dtr wonders how to proceed.    Note that cardiac meds on pt's Epic list currently:  -lasix 20mg daily  -metoprolol 100mg BID  -warfarin as directed by INR clinic    Sandhya stated that pt is feeling better than she has in some time. Wt has been ranging around 155#, which is stable from last OV 2/7/19, and dry wt noted to be ~160#. Sandhya told me that pt has no SOB/swelling and is meticulous about her diet. Briefly discussed the relationship between fast HRs and HF.     Eliz COSTELLO ordered diltiazem 3/24 d/t for VT on device check.     Sandhya gave me pt's number ([phone]) so that I could call to discuss her preferences. I called, no answer and no option for VM. Will reach out to her again later today or tomorrow.     Discussed w/ Sandhya that pt does have follow-up w/ Eliz COSTELLO 5/1.     Mayra Garrett RN BSN   11:55 AM 04/22/19      Will await review of above at [facility] follow-up tomorrow.     Mayra Garrett RN BSN   3:20 PM 04/30/19

## 2019-04-24 DIAGNOSIS — E11.9 TYPE 2 DIABETES MELLITUS WITHOUT COMPLICATION, WITHOUT LONG-TERM CURRENT USE OF INSULIN (H): ICD-10-CM

## 2019-04-25 RX ORDER — GLIMEPIRIDE 2 MG/1
2 TABLET ORAL 2 TIMES DAILY
Qty: 60 TABLET | Refills: 0 | Status: SHIPPED | OUTPATIENT
Start: 2019-04-25 | End: 2019-05-14

## 2019-04-25 NOTE — TELEPHONE ENCOUNTER
"Last Written Prescription Date:  2/27/18  Last Fill Quantity: 180 tablet,  # refills: 3   Last office visit: 6/12/2018 with prescribing provider:  Daria   Future Office Visit:      Requested Prescriptions   Pending Prescriptions Disp Refills     glimepiride (AMARYL) 2 MG tablet 180 tablet 3     Sig: Take 1 tablet (2 mg) by mouth 2 times daily       Sulfonylurea Agents Failed - 4/24/2019  8:46 PM        Failed - Patient has documented LDL within the past 12 mos.     Recent Labs   Lab Test 02/27/18  1541   *             Failed - Patient has documented A1c within the specified period of time.     If HgbA1C is 8 or greater, it needs to be on file within the past 3 months.  If less than 8, must be on file within the past 6 months.     Recent Labs   Lab Test 10/19/18  0620   A1C 8.2*             Passed - Blood pressure less than 140/90 in past 6 months     BP Readings from Last 3 Encounters:   02/07/19 120/76   01/10/19 126/80   11/14/18 128/80                 Passed - Patient has had a Microalbumin in the past 12 mos.     Recent Labs   Lab Test 06/12/18  1634   MICROL 112   UMALCR 97.39*             Passed - Medication is active on med list        Passed - Patient is age 18 or older        Passed - No active pregnancy on record        Passed - Patient has a recent creatinine (normal) within the past 12 mos.     Recent Labs   Lab Test 02/07/19  0959   CR 1.29             Passed - Patient has not had a positive pregnancy test within the past 12 mos.        Passed - Recent (6 mo) or future (30 days) visit within the authorizing provider's specialty     Patient had office visit in the last 6 months or has a visit in the next 30 days with authorizing provider or within the authorizing provider's specialty.  See \"Patient Info\" tab in inbasket, or \"Choose Columns\" in Meds & Orders section of the refill encounter.              "

## 2019-04-25 NOTE — TELEPHONE ENCOUNTER
Received refill request from two different [facility] pharmacies, [id number] and [id number]

## 2019-04-25 NOTE — TELEPHONE ENCOUNTER
Medication is being filled for 1 time refill only due to:  Patient needs to be seen because due for fasting physical.   [id number]  [facility]  Adri HOWARD RN

## 2019-04-29 ENCOUNTER — ANTICOAGULATION THERAPY VISIT (OUTPATIENT)
Dept: NURSING | Facility: CLINIC | Age: 84
End: 2019-04-29
Payer: MEDICARE

## 2019-04-29 DIAGNOSIS — I48.91 ATRIAL FIBRILLATION (H): ICD-10-CM

## 2019-04-29 LAB — INR POINT OF CARE: 2.1 (ref 0.86–1.14)

## 2019-04-29 PROCEDURE — 99207 ZZC NO CHARGE NURSE ONLY: CPT

## 2019-04-29 PROCEDURE — 85610 PROTHROMBIN TIME: CPT | Mod: QW

## 2019-04-29 PROCEDURE — 36416 COLLJ CAPILLARY BLOOD SPEC: CPT

## 2019-04-29 NOTE — PROGRESS NOTES
ANTICOAGULATION FOLLOW-UP CLINIC VISIT    Patient Name:  Shelly Rogers  Date:  2019  Contact Type:  Face to Face    SUBJECTIVE:     Patient Findings            OBJECTIVE    INR Protime   Date Value Ref Range Status   2019 2.1 (A) 0.86 - 1.14 Final       ASSESSMENT / PLAN  INR assessment THER    Recheck INR In: 3 WEEKS    INR Location Clinic      Anticoagulation Summary  As of 2019    INR goal:   2.0-3.0   TTR:   82.4 % (2.9 y)   INR used for dosin.1 (2019)   Warfarin maintenance plan:   5 mg (5 mg x 1) every Mon, Wed, Fri; 2.5 mg (5 mg x 0.5) all other days   Full warfarin instructions:   5 mg every Mon, Wed, Fri; 2.5 mg all other days   Weekly warfarin total:   25 mg   Plan last modified:   rBee Delgadillo RN (2019)   Next INR check:   2019   Priority:   INR   Target end date:       Indications    Long-term (current) use of anticoagulants [Z79.01] [Z79.01]  Atrial fibrillation (H) [I48.91]             Anticoagulation Episode Summary     INR check location:       Preferred lab:       Send INR reminders to:    ANTICOAGULATION    Comments:         Anticoagulation Care Providers     Provider Role Specialty Phone number    Halley Huff MD Responsible Internal Medicine [phone]            See the Encounter Report to view Anticoagulation Flowsheet and Dosing Calendar (Go to Encounters tab in chart review, and find the Anticoagulation Therapy Visit)    No changes in meds/diet. No missed/extra warfarin doses. No unusual bruising/bleeding or other changes. Pt will continue same warfarin dose and recheck INR in 3 week(s), or sooner if concerns.     Pt aware if signs of clotting (pain, tenderness, swelling, color change in leg or arm, SOB) and bleeding occur (blood in stool, urine, large bruising, bleeding gums, nosebleeds) to have INR check sooner. If sx severe report to ER or concerned for stroke call 911. If general questions or concerns arise, call clinic.    Odilia MIGUEL  SARINA Youssef

## 2019-05-01 ENCOUNTER — OFFICE VISIT (OUTPATIENT)
Dept: CARDIOLOGY | Facility: CLINIC | Age: 84
End: 2019-05-01
Attending: PHYSICIAN ASSISTANT
Payer: MEDICARE

## 2019-05-01 VITALS
OXYGEN SATURATION: 98 % | HEIGHT: 63 IN | BODY MASS INDEX: 28.7 KG/M2 | DIASTOLIC BLOOD PRESSURE: 76 MMHG | SYSTOLIC BLOOD PRESSURE: 130 MMHG | WEIGHT: 162 LBS | HEART RATE: 88 BPM

## 2019-05-01 DIAGNOSIS — I50.32 CHRONIC DIASTOLIC CONGESTIVE HEART FAILURE (H): ICD-10-CM

## 2019-05-01 LAB
ANION GAP SERPL CALCULATED.3IONS-SCNC: 13.8 MMOL/L (ref 6–17)
BUN SERPL-MCNC: 17 MG/DL (ref 7–30)
CALCIUM SERPL-MCNC: 9.7 MG/DL (ref 8.5–10.5)
CHLORIDE SERPL-SCNC: 100 MMOL/L (ref 98–107)
CO2 SERPL-SCNC: 29 MMOL/L (ref 23–29)
CREAT SERPL-MCNC: 1.05 MG/DL (ref 0.7–1.3)
GFR SERPL CREATININE-BSD FRML MDRD: 50 ML/MIN/{1.73_M2}
GLUCOSE SERPL-MCNC: 231 MG/DL (ref 70–105)
POTASSIUM SERPL-SCNC: 3.8 MMOL/L (ref 3.5–5.1)
SODIUM SERPL-SCNC: 139 MMOL/L (ref 136–145)

## 2019-05-01 PROCEDURE — 80048 BASIC METABOLIC PNL TOTAL CA: CPT | Performed by: PHYSICIAN ASSISTANT

## 2019-05-01 PROCEDURE — 99214 OFFICE O/P EST MOD 30 MIN: CPT | Performed by: PHYSICIAN ASSISTANT

## 2019-05-01 PROCEDURE — 36415 COLL VENOUS BLD VENIPUNCTURE: CPT | Performed by: PHYSICIAN ASSISTANT

## 2019-05-01 ASSESSMENT — MIFFLIN-ST. JEOR: SCORE: 1136.02

## 2019-05-01 NOTE — PATIENT INSTRUCTIONS
Today, we discussed the following:   - Results: The kidney function today is stable.   - Medication changes:  Please increase the metoprolol to 100 mg twice daily.   - Follow up: I'll see you back in another 3 months, Dr. Del Valle in 6 months.    Please call your PCP (Dr. Mac) to set up an office visit for management of your diabetes.     Please, remember to continue the followin.  Weigh yourself daily. Call if your weight is up > than 2 pounds in one day, or 5 pounds in one week; if you feel more short of breath or have worsening swelling in your legs or abdomen.  2.  Eat a low sodium diet (less than 2,000mg or 2g daily). If you eat less salt, you will retain less fluid.   3.  Avoid alcohol, as this can worsen heart failure.   4.  Avoid NSAIDs as able (For example, Ibuprofen / aleve / advil / naprosen / diclofenac).    Please call [facility] nurse for any questions or concerns Mon-Fri 8am-4pm:   [phone]  For concerns after hours:   [phone]   Scheduling phone number:   [phone]    Thank you for visiting with us today.   Eliz Goodwin PA-C  ______________________________________________________________

## 2019-05-01 NOTE — LETTER
5/1/2019    Halley Huff MD  [address]  [place]                [place] [address]    RE: Shelly Rogers       Dear Colleague,    I had the pleasure of seeing Shelly Rogers in the [facility].      Cardiology Clinic Progress Note    Shelly Rogers MRN# [id number]   YOB: 1933 Age: 86 year old     Reason for visit: CORE f/u visit           Assessment and Plan:     In summary, the patient presents today to for a CORE clinic f/u visit.     1. HFpEF   - ECHO: EF 55-60%, E/E' avg 13.2, small pericardial effusion   - ACC/AHA Stage: C; FC II    - Etiology: tachy-mediated, diabetes, SYLVIE; diastolic dysfn contributing to RV failure   - RV: moderate dilated with mildly reduced fn; RVSP 13.5 mmHg   - Valves: moderate TR   - Volume: Euvolemic    Admit Wt: 179 lbs    Current Wt: 162 lbs (154 lbs @ home)    Dry Wt: likely ~ 160 lbs    Diuretics: Lasix 20 mg BID   - Rhythm: PAF with uncontrolled VR and some NSVT on device check.    - QRS: narrow   - Device: Dual chamber PPM   - Other: Uncontrolled DM. Severe mixed sleep apnea, refuses CPAP. Treated thyroid disease.     Plan:  - Increase metoprolol to 100 mg BID reinforced today.   - Discussed low-salt diet, with maintaining adequate protein intake.   - RTC with CORE clinic in 3 months.   - Advised to arrange f/u with PCP for diabetes management.          History of Presenting Illness:      Shelly Rogers is a pleasant 85 year old patient of  Dr. Del Valle who presents today for a CORE clinic f/u visit.    She has a pertinent PMH including:  # HFpEF, initially diagnosed in 2013 in the setting of new-onset rapid AF, controlled with low-dose Lasix since that time, until admission in October 2018. This was felt in the setting of lasix noncompliance and increasing burden of AF. Admit wt was 178 lbs. TTE at that time revealed a preserved LVEF with mild RV dysfunction, moderate TR - overall stable save for a small pericardial effusion. Of  note, the rhythm was rapid AF at the time of the study. She was diuresed, and Toprol was up-titrated. Ultimately discharged with Lasix 20 BID.   # PAF/SSS, s/p PPM and cardioversion in 2013. Rate control strategy with Toprol. Anticoagulated on warfarin, INR followed by PCP.   # NSVT, asymptomatic, short runs noted on device  # Hx of R pleural effusion, s/p thoracentesis 10/2018  # HTN  # Poorly-controlled DMII  # CKD  # Hypothyroidism, on levothyroxine  # Mild hyponatremia  # Severe mixed SYLVIE, refuses CPAP  # Noncompliance  # Social - She lives independently at an assisted living facility and manages her own medications. She has some macular degeneration which has caused some balance issues over time and therefore uses a cane to ambulate when she's out of her home, and doesn't drive. Daughter Sandhya presents with patient. No ETOH or tobacco at present. She's had no falls, and uses fall precautions.   I've been following Shelly along with Dr. Del Valle since her admission in October 2018. She's been doing well on her current diuretic regimen with the addition of sodium counseling. She initially had a 10 lb wt loss after she started monitoring her sodium intake with symptomatic improvement. I obtained a sleep study which showed severe mixed SYLVIE. Unfortunately, she refuses a CPAP. Zio monitor after that showed CAF with an average HR of 80 bpm. She saw Dr. Del Valle in November 2018, and her Toprol was increased from 75 BID to 100 mg BID. When I saw her after that in February, she told me she hadn't started Aldactone nor diltiazem. I checked her device and this showed majority of HR's in the 80's with some short runs of VT. I asked her to start the diltiazem then but she declined.   Today, she's feeling well. She doesn't feel limited by her breathing with ambulation or sleep. Her mild LE edema is at baseline. She tells me she's taking her Lasix 20 mg BID (was ordered as 20 daily + additionally 20 PRN for ankle swelling) and is  "still taking the metoprolol at only 75 mg BID. Weights are stable ~ 159 lbs at home, 162 lbs in clinic. BMP is stable. TSH from mid-March is WNL. INR a few days ago was 2.1.    She's continued to read her labels and is following a <2g/day sodium diet.She tells me she eats a lot of carbs and fruits, but avoids \"sugar.\" She feels she's probably getting some form of protein in every day. Her daughter thinks she appears more agile and spry at this weight, and indeed, the patient denies symptoms consistent with sleep apnea at this time.           Review of Systems:     12-pt ROS is negative except for as noted in the HPI.           Physical Exam:     Vitals: /76   Pulse 88   Ht 1.588 m (5' 2.5\")   Wt 73.5 kg (162 lb)   SpO2 98%   BMI 29.16 kg/m     Wt Readings from Last 3 Encounters:   05/01/19 73.5 kg (162 lb)   02/07/19 73.5 kg (162 lb)   01/10/19 73.3 kg (161 lb 8 oz)       Constitutional:  Patient is pleasant, alert, cooperative, and in NAD.  HEENT:  NCAT. PERRLA. EOM's intact.  Neck:  CVP appears normal  Pulmonary: Normal respiratory effort. CTAB.  Cardiac: Irregularly irregular, grade 2/6 sm at the LSB  Abdomen:  Non-tender abdomen with normoactive bowel sounds, no hepatosplenomegaly appreciated.   Vascular: Pulses in the upper extremities are 2+ and equal, lower extremity pulses are diminished bilaterally.  Extremities: 1+ pitting edema pedal - knee bilaterally.  Skin:  No rashes or lesions appreciated.   Neurological:  No gross motor or sensory deficits.   Psych: Appropriate affect.        Data:     Cardiac Diagnostics reviewed:  Type Date Result   TTE 10/19/18 The left ventricle is normal in size.  The visual ejection fraction is estimated at 55-60%.  No regional wall motion abnormalities noted.  The right ventricle is moderately dilated.  Mildly decreased right ventricular systolic function  There is moderate (2+) tricuspid regurgitation.  Small pericardial effusion  The rhythm was rapid atrial " fibrillation.    5/5/16 Left ventricular systolic function is normal. The visual ejection fraction is  estimated at 60-65%.  There is mild concentric left ventricular hypertrophy. The left atrium is borderline dilated.  There is trace to mild mitral regurgitation.  There is moderate (2+) tricuspid regurgitation.  Right ventricular systolic pressure is elevated, consistent with mild  pulmonary hypertension.  The rhythm was normal sinus.  Contrast was used without apparent complications. TR may be mildly increased  compared to the prior study.   EKG 10/19/18 AF, low voltage QRS     Device  [facility] PPM interrogation (inpatient)   ~ 2%, Ap ~ 30%     Sleep study  12/17/18 Respiration: Severe sleep disordered breathing best characterized as mixed (combination of both obstructive and central phenomena). Central events were suggestive of Cheyne Simons periodic breathing consisteint with heart disease. She also had sleep related hypoxemia. Of note the patient did not have REM supine during the study.     Events ? The polysomnogram revealed a presence of 26 obstructive, 66 central, and 23 mixed apneas resulting in an apnea index of 89.6 events per hour. There were 2 obstructive hypopneas and 2 central hypopneas resulting in an obstructive hypopnea index of 1.6 and central hypopnea index of 1.6 events per hour. The combined apnea/hypopnea index was 92.7 events per hour (central apnea/hypopnea index was 53.0 events per hour). The REM AHI was 93.9 events per hour. The supine AHI was - events per hour. The RERA index was - events per hour. The RDI was 92.7 events per hour.      Recent Labs   Lab Test 03/19/19  1548 01/07/19  1528 11/08/18  1206 10/19/18  0620  02/27/18  1541  10/13/15  1454 10/30/14  1434  03/21/13  0525   LDL  --   --   --   --   --  101*  --  88 105   < > 58   HDL  --   --   --   --   --  41*  --  44*  --   --  40*   NHDL  --   --   --   --   --  165*  --   --   --   --   --    CHOL  --   --   --   --   --   206*  --  204*  --   --  125   TRIG  --   --   --   --   --  319*  --  359*  --   --  137   TSH 3.72 5.89*  --  6.76*   < > 2.32   < > 1.20 0.97   < >  --    IRON  --   --  57  --   --   --   --   --   --   --   --    FEB  --   --  389  --   --   --   --   --   --   --   --    IRONSAT  --   --  15  --   --   --   --   --   --   --   --    DAIR  --   --  34  --    < > 28   < >  --   --   --   --     < > = values in this interval not displayed.     Lab Results   Component Value Date    CHOL 206 (H) 02/27/2018    HDL 41 (L) 02/27/2018     (H) 02/27/2018    TRIG 319 (H) 02/27/2018    CHOLHDLRATIO 4.6 10/13/2015       Lab Results   Component Value Date    WBC 7.4 11/08/2018    RBC 4.61 11/08/2018    HGB 13.6 11/08/2018    HCT 42.3 11/08/2018    MCV 92 11/08/2018    MCH 29.5 11/08/2018    MCHC 32.2 11/08/2018    RDW 16.3 (H) 11/08/2018     11/08/2018       Lab Results   Component Value Date     05/01/2019    POTASSIUM 3.8 05/01/2019    CHLORIDE 100 05/01/2019    CO2 29 05/01/2019    ANIONGAP 13.8 05/01/2019     (H) 05/01/2019    BUN 17 05/01/2019    CR 1.05 05/01/2019    GFRESTIMATED 50 (L) 05/01/2019    GFRESTBLACK 60 (L) 05/01/2019    ASTRID 9.7 05/01/2019      Lab Results   Component Value Date    AST 16 10/19/2018    ALT 19 10/19/2018       Lab Results   Component Value Date    A1C 8.2 (H) 10/19/2018       Lab Results   Component Value Date    INR 2.1 (A) 04/29/2019    INR 2.4 (A) 03/19/2019    INR 1.70 (H) 10/22/2018    INR 1.97 (H) 10/21/2018          Problem List:     Patient Active Problem List   Diagnosis     CHF (congestive heart failure) (H)     CARDIOVASCULAR SCREENING; LDL GOAL LESS THAN 100     Health Care Home     Uterine cancer (H)     Pacemaker     OA (osteoarthritis)     Type 2 diabetes mellitus without complications (H)     DNS (deviated nasal septum)     Atrial fibrillation (H)     Paroxysmal atrial fibrillation (H)     Chronic diastolic congestive heart failure (H)      Hypothyroidism     Long-term (current) use of anticoagulants [Z79.01]     Heart failure (H)            Medications:     Current Outpatient Medications   Medication Sig Dispense Refill     ACE/ARB NOT PRESCRIBED, INTENTIONAL, ACE & ARB not prescribed due to Other: HFpEF       ACETAMINOPHEN PO Take 500 mg by mouth 3 times daily as needed (Takes at least 6 hours apart).        ASPIRIN NOT PRESCRIBED, INTENTIONAL, Antiplatelet medication not prescribed intentionally due to Current anticoagulant therapy (warfarin/enoxaparin) 0 each 0     blood glucose (ACCU-CHEK SMARTVIEW) test strip 1 strip by In Vitro route daily 1 Box prn     blood glucose (NO BRAND SPECIFIED) lancets standard Use to test blood sugar 1 times daily or as directed. 100 each 11     blood glucose monitoring (ACCU-CHEK FASTCLIX) lancets Use to check blood sugars daily 1 Box prn     blood glucose monitoring (NO BRAND SPECIFIED) meter device kit Use to test blood sugar one times daily or as directed. 1 kit 0     blood glucose monitoring (NO BRAND SPECIFIED) test strip Use to test blood sugars one times daily or as directed .has type 2 diabetes and not on insulin 100 strip 1     Blood Glucose Monitoring Suppl (ACCU-CHEK LAKIA SMARTVIEW) W/DEVICE KIT 1 Device daily. 1 kit 0     furosemide (LASIX) 20 MG tablet Take 20 mg by mouth 2 times daily  90 tablet 3     glimepiride (AMARYL) 2 MG tablet Take 1 tablet (2 mg) by mouth 2 times daily 60 tablet 0     levothyroxine (SYNTHROID/LEVOTHROID) 88 MCG tablet Take 1 tablet (88 mcg) by mouth daily 90 tablet 2     metFORMIN (GLUCOPHAGE) 500 MG tablet Take 1 tablet (500 mg) by mouth 2 times daily (with meals) 180 tablet 3     metoprolol succinate (TOPROL-XL) 100 MG 24 hr tablet Take 1 tablet (100 mg) by mouth 2 times daily (Patient taking differently: Take 75 mg by mouth 2 times daily ) 180 tablet 3     multivitamin  with lutein (OCUVITE WITH LTEIN) CAPS per capsule Take 1 capsule by mouth daily       STATIN NOT  PRESCRIBED, INTENTIONAL, continuous prn. Statin not prescribed intentionally due to Other: Not needed, LDL at goal <100mg/dL without therapy 0 each 0     warfarin (COUMADIN) 5 MG tablet TAKE 1/2 TO 1 TABLET BY MOUTH DAILY OR AS DIRECTED BY INR CLINIC (Patient taking differently: Take 2.5-5 mg by mouth daily TAKE 1/2 TO 1 TABLET BY MOUTH DAILY OR AS DIRECTED BY INR CLINIC) 90 tablet 3     diltiazem (CARDIZEM SR) 120 MG CP12 12 hr SR capsule Take 120 mg by mouth At Bedtime (Patient not taking: Reported on 5/1/2019) 90 capsule 3          Past Medical History:     Past Medical History:   Diagnosis Date     Atrial fibrillation (H)     Nl LVEF, s/p ablation      Congestive heart failure, unspecified      Diabetes mellitus (H) 2004     Hemorrhoids     intermittent bleeding     Hypertension      Macular degeneration      OA (osteoarthritis)      Pacemaker 3/2013     Uterine cancer (H)     s/p hyst     Past Surgical History:   Procedure Laterality Date     C ANESTH,PACEMAKER INSERTION      dual chamber 3/27/13     CHOLECYSTECTOMY  8/2004     GYN SURGERY       HYSTERECTOMY      Ut ca      JOINT REPLACEMENT       KERATOTOMY ARCUATE WITH FEMTOSECOND LASER/IMAGING FOR ATIOL Right 4/11/2017    Procedure: KERATOTOMY ARCUATE WITH FEMTOSECOND LASER/IMAGING FOR ATIOL;  Surgeon: Calvin Dominguez MD;  Location: [facility]     PHACOEMULSIFICATION CLEAR CORNEA WITH STANDARD INTRAOCULAR LENS IMPLANT Right 4/11/2017    Procedure: PHACOEMULSIFICATION CLEAR CORNEA WITH STANDARD INTRAOCULAR LENS IMPLANT;  Surgeon: Calvin Dominguez MD;  Location: [facility]     TKR      bilat     Family History   Problem Relation Age of Onset     Gastrointestinal Disease Mother         bowel obstruction     Cardiovascular Father      C.A.D. Father      Cardiovascular Brother         CHF     Social History     Socioeconomic History     Marital status: Single     Spouse name: Not on file     Number of children: Not on file     Years of education: Not on file      Highest education level: Not on file   Occupational History     Not on file   Social Needs     Financial resource strain: Not on file     Food insecurity:     Worry: Not on file     Inability: Not on file     Transportation needs:     Medical: Not on file     Non-medical: Not on file   Tobacco Use     Smoking status: Former Smoker     Last attempt to quit: 1990     Years since quittin.3     Smokeless tobacco: Never Used   Substance and Sexual Activity     Alcohol use: No     Alcohol/week: 0.0 oz     Drug use: No     Sexual activity: Not Currently     Partners: Male   Lifestyle     Physical activity:     Days per week: Not on file     Minutes per session: Not on file     Stress: Not on file   Relationships     Social connections:     Talks on phone: Not on file     Gets together: Not on file     Attends Congregational service: Not on file     Active member of club or organization: Not on file     Attends meetings of clubs or organizations: Not on file     Relationship status: Not on file     Intimate partner violence:     Fear of current or ex partner: Not on file     Emotionally abused: Not on file     Physically abused: Not on file     Forced sexual activity: Not on file   Other Topics Concern     Parent/sibling w/ CABG, MI or angioplasty before 65F 55M? No      Service Not Asked     Blood Transfusions Not Asked     Caffeine Concern Yes     Comment: daily      Occupational Exposure Not Asked     Hobby Hazards Not Asked     Sleep Concern No     Stress Concern Not Asked     Weight Concern Not Asked     Special Diet Yes     Comment: low sodium, less leafy greens, low sugar     Back Care Not Asked     Exercise No     Bike Helmet Not Asked     Seat Belt Yes     Self-Exams Not Asked   Social History Narrative     Not on file            Allergies:   Shellfish allergy; Ambien [zolpidem]; Cats; Lisinopril; No clinical screening - see comments; Seasonal allergies; and Sulfa drugs      Eliz Goodwin,  KIMBERLY  [facility]  Pager: [phone]      Thank you for allowing me to participate in the care of your patient.    Sincerely,     Eliz Goodwin PA-C     [facility]

## 2019-05-01 NOTE — LETTER
5/1/2019    Halley Huff MD  [address]  [place]                [place] [address]    RE: Shelly Rogers       Dear Colleague,    I had the pleasure of seeing Shelly Rogers in the [facility].      Cardiology Clinic Progress Note    Shelly Rogers MRN# [id number]   YOB: 1933 Age: 86 year old     Reason for visit: CORE f/u visit           Assessment and Plan:     In summary, the patient presents today to for a CORE clinic f/u visit.     1. HFpEF   - ECHO: EF 55-60%, E/E' avg 13.2, small pericardial effusion   - ACC/AHA Stage: C; FC II    - Etiology: tachy-mediated, diabetes, SYLVIE; diastolic dysfn contributing to RV failure   - RV: moderate dilated with mildly reduced fn; RVSP 13.5 mmHg   - Valves: moderate TR   - Volume: Euvolemic    Admit Wt: 179 lbs    Current Wt: 162 lbs (154 lbs @ home)    Dry Wt: likely ~ 160 lbs    Diuretics: Lasix 20 mg BID   - Rhythm: ***.    - QRS: narrow   - Device: Dual chamber PPM   - Other: Uncontrolled DM. Severe mixed sleep apnea, refuses CPAP. Treated thyroid disease.     Plan:  - Increase metoprolol to 100 mg BID reinforced today.   - Discussed low-salt diet, with maintaining adequate protein intake.   - RTC with CORE clinic in 3 months.   - Advised to arrange f/u with PCP for diabetes management.          History of Presenting Illness:      Shelly Rogers is a pleasant 85 year old patient of  Dr. Del Valle who presents today for a CORE clinic f/u visit after a recent admission for HFpEF.    She has a pertinent PMH including:  # HFpEF, initially diagnosed in 2013 in the setting of new-onset rapid AF, controlled with low-dose Lasix since that time, until admission in October 2018. This was felt in the setting of lasix noncompliance and increasing burden of AF. Admit wt was 178 lbs. TTE at that time revealed a preserved LVEF with mild RV dysfunction, moderate TR - overall stable save for a small pericardial effusion. Of note, the rhythm  was rapid AF at the time of the study. She was diuresed, and Toprol was up-titrated. Ultimately discharged with Lasix 20 BID.   # PAF/SSS, s/p PPM and cardioversion in 2013. Rate control strategy with Toprol. Anticoagulated on warfarin, INR followed by PCP.   # NSVT, asymptomatic, short runs noted on device  # Hx of R pleural effusion, s/p thoracentesis 10/2018  # HTN  # Poorly-controlled DMII  # CKD  # Hypothyroidism, on levothyroxine  # Mild hyponatremia  # Severe mixed SYLVIE, refuses CPAP  # Noncompliance  # Social - She lives independently at an assisted living facility and manages her own medications. She has some macular degeneration which has caused some balance issues over time and therefore uses a cane to ambulate when she's out of her home, and doesn't drive. Daughter Sandhya presents with patient. No ETOH or tobacco at present. She's had no falls, and uses fall precautions.   I've been following Shelly along with Dr. Del Valle since her admission in October 2018. She's been doing well on her current diuretic regimen with the addition of sodium counseling. She initially had a 10 lb wt loss after she started monitoring her sodium intake with symptomatic improvement. I obtained a sleep study which showed severe mixed SYLVIE. Unfortunately, she refuses a CPAP. Zio monitor after that showed CAF with an average HR of 80 bpm. She saw Dr. Del Valle in November 2018, and her Toprol was increased from 75 BID to 100 mg BID. When I saw her after that in February, she told me she hadn't started Aldactone nor diltiazem. I checked her device and this showed majority of HR's in the 80's with some short runs of VT. I asked her to start the diltiazem then but she declined.   Today, she's feeling well. She doesn't feel limited by her breathing with ambulation or sleep. Her mild LE edema is at baseline. She tells me she's taking her Lasix 20 mg BID (was ordered as 20 daily + additionally 20 PRN for ankle swelling) and is still taking the  "metoprolol at only 75 mg BID. Weights are stable ~ 159 lbs at home, 162 lbs in clinic. BMP is stable. TSH from mid-March is WNL. INR a few days ago was 2.1.    She's continued to read her labels and is following a <2g/day sodium diet.She tells me she eats a lot of carbs and fruits, but avoids \"sugar.\" She feels she's probably getting some form of protein in every day. Her daughter thinks she appears more agile and spry at this weight, and indeed, the patient denies symptoms consistent with sleep apnea at this time.           Review of Systems:     12-pt ROS is negative except for as noted in the HPI.           Physical Exam:     Vitals: /76   Pulse 88   Ht 1.588 m (5' 2.5\")   Wt 73.5 kg (162 lb)   SpO2 98%   BMI 29.16 kg/m     Wt Readings from Last 3 Encounters:   05/01/19 73.5 kg (162 lb)   02/07/19 73.5 kg (162 lb)   01/10/19 73.3 kg (161 lb 8 oz)       Constitutional:  Patient is pleasant, alert, cooperative, and in NAD.  HEENT:  NCAT. PERRLA. EOM's intact.  Neck:  CVP appears normal  Pulmonary: Normal respiratory effort. CTAB.  Cardiac: Irregularly irregular, grade 2/6 sm at the LSB  Abdomen:  Non-tender abdomen with normoactive bowel sounds, no hepatosplenomegaly appreciated.   Vascular: Pulses in the upper extremities are 2+ and equal, lower extremity pulses are diminished bilaterally.  Extremities: 1+ pitting edema pedal - knee bilaterally.  Skin:  No rashes or lesions appreciated.   Neurological:  No gross motor or sensory deficits.   Psych: Appropriate affect.        Data:     Cardiac Diagnostics reviewed:  Type Date Result   TTE 10/19/18 The left ventricle is normal in size.  The visual ejection fraction is estimated at 55-60%.  No regional wall motion abnormalities noted.  The right ventricle is moderately dilated.  Mildly decreased right ventricular systolic function  There is moderate (2+) tricuspid regurgitation.  Small pericardial effusion  The rhythm was rapid atrial fibrillation.    " 5/5/16 Left ventricular systolic function is normal. The visual ejection fraction is  estimated at 60-65%.  There is mild concentric left ventricular hypertrophy. The left atrium is borderline dilated.  There is trace to mild mitral regurgitation.  There is moderate (2+) tricuspid regurgitation.  Right ventricular systolic pressure is elevated, consistent with mild  pulmonary hypertension.  The rhythm was normal sinus.  Contrast was used without apparent complications. TR may be mildly increased  compared to the prior study.   EKG 10/19/18 AF, low voltage QRS     Device  FathomDBtronic PPM interrogation (inpatient)   ~ 2%, Ap ~ 30%     Sleep study  12/17/18 Respiration: Severe sleep disordered breathing best characterized as mixed (combination of both obstructive and central phenomena). Central events were suggestive of Cheyne Simons periodic breathing consisteint with heart disease. She also had sleep related hypoxemia. Of note the patient did not have REM supine during the study.     Events ? The polysomnogram revealed a presence of 26 obstructive, 66 central, and 23 mixed apneas resulting in an apnea index of 89.6 events per hour. There were 2 obstructive hypopneas and 2 central hypopneas resulting in an obstructive hypopnea index of 1.6 and central hypopnea index of 1.6 events per hour. The combined apnea/hypopnea index was 92.7 events per hour (central apnea/hypopnea index was 53.0 events per hour). The REM AHI was 93.9 events per hour. The supine AHI was - events per hour. The RERA index was - events per hour. The RDI was 92.7 events per hour.      Recent Labs   Lab Test 03/19/19  1548 01/07/19  1528 11/08/18  1206 10/19/18  0620  02/27/18  1541  10/13/15  1454 10/30/14  1434  03/21/13  0525   LDL  --   --   --   --   --  101*  --  88 105   < > 58   HDL  --   --   --   --   --  41*  --  44*  --   --  40*   NHDL  --   --   --   --   --  165*  --   --   --   --   --    CHOL  --   --   --   --   --  206*  --  204*   --   --  125   TRIG  --   --   --   --   --  319*  --  359*  --   --  137   TSH 3.72 5.89*  --  6.76*   < > 2.32   < > 1.20 0.97   < >  --    IRON  --   --  57  --   --   --   --   --   --   --   --    FEB  --   --  389  --   --   --   --   --   --   --   --    IRONSAT  --   --  15  --   --   --   --   --   --   --   --    DARI  --   --  34  --    < > 28   < >  --   --   --   --     < > = values in this interval not displayed.     Lab Results   Component Value Date    CHOL 206 (H) 02/27/2018    HDL 41 (L) 02/27/2018     (H) 02/27/2018    TRIG 319 (H) 02/27/2018    CHOLHDLRATIO 4.6 10/13/2015       Lab Results   Component Value Date    WBC 7.4 11/08/2018    RBC 4.61 11/08/2018    HGB 13.6 11/08/2018    HCT 42.3 11/08/2018    MCV 92 11/08/2018    MCH 29.5 11/08/2018    MCHC 32.2 11/08/2018    RDW 16.3 (H) 11/08/2018     11/08/2018       Lab Results   Component Value Date     05/01/2019    POTASSIUM 3.8 05/01/2019    CHLORIDE 100 05/01/2019    CO2 29 05/01/2019    ANIONGAP 13.8 05/01/2019     (H) 05/01/2019    BUN 17 05/01/2019    CR 1.05 05/01/2019    GFRESTIMATED 50 (L) 05/01/2019    GFRESTBLACK 60 (L) 05/01/2019    ASTRID 9.7 05/01/2019      Lab Results   Component Value Date    AST 16 10/19/2018    ALT 19 10/19/2018       Lab Results   Component Value Date    A1C 8.2 (H) 10/19/2018       Lab Results   Component Value Date    INR 2.1 (A) 04/29/2019    INR 2.4 (A) 03/19/2019    INR 1.70 (H) 10/22/2018    INR 1.97 (H) 10/21/2018          Problem List:     Patient Active Problem List   Diagnosis     CHF (congestive heart failure) (H)     CARDIOVASCULAR SCREENING; LDL GOAL LESS THAN 100     Health Care Home     Uterine cancer (H)     Pacemaker     OA (osteoarthritis)     Type 2 diabetes mellitus without complications (H)     DNS (deviated nasal septum)     Atrial fibrillation (H)     Paroxysmal atrial fibrillation (H)     Chronic diastolic congestive heart failure (H)     Hypothyroidism      Long-term (current) use of anticoagulants [Z79.01]     Heart failure (H)            Medications:     Current Outpatient Medications   Medication Sig Dispense Refill     ACE/ARB NOT PRESCRIBED, INTENTIONAL, ACE & ARB not prescribed due to Other: HFpEF       ACETAMINOPHEN PO Take 500 mg by mouth 3 times daily as needed (Takes at least 6 hours apart).        ASPIRIN NOT PRESCRIBED, INTENTIONAL, Antiplatelet medication not prescribed intentionally due to Current anticoagulant therapy (warfarin/enoxaparin) 0 each 0     blood glucose (ACCU-CHEK SMARTVIEW) test strip 1 strip by In Vitro route daily 1 Box prn     blood glucose (NO BRAND SPECIFIED) lancets standard Use to test blood sugar 1 times daily or as directed. 100 each 11     blood glucose monitoring (ACCU-CHEK FASTCLIX) lancets Use to check blood sugars daily 1 Box prn     blood glucose monitoring (NO BRAND SPECIFIED) meter device kit Use to test blood sugar one times daily or as directed. 1 kit 0     blood glucose monitoring (NO BRAND SPECIFIED) test strip Use to test blood sugars one times daily or as directed .has type 2 diabetes and not on insulin 100 strip 1     Blood Glucose Monitoring Suppl (ACCU-CHEK LAKIA SMARTVIEW) W/DEVICE KIT 1 Device daily. 1 kit 0     furosemide (LASIX) 20 MG tablet Take 20 mg by mouth 2 times daily  90 tablet 3     glimepiride (AMARYL) 2 MG tablet Take 1 tablet (2 mg) by mouth 2 times daily 60 tablet 0     levothyroxine (SYNTHROID/LEVOTHROID) 88 MCG tablet Take 1 tablet (88 mcg) by mouth daily 90 tablet 2     metFORMIN (GLUCOPHAGE) 500 MG tablet Take 1 tablet (500 mg) by mouth 2 times daily (with meals) 180 tablet 3     metoprolol succinate (TOPROL-XL) 100 MG 24 hr tablet Take 1 tablet (100 mg) by mouth 2 times daily (Patient taking differently: Take 75 mg by mouth 2 times daily ) 180 tablet 3     multivitamin  with lutein (OCUVITE WITH LTEIN) CAPS per capsule Take 1 capsule by mouth daily       STATIN NOT PRESCRIBED, INTENTIONAL,  continuous prn. Statin not prescribed intentionally due to Other: Not needed, LDL at goal <100mg/dL without therapy 0 each 0     warfarin (COUMADIN) 5 MG tablet TAKE 1/2 TO 1 TABLET BY MOUTH DAILY OR AS DIRECTED BY INR CLINIC (Patient taking differently: Take 2.5-5 mg by mouth daily TAKE 1/2 TO 1 TABLET BY MOUTH DAILY OR AS DIRECTED BY INR CLINIC) 90 tablet 3     diltiazem (CARDIZEM SR) 120 MG CP12 12 hr SR capsule Take 120 mg by mouth At Bedtime (Patient not taking: Reported on 5/1/2019) 90 capsule 3          Past Medical History:     Past Medical History:   Diagnosis Date     Atrial fibrillation (H)     Nl LVEF, s/p ablation      Congestive heart failure, unspecified      Diabetes mellitus (H) 2004     Hemorrhoids     intermittent bleeding     Hypertension      Macular degeneration      OA (osteoarthritis)      Pacemaker 3/2013     Uterine cancer (H)     s/p hyst     Past Surgical History:   Procedure Laterality Date     C ANESTH,PACEMAKER INSERTION      dual chamber 3/27/13     CHOLECYSTECTOMY  8/2004     GYN SURGERY       HYSTERECTOMY      Ut ca      JOINT REPLACEMENT       KERATOTOMY ARCUATE WITH FEMTOSECOND LASER/IMAGING FOR ATIOL Right 4/11/2017    Procedure: KERATOTOMY ARCUATE WITH FEMTOSECOND LASER/IMAGING FOR ATIOL;  Surgeon: Calvin Dominguez MD;  Location: [facility]     PHACOEMULSIFICATION CLEAR CORNEA WITH STANDARD INTRAOCULAR LENS IMPLANT Right 4/11/2017    Procedure: PHACOEMULSIFICATION CLEAR CORNEA WITH STANDARD INTRAOCULAR LENS IMPLANT;  Surgeon: Calvin Dominguez MD;  Location: [facility]     TKR      bilat     Family History   Problem Relation Age of Onset     Gastrointestinal Disease Mother         bowel obstruction     Cardiovascular Father      C.A.D. Father      Cardiovascular Brother         CHF     Social History     Socioeconomic History     Marital status: Single     Spouse name: Not on file     Number of children: Not on file     Years of education: Not on file     Highest education  level: Not on file   Occupational History     Not on file   Social Needs     Financial resource strain: Not on file     Food insecurity:     Worry: Not on file     Inability: Not on file     Transportation needs:     Medical: Not on file     Non-medical: Not on file   Tobacco Use     Smoking status: Former Smoker     Last attempt to quit: 1990     Years since quittin.3     Smokeless tobacco: Never Used   Substance and Sexual Activity     Alcohol use: No     Alcohol/week: 0.0 oz     Drug use: No     Sexual activity: Not Currently     Partners: Male   Lifestyle     Physical activity:     Days per week: Not on file     Minutes per session: Not on file     Stress: Not on file   Relationships     Social connections:     Talks on phone: Not on file     Gets together: Not on file     Attends Anabaptism service: Not on file     Active member of club or organization: Not on file     Attends meetings of clubs or organizations: Not on file     Relationship status: Not on file     Intimate partner violence:     Fear of current or ex partner: Not on file     Emotionally abused: Not on file     Physically abused: Not on file     Forced sexual activity: Not on file   Other Topics Concern     Parent/sibling w/ CABG, MI or angioplasty before 65F 55M? No      Service Not Asked     Blood Transfusions Not Asked     Caffeine Concern Yes     Comment: daily      Occupational Exposure Not Asked     Hobby Hazards Not Asked     Sleep Concern No     Stress Concern Not Asked     Weight Concern Not Asked     Special Diet Yes     Comment: low sodium, less leafy greens, low sugar     Back Care Not Asked     Exercise No     Bike Helmet Not Asked     Seat Belt Yes     Self-Exams Not Asked   Social History Narrative     Not on file            Allergies:   Shellfish allergy; Ambien [zolpidem]; Cats; Lisinopril; No clinical screening - see comments; Seasonal allergies; and Sulfa drugs      Eliz Goodwin PA-C  [facility]  Pager:  [phone]    Thank you for allowing me to participate in the care of your patient.    Sincerely,     Eliz Goodwin PA-C     [facility] [place]

## 2019-05-01 NOTE — PROGRESS NOTES
Cardiology Clinic Progress Note    Shelly Rogers MRN# [id number]   YOB: 1933 Age: 86 year old     Reason for visit: CORE f/u visit           Assessment and Plan:     In summary, the patient presents today to for a CORE clinic f/u visit.     1. HFpEF   - ECHO: EF 55-60%, E/E' avg 13.2, small pericardial effusion   - ACC/AHA Stage: C; FC II    - Etiology: tachy-mediated, diabetes, YSLVIE; diastolic dysfn contributing to RV failure   - RV: moderate dilated with mildly reduced fn; RVSP 13.5 mmHg   - Valves: moderate TR   - Volume: Euvolemic    Admit Wt: 179 lbs    Current Wt: 162 lbs (154 lbs @ home)    Dry Wt: likely ~ 160 lbs    Diuretics: Lasix 20 mg BID   - Rhythm: PAF with uncontrolled VR and some NSVT on device check.    - QRS: narrow   - Device: Dual chamber PPM   - Other: Uncontrolled DM. Severe mixed sleep apnea, refuses CPAP. Treated thyroid disease.     Plan:  - Increase metoprolol to 100 mg BID reinforced today.   - Discussed low-salt diet, with maintaining adequate protein intake.   - RTC with CORE clinic in 3 months.   - Advised to arrange f/u with PCP for diabetes management.          History of Presenting Illness:      Shelly Rogers is a pleasant 85 year old patient of  Dr. Del Valle who presents today for a CORE clinic f/u visit.    She has a pertinent PMH including:  # HFpEF, initially diagnosed in 2013 in the setting of new-onset rapid AF, controlled with low-dose Lasix since that time, until admission in October 2018. This was felt in the setting of lasix noncompliance and increasing burden of AF. Admit wt was 178 lbs. TTE at that time revealed a preserved LVEF with mild RV dysfunction, moderate TR - overall stable save for a small pericardial effusion. Of note, the rhythm was rapid AF at the time of the study. She was diuresed, and Toprol was up-titrated. Ultimately discharged with Lasix 20 BID.   # PAF/SSS, s/p PPM and cardioversion in 2013. Rate control strategy with Toprol.  Anticoagulated on warfarin, INR followed by PCP.   # NSVT, asymptomatic, short runs noted on device  # Hx of R pleural effusion, s/p thoracentesis 10/2018  # HTN  # Poorly-controlled DMII  # CKD  # Hypothyroidism, on levothyroxine  # Mild hyponatremia  # Severe mixed SYLVIE, refuses CPAP  # Noncompliance  # Social - She lives independently at an assisted living facility and manages her own medications. She has some macular degeneration which has caused some balance issues over time and therefore uses a cane to ambulate when she's out of her home, and doesn't drive. Daughter Sandhya presents with patient. No ETOH or tobacco at present. She's had no falls, and uses fall precautions.   I've been following Shelly along with Dr. Del Valle since her admission in October 2018. She's been doing well on her current diuretic regimen with the addition of sodium counseling. She initially had a 10 lb wt loss after she started monitoring her sodium intake with symptomatic improvement. I obtained a sleep study which showed severe mixed SYLVIE. Unfortunately, she refuses a CPAP. Zio monitor after that showed CAF with an average HR of 80 bpm. She saw Dr. Del Valle in November 2018, and her Toprol was increased from 75 BID to 100 mg BID. When I saw her after that in February, she told me she hadn't started Aldactone nor diltiazem. I checked her device and this showed majority of HR's in the 80's with some short runs of VT. I asked her to start the diltiazem then but she declined.   Today, she's feeling well. She doesn't feel limited by her breathing with ambulation or sleep. Her mild LE edema is at baseline. She tells me she's taking her Lasix 20 mg BID (was ordered as 20 daily + additionally 20 PRN for ankle swelling) and is still taking the metoprolol at only 75 mg BID. Weights are stable ~ 159 lbs at home, 162 lbs in clinic. BMP is stable. TSH from mid-March is WNL. INR a few days ago was 2.1.    She's continued to read her labels and is  "following a <2g/day sodium diet.She tells me she eats a lot of carbs and fruits, but avoids \"sugar.\" She feels she's probably getting some form of protein in every day. Her daughter thinks she appears more agile and spry at this weight, and indeed, the patient denies symptoms consistent with sleep apnea at this time.           Review of Systems:     12-pt ROS is negative except for as noted in the HPI.           Physical Exam:     Vitals: /76   Pulse 88   Ht 1.588 m (5' 2.5\")   Wt 73.5 kg (162 lb)   SpO2 98%   BMI 29.16 kg/m    Wt Readings from Last 3 Encounters:   05/01/19 73.5 kg (162 lb)   02/07/19 73.5 kg (162 lb)   01/10/19 73.3 kg (161 lb 8 oz)       Constitutional:  Patient is pleasant, alert, cooperative, and in NAD.  HEENT:  NCAT. PERRLA. EOM's intact.  Neck:  CVP appears normal  Pulmonary: Normal respiratory effort. CTAB.  Cardiac: Irregularly irregular, grade 2/6 sm at the LSB  Abdomen:  Non-tender abdomen with normoactive bowel sounds, no hepatosplenomegaly appreciated.   Vascular: Pulses in the upper extremities are 2+ and equal, lower extremity pulses are diminished bilaterally.  Extremities: 1+ pitting edema pedal - knee bilaterally.  Skin:  No rashes or lesions appreciated.   Neurological:  No gross motor or sensory deficits.   Psych: Appropriate affect.        Data:     Cardiac Diagnostics reviewed:  Type Date Result   TTE 10/19/18 The left ventricle is normal in size.  The visual ejection fraction is estimated at 55-60%.  No regional wall motion abnormalities noted.  The right ventricle is moderately dilated.  Mildly decreased right ventricular systolic function  There is moderate (2+) tricuspid regurgitation.  Small pericardial effusion  The rhythm was rapid atrial fibrillation.    5/5/16 Left ventricular systolic function is normal. The visual ejection fraction is  estimated at 60-65%.  There is mild concentric left ventricular hypertrophy. The left atrium is borderline dilated.  There " is trace to mild mitral regurgitation.  There is moderate (2+) tricuspid regurgitation.  Right ventricular systolic pressure is elevated, consistent with mild  pulmonary hypertension.  The rhythm was normal sinus.  Contrast was used without apparent complications. TR may be mildly increased  compared to the prior study.   EKG 10/19/18 AF, low voltage QRS     Device  [employer] PPM interrogation (inpatient)   ~ 2%, Ap ~ 30%     Sleep study  12/17/18 Respiration: Severe sleep disordered breathing best characterized as mixed (combination of both obstructive and central phenomena). Central events were suggestive of Cheyne Simons periodic breathing consisteint with heart disease. She also had sleep related hypoxemia. Of note the patient did not have REM supine during the study.     Events ? The polysomnogram revealed a presence of 26 obstructive, 66 central, and 23 mixed apneas resulting in an apnea index of 89.6 events per hour. There were 2 obstructive hypopneas and 2 central hypopneas resulting in an obstructive hypopnea index of 1.6 and central hypopnea index of 1.6 events per hour. The combined apnea/hypopnea index was 92.7 events per hour (central apnea/hypopnea index was 53.0 events per hour). The REM AHI was 93.9 events per hour. The supine AHI was - events per hour. The RERA index was - events per hour. The RDI was 92.7 events per hour.      Recent Labs   Lab Test 03/19/19  1548 01/07/19  1528 11/08/18  1206 10/19/18  0620  02/27/18  1541  10/13/15  1454 10/30/14  1434  03/21/13  0525   LDL  --   --   --   --   --  101*  --  88 105   < > 58   HDL  --   --   --   --   --  41*  --  44*  --   --  40*   NHDL  --   --   --   --   --  165*  --   --   --   --   --    CHOL  --   --   --   --   --  206*  --  204*  --   --  125   TRIG  --   --   --   --   --  319*  --  359*  --   --  137   TSH 3.72 5.89*  --  6.76*   < > 2.32   < > 1.20 0.97   < >  --    IRON  --   --  57  --   --   --   --   --   --   --   --    FEB  --    --  389  --   --   --   --   --   --   --   --    IRONSAT  --   --  15  --   --   --   --   --   --   --   --    DARI  --   --  34  --    < > 28   < >  --   --   --   --     < > = values in this interval not displayed.     Lab Results   Component Value Date    CHOL 206 (H) 02/27/2018    HDL 41 (L) 02/27/2018     (H) 02/27/2018    TRIG 319 (H) 02/27/2018    CHOLHDLRATIO 4.6 10/13/2015       Lab Results   Component Value Date    WBC 7.4 11/08/2018    RBC 4.61 11/08/2018    HGB 13.6 11/08/2018    HCT 42.3 11/08/2018    MCV 92 11/08/2018    MCH 29.5 11/08/2018    MCHC 32.2 11/08/2018    RDW 16.3 (H) 11/08/2018     11/08/2018       Lab Results   Component Value Date     05/01/2019    POTASSIUM 3.8 05/01/2019    CHLORIDE 100 05/01/2019    CO2 29 05/01/2019    ANIONGAP 13.8 05/01/2019     (H) 05/01/2019    BUN 17 05/01/2019    CR 1.05 05/01/2019    GFRESTIMATED 50 (L) 05/01/2019    GFRESTBLACK 60 (L) 05/01/2019    ASTRID 9.7 05/01/2019      Lab Results   Component Value Date    AST 16 10/19/2018    ALT 19 10/19/2018       Lab Results   Component Value Date    A1C 8.2 (H) 10/19/2018       Lab Results   Component Value Date    INR 2.1 (A) 04/29/2019    INR 2.4 (A) 03/19/2019    INR 1.70 (H) 10/22/2018    INR 1.97 (H) 10/21/2018          Problem List:     Patient Active Problem List   Diagnosis     CHF (congestive heart failure) (H)     CARDIOVASCULAR SCREENING; LDL GOAL LESS THAN 100     Health Care Home     Uterine cancer (H)     Pacemaker     OA (osteoarthritis)     Type 2 diabetes mellitus without complications (H)     DNS (deviated nasal septum)     Atrial fibrillation (H)     Paroxysmal atrial fibrillation (H)     Chronic diastolic congestive heart failure (H)     Hypothyroidism     Long-term (current) use of anticoagulants [Z79.01]     Heart failure (H)            Medications:     Current Outpatient Medications   Medication Sig Dispense Refill     ACE/ARB NOT PRESCRIBED, INTENTIONAL, ACE & ARB  not prescribed due to Other: HFpEF       ACETAMINOPHEN PO Take 500 mg by mouth 3 times daily as needed (Takes at least 6 hours apart).        ASPIRIN NOT PRESCRIBED, INTENTIONAL, Antiplatelet medication not prescribed intentionally due to Current anticoagulant therapy (warfarin/enoxaparin) 0 each 0     blood glucose (ACCU-CHEK SMARTVIEW) test strip 1 strip by In Vitro route daily 1 Box prn     blood glucose (NO BRAND SPECIFIED) lancets standard Use to test blood sugar 1 times daily or as directed. 100 each 11     blood glucose monitoring (ACCU-CHEK FASTCLIX) lancets Use to check blood sugars daily 1 Box prn     blood glucose monitoring (NO BRAND SPECIFIED) meter device kit Use to test blood sugar one times daily or as directed. 1 kit 0     blood glucose monitoring (NO BRAND SPECIFIED) test strip Use to test blood sugars one times daily or as directed .has type 2 diabetes and not on insulin 100 strip 1     Blood Glucose Monitoring Suppl (ACCU-CHEK LAKIA SMARTVIEW) W/DEVICE KIT 1 Device daily. 1 kit 0     furosemide (LASIX) 20 MG tablet Take 20 mg by mouth 2 times daily  90 tablet 3     glimepiride (AMARYL) 2 MG tablet Take 1 tablet (2 mg) by mouth 2 times daily 60 tablet 0     levothyroxine (SYNTHROID/LEVOTHROID) 88 MCG tablet Take 1 tablet (88 mcg) by mouth daily 90 tablet 2     metFORMIN (GLUCOPHAGE) 500 MG tablet Take 1 tablet (500 mg) by mouth 2 times daily (with meals) 180 tablet 3     metoprolol succinate (TOPROL-XL) 100 MG 24 hr tablet Take 1 tablet (100 mg) by mouth 2 times daily (Patient taking differently: Take 75 mg by mouth 2 times daily ) 180 tablet 3     multivitamin  with lutein (OCUVITE WITH LTEIN) CAPS per capsule Take 1 capsule by mouth daily       STATIN NOT PRESCRIBED, INTENTIONAL, continuous prn. Statin not prescribed intentionally due to Other: Not needed, LDL at goal <100mg/dL without therapy 0 each 0     warfarin (COUMADIN) 5 MG tablet TAKE 1/2 TO 1 TABLET BY MOUTH DAILY OR AS DIRECTED BY INR  CLINIC (Patient taking differently: Take 2.5-5 mg by mouth daily TAKE 1/2 TO 1 TABLET BY MOUTH DAILY OR AS DIRECTED BY INR CLINIC) 90 tablet 3     diltiazem (CARDIZEM SR) 120 MG CP12 12 hr SR capsule Take 120 mg by mouth At Bedtime (Patient not taking: Reported on 5/1/2019) 90 capsule 3          Past Medical History:     Past Medical History:   Diagnosis Date     Atrial fibrillation (H)     Nl LVEF, s/p ablation      Congestive heart failure, unspecified      Diabetes mellitus (H) 2004     Hemorrhoids     intermittent bleeding     Hypertension      Macular degeneration      OA (osteoarthritis)      Pacemaker 3/2013     Uterine cancer (H)     s/p hyst     Past Surgical History:   Procedure Laterality Date     C ANESTH,PACEMAKER INSERTION      dual chamber 3/27/13     CHOLECYSTECTOMY  8/2004     GYN SURGERY       HYSTERECTOMY      Ut ca      JOINT REPLACEMENT       KERATOTOMY ARCUATE WITH FEMTOSECOND LASER/IMAGING FOR ATIOL Right 4/11/2017    Procedure: KERATOTOMY ARCUATE WITH FEMTOSECOND LASER/IMAGING FOR ATIOL;  Surgeon: Calvin Dominguez MD;  Location: [facility]     PHACOEMULSIFICATION CLEAR CORNEA WITH STANDARD INTRAOCULAR LENS IMPLANT Right 4/11/2017    Procedure: PHACOEMULSIFICATION CLEAR CORNEA WITH STANDARD INTRAOCULAR LENS IMPLANT;  Surgeon: Calvin Dominguez MD;  Location: [facility]     TKR      bilat     Family History   Problem Relation Age of Onset     Gastrointestinal Disease Mother         bowel obstruction     Cardiovascular Father      C.A.D. Father      Cardiovascular Brother         CHF     Social History     Socioeconomic History     Marital status: Single     Spouse name: Not on file     Number of children: Not on file     Years of education: Not on file     Highest education level: Not on file   Occupational History     Not on file   Social Needs     Financial resource strain: Not on file     Food insecurity:     Worry: Not on file     Inability: Not on file     Transportation needs:      Medical: Not on file     Non-medical: Not on file   Tobacco Use     Smoking status: Former Smoker     Last attempt to quit: 1990     Years since quittin.3     Smokeless tobacco: Never Used   Substance and Sexual Activity     Alcohol use: No     Alcohol/week: 0.0 oz     Drug use: No     Sexual activity: Not Currently     Partners: Male   Lifestyle     Physical activity:     Days per week: Not on file     Minutes per session: Not on file     Stress: Not on file   Relationships     Social connections:     Talks on phone: Not on file     Gets together: Not on file     Attends Holiness service: Not on file     Active member of club or organization: Not on file     Attends meetings of clubs or organizations: Not on file     Relationship status: Not on file     Intimate partner violence:     Fear of current or ex partner: Not on file     Emotionally abused: Not on file     Physically abused: Not on file     Forced sexual activity: Not on file   Other Topics Concern     Parent/sibling w/ CABG, MI or angioplasty before 65F 55M? No      Service Not Asked     Blood Transfusions Not Asked     Caffeine Concern Yes     Comment: daily      Occupational Exposure Not Asked     Hobby Hazards Not Asked     Sleep Concern No     Stress Concern Not Asked     Weight Concern Not Asked     Special Diet Yes     Comment: low sodium, less leafy greens, low sugar     Back Care Not Asked     Exercise No     Bike Helmet Not Asked     Seat Belt Yes     Self-Exams Not Asked   Social History Narrative     Not on file            Allergies:   Shellfish allergy; Ambien [zolpidem]; Cats; Lisinopril; No clinical screening - see comments; Seasonal allergies; and Sulfa drugs      Eliz Goodwin PA-C  [facility]  Pager: [phone]

## 2019-05-14 DIAGNOSIS — E11.9 TYPE 2 DIABETES MELLITUS WITHOUT COMPLICATION, WITHOUT LONG-TERM CURRENT USE OF INSULIN (H): ICD-10-CM

## 2019-05-14 NOTE — TELEPHONE ENCOUNTER
"glimepiride (AMARYL) 2 MG tablet 60 tablet 0 4/25/2019         Last Written Prescription Date:  04/25/2019  Last Fill Quantity: 60,  # refills: 0   Last office visit: 10/26/2018 with prescribing provider:  Edgardo   Future Office Visit:  Unknown    Requested Prescriptions   Pending Prescriptions Disp Refills     glimepiride (AMARYL) 2 MG tablet [Pharmacy Med Name: GLIMEPIRIDE 2 MG TABLET] 60 tablet 0     Sig: TAKE 1 TABLET (2 MG) BY MOUTH 2 TIMES DAILY       Sulfonylurea Agents Failed - 5/14/2019  3:23 PM        Failed - Patient has documented LDL within the past 12 mos.     Recent Labs   Lab Test 02/27/18  1541   *             Failed - Patient has documented A1c within the specified period of time.     If HgbA1C is 8 or greater, it needs to be on file within the past 3 months.  If less than 8, must be on file within the past 6 months.     Recent Labs   Lab Test 10/19/18  0620   A1C 8.2*             Passed - Blood pressure less than 140/90 in past 6 months     BP Readings from Last 3 Encounters:   05/01/19 130/76   02/07/19 120/76   01/10/19 126/80                 Passed - Patient has had a Microalbumin in the past 15 mos.     Recent Labs   Lab Test 06/12/18  1634   MICROL 112   UMALCR 97.39*             Passed - Medication is active on med list        Passed - Patient is age 18 or older        Passed - No active pregnancy on record        Passed - Patient has a recent creatinine (normal) within the past 12 mos.     Recent Labs   Lab Test 05/01/19  0908   CR 1.05             Passed - Patient has not had a positive pregnancy test within the past 12 mos.        Passed - Recent (6 mo) or future (30 days) visit within the authorizing provider's specialty     Patient had office visit in the last 6 months or has a visit in the next 30 days with authorizing provider or within the authorizing provider's specialty.  See \"Patient Info\" tab in inbasket, or \"Choose Columns\" in Meds & Orders section of the refill encounter. "

## 2019-05-15 DIAGNOSIS — I48.0 PAROXYSMAL ATRIAL FIBRILLATION (H): ICD-10-CM

## 2019-05-15 RX ORDER — GLIMEPIRIDE 2 MG/1
2 TABLET ORAL 2 TIMES DAILY
Qty: 60 TABLET | Refills: 0 | Status: SHIPPED | OUTPATIENT
Start: 2019-05-15 | End: 2019-06-10

## 2019-05-15 NOTE — TELEPHONE ENCOUNTER
Called pt   Daughter answered  Due for appt    Next 5 appointments (look out 90 days)    May 30, 2019  3:00 PM CDT  PHYSICAL with Halley Huff MD  [facility] [address] [facility] [address]  [phone]        Prescription approved per [facility] Ref[facility] Protocol.  Adri HOWARD RN

## 2019-05-16 RX ORDER — WARFARIN SODIUM 5 MG/1
TABLET ORAL
Qty: 90 TABLET | Refills: 1 | Status: SHIPPED | OUTPATIENT
Start: 2019-05-16 | End: 2019-11-11

## 2019-05-16 NOTE — TELEPHONE ENCOUNTER
"Requested Prescriptions   Pending Prescriptions Disp Refills     warfarin (COUMADIN) 5 MG tablet 90 tablet 3     Sig: TAKE 1/2 TO 1 TABLET BY MOUTH DAILY OR AS DIRECTED BY INR CLINIC       Vitamin K Antagonists Failed - 5/15/2019  5:01 PM        Failed - INR is within goal in the past 6 weeks     Confirm INR is within goal in the past 6 weeks.     Recent Labs   Lab Test 04/29/19   INR 2.1*                       Passed - Recent (12 mo) or future (30 days) visit within the authorizing provider's specialty     Patient had office visit in the last 12 months or has a visit in the next 30 days with authorizing provider or within the authorizing provider's specialty.  See \"Patient Info\" tab in inbasket, or \"Choose Columns\" in Meds & Orders section of the refill encounter.              Passed - Medication is active on med list        Passed - Patient is 18 years of age or older        Passed - Patient is not pregnant        Passed - No positive pregnancy on file in past 12 months      Prescription approved per [facility] Refill Protocol.  "

## 2019-06-10 ENCOUNTER — OFFICE VISIT (OUTPATIENT)
Dept: FAMILY MEDICINE | Facility: CLINIC | Age: 84
End: 2019-06-10
Payer: MEDICARE

## 2019-06-10 ENCOUNTER — ANTICOAGULATION THERAPY VISIT (OUTPATIENT)
Dept: NURSING | Facility: CLINIC | Age: 84
End: 2019-06-10
Payer: MEDICARE

## 2019-06-10 VITALS
HEART RATE: 85 BPM | BODY MASS INDEX: 27.54 KG/M2 | TEMPERATURE: 97.6 F | DIASTOLIC BLOOD PRESSURE: 70 MMHG | WEIGHT: 153 LBS | OXYGEN SATURATION: 95 % | SYSTOLIC BLOOD PRESSURE: 130 MMHG

## 2019-06-10 DIAGNOSIS — Z85.42 HISTORY OF UTERINE CANCER: ICD-10-CM

## 2019-06-10 DIAGNOSIS — H61.23 BILATERAL IMPACTED CERUMEN: ICD-10-CM

## 2019-06-10 DIAGNOSIS — E11.9 TYPE 2 DIABETES MELLITUS WITHOUT COMPLICATION, WITHOUT LONG-TERM CURRENT USE OF INSULIN (H): ICD-10-CM

## 2019-06-10 DIAGNOSIS — E03.9 HYPOTHYROIDISM, UNSPECIFIED TYPE: ICD-10-CM

## 2019-06-10 DIAGNOSIS — Z00.00 MEDICARE ANNUAL WELLNESS VISIT, SUBSEQUENT: Primary | ICD-10-CM

## 2019-06-10 DIAGNOSIS — N18.30 CKD (CHRONIC KIDNEY DISEASE) STAGE 3, GFR 30-59 ML/MIN (H): ICD-10-CM

## 2019-06-10 DIAGNOSIS — I50.32 CHRONIC DIASTOLIC CONGESTIVE HEART FAILURE (H): ICD-10-CM

## 2019-06-10 LAB
HBA1C MFR BLD: 7.9 % (ref 0–5.6)
INR POINT OF CARE: 2.2 (ref 0.86–1.14)

## 2019-06-10 PROCEDURE — 36416 COLLJ CAPILLARY BLOOD SPEC: CPT

## 2019-06-10 PROCEDURE — 84439 ASSAY OF FREE THYROXINE: CPT | Performed by: INTERNAL MEDICINE

## 2019-06-10 PROCEDURE — 80053 COMPREHEN METABOLIC PANEL: CPT | Performed by: INTERNAL MEDICINE

## 2019-06-10 PROCEDURE — 69209 REMOVE IMPACTED EAR WAX UNI: CPT | Performed by: INTERNAL MEDICINE

## 2019-06-10 PROCEDURE — 99213 OFFICE O/P EST LOW 20 MIN: CPT | Mod: 25 | Performed by: INTERNAL MEDICINE

## 2019-06-10 PROCEDURE — 99207 C FOOT EXAM  NO CHARGE: CPT | Performed by: INTERNAL MEDICINE

## 2019-06-10 PROCEDURE — 84443 ASSAY THYROID STIM HORMONE: CPT | Performed by: INTERNAL MEDICINE

## 2019-06-10 PROCEDURE — G0439 PPPS, SUBSEQ VISIT: HCPCS | Performed by: INTERNAL MEDICINE

## 2019-06-10 PROCEDURE — 83036 HEMOGLOBIN GLYCOSYLATED A1C: CPT | Performed by: INTERNAL MEDICINE

## 2019-06-10 PROCEDURE — 85610 PROTHROMBIN TIME: CPT | Mod: QW

## 2019-06-10 RX ORDER — FLASH GLUCOSE SCANNING READER
1 EACH MISCELLANEOUS
Qty: 1 DEVICE | Refills: 0 | Status: SHIPPED | OUTPATIENT
Start: 2019-06-10 | End: 2022-01-01

## 2019-06-10 RX ORDER — TRIAMCINOLONE ACETONIDE 55 UG/1
2 SPRAY, METERED NASAL DAILY PRN
COMMUNITY

## 2019-06-10 RX ORDER — FLASH GLUCOSE SENSOR
1 KIT MISCELLANEOUS
Qty: 2 EACH | Refills: 11 | Status: SHIPPED | OUTPATIENT
Start: 2019-06-10 | End: 2022-01-01

## 2019-06-10 RX ORDER — GLIMEPIRIDE 2 MG/1
2 TABLET ORAL 2 TIMES DAILY
Qty: 180 TABLET | Refills: 3 | Status: SHIPPED | OUTPATIENT
Start: 2019-06-10 | End: 2020-06-15

## 2019-06-10 ASSESSMENT — ACTIVITIES OF DAILY LIVING (ADL): CURRENT_FUNCTION: NO ASSISTANCE NEEDED

## 2019-06-10 NOTE — PATIENT INSTRUCTIONS
Patient Education   Personalized Prevention Plan  You are due for the preventive services outlined below.  Your care team is available to assist you in scheduling these services.  If you have already completed any of these items, please share that information with your care team to update in your medical record.  Health Maintenance Due   Topic Date Due     Depression Action Plan  01/18/1933     INR CLINIC REFERRAL - yearly  01/18/1934     Annual Wellness Visit  01/18/1998     Zoster (Shingles) Vaccine (2 of 3) 12/08/2015     Diabetic Foot Exam  09/12/2017     PHQ-2  01/01/2019     Cholesterol Lab  02/27/2019     Depression Assessment  03/24/2019     A1C Lab  04/19/2019     Kidney Function Urine Test  06/12/2019     Preventive Health Recommendations    See your health care provider every year to    Review health changes.     Discuss preventive care.      Review your medicines if your doctor has prescribed any.    You no longer need a yearly Pap test unless you've had an abnormal Pap test in the past 10 years. If you have vaginal symptoms, such as bleeding or discharge, be sure to talk with your provider about a Pap test.    Every 1 to 2 years, have a mammogram.  If you are over 69, talk with your health care provider about whether or not you want to continue having screening mammograms.    Every 10 years, have a colonoscopy. Or, have a yearly FIT test (stool test). These exams will check for colon cancer.     Have a cholesterol test every 5 years, or more often if your doctor advises it.     Have a diabetes test (fasting glucose) every three years. If you are at risk for diabetes, you should have this test more often.     At age 65, have a bone density scan (DEXA) to check for osteoporosis (brittle bone disease).    Shots:    Get a flu shot each year.    Get a tetanus shot every 10 years.    Talk to your doctor about your pneumonia vaccines. There are now two you should receive - Pneumovax (PPSV 23) and Prevnar  (PCV 13).    Talk to your pharmacist about the shingles vaccine.    Talk to your doctor about the hepatitis B vaccine.    Nutrition:     Eat at least 5 servings of fruits and vegetables each day.    Eat whole-grain bread, whole-wheat pasta and brown rice instead of white grains and rice.    Get adequate Calcium and Vitamin D.     Lifestyle    Exercise at least 150 minutes a week (30 minutes a day, 5 days a week). This will help you control your weight and prevent disease.    Limit alcohol to one drink per day.    No smoking.     Wear sunscreen to prevent skin cancer.     See your dentist twice a year for an exam and cleaning.    See your eye doctor every 1 to 2 years to screen for conditions such as glaucoma, macular degeneration and cataracts.    Personalized Prevention Plan  You are due for the preventive services outlined below.  Your care team is available to assist you in scheduling these services.  If you have already completed any of these items, please share that information with your care team to update in your medical record.  Health Maintenance Due   Topic Date Due     Depression Action Plan  01/18/1933     INR CLINIC REFERRAL - yearly  01/18/1934     Annual Wellness Visit  01/18/1998     Zoster (Shingles) Vaccine (2 of 3) 12/08/2015     Diabetic Foot Exam  09/12/2017     PHQ-2  01/01/2019     Cholesterol Lab  02/27/2019     Depression Assessment  03/24/2019     A1C Lab  04/19/2019     Kidney Function Urine Test  06/12/2019       Exercise for a Healthier Heart     Exercise with a friend. When activity is fun, you're more likely to stick with it.   You may wonder how you can improve the health of your heart. If you re thinking about exercise, you re on the right track. You don t need to become an athlete, but you do need a certain amount of brisk exercise to help strengthen your heart. If you have been diagnosed with a heart condition, your doctor may recommend exercise to help stabilize your condition. To  help make exercise a habit, choose safe, fun activities.  Be sure to check with your healthcare provider before starting an exercise program.   Why exercise?  Exercising regularly offers many healthy rewards. It can help you do all of the following:    Improve your blood cholesterol level to help prevent further heart trouble    Lower your blood pressure to help prevent a stroke or heart attack    Control diabetes, or reduce your risk of getting this disease    Improve your heart and lung function    Reach and maintain a healthy weight    Make your muscles stronger and more limber so you can stay active    Prevent falls and fractures by slowing the loss of bone mass (osteoporosis)    Manage stress better    Reduce your blood pressure    Improve your sense of self and your body image  Exercise tips  Ease into your routine. Set small goals. Then build on them.  Exercise on most days. Aim for a total of 150 or more minutes of moderate to  vigorous intensity activity each week. Consider 40 minutes, 3 to 4 times a week. For best results, activity should last for 40 minutes on average. It is OK to work up to the 40 minute period over time. Examples of moderate-intensity activity is walking 1 mile in 15 minutes or 30 to 45 minutes of yard work.  Step up your daily activity level. Along with your exercise program, try being more active throughout the day. Walk instead of drive. Do more household tasks or yard work.  Choose one or more activities you enjoy. Walking is one of the easiest things you can do. You can also try swimming, riding a bike, dancing, or taking an exercise class.  Stop exercising and call your doctor if you:    Have chest pain or feel dizzy or lightheaded    Feel burning, tightness, pressure, or heaviness in your chest, neck, shoulders, back, or arms    Have unusual shortness of breath    Have increased joint or muscle pain    Have palpitations or an irregular heartbeat   Date Last Reviewed: 5/1/2016     2384-8943 [employer]. [address] [place] [address]. All rights reserved. This information is not intended as a substitute for professional medical care. Always follow your healthcare professional's instructions.          Signs of Hearing Loss     Hearing much better with one ear can be a sign of hearing loss.     Hearing loss is a problem shared by many people. In fact, it is one of the most common health conditions, particularly as people age. Most people over age 65 have some hearing loss, and by age 80, almost everyone does. Because hearing loss usually occurs slowly over the years, you may not realize your hearing ability has gotten worse.  Have your hearing checked  Contact your healthcare provider if you:    Have to strain to hear normal conversation    Have to watch other people s faces very carefully to follow what they re saying    Need to ask people to repeat what they ve said    Often misunderstand what people are saying    Turn the volume of the television or radio up so high that others complain    Feel that people are mumbling when they re talking to you    Find that the effort to hear leaves you feeling tired and irritated    Notice, when using the phone, that you hear better with one ear than the other  Date Last Reviewed: 12/1/2016 2000-2018 [employer]. [address] [place] [address]. All rights reserved. This information is not intended as a substitute for professional medical care. Always follow your healthcare professional's instructions.        Your Health Risk Assessment indicates you feel you are not in good emotional health.    Recreation   Recreation is not limited to sports and team events. It includes any activity that provides relaxation, interest, enjoyment, and exercise. Recreation provides an outlet for physical, mental, and social energy. It can give a sense of worth and achievement. It can help you stay healthy.    Mental  Exercise and Social Involvement  Mental and emotional health is as important as physical health. Keep in touch with friends and family. Stay as active as possible. Continue to learn and challenge yourself.   Things you can do to stay mentally active are:    Learn something new, like a foreign language or musical instrument.     Play SCRABBLE or do crossword puzzles. If you cannot find people to play these games with you at home, you can play them with others on your computer through the Internet.     Join a games club--anything from card games to chess or checkers or lawn bowling.     Start a new hobby.     Go back to school.     Volunteer.     Read.   Keep up with world events.    Labs today  There is this is a new shingles vaccine available called shingrex  It is a series of 2 shots 2-6 months apart.  Considered more than 90% effective.  Please go to any pharmacy to get the  Vaccine  Follow up in 4 months  Seek sooner medical attention if there is any worsening of symptoms or problems.

## 2019-06-10 NOTE — PROGRESS NOTES
"SUBJECTIVE:   Shelly Rogers is a 86 year old female who presents for Preventive Visit.    Are you in the first 12 months of your Medicare coverage?  No    Healthy Habits:    In general, how would you rate your overall health?  Very good    Frequency of exercise:  None    Duration of exercise:  Other    Do you usually eat at least 4 servings of fruit and vegetables a day, include whole grains    & fiber and avoid regularly eating high fat or \"junk\" foods?  Yes    Taking medications regularly:  Yes    Barriers to taking medications:  None    Medication side effects:  None    Ability to successfully perform activities of daily living:  No assistance needed    Home Safety:  No safety concerns identified    Hearing Impairment:  Feel that people are mumbling or not speaking clearly    In the past 6 months, have you been bothered by leaking of urine?  No    In general, how would you rate your overall mental or emotional health?  Fair      PHQ-2 Total Score:    Additional concerns today:  No    Do you feel safe in your environment? Yes    Do you have a Health Care Directive? No: Advance care planning reviewed with patient; information given to patient to review.      Fall risk  Fallen 2 or more times in the past year?: No  Any fall with injury in the past year?: No  click delete button to remove this line now  Cognitive Screening   1) Repeat 3 items (Leader, Season, Table)    2) Clock draw: NORMAL  3) 3 item recall: Recalls 3 objects  Results: 3 items recalled: COGNITIVE IMPAIRMENT LESS LIKELY    Mini-CogTM Copyright S Khanh. Licensed by the author for use in [facility]; reprinted with permission (tim@.[facility]). All rights reserved.      Do you have sleep apnea, excessive snoring or daytime drowsiness?: yes    Reviewed and updated as needed this visit by clinical staff  Tobacco  Allergies  Meds  Problems  Med Hx  Surg Hx  Fam Hx         Reviewed and updated as needed this visit by Provider      "   Social History     Tobacco Use     Smoking status: Former Smoker     Last attempt to quit: 1990     Years since quittin.4     Smokeless tobacco: Never Used   Substance Use Topics     Alcohol use: No     Alcohol/week: 0.0 oz     If you drink alcohol do you typically have >3 drinks per day or >7 drinks per week? No    Alcohol Use 6/10/2019   Prescreen: >3 drinks/day or >7 drinks/week? No               Current providers sharing in care for this patient include:   Patient Care Team:  Halley Huff MD as PCP - General (Internal Medicine)  Galindo Del Valle MD as MD (Cardiology)  Jose Poe MD as MD (Internal Medicine)  Halley Huff MD as Assigned PCP    The following health maintenance items are reviewed in Epic and correct as of today:  Health Maintenance   Topic Date Due     DEPRESSION ACTION PLAN  1933     OP ANNUAL INR REFERRAL  1934     MEDICARE ANNUAL WELLNESS VISIT  1998     ZOSTER IMMUNIZATION (2 of 3) 2015     DIABETIC FOOT EXAM  2017     LIPID  2019     PHQ-9  2019     A1C  2019     MICROALBUMIN  2019     HF ACTION PLAN  2019     EYE EXAM  10/15/2019     ALT  10/19/2019     BMP  2019     CBC  2019     TSH W/FREE T4 REFLEX  2021     DTAP/TDAP/TD IMMUNIZATION (2 - Td) 2023     ADVANCED DIRECTIVE PLANNING  10/22/2023     PHQ-2  Completed     INFLUENZA VACCINE  Completed     IPV IMMUNIZATION  Aged Out     MENINGITIS IMMUNIZATION  Aged Out     Labs reviewed in EPIC      Review of Systems  Constitutional, HEENT, cardiovascular, pulmonary, GI, , musculoskeletal, neuro, skin, endocrine and psych systems are negative, except as otherwise noted.  He lives in senior  living  She does not check her blood sugars  Questing freestyle mina  She is watching her diet  She has bilateral leg edema and takes diuretics  She monitors her weight and she knows how to take care of it  OBJECTIVE:   /70   Pulse 85   Temp  "97.6  F (36.4  C) (Tympanic)   Wt 69.4 kg (153 lb)   SpO2 95%   BMI 27.54 kg/m   Estimated body mass index is 27.54 kg/m  as calculated from the following:    Height as of 5/1/19: 1.588 m (5' 2.5\").    Weight as of this encounter: 69.4 kg (153 lb).  Physical Exam  GENERAL: healthy, alert and no distress  EYES: Eyes grossly normal to inspection, PERRL and conjunctivae and sclerae normal  HENT: ear canals shows bilateral cerumen , nose and mouth without ulcers or lesions  NECK: no adenopathy,   RESP: lungs clear to auscultation - no rales, rhonchi or wheezes  CV: regular rate and rhythm, normal S1 S2, no S3 she has peripheral edema and peripheral pulses strong  ABDOMEN: soft, nontender, no hepatosplenomegaly, no masses and bowel sounds normal  MS:  changes due to osteoarthritis  SKIN: no suspicious lesions or rashes  NEURO: mentation intact and speech normal  PSYCH: mentation appears normal, affect normal/bright  She has bilateral leg and feet edema  Good pulses  Sensations are slightly impaired  Diagnostic Test Results:  Labs reviewed in Epic    ASSESSMENT / PLAN:   Shelly was seen today for physical.    Diagnoses and all orders for this visit:    Medicare annual wellness visit, subsequent  Performed.  Declined for mammogram  Preventive health counseling was also done.    Type 2 diabetes mellitus without complication, without long-term current use of insulin (H)  -     Continuous Blood Gluc  (FREESTYLE STELLA 14 DAY READER) VERO; 1 each every 14 days  -     Continuous Blood Gluc Sensor (FREESTYLE STELLA 14 DAY SENSOR) MISC; 1 each every 14 days  -     Hemoglobin A1c  -     Comprehensive metabolic panel  -     metFORMIN (GLUCOPHAGE) 500 MG tablet; Take 1 tablet (500 mg) by mouth 2 times daily (with meals)  -     glimepiride (AMARYL) 2 MG tablet; Take 1 tablet (2 mg) by mouth 2 times daily  -     C FOOT EXAM  NO CHARGE  Discussed freestyle stella  She will make appointment to get the training  She is watching " "her diet  No hypoglycemic symptoms    Hypothyroidism, unspecified type  -     TSH with free T4 reflex    History of uterine cancer  History and she had hysterectomy    CKD (chronic kidney disease) stage 3, GFR 30-59 ml/min (H)  Stable    Bilateral impacted cerumen  Ear wash was done by nursing staff.    Chronic diastolic congestive heart failure (H)  -     HEART FAILURE ACTION PLAN  Followed by cardiology    End of Life Planning:  Patient currently has an advanced directive: Yes.  Practitioner is supportive of decision.    COUNSELING:  Reviewed preventive health counseling, as reflected in patient instructions       Regular exercise       Healthy diet/nutrition    Estimated body mass index is 27.54 kg/m  as calculated from the following:    Height as of 5/1/19: 1.588 m (5' 2.5\").    Weight as of this encounter: 69.4 kg (153 lb).    Disclaimer: This note consists of symbols derived from keyboarding, dictation and/or voice recognition software. As a result, there may be errors in the script that have gone undetected. Please consider this when interpreting information found in this chart.       reports that she quit smoking about 29 years ago. She has never used smokeless tobacco.      Appropriate preventive services were discussed with this patient, including applicable screening as appropriate for cardiovascular disease, diabetes, osteopenia/osteoporosis, and glaucoma.  As appropriate for age/gender, discussed screening for colorectal cancer, prostate cancer, breast cancer, and cervical cancer. Checklist reviewing preventive services available has been given to the patient.    Reviewed patients plan of care and provided an AVS. The Basic Care Plan (routine screening as documented in Health Maintenance) for Shelly meets the Care Plan requirement. This Care Plan has been established and reviewed with the Patient.    Counseling Resources:  ATP IV Guidelines  Pooled Cohorts Equation Calculator  Breast Cancer Risk " Calculator  FRAX Risk Assessment  ICSI Preventive Guidelines  Dietary Guidelines for Americans, 2010  USDA's MyPlate  ASA Prophylaxis  Lung CA Screening    Halley Huff MD  [facility]    Identified Health Risks:

## 2019-06-10 NOTE — PROGRESS NOTES
ANTICOAGULATION FOLLOW-UP CLINIC VISIT    Patient Name:  Shelly Rogers  Date:  6/10/2019  Contact Type:  Face to Face    SUBJECTIVE:  Patient Findings         Clinical Outcomes     Negatives:   Major bleeding event, Thromboembolic event, Anticoagulation-related hospital admission, Anticoagulation-related ED visit, Anticoagulation-related fatality           OBJECTIVE    INR Protime   Date Value Ref Range Status   06/10/2019 2.2 (A) 0.86 - 1.14 Final       ASSESSMENT / PLAN  INR assessment THER    Recheck INR In: 6 WEEKS    INR Location Clinic      Anticoagulation Summary  As of 6/10/2019    INR goal:   2.0-3.0   TTR:   83.1 % (3 y)   INR used for dosin.2 (6/10/2019)   Warfarin maintenance plan:   5 mg (5 mg x 1) every Mon, Wed, Fri; 2.5 mg (5 mg x 0.5) all other days   Full warfarin instructions:   5 mg every Mon, Wed, Fri; 2.5 mg all other days   Weekly warfarin total:   25 mg   No change documented:   Bree Delgadillo RN   Plan last modified:   Bree Delgadillo RN (2019)   Next INR check:   2019   Priority:   INR   Target end date:       Indications    Long-term (current) use of anticoagulants [Z79.01] [Z79.01]  Atrial fibrillation (H) [I48.91]             Anticoagulation Episode Summary     INR check location:       Preferred lab:       Send INR reminders to:    ANTICOAGULATION    Comments:         Anticoagulation Care Providers     Provider Role Specialty Phone number    Halley Huff MD [facility] [phone]            See the Encounter Report to view Anticoagulation Flowsheet and Dosing Calendar (Go to Encounters tab in chart review, and find the Anticoagulation Therapy Visit)    Pt INR was 2.2 today. Pt is here with her daughter. Pt advised to continue maintenance dose of 5 mg on , ,  and 2.5 mg all the other days. Recheck in 6 weeks. Pt denies any changes in health, diet, activity or medication. Shelly aware if signs of clotting (pain,  tenderness, swelling, color change in leg or arm, SOB) and bleeding occur (blood in stool, urine, large bruising, bleeding gums, nosebleeds) to have INR check sooner. If sx severe report to ER or concerned for stroke call 911. If general questions or concerns arise, call clinic.         Bree Delgadillo RN

## 2019-06-10 NOTE — PROGRESS NOTES
She is at risk for lack of exercise and has been provided with information to increase physical activity for the benefit of her well-being.  The patient was provided with written information regarding signs of hearing loss.  The patient was provided with suggestions to help her develop a healthy emotional lifestyle.

## 2019-06-10 NOTE — LETTER
My Heart Failure Action Plan   Name: Shelly Rogers    YOB: 1933   Date: 6/10/2019    [place] [address] [facility] [id number] [phone]  My Diagnosis: Preserved (HFp)- EF:Over 50%   My Exercise Goal: 30 minutes daily  .     My Weight Plan:   Wt Readings from Last 2 Encounters:   06/10/19 69.4 kg (153 lb)   05/01/19 73.5 kg (162 lb)     Weigh yourself daily using the same scale. If you gain more than 2 pounds in 24 hours or 5 pounds in a week call the clinic    My Diet Goal: low salt diet    Emergency Room Visits:    Our goal is to improve your quality of life and help you avoid a visit to the emergency room or hospital.  If we work together, we can achieve this goal. But, if you feel you need to call 911 or go to the emergency room, please do so.  If you go to the emergency room, please bring your list of medicines and your daily weight chart with you.       GREEN ZONE     Doing well today    Weight gained is no more than 2 pounds a day or 5 pounds a week.    No swelling in feet, ankles, legs or stomach.    No more swelling than usual.    No more trouble breathing than usual.    No change in my sleep.    No other problems. Actions:    I am doing fine.  I will take my medicine, follow my diet, see my doctor, exercise, and watch for symptoms.           YELLOW ZONE         Having a bad day or flare up    Weight gain of more than 2 pounds in one day or 5 pounds in one week.    New swelling in ankle, leg, knee or thigh.    Bloating in belly, pants feel tighter.    Swelling in hands or face.    Coughing or trouble breathing while walking or talking.    Harder to breathe last night.    Have trouble sleeping, wake up short of breath.    Much more tired than usual.    Not eating.    Pain in my chest or bad leg cramps.    Feel weak or dizzy. Actions:    I need to take action and call my doctor or nurse today.                 RED ZONE          Need medical care now    Weight gain of 5 pounds overnight.    Chest pain or pressure that does not go away.    Feel less alert.    Wheezing or have trouble breathing when at rest.    Cannot sleep lying down.    Cannot take my water pill.    Pass out or faint. Actions:    I need to call my doctor or nurse now!    Call 911 if I have chest pain or cannot breathe.

## 2019-06-11 LAB
ALBUMIN SERPL-MCNC: 3.6 G/DL (ref 3.4–5)
ALP SERPL-CCNC: 155 U/L (ref 40–150)
ALT SERPL W P-5'-P-CCNC: 20 U/L (ref 0–50)
ANION GAP SERPL CALCULATED.3IONS-SCNC: 8 MMOL/L (ref 3–14)
AST SERPL W P-5'-P-CCNC: 22 U/L (ref 0–45)
BILIRUB SERPL-MCNC: 1 MG/DL (ref 0.2–1.3)
BUN SERPL-MCNC: 22 MG/DL (ref 7–30)
CALCIUM SERPL-MCNC: 9.2 MG/DL (ref 8.5–10.1)
CHLORIDE SERPL-SCNC: 102 MMOL/L (ref 94–109)
CO2 SERPL-SCNC: 28 MMOL/L (ref 20–32)
CREAT SERPL-MCNC: 0.93 MG/DL (ref 0.52–1.04)
GFR SERPL CREATININE-BSD FRML MDRD: 56 ML/MIN/{1.73_M2}
GLUCOSE SERPL-MCNC: 108 MG/DL (ref 70–99)
POTASSIUM SERPL-SCNC: 4.6 MMOL/L (ref 3.4–5.3)
PROT SERPL-MCNC: 7.3 G/DL (ref 6.8–8.8)
SODIUM SERPL-SCNC: 138 MMOL/L (ref 133–144)
T4 FREE SERPL-MCNC: 1.42 NG/DL (ref 0.76–1.46)
TSH SERPL DL<=0.005 MIU/L-ACNC: 5.11 MU/L (ref 0.4–4)

## 2019-06-13 DIAGNOSIS — E03.9 HYPOTHYROIDISM, UNSPECIFIED TYPE: ICD-10-CM

## 2019-06-13 RX ORDER — LEVOTHYROXINE SODIUM 100 UG/1
100 TABLET ORAL DAILY
Qty: 90 TABLET | Refills: 0 | Status: SHIPPED | OUTPATIENT
Start: 2019-06-13 | End: 2019-09-11

## 2019-06-14 NOTE — RESULT ENCOUNTER NOTE
I spoke to her daughter and informed her regarding the results and the fact that we have change the medication levothyroxine  She knows that we will be sending her a letter as well.    
Please notify patient by sending following letter with copy of test results      Nayely Bravo,    This is to inform you regarding your test result.    TSH which is thyroid hormone is elevated   Increase levothyroxine to 100 mcg po daily  You ar on 88  mcg currently so we are increasing to 100 mcg po daily.  Please make lab appointment to recheck TSH in 2 months.  Orders are placed.  New script is sent  The testing of your kidney function, liver function and electrolytes was satisfactory   HbA1c which is average glucose during last 3 months is 7.9%.  It shows slight improvement.    Sincerely,      Dr.Nasima Daria MD,FACP    
positive S2/positive S1

## 2019-06-21 ENCOUNTER — ANCILLARY PROCEDURE (OUTPATIENT)
Dept: CARDIOLOGY | Facility: CLINIC | Age: 84
End: 2019-06-21
Attending: INTERNAL MEDICINE
Payer: MEDICARE

## 2019-06-21 DIAGNOSIS — I48.91 ATRIAL FIBRILLATION (H): ICD-10-CM

## 2019-06-21 LAB
MDC_IDC_LEAD_IMPLANT_DT: NORMAL
MDC_IDC_LEAD_IMPLANT_DT: NORMAL
MDC_IDC_LEAD_LOCATION: NORMAL
MDC_IDC_LEAD_LOCATION: NORMAL
MDC_IDC_LEAD_MFG: NORMAL
MDC_IDC_LEAD_MFG: NORMAL
MDC_IDC_LEAD_MODEL: NORMAL
MDC_IDC_LEAD_MODEL: NORMAL
MDC_IDC_LEAD_POLARITY_TYPE: NORMAL
MDC_IDC_LEAD_POLARITY_TYPE: NORMAL
MDC_IDC_LEAD_SERIAL: NORMAL
MDC_IDC_LEAD_SERIAL: NORMAL
MDC_IDC_MSMT_BATTERY_DTM: NORMAL
MDC_IDC_MSMT_BATTERY_IMPEDANCE: 1058 OHM
MDC_IDC_MSMT_BATTERY_REMAINING_LONGEVITY: 59 MO
MDC_IDC_MSMT_BATTERY_STATUS: NORMAL
MDC_IDC_MSMT_BATTERY_VOLTAGE: 2.78 V
MDC_IDC_MSMT_LEADCHNL_RA_IMPEDANCE_VALUE: 421 OHM
MDC_IDC_MSMT_LEADCHNL_RA_PACING_THRESHOLD_AMPLITUDE: 0.88 V
MDC_IDC_MSMT_LEADCHNL_RA_PACING_THRESHOLD_PULSEWIDTH: 0.4 MS
MDC_IDC_MSMT_LEADCHNL_RV_IMPEDANCE_VALUE: 442 OHM
MDC_IDC_MSMT_LEADCHNL_RV_PACING_THRESHOLD_AMPLITUDE: 0.5 V
MDC_IDC_MSMT_LEADCHNL_RV_PACING_THRESHOLD_PULSEWIDTH: 0.4 MS
MDC_IDC_PG_IMPLANT_DTM: NORMAL
MDC_IDC_PG_MFG: NORMAL
MDC_IDC_PG_MODEL: NORMAL
MDC_IDC_PG_SERIAL: NORMAL
MDC_IDC_PG_TYPE: NORMAL
MDC_IDC_SESS_CLINIC_NAME: NORMAL
MDC_IDC_SESS_DTM: NORMAL
MDC_IDC_SESS_TYPE: NORMAL
MDC_IDC_SET_BRADY_AT_MODE_SWITCH_MODE: NORMAL
MDC_IDC_SET_BRADY_AT_MODE_SWITCH_RATE: 175 {BEATS}/MIN
MDC_IDC_SET_BRADY_LOWRATE: 60 {BEATS}/MIN
MDC_IDC_SET_BRADY_MAX_SENSOR_RATE: 120 {BEATS}/MIN
MDC_IDC_SET_BRADY_MAX_TRACKING_RATE: 120 {BEATS}/MIN
MDC_IDC_SET_BRADY_MODE: NORMAL
MDC_IDC_SET_BRADY_PAV_DELAY_LOW: 350 MS
MDC_IDC_SET_BRADY_SAV_DELAY_LOW: 350 MS
MDC_IDC_SET_LEADCHNL_RA_PACING_AMPLITUDE: 1.5 V
MDC_IDC_SET_LEADCHNL_RA_PACING_ANODE_ELECTRODE_1: NORMAL
MDC_IDC_SET_LEADCHNL_RA_PACING_ANODE_LOCATION_1: NORMAL
MDC_IDC_SET_LEADCHNL_RA_PACING_CAPTURE_MODE: NORMAL
MDC_IDC_SET_LEADCHNL_RA_PACING_CATHODE_ELECTRODE_1: NORMAL
MDC_IDC_SET_LEADCHNL_RA_PACING_CATHODE_LOCATION_1: NORMAL
MDC_IDC_SET_LEADCHNL_RA_PACING_POLARITY: NORMAL
MDC_IDC_SET_LEADCHNL_RA_PACING_PULSEWIDTH: 0.4 MS
MDC_IDC_SET_LEADCHNL_RA_SENSING_ANODE_ELECTRODE_1: NORMAL
MDC_IDC_SET_LEADCHNL_RA_SENSING_ANODE_LOCATION_1: NORMAL
MDC_IDC_SET_LEADCHNL_RA_SENSING_CATHODE_ELECTRODE_1: NORMAL
MDC_IDC_SET_LEADCHNL_RA_SENSING_CATHODE_LOCATION_1: NORMAL
MDC_IDC_SET_LEADCHNL_RA_SENSING_POLARITY: NORMAL
MDC_IDC_SET_LEADCHNL_RA_SENSING_SENSITIVITY: 0.18 MV
MDC_IDC_SET_LEADCHNL_RV_PACING_AMPLITUDE: 2 V
MDC_IDC_SET_LEADCHNL_RV_PACING_ANODE_ELECTRODE_1: NORMAL
MDC_IDC_SET_LEADCHNL_RV_PACING_ANODE_LOCATION_1: NORMAL
MDC_IDC_SET_LEADCHNL_RV_PACING_CAPTURE_MODE: NORMAL
MDC_IDC_SET_LEADCHNL_RV_PACING_CATHODE_ELECTRODE_1: NORMAL
MDC_IDC_SET_LEADCHNL_RV_PACING_CATHODE_LOCATION_1: NORMAL
MDC_IDC_SET_LEADCHNL_RV_PACING_POLARITY: NORMAL
MDC_IDC_SET_LEADCHNL_RV_PACING_PULSEWIDTH: 0.4 MS
MDC_IDC_SET_LEADCHNL_RV_SENSING_ANODE_ELECTRODE_1: NORMAL
MDC_IDC_SET_LEADCHNL_RV_SENSING_ANODE_LOCATION_1: NORMAL
MDC_IDC_SET_LEADCHNL_RV_SENSING_CATHODE_ELECTRODE_1: NORMAL
MDC_IDC_SET_LEADCHNL_RV_SENSING_CATHODE_LOCATION_1: NORMAL
MDC_IDC_SET_LEADCHNL_RV_SENSING_POLARITY: NORMAL
MDC_IDC_SET_LEADCHNL_RV_SENSING_SENSITIVITY: 1.4 MV
MDC_IDC_SET_ZONE_DETECTION_INTERVAL: 342.86 MS
MDC_IDC_SET_ZONE_DETECTION_INTERVAL: 342.86 MS
MDC_IDC_SET_ZONE_TYPE: NORMAL
MDC_IDC_SET_ZONE_TYPE: NORMAL
MDC_IDC_STAT_AT_BURDEN_PERCENT: 88.7 %
MDC_IDC_STAT_AT_DTM_END: NORMAL
MDC_IDC_STAT_AT_DTM_START: NORMAL
MDC_IDC_STAT_AT_MODE_SW_COUNT: NORMAL
MDC_IDC_STAT_BRADY_AP_VP_PERCENT: 11 %
MDC_IDC_STAT_BRADY_AP_VS_PERCENT: 10 %
MDC_IDC_STAT_BRADY_AS_VP_PERCENT: 3 %
MDC_IDC_STAT_BRADY_AS_VS_PERCENT: 76 %
MDC_IDC_STAT_BRADY_DTM_END: NORMAL
MDC_IDC_STAT_BRADY_DTM_START: NORMAL
MDC_IDC_STAT_BRADY_RA_PERCENT_PACED: 21 %
MDC_IDC_STAT_BRADY_RV_PERCENT_PACED: 14 %
MDC_IDC_STAT_EPISODE_RECENT_COUNT: 1
MDC_IDC_STAT_EPISODE_RECENT_COUNT: 7572
MDC_IDC_STAT_EPISODE_RECENT_COUNT_DTM_END: NORMAL
MDC_IDC_STAT_EPISODE_RECENT_COUNT_DTM_END: NORMAL
MDC_IDC_STAT_EPISODE_RECENT_COUNT_DTM_START: NORMAL
MDC_IDC_STAT_EPISODE_RECENT_COUNT_DTM_START: NORMAL
MDC_IDC_STAT_EPISODE_TYPE: NORMAL
MDC_IDC_STAT_EPISODE_TYPE: NORMAL

## 2019-06-21 PROCEDURE — 93294 REM INTERROG EVL PM/LDLS PM: CPT | Performed by: INTERNAL MEDICINE

## 2019-06-21 PROCEDURE — 93296 REM INTERROG EVL PM/IDS: CPT | Performed by: INTERNAL MEDICINE

## 2019-07-03 ENCOUNTER — TELEPHONE (OUTPATIENT)
Dept: FAMILY MEDICINE | Facility: CLINIC | Age: 84
End: 2019-07-03

## 2019-07-03 DIAGNOSIS — I50.32 CHRONIC DIASTOLIC CONGESTIVE HEART FAILURE (H): ICD-10-CM

## 2019-07-03 RX ORDER — FUROSEMIDE 20 MG
20 TABLET ORAL 2 TIMES DAILY
Qty: 180 TABLET | Refills: 3 | Status: SHIPPED | OUTPATIENT
Start: 2019-07-03 | End: 2020-06-17

## 2019-07-03 NOTE — TELEPHONE ENCOUNTER
Historical in chart, 20mg BID    Pending Prescriptions:                       Disp   Refills    furosemide (LASIX) 20 MG tablet           180 ta*3            Sig: Take 1 tablet (20 mg) by mouth 2 times daily          Last Written Prescription Date:  historical  Last Fill Quantity: -,   # refills: -  Last Office Visit: 6-10-19 [place]  Future Office visit:       Routing refill request to provider for review/approval because:  Medication is reported/historical    Esthela West RT (R)

## 2019-07-22 ENCOUNTER — ANTICOAGULATION THERAPY VISIT (OUTPATIENT)
Dept: NURSING | Facility: CLINIC | Age: 84
End: 2019-07-22
Payer: MEDICARE

## 2019-07-22 LAB — INR POINT OF CARE: 2 (ref 0.86–1.14)

## 2019-07-22 PROCEDURE — 85610 PROTHROMBIN TIME: CPT | Mod: QW

## 2019-07-22 PROCEDURE — 36416 COLLJ CAPILLARY BLOOD SPEC: CPT

## 2019-07-22 PROCEDURE — 99207 ZZC NO CHARGE NURSE ONLY: CPT

## 2019-07-22 NOTE — PROGRESS NOTES
ANTICOAGULATION FOLLOW-UP CLINIC VISIT    Patient Name:  Shelly Rogers  Date:  2019  Contact Type:  Face to Face    SUBJECTIVE:  Patient Findings     Comments:   The patient was assessed for diet, medication, and activity level changes, missed or extra doses, bruising or bleeding, with no problem findings.        Clinical Outcomes     Negatives:   Major bleeding event, Thromboembolic event, Anticoagulation-related hospital admission, Anticoagulation-related ED visit, Anticoagulation-related fatality    Comments:   The patient was assessed for diet, medication, and activity level changes, missed or extra doses, bruising or bleeding, with no problem findings.           OBJECTIVE    INR Protime   Date Value Ref Range Status   2019 2.0 (A) 0.86 - 1.14 Final       ASSESSMENT / PLAN  INR assessment THER    Recheck INR In: 6 WEEKS    INR Location Clinic      Anticoagulation Summary  As of 2019    INR goal:   2.0-3.0   TTR:   83.7 % (3.1 y)   INR used for dosin.0 (2019)   Warfarin maintenance plan:   5 mg (5 mg x 1) every Mon, Wed, Fri; 2.5 mg (5 mg x 0.5) all other days   Full warfarin instructions:   5 mg every Mon, Wed, Fri; 2.5 mg all other days   Weekly warfarin total:   25 mg   No change documented:   Bree Delgadillo RN   Plan last modified:   Bree Delgadillo RN (2019)   Next INR check:   2019   Priority:   INR   Target end date:       Indications    Long-term (current) use of anticoagulants [Z79.01] [Z79.01]  Atrial fibrillation (H) [I48.91]             Anticoagulation Episode Summary     INR check location:       Preferred lab:       Send INR reminders to:   MIRNA CORRALES    Comments:         Anticoagulation Care Providers     Provider Role Specialty Phone number    Halley Huff MD [facility] Internal Medicine [phone]            See the Encounter Report to view Anticoagulation Flowsheet and Dosing Calendar (Go to Encounters tab in chart review, and find the  Anticoagulation Therapy Visit)    Pt INR is 2.0 today. Pt is here with her daughter. Pt advised to continue maintenance dose of 5 mg on Mondays, Wednesdays, Fridays and 2.5 mg all the other days. Recheck INR in 6 weeks. Shelly aware if signs of clotting (pain, tenderness, swelling, color change in leg or arm, SOB) and bleeding occur (blood in stool, urine, large bruising, bleeding gums, nosebleeds) to have INR check sooner. If sx severe report to ER or concerned for stroke call 911. If general questions or concerns arise, call clinic.         Bree Delgadillo RN

## 2019-09-04 ENCOUNTER — ANTICOAGULATION THERAPY VISIT (OUTPATIENT)
Dept: NURSING | Facility: CLINIC | Age: 84
End: 2019-09-04
Payer: MEDICARE

## 2019-09-04 DIAGNOSIS — I48.91 ATRIAL FIBRILLATION (H): ICD-10-CM

## 2019-09-04 DIAGNOSIS — Z79.01 LONG TERM CURRENT USE OF ANTICOAGULANT THERAPY: ICD-10-CM

## 2019-09-04 LAB — INR POINT OF CARE: 2.3 (ref 0.86–1.14)

## 2019-09-04 PROCEDURE — 85610 PROTHROMBIN TIME: CPT | Mod: QW

## 2019-09-04 PROCEDURE — 36416 COLLJ CAPILLARY BLOOD SPEC: CPT

## 2019-09-04 PROCEDURE — 99207 ZZC NO CHARGE NURSE ONLY: CPT

## 2019-09-04 NOTE — PROGRESS NOTES
ANTICOAGULATION FOLLOW-UP CLINIC VISIT    Patient Name:  Shelly Rogers  Date:  2019  Contact Type:  Face to Face    SUBJECTIVE:  Patient Findings     Comments:   The patient was assessed for diet, medication, and activity level changes, missed or extra doses, bruising or bleeding, with no problem findings.          Clinical Outcomes     Negatives:   Major bleeding event, Thromboembolic event, Anticoagulation-related hospital admission, Anticoagulation-related ED visit, Anticoagulation-related fatality    Comments:   The patient was assessed for diet, medication, and activity level changes, missed or extra doses, bruising or bleeding, with no problem findings.             OBJECTIVE    INR Protime   Date Value Ref Range Status   2019 2.3 (A) 0.86 - 1.14 Final       ASSESSMENT / PLAN  No question data found.  Anticoagulation Summary  As of 2019    INR goal:   2.0-3.0   TTR:   84.3 % (3.3 y)   INR used for dosin.3 (2019)   Warfarin maintenance plan:   5 mg (5 mg x 1) every Mon, Wed, Fri; 2.5 mg (5 mg x 0.5) all other days   Full warfarin instructions:   5 mg every Mon, Wed, Fri; 2.5 mg all other days   Weekly warfarin total:   25 mg   No change documented:   Glory Balderrama RN   Plan last modified:   Bree Delgadillo RN (2019)   Next INR check:   10/16/2019   Priority:   INR   Target end date:       Indications    Long-term (current) use of anticoagulants [Z79.01] [Z79.01]  Atrial fibrillation (H) [I48.91]             Anticoagulation Episode Summary     INR check location:       Preferred lab:       Send INR reminders to:   MIRNA CORRALES    Comments:         Anticoagulation Care Providers     Provider Role Specialty Phone number    Halley Huff MD [facility] Internal Medicine [phone]            See the Encounter Report to view Anticoagulation Flowsheet and Dosing Calendar (Go to Encounters tab in chart review, and find the Anticoagulation Therapy Visit)        Glory ABEL  SARINA Balderrama

## 2019-09-12 DIAGNOSIS — E03.9 HYPOTHYROIDISM, UNSPECIFIED TYPE: ICD-10-CM

## 2019-09-12 PROCEDURE — 36415 COLL VENOUS BLD VENIPUNCTURE: CPT | Performed by: INTERNAL MEDICINE

## 2019-09-12 PROCEDURE — 84443 ASSAY THYROID STIM HORMONE: CPT | Performed by: INTERNAL MEDICINE

## 2019-09-12 NOTE — LETTER
[address] [facility]  [address]  [place]  [address]  Tel: [phone]    September 16, 2019    Shelly Rogers  [address] [place] [id number]        Dear Ms. Rogers,    This is to inform you regarding your test result.    TSH which is thyroid hormone is normal.  Stay on current dose of levothyroxine.  Recheck TSH in 6 months      Sincerely,      Dr.Nasima Daria MD,FACP/SML        Enclosure: Lab Results  Results for orders placed or performed in visit on 09/12/19   TSH with free T4 reflex   Result Value Ref Range    TSH 1.90 0.40 - 4.00 mU/L

## 2019-09-13 LAB — TSH SERPL DL<=0.005 MIU/L-ACNC: 1.9 MU/L (ref 0.4–4)

## 2019-09-15 DIAGNOSIS — E03.9 HYPOTHYROIDISM, UNSPECIFIED TYPE: ICD-10-CM

## 2019-09-15 RX ORDER — LEVOTHYROXINE SODIUM 100 UG/1
100 TABLET ORAL DAILY
Qty: 90 TABLET | Refills: 1 | Status: SHIPPED | OUTPATIENT
Start: 2019-09-15 | End: 2020-03-10

## 2019-09-20 DIAGNOSIS — I50.32 CHRONIC DIASTOLIC CONGESTIVE HEART FAILURE (H): ICD-10-CM

## 2019-09-20 RX ORDER — METOPROLOL SUCCINATE 100 MG/1
100 TABLET, EXTENDED RELEASE ORAL 2 TIMES DAILY
Qty: 180 TABLET | Refills: 1 | Status: SHIPPED | OUTPATIENT
Start: 2019-09-20 | End: 2020-03-16

## 2019-09-23 ENCOUNTER — OFFICE VISIT (OUTPATIENT)
Dept: CARDIOLOGY | Facility: CLINIC | Age: 84
End: 2019-09-23
Attending: PHYSICIAN ASSISTANT
Payer: MEDICARE

## 2019-09-23 VITALS
SYSTOLIC BLOOD PRESSURE: 134 MMHG | WEIGHT: 156.3 LBS | OXYGEN SATURATION: 96 % | HEART RATE: 88 BPM | BODY MASS INDEX: 27.7 KG/M2 | HEIGHT: 63 IN | DIASTOLIC BLOOD PRESSURE: 84 MMHG

## 2019-09-23 DIAGNOSIS — I50.32 CHRONIC DIASTOLIC CONGESTIVE HEART FAILURE (H): ICD-10-CM

## 2019-09-23 LAB
ANION GAP SERPL CALCULATED.3IONS-SCNC: 12.4 MMOL/L (ref 6–17)
BUN SERPL-MCNC: 17 MG/DL (ref 7–30)
CALCIUM SERPL-MCNC: 9.9 MG/DL (ref 8.5–10.5)
CHLORIDE SERPL-SCNC: 102 MMOL/L (ref 98–107)
CO2 SERPL-SCNC: 27 MMOL/L (ref 23–29)
CREAT SERPL-MCNC: 1.18 MG/DL (ref 0.7–1.3)
GFR SERPL CREATININE-BSD FRML MDRD: 43 ML/MIN/{1.73_M2}
GLUCOSE SERPL-MCNC: 150 MG/DL (ref 70–105)
HGB BLD-MCNC: 14.5 G/DL (ref 11.7–15.7)
NT-PROBNP SERPL-MCNC: 2029 PG/ML (ref 0–450)
POTASSIUM SERPL-SCNC: 4.4 MMOL/L (ref 3.5–5.1)
SODIUM SERPL-SCNC: 137 MMOL/L (ref 136–145)

## 2019-09-23 PROCEDURE — 85018 HEMOGLOBIN: CPT | Performed by: PHYSICIAN ASSISTANT

## 2019-09-23 PROCEDURE — 83880 ASSAY OF NATRIURETIC PEPTIDE: CPT | Performed by: PHYSICIAN ASSISTANT

## 2019-09-23 PROCEDURE — 99214 OFFICE O/P EST MOD 30 MIN: CPT | Performed by: PHYSICIAN ASSISTANT

## 2019-09-23 PROCEDURE — 36415 COLL VENOUS BLD VENIPUNCTURE: CPT | Performed by: PHYSICIAN ASSISTANT

## 2019-09-23 PROCEDURE — 80048 BASIC METABOLIC PNL TOTAL CA: CPT | Performed by: PHYSICIAN ASSISTANT

## 2019-09-23 ASSESSMENT — MIFFLIN-ST. JEOR: SCORE: 1110.16

## 2019-09-23 NOTE — LETTER
9/23/2019    Halley Huff MD  [address]  [facility] [address]    RE: Shelly Rogers       Dear Colleague,    I had the pleasure of seeing Shelly Rogers in the [facility].    Cardiology Clinic Progress Note    Shelly Rogers MRN# [id number]   YOB: 1933 Age: 86 year old   Primary cardiologist: Dr. Del Valle         Assessment and Plan:     In summary, Shelly Rogers presents today for a [facility] clinic f/u visit.     1. HFpEF   - ECHO: EF 55-60%, E/E' avg 13.2   - ACC/AHA Stage: C; FC II (stable)   - Etiology: tachy-mediated, diabetes, SYLVIE; diastolic dysfn contributing to RV failure   - RV: moderate dilated with mildly reduced fn   - Valves: moderate TR   - Volume: Euvolemic    Current Wt: 156 lbs    Dry Wt: suspect ~ 155-160 lbs    Diuretics: Lasix 20 mg BID   - Rx: Toprol 100 BID   - Rhythm: PAF, on warfarin.   - QRS: narrow   - Device: Dual chamber PPM, not dependent   - Other:     Uncontrolled DM.     Severe mixed sleep apnea, refuses CPAP.     Treated thyroid disease.     Noncompliance with medications.     Plan:  - I have advised her to take her evening dose of furosemide earlier in the day (currently taking at bedtime) to avoid nocturia. Otherwise, no changes today.   - She is thinking about switching back from Coumadin to NOAC and are checking with her new insurance to see how well Xarelto and Eliquis are covered - they'll call us if they decide to change.   - Remote device check 10/1.   - RTC with [facility] clinic in 3 months with Dr. Del Valle.   - Discussed low-salt diet, with maintaining adequate protein intake.         History of Presenting Illness:      Shelly Rogers is a pleasant 86 year old patient who presents today for a [facility] clinic f/u visit.    The patient has a history of the following -   # HFpEF, initially diagnosed in 2013 in the setting of new-onset rapid AF. Admission in October 2018 in the setting of lasix noncompliance and  increasing burden of AF.   # PAF/SSS, s/p PPM and cardioversion in 2013. Rate control strategy with Toprol. Anticoagulated on warfarin, INR followed by PCP.   # NSVT, asymptomatic, short runs noted on device  # Hx of R pleural effusion, s/p thoracentesis 10/2018  # HTN  # DMII  # CKD  # Hypothyroidism, on levothyroxine  # Mild hyponatremia  # Severe mixed SYLVIE, refuses CPAP  # Noncompliance  # Social - She lives independently at an assisted living facility and manages her own medications. She has some macular degeneration which has caused some balance issues over time and therefore uses a cane to ambulate when she's out of her home, and doesn't drive. She's had no falls, and uses fall precautions. No ETOH or tobacco.    I've been following Shelly along with Dr. Del Valle since her admission in October 2018. She's been doing well on her current diuretic regimen with the addition of sodium counseling since that time. She initially had a 10 lb wt loss after she started monitoring her sodium intake with symptomatic improvement. I obtained a sleep study which showed severe mixed SYLVIE. Unfortunately, she refuses a CPAP. Zio monitor after that showed CAF with an average HR of 80 bpm. She saw Dr. Del Valle in November 2018, and her Toprol was increased from 75 BID to 100 mg BID. When I saw her after that in February, she told me she had never taken her prescribed Aldactone nor diltiazem. I checked her device and this showed majority of HR's in the 80's with some short runs of VT. I asked her to start the diltiazem then but she declined. She then admitted that she hadn't increase her Toprol as instructed by Dr. Del Valle - I reinforced this.     Last device check in June showed 14%  with 21% Ap, presenting rhythm was AF. She was in mode switch 88% of the time. Rates were adequate per histogram. There was one ventricular high rate which was felt to be PAT.   TSH at that time was elevated and her levothyroxine was increased by PCP, TSH is  "now in range. Her last A1c was 7.9%.     Today, she presents to clinic with her daughter Sandhya. She's feeling well. She doesn't feel limited by her breathing with ambulation or sleep. Her pedal and ankle edema is at baseline. She elevates her legs and wears compression stockings during the day which help this. Clinic weight is stable. She hasn't been weighing herself at home as it's \"always the same.\"She's continued to read her labels and is following a <2g/day sodium diet. She feels she's probably getting some form of protein in every day. She's visiting her daughter in [place] for five days in the near future and looking forward to this. Shelly tells me she's compliant with her meds and is using a pill box appropriately. She is taking her second dose of furosemide at 8:30p with her other evening meds and has nocturia from this.   BMP today is satisfactory.          Review of Systems:     12-pt ROS is negative except for as noted in the HPI.          Physical Exam:     Vitals: /84   Pulse 88   Ht 1.588 m (5' 2.5\")   Wt 70.9 kg (156 lb 4.8 oz)   SpO2 96%   BMI 28.13 kg/m     Wt Readings from Last 10 Encounters:   09/23/19 70.9 kg (156 lb 4.8 oz)   06/10/19 69.4 kg (153 lb)   05/01/19 73.5 kg (162 lb)   02/07/19 73.5 kg (162 lb)   01/10/19 73.3 kg (161 lb 8 oz)   11/14/18 78.6 kg (173 lb 3.2 oz)   11/12/18 77.8 kg (171 lb 8 oz)   10/31/18 78.9 kg (174 lb)   10/26/18 81.2 kg (179 lb)   10/22/18 80.5 kg (177 lb 6.4 oz)       Constitutional:  Patient is pleasant, alert, cooperative, and in NAD.  HEENT:  NCAT. PERRLA. EOM's intact.  Neck:  CVP appears normal  Pulmonary: Normal respiratory effort. CTAB.  Cardiac: Irregularly irregular, grade 2/6 sm at the LSB  Abdomen:  Non-tender abdomen with normoactive bowel sounds, no hepatosplenomegaly appreciated.   Vascular: Pulses in the upper extremities are 2+ and equal, lower extremity pulses are diminished bilaterally.  Extremities: 1+ pitting edema pedal - " pretibial region bilaterally.  Skin:  No rashes or lesions appreciated.   Neurological:  No gross motor or sensory deficits.   Psych: Appropriate affect.        Data:   Cardiac Diagnostics reviewed:  Type Date Result   TTE 10/19/18 The left ventricle is normal in size.  The visual ejection fraction is estimated at 55-60%.  No regional wall motion abnormalities noted.  The right ventricle is moderately dilated.  Mildly decreased right ventricular systolic function  There is moderate (2+) tricuspid regurgitation.  Small pericardial effusion  The rhythm was rapid atrial fibrillation.    5/5/16 Left ventricular systolic function is normal. The visual ejection fraction is  estimated at 60-65%.  There is mild concentric left ventricular hypertrophy. The left atrium is borderline dilated.  There is trace to mild mitral regurgitation.  There is moderate (2+) tricuspid regurgitation.  Right ventricular systolic pressure is elevated, consistent with mild  pulmonary hypertension.  The rhythm was normal sinus.  Contrast was used without apparent complications. TR may be mildly increased  compared to the prior study.   EKG 10/19/18 AF, low voltage QRS     Device  [employer] PPM interrogation (inpatient)   ~ 2%, Ap ~ 30%     Sleep study  12/17/18 Respiration: Severe sleep disordered breathing best characterized as mixed (combination of both obstructive and central phenomena). Central events were suggestive of Cheyne Simons periodic breathing consisteint with heart disease. She also had sleep related hypoxemia. Of note the patient did not have REM supine during the study.     Events ? The polysomnogram revealed a presence of 26 obstructive, 66 central, and 23 mixed apneas resulting in an apnea index of 89.6 events per hour. There were 2 obstructive hypopneas and 2 central hypopneas resulting in an obstructive hypopnea index of 1.6 and central hypopnea index of 1.6 events per hour. The combined apnea/hypopnea index was 92.7 events  per hour (central apnea/hypopnea index was 53.0 events per hour). The REM AHI was 93.9 events per hour. The supine AHI was - events per hour. The RERA index was - events per hour. The RDI was 92.7 events per hour.      Labs reviewed:  Recent Labs   Lab Test 09/12/19  1402 06/10/19  1426 03/19/19  1548  11/08/18  1206  02/27/18  1541  10/13/15  1454 10/30/14  1434  03/21/13  0525   LDL  --   --   --   --   --   --  101*  --  88 105   < > 58   HDL  --   --   --   --   --   --  41*  --  44*  --   --  40*   NHDL  --   --   --   --   --   --  165*  --   --   --   --   --    CHOL  --   --   --   --   --   --  206*  --  204*  --   --  125   TRIG  --   --   --   --   --   --  319*  --  359*  --   --  137   TSH 1.90 5.11* 3.72   < >  --    < > 2.32   < > 1.20 0.97   < >  --    IRON  --   --   --   --  57  --   --   --   --   --   --   --    FEB  --   --   --   --  389  --   --   --   --   --   --   --    IRONSAT  --   --   --   --  15  --   --   --   --   --   --   --    DARI  --   --   --   --  34   < > 28   < >  --   --   --   --     < > = values in this interval not displayed.       Lab Results   Component Value Date    WBC 7.4 11/08/2018    RBC 4.61 11/08/2018    HGB 14.5 09/23/2019    HCT 42.3 11/08/2018    MCV 92 11/08/2018    MCH 29.5 11/08/2018    MCHC 32.2 11/08/2018    RDW 16.3 (H) 11/08/2018     11/08/2018       Lab Results   Component Value Date     09/23/2019    POTASSIUM 4.4 09/23/2019    CHLORIDE 102 09/23/2019    CO2 27 09/23/2019    ANIONGAP 12.4 09/23/2019     (H) 09/23/2019    BUN 17 09/23/2019    CR 1.18 09/23/2019    GFRESTIMATED 43 (L) 09/23/2019    GFRESTBLACK 53 (L) 09/23/2019    ASTRID 9.9 09/23/2019      Lab Results   Component Value Date    AST 22 06/10/2019    ALT 20 06/10/2019       Lab Results   Component Value Date    A1C 7.9 (H) 06/10/2019       Lab Results   Component Value Date    INR 2.3 (A) 09/04/2019    INR 2.0 (A) 07/22/2019    INR 1.70 (H) 10/22/2018    INR 1.97 (H)  10/21/2018           Problem List:     Patient Active Problem List   Diagnosis     CHF (congestive heart failure) (H)     CARDIOVASCULAR SCREENING; LDL GOAL LESS THAN 100     Health Care Home     Pacemaker     OA (osteoarthritis)     Type 2 diabetes mellitus without complications (H)     DNS (deviated nasal septum)     Atrial fibrillation (H)     Paroxysmal atrial fibrillation (H)     Chronic diastolic congestive heart failure (H)     Hypothyroidism     Long-term (current) use of anticoagulants [Z79.01]     Heart failure (H)     CKD (chronic kidney disease) stage 3, GFR 30-59 ml/min (H)     History of uterine cancer           Medications:     Current Outpatient Medications   Medication Sig Dispense Refill     ACE/ARB NOT PRESCRIBED, INTENTIONAL, ACE & ARB not prescribed due to Other: HFpEF       ACETAMINOPHEN PO Take 500 mg by mouth 3 times daily as needed (Takes at least 6 hours apart).        ASPIRIN NOT PRESCRIBED, INTENTIONAL, Antiplatelet medication not prescribed intentionally due to Current anticoagulant therapy (warfarin/enoxaparin) 0 each 0     blood glucose (ACCU-CHEK SMARTVIEW) test strip 1 strip by In Vitro route daily 1 Box prn     blood glucose (NO BRAND SPECIFIED) lancets standard Use to test blood sugar 1 times daily or as directed. 100 each 11     blood glucose monitoring (ACCU-CHEK FASTCLIX) lancets Use to check blood sugars daily 1 Box prn     blood glucose monitoring (NO BRAND SPECIFIED) meter device kit Use to test blood sugar one times daily or as directed. 1 kit 0     blood glucose monitoring (NO BRAND SPECIFIED) test strip Use to test blood sugars one times daily or as directed .has type 2 diabetes and not on insulin 100 strip 1     Blood Glucose Monitoring Suppl (ACCU-CHEK LAKIA SMARTVIEW) W/DEVICE KIT 1 Device daily. 1 kit 0     Continuous Blood Gluc  (FREESTYLE STELLA 14 DAY READER) VERO 1 each every 14 days 1 Device 0     Continuous Blood Gluc Sensor (FREESTYLE STELLA 14 DAY SENSOR)  MISC 1 each every 14 days 2 each 11     furosemide (LASIX) 20 MG tablet Take 1 tablet (20 mg) by mouth 2 times daily 180 tablet 3     glimepiride (AMARYL) 2 MG tablet Take 1 tablet (2 mg) by mouth 2 times daily 180 tablet 3     levothyroxine (SYNTHROID/LEVOTHROID) 100 MCG tablet Take 1 tablet (100 mcg) by mouth daily 90 tablet 1     metFORMIN (GLUCOPHAGE) 500 MG tablet Take 1 tablet (500 mg) by mouth 2 times daily (with meals) 180 tablet 3     metoprolol succinate ER (TOPROL-XL) 100 MG 24 hr tablet Take 1 tablet (100 mg) by mouth 2 times daily 180 tablet 1     multivitamin  with lutein (OCUVITE WITH LTEIN) CAPS per capsule Take 1 capsule by mouth daily       STATIN NOT PRESCRIBED, INTENTIONAL, continuous prn. Statin not prescribed intentionally due to Other: Not needed, LDL at goal <100mg/dL without therapy 0 each 0     triamcinolone (NASACORT) 55 MCG/ACT nasal aerosol Spray 2 sprays into both nostrils daily       UNABLE TO FIND MEDICATION NAME: CBDistilleadalberto Vegan CBD gummies       warfarin (COUMADIN) 5 MG tablet TAKE 1/2 TO 1 TABLET BY MOUTH DAILY OR AS DIRECTED BY INR CLINIC 90 tablet 1           Past Medical History:     Past Medical History:   Diagnosis Date     Atrial fibrillation (H)     Nl LVEF, s/p ablation      Congestive heart failure, unspecified      Diabetes mellitus (H) 2004     Hemorrhoids     intermittent bleeding     Hypertension      Macular degeneration      OA (osteoarthritis)      Pacemaker 3/2013     Uterine cancer (H)     s/p hyst     Past Surgical History:   Procedure Laterality Date     C ANESTH,PACEMAKER INSERTION      dual chamber 3/27/13     CHOLECYSTECTOMY  8/2004     GYN SURGERY       HYSTERECTOMY      Ut ca      JOINT REPLACEMENT       KERATOTOMY ARCUATE WITH FEMTOSECOND LASER/IMAGING FOR ATIOL Right 4/11/2017    Procedure: KERATOTOMY ARCUATE WITH FEMTOSECOND LASER/IMAGING FOR ATIOL;  Surgeon: Calvin Dominguez MD;  Location: [facility]     PHACOEMULSIFICATION CLEAR CORNEA WITH  STANDARD INTRAOCULAR LENS IMPLANT Right 2017    Procedure: PHACOEMULSIFICATION CLEAR CORNEA WITH STANDARD INTRAOCULAR LENS IMPLANT;  Surgeon: Calvin Dominguez MD;  Location: [facility]     Family History   Problem Relation Age of Onset     Gastrointestinal Disease Mother         bowel obstruction     Cardiovascular Father      C.A.D. Father      Cardiovascular Brother         CHF     Social History     Socioeconomic History     Marital status: Single     Spouse name: Not on file     Number of children: Not on file     Years of education: Not on file     Highest education level: Not on file   Occupational History     Not on file   Social Needs     Financial resource strain: Not on file     Food insecurity:     Worry: Not on file     Inability: Not on file     Transportation needs:     Medical: Not on file     Non-medical: Not on file   Tobacco Use     Smoking status: Former Smoker     Last attempt to quit: 1990     Years since quittin.7     Smokeless tobacco: Never Used   Substance and Sexual Activity     Alcohol use: No     Alcohol/week: 0.0 standard drinks     Drug use: No     Sexual activity: Not Currently     Partners: Male   Lifestyle     Physical activity:     Days per week: Not on file     Minutes per session: Not on file     Stress: Not on file   Relationships     Social connections:     Talks on phone: Not on file     Gets together: Not on file     Attends Baptism service: Not on file     Active member of club or organization: Not on file     Attends meetings of clubs or organizations: Not on file     Relationship status: Not on file     Intimate partner violence:     Fear of current or ex partner: Not on file     Emotionally abused: Not on file     Physically abused: Not on file     Forced sexual activity: Not on file   Other Topics Concern     Parent/sibling w/ CABG, MI or angioplasty before 65F 55M? No      Service Not Asked     Blood Transfusions Not Asked      Caffeine Concern Yes     Comment: daily      Occupational Exposure Not Asked     Hobby Hazards Not Asked     Sleep Concern No     Stress Concern Not Asked     Weight Concern Not Asked     Special Diet Yes     Comment: low sodium, less leafy greens, low sugar     Back Care Not Asked     Exercise No     Bike Helmet Not Asked     Seat Belt Yes     Self-Exams Not Asked   Social History Narrative     Not on file           Allergies:   Shellfish allergy; Ambien [zolpidem]; Cats; Lisinopril; No clinical screening - see comments; Seasonal allergies; and Sulfa drugs      Eliz Goodwin PA-C, KIMBERLY  [facility]  Pager: [phone]    Thank you for allowing me to participate in the care of your patient.    Sincerely,     Eliz Goodwin PA-C     [facility]

## 2019-09-23 NOTE — PROGRESS NOTES
Cardiology Clinic Progress Note    Shelly Rogers MRN# [id number]   YOB: 1933 Age: 86 year old   Primary cardiologist: Dr. Del Valle         Assessment and Plan:     In summary, Shelly Rogers presents today for a [facility] clinic f/u visit.     1. HFpEF   - ECHO: EF 55-60%, E/E' avg 13.2   - ACC/AHA Stage: C; FC II (stable)   - Etiology: tachy-mediated, diabetes, SYLVIE; diastolic dysfn contributing to RV failure   - RV: moderate dilated with mildly reduced fn   - Valves: moderate TR   - Volume: Euvolemic    Current Wt: 156 lbs    Dry Wt: suspect ~ 155-160 lbs    Diuretics: Lasix 20 mg BID   - Rx: Toprol 100 BID   - Rhythm: PAF, on warfarin.   - QRS: narrow   - Device: Dual chamber PPM, not dependent   - Other:     Uncontrolled DM.     Severe mixed sleep apnea, refuses CPAP.     Treated thyroid disease.     Noncompliance with medications.     Plan:  - I have advised her to take her evening dose of furosemide earlier in the day (currently taking at bedtime) to avoid nocturia. Otherwise, no changes today.   - She is thinking about switching back from Coumadin to NOAC and are checking with her new insurance to see how well Xarelto and Eliquis are covered - they'll call us if they decide to change.   - Remote device check 10/1.   - RTC with [facility] clinic in 3 months with Dr. Del Valle.   - Discussed low-salt diet, with maintaining adequate protein intake.         History of Presenting Illness:      Shelly Rogers is a pleasant 86 year old patient who presents today for a [facility] clinic f/u visit.    The patient has a history of the following -   # HFpEF, initially diagnosed in 2013 in the setting of new-onset rapid AF. Admission in October 2018 in the setting of lasix noncompliance and increasing burden of AF.   # PAF/SSS, s/p PPM and cardioversion in 2013. Rate control strategy with Toprol. Anticoagulated on warfarin, INR followed by PCP.   # NSVT, asymptomatic, short runs noted on device  # Hx of R pleural effusion, s/p  thoracentesis 10/2018  # HTN  # DMII  # CKD  # Hypothyroidism, on levothyroxine  # Mild hyponatremia  # Severe mixed SYLVIE, refuses CPAP  # Noncompliance  # Social - She lives independently at an assisted living facility and manages her own medications. She has some macular degeneration which has caused some balance issues over time and therefore uses a cane to ambulate when she's out of her home, and doesn't drive. She's had no falls, and uses fall precautions. No ETOH or tobacco.    I've been following Shelly along with Dr. Del Valle since her admission in October 2018. She's been doing well on her current diuretic regimen with the addition of sodium counseling since that time. She initially had a 10 lb wt loss after she started monitoring her sodium intake with symptomatic improvement. I obtained a sleep study which showed severe mixed SYLVIE. Unfortunately, she refuses a CPAP. Zio monitor after that showed CAF with an average HR of 80 bpm. She saw Dr. Del Valle in November 2018, and her Toprol was increased from 75 BID to 100 mg BID. When I saw her after that in February, she told me she had never taken her prescribed Aldactone nor diltiazem. I checked her device and this showed majority of HR's in the 80's with some short runs of VT. I asked her to start the diltiazem then but she declined. She then admitted that she hadn't increase her Toprol as instructed by Dr. Del Valle - I reinforced this.     Last device check in June showed 14%  with 21% Ap, presenting rhythm was AF. She was in mode switch 88% of the time. Rates were adequate per histogram. There was one ventricular high rate which was felt to be PAT.   TSH at that time was elevated and her levothyroxine was increased by PCP, TSH is now in range. Her last A1c was 7.9%.     Today, she presents to clinic with her daughter Sandhya. She's feeling well. She doesn't feel limited by her breathing with ambulation or sleep. Her pedal and ankle edema is at baseline. She elevates  "her legs and wears compression stockings during the day which help this. Clinic weight is stable. She hasn't been weighing herself at home as it's \"always the same.\"She's continued to read her labels and is following a <2g/day sodium diet. She feels she's probably getting some form of protein in every day. She's visiting her daughter in [place] for five days in the near future and looking forward to this. Shelly tells me she's compliant with her meds and is using a pill box appropriately. She is taking her second dose of furosemide at 8:30p with her other evening meds and has nocturia from this.   BMP today is satisfactory.          Review of Systems:     12-pt ROS is negative except for as noted in the HPI.          Physical Exam:     Vitals: /84   Pulse 88   Ht 1.588 m (5' 2.5\")   Wt 70.9 kg (156 lb 4.8 oz)   SpO2 96%   BMI 28.13 kg/m    Wt Readings from Last 10 Encounters:   09/23/19 70.9 kg (156 lb 4.8 oz)   06/10/19 69.4 kg (153 lb)   05/01/19 73.5 kg (162 lb)   02/07/19 73.5 kg (162 lb)   01/10/19 73.3 kg (161 lb 8 oz)   11/14/18 78.6 kg (173 lb 3.2 oz)   11/12/18 77.8 kg (171 lb 8 oz)   10/31/18 78.9 kg (174 lb)   10/26/18 81.2 kg (179 lb)   10/22/18 80.5 kg (177 lb 6.4 oz)       Constitutional:  Patient is pleasant, alert, cooperative, and in NAD.  HEENT:  NCAT. PERRLA. EOM's intact.  Neck:  CVP appears normal  Pulmonary: Normal respiratory effort. CTAB.  Cardiac: Irregularly irregular, grade 2/6 sm at the LSB  Abdomen:  Non-tender abdomen with normoactive bowel sounds, no hepatosplenomegaly appreciated.   Vascular: Pulses in the upper extremities are 2+ and equal, lower extremity pulses are diminished bilaterally.  Extremities: 1+ pitting edema pedal - pretibial region bilaterally.  Skin:  No rashes or lesions appreciated.   Neurological:  No gross motor or sensory deficits.   Psych: Appropriate affect.        Data:   Cardiac Diagnostics reviewed:  Type Date Result   TTE 10/19/18 The left " ventricle is normal in size.  The visual ejection fraction is estimated at 55-60%.  No regional wall motion abnormalities noted.  The right ventricle is moderately dilated.  Mildly decreased right ventricular systolic function  There is moderate (2+) tricuspid regurgitation.  Small pericardial effusion  The rhythm was rapid atrial fibrillation.    5/5/16 Left ventricular systolic function is normal. The visual ejection fraction is  estimated at 60-65%.  There is mild concentric left ventricular hypertrophy. The left atrium is borderline dilated.  There is trace to mild mitral regurgitation.  There is moderate (2+) tricuspid regurgitation.  Right ventricular systolic pressure is elevated, consistent with mild  pulmonary hypertension.  The rhythm was normal sinus.  Contrast was used without apparent complications. TR may be mildly increased  compared to the prior study.   EKG 10/19/18 AF, low voltage QRS     Device  [employer] PPM interrogation (inpatient)   ~ 2%, Ap ~ 30%     Sleep study  12/17/18 Respiration: Severe sleep disordered breathing best characterized as mixed (combination of both obstructive and central phenomena). Central events were suggestive of Cheyne Simons periodic breathing consisteint with heart disease. She also had sleep related hypoxemia. Of note the patient did not have REM supine during the study.     Events ? The polysomnogram revealed a presence of 26 obstructive, 66 central, and 23 mixed apneas resulting in an apnea index of 89.6 events per hour. There were 2 obstructive hypopneas and 2 central hypopneas resulting in an obstructive hypopnea index of 1.6 and central hypopnea index of 1.6 events per hour. The combined apnea/hypopnea index was 92.7 events per hour (central apnea/hypopnea index was 53.0 events per hour). The REM AHI was 93.9 events per hour. The supine AHI was - events per hour. The RERA index was - events per hour. The RDI was 92.7 events per hour.      Labs  reviewed:  Recent Labs   Lab Test 09/12/19  1402 06/10/19  1426 03/19/19  1548  11/08/18  1206  02/27/18  1541  10/13/15  1454 10/30/14  1434  03/21/13  0525   LDL  --   --   --   --   --   --  101*  --  88 105   < > 58   HDL  --   --   --   --   --   --  41*  --  44*  --   --  40*   NHDL  --   --   --   --   --   --  165*  --   --   --   --   --    CHOL  --   --   --   --   --   --  206*  --  204*  --   --  125   TRIG  --   --   --   --   --   --  319*  --  359*  --   --  137   TSH 1.90 5.11* 3.72   < >  --    < > 2.32   < > 1.20 0.97   < >  --    IRON  --   --   --   --  57  --   --   --   --   --   --   --    FEB  --   --   --   --  389  --   --   --   --   --   --   --    IRONSAT  --   --   --   --  15  --   --   --   --   --   --   --    DARI  --   --   --   --  34   < > 28   < >  --   --   --   --     < > = values in this interval not displayed.       Lab Results   Component Value Date    WBC 7.4 11/08/2018    RBC 4.61 11/08/2018    HGB 14.5 09/23/2019    HCT 42.3 11/08/2018    MCV 92 11/08/2018    MCH 29.5 11/08/2018    MCHC 32.2 11/08/2018    RDW 16.3 (H) 11/08/2018     11/08/2018       Lab Results   Component Value Date     09/23/2019    POTASSIUM 4.4 09/23/2019    CHLORIDE 102 09/23/2019    CO2 27 09/23/2019    ANIONGAP 12.4 09/23/2019     (H) 09/23/2019    BUN 17 09/23/2019    CR 1.18 09/23/2019    GFRESTIMATED 43 (L) 09/23/2019    GFRESTBLACK 53 (L) 09/23/2019    ASTRID 9.9 09/23/2019      Lab Results   Component Value Date    AST 22 06/10/2019    ALT 20 06/10/2019       Lab Results   Component Value Date    A1C 7.9 (H) 06/10/2019       Lab Results   Component Value Date    INR 2.3 (A) 09/04/2019    INR 2.0 (A) 07/22/2019    INR 1.70 (H) 10/22/2018    INR 1.97 (H) 10/21/2018           Problem List:     Patient Active Problem List   Diagnosis     CHF (congestive heart failure) (H)     CARDIOVASCULAR SCREENING; LDL GOAL LESS THAN 100     Health Care Home     Pacemaker     OA  (osteoarthritis)     Type 2 diabetes mellitus without complications (H)     DNS (deviated nasal septum)     Atrial fibrillation (H)     Paroxysmal atrial fibrillation (H)     Chronic diastolic congestive heart failure (H)     Hypothyroidism     Long-term (current) use of anticoagulants [Z79.01]     Heart failure (H)     CKD (chronic kidney disease) stage 3, GFR 30-59 ml/min (H)     History of uterine cancer           Medications:     Current Outpatient Medications   Medication Sig Dispense Refill     ACE/ARB NOT PRESCRIBED, INTENTIONAL, ACE & ARB not prescribed due to Other: HFpEF       ACETAMINOPHEN PO Take 500 mg by mouth 3 times daily as needed (Takes at least 6 hours apart).        ASPIRIN NOT PRESCRIBED, INTENTIONAL, Antiplatelet medication not prescribed intentionally due to Current anticoagulant therapy (warfarin/enoxaparin) 0 each 0     blood glucose (ACCU-CHEK SMARTVIEW) test strip 1 strip by In Vitro route daily 1 Box prn     blood glucose (NO BRAND SPECIFIED) lancets standard Use to test blood sugar 1 times daily or as directed. 100 each 11     blood glucose monitoring (ACCU-CHEK FASTCLIX) lancets Use to check blood sugars daily 1 Box prn     blood glucose monitoring (NO BRAND SPECIFIED) meter device kit Use to test blood sugar one times daily or as directed. 1 kit 0     blood glucose monitoring (NO BRAND SPECIFIED) test strip Use to test blood sugars one times daily or as directed .has type 2 diabetes and not on insulin 100 strip 1     Blood Glucose Monitoring Suppl (ACCU-CHEK LAKIA SMARTVIEW) W/DEVICE KIT 1 Device daily. 1 kit 0     Continuous Blood Gluc  (FREESTYLE STELLA 14 DAY READER) VERO 1 each every 14 days 1 Device 0     Continuous Blood Gluc Sensor (FREESTYLE STELLA 14 DAY SENSOR) MISC 1 each every 14 days 2 each 11     furosemide (LASIX) 20 MG tablet Take 1 tablet (20 mg) by mouth 2 times daily 180 tablet 3     glimepiride (AMARYL) 2 MG tablet Take 1 tablet (2 mg) by mouth 2 times daily  180 tablet 3     levothyroxine (SYNTHROID/LEVOTHROID) 100 MCG tablet Take 1 tablet (100 mcg) by mouth daily 90 tablet 1     metFORMIN (GLUCOPHAGE) 500 MG tablet Take 1 tablet (500 mg) by mouth 2 times daily (with meals) 180 tablet 3     metoprolol succinate ER (TOPROL-XL) 100 MG 24 hr tablet Take 1 tablet (100 mg) by mouth 2 times daily 180 tablet 1     multivitamin  with lutein (OCUVITE WITH LTEIN) CAPS per capsule Take 1 capsule by mouth daily       STATIN NOT PRESCRIBED, INTENTIONAL, continuous prn. Statin not prescribed intentionally due to Other: Not needed, LDL at goal <100mg/dL without therapy 0 each 0     triamcinolone (NASACORT) 55 MCG/ACT nasal aerosol Spray 2 sprays into both nostrils daily       UNABLE TO FIND MEDICATION NAME: [employer] Vegan CBD gummies       warfarin (COUMADIN) 5 MG tablet TAKE 1/2 TO 1 TABLET BY MOUTH DAILY OR AS DIRECTED BY INR CLINIC 90 tablet 1           Past Medical History:     Past Medical History:   Diagnosis Date     Atrial fibrillation (H)     Nl LVEF, s/p ablation      Congestive heart failure, unspecified      Diabetes mellitus (H) 2004     Hemorrhoids     intermittent bleeding     Hypertension      Macular degeneration      OA (osteoarthritis)      Pacemaker 3/2013     Uterine cancer (H)     s/p hyst     Past Surgical History:   Procedure Laterality Date     C ANESTH,PACEMAKER INSERTION      dual chamber 3/27/13     CHOLECYSTECTOMY  8/2004     GYN SURGERY       HYSTERECTOMY      Ut ca      JOINT REPLACEMENT       KERATOTOMY ARCUATE WITH FEMTOSECOND LASER/IMAGING FOR ATIOL Right 4/11/2017    Procedure: KERATOTOMY ARCUATE WITH FEMTOSECOND LASER/IMAGING FOR ATIOL;  Surgeon: Calvin Dominguez MD;  Location: [facility]     PHACOEMULSIFICATION CLEAR CORNEA WITH STANDARD INTRAOCULAR LENS IMPLANT Right 4/11/2017    Procedure: PHACOEMULSIFICATION CLEAR CORNEA WITH STANDARD INTRAOCULAR LENS IMPLANT;  Surgeon: Calvin Dominguez MD;  Location: [facility]     TKR      bilat      Family History   Problem Relation Age of Onset     Gastrointestinal Disease Mother         bowel obstruction     Cardiovascular Father      C.A.D. Father      Cardiovascular Brother         CHF     Social History     Socioeconomic History     Marital status: Single     Spouse name: Not on file     Number of children: Not on file     Years of education: Not on file     Highest education level: Not on file   Occupational History     Not on file   Social Needs     Financial resource strain: Not on file     Food insecurity:     Worry: Not on file     Inability: Not on file     Transportation needs:     Medical: Not on file     Non-medical: Not on file   Tobacco Use     Smoking status: Former Smoker     Last attempt to quit: 1990     Years since quittin.7     Smokeless tobacco: Never Used   Substance and Sexual Activity     Alcohol use: No     Alcohol/week: 0.0 standard drinks     Drug use: No     Sexual activity: Not Currently     Partners: Male   Lifestyle     Physical activity:     Days per week: Not on file     Minutes per session: Not on file     Stress: Not on file   Relationships     Social connections:     Talks on phone: Not on file     Gets together: Not on file     Attends Buddhist service: Not on file     Active member of club or organization: Not on file     Attends meetings of clubs or organizations: Not on file     Relationship status: Not on file     Intimate partner violence:     Fear of current or ex partner: Not on file     Emotionally abused: Not on file     Physically abused: Not on file     Forced sexual activity: Not on file   Other Topics Concern     Parent/sibling w/ CABG, MI or angioplasty before 65F 55M? No      Service Not Asked     Blood Transfusions Not Asked     Caffeine Concern Yes     Comment: daily      Occupational Exposure Not Asked     Hobby Hazards Not Asked     Sleep Concern No     Stress Concern Not Asked     Weight Concern Not Asked     Special Diet Yes      Comment: low sodium, less leafy greens, low sugar     Back Care Not Asked     Exercise No     Bike Helmet Not Asked     Seat Belt Yes     Self-Exams Not Asked   Social History Narrative     Not on file           Allergies:   Shellfish allergy; Ambien [zolpidem]; Cats; Lisinopril; No clinical screening - see comments; Seasonal allergies; and Sulfa drugs      Eliz Goodwin PA-C, KIMBERLY  [facility]  Pager: [phone]

## 2019-10-01 ENCOUNTER — ANCILLARY PROCEDURE (OUTPATIENT)
Dept: CARDIOLOGY | Facility: CLINIC | Age: 84
End: 2019-10-01
Attending: INTERNAL MEDICINE
Payer: MEDICARE

## 2019-10-01 DIAGNOSIS — I48.91 ATRIAL FIBRILLATION (H): ICD-10-CM

## 2019-10-01 PROCEDURE — 93294 REM INTERROG EVL PM/LDLS PM: CPT | Performed by: INTERNAL MEDICINE

## 2019-10-02 DIAGNOSIS — Z95.0 CARDIAC PACEMAKER IN SITU: Primary | ICD-10-CM

## 2019-10-02 LAB
MDC_IDC_LEAD_IMPLANT_DT: NORMAL
MDC_IDC_LEAD_IMPLANT_DT: NORMAL
MDC_IDC_LEAD_LOCATION: NORMAL
MDC_IDC_LEAD_LOCATION: NORMAL
MDC_IDC_LEAD_MFG: NORMAL
MDC_IDC_LEAD_MFG: NORMAL
MDC_IDC_LEAD_MODEL: NORMAL
MDC_IDC_LEAD_MODEL: NORMAL
MDC_IDC_LEAD_POLARITY_TYPE: NORMAL
MDC_IDC_LEAD_POLARITY_TYPE: NORMAL
MDC_IDC_LEAD_SERIAL: NORMAL
MDC_IDC_LEAD_SERIAL: NORMAL
MDC_IDC_MSMT_BATTERY_DTM: NORMAL
MDC_IDC_MSMT_BATTERY_IMPEDANCE: 1164 OHM
MDC_IDC_MSMT_BATTERY_REMAINING_LONGEVITY: 55 MO
MDC_IDC_MSMT_BATTERY_STATUS: NORMAL
MDC_IDC_MSMT_BATTERY_VOLTAGE: 2.77 V
MDC_IDC_MSMT_LEADCHNL_RA_IMPEDANCE_VALUE: 427 OHM
MDC_IDC_MSMT_LEADCHNL_RA_PACING_THRESHOLD_AMPLITUDE: 0.88 V
MDC_IDC_MSMT_LEADCHNL_RA_PACING_THRESHOLD_PULSEWIDTH: 0.4 MS
MDC_IDC_MSMT_LEADCHNL_RV_IMPEDANCE_VALUE: 438 OHM
MDC_IDC_MSMT_LEADCHNL_RV_PACING_THRESHOLD_AMPLITUDE: 0.5 V
MDC_IDC_MSMT_LEADCHNL_RV_PACING_THRESHOLD_PULSEWIDTH: 0.4 MS
MDC_IDC_PG_IMPLANT_DTM: NORMAL
MDC_IDC_PG_MFG: NORMAL
MDC_IDC_PG_MODEL: NORMAL
MDC_IDC_PG_SERIAL: NORMAL
MDC_IDC_PG_TYPE: NORMAL
MDC_IDC_SESS_CLINIC_NAME: NORMAL
MDC_IDC_SESS_DTM: NORMAL
MDC_IDC_SESS_TYPE: NORMAL
MDC_IDC_SET_BRADY_AT_MODE_SWITCH_MODE: NORMAL
MDC_IDC_SET_BRADY_AT_MODE_SWITCH_RATE: 175 {BEATS}/MIN
MDC_IDC_SET_BRADY_LOWRATE: 60 {BEATS}/MIN
MDC_IDC_SET_BRADY_MAX_SENSOR_RATE: 120 {BEATS}/MIN
MDC_IDC_SET_BRADY_MAX_TRACKING_RATE: 120 {BEATS}/MIN
MDC_IDC_SET_BRADY_MODE: NORMAL
MDC_IDC_SET_BRADY_PAV_DELAY_LOW: 350 MS
MDC_IDC_SET_BRADY_SAV_DELAY_LOW: 350 MS
MDC_IDC_SET_LEADCHNL_RA_PACING_AMPLITUDE: 1.5 V
MDC_IDC_SET_LEADCHNL_RA_PACING_ANODE_ELECTRODE_1: NORMAL
MDC_IDC_SET_LEADCHNL_RA_PACING_ANODE_LOCATION_1: NORMAL
MDC_IDC_SET_LEADCHNL_RA_PACING_CAPTURE_MODE: NORMAL
MDC_IDC_SET_LEADCHNL_RA_PACING_CATHODE_ELECTRODE_1: NORMAL
MDC_IDC_SET_LEADCHNL_RA_PACING_CATHODE_LOCATION_1: NORMAL
MDC_IDC_SET_LEADCHNL_RA_PACING_POLARITY: NORMAL
MDC_IDC_SET_LEADCHNL_RA_PACING_PULSEWIDTH: 0.4 MS
MDC_IDC_SET_LEADCHNL_RA_SENSING_ANODE_ELECTRODE_1: NORMAL
MDC_IDC_SET_LEADCHNL_RA_SENSING_ANODE_LOCATION_1: NORMAL
MDC_IDC_SET_LEADCHNL_RA_SENSING_CATHODE_ELECTRODE_1: NORMAL
MDC_IDC_SET_LEADCHNL_RA_SENSING_CATHODE_LOCATION_1: NORMAL
MDC_IDC_SET_LEADCHNL_RA_SENSING_POLARITY: NORMAL
MDC_IDC_SET_LEADCHNL_RA_SENSING_SENSITIVITY: 0.18 MV
MDC_IDC_SET_LEADCHNL_RV_PACING_AMPLITUDE: 2 V
MDC_IDC_SET_LEADCHNL_RV_PACING_ANODE_ELECTRODE_1: NORMAL
MDC_IDC_SET_LEADCHNL_RV_PACING_ANODE_LOCATION_1: NORMAL
MDC_IDC_SET_LEADCHNL_RV_PACING_CAPTURE_MODE: NORMAL
MDC_IDC_SET_LEADCHNL_RV_PACING_CATHODE_ELECTRODE_1: NORMAL
MDC_IDC_SET_LEADCHNL_RV_PACING_CATHODE_LOCATION_1: NORMAL
MDC_IDC_SET_LEADCHNL_RV_PACING_POLARITY: NORMAL
MDC_IDC_SET_LEADCHNL_RV_PACING_PULSEWIDTH: 0.4 MS
MDC_IDC_SET_LEADCHNL_RV_SENSING_ANODE_ELECTRODE_1: NORMAL
MDC_IDC_SET_LEADCHNL_RV_SENSING_ANODE_LOCATION_1: NORMAL
MDC_IDC_SET_LEADCHNL_RV_SENSING_CATHODE_ELECTRODE_1: NORMAL
MDC_IDC_SET_LEADCHNL_RV_SENSING_CATHODE_LOCATION_1: NORMAL
MDC_IDC_SET_LEADCHNL_RV_SENSING_POLARITY: NORMAL
MDC_IDC_SET_LEADCHNL_RV_SENSING_SENSITIVITY: 1.4 MV
MDC_IDC_SET_ZONE_DETECTION_INTERVAL: 342.86 MS
MDC_IDC_SET_ZONE_DETECTION_INTERVAL: 342.86 MS
MDC_IDC_SET_ZONE_TYPE: NORMAL
MDC_IDC_SET_ZONE_TYPE: NORMAL
MDC_IDC_STAT_AT_BURDEN_PERCENT: 60.5 %
MDC_IDC_STAT_AT_DTM_END: NORMAL
MDC_IDC_STAT_AT_DTM_START: NORMAL
MDC_IDC_STAT_AT_MODE_SW_COUNT: NORMAL
MDC_IDC_STAT_BRADY_AP_VP_PERCENT: 11 %
MDC_IDC_STAT_BRADY_AP_VS_PERCENT: 10 %
MDC_IDC_STAT_BRADY_AS_VP_PERCENT: 3 %
MDC_IDC_STAT_BRADY_AS_VS_PERCENT: 76 %
MDC_IDC_STAT_BRADY_DTM_END: NORMAL
MDC_IDC_STAT_BRADY_DTM_START: NORMAL
MDC_IDC_STAT_EPISODE_RECENT_COUNT: 26
MDC_IDC_STAT_EPISODE_RECENT_COUNT: NORMAL
MDC_IDC_STAT_EPISODE_RECENT_COUNT_DTM_END: NORMAL
MDC_IDC_STAT_EPISODE_RECENT_COUNT_DTM_END: NORMAL
MDC_IDC_STAT_EPISODE_RECENT_COUNT_DTM_START: NORMAL
MDC_IDC_STAT_EPISODE_RECENT_COUNT_DTM_START: NORMAL
MDC_IDC_STAT_EPISODE_TYPE: NORMAL
MDC_IDC_STAT_EPISODE_TYPE: NORMAL

## 2019-10-16 ENCOUNTER — ANTICOAGULATION THERAPY VISIT (OUTPATIENT)
Dept: NURSING | Facility: CLINIC | Age: 84
End: 2019-10-16
Payer: MEDICARE

## 2019-10-16 DIAGNOSIS — I48.91 ATRIAL FIBRILLATION (H): ICD-10-CM

## 2019-10-16 DIAGNOSIS — Z79.01 LONG TERM CURRENT USE OF ANTICOAGULANT THERAPY: ICD-10-CM

## 2019-10-16 LAB — INR POINT OF CARE: 2.2 (ref 0.86–1.14)

## 2019-10-16 PROCEDURE — 99207 ZZC NO CHARGE NURSE ONLY: CPT

## 2019-10-16 PROCEDURE — 36416 COLLJ CAPILLARY BLOOD SPEC: CPT

## 2019-10-16 PROCEDURE — 85610 PROTHROMBIN TIME: CPT | Mod: QW

## 2019-10-16 NOTE — PROGRESS NOTES
ANTICOAGULATION FOLLOW-UP CLINIC VISIT    Patient Name:  Shelly Rogers  Date:  10/16/2019  Contact Type:  Face to Face    SUBJECTIVE:  Patient Findings     Comments:   Bleeding Signs/Symptoms: NO  Thromboembolic Signs/Symptoms: NO     Medication Changes:  NO  Dietary Changes:  NO  Bacterial/Viral Infection: NO     Missed Coumadin Doses: NO  Other Concerns:  NO          Clinical Outcomes     Negatives:   Major bleeding event, Thromboembolic event, Anticoagulation-related hospital admission, Anticoagulation-related ED visit, Anticoagulation-related fatality    Comments:   Bleeding Signs/Symptoms: NO  Thromboembolic Signs/Symptoms: NO     Medication Changes:  NO  Dietary Changes:  NO  Bacterial/Viral Infection: NO     Missed Coumadin Doses: NO  Other Concerns:  NO             OBJECTIVE    INR Protime   Date Value Ref Range Status   10/16/2019 2.2 (A) 0.86 - 1.14 Final       ASSESSMENT / PLAN  INR assessment THER    Recheck INR In: 6 WEEKS    INR Location Clinic      Anticoagulation Summary  As of 10/16/2019    INR goal:   2.0-3.0   TTR:   84.9 % (3.4 y)   INR used for dosin.2 (10/16/2019)   Warfarin maintenance plan:   5 mg (5 mg x 1) every Mon, Wed, Fri; 2.5 mg (5 mg x 0.5) all other days   Full warfarin instructions:   5 mg every Mon, Wed, Fri; 2.5 mg all other days   Weekly warfarin total:   25 mg   Plan last modified:   Bree Delgadillo RN (2019)   Next INR check:      Priority:   INR   Target end date:       Indications    Long-term (current) use of anticoagulants [Z79.01] [Z79.01]  Atrial fibrillation (H) [I48.91]             Anticoagulation Episode Summary     INR check location:       Preferred lab:       Send INR reminders to:   MIRNA CORRALES    Comments:         Anticoagulation Care Providers     Provider Role Specialty Phone number    Halley Huff MD Responsible Internal Medicine [phone]            See the Encounter Report to view Anticoagulation Flowsheet and Dosing Calendar (Go to  Encounters tab in chart review, and find the Anticoagulation Therapy Visit)        Breanna Portillo RN

## 2019-11-11 DIAGNOSIS — I48.0 PAROXYSMAL ATRIAL FIBRILLATION (H): ICD-10-CM

## 2019-11-11 NOTE — TELEPHONE ENCOUNTER
"warfarin ANTICOAGULANT (COUMADIN) 5 MG tablet    Last Written Prescription Date:  05/16/2019  Last Fill Quantity: 90,  # refills: 1   Last office visit: 6/10/2019 with prescribing provider:  Daria   Future Office Visit:  Unknown     Requested Prescriptions   Pending Prescriptions Disp Refills     warfarin ANTICOAGULANT (COUMADIN) 5 MG tablet [Pharmacy Med Name: WARFARIN SODIUM 5 MG TABLET] 90 tablet 1     Sig: TAKE 1/2 TO 1 TABLET BY MOUTH DAILY OR AS DIRECTED BY INR CLINIC       Vitamin K Antagonists Failed - 11/11/2019  1:34 AM        Failed - INR is within goal in the past 6 weeks     Confirm INR is within goal in the past 6 weeks.     Recent Labs   Lab Test 10/16/19   INR 2.2*                       Failed - Medication is active on med list        Passed - Recent (12 mo) or future (30 days) visit within the authorizing provider's specialty     Patient has had an office visit with the authorizing provider or a provider within the authorizing providers department within the previous 12 mos or has a future within next 30 days. See \"Patient Info\" tab in inbasket, or \"Choose Columns\" in Meds & Orders section of the refill encounter.              Passed - Patient is 18 years of age or older        Passed - Patient is not pregnant        Passed - No positive pregnancy on file in past 12 months          "

## 2019-11-12 RX ORDER — WARFARIN SODIUM 5 MG/1
TABLET ORAL
Qty: 100 TABLET | Refills: 0 | Status: SHIPPED | OUTPATIENT
Start: 2019-11-12 | End: 2020-02-04

## 2019-12-06 ENCOUNTER — ANTICOAGULATION THERAPY VISIT (OUTPATIENT)
Dept: NURSING | Facility: CLINIC | Age: 84
End: 2019-12-06
Payer: MEDICARE

## 2019-12-06 ENCOUNTER — ALLIED HEALTH/NURSE VISIT (OUTPATIENT)
Dept: NURSING | Facility: CLINIC | Age: 84
End: 2019-12-06
Payer: MEDICARE

## 2019-12-06 DIAGNOSIS — Z23 NEED FOR PROPHYLACTIC VACCINATION AND INOCULATION AGAINST INFLUENZA: Primary | ICD-10-CM

## 2019-12-06 LAB — INR POINT OF CARE: 2.4 (ref 0.86–1.14)

## 2019-12-06 PROCEDURE — 99207 ZZC NO CHARGE NURSE ONLY: CPT

## 2019-12-06 PROCEDURE — 85610 PROTHROMBIN TIME: CPT | Mod: QW

## 2019-12-06 PROCEDURE — 90662 IIV NO PRSV INCREASED AG IM: CPT

## 2019-12-06 PROCEDURE — 36416 COLLJ CAPILLARY BLOOD SPEC: CPT

## 2019-12-06 PROCEDURE — G0008 ADMIN INFLUENZA VIRUS VAC: HCPCS

## 2019-12-06 NOTE — PROGRESS NOTES
Due to injection administration, patient instructed to remain in clinic for 15 minutes  afterwards, and to report any adverse reaction to me immediately.    Radha Baeza MA

## 2020-01-13 ENCOUNTER — OFFICE VISIT (OUTPATIENT)
Dept: CARDIOLOGY | Facility: CLINIC | Age: 85
End: 2020-01-13
Payer: MEDICARE

## 2020-01-13 ENCOUNTER — ANCILLARY PROCEDURE (OUTPATIENT)
Dept: CARDIOLOGY | Facility: CLINIC | Age: 85
End: 2020-01-13
Attending: INTERNAL MEDICINE
Payer: MEDICARE

## 2020-01-13 VITALS
HEART RATE: 74 BPM | WEIGHT: 156 LBS | HEIGHT: 63 IN | BODY MASS INDEX: 27.64 KG/M2 | SYSTOLIC BLOOD PRESSURE: 164 MMHG | DIASTOLIC BLOOD PRESSURE: 76 MMHG

## 2020-01-13 DIAGNOSIS — I49.5 SSS (SICK SINUS SYNDROME) (H): ICD-10-CM

## 2020-01-13 DIAGNOSIS — Z95.0 CARDIAC PACEMAKER IN SITU: ICD-10-CM

## 2020-01-13 DIAGNOSIS — I50.32 CHRONIC DIASTOLIC CONGESTIVE HEART FAILURE (H): ICD-10-CM

## 2020-01-13 DIAGNOSIS — I48.0 PAROXYSMAL ATRIAL FIBRILLATION (H): Primary | ICD-10-CM

## 2020-01-13 DIAGNOSIS — I10 BENIGN ESSENTIAL HYPERTENSION: ICD-10-CM

## 2020-01-13 DIAGNOSIS — E11.9 TYPE 2 DIABETES MELLITUS WITHOUT COMPLICATION, WITHOUT LONG-TERM CURRENT USE OF INSULIN (H): ICD-10-CM

## 2020-01-13 LAB
ANION GAP SERPL CALCULATED.3IONS-SCNC: 13.2 MMOL/L (ref 6–17)
BUN SERPL-MCNC: 22 MG/DL (ref 7–30)
CALCIUM SERPL-MCNC: 10.1 MG/DL (ref 8.5–10.5)
CHLORIDE SERPL-SCNC: 100 MMOL/L (ref 98–107)
CO2 SERPL-SCNC: 30 MMOL/L (ref 23–29)
CREAT SERPL-MCNC: 0.98 MG/DL (ref 0.7–1.3)
GFR SERPL CREATININE-BSD FRML MDRD: 54 ML/MIN/{1.73_M2}
GLUCOSE SERPL-MCNC: 109 MG/DL (ref 70–105)
POTASSIUM SERPL-SCNC: 4.2 MMOL/L (ref 3.5–5.1)
SODIUM SERPL-SCNC: 139 MMOL/L (ref 136–145)

## 2020-01-13 PROCEDURE — 99214 OFFICE O/P EST MOD 30 MIN: CPT | Mod: 25 | Performed by: INTERNAL MEDICINE

## 2020-01-13 PROCEDURE — 36415 COLL VENOUS BLD VENIPUNCTURE: CPT | Performed by: PHYSICIAN ASSISTANT

## 2020-01-13 PROCEDURE — 80048 BASIC METABOLIC PNL TOTAL CA: CPT | Performed by: PHYSICIAN ASSISTANT

## 2020-01-13 PROCEDURE — 93280 PM DEVICE PROGR EVAL DUAL: CPT | Performed by: INTERNAL MEDICINE

## 2020-01-13 RX ORDER — LOSARTAN POTASSIUM 25 MG/1
25 TABLET ORAL DAILY
Qty: 90 TABLET | Refills: 3 | Status: SHIPPED | OUTPATIENT
Start: 2020-01-13 | End: 2020-07-24

## 2020-01-13 ASSESSMENT — MIFFLIN-ST. JEOR: SCORE: 1108.8

## 2020-01-13 NOTE — LETTER
1/13/2020    Halley Huff MD  [address] [facility] [address]  [facility] [address]    RE: Shelly Rogers       Dear Colleague,    I had the pleasure of seeing Shelly Rogers in the [facility].    HPI and Plan:   See dictation    Orders Placed This Encounter   Procedures     Basic metabolic panel     Basic metabolic panel     Hemoglobin     Follow-Up with [facility] - [place] visit     Follow-Up with [facility] - Return MD visit     Echocardiogram Complete       Orders Placed This Encounter   Medications     apixaban ANTICOAGULANT (ELIQUIS ANTICOAGULANT) 5 MG tablet     Sig: Take 1 tablet (5 mg) by mouth 2 times daily     Dispense:  180 tablet     Refill:  3     losartan (COZAAR) 25 MG tablet     Sig: Take 1 tablet (25 mg) by mouth daily     Dispense:  90 tablet     Refill:  3       Medications Discontinued During This Encounter   Medication Reason     ACE/ARB NOT PRESCRIBED, INTENTIONAL,          Encounter Diagnoses   Name Primary?     Paroxysmal atrial fibrillation (H) Yes     Type 2 diabetes mellitus without complication, without long-term current use of insulin (H)      Cardiac pacemaker in situ      Chronic diastolic congestive heart failure (H)      Benign essential hypertension      SSS (sick sinus syndrome) (H)        CURRENT MEDICATIONS:  Current Outpatient Medications   Medication Sig Dispense Refill     ACETAMINOPHEN PO Take 500 mg by mouth 3 times daily as needed (Takes at least 6 hours apart).        apixaban ANTICOAGULANT (ELIQUIS ANTICOAGULANT) 5 MG tablet Take 1 tablet (5 mg) by mouth 2 times daily 180 tablet 3     furosemide (LASIX) 20 MG tablet Take 1 tablet (20 mg) by mouth 2 times daily 180 tablet 3     glimepiride (AMARYL) 2 MG tablet Take 1 tablet (2 mg) by mouth 2 times daily 180 tablet 3     levothyroxine (SYNTHROID/LEVOTHROID) 100 MCG tablet Take 1 tablet (100 mcg) by mouth daily 90 tablet 1     losartan (COZAAR) 25 MG tablet Take 1 tablet (25 mg) by  mouth daily 90 tablet 3     metFORMIN (GLUCOPHAGE) 500 MG tablet Take 1 tablet (500 mg) by mouth 2 times daily (with meals) 180 tablet 3     metoprolol succinate ER (TOPROL-XL) 100 MG 24 hr tablet Take 1 tablet (100 mg) by mouth 2 times daily 180 tablet 1     multivitamin  with lutein (OCUVITE WITH LTEIN) CAPS per capsule Take 1 capsule by mouth daily       UNABLE TO FIND MEDICATION NAME: CBDistillery Vegan CBD gummies       warfarin ANTICOAGULANT (COUMADIN) 5 MG tablet Take 1 tablet (5 mg) on Mondays, Wednesdays, Fridays and 0.5 tablet (2.5 mg) all the other days or as directed by the INR clinic 100 tablet 0     ASPIRIN NOT PRESCRIBED, INTENTIONAL, Antiplatelet medication not prescribed intentionally due to Current anticoagulant therapy (warfarin/enoxaparin) (Patient not taking: Reported on 1/13/2020) 0 each 0     blood glucose (ACCU-CHEK SMARTVIEW) test strip 1 strip by In Vitro route daily (Patient not taking: Reported on 1/13/2020) 1 Box prn     blood glucose (NO BRAND SPECIFIED) lancets standard Use to test blood sugar 1 times daily or as directed. (Patient not taking: Reported on 1/13/2020) 100 each 11     blood glucose monitoring (ACCU-CHEK FASTCLIX) lancets Use to check blood sugars daily (Patient not taking: Reported on 1/13/2020) 1 Box prn     blood glucose monitoring (NO BRAND SPECIFIED) meter device kit Use to test blood sugar one times daily or as directed. (Patient not taking: Reported on 1/13/2020) 1 kit 0     blood glucose monitoring (NO BRAND SPECIFIED) test strip Use to test blood sugars one times daily or as directed .has type 2 diabetes and not on insulin (Patient not taking: Reported on 1/13/2020) 100 strip 1     Blood Glucose Monitoring Suppl (ACCU-CHEK LAKIA SMARTVIEW) W/DEVICE KIT 1 Device daily. 1 kit 0     Continuous Blood Gluc  (FREESTYLE STELLA 14 DAY READER) VERO 1 each every 14 days (Patient not taking: Reported on 1/13/2020) 1 Device 0     Continuous Blood Gluc Sensor (FREESTYLE  STELLA 14 DAY SENSOR) MISC 1 each every 14 days (Patient not taking: Reported on 1/13/2020) 2 each 11     STATIN NOT PRESCRIBED, INTENTIONAL, continuous prn. Statin not prescribed intentionally due to Other: Not needed, LDL at goal <100mg/dL without therapy 0 each 0     triamcinolone (NASACORT) 55 MCG/ACT nasal aerosol Spray 2 sprays into both nostrils daily         ALLERGIES     Allergies   Allergen Reactions     Shellfish Allergy Difficulty breathing     Ambien [Zolpidem] Other (See Comments)     Agitation with hallucinations     Cats      Sneezing, watery eyes     Lisinopril Cough     No Clinical Screening - See Comments Other (See Comments)     Pt stated allergic to flowers, pt gets itchy watery eyes and stuffy nose     Seasonal Allergies Itching     Flowers     Sulfa Drugs Hives       PAST MEDICAL HISTORY:  Past Medical History:   Diagnosis Date     Atrial fibrillation (H)     Nl LVEF, s/p ablation      Congestive heart failure, unspecified      Diabetes mellitus (H) 2004     Hemorrhoids     intermittent bleeding     Hypertension      Macular degeneration      OA (osteoarthritis)      Pacemaker 3/2013     Uterine cancer (H)     s/p hyst       PAST SURGICAL HISTORY:  Past Surgical History:   Procedure Laterality Date     C ANESTH,PACEMAKER INSERTION      dual chamber 3/27/13     CHOLECYSTECTOMY  8/2004     GYN SURGERY       HYSTERECTOMY      Ut ca      JOINT REPLACEMENT       KERATOTOMY ARCUATE WITH FEMTOSECOND LASER/IMAGING FOR ATIOL Right 4/11/2017    Procedure: KERATOTOMY ARCUATE WITH FEMTOSECOND LASER/IMAGING FOR ATIOL;  Surgeon: Calvin Dominguez MD;  Location: [facility]     PHACOEMULSIFICATION CLEAR CORNEA WITH STANDARD INTRAOCULAR LENS IMPLANT Right 4/11/2017    Procedure: PHACOEMULSIFICATION CLEAR CORNEA WITH STANDARD INTRAOCULAR LENS IMPLANT;  Surgeon: Calvin Dominguez MD;  Location: [facility]     TKR      bilat       FAMILY HISTORY:  Family History   Problem Relation Age of Onset      Gastrointestinal Disease Mother         bowel obstruction     Cardiovascular Father      C.A.D. Father      Cardiovascular Brother         CHF       SOCIAL HISTORY:  Social History     Socioeconomic History     Marital status: Single     Spouse name: None     Number of children: None     Years of education: None     Highest education level: None   Occupational History     None   Social Needs     Financial resource strain: None     Food insecurity:     Worry: None     Inability: None     Transportation needs:     Medical: None     Non-medical: None   Tobacco Use     Smoking status: Former Smoker     Last attempt to quit: 1990     Years since quittin.0     Smokeless tobacco: Never Used   Substance and Sexual Activity     Alcohol use: No     Alcohol/week: 0.0 standard drinks     Drug use: No     Sexual activity: Not Currently     Partners: Male   Lifestyle     Physical activity:     Days per week: None     Minutes per session: None     Stress: None   Relationships     Social connections:     Talks on phone: None     Gets together: None     Attends Yazdanism service: None     Active member of club or organization: None     Attends meetings of clubs or organizations: None     Relationship status: None     Intimate partner violence:     Fear of current or ex partner: None     Emotionally abused: None     Physically abused: None     Forced sexual activity: None   Other Topics Concern     Parent/sibling w/ CABG, MI or angioplasty before 65F 55M? No      Service Not Asked     Blood Transfusions Not Asked     Caffeine Concern Yes     Comment: daily      Occupational Exposure Not Asked     Hobby Hazards Not Asked     Sleep Concern No     Stress Concern Not Asked     Weight Concern Not Asked     Special Diet Yes     Comment: low sodium, less leafy greens, low sugar     Back Care Not Asked     Exercise No     Bike Helmet Not Asked     Seat Belt Yes     Self-Exams Not Asked   Social History Narrative     None  "      Review of Systems:  Skin:  Negative       Eyes:  Positive for glasses L eye macular degeneration  ENT:  Positive for hearing loss;sinus trouble    Respiratory:  Positive for cough;sleep apnea chronic   Cardiovascular:  Negative Positive for;edema swelling in both feet, unchanged   Gastroenterology: Negative      Genitourinary:  Negative      Musculoskeletal:  Positive for joint pain;arthritis    Neurologic:  Positive for numbness or tingling of feet;numbness or tingling of hands    Psychiatric:  Positive for sleep disturbances    Heme/Lymph/Imm:  Positive for allergies    Endocrine:  Positive for diabetes;thyroid disorder      Physical Exam:  Vitals: BP (!) 164/76   Pulse 74   Ht 1.588 m (5' 2.5\")   Wt 70.8 kg (156 lb)   BMI 28.08 kg/m       Constitutional:  cooperative, alert and oriented, well developed, well nourished, in no acute distress        Skin:  warm and dry to the touch, no apparent skin lesions or masses noted          Head:  normocephalic, no masses or lesions        Eyes:  pupils equal and round, conjunctivae and lids unremarkable, sclera white, no xanthalasma, EOMS intact, no nystagmus        Lymph:      ENT:  no pallor or cyanosis, dentition good        Neck:  carotid pulses are full and equal bilaterally, JVP normal, no carotid bruit        Respiratory:  normal breath sounds, clear to auscultation, normal A-P diameter, normal symmetry, normal respiratory excursion, no use of accessory muscles         Cardiac: regular rhythm, normal S1/S2, no S3 or S4, apical impulse not displaced, no murmurs, gallops or rubs     no presence of murmur          pulses full and equal, no bruits auscultated                                        GI:  abdomen soft;BS normoactive        Extremities and Muscular Skeletal:  no deformities, clubbing, cyanosis, erythema observed   bilateral LE edema;trace          Neurological:  no gross motor deficits;affect appropriate        Psych:  oriented to time, person and " place        CC  Halley Huff MD  [address]  [place] [address]                Thank you for allowing me to participate in the care of your patient.      Sincerely,     JENNIFER MIRANDA MD     [facility]    cc:   Halley Huff MD  [address]  [place] [address]

## 2020-01-13 NOTE — LETTER
1/13/2020      Halley Huff MD  [address] [facility] [address]  [facility] [address]      RE: Shelly Marcumcker       Dear Colleague,    I had the pleasure of seeing Shelly Rogers in the [facility].    Service Date: 01/13/2020      CARDIOLOGY OFFICE PROGRESS NOTE       HISTORY OF PRESENT ILLNESS:  I had the opportunity to see Ms. Shelly Rogers in Cardiology Clinic today at the [facility] in [place] for reevaluation of chronic diastolic heart failure in the [facility].  As you know, Ms. Rogers is an 86-year-old woman with a history of paroxysmal atrial fibrillation, hypertension, type 2 diabetes, and severe mixed obstructive sleep apnea who has declined to use CPAP.  She has a history of sick sinus syndrome and has had a permanent pacemaker implanted in 2013.  She remains on warfarin for stroke prevention.  I last saw her in 11/2018 after she had been hospitalized at [facility] in 10/2018 with acute exacerbation of diastolic heart failure.  She required a thoracentesis and diuresis of about 9 pounds of fluid.  She improved significantly after that treatment and has been able to stay stable on her current medication program over the course of the last year.  Her daughter attributes her stability to significant improvements in her diet.  She has been avoiding salt consistently and has reduced her sugar intake.      Indeed, she seems to be doing pretty well.  She is not having problems with shortness of breath or edema.  She has no chest pain, palpitations, lightheadedness or syncope.      When I reviewed her pacemaker check, she had atrial fibrillation about 60% of the time most recently but these episodes were short, lasting less than 5 hours in duration.  Otherwise, it looks like her pacemaker is working well and she is not having any bleeding problems from her Coumadin.      Today, her basic metabolic panel was normal with a creatinine of 0.98,  BUN 22, potassium 4.2.  She takes Lasix 20 mg p.o. b.i.d.  She takes 100 mg of metoprolol succinate twice daily for heart rate control and blood pressure control.      PHYSICAL EXAMINATION:     VITAL SIGNS:  Today, her blood pressure is unusually high for her at 164/76, heart rate 74 and weight 156 pounds.  Her weight is very stable.  I see that her blood pressures here on her previous 4 visits with her had been in the range of 130/70 consistently.     GENERAL:  She seems comfortable and breathing easily.     LUNGS:  Clear.     CARDIOVASCULAR:  Heart rhythm is regular.  She has no cardiac murmurs or carotid bruits and just a trace of edema.      IMPRESSIONS:  Ms. Shelly Rogers is an 86-year-old woman with chronic diastolic heart failure and multiple other medical issues including hypertension, type 2 diabetes, paroxysmal atrial fibrillation, sick sinus syndrome status post permanent pacemaker implantation and thyroid disease.      I am pleased that she is doing well and has not had any hospitalizations for heart failure exacerbation in the last year.  I encouraged her to continue her low-salt diet which is probably a major part of her success.  I did increase her metoprolol last year to help give her better heart rate control and that seems to be more effective now.  She is still having a lot of atrial fibrillation but does not feel that.  I am not convinced that that is causing her any problem.  She wished to consider switching warfarin to Eliquis.  I gave her a prescription for Eliquis 5 mg p.o. b.i.d. and advised her to stop warfarin and then come in for an INR check 3 days later and to start Eliquis if her INR is less than 2.0 at that time.  I also started losartan 25 mg daily for better blood pressure control and also for vascular protection due to diabetes.      I will have her return to visit with us in the [facility] in 6 months for reevaluation.      Galindo Del Valle MD, [facility]       cc:   Halley Huff MD     [facility]   [address], [facility] [address]   [place] [address]         JENNIFER MIRANDA MD, [facility]             D: 2020   T: 2020   MT: CHRISTOPHER      Name:     GABBY OLIVA   MRN:      [id number]        Account:      [id number]   :      1933           Service Date: 2020      Document: [id number]         Outpatient Encounter Medications as of 2020   Medication Sig Dispense Refill     ACETAMINOPHEN PO Take 500 mg by mouth 3 times daily as needed (Takes at least 6 hours apart).        apixaban ANTICOAGULANT (ELIQUIS ANTICOAGULANT) 5 MG tablet Take 1 tablet (5 mg) by mouth 2 times daily 180 tablet 3     furosemide (LASIX) 20 MG tablet Take 1 tablet (20 mg) by mouth 2 times daily 180 tablet 3     glimepiride (AMARYL) 2 MG tablet Take 1 tablet (2 mg) by mouth 2 times daily 180 tablet 3     levothyroxine (SYNTHROID/LEVOTHROID) 100 MCG tablet Take 1 tablet (100 mcg) by mouth daily 90 tablet 1     losartan (COZAAR) 25 MG tablet Take 1 tablet (25 mg) by mouth daily 90 tablet 3     metFORMIN (GLUCOPHAGE) 500 MG tablet Take 1 tablet (500 mg) by mouth 2 times daily (with meals) 180 tablet 3     metoprolol succinate ER (TOPROL-XL) 100 MG 24 hr tablet Take 1 tablet (100 mg) by mouth 2 times daily 180 tablet 1     multivitamin  with lutein (OCUVITE WITH LTEIN) CAPS per capsule Take 1 capsule by mouth daily       UNABLE TO FIND MEDICATION NAME: CBDistillery Vegan CBD gummies       warfarin ANTICOAGULANT (COUMADIN) 5 MG tablet Take 1 tablet (5 mg) on , ,  and 0.5 tablet (2.5 mg) all the other days or as directed by the INR clinic 100 tablet 0     ASPIRIN NOT PRESCRIBED, INTENTIONAL, Antiplatelet medication not prescribed intentionally due to Current anticoagulant therapy (warfarin/enoxaparin) (Patient not taking: Reported on 2020) 0 each 0     blood glucose (ACCU-CHEK SMARTVIEW) test strip 1 strip by In Vitro route daily (Patient not taking: Reported on 2020) 1 Box  prn     blood glucose (NO BRAND SPECIFIED) lancets standard Use to test blood sugar 1 times daily or as directed. (Patient not taking: Reported on 1/13/2020) 100 each 11     blood glucose monitoring (ACCU-CHEK FASTCLIX) lancets Use to check blood sugars daily (Patient not taking: Reported on 1/13/2020) 1 Box prn     blood glucose monitoring (NO BRAND SPECIFIED) meter device kit Use to test blood sugar one times daily or as directed. (Patient not taking: Reported on 1/13/2020) 1 kit 0     blood glucose monitoring (NO BRAND SPECIFIED) test strip Use to test blood sugars one times daily or as directed .has type 2 diabetes and not on insulin (Patient not taking: Reported on 1/13/2020) 100 strip 1     Blood Glucose Monitoring Suppl (ACCU-CHEK LAKIA SMARTVIEW) W/DEVICE KIT 1 Device daily. 1 kit 0     Continuous Blood Gluc  (FREESTYLE STELLA 14 DAY READER) VERO 1 each every 14 days (Patient not taking: Reported on 1/13/2020) 1 Device 0     Continuous Blood Gluc Sensor (FREESTYLE STELLA 14 DAY SENSOR) MISC 1 each every 14 days (Patient not taking: Reported on 1/13/2020) 2 each 11     STATIN NOT PRESCRIBED, INTENTIONAL, continuous prn. Statin not prescribed intentionally due to Other: Not needed, LDL at goal <100mg/dL without therapy 0 each 0     triamcinolone (NASACORT) 55 MCG/ACT nasal aerosol Spray 2 sprays into both nostrils daily       [DISCONTINUED] ACE/ARB NOT PRESCRIBED, INTENTIONAL, ACE & ARB not prescribed due to Other: HFpEF (Patient not taking: Reported on 1/13/2020)       No facility-administered encounter medications on file as of 1/13/2020.        Again, thank you for allowing me to participate in the care of your patient.      Sincerely,    JENNIFER MIRANDA MD     [facility]

## 2020-01-13 NOTE — PROGRESS NOTES
Service Date: 01/13/2020      CARDIOLOGY OFFICE PROGRESS NOTE       HISTORY OF PRESENT ILLNESS:  I had the opportunity to see Ms. Shelly Rogers in Cardiology Clinic today at the [facility] in [place] for reevaluation of chronic diastolic heart failure in the [facility].  As you know, Ms. Rogers is an 86-year-old woman with a history of paroxysmal atrial fibrillation, hypertension, type 2 diabetes, and severe mixed obstructive sleep apnea who has declined to use CPAP.  She has a history of sick sinus syndrome and has had a permanent pacemaker implanted in 2013.  She remains on warfarin for stroke prevention.  I last saw her in 11/2018 after she had been hospitalized at [facility] in 10/2018 with acute exacerbation of diastolic heart failure.  She required a thoracentesis and diuresis of about 9 pounds of fluid.  She improved significantly after that treatment and has been able to stay stable on her current medication program over the course of the last year.  Her daughter attributes her stability to significant improvements in her diet.  She has been avoiding salt consistently and has reduced her sugar intake.      Indeed, she seems to be doing pretty well.  She is not having problems with shortness of breath or edema.  She has no chest pain, palpitations, lightheadedness or syncope.      When I reviewed her pacemaker check, she had atrial fibrillation about 60% of the time most recently but these episodes were short, lasting less than 5 hours in duration.  Otherwise, it looks like her pacemaker is working well and she is not having any bleeding problems from her Coumadin.      Today, her basic metabolic panel was normal with a creatinine of 0.98, BUN 22, potassium 4.2.  She takes Lasix 20 mg p.o. b.i.d.  She takes 100 mg of metoprolol succinate twice daily for heart rate control and blood pressure control.      PHYSICAL EXAMINATION:     VITAL SIGNS:  Today, her blood pressure is  unusually high for her at 164/76, heart rate 74 and weight 156 pounds.  Her weight is very stable.  I see that her blood pressures here on her previous 4 visits with her had been in the range of 130/70 consistently.     GENERAL:  She seems comfortable and breathing easily.     LUNGS:  Clear.     CARDIOVASCULAR:  Heart rhythm is regular.  She has no cardiac murmurs or carotid bruits and just a trace of edema.      IMPRESSIONS:  Ms. Shelly Oliva is an 86-year-old woman with chronic diastolic heart failure and multiple other medical issues including hypertension, type 2 diabetes, paroxysmal atrial fibrillation, sick sinus syndrome status post permanent pacemaker implantation and thyroid disease.      I am pleased that she is doing well and has not had any hospitalizations for heart failure exacerbation in the last year.  I encouraged her to continue her low-salt diet which is probably a major part of her success.  I did increase her metoprolol last year to help give her better heart rate control and that seems to be more effective now.  She is still having a lot of atrial fibrillation but does not feel that.  I am not convinced that that is causing her any problem.  She wished to consider switching warfarin to Eliquis.  I gave her a prescription for Eliquis 5 mg p.o. b.i.d. and advised her to stop warfarin and then come in for an INR check 3 days later and to start Eliquis if her INR is less than 2.0 at that time.  I also started losartan 25 mg daily for better blood pressure control and also for vascular protection due to diabetes.      I will have her return to visit with us in the [facility] in 6 months for reevaluation.      Jennifer Miranda MD, FACC       cc:   Halley Huff MD    [facility]   [address], [facility] [address]   [place] [address]         JENNIFER MIRANDA MD, FACC             D: 01/13/2020   T: 01/13/2020   MT: CHRISTOPHER      Name:     SHELLY OLIVA   MRN:      [id number]        Account:       [id number]   :      1933           Service Date: 2020      Document: [id number]

## 2020-01-13 NOTE — PROGRESS NOTES
HPI and Plan:   See dictation    Orders Placed This Encounter   Procedures     Basic metabolic panel     Basic metabolic panel     Hemoglobin     Follow-Up with [facility] - [place] visit     Follow-Up with [facility] - Return MD visit     Echocardiogram Complete       Orders Placed This Encounter   Medications     apixaban ANTICOAGULANT (ELIQUIS ANTICOAGULANT) 5 MG tablet     Sig: Take 1 tablet (5 mg) by mouth 2 times daily     Dispense:  180 tablet     Refill:  3     losartan (COZAAR) 25 MG tablet     Sig: Take 1 tablet (25 mg) by mouth daily     Dispense:  90 tablet     Refill:  3       Medications Discontinued During This Encounter   Medication Reason     ACE/ARB NOT PRESCRIBED, INTENTIONAL,          Encounter Diagnoses   Name Primary?     Paroxysmal atrial fibrillation (H) Yes     Type 2 diabetes mellitus without complication, without long-term current use of insulin (H)      Cardiac pacemaker in situ      Chronic diastolic congestive heart failure (H)      Benign essential hypertension      SSS (sick sinus syndrome) (H)        CURRENT MEDICATIONS:  Current Outpatient Medications   Medication Sig Dispense Refill     ACETAMINOPHEN PO Take 500 mg by mouth 3 times daily as needed (Takes at least 6 hours apart).        apixaban ANTICOAGULANT (ELIQUIS ANTICOAGULANT) 5 MG tablet Take 1 tablet (5 mg) by mouth 2 times daily 180 tablet 3     furosemide (LASIX) 20 MG tablet Take 1 tablet (20 mg) by mouth 2 times daily 180 tablet 3     glimepiride (AMARYL) 2 MG tablet Take 1 tablet (2 mg) by mouth 2 times daily 180 tablet 3     levothyroxine (SYNTHROID/LEVOTHROID) 100 MCG tablet Take 1 tablet (100 mcg) by mouth daily 90 tablet 1     losartan (COZAAR) 25 MG tablet Take 1 tablet (25 mg) by mouth daily 90 tablet 3     metFORMIN (GLUCOPHAGE) 500 MG tablet Take 1 tablet (500 mg) by mouth 2 times daily (with meals) 180 tablet 3     metoprolol succinate ER (TOPROL-XL) 100 MG 24 hr tablet Take 1 tablet (100 mg) by mouth 2 times  daily 180 tablet 1     multivitamin  with lutein (OCUVITE WITH LTEIN) CAPS per capsule Take 1 capsule by mouth daily       UNABLE TO FIND MEDICATION NAME: CBDistillery Vegan CBD gummies       warfarin ANTICOAGULANT (COUMADIN) 5 MG tablet Take 1 tablet (5 mg) on Mondays, Wednesdays, Fridays and 0.5 tablet (2.5 mg) all the other days or as directed by the INR clinic 100 tablet 0     ASPIRIN NOT PRESCRIBED, INTENTIONAL, Antiplatelet medication not prescribed intentionally due to Current anticoagulant therapy (warfarin/enoxaparin) (Patient not taking: Reported on 1/13/2020) 0 each 0     blood glucose (ACCU-CHEK SMARTVIEW) test strip 1 strip by In Vitro route daily (Patient not taking: Reported on 1/13/2020) 1 Box prn     blood glucose (NO BRAND SPECIFIED) lancets standard Use to test blood sugar 1 times daily or as directed. (Patient not taking: Reported on 1/13/2020) 100 each 11     blood glucose monitoring (ACCU-CHEK FASTCLIX) lancets Use to check blood sugars daily (Patient not taking: Reported on 1/13/2020) 1 Box prn     blood glucose monitoring (NO BRAND SPECIFIED) meter device kit Use to test blood sugar one times daily or as directed. (Patient not taking: Reported on 1/13/2020) 1 kit 0     blood glucose monitoring (NO BRAND SPECIFIED) test strip Use to test blood sugars one times daily or as directed .has type 2 diabetes and not on insulin (Patient not taking: Reported on 1/13/2020) 100 strip 1     Blood Glucose Monitoring Suppl (ACCU-CHEK LAKIA SMARTVIEW) W/DEVICE KIT 1 Device daily. 1 kit 0     Continuous Blood Gluc  (FREESTYLE STELLA 14 DAY READER) VERO 1 each every 14 days (Patient not taking: Reported on 1/13/2020) 1 Device 0     Continuous Blood Gluc Sensor (FREESTYLE STELLA 14 DAY SENSOR) MISC 1 each every 14 days (Patient not taking: Reported on 1/13/2020) 2 each 11     STATIN NOT PRESCRIBED, INTENTIONAL, continuous prn. Statin not prescribed intentionally due to Other: Not needed, LDL at goal  <100mg/dL without therapy 0 each 0     triamcinolone (NASACORT) 55 MCG/ACT nasal aerosol Spray 2 sprays into both nostrils daily         ALLERGIES     Allergies   Allergen Reactions     Shellfish Allergy Difficulty breathing     Ambien [Zolpidem] Other (See Comments)     Agitation with hallucinations     Cats      Sneezing, watery eyes     Lisinopril Cough     No Clinical Screening - See Comments Other (See Comments)     Pt stated allergic to flowers, pt gets itchy watery eyes and stuffy nose     Seasonal Allergies Itching     Flowers     Sulfa Drugs Hives       PAST MEDICAL HISTORY:  Past Medical History:   Diagnosis Date     Atrial fibrillation (H)     Nl LVEF, s/p ablation      Congestive heart failure, unspecified      Diabetes mellitus (H) 2004     Hemorrhoids     intermittent bleeding     Hypertension      Macular degeneration      OA (osteoarthritis)      Pacemaker 3/2013     Uterine cancer (H)     s/p hyst       PAST SURGICAL HISTORY:  Past Surgical History:   Procedure Laterality Date     C ANESTH,PACEMAKER INSERTION      dual chamber 3/27/13     CHOLECYSTECTOMY  8/2004     GYN SURGERY       HYSTERECTOMY      Ut ca      JOINT REPLACEMENT       KERATOTOMY ARCUATE WITH FEMTOSECOND LASER/IMAGING FOR ATIOL Right 4/11/2017    Procedure: KERATOTOMY ARCUATE WITH FEMTOSECOND LASER/IMAGING FOR ATIOL;  Surgeon: Calvin Dominguez MD;  Location: [facility]     PHACOEMULSIFICATION CLEAR CORNEA WITH STANDARD INTRAOCULAR LENS IMPLANT Right 4/11/2017    Procedure: PHACOEMULSIFICATION CLEAR CORNEA WITH STANDARD INTRAOCULAR LENS IMPLANT;  Surgeon: Calvin Dominguez MD;  Location: [facility]     TKR      bilat       FAMILY HISTORY:  Family History   Problem Relation Age of Onset     Gastrointestinal Disease Mother         bowel obstruction     Cardiovascular Father      C.A.D. Father      Cardiovascular Brother         CHF       SOCIAL HISTORY:  Social History     Socioeconomic History     Marital status: Single     Spouse  name: None     Number of children: None     Years of education: None     Highest education level: None   Occupational History     None   Social Needs     Financial resource strain: None     Food insecurity:     Worry: None     Inability: None     Transportation needs:     Medical: None     Non-medical: None   Tobacco Use     Smoking status: Former Smoker     Last attempt to quit: 1990     Years since quittin.0     Smokeless tobacco: Never Used   Substance and Sexual Activity     Alcohol use: No     Alcohol/week: 0.0 standard drinks     Drug use: No     Sexual activity: Not Currently     Partners: Male   Lifestyle     Physical activity:     Days per week: None     Minutes per session: None     Stress: None   Relationships     Social connections:     Talks on phone: None     Gets together: None     Attends Latter day service: None     Active member of club or organization: None     Attends meetings of clubs or organizations: None     Relationship status: None     Intimate partner violence:     Fear of current or ex partner: None     Emotionally abused: None     Physically abused: None     Forced sexual activity: None   Other Topics Concern     Parent/sibling w/ CABG, MI or angioplasty before 65F 55M? No      Service Not Asked     Blood Transfusions Not Asked     Caffeine Concern Yes     Comment: daily      Occupational Exposure Not Asked     Hobby Hazards Not Asked     Sleep Concern No     Stress Concern Not Asked     Weight Concern Not Asked     Special Diet Yes     Comment: low sodium, less leafy greens, low sugar     Back Care Not Asked     Exercise No     Bike Helmet Not Asked     Seat Belt Yes     Self-Exams Not Asked   Social History Narrative     None       Review of Systems:  Skin:  Negative       Eyes:  Positive for glasses L eye macular degeneration  ENT:  Positive for hearing loss;sinus trouble    Respiratory:  Positive for cough;sleep apnea chronic   Cardiovascular:  Negative Positive  "for;edema swelling in both feet, unchanged   Gastroenterology: Negative      Genitourinary:  Negative      Musculoskeletal:  Positive for joint pain;arthritis    Neurologic:  Positive for numbness or tingling of feet;numbness or tingling of hands    Psychiatric:  Positive for sleep disturbances    Heme/Lymph/Imm:  Positive for allergies    Endocrine:  Positive for diabetes;thyroid disorder      Physical Exam:  Vitals: BP (!) 164/76   Pulse 74   Ht 1.588 m (5' 2.5\")   Wt 70.8 kg (156 lb)   BMI 28.08 kg/m      Constitutional:  cooperative, alert and oriented, well developed, well nourished, in no acute distress        Skin:  warm and dry to the touch, no apparent skin lesions or masses noted          Head:  normocephalic, no masses or lesions        Eyes:  pupils equal and round, conjunctivae and lids unremarkable, sclera white, no xanthalasma, EOMS intact, no nystagmus        Lymph:      ENT:  no pallor or cyanosis, dentition good        Neck:  carotid pulses are full and equal bilaterally, JVP normal, no carotid bruit        Respiratory:  normal breath sounds, clear to auscultation, normal A-P diameter, normal symmetry, normal respiratory excursion, no use of accessory muscles         Cardiac: regular rhythm, normal S1/S2, no S3 or S4, apical impulse not displaced, no murmurs, gallops or rubs     no presence of murmur          pulses full and equal, no bruits auscultated                                        GI:  abdomen soft;BS normoactive        Extremities and Muscular Skeletal:  no deformities, clubbing, cyanosis, erythema observed   bilateral LE edema;trace          Neurological:  no gross motor deficits;affect appropriate        Psych:  oriented to time, person and place        CC  Halley Huff MD  [address]  [place] [address]              "

## 2020-01-15 LAB
MDC_IDC_LEAD_IMPLANT_DT: NORMAL
MDC_IDC_LEAD_IMPLANT_DT: NORMAL
MDC_IDC_LEAD_LOCATION: NORMAL
MDC_IDC_LEAD_LOCATION: NORMAL
MDC_IDC_LEAD_MFG: NORMAL
MDC_IDC_LEAD_MFG: NORMAL
MDC_IDC_LEAD_MODEL: NORMAL
MDC_IDC_LEAD_MODEL: NORMAL
MDC_IDC_LEAD_POLARITY_TYPE: NORMAL
MDC_IDC_LEAD_POLARITY_TYPE: NORMAL
MDC_IDC_LEAD_SERIAL: NORMAL
MDC_IDC_LEAD_SERIAL: NORMAL
MDC_IDC_MSMT_BATTERY_DTM: NORMAL
MDC_IDC_MSMT_BATTERY_IMPEDANCE: 1301 OHM
MDC_IDC_MSMT_BATTERY_REMAINING_LONGEVITY: 51 MO
MDC_IDC_MSMT_BATTERY_STATUS: NORMAL
MDC_IDC_MSMT_BATTERY_VOLTAGE: 2.77 V
MDC_IDC_MSMT_LEADCHNL_RA_IMPEDANCE_VALUE: 452 OHM
MDC_IDC_MSMT_LEADCHNL_RA_PACING_THRESHOLD_AMPLITUDE: 0.75 V
MDC_IDC_MSMT_LEADCHNL_RA_PACING_THRESHOLD_AMPLITUDE: 1 V
MDC_IDC_MSMT_LEADCHNL_RA_PACING_THRESHOLD_PULSEWIDTH: 0.4 MS
MDC_IDC_MSMT_LEADCHNL_RA_PACING_THRESHOLD_PULSEWIDTH: 0.4 MS
MDC_IDC_MSMT_LEADCHNL_RA_SENSING_INTR_AMPL: 0.7 MV
MDC_IDC_MSMT_LEADCHNL_RV_IMPEDANCE_VALUE: 461 OHM
MDC_IDC_MSMT_LEADCHNL_RV_PACING_THRESHOLD_AMPLITUDE: 0.5 V
MDC_IDC_MSMT_LEADCHNL_RV_PACING_THRESHOLD_AMPLITUDE: 0.5 V
MDC_IDC_MSMT_LEADCHNL_RV_PACING_THRESHOLD_PULSEWIDTH: 0.4 MS
MDC_IDC_MSMT_LEADCHNL_RV_PACING_THRESHOLD_PULSEWIDTH: 0.4 MS
MDC_IDC_MSMT_LEADCHNL_RV_SENSING_INTR_AMPL: 2.8 MV
MDC_IDC_PG_IMPLANT_DTM: NORMAL
MDC_IDC_PG_MFG: NORMAL
MDC_IDC_PG_MODEL: NORMAL
MDC_IDC_PG_SERIAL: NORMAL
MDC_IDC_PG_TYPE: NORMAL
MDC_IDC_SESS_CLINIC_NAME: NORMAL
MDC_IDC_SESS_DTM: NORMAL
MDC_IDC_SESS_TYPE: NORMAL
MDC_IDC_SET_BRADY_AT_MODE_SWITCH_MODE: NORMAL
MDC_IDC_SET_BRADY_AT_MODE_SWITCH_RATE: 175 {BEATS}/MIN
MDC_IDC_SET_BRADY_LOWRATE: 60 {BEATS}/MIN
MDC_IDC_SET_BRADY_MAX_SENSOR_RATE: 120 {BEATS}/MIN
MDC_IDC_SET_BRADY_MAX_TRACKING_RATE: 120 {BEATS}/MIN
MDC_IDC_SET_BRADY_MODE: NORMAL
MDC_IDC_SET_BRADY_PAV_DELAY_LOW: 350 MS
MDC_IDC_SET_BRADY_SAV_DELAY_LOW: 350 MS
MDC_IDC_SET_LEADCHNL_RA_PACING_AMPLITUDE: 1.5 V
MDC_IDC_SET_LEADCHNL_RA_PACING_ANODE_ELECTRODE_1: NORMAL
MDC_IDC_SET_LEADCHNL_RA_PACING_ANODE_LOCATION_1: NORMAL
MDC_IDC_SET_LEADCHNL_RA_PACING_CAPTURE_MODE: NORMAL
MDC_IDC_SET_LEADCHNL_RA_PACING_CATHODE_ELECTRODE_1: NORMAL
MDC_IDC_SET_LEADCHNL_RA_PACING_CATHODE_LOCATION_1: NORMAL
MDC_IDC_SET_LEADCHNL_RA_PACING_POLARITY: NORMAL
MDC_IDC_SET_LEADCHNL_RA_PACING_PULSEWIDTH: 0.4 MS
MDC_IDC_SET_LEADCHNL_RA_SENSING_ANODE_ELECTRODE_1: NORMAL
MDC_IDC_SET_LEADCHNL_RA_SENSING_ANODE_LOCATION_1: NORMAL
MDC_IDC_SET_LEADCHNL_RA_SENSING_CATHODE_ELECTRODE_1: NORMAL
MDC_IDC_SET_LEADCHNL_RA_SENSING_CATHODE_LOCATION_1: NORMAL
MDC_IDC_SET_LEADCHNL_RA_SENSING_POLARITY: NORMAL
MDC_IDC_SET_LEADCHNL_RA_SENSING_SENSITIVITY: 0.18 MV
MDC_IDC_SET_LEADCHNL_RV_PACING_AMPLITUDE: 2 V
MDC_IDC_SET_LEADCHNL_RV_PACING_ANODE_ELECTRODE_1: NORMAL
MDC_IDC_SET_LEADCHNL_RV_PACING_ANODE_LOCATION_1: NORMAL
MDC_IDC_SET_LEADCHNL_RV_PACING_CAPTURE_MODE: NORMAL
MDC_IDC_SET_LEADCHNL_RV_PACING_CATHODE_ELECTRODE_1: NORMAL
MDC_IDC_SET_LEADCHNL_RV_PACING_CATHODE_LOCATION_1: NORMAL
MDC_IDC_SET_LEADCHNL_RV_PACING_POLARITY: NORMAL
MDC_IDC_SET_LEADCHNL_RV_PACING_PULSEWIDTH: 0.4 MS
MDC_IDC_SET_LEADCHNL_RV_SENSING_ANODE_ELECTRODE_1: NORMAL
MDC_IDC_SET_LEADCHNL_RV_SENSING_ANODE_LOCATION_1: NORMAL
MDC_IDC_SET_LEADCHNL_RV_SENSING_CATHODE_ELECTRODE_1: NORMAL
MDC_IDC_SET_LEADCHNL_RV_SENSING_CATHODE_LOCATION_1: NORMAL
MDC_IDC_SET_LEADCHNL_RV_SENSING_POLARITY: NORMAL
MDC_IDC_SET_LEADCHNL_RV_SENSING_SENSITIVITY: 1.4 MV
MDC_IDC_SET_ZONE_DETECTION_INTERVAL: 342.86 MS
MDC_IDC_SET_ZONE_DETECTION_INTERVAL: 342.86 MS
MDC_IDC_SET_ZONE_TYPE: NORMAL
MDC_IDC_SET_ZONE_TYPE: NORMAL
MDC_IDC_STAT_AT_BURDEN_PERCENT: 45.7 %
MDC_IDC_STAT_AT_DTM_END: NORMAL
MDC_IDC_STAT_AT_DTM_START: NORMAL
MDC_IDC_STAT_AT_MODE_SW_COUNT: NORMAL
MDC_IDC_STAT_BRADY_AP_VP_PERCENT: 10 %
MDC_IDC_STAT_BRADY_AP_VS_PERCENT: 17 %
MDC_IDC_STAT_BRADY_AS_VP_PERCENT: 3 %
MDC_IDC_STAT_BRADY_AS_VS_PERCENT: 70 %
MDC_IDC_STAT_BRADY_DTM_END: NORMAL
MDC_IDC_STAT_BRADY_DTM_START: NORMAL
MDC_IDC_STAT_EPISODE_RECENT_COUNT: 27
MDC_IDC_STAT_EPISODE_RECENT_COUNT: NORMAL
MDC_IDC_STAT_EPISODE_RECENT_COUNT_DTM_END: NORMAL
MDC_IDC_STAT_EPISODE_RECENT_COUNT_DTM_END: NORMAL
MDC_IDC_STAT_EPISODE_RECENT_COUNT_DTM_START: NORMAL
MDC_IDC_STAT_EPISODE_RECENT_COUNT_DTM_START: NORMAL
MDC_IDC_STAT_EPISODE_TYPE: NORMAL
MDC_IDC_STAT_EPISODE_TYPE: NORMAL

## 2020-01-20 ENCOUNTER — ANTICOAGULATION THERAPY VISIT (OUTPATIENT)
Dept: NURSING | Facility: CLINIC | Age: 85
End: 2020-01-20
Payer: MEDICARE

## 2020-01-20 LAB — INR POINT OF CARE: 1.9 (ref 0.86–1.14)

## 2020-01-20 PROCEDURE — 36416 COLLJ CAPILLARY BLOOD SPEC: CPT

## 2020-01-20 PROCEDURE — 99207 ZZC NO CHARGE NURSE ONLY: CPT

## 2020-01-20 PROCEDURE — 85610 PROTHROMBIN TIME: CPT | Mod: QW

## 2020-01-20 NOTE — PROGRESS NOTES
ANTICOAGULATION FOLLOW-UP CLINIC VISIT    Patient Name:  Shelly Rogers  Date:  2020  Contact Type:  Face to Face    SUBJECTIVE:  Patient Findings     Comments:   Pt and her daughter are interested in switching warfarin to Eliquis. Pt has instructions on how to do that from her cardiologist if the Eliquis is covered by insurance.        Clinical Outcomes     Comments:   Pt and her daughter are interested in switching warfarin to Eliquis. Pt has instructions on how to do that from her cardiologist if the Eliquis is covered by insurance.           OBJECTIVE    INR Protime   Date Value Ref Range Status   2020 1.9 (A) 0.86 - 1.14 Final       ASSESSMENT / PLAN  INR assessment SUB    Recheck INR In: 2 WEEKS    INR Location Clinic      Anticoagulation Summary  As of 2020    INR goal:   2.0-3.0   TTR:   89.6 % (1 y)   INR used for dosin.9! (2020)   Warfarin maintenance plan:   5 mg (5 mg x 1) every Mon, Wed, Fri; 2.5 mg (5 mg x 0.5) all other days   Full warfarin instructions:   : 7.5 mg; Otherwise 5 mg every Mon, Wed, Fri; 2.5 mg all other days   Weekly warfarin total:   25 mg   Plan last modified:   Bree Delgadillo RN (2019)   Next INR check:   2/3/2020   Priority:   INR   Target end date:       Indications    Long-term (current) use of anticoagulants [Z79.01] [Z79.01]  Atrial fibrillation (H) [I48.91]             Anticoagulation Episode Summary     INR check location:       Preferred lab:       Send INR reminders to:   MIRNA CORRALES    Comments:         Anticoagulation Care Providers     Provider Role Specialty Phone number    Halley Hfuf MD [facility] Internal Medicine [phone]            See the Encounter Report to view Anticoagulation Flowsheet and Dosing Calendar (Go to Encounters tab in chart review, and find the Anticoagulation Therapy Visit)    Pt INR is 1.9 today. Pt is here with her daughter. Pt advised to take 7.5 mg today 20 then continue maintenance dose  of 5 mg on Monday, Wednesdays, Fridays and 2.5 mg all the other days. Recheck INR in 2 weeks. Pt and daughter given the central INR telephone number to call if questions regarding the process of switching from warfarin to Eliquis if they decide to proceed. See 1/13/20 cardiology progress note. Shelly aware if signs of clotting (pain, tenderness, swelling, color change in leg or arm, SOB) and bleeding occur (blood in stool, urine, large bruising, bleeding gums, nosebleeds) to have INR check sooner. If sx severe report to ER or concerned for stroke call 911. If general questions or concerns arise, call clinic.         Bree Delgadillo RN

## 2020-02-03 DIAGNOSIS — I48.0 PAROXYSMAL ATRIAL FIBRILLATION (H): ICD-10-CM

## 2020-02-04 RX ORDER — WARFARIN SODIUM 5 MG/1
TABLET ORAL
Qty: 35 TABLET | Refills: 0 | Status: SHIPPED | OUTPATIENT
Start: 2020-02-04 | End: 2020-03-04

## 2020-02-04 NOTE — TELEPHONE ENCOUNTER
Rx approved for 35 tablets of warfarin. Pt is due for an INR check. Do not fill if pt has started on Eliquis. On 1/20/20, pt was still deciding whether to stop warfarin and start eliquis or not.

## 2020-02-06 ENCOUNTER — ANTICOAGULATION THERAPY VISIT (OUTPATIENT)
Dept: NURSING | Facility: CLINIC | Age: 85
End: 2020-02-06
Payer: MEDICARE

## 2020-02-06 DIAGNOSIS — Z79.01 LONG TERM CURRENT USE OF ANTICOAGULANT THERAPY: ICD-10-CM

## 2020-02-06 DIAGNOSIS — I48.91 ATRIAL FIBRILLATION (H): ICD-10-CM

## 2020-02-06 LAB — INR POINT OF CARE: 1.7 (ref 0.86–1.14)

## 2020-02-06 PROCEDURE — 99207 ZZC NO CHARGE NURSE ONLY: CPT

## 2020-02-06 PROCEDURE — 36416 COLLJ CAPILLARY BLOOD SPEC: CPT

## 2020-02-06 PROCEDURE — 85610 PROTHROMBIN TIME: CPT | Mod: QW

## 2020-02-06 NOTE — PROGRESS NOTES
ANTICOAGULATION FOLLOW-UP CLINIC VISIT    Patient Name:  Shelly Rogers  Date:  2020  Contact Type:  Face to Face    SUBJECTIVE:         OBJECTIVE    INR Protime   Date Value Ref Range Status   2020 1.7 (A) 0.86 - 1.14 Final       ASSESSMENT / PLAN  No question data found.  Anticoagulation Summary  As of 2020    INR goal:   2.0-3.0   TTR:   86.8 % (1 y)   INR used for dosin.7! (2020)   Warfarin maintenance plan:   2.5 mg (5 mg x 0.5) every Tue, Thu, Sat; 5 mg (5 mg x 1) all other days   Full warfarin instructions:   : 5 mg; Otherwise 2.5 mg every Tue, Thu, Sat; 5 mg all other days   Weekly warfarin total:   27.5 mg   Plan last modified:   Glory Balderrama RN (2020)   Next INR check:   2020   Priority:   INR   Target end date:       Indications    Long-term (current) use of anticoagulants [Z79.01] [Z79.01]  Atrial fibrillation (H) [I48.91]             Anticoagulation Episode Summary     INR check location:       Preferred lab:       Send INR reminders to:   MIRNA CORRALES    Comments:         Anticoagulation Care Providers     Provider Role Specialty Phone number    Halley Huff MD [facility] Internal Medicine [phone]            See the Encounter Report to view Anticoagulation Flowsheet and Dosing Calendar (Go to Encounters tab in chart review, and find the Anticoagulation Therapy Visit)    Dosage adjustment made based on physician directed care plan.    Denies any changes.  Will increase maintenance dose ~ 10% and recheck INR on 20.  Patient daughter is on vacation and cannot get her in until then.  Patient to have booster dose of 5mg today    Glory Balderrama, SARINA

## 2020-02-25 ENCOUNTER — ANTICOAGULATION THERAPY VISIT (OUTPATIENT)
Dept: NURSING | Facility: CLINIC | Age: 85
End: 2020-02-25
Payer: MEDICARE

## 2020-02-25 LAB — INR POINT OF CARE: 2.4 (ref 0.86–1.14)

## 2020-02-25 PROCEDURE — 99207 ZZC NO CHARGE NURSE ONLY: CPT

## 2020-02-25 PROCEDURE — 85610 PROTHROMBIN TIME: CPT | Mod: QW

## 2020-02-25 PROCEDURE — 36416 COLLJ CAPILLARY BLOOD SPEC: CPT

## 2020-02-25 NOTE — PROGRESS NOTES
ANTICOAGULATION FOLLOW-UP CLINIC VISIT    Patient Name:  Shelly Rogers  Date:  2020  Contact Type:  Face to Face    SUBJECTIVE:  Patient Findings     Comments:   The patient was assessed for diet, medication, and activity level changes, missed or extra doses, bruising or bleeding, with no problem findings.        Clinical Outcomes     Negatives:   Major bleeding event, Thromboembolic event, Anticoagulation-related hospital admission, Anticoagulation-related ED visit, Anticoagulation-related fatality    Comments:   The patient was assessed for diet, medication, and activity level changes, missed or extra doses, bruising or bleeding, with no problem findings.           OBJECTIVE    INR Protime   Date Value Ref Range Status   2020 2.4 (A) 0.86 - 1.14 Final       ASSESSMENT / PLAN  INR assessment THER    Recheck INR In: 4 WEEKS    INR Location Clinic      Anticoagulation Summary  As of 2020    INR goal:   2.0-3.0   TTR:   89.8 % (1 y)   INR used for dosin.4 (2020)   Warfarin maintenance plan:   2.5 mg (5 mg x 0.5) every Tue, Thu, Sat; 5 mg (5 mg x 1) all other days   Full warfarin instructions:   2.5 mg every Tue, Thu, Sat; 5 mg all other days   Weekly warfarin total:   27.5 mg   No change documented:   Bree Delgadillo RN   Plan last modified:   Glory Balderrama RN (2020)   Next INR check:   3/24/2020   Priority:   INR   Target end date:       Indications    Long-term (current) use of anticoagulants [Z79.01] [Z79.01]  Atrial fibrillation (H) [I48.91]             Anticoagulation Episode Summary     INR check location:       Preferred lab:       Send INR reminders to:   MIRNA CORRALES    Comments:         Anticoagulation Care Providers     Provider Role Specialty Phone number    Halley Huff MD Responsible Internal Medicine [phone]            See the Encounter Report to view Anticoagulation Flowsheet and Dosing Calendar (Go to Encounters tab in chart review, and find the  Anticoagulation Therapy Visit)    Pt INR is 2.4 today. Pt is here with her daughter. Pt advised to continue taking 2.5 mg on Tuesdays, Thursdays, Saturdays and 5 mg all the other days. Recheck INR in 4 weeks or sooner as needed. Shelly aware if signs of clotting (pain, tenderness, swelling, color change in leg or arm, SOB) and bleeding occur (blood in stool, urine, large bruising, bleeding gums, nosebleeds) to have INR check sooner. If sx severe report to ER or concerned for stroke call 911. If general questions or concerns arise, call clinic.         Bree Delgadillo RN

## 2020-03-04 DIAGNOSIS — I48.0 PAROXYSMAL ATRIAL FIBRILLATION (H): ICD-10-CM

## 2020-03-04 RX ORDER — WARFARIN SODIUM 5 MG/1
TABLET ORAL
Qty: 100 TABLET | Refills: 0 | Status: SHIPPED | OUTPATIENT
Start: 2020-03-04 | End: 2020-06-01 | Stop reason: ALTCHOICE

## 2020-03-04 NOTE — TELEPHONE ENCOUNTER
"warfarin ANTICOAGULANT (COUMADIN) 5 MG tablet    Last Written Prescription Date:  2/4/2020  Last Fill Quantity: 35,  # refills: 0   Last office visit: 6/10/2019 with prescribing provider:  Dr. Huff    Future Office Visit:  Unknown     Requested Prescriptions   Pending Prescriptions Disp Refills     warfarin ANTICOAGULANT (COUMADIN) 5 MG tablet [Pharmacy Med Name: WARFARIN SODIUM 5 MG TABLET] 20 tablet 1     Sig: TAKE 1TAB ON MONDAYS/WEDNESDAYS/FRIDAYS AND 0.5TAB ALL THE OTHER DAYS. AS DIRECTED BY THE INR CLINIC       Vitamin K Antagonists Failed - 3/4/2020  1:43 AM        Failed - INR is within goal in the past 6 weeks     Confirm INR is within goal in the past 6 weeks.     Recent Labs   Lab Test 02/25/20   INR 2.4*                       Passed - Recent (12 mo) or future (30 days) visit within the authorizing provider's specialty     Patient has had an office visit with the authorizing provider or a provider within the authorizing providers department within the previous 12 mos or has a future within next 30 days. See \"Patient Info\" tab in inbasket, or \"Choose Columns\" in Meds & Orders section of the refill encounter.              Passed - Medication is active on med list        Passed - Patient is 18 years of age or older        Passed - Patient is not pregnant        Passed - No positive pregnancy on file in past 12 months        " Detail Level: Detailed Detail Level: Zone Include Location In Plan?: Yes Detail Level: Simple

## 2020-03-09 DIAGNOSIS — E03.9 HYPOTHYROIDISM, UNSPECIFIED TYPE: ICD-10-CM

## 2020-03-09 NOTE — TELEPHONE ENCOUNTER
"Last Written Prescription Date:  9/15/19  Last Fill Quantity: 90,  # refills: 1   Last office visit: 6/10/2019 with prescribing provider:     Future Office Visit:    Requested Prescriptions   Pending Prescriptions Disp Refills     levothyroxine (SYNTHROID/LEVOTHROID) 100 MCG tablet [Pharmacy Med Name: LEVOTHYROXINE 100 MCG TABLET] 90 tablet 1     Sig: TAKE 1 TABLET BY MOUTH EVERY DAY       Thyroid Protocol Passed - 3/9/2020  2:12 AM        Passed - Patient is 12 years or older        Passed - Recent (12 mo) or future (30 days) visit within the authorizing provider's specialty     Patient has had an office visit with the authorizing provider or a provider within the authorizing providers department within the previous 12 mos or has a future within next 30 days. See \"Patient Info\" tab in inbasket, or \"Choose Columns\" in Meds & Orders section of the refill encounter.              Passed - Medication is active on med list        Passed - Normal TSH on file in past 12 months     Recent Labs   Lab Test 09/12/19  1402   TSH 1.90              Passed - No active pregnancy on record     If patient is pregnant or has had a positive pregnancy test, please check TSH.          Passed - No positive pregnancy test in past 12 months     If patient is pregnant or has had a positive pregnancy test, please check TSH.               "

## 2020-03-10 RX ORDER — LEVOTHYROXINE SODIUM 100 UG/1
TABLET ORAL
Qty: 90 TABLET | Refills: 0 | Status: SHIPPED | OUTPATIENT
Start: 2020-03-10 | End: 2020-06-02

## 2020-03-16 DIAGNOSIS — I50.32 CHRONIC DIASTOLIC CONGESTIVE HEART FAILURE (H): ICD-10-CM

## 2020-03-16 RX ORDER — METOPROLOL SUCCINATE 100 MG/1
100 TABLET, EXTENDED RELEASE ORAL 2 TIMES DAILY
Qty: 180 TABLET | Refills: 1 | Status: SHIPPED | OUTPATIENT
Start: 2020-03-16 | End: 2020-06-25

## 2020-03-23 DIAGNOSIS — I48.91 ATRIAL FIBRILLATION (H): Primary | ICD-10-CM

## 2020-04-27 ENCOUNTER — ANCILLARY PROCEDURE (OUTPATIENT)
Dept: CARDIOLOGY | Facility: CLINIC | Age: 85
End: 2020-04-27
Attending: INTERNAL MEDICINE
Payer: MEDICARE

## 2020-04-27 DIAGNOSIS — Z95.0 CARDIAC PACEMAKER IN SITU: ICD-10-CM

## 2020-04-27 PROCEDURE — 93294 REM INTERROG EVL PM/LDLS PM: CPT | Performed by: INTERNAL MEDICINE

## 2020-04-27 PROCEDURE — 93296 REM INTERROG EVL PM/IDS: CPT | Performed by: INTERNAL MEDICINE

## 2020-05-05 ENCOUNTER — TELEPHONE (OUTPATIENT)
Dept: FAMILY MEDICINE | Facility: CLINIC | Age: 85
End: 2020-05-05

## 2020-05-05 NOTE — LETTER
[facility]  [address] [place] [id number]  [phone]          Dear Shelly,    We are contacting you because our records show you were due for an INR on 3/24/20.?   ?  We understand that this is a difficult time. In response to the COVID-19 [facility] has made several changes to help keep you safe. Some examples include limiting in person office visits to essential appointments only, wearing masks, and calling patients the day before appointments to screen for symptoms of illness.?   ?  INR testing is considered essential care during this time. There are potentially serious risks when taking warfarin without careful monitoring, and we want to make sure you stay safe. To minimize your time in clinic, we are now using lab only appointments for your INR test. Your anticoagulation nurse will now be calling you to discuss your result and dosing after you return home.  ?  Please call the INR clinic at [phone] to discuss your warfarin care, and to set up an appointment. If there has been a change in your medications or provider, please let us know so we can update our records.  ?  Sincerely,  ?  [facility]

## 2020-05-07 ENCOUNTER — TELEPHONE (OUTPATIENT)
Dept: FAMILY MEDICINE | Facility: CLINIC | Age: 85
End: 2020-05-07

## 2020-05-07 NOTE — TELEPHONE ENCOUNTER
Pt is afraid to come in because of the Covid. Discussed with daughter Sandhya.  I suggested that she schedule at [facility] and schedule the first afternoon appt so she would be the only pt here. Asked that she call me back if she is ok with that and I would look at the schedule with her.

## 2020-05-12 LAB
MDC_IDC_LEAD_IMPLANT_DT: NORMAL
MDC_IDC_LEAD_IMPLANT_DT: NORMAL
MDC_IDC_LEAD_LOCATION: NORMAL
MDC_IDC_LEAD_LOCATION: NORMAL
MDC_IDC_LEAD_MFG: NORMAL
MDC_IDC_LEAD_MFG: NORMAL
MDC_IDC_LEAD_MODEL: NORMAL
MDC_IDC_LEAD_MODEL: NORMAL
MDC_IDC_LEAD_POLARITY_TYPE: NORMAL
MDC_IDC_LEAD_POLARITY_TYPE: NORMAL
MDC_IDC_LEAD_SERIAL: NORMAL
MDC_IDC_LEAD_SERIAL: NORMAL
MDC_IDC_MSMT_BATTERY_DTM: NORMAL
MDC_IDC_MSMT_BATTERY_IMPEDANCE: 1437 OHM
MDC_IDC_MSMT_BATTERY_REMAINING_LONGEVITY: 46 MO
MDC_IDC_MSMT_BATTERY_STATUS: NORMAL
MDC_IDC_MSMT_BATTERY_VOLTAGE: 2.76 V
MDC_IDC_MSMT_LEADCHNL_RA_IMPEDANCE_VALUE: 422 OHM
MDC_IDC_MSMT_LEADCHNL_RA_PACING_THRESHOLD_AMPLITUDE: 1 V
MDC_IDC_MSMT_LEADCHNL_RA_PACING_THRESHOLD_PULSEWIDTH: 0.4 MS
MDC_IDC_MSMT_LEADCHNL_RV_IMPEDANCE_VALUE: 441 OHM
MDC_IDC_MSMT_LEADCHNL_RV_PACING_THRESHOLD_AMPLITUDE: 0.5 V
MDC_IDC_MSMT_LEADCHNL_RV_PACING_THRESHOLD_PULSEWIDTH: 0.4 MS
MDC_IDC_PG_IMPLANT_DTM: NORMAL
MDC_IDC_PG_MFG: NORMAL
MDC_IDC_PG_MODEL: NORMAL
MDC_IDC_PG_SERIAL: NORMAL
MDC_IDC_PG_TYPE: NORMAL
MDC_IDC_SESS_CLINIC_NAME: NORMAL
MDC_IDC_SESS_DTM: NORMAL
MDC_IDC_SESS_TYPE: NORMAL
MDC_IDC_SET_BRADY_AT_MODE_SWITCH_MODE: NORMAL
MDC_IDC_SET_BRADY_AT_MODE_SWITCH_RATE: 175 {BEATS}/MIN
MDC_IDC_SET_BRADY_LOWRATE: 60 {BEATS}/MIN
MDC_IDC_SET_BRADY_MAX_SENSOR_RATE: 120 {BEATS}/MIN
MDC_IDC_SET_BRADY_MAX_TRACKING_RATE: 120 {BEATS}/MIN
MDC_IDC_SET_BRADY_MODE: NORMAL
MDC_IDC_SET_BRADY_PAV_DELAY_LOW: 350 MS
MDC_IDC_SET_BRADY_SAV_DELAY_LOW: 350 MS
MDC_IDC_SET_LEADCHNL_RA_PACING_AMPLITUDE: 1.5 V
MDC_IDC_SET_LEADCHNL_RA_PACING_CAPTURE_MODE: NORMAL
MDC_IDC_SET_LEADCHNL_RA_PACING_POLARITY: NORMAL
MDC_IDC_SET_LEADCHNL_RA_PACING_PULSEWIDTH: 0.4 MS
MDC_IDC_SET_LEADCHNL_RA_SENSING_POLARITY: NORMAL
MDC_IDC_SET_LEADCHNL_RA_SENSING_SENSITIVITY: 0.18 MV
MDC_IDC_SET_LEADCHNL_RV_PACING_AMPLITUDE: 2 V
MDC_IDC_SET_LEADCHNL_RV_PACING_CAPTURE_MODE: NORMAL
MDC_IDC_SET_LEADCHNL_RV_PACING_POLARITY: NORMAL
MDC_IDC_SET_LEADCHNL_RV_PACING_PULSEWIDTH: 0.4 MS
MDC_IDC_SET_LEADCHNL_RV_SENSING_POLARITY: NORMAL
MDC_IDC_SET_LEADCHNL_RV_SENSING_SENSITIVITY: 1.4 MV
MDC_IDC_SET_ZONE_DETECTION_INTERVAL: 342.86 MS
MDC_IDC_SET_ZONE_DETECTION_INTERVAL: 342.86 MS
MDC_IDC_SET_ZONE_TYPE: NORMAL
MDC_IDC_SET_ZONE_TYPE: NORMAL
MDC_IDC_STAT_AT_BURDEN_PERCENT: 0 %
MDC_IDC_STAT_AT_DTM_END: NORMAL
MDC_IDC_STAT_AT_DTM_START: NORMAL
MDC_IDC_STAT_AT_MODE_SW_COUNT: 3
MDC_IDC_STAT_BRADY_AP_VP_PERCENT: 0 %
MDC_IDC_STAT_BRADY_AP_VS_PERCENT: 98 %
MDC_IDC_STAT_BRADY_AS_VP_PERCENT: 0 %
MDC_IDC_STAT_BRADY_AS_VS_PERCENT: 2 %
MDC_IDC_STAT_BRADY_DTM_END: NORMAL
MDC_IDC_STAT_BRADY_DTM_START: NORMAL
MDC_IDC_STAT_EPISODE_RECENT_COUNT: 0
MDC_IDC_STAT_EPISODE_RECENT_COUNT: 1
MDC_IDC_STAT_EPISODE_RECENT_COUNT_DTM_END: NORMAL
MDC_IDC_STAT_EPISODE_RECENT_COUNT_DTM_END: NORMAL
MDC_IDC_STAT_EPISODE_RECENT_COUNT_DTM_START: NORMAL
MDC_IDC_STAT_EPISODE_RECENT_COUNT_DTM_START: NORMAL
MDC_IDC_STAT_EPISODE_TYPE: NORMAL
MDC_IDC_STAT_EPISODE_TYPE: NORMAL

## 2020-05-25 DIAGNOSIS — I48.0 PAROXYSMAL ATRIAL FIBRILLATION (H): ICD-10-CM

## 2020-05-27 RX ORDER — WARFARIN SODIUM 5 MG/1
TABLET ORAL
Qty: 1 TABLET | Refills: 0 | OUTPATIENT
Start: 2020-05-27

## 2020-05-27 NOTE — TELEPHONE ENCOUNTER
Called and spoke to pt's daughter Sandhya. Options discussed. Sandhya decided that no matter the cost they want pt to start Eliquis and stop warfarin so that pt does not have to worry about having INR checks.    Will route to  to get advisement for stopping warfarin and starting Eliquis. Sandhya reports that she still has the Eliquis prescription.

## 2020-05-27 NOTE — TELEPHONE ENCOUNTER
Please check with patient if she is planning to switch to eliquis ( apixaben)  She got script from her cardiologist  See note of .  Please clarify before I renew her warfarin.  Want to make sure she does not do both  Dr.Nasima Daria MD

## 2020-05-27 NOTE — TELEPHONE ENCOUNTER
Routing to Anticoag Team for Rx Warfarin refill.     Called and spoke with patient's daughter, Sandhya, who confirmed patient has not yet started the Rx Eliquis from her Cardiologist.    Patient is still taking Warfarin.   Overdue for INR.   Patient is aware, however daughter states patient will not come into clinic for INR due to COVID-19 concerns.     Anticoag nurse---please review, contact patient with options for testing INR and complete RX Warfarin refill request.     Thank you,  Ciera FLEMING RN,BSN

## 2020-05-27 NOTE — TELEPHONE ENCOUNTER
According to office visit note from cardiologist  dated 1/13/20, pt was given an Eliquis prescription for 5 mg BID.    Instructions: Stop warfarin then check an INR 3 days later. INR must be less than 2.0 before starting eliquis.      The issue is that pt refuses to get an INR done at this time due to COVID.    Please advise how to proceed.

## 2020-05-27 NOTE — TELEPHONE ENCOUNTER
It is ok to proceed without INR as she does not want to come.  Hold warfarin for 3 days then start eliquis 3 days after stopping warfarin.  Dr.Nasima Daria MD

## 2020-05-27 NOTE — TELEPHONE ENCOUNTER
Routing refill request to provider for review/approval because:  Overdue for INR  See recent [facility] notes - pt afraid to come in d/t Ryland HOWARD RN

## 2020-05-29 NOTE — TELEPHONE ENCOUNTER
Patient daughter called and was given below instruction on starting eliquis.  Patient was advised to stop warfarin once she picks up the RX for eliquis.  Given instruction to stop warfarin for 3 days then start eliquis.  Verbalizes understanding.  Britt Benson RN

## 2020-06-01 ENCOUNTER — ANTICOAGULATION THERAPY VISIT (OUTPATIENT)
Dept: FAMILY MEDICINE | Facility: CLINIC | Age: 85
End: 2020-06-01

## 2020-06-01 ENCOUNTER — TELEPHONE (OUTPATIENT)
Dept: FAMILY MEDICINE | Facility: CLINIC | Age: 85
End: 2020-06-01

## 2020-06-01 DIAGNOSIS — E03.9 HYPOTHYROIDISM, UNSPECIFIED TYPE: ICD-10-CM

## 2020-06-02 RX ORDER — LEVOTHYROXINE SODIUM 100 UG/1
TABLET ORAL
Qty: 90 TABLET | Refills: 0 | Status: SHIPPED | OUTPATIENT
Start: 2020-06-02 | End: 2020-06-25

## 2020-06-25 ENCOUNTER — VIRTUAL VISIT (OUTPATIENT)
Dept: FAMILY MEDICINE | Facility: CLINIC | Age: 85
End: 2020-06-25
Payer: MEDICARE

## 2020-06-25 DIAGNOSIS — I48.0 PAROXYSMAL ATRIAL FIBRILLATION (H): ICD-10-CM

## 2020-06-25 DIAGNOSIS — N18.30 CKD (CHRONIC KIDNEY DISEASE) STAGE 3, GFR 30-59 ML/MIN (H): ICD-10-CM

## 2020-06-25 DIAGNOSIS — E03.9 HYPOTHYROIDISM, UNSPECIFIED TYPE: ICD-10-CM

## 2020-06-25 DIAGNOSIS — I50.32 CHRONIC DIASTOLIC CONGESTIVE HEART FAILURE (H): ICD-10-CM

## 2020-06-25 DIAGNOSIS — E11.9 TYPE 2 DIABETES MELLITUS WITHOUT COMPLICATION, WITHOUT LONG-TERM CURRENT USE OF INSULIN (H): Primary | ICD-10-CM

## 2020-06-25 PROCEDURE — 99441 ZZC PHYSICIAN TELEPHONE EVALUATION 5-10 MIN: CPT | Performed by: INTERNAL MEDICINE

## 2020-06-25 RX ORDER — METOPROLOL SUCCINATE 100 MG/1
100 TABLET, EXTENDED RELEASE ORAL 2 TIMES DAILY
Qty: 180 TABLET | Refills: 3 | Status: SHIPPED | OUTPATIENT
Start: 2020-06-25 | End: 2021-05-07

## 2020-06-25 RX ORDER — LEVOTHYROXINE SODIUM 100 UG/1
100 TABLET ORAL DAILY
Qty: 90 TABLET | Refills: 1 | Status: SHIPPED | OUTPATIENT
Start: 2020-06-25 | End: 2021-02-22

## 2020-06-25 ASSESSMENT — PATIENT HEALTH QUESTIONNAIRE - PHQ9: SUM OF ALL RESPONSES TO PHQ QUESTIONS 1-9: 1

## 2020-06-25 NOTE — NURSING NOTE
Contacted jim Arthur [phone] to tell there is a Covid-19 safe service that can go to [facility] to draw the blood but it is not covered by Medicare and cost is $75.00.  She said ok.  I faxed the order [phone]  with a face sheet and note for them to call jim Arthur to arrange.        Sandhya will call us tomorrow if she doesn't get a call to schedule. Order car in accordion on [address] if need to refax.     Abigail BHATT MA

## 2020-06-25 NOTE — PATIENT INSTRUCTIONS
Schedule labs ( no fasting)  Monitor blood sugars at least few times a week  Watch for low blood sugars.  Follow up in 4 months  Seek sooner medical attention if there is any worsening of symptoms or problems.

## 2020-06-25 NOTE — PROGRESS NOTES
"Shelly Rogers is a 87 year old female who is being evaluated via a billable telephone visit.      The patient has been notified of following:     \"This telephone visit will be conducted via a call between you and your physician/provider. We have found that certain health care needs can be provided without the need for a physical exam.  This service lets us provide the care you need with a short phone conversation.  If a prescription is necessary we can send it directly to your pharmacy.  If lab work is needed we can place an order for that and you can then stop by our lab to have the test done at a later time.    Telephone visits are billed at different rates depending on your insurance coverage. During this emergency period, for some insurers they may be billed the same as an in-person visit.  Please reach out to your insurance provider with any questions.    If during the course of the call the physician/provider feels a telephone visit is not appropriate, you will not be charged for this service.\"    Patient has given verbal consent for Telephone visit?  Yes    What phone number would you like to be contacted at? [phone]    How would you like to obtain your AVS? Mail a copy    Subjective     Shelly Rogers is a 87 year old female who presents via phone visit today for the following health issues:    HPI    Feels good   No complaints .  Lives in independent senior living .  No chest pain , no sob ,   HR under control.  Does not check her sugars  Does not want to go out.        Patient Active Problem List   Diagnosis     CHF (congestive heart failure) (H)     CARDIOVASCULAR SCREENING; LDL GOAL LESS THAN 100     Health Care Home     Pacemaker     OA (osteoarthritis)     Type 2 diabetes mellitus without complications (H)     DNS (deviated nasal septum)     Atrial fibrillation (H)     Paroxysmal atrial fibrillation (H)     Chronic diastolic congestive heart failure (H)     Hypothyroidism     Long-term (current) " use of anticoagulants [Z79.01]     Heart failure (H)     CKD (chronic kidney disease) stage 3, GFR 30-59 ml/min (H)     History of uterine cancer     Past Surgical History:   Procedure Laterality Date     C ANESTH,PACEMAKER INSERTION      dual chamber 3/27/13     CHOLECYSTECTOMY  2004     GYN SURGERY       HYSTERECTOMY      Ut ca      JOINT REPLACEMENT       KERATOTOMY ARCUATE WITH FEMTOSECOND LASER/IMAGING FOR ATIOL Right 2017    Procedure: KERATOTOMY ARCUATE WITH FEMTOSECOND LASER/IMAGING FOR ATIOL;  Surgeon: Calvin Dominguez MD;  Location:  [facility]     PHACOEMULSIFICATION CLEAR CORNEA WITH STANDARD INTRAOCULAR LENS IMPLANT Right 2017    Procedure: PHACOEMULSIFICATION CLEAR CORNEA WITH STANDARD INTRAOCULAR LENS IMPLANT;  Surgeon: Calvin Dominguez MD;  Location:  [facility]     TKR      bilat       Social History     Tobacco Use     Smoking status: Former Smoker     Last attempt to quit: 1990     Years since quittin.5     Smokeless tobacco: Never Used   Substance Use Topics     Alcohol use: No     Alcohol/week: 0.0 standard drinks     Family History   Problem Relation Age of Onset     Gastrointestinal Disease Mother         bowel obstruction     Cardiovascular Father      C.A.D. Father      Cardiovascular Brother         CHF         Current Outpatient Medications   Medication Sig Dispense Refill     ACETAMINOPHEN PO Take 500 mg by mouth 3 times daily as needed (Takes at least 6 hours apart).        apixaban ANTICOAGULANT (ELIQUIS ANTICOAGULANT) 5 MG tablet Take 1 tablet (5 mg) by mouth 2 times daily 180 tablet 3     blood glucose (ACCU-CHEK SMARTVIEW) test strip 1 strip by In Vitro route daily 1 Box prn     blood glucose (NO BRAND SPECIFIED) lancets standard Use to test blood sugar 1 times daily or as directed. 100 each 11     blood glucose monitoring (ACCU-CHEK FASTCLIX) lancets Use to check blood sugars daily 1 Box prn     Continuous Blood Gluc  (FREESTYLE STELLA 14 DAY READER)  VERO 1 each every 14 days 1 Device 0     Continuous Blood Gluc Sensor (FREESTYLE STELLA 14 DAY SENSOR) MISC 1 each every 14 days 2 each 11     furosemide (LASIX) 20 MG tablet Take 1 tablet (20 mg) by mouth 2 times daily 180 tablet 0     glimepiride (AMARYL) 2 MG tablet Take 1 tablet (2 mg) by mouth 2 times daily for diabetes 180 tablet 1     levothyroxine (SYNTHROID/LEVOTHROID) 100 MCG tablet Take 1 tablet (100 mcg) by mouth daily for hypothyroidism 90 tablet 1     losartan (COZAAR) 25 MG tablet Take 1 tablet (25 mg) by mouth daily 90 tablet 3     metFORMIN (GLUCOPHAGE) 500 MG tablet Take 1 tablet (500 mg) by mouth 2 times daily (with meals) 180 tablet 0     metoprolol succinate ER (TOPROL-XL) 100 MG 24 hr tablet Take 1 tablet (100 mg) by mouth 2 times daily 180 tablet 3     multivitamin  with lutein (OCUVITE WITH LTEIN) CAPS per capsule Take 1 capsule by mouth daily       triamcinolone (NASACORT) 55 MCG/ACT nasal aerosol Spray 2 sprays into both nostrils daily       UNABLE TO FIND MEDICATION NAME: CBDistilleadalberto Vegan CBD gummies       ASPIRIN NOT PRESCRIBED, INTENTIONAL, Antiplatelet medication not prescribed intentionally due to Current anticoagulant therapy (warfarin/enoxaparin) (Patient not taking: Reported on 1/13/2020) 0 each 0     STATIN NOT PRESCRIBED, INTENTIONAL, continuous prn. Statin not prescribed intentionally due to Other: Not needed, LDL at goal <100mg/dL without therapy 0 each 0       Reviewed and updated as needed this visit by Provider         Review of Systems   Constitutional, HEENT, cardiovascular, pulmonary, GI, , musculoskeletal, neuro, skin, endocrine and psych systems are negative, except as otherwise noted.       Objective   Reported vitals:  There were no vitals taken for this visit.   healthy, alert and no distress  PSYCH: Alert and oriented times 3; coherent speech, normal   rate and volume, able to articulate logical thoughts, able   to abstract reason, no tangential thoughts, no  hallucinations   or delusions  Her affect is normal  RESP: No cough, no audible wheezing, able to talk in full sentences  Remainder of exam unable to be completed due to telephone visits            Assessment/Plan:  1. Hypothyroidism, unspecified type  She is due for labs   - levothyroxine (SYNTHROID/LEVOTHROID) 100 MCG tablet; Take 1 tablet (100 mcg) by mouth daily for hypothyroidism  Dispense: 90 tablet; Refill: 1  - TSH with free T4 reflex; Future  Discussed about blood draw at home  Nursing staff tried to contact the daughter to schedule it as she is someone who does on the scheduling  They were made able to get hold of her daughter  They will try again tomorrow  Patient says she would not go out as if she does then she will have to do self quarantine for 14 days.    2. Chronic diastolic congestive heart failure (H)  Well compensated*  - metoprolol succinate ER (TOPROL-XL) 100 MG 24 hr tablet; Take 1 tablet (100 mg) by mouth 2 times daily  Dispense: 180 tablet; Refill: 3    3. Type 2 diabetes mellitus without complication, without long-term current use of insulin (H)  Not checking her blood sugar and does not want to do that  - Lipid panel reflex to direct LDL Non-fasting; Future  - Basic metabolic panel; Future  - Hemoglobin A1c; Future  - Albumin Random Urine Quantitative with Creat Ratio; Future  Lab Results   Component Value Date    A1C 7.9 06/10/2019    A1C 8.2 10/19/2018    A1C 7.8 06/12/2018    A1C 8.0 02/27/2018    A1C 8.1 04/06/2017     4. Paroxysmal atrial fibrillation (H)  Heart rate under control        5. CKD (chronic kidney disease) stage 3, GFR 30-59 ml/min (H)  stable  - CBC with platelets; Future    Return in about 4 months (around 10/25/2020) for wellness visit.    Disclaimer: This note consists of symbols derived from keyboarding, dictation and/or voice recognition software. As a result, there may be errors in the script that have gone undetected. Please consider this when interpreting  information found in this chart.    Phone call duration:  8  minutes    Halley Huff MD

## 2020-07-15 ENCOUNTER — CARE COORDINATION (OUTPATIENT)
Dept: CARDIOLOGY | Facility: CLINIC | Age: 85
End: 2020-07-15

## 2020-07-15 NOTE — PROGRESS NOTES
Pt's daughter, Sandhya, left message to clarify if pt should be taking losartan.     started pt on losartan 25 mg daily at 1/2020 visit for elevated BP. I called pt's daughter back. She sets up pt's medications, and said the pharmacy never alerted her to this new script until this week. Pt has not been taking. She wondered if pt needs to start at this time. I asked her if pt has the ability to check her BP at home. She said pt lives at [facility], but does not have her own cuff. Pt has not left her assisted living for a couple of months d/t fear of bringing COVID back to the other residents. She needs labs done per PCP and the dtr is trying to arrange a company to come draw them. She will see if they can check BP or if a staff member can check her BP. I told her I think  will be ok with her holding off on starting losartan until another BP can be obtained, but I will send him an update to verify. Berna BRANCH July 15, 2020, 11:25 AM

## 2020-07-16 ENCOUNTER — TRANSFERRED RECORDS (OUTPATIENT)
Dept: HEALTH INFORMATION MANAGEMENT | Facility: CLINIC | Age: 85
End: 2020-07-16

## 2020-07-16 ENCOUNTER — HOSPITAL LABORATORY (OUTPATIENT)
Dept: OTHER | Facility: CLINIC | Age: 85
End: 2020-07-16

## 2020-07-16 LAB
CREAT UR-MCNC: 40 MG/DL
MICROALBUMIN UR-MCNC: 12 MG/L
MICROALBUMIN/CREAT UR: 29.32 MG/G CR (ref 0–25)

## 2020-07-16 NOTE — LETTER
July 17, 2020      Shelly ANN Ken  [address]   [place] [address]        Dear Ms.Ken,      This is to inform you regarding your test result.     You have protein in your urine.   The numbers have gone down compared to last time   Taking good care of blood pressure and diabetes is very important.   Will recheck in 6 months.          Resulted Orders   Albumin Random Urine Quantitative   Result Value Ref Range    Creatinine Urine 40 mg/dL    Albumin Urine mg/L 12 mg/L    Albumin Urine mg/g Cr 29.32 (H) 0 - 25 mg/g Cr       If you have any questions or concerns, please call the clinic at the number listed above.         Sincerely,       Dr.Nasima Daria MD,FACP/ maria r, ma

## 2020-07-17 NOTE — RESULT ENCOUNTER NOTE
Please notify patient by sending following letter with copy of test results      Nayely Bravo,    This is to inform you regarding your test result.    You have protein in your urine.  The numbers have gone down compared to last time  Taking good care of blood pressure and diabetes is very important.  Will recheck in 6 months.      Sincerely,      Dr.Nasima Daria MD,FACP

## 2020-07-20 ENCOUNTER — TELEPHONE (OUTPATIENT)
Dept: FAMILY MEDICINE | Facility: CLINIC | Age: 85
End: 2020-07-20

## 2020-07-20 NOTE — LETTER
[facility]  [address] [facility] [address]  Phone: [phone]    07/20/20    Shelly Rogers  [address]   [facility] [address]      Dear Shelly,:     Total cholesterol is 193  Your triglycerides are high at 222  LDL which is called bad cholesterol is normal  Your electrolytes and kidney function is fine  TSH which is thyroid hormone is normal  CBC result which includes white count Hemoglobin and  Platelet Counts is normal.   HbA1c which is average glucose during last 3 months is 9.3%.  Your diabetes is not under control.  We will call you to discuss increasing your glimepiride       Sincerely,      Halley Huff MD

## 2020-07-20 NOTE — LETTER
[facility]  [address] [facility] [address]  Phone: [phone]    07/20/20    Shelly Rogers  [address]   [facility] [address]      Nayely Bravo,    This is to inform you regarding your test result     HbA1c which is average glucose during last 3 months is 7.4%.  Basic metabolic panel which includes electrolytes and kidney fucntion is normal  Your total cholesterol is normal.  The triglycerides are high. Lowering  the amount of sugar ,alcohol and sweets in the diet helps to control this.Exercise and weight loss helps.  HDL which is called good cholesterol is normal.  Your LDL cholesterol is normal.  This is often call bad cholesterol and high levels increase the risk for heart attacks and strokes.  CBC result which includes Hemoglobin and  Platelet Counts is satisfactory.        Sincerely,      Halley Huff MD

## 2020-07-20 NOTE — TELEPHONE ENCOUNTER
Spoke to her daughter   Gave verbal report about test result done at lab one  Letter is also written  Dr.Nasima Daria MD

## 2020-07-20 NOTE — LETTER
[facility]  [address] [facility] [place] [address]  Phone: [phone]    07/20/20    Shelly Rogers  [address]   [place] [facility] [address]        Dear Jagruti,      This is to inform you regarding your test results which were done on July 16, 2020.    Total cholesterol is 193  Your triglycerides are high at 222  LDL which is called bad cholesterol is normal  Your electrolytes and kidney function is fine  TSH which is thyroid hormone is normal  CBC result which includes white count Hemoglobin and  Platelet Counts is normal.   HbA1c which is average glucose during last 3 months is 9.3%.  Your diabetes is not under control.      Sincerely,      Halley Huff MD

## 2020-07-23 NOTE — TELEPHONE ENCOUNTER
Yes, I agree that she can hold off on starting losartan until we confirm that her BP remains high and needs more treatment, whenever that may be. EE

## 2020-07-24 NOTE — PROGRESS NOTES
I called pt's daughter and let her know  ok with pt not starting losartan until a recent BP can be obtained. Pt's daughter said a  did come to the house, but could not do a BP. Pt's daughter will call back when she can get a bp. I will take losartan 25 mg daily off pt's med list for now. Berna BRANCH July 24, 2020, 11:44 AM

## 2020-08-27 ENCOUNTER — DOCUMENTATION ONLY (OUTPATIENT)
Dept: CARDIOLOGY | Facility: CLINIC | Age: 85
End: 2020-08-27

## 2020-08-27 NOTE — PROGRESS NOTES
Patient missed [facility] transmission on 8/12/20. Sent letter 8/13, no response. Sent 1 week letter today

## 2020-09-01 ENCOUNTER — ANCILLARY PROCEDURE (OUTPATIENT)
Dept: CARDIOLOGY | Facility: CLINIC | Age: 85
End: 2020-09-01
Attending: INTERNAL MEDICINE
Payer: MEDICARE

## 2020-09-01 DIAGNOSIS — Z95.0 CARDIAC PACEMAKER IN SITU: ICD-10-CM

## 2020-09-01 PROCEDURE — 93296 REM INTERROG EVL PM/IDS: CPT | Performed by: INTERNAL MEDICINE

## 2020-09-01 PROCEDURE — 93294 REM INTERROG EVL PM/LDLS PM: CPT | Performed by: INTERNAL MEDICINE

## 2020-09-07 LAB
MDC_IDC_LEAD_IMPLANT_DT: NORMAL
MDC_IDC_LEAD_IMPLANT_DT: NORMAL
MDC_IDC_LEAD_LOCATION: NORMAL
MDC_IDC_LEAD_LOCATION: NORMAL
MDC_IDC_LEAD_MFG: NORMAL
MDC_IDC_LEAD_MFG: NORMAL
MDC_IDC_LEAD_MODEL: NORMAL
MDC_IDC_LEAD_MODEL: NORMAL
MDC_IDC_LEAD_POLARITY_TYPE: NORMAL
MDC_IDC_LEAD_POLARITY_TYPE: NORMAL
MDC_IDC_LEAD_SERIAL: NORMAL
MDC_IDC_LEAD_SERIAL: NORMAL
MDC_IDC_MSMT_BATTERY_DTM: NORMAL
MDC_IDC_MSMT_BATTERY_IMPEDANCE: 1609 OHM
MDC_IDC_MSMT_BATTERY_REMAINING_LONGEVITY: 42 MO
MDC_IDC_MSMT_BATTERY_STATUS: NORMAL
MDC_IDC_MSMT_BATTERY_VOLTAGE: 2.77 V
MDC_IDC_MSMT_LEADCHNL_RA_IMPEDANCE_VALUE: 429 OHM
MDC_IDC_MSMT_LEADCHNL_RA_PACING_THRESHOLD_AMPLITUDE: 1 V
MDC_IDC_MSMT_LEADCHNL_RA_PACING_THRESHOLD_PULSEWIDTH: 0.4 MS
MDC_IDC_MSMT_LEADCHNL_RV_IMPEDANCE_VALUE: 466 OHM
MDC_IDC_MSMT_LEADCHNL_RV_PACING_THRESHOLD_AMPLITUDE: 0.5 V
MDC_IDC_MSMT_LEADCHNL_RV_PACING_THRESHOLD_PULSEWIDTH: 0.4 MS
MDC_IDC_PG_IMPLANT_DTM: NORMAL
MDC_IDC_PG_MFG: NORMAL
MDC_IDC_PG_MODEL: NORMAL
MDC_IDC_PG_SERIAL: NORMAL
MDC_IDC_PG_TYPE: NORMAL
MDC_IDC_SESS_CLINIC_NAME: NORMAL
MDC_IDC_SESS_DTM: NORMAL
MDC_IDC_SESS_TYPE: NORMAL
MDC_IDC_SET_BRADY_AT_MODE_SWITCH_MODE: NORMAL
MDC_IDC_SET_BRADY_AT_MODE_SWITCH_RATE: 175 {BEATS}/MIN
MDC_IDC_SET_BRADY_LOWRATE: 60 {BEATS}/MIN
MDC_IDC_SET_BRADY_MAX_SENSOR_RATE: 120 {BEATS}/MIN
MDC_IDC_SET_BRADY_MAX_TRACKING_RATE: 120 {BEATS}/MIN
MDC_IDC_SET_BRADY_MODE: NORMAL
MDC_IDC_SET_BRADY_PAV_DELAY_LOW: 350 MS
MDC_IDC_SET_BRADY_SAV_DELAY_LOW: 350 MS
MDC_IDC_SET_LEADCHNL_RA_PACING_AMPLITUDE: 1.5 V
MDC_IDC_SET_LEADCHNL_RA_PACING_CAPTURE_MODE: NORMAL
MDC_IDC_SET_LEADCHNL_RA_PACING_POLARITY: NORMAL
MDC_IDC_SET_LEADCHNL_RA_PACING_PULSEWIDTH: 0.4 MS
MDC_IDC_SET_LEADCHNL_RA_SENSING_POLARITY: NORMAL
MDC_IDC_SET_LEADCHNL_RA_SENSING_SENSITIVITY: 0.18 MV
MDC_IDC_SET_LEADCHNL_RV_PACING_AMPLITUDE: 2 V
MDC_IDC_SET_LEADCHNL_RV_PACING_CAPTURE_MODE: NORMAL
MDC_IDC_SET_LEADCHNL_RV_PACING_POLARITY: NORMAL
MDC_IDC_SET_LEADCHNL_RV_PACING_PULSEWIDTH: 0.4 MS
MDC_IDC_SET_LEADCHNL_RV_SENSING_POLARITY: NORMAL
MDC_IDC_SET_LEADCHNL_RV_SENSING_SENSITIVITY: 1.4 MV
MDC_IDC_SET_ZONE_DETECTION_INTERVAL: 342.86 MS
MDC_IDC_SET_ZONE_DETECTION_INTERVAL: 342.86 MS
MDC_IDC_SET_ZONE_TYPE: NORMAL
MDC_IDC_SET_ZONE_TYPE: NORMAL
MDC_IDC_STAT_AT_BURDEN_PERCENT: 0 %
MDC_IDC_STAT_AT_DTM_END: NORMAL
MDC_IDC_STAT_AT_DTM_START: NORMAL
MDC_IDC_STAT_AT_MODE_SW_COUNT: 17
MDC_IDC_STAT_BRADY_AP_VP_PERCENT: 0 %
MDC_IDC_STAT_BRADY_AP_VS_PERCENT: 96 %
MDC_IDC_STAT_BRADY_AS_VP_PERCENT: 0 %
MDC_IDC_STAT_BRADY_AS_VS_PERCENT: 4 %
MDC_IDC_STAT_BRADY_DTM_END: NORMAL
MDC_IDC_STAT_BRADY_DTM_START: NORMAL
MDC_IDC_STAT_EPISODE_RECENT_COUNT: 0
MDC_IDC_STAT_EPISODE_RECENT_COUNT: 7
MDC_IDC_STAT_EPISODE_RECENT_COUNT_DTM_END: NORMAL
MDC_IDC_STAT_EPISODE_RECENT_COUNT_DTM_END: NORMAL
MDC_IDC_STAT_EPISODE_RECENT_COUNT_DTM_START: NORMAL
MDC_IDC_STAT_EPISODE_RECENT_COUNT_DTM_START: NORMAL
MDC_IDC_STAT_EPISODE_TYPE: NORMAL
MDC_IDC_STAT_EPISODE_TYPE: NORMAL

## 2020-09-09 DIAGNOSIS — I50.32 CHRONIC DIASTOLIC CONGESTIVE HEART FAILURE (H): ICD-10-CM

## 2020-09-10 RX ORDER — FUROSEMIDE 20 MG
TABLET ORAL
Qty: 180 TABLET | Refills: 0 | Status: SHIPPED | OUTPATIENT
Start: 2020-09-10 | End: 2020-12-16

## 2020-12-01 ENCOUNTER — ANCILLARY PROCEDURE (OUTPATIENT)
Dept: CARDIOLOGY | Facility: CLINIC | Age: 85
End: 2020-12-01
Attending: INTERNAL MEDICINE
Payer: MEDICARE

## 2020-12-01 DIAGNOSIS — Z95.0 CARDIAC PACEMAKER IN SITU: ICD-10-CM

## 2020-12-01 PROCEDURE — 93296 REM INTERROG EVL PM/IDS: CPT | Performed by: INTERNAL MEDICINE

## 2020-12-01 PROCEDURE — 93294 REM INTERROG EVL PM/LDLS PM: CPT | Performed by: INTERNAL MEDICINE

## 2020-12-15 LAB
MDC_IDC_LEAD_IMPLANT_DT: NORMAL
MDC_IDC_LEAD_IMPLANT_DT: NORMAL
MDC_IDC_LEAD_LOCATION: NORMAL
MDC_IDC_LEAD_LOCATION: NORMAL
MDC_IDC_LEAD_MFG: NORMAL
MDC_IDC_LEAD_MFG: NORMAL
MDC_IDC_LEAD_MODEL: NORMAL
MDC_IDC_LEAD_MODEL: NORMAL
MDC_IDC_LEAD_POLARITY_TYPE: NORMAL
MDC_IDC_LEAD_POLARITY_TYPE: NORMAL
MDC_IDC_LEAD_SERIAL: NORMAL
MDC_IDC_LEAD_SERIAL: NORMAL
MDC_IDC_MSMT_BATTERY_DTM: NORMAL
MDC_IDC_MSMT_BATTERY_IMPEDANCE: 1666 OHM
MDC_IDC_MSMT_BATTERY_REMAINING_LONGEVITY: 41 MO
MDC_IDC_MSMT_BATTERY_STATUS: NORMAL
MDC_IDC_MSMT_BATTERY_VOLTAGE: 2.76 V
MDC_IDC_MSMT_LEADCHNL_RA_IMPEDANCE_VALUE: 447 OHM
MDC_IDC_MSMT_LEADCHNL_RA_PACING_THRESHOLD_AMPLITUDE: 0.88 V
MDC_IDC_MSMT_LEADCHNL_RA_PACING_THRESHOLD_PULSEWIDTH: 0.4 MS
MDC_IDC_MSMT_LEADCHNL_RV_IMPEDANCE_VALUE: 454 OHM
MDC_IDC_MSMT_LEADCHNL_RV_PACING_THRESHOLD_AMPLITUDE: 0.5 V
MDC_IDC_MSMT_LEADCHNL_RV_PACING_THRESHOLD_PULSEWIDTH: 0.4 MS
MDC_IDC_PG_IMPLANT_DTM: NORMAL
MDC_IDC_PG_MFG: NORMAL
MDC_IDC_PG_MODEL: NORMAL
MDC_IDC_PG_SERIAL: NORMAL
MDC_IDC_PG_TYPE: NORMAL
MDC_IDC_SESS_CLINIC_NAME: NORMAL
MDC_IDC_SESS_DTM: NORMAL
MDC_IDC_SESS_TYPE: NORMAL
MDC_IDC_SET_BRADY_AT_MODE_SWITCH_MODE: NORMAL
MDC_IDC_SET_BRADY_AT_MODE_SWITCH_RATE: 175 {BEATS}/MIN
MDC_IDC_SET_BRADY_LOWRATE: 60 {BEATS}/MIN
MDC_IDC_SET_BRADY_MAX_SENSOR_RATE: 120 {BEATS}/MIN
MDC_IDC_SET_BRADY_MAX_TRACKING_RATE: 120 {BEATS}/MIN
MDC_IDC_SET_BRADY_MODE: NORMAL
MDC_IDC_SET_BRADY_PAV_DELAY_LOW: 350 MS
MDC_IDC_SET_BRADY_SAV_DELAY_LOW: 350 MS
MDC_IDC_SET_LEADCHNL_RA_PACING_AMPLITUDE: 1.5 V
MDC_IDC_SET_LEADCHNL_RA_PACING_CAPTURE_MODE: NORMAL
MDC_IDC_SET_LEADCHNL_RA_PACING_POLARITY: NORMAL
MDC_IDC_SET_LEADCHNL_RA_PACING_PULSEWIDTH: 0.4 MS
MDC_IDC_SET_LEADCHNL_RA_SENSING_POLARITY: NORMAL
MDC_IDC_SET_LEADCHNL_RA_SENSING_SENSITIVITY: 0.25 MV
MDC_IDC_SET_LEADCHNL_RV_PACING_AMPLITUDE: 2 V
MDC_IDC_SET_LEADCHNL_RV_PACING_CAPTURE_MODE: NORMAL
MDC_IDC_SET_LEADCHNL_RV_PACING_POLARITY: NORMAL
MDC_IDC_SET_LEADCHNL_RV_PACING_PULSEWIDTH: 0.4 MS
MDC_IDC_SET_LEADCHNL_RV_SENSING_POLARITY: NORMAL
MDC_IDC_SET_LEADCHNL_RV_SENSING_SENSITIVITY: 1.4 MV
MDC_IDC_SET_ZONE_DETECTION_INTERVAL: 342.86 MS
MDC_IDC_SET_ZONE_DETECTION_INTERVAL: 342.86 MS
MDC_IDC_SET_ZONE_TYPE: NORMAL
MDC_IDC_SET_ZONE_TYPE: NORMAL
MDC_IDC_STAT_AT_BURDEN_PERCENT: 0 %
MDC_IDC_STAT_AT_DTM_END: NORMAL
MDC_IDC_STAT_AT_DTM_START: NORMAL
MDC_IDC_STAT_AT_MODE_SW_COUNT: 20
MDC_IDC_STAT_BRADY_AP_VP_PERCENT: 0 %
MDC_IDC_STAT_BRADY_AP_VS_PERCENT: 93 %
MDC_IDC_STAT_BRADY_AS_VP_PERCENT: 0 %
MDC_IDC_STAT_BRADY_AS_VS_PERCENT: 7 %
MDC_IDC_STAT_BRADY_DTM_END: NORMAL
MDC_IDC_STAT_BRADY_DTM_START: NORMAL
MDC_IDC_STAT_EPISODE_RECENT_COUNT: 0
MDC_IDC_STAT_EPISODE_RECENT_COUNT: 7
MDC_IDC_STAT_EPISODE_RECENT_COUNT_DTM_END: NORMAL
MDC_IDC_STAT_EPISODE_RECENT_COUNT_DTM_END: NORMAL
MDC_IDC_STAT_EPISODE_RECENT_COUNT_DTM_START: NORMAL
MDC_IDC_STAT_EPISODE_RECENT_COUNT_DTM_START: NORMAL
MDC_IDC_STAT_EPISODE_TYPE: NORMAL
MDC_IDC_STAT_EPISODE_TYPE: NORMAL

## 2021-01-01 ENCOUNTER — ANCILLARY PROCEDURE (OUTPATIENT)
Dept: CARDIOLOGY | Facility: CLINIC | Age: 86
End: 2021-01-01
Attending: INTERNAL MEDICINE
Payer: MEDICARE

## 2021-01-01 DIAGNOSIS — Z95.0 PACEMAKER: ICD-10-CM

## 2021-01-01 DIAGNOSIS — I50.32 CHRONIC DIASTOLIC CONGESTIVE HEART FAILURE (H): ICD-10-CM

## 2021-01-01 LAB
MDC_IDC_LEAD_IMPLANT_DT: NORMAL
MDC_IDC_LEAD_IMPLANT_DT: NORMAL
MDC_IDC_LEAD_LOCATION: NORMAL
MDC_IDC_LEAD_LOCATION: NORMAL
MDC_IDC_LEAD_MFG: NORMAL
MDC_IDC_LEAD_MFG: NORMAL
MDC_IDC_LEAD_MODEL: NORMAL
MDC_IDC_LEAD_MODEL: NORMAL
MDC_IDC_LEAD_POLARITY_TYPE: NORMAL
MDC_IDC_LEAD_POLARITY_TYPE: NORMAL
MDC_IDC_LEAD_SERIAL: NORMAL
MDC_IDC_LEAD_SERIAL: NORMAL
MDC_IDC_MSMT_BATTERY_DTM: NORMAL
MDC_IDC_MSMT_BATTERY_IMPEDANCE: 1915 OHM
MDC_IDC_MSMT_BATTERY_REMAINING_LONGEVITY: 35 MO
MDC_IDC_MSMT_BATTERY_STATUS: NORMAL
MDC_IDC_MSMT_BATTERY_VOLTAGE: 2.75 V
MDC_IDC_MSMT_LEADCHNL_RA_IMPEDANCE_VALUE: 426 OHM
MDC_IDC_MSMT_LEADCHNL_RA_PACING_THRESHOLD_AMPLITUDE: 0.88 V
MDC_IDC_MSMT_LEADCHNL_RA_PACING_THRESHOLD_PULSEWIDTH: 0.4 MS
MDC_IDC_MSMT_LEADCHNL_RV_IMPEDANCE_VALUE: 423 OHM
MDC_IDC_MSMT_LEADCHNL_RV_PACING_THRESHOLD_AMPLITUDE: 0.5 V
MDC_IDC_MSMT_LEADCHNL_RV_PACING_THRESHOLD_PULSEWIDTH: 0.4 MS
MDC_IDC_PG_IMPLANT_DTM: NORMAL
MDC_IDC_PG_MFG: NORMAL
MDC_IDC_PG_MODEL: NORMAL
MDC_IDC_PG_SERIAL: NORMAL
MDC_IDC_PG_TYPE: NORMAL
MDC_IDC_SESS_CLINIC_NAME: NORMAL
MDC_IDC_SESS_DTM: NORMAL
MDC_IDC_SESS_TYPE: NORMAL
MDC_IDC_SET_BRADY_AT_MODE_SWITCH_MODE: NORMAL
MDC_IDC_SET_BRADY_AT_MODE_SWITCH_RATE: 175 {BEATS}/MIN
MDC_IDC_SET_BRADY_LOWRATE: 60 {BEATS}/MIN
MDC_IDC_SET_BRADY_MAX_SENSOR_RATE: 120 {BEATS}/MIN
MDC_IDC_SET_BRADY_MAX_TRACKING_RATE: 120 {BEATS}/MIN
MDC_IDC_SET_BRADY_MODE: NORMAL
MDC_IDC_SET_BRADY_PAV_DELAY_LOW: 350 MS
MDC_IDC_SET_BRADY_SAV_DELAY_LOW: 350 MS
MDC_IDC_SET_LEADCHNL_RA_PACING_AMPLITUDE: 1.5 V
MDC_IDC_SET_LEADCHNL_RA_PACING_CAPTURE_MODE: NORMAL
MDC_IDC_SET_LEADCHNL_RA_PACING_POLARITY: NORMAL
MDC_IDC_SET_LEADCHNL_RA_PACING_PULSEWIDTH: 0.4 MS
MDC_IDC_SET_LEADCHNL_RA_SENSING_POLARITY: NORMAL
MDC_IDC_SET_LEADCHNL_RA_SENSING_SENSITIVITY: 0.18 MV
MDC_IDC_SET_LEADCHNL_RV_PACING_AMPLITUDE: 2 V
MDC_IDC_SET_LEADCHNL_RV_PACING_CAPTURE_MODE: NORMAL
MDC_IDC_SET_LEADCHNL_RV_PACING_POLARITY: NORMAL
MDC_IDC_SET_LEADCHNL_RV_PACING_PULSEWIDTH: 0.4 MS
MDC_IDC_SET_LEADCHNL_RV_SENSING_POLARITY: NORMAL
MDC_IDC_SET_LEADCHNL_RV_SENSING_SENSITIVITY: 1.4 MV
MDC_IDC_SET_ZONE_DETECTION_INTERVAL: 342.86 MS
MDC_IDC_SET_ZONE_DETECTION_INTERVAL: 342.86 MS
MDC_IDC_SET_ZONE_TYPE: NORMAL
MDC_IDC_SET_ZONE_TYPE: NORMAL
MDC_IDC_STAT_AT_BURDEN_PERCENT: 97.7 %
MDC_IDC_STAT_AT_DTM_END: NORMAL
MDC_IDC_STAT_AT_DTM_START: NORMAL
MDC_IDC_STAT_AT_MODE_SW_COUNT: 2386
MDC_IDC_STAT_BRADY_AP_VP_PERCENT: 11 %
MDC_IDC_STAT_BRADY_AP_VS_PERCENT: 8 %
MDC_IDC_STAT_BRADY_AS_VP_PERCENT: 7 %
MDC_IDC_STAT_BRADY_AS_VS_PERCENT: 74 %
MDC_IDC_STAT_BRADY_DTM_END: NORMAL
MDC_IDC_STAT_BRADY_DTM_START: NORMAL
MDC_IDC_STAT_EPISODE_RECENT_COUNT: 0
MDC_IDC_STAT_EPISODE_RECENT_COUNT: 1571
MDC_IDC_STAT_EPISODE_RECENT_COUNT_DTM_END: NORMAL
MDC_IDC_STAT_EPISODE_RECENT_COUNT_DTM_END: NORMAL
MDC_IDC_STAT_EPISODE_RECENT_COUNT_DTM_START: NORMAL
MDC_IDC_STAT_EPISODE_RECENT_COUNT_DTM_START: NORMAL
MDC_IDC_STAT_EPISODE_TYPE: NORMAL
MDC_IDC_STAT_EPISODE_TYPE: NORMAL

## 2021-01-01 PROCEDURE — 93294 REM INTERROG EVL PM/LDLS PM: CPT | Performed by: INTERNAL MEDICINE

## 2021-01-01 PROCEDURE — 93296 REM INTERROG EVL PM/IDS: CPT | Performed by: INTERNAL MEDICINE

## 2021-01-01 RX ORDER — FUROSEMIDE 20 MG
TABLET ORAL
Qty: 180 TABLET | Refills: 1 | Status: SHIPPED | OUTPATIENT
Start: 2021-01-01 | End: 2022-01-01

## 2021-02-18 DIAGNOSIS — E03.9 HYPOTHYROIDISM, UNSPECIFIED TYPE: ICD-10-CM

## 2021-02-19 NOTE — TELEPHONE ENCOUNTER
Levothyroxine 100 mcg tablet    Summary: Take 1 tablet (100 mcg) by mouth daily for hypothyroidism, Disp-90 tablet,R-1, E-Prescribe   Dose, Route, Frequency: 100 mcg, Oral, DAILY  Start: 6/25/2020  Ord/Sold: 6/25/2020

## 2021-02-22 RX ORDER — LEVOTHYROXINE SODIUM 100 UG/1
100 TABLET ORAL DAILY
Qty: 30 TABLET | Refills: 0 | Status: SHIPPED | OUTPATIENT
Start: 2021-02-22 | End: 2021-03-24

## 2021-03-03 DIAGNOSIS — I48.0 PAROXYSMAL ATRIAL FIBRILLATION (H): ICD-10-CM

## 2021-03-04 DIAGNOSIS — E11.9 TYPE 2 DIABETES MELLITUS WITHOUT COMPLICATION, WITHOUT LONG-TERM CURRENT USE OF INSULIN (H): ICD-10-CM

## 2021-03-04 NOTE — TELEPHONE ENCOUNTER
Pending Prescriptions:                       Disp   Refills    metFORMIN (GLUCOPHAGE) 500 MG tablet [Pha*180 ta*1            Sig: TAKE 1 TABLET (500 MG) BY MOUTH 2 TIMES DAILY           (WITH MEALS) FOR DIABETES    Last Written Prescription Date:  9/9/20  Last Fill Quantity: 180,  # refills: 1   Last office visit: 6/10/2019 with prescribing provider:     Future Office Visit:

## 2021-03-05 NOTE — TELEPHONE ENCOUNTER
Routing refill request to provider for review/approval because:  Labs not current:  A1c, GFR, creatinine    TCs: Please call pt to help schedule appt    Thank you,  Harrison HARVEY RN

## 2021-03-10 DIAGNOSIS — E11.9 TYPE 2 DIABETES MELLITUS WITHOUT COMPLICATION, WITHOUT LONG-TERM CURRENT USE OF INSULIN (H): ICD-10-CM

## 2021-03-10 DIAGNOSIS — I50.32 CHRONIC DIASTOLIC CONGESTIVE HEART FAILURE (H): ICD-10-CM

## 2021-03-11 RX ORDER — FUROSEMIDE 20 MG
20 TABLET ORAL 2 TIMES DAILY
Qty: 60 TABLET | Refills: 0 | Status: SHIPPED | OUTPATIENT
Start: 2021-03-11 | End: 2021-04-05

## 2021-03-11 RX ORDER — GLIMEPIRIDE 2 MG/1
2 TABLET ORAL 2 TIMES DAILY
Qty: 60 TABLET | Refills: 0 | Status: SHIPPED | OUTPATIENT
Start: 2021-03-11 | End: 2021-04-05

## 2021-03-11 NOTE — TELEPHONE ENCOUNTER
Medication is being filled for 1 time refill only due to:  overdue for appt and labs.  Pt was notified with last Rx refill x 90 days ago.  No appt marge yet.  pt notified again.

## 2021-03-17 DIAGNOSIS — I50.32 CHRONIC DIASTOLIC CONGESTIVE HEART FAILURE (H): ICD-10-CM

## 2021-03-17 DIAGNOSIS — I48.91 ATRIAL FIBRILLATION (H): ICD-10-CM

## 2021-03-17 LAB
ANION GAP SERPL CALCULATED.3IONS-SCNC: 4 MMOL/L (ref 3–14)
BUN SERPL-MCNC: 18 MG/DL (ref 7–30)
CALCIUM SERPL-MCNC: 9.6 MG/DL (ref 8.5–10.1)
CHLORIDE SERPL-SCNC: 104 MMOL/L (ref 94–109)
CO2 SERPL-SCNC: 28 MMOL/L (ref 20–32)
CREAT SERPL-MCNC: 0.95 MG/DL (ref 0.52–1.04)
GFR SERPL CREATININE-BSD FRML MDRD: 53 ML/MIN/{1.73_M2}
GLUCOSE SERPL-MCNC: 175 MG/DL (ref 70–99)
HGB BLD-MCNC: 13.5 G/DL (ref 11.7–15.7)
INR PPP: 1.21 (ref 0.86–1.14)
POTASSIUM SERPL-SCNC: 4.8 MMOL/L (ref 3.4–5.3)
SODIUM SERPL-SCNC: 136 MMOL/L (ref 133–144)

## 2021-03-17 PROCEDURE — 36415 COLL VENOUS BLD VENIPUNCTURE: CPT | Performed by: INTERNAL MEDICINE

## 2021-03-17 PROCEDURE — 85018 HEMOGLOBIN: CPT | Performed by: INTERNAL MEDICINE

## 2021-03-17 PROCEDURE — 85610 PROTHROMBIN TIME: CPT | Performed by: INTERNAL MEDICINE

## 2021-03-17 PROCEDURE — 80048 BASIC METABOLIC PNL TOTAL CA: CPT | Performed by: INTERNAL MEDICINE

## 2021-03-22 ENCOUNTER — ANCILLARY PROCEDURE (OUTPATIENT)
Dept: CARDIOLOGY | Facility: CLINIC | Age: 86
End: 2021-03-22
Attending: INTERNAL MEDICINE
Payer: MEDICARE

## 2021-03-22 DIAGNOSIS — Z95.0 CARDIAC PACEMAKER IN SITU: ICD-10-CM

## 2021-03-22 PROCEDURE — 93294 REM INTERROG EVL PM/LDLS PM: CPT | Performed by: INTERNAL MEDICINE

## 2021-03-22 PROCEDURE — 93296 REM INTERROG EVL PM/IDS: CPT | Performed by: INTERNAL MEDICINE

## 2021-03-24 DIAGNOSIS — E03.9 HYPOTHYROIDISM, UNSPECIFIED TYPE: ICD-10-CM

## 2021-03-24 RX ORDER — LEVOTHYROXINE SODIUM 100 UG/1
100 TABLET ORAL DAILY
Qty: 90 TABLET | Refills: 0 | Status: SHIPPED | OUTPATIENT
Start: 2021-03-24 | End: 2021-05-07

## 2021-03-24 NOTE — TELEPHONE ENCOUNTER
Spoke with Sandhya     Had TSH done in July with [facility]     Prescription approved per [facility] Refill Protocol.    Advised she is due for diabetes follow up - will call back to schedule     Odilia ABEL RN

## 2021-03-24 NOTE — TELEPHONE ENCOUNTER
Reason for Call:  Medication or medication refill:    Do you use a [facility]?  Name of the pharmacy and phone number for the current request:  [facility] [id number] [place] [address]    Name of the medication requested: levothyroxine (SYNTHROID/LEVOTHROID) 100 MCG tablet     Other request: Pt is currently out    Can we leave a detailed message on this number? YES    Phone number patient' daughter Sandhya can be reached at: [phone]  *C2C verified    Best Time: any    Call taken on 3/24/2021 at 3:35 PM by Nuha Randolph

## 2021-04-13 LAB
MDC_IDC_LEAD_IMPLANT_DT: NORMAL
MDC_IDC_LEAD_IMPLANT_DT: NORMAL
MDC_IDC_LEAD_LOCATION: NORMAL
MDC_IDC_LEAD_LOCATION: NORMAL
MDC_IDC_LEAD_MFG: NORMAL
MDC_IDC_LEAD_MFG: NORMAL
MDC_IDC_LEAD_MODEL: NORMAL
MDC_IDC_LEAD_MODEL: NORMAL
MDC_IDC_LEAD_POLARITY_TYPE: NORMAL
MDC_IDC_LEAD_POLARITY_TYPE: NORMAL
MDC_IDC_LEAD_SERIAL: NORMAL
MDC_IDC_LEAD_SERIAL: NORMAL
MDC_IDC_MSMT_BATTERY_DTM: NORMAL
MDC_IDC_MSMT_BATTERY_IMPEDANCE: 1786 OHM
MDC_IDC_MSMT_BATTERY_REMAINING_LONGEVITY: 38 MO
MDC_IDC_MSMT_BATTERY_STATUS: NORMAL
MDC_IDC_MSMT_BATTERY_VOLTAGE: 2.76 V
MDC_IDC_MSMT_LEADCHNL_RA_IMPEDANCE_VALUE: 447 OHM
MDC_IDC_MSMT_LEADCHNL_RA_PACING_THRESHOLD_AMPLITUDE: 0.75 V
MDC_IDC_MSMT_LEADCHNL_RA_PACING_THRESHOLD_PULSEWIDTH: 0.4 MS
MDC_IDC_MSMT_LEADCHNL_RV_IMPEDANCE_VALUE: 452 OHM
MDC_IDC_MSMT_LEADCHNL_RV_PACING_THRESHOLD_AMPLITUDE: 0.5 V
MDC_IDC_MSMT_LEADCHNL_RV_PACING_THRESHOLD_PULSEWIDTH: 0.4 MS
MDC_IDC_PG_IMPLANT_DTM: NORMAL
MDC_IDC_PG_MFG: NORMAL
MDC_IDC_PG_MODEL: NORMAL
MDC_IDC_PG_SERIAL: NORMAL
MDC_IDC_PG_TYPE: NORMAL
MDC_IDC_SESS_CLINIC_NAME: NORMAL
MDC_IDC_SESS_DTM: NORMAL
MDC_IDC_SESS_TYPE: NORMAL
MDC_IDC_SET_BRADY_AT_MODE_SWITCH_MODE: NORMAL
MDC_IDC_SET_BRADY_AT_MODE_SWITCH_RATE: 175 {BEATS}/MIN
MDC_IDC_SET_BRADY_LOWRATE: 60 {BEATS}/MIN
MDC_IDC_SET_BRADY_MAX_SENSOR_RATE: 120 {BEATS}/MIN
MDC_IDC_SET_BRADY_MAX_TRACKING_RATE: 120 {BEATS}/MIN
MDC_IDC_SET_BRADY_MODE: NORMAL
MDC_IDC_SET_BRADY_PAV_DELAY_LOW: 350 MS
MDC_IDC_SET_BRADY_SAV_DELAY_LOW: 350 MS
MDC_IDC_SET_LEADCHNL_RA_PACING_AMPLITUDE: 1.5 V
MDC_IDC_SET_LEADCHNL_RA_PACING_CAPTURE_MODE: NORMAL
MDC_IDC_SET_LEADCHNL_RA_PACING_POLARITY: NORMAL
MDC_IDC_SET_LEADCHNL_RA_PACING_PULSEWIDTH: 0.4 MS
MDC_IDC_SET_LEADCHNL_RA_SENSING_POLARITY: NORMAL
MDC_IDC_SET_LEADCHNL_RA_SENSING_SENSITIVITY: 0.18 MV
MDC_IDC_SET_LEADCHNL_RV_PACING_AMPLITUDE: 2 V
MDC_IDC_SET_LEADCHNL_RV_PACING_CAPTURE_MODE: NORMAL
MDC_IDC_SET_LEADCHNL_RV_PACING_POLARITY: NORMAL
MDC_IDC_SET_LEADCHNL_RV_PACING_PULSEWIDTH: 0.4 MS
MDC_IDC_SET_LEADCHNL_RV_SENSING_POLARITY: NORMAL
MDC_IDC_SET_LEADCHNL_RV_SENSING_SENSITIVITY: 1.4 MV
MDC_IDC_SET_ZONE_DETECTION_INTERVAL: 342.86 MS
MDC_IDC_SET_ZONE_DETECTION_INTERVAL: 342.86 MS
MDC_IDC_SET_ZONE_TYPE: NORMAL
MDC_IDC_SET_ZONE_TYPE: NORMAL
MDC_IDC_STAT_AT_BURDEN_PERCENT: 0.1 %
MDC_IDC_STAT_AT_DTM_END: NORMAL
MDC_IDC_STAT_AT_DTM_START: NORMAL
MDC_IDC_STAT_AT_MODE_SW_COUNT: 46
MDC_IDC_STAT_BRADY_AP_VP_PERCENT: 0 %
MDC_IDC_STAT_BRADY_AP_VS_PERCENT: 91 %
MDC_IDC_STAT_BRADY_AS_VP_PERCENT: 0 %
MDC_IDC_STAT_BRADY_AS_VS_PERCENT: 9 %
MDC_IDC_STAT_BRADY_DTM_END: NORMAL
MDC_IDC_STAT_BRADY_DTM_START: NORMAL
MDC_IDC_STAT_EPISODE_RECENT_COUNT: 0
MDC_IDC_STAT_EPISODE_RECENT_COUNT: 25
MDC_IDC_STAT_EPISODE_RECENT_COUNT_DTM_END: NORMAL
MDC_IDC_STAT_EPISODE_RECENT_COUNT_DTM_END: NORMAL
MDC_IDC_STAT_EPISODE_RECENT_COUNT_DTM_START: NORMAL
MDC_IDC_STAT_EPISODE_RECENT_COUNT_DTM_START: NORMAL
MDC_IDC_STAT_EPISODE_TYPE: NORMAL
MDC_IDC_STAT_EPISODE_TYPE: NORMAL

## 2021-05-07 ENCOUNTER — OFFICE VISIT (OUTPATIENT)
Dept: FAMILY MEDICINE | Facility: CLINIC | Age: 86
End: 2021-05-07
Payer: MEDICARE

## 2021-05-07 VITALS
SYSTOLIC BLOOD PRESSURE: 123 MMHG | OXYGEN SATURATION: 98 % | BODY MASS INDEX: 28 KG/M2 | HEIGHT: 63 IN | TEMPERATURE: 97 F | WEIGHT: 158 LBS | HEART RATE: 85 BPM | DIASTOLIC BLOOD PRESSURE: 79 MMHG

## 2021-05-07 DIAGNOSIS — E03.9 HYPOTHYROIDISM, UNSPECIFIED TYPE: ICD-10-CM

## 2021-05-07 DIAGNOSIS — I48.0 PAROXYSMAL ATRIAL FIBRILLATION (H): ICD-10-CM

## 2021-05-07 DIAGNOSIS — E11.9 TYPE 2 DIABETES MELLITUS WITHOUT COMPLICATION, WITHOUT LONG-TERM CURRENT USE OF INSULIN (H): Primary | ICD-10-CM

## 2021-05-07 DIAGNOSIS — N18.31 STAGE 3A CHRONIC KIDNEY DISEASE (H): ICD-10-CM

## 2021-05-07 DIAGNOSIS — I50.32 CHRONIC DIASTOLIC CONGESTIVE HEART FAILURE (H): ICD-10-CM

## 2021-05-07 DIAGNOSIS — H61.23 BILATERAL IMPACTED CERUMEN: ICD-10-CM

## 2021-05-07 LAB
ANION GAP SERPL CALCULATED.3IONS-SCNC: 2 MMOL/L (ref 3–14)
BUN SERPL-MCNC: 22 MG/DL (ref 7–30)
CALCIUM SERPL-MCNC: 9.3 MG/DL (ref 8.5–10.1)
CHLORIDE SERPL-SCNC: 102 MMOL/L (ref 94–109)
CHOLEST SERPL-MCNC: 183 MG/DL
CO2 SERPL-SCNC: 31 MMOL/L (ref 20–32)
CREAT SERPL-MCNC: 1.12 MG/DL (ref 0.52–1.04)
ERYTHROCYTE [DISTWIDTH] IN BLOOD BY AUTOMATED COUNT: 14.2 % (ref 10–15)
GFR SERPL CREATININE-BSD FRML MDRD: 44 ML/MIN/{1.73_M2}
GLUCOSE SERPL-MCNC: 216 MG/DL (ref 70–99)
HBA1C MFR BLD: 8.1 % (ref 0–5.6)
HCT VFR BLD AUTO: 42.9 % (ref 35–47)
HDLC SERPL-MCNC: 50 MG/DL
HGB BLD-MCNC: 14.2 G/DL (ref 11.7–15.7)
LDLC SERPL CALC-MCNC: 92 MG/DL
MCH RBC QN AUTO: 32.4 PG (ref 26.5–33)
MCHC RBC AUTO-ENTMCNC: 33.1 G/DL (ref 31.5–36.5)
MCV RBC AUTO: 98 FL (ref 78–100)
NONHDLC SERPL-MCNC: 133 MG/DL
PLATELET # BLD AUTO: 291 10E9/L (ref 150–450)
POTASSIUM SERPL-SCNC: 4.7 MMOL/L (ref 3.4–5.3)
RBC # BLD AUTO: 4.38 10E12/L (ref 3.8–5.2)
SODIUM SERPL-SCNC: 135 MMOL/L (ref 133–144)
TRIGL SERPL-MCNC: 204 MG/DL
TSH SERPL DL<=0.005 MIU/L-ACNC: 1.23 MU/L (ref 0.4–4)
WBC # BLD AUTO: 8.1 10E9/L (ref 4–11)

## 2021-05-07 PROCEDURE — 82043 UR ALBUMIN QUANTITATIVE: CPT | Performed by: INTERNAL MEDICINE

## 2021-05-07 PROCEDURE — 83036 HEMOGLOBIN GLYCOSYLATED A1C: CPT | Performed by: INTERNAL MEDICINE

## 2021-05-07 PROCEDURE — 80061 LIPID PANEL: CPT | Performed by: INTERNAL MEDICINE

## 2021-05-07 PROCEDURE — 80048 BASIC METABOLIC PNL TOTAL CA: CPT | Performed by: INTERNAL MEDICINE

## 2021-05-07 PROCEDURE — 84443 ASSAY THYROID STIM HORMONE: CPT | Performed by: INTERNAL MEDICINE

## 2021-05-07 PROCEDURE — 85027 COMPLETE CBC AUTOMATED: CPT | Performed by: INTERNAL MEDICINE

## 2021-05-07 PROCEDURE — 99215 OFFICE O/P EST HI 40 MIN: CPT | Mod: 25 | Performed by: INTERNAL MEDICINE

## 2021-05-07 PROCEDURE — 36415 COLL VENOUS BLD VENIPUNCTURE: CPT | Performed by: INTERNAL MEDICINE

## 2021-05-07 PROCEDURE — 69209 REMOVE IMPACTED EAR WAX UNI: CPT | Performed by: INTERNAL MEDICINE

## 2021-05-07 RX ORDER — GLIMEPIRIDE 2 MG/1
2 TABLET ORAL 2 TIMES DAILY
Qty: 180 TABLET | Refills: 1 | Status: SHIPPED | OUTPATIENT
Start: 2021-05-07 | End: 2021-05-27

## 2021-05-07 RX ORDER — LEVOTHYROXINE SODIUM 100 UG/1
100 TABLET ORAL DAILY
Qty: 90 TABLET | Refills: 1 | Status: SHIPPED | OUTPATIENT
Start: 2021-05-07 | End: 2021-01-01

## 2021-05-07 RX ORDER — METOPROLOL SUCCINATE 100 MG/1
100 TABLET, EXTENDED RELEASE ORAL 2 TIMES DAILY
Qty: 180 TABLET | Refills: 3 | Status: SHIPPED | OUTPATIENT
Start: 2021-05-07 | End: 2022-01-01

## 2021-05-07 ASSESSMENT — MIFFLIN-ST. JEOR: SCORE: 1107.87

## 2021-05-07 NOTE — NURSING NOTE
Patient identified using two patient identifiers.  Ear exam showing wax occlusion completed by provider.  Solution: warm water was placed in the bilateral ear(s) via irrigation tool: elephant ear.    Radha Baeza MA

## 2021-05-07 NOTE — LETTER
May 10, 2021      Shelly ANN Ken  [address]   [facility] [address]        Dear ,    We are writing to inform you of your test results.    HbA1c which is average glucose during last 3 months is 8.1%.   Your diabetes is not under control.   If you would like to see a diabetic educator regarding this then please let us know so we can put a referral for you to see a dietitian.   Your total cholesterol is normal.   HDL which is called good cholesterol is normal.   Your LDL cholesterol is normal.  This is often call bad cholesterol and high levels increase the risk for heart attacks and strokes.   The triglycerides are high. Lowering  the amount of sugar ,alcohol and sweets in the diet helps to control this.Exercise and weight loss helps.   Urine for microalbumin is positive .   TSH which is thyroid hormone is normal.   Chronic kidney disease is slightly worse   CBC result which includes white count Hemoglobin and  Platelet Counts is normal.     Resulted Orders   Hemoglobin A1c   Result Value Ref Range    Hemoglobin A1C 8.1 (H) 0 - 5.6 %      Comment:      Reviewed: OK with previous  Normal <5.7% Prediabetes 5.7-6.4%  Diabetes 6.5% or higher - adopted from ADA   consensus guidelines.     CBC with platelets   Result Value Ref Range    WBC 8.1 4.0 - 11.0 10e9/L    RBC Count 4.38 3.8 - 5.2 10e12/L    Hemoglobin 14.2 11.7 - 15.7 g/dL    Hematocrit 42.9 35.0 - 47.0 %    MCV 98 78 - 100 fl    MCH 32.4 26.5 - 33.0 pg    MCHC 33.1 31.5 - 36.5 g/dL    RDW 14.2 10.0 - 15.0 %    Platelet Count 291 150 - 450 10e9/L   Basic metabolic panel   Result Value Ref Range    Sodium 135 133 - 144 mmol/L    Potassium 4.7 3.4 - 5.3 mmol/L    Chloride 102 94 - 109 mmol/L    Carbon Dioxide 31 20 - 32 mmol/L    Anion Gap 2 (L) 3 - 14 mmol/L    Glucose 216 (H) 70 - 99 mg/dL      Comment:      Fasting specimen    Urea Nitrogen 22 7 - 30 mg/dL    Creatinine 1.12 (H) 0.52 - 1.04 mg/dL    GFR Estimate 44 (L) >60  mL/min/[1.73_m2]      Comment:      Non  GFR Calc  Starting 12/18/2018, serum creatinine based estimated GFR (eGFR) will be   calculated using the Chronic Kidney Disease Epidemiology Collaboration   (CKD-EPI) equation.      GFR Estimate If Black 51 (L) >60 mL/min/[1.73_m2]      Comment:       GFR Calc  Starting 12/18/2018, serum creatinine based estimated GFR (eGFR) will be   calculated using the Chronic Kidney Disease Epidemiology Collaboration   (CKD-EPI) equation.      Calcium 9.3 8.5 - 10.1 mg/dL   Lipid panel reflex to direct LDL Non-fasting   Result Value Ref Range    Cholesterol 183 <200 mg/dL    Triglycerides 204 (H) <150 mg/dL      Comment:      Borderline high:  150-199 mg/dl  High:             200-499 mg/dl  Very high:       >499 mg/dl  Fasting specimen      HDL Cholesterol 50 >49 mg/dL    LDL Cholesterol Calculated 92 <100 mg/dL      Comment:      Desirable:       <100 mg/dl    Non HDL Cholesterol 133 (H) <130 mg/dL      Comment:      Above Desirable:  130-159 mg/dl  Borderline high:  160-189 mg/dl  High:             190-219 mg/dl  Very high:       >219 mg/dl     TSH with free T4 reflex   Result Value Ref Range    TSH 1.23 0.40 - 4.00 mU/L   Albumin Random Urine Quantitative with Creat Ratio   Result Value Ref Range    Creatinine Urine 132 mg/dL    Albumin Urine mg/L 86 mg/L    Albumin Urine mg/g Cr 64.77 (H) 0 - 25 mg/g Cr       If you have any questions or concerns, please call the clinic at the number listed above.       Sincerely,      Halley Huff MD

## 2021-05-07 NOTE — PROGRESS NOTES
Assessment & Plan     Shelly was seen today for diabetes.    Diagnoses and all orders for this visit:    Patient came with her daughter    Type 2 diabetes mellitus without complication, without long-term current use of insulin (H)  -     Hemoglobin A1c  -     Basic metabolic panel  -     Lipid panel reflex to direct LDL Non-fasting  -     Albumin Random Urine Quantitative with Creat Ratio  -     glimepiride (AMARYL) 2 MG tablet; Take 1 tablet (2 mg) by mouth 2 times daily for diabetes  -     metFORMIN (GLUCOPHAGE) 500 MG tablet; Take 1 tablet (500 mg) by mouth 2 times daily (with meals) for diabetes  Patient has not been monitoring her blood sugar  [facility] lives in [facility] living  Mostly she is self-sufficient  Discussed the importance of checking blood sugar at least once or twice a week  Some days check fasting blood sugar some days 2 hours after you eat  Denied any hypoglycemic symptoms  Discussed hypoglycemia and its management    CKD (chronic kidney disease) stage 3, GFR 30-59 ml/min  -     CBC with platelets  Stable    Hypothyroidism, unspecified type  -     TSH with free T4 reflex  -     levothyroxine (SYNTHROID/LEVOTHROID) 100 MCG tablet; Take 1 tablet (100 mcg) by mouth daily for thyroid    Chronic diastolic congestive heart failure (H)  -     metoprolol succinate ER (TOPROL-XL) 100 MG 24 hr tablet; Take 1 tablet (100 mg) by mouth 2 times daily for Blood Pressure and heart  Currently well compensated  She has not followed her cardiologist due to Covid  She has upcoming appointment per patient report    Paroxysmal atrial fibrillation (H)  -     apixaban ANTICOAGULANT (ELIQUIS ANTICOAGULANT) 5 MG tablet; Take 1 tablet (5 mg) by mouth 2 times daily is blood thinner  Heart rate is under control  She is fully anticoagulated  Discussed again to avoid any nonsteroidal anti-inflammatory pain medication as it increases the risk of bleeding  Take Tylenol for pain    She has bilateral cerumen impaction  Ear  "wash was done by nursing staff    [id number]}     BMI:   Estimated body mass index is 28.44 kg/m  as calculated from the following:    Height as of this encounter: 1.588 m (5' 2.5\").    Weight as of this encounter: 71.7 kg (158 lb).       See Patient Instructions  Patient Instructions     Labs today  Avoid OTC NSAID like ibuprofen, motrin and aleve as you are on blood thinner eliquis(Apixaben)  Monitor your blood pressure once a week  at home.  Bring those readings on your next visit.  Notify us if your blood pressure readings consistently stays greater than 140/90.  Monitor your blood sugars at home  Hold glimepiride when NPO to avoid hypoglycemia  Patient Education     Low Blood Sugar (Hypoglycemia)     Fast-acting sugar can be a glass of nonfat milk.        Having too little sugar (glucose) in your blood is called low blood sugar (hypoglycemia). Low blood sugar often means anything lower than 70 mg/dL. Talk with your healthcare provider about your target range. Ask what level is too low for you. Diabetes itself doesn t cause low blood sugar. But some treatments for diabetes may raise your risk for it. These include pills or insulin. Low blood sugar may make you pass out or have a seizure. So always treat low blood sugar right away. But don't overeat.  Special note: Always carry a source of fast-acting sugar and a snack in case of hypoglycemia.  What you may notice  If you have low blood sugar, you may have one or more of these symptoms:    Shakiness or dizziness    Cold, clammy skin or sweating    Feeling hungry    Headache    Nervousness    A hard, fast heartbeat    Weakness    Confusion or irritability    Blurred eyesight    Having nightmares or waking up confused or sweating    Numbness or tingling in the lips or tongue  What you should do  Here are tips to follow if you have hypoglycemia:     First check your blood sugar. If it's too low (out of your target range), eat or drink 15 to 20 grams of fast-acting sugar. " This may be 3 to 4 glucose tablets, 4 ounces (half a cup) of fruit juice or regular (nondiet) soda, or 1 tablespoon of honey. Don t take more than this, or your blood sugar may go too high.    Don't eat foods high in protein such as milk or nuts to treat hypoglycemia. Protein may increase your insulin response. It may lower your blood sugar even more.    Wait 15 minutes. Then recheck your blood sugar if you can.    If your blood sugar is still too low, repeat the steps above and check your blood sugar again. If your blood sugar still has not gone back to your target range, contact your healthcare provider. Or seek emergency care.    Once your blood sugar is back at target range, eat a snack or meal.  Preventing low blood sugar  Things you can do include the following:     If your condition needs a strict treatment plan, eat meals and snacks at the same times each day. Don t skip meals!    If your treatment plan lets you change when and what you eat, learn how to change the time and dose of your rapid-acting insulin to match this.     Ask your healthcare provider if it's safe to drink alcohol. Never drink on an empty stomach.    Take your medicine at the prescribed times.    Always carry a source of fast-acting sugar and a snack when you re away from home.    If you have had several hypoglycemic episodes, talk with your healthcare provider. See if you may be able to take less medicine. You also may have a condition where you no longer recognize the symptoms of low blood sugar until the value falls to dangerous levels.  Other things to do  Other tips include:    Carry a medical ID card or wear a medical alert bracelet or necklace. It should say that you have diabetes. It should also say what to do if you pass out or have a seizure.    Make sure your family, friends, and coworkers know the signs of low blood sugar. Tell them what to do if your blood sugar falls very low and you can t treat yourself.    Keep a glucagon  emergency kit handy. Be sure your family, friends, and coworkers know how and when to use it. Check it often. Replace the glucagon before it expires.    Talk with your healthcare team about other things you can do to prevent low blood sugar.  If you have unexplained hypoglycemia or hypoglycemia several times, call your healthcare provider.  [employer] last reviewed this educational content on 11/1/2018 2000-2021 [employer]. All rights reserved. This information is not intended as a substitute for professional medical care. Always follow your healthcare professional's instructions.               Return in about 4 months (around 9/7/2021) for wellness visit.    Halley Huff MD  [facility] SAVANNA Bravo is a 88 year old who presents for the following health issues     HPI      Diabetes Follow-up      How often are you checking your blood sugar? Not at all    What concerns do you have today about your diabetes? None     Do you have any of these symptoms? (Select all that apply)  No numbness or tingling in feet.  No redness, sores or blisters on feet.  No complaints of excessive thirst.  No reports of blurry vision.  No significant changes to weight.    Have you had a diabetic eye exam in the last 12 months? No    Hard of hearing   [facility] lives in [facility]  She was accompanied by her daughter who was helping with communication  I used amplifier that made a big difference    BP Readings from Last 2 Encounters:   05/07/21 123/79   01/13/20 (!) 164/76     Hemoglobin A1C (%)   Date Value   05/07/2021 8.1 (H)   06/10/2019 7.9 (H)     LDL Cholesterol Calculated (mg/dL)   Date Value   05/07/2021 92   02/27/2018 101 (H)           How many servings of fruits and vegetables do you eat daily?  2-3    On average, how many sweetened beverages do you drink each day (Examples: soda, juice, sweet tea, etc.  Do NOT count diet or artificially sweetened beverages)?   0    How  "many days per week do you exercise enough to make your heart beat faster? 3 or less    How many minutes a day do you exercise enough to make your heart beat faster? 9 or less    How many days per week do you miss taking your medication? 0        Review of Systems   Constitutional, HEENT, cardiovascular, pulmonary, gi and gu systems are negative, except as otherwise noted.      Objective    /79   Pulse 85   Temp 97  F (36.1  C) (Oral)   Ht 1.588 m (5' 2.5\")   Wt 71.7 kg (158 lb)   SpO2 98%   Breastfeeding No   BMI 28.44 kg/m    Body mass index is 28.44 kg/m .  Physical Exam       GENERAL APPEARANCE: healthy, alert and no distress  EYES: Eyes grossly normal to inspection, PERRL and conjunctivae and sclerae normal  HENT: ear canals shows cerumen impaction nose and mouth without ulcers or lesions  NECK: no adenopathy  RESP: lungs clear to auscultation - no rales, rhonchi or wheezes  CV: regular rates and rhythm, normal S1 S2, no S3      40 minutes spent on the date of the encounter doing chart review, history and exam, documentation and further activities as noted above      Disclaimer: This note consists of symbols derived from keyboarding, dictation and/or voice recognition software. As a result, there may be errors in the script that have gone undetected. Please consider this when interpreting information found in this chart.            "

## 2021-05-07 NOTE — PATIENT INSTRUCTIONS
Labs today  Avoid OTC NSAID like ibuprofen, motrin and aleve as you are on blood thinner eliquis(Apixaben)  Monitor your blood pressure once a week  at home.  Bring those readings on your next visit.  Notify us if your blood pressure readings consistently stays greater than 140/90.  Monitor your blood sugars at home  Hold glimepiride when NPO to avoid hypoglycemia  Patient Education     Low Blood Sugar (Hypoglycemia)     Fast-acting sugar can be a glass of nonfat milk.        Having too little sugar (glucose) in your blood is called low blood sugar (hypoglycemia). Low blood sugar often means anything lower than 70 mg/dL. Talk with your healthcare provider about your target range. Ask what level is too low for you. Diabetes itself doesn t cause low blood sugar. But some treatments for diabetes may raise your risk for it. These include pills or insulin. Low blood sugar may make you pass out or have a seizure. So always treat low blood sugar right away. But don't overeat.  Special note: Always carry a source of fast-acting sugar and a snack in case of hypoglycemia.  What you may notice  If you have low blood sugar, you may have one or more of these symptoms:    Shakiness or dizziness    Cold, clammy skin or sweating    Feeling hungry    Headache    Nervousness    A hard, fast heartbeat    Weakness    Confusion or irritability    Blurred eyesight    Having nightmares or waking up confused or sweating    Numbness or tingling in the lips or tongue  What you should do  Here are tips to follow if you have hypoglycemia:     First check your blood sugar. If it's too low (out of your target range), eat or drink 15 to 20 grams of fast-acting sugar. This may be 3 to 4 glucose tablets, 4 ounces (half a cup) of fruit juice or regular (nondiet) soda, or 1 tablespoon of honey. Don t take more than this, or your blood sugar may go too high.    Don't eat foods high in protein such as milk or nuts to treat hypoglycemia. Protein may  increase your insulin response. It may lower your blood sugar even more.    Wait 15 minutes. Then recheck your blood sugar if you can.    If your blood sugar is still too low, repeat the steps above and check your blood sugar again. If your blood sugar still has not gone back to your target range, contact your healthcare provider. Or seek emergency care.    Once your blood sugar is back at target range, eat a snack or meal.  Preventing low blood sugar  Things you can do include the following:     If your condition needs a strict treatment plan, eat meals and snacks at the same times each day. Don t skip meals!    If your treatment plan lets you change when and what you eat, learn how to change the time and dose of your rapid-acting insulin to match this.     Ask your healthcare provider if it's safe to drink alcohol. Never drink on an empty stomach.    Take your medicine at the prescribed times.    Always carry a source of fast-acting sugar and a snack when you re away from home.    If you have had several hypoglycemic episodes, talk with your healthcare provider. See if you may be able to take less medicine. You also may have a condition where you no longer recognize the symptoms of low blood sugar until the value falls to dangerous levels.  Other things to do  Other tips include:    Carry a medical ID card or wear a medical alert bracelet or necklace. It should say that you have diabetes. It should also say what to do if you pass out or have a seizure.    Make sure your family, friends, and coworkers know the signs of low blood sugar. Tell them what to do if your blood sugar falls very low and you can t treat yourself.    Keep a glucagon emergency kit handy. Be sure your family, friends, and coworkers know how and when to use it. Check it often. Replace the glucagon before it expires.    Talk with your healthcare team about other things you can do to prevent low blood sugar.  If you have unexplained hypoglycemia  or hypoglycemia several times, call your healthcare provider.  [employer] last reviewed this educational content on 11/1/2018 2000-2021 [employer]. All rights reserved. This information is not intended as a substitute for professional medical care. Always follow your healthcare professional's instructions.

## 2021-05-08 LAB
CREAT UR-MCNC: 132 MG/DL
MICROALBUMIN UR-MCNC: 86 MG/L
MICROALBUMIN/CREAT UR: 64.77 MG/G CR (ref 0–25)

## 2021-05-10 NOTE — RESULT ENCOUNTER NOTE
Please notify patient by sending following letter with copy of test results      Nayely Bravo,    This is to inform you regarding your test result.    HbA1c which is average glucose during last 3 months is 8.1%.  Your diabetes is not under control.  If you would like to see a diabetic educator regarding this then please let us know so we can put a referral for you to see a dietitian.  Your total cholesterol is normal.  HDL which is called good cholesterol is normal.  Your LDL cholesterol is normal.  This is often call bad cholesterol and high levels increase the risk for heart attacks and strokes.  The triglycerides are high. Lowering  the amount of sugar ,alcohol and sweets in the diet helps to control this.Exercise and weight loss helps.  Urine for microalbumin is positive .  TSH which is thyroid hormone is normal.  Chronic kidney disease is slightly worse   CBC result which includes white count Hemoglobin and  Platelet Counts is normal.       Sincerely,      Dr.Nasima Daria MD,FACP

## 2021-06-10 ENCOUNTER — DOCUMENTATION ONLY (OUTPATIENT)
Dept: OTHER | Facility: CLINIC | Age: 86
End: 2021-06-10

## 2021-06-23 ENCOUNTER — ANCILLARY PROCEDURE (OUTPATIENT)
Dept: CARDIOLOGY | Facility: CLINIC | Age: 86
End: 2021-06-23
Attending: INTERNAL MEDICINE
Payer: MEDICARE

## 2021-06-23 DIAGNOSIS — Z95.0 CARDIAC PACEMAKER IN SITU: ICD-10-CM

## 2021-06-23 PROCEDURE — 93283 PRGRMG EVAL IMPLANTABLE DFB: CPT | Performed by: INTERNAL MEDICINE

## 2021-06-24 LAB
MDC_IDC_LEAD_IMPLANT_DT: NORMAL
MDC_IDC_LEAD_IMPLANT_DT: NORMAL
MDC_IDC_LEAD_LOCATION: NORMAL
MDC_IDC_LEAD_LOCATION: NORMAL
MDC_IDC_LEAD_MFG: NORMAL
MDC_IDC_LEAD_MFG: NORMAL
MDC_IDC_LEAD_MODEL: NORMAL
MDC_IDC_LEAD_MODEL: NORMAL
MDC_IDC_LEAD_POLARITY_TYPE: NORMAL
MDC_IDC_LEAD_POLARITY_TYPE: NORMAL
MDC_IDC_LEAD_SERIAL: NORMAL
MDC_IDC_LEAD_SERIAL: NORMAL
MDC_IDC_MSMT_BATTERY_DTM: NORMAL
MDC_IDC_MSMT_BATTERY_IMPEDANCE: 1912 OHM
MDC_IDC_MSMT_BATTERY_REMAINING_LONGEVITY: 35 MO
MDC_IDC_MSMT_BATTERY_STATUS: NORMAL
MDC_IDC_MSMT_BATTERY_VOLTAGE: 2.76 V
MDC_IDC_MSMT_LEADCHNL_RA_IMPEDANCE_VALUE: 436 OHM
MDC_IDC_MSMT_LEADCHNL_RA_PACING_THRESHOLD_AMPLITUDE: 0.88 V
MDC_IDC_MSMT_LEADCHNL_RA_PACING_THRESHOLD_PULSEWIDTH: 0.4 MS
MDC_IDC_MSMT_LEADCHNL_RA_SENSING_INTR_AMPL: 0.7 MV
MDC_IDC_MSMT_LEADCHNL_RV_IMPEDANCE_VALUE: 437 OHM
MDC_IDC_MSMT_LEADCHNL_RV_PACING_THRESHOLD_AMPLITUDE: 0.5 V
MDC_IDC_MSMT_LEADCHNL_RV_PACING_THRESHOLD_AMPLITUDE: 0.75 V
MDC_IDC_MSMT_LEADCHNL_RV_PACING_THRESHOLD_PULSEWIDTH: 0.4 MS
MDC_IDC_MSMT_LEADCHNL_RV_PACING_THRESHOLD_PULSEWIDTH: 0.4 MS
MDC_IDC_MSMT_LEADCHNL_RV_SENSING_INTR_AMPL: 5.6 MV
MDC_IDC_PG_IMPLANT_DTM: NORMAL
MDC_IDC_PG_MFG: NORMAL
MDC_IDC_PG_MODEL: NORMAL
MDC_IDC_PG_SERIAL: NORMAL
MDC_IDC_PG_TYPE: NORMAL
MDC_IDC_SESS_CLINIC_NAME: NORMAL
MDC_IDC_SESS_DTM: NORMAL
MDC_IDC_SESS_TYPE: NORMAL
MDC_IDC_SET_BRADY_AT_MODE_SWITCH_MODE: NORMAL
MDC_IDC_SET_BRADY_AT_MODE_SWITCH_RATE: 175 {BEATS}/MIN
MDC_IDC_SET_BRADY_LOWRATE: 60 {BEATS}/MIN
MDC_IDC_SET_BRADY_MAX_SENSOR_RATE: 120 {BEATS}/MIN
MDC_IDC_SET_BRADY_MAX_TRACKING_RATE: 120 {BEATS}/MIN
MDC_IDC_SET_BRADY_MODE: NORMAL
MDC_IDC_SET_BRADY_PAV_DELAY_LOW: 350 MS
MDC_IDC_SET_BRADY_SAV_DELAY_LOW: 350 MS
MDC_IDC_SET_LEADCHNL_RA_PACING_AMPLITUDE: 1.5 V
MDC_IDC_SET_LEADCHNL_RA_PACING_CAPTURE_MODE: NORMAL
MDC_IDC_SET_LEADCHNL_RA_PACING_POLARITY: NORMAL
MDC_IDC_SET_LEADCHNL_RA_PACING_PULSEWIDTH: 0.4 MS
MDC_IDC_SET_LEADCHNL_RA_SENSING_POLARITY: NORMAL
MDC_IDC_SET_LEADCHNL_RA_SENSING_SENSITIVITY: 0.18 MV
MDC_IDC_SET_LEADCHNL_RV_PACING_AMPLITUDE: 2 V
MDC_IDC_SET_LEADCHNL_RV_PACING_CAPTURE_MODE: NORMAL
MDC_IDC_SET_LEADCHNL_RV_PACING_POLARITY: NORMAL
MDC_IDC_SET_LEADCHNL_RV_PACING_PULSEWIDTH: 0.4 MS
MDC_IDC_SET_LEADCHNL_RV_SENSING_POLARITY: NORMAL
MDC_IDC_SET_LEADCHNL_RV_SENSING_SENSITIVITY: 1.4 MV
MDC_IDC_SET_ZONE_DETECTION_INTERVAL: 342.86 MS
MDC_IDC_SET_ZONE_DETECTION_INTERVAL: 342.86 MS
MDC_IDC_SET_ZONE_TYPE: NORMAL
MDC_IDC_SET_ZONE_TYPE: NORMAL
MDC_IDC_STAT_AT_BURDEN_PERCENT: 13.4 %
MDC_IDC_STAT_AT_DTM_END: NORMAL
MDC_IDC_STAT_AT_DTM_START: NORMAL
MDC_IDC_STAT_AT_MODE_SW_COUNT: 4410
MDC_IDC_STAT_BRADY_AP_VP_PERCENT: 1 %
MDC_IDC_STAT_BRADY_AP_VS_PERCENT: 82 %
MDC_IDC_STAT_BRADY_AS_VP_PERCENT: 0 %
MDC_IDC_STAT_BRADY_AS_VS_PERCENT: 18 %
MDC_IDC_STAT_BRADY_DTM_END: NORMAL
MDC_IDC_STAT_BRADY_DTM_START: NORMAL
MDC_IDC_STAT_BRADY_RA_PERCENT_PACED: 83 %
MDC_IDC_STAT_BRADY_RV_PERCENT_PACED: 1 %
MDC_IDC_STAT_EPISODE_RECENT_COUNT: 0
MDC_IDC_STAT_EPISODE_RECENT_COUNT: 2910
MDC_IDC_STAT_EPISODE_RECENT_COUNT_DTM_END: NORMAL
MDC_IDC_STAT_EPISODE_RECENT_COUNT_DTM_END: NORMAL
MDC_IDC_STAT_EPISODE_RECENT_COUNT_DTM_START: NORMAL
MDC_IDC_STAT_EPISODE_RECENT_COUNT_DTM_START: NORMAL
MDC_IDC_STAT_EPISODE_TYPE: NORMAL
MDC_IDC_STAT_EPISODE_TYPE: NORMAL

## 2021-07-26 ENCOUNTER — HOSPITAL ENCOUNTER (OUTPATIENT)
Dept: CARDIOLOGY | Facility: CLINIC | Age: 86
Discharge: HOME | End: 2021-07-26
Attending: INTERNAL MEDICINE | Admitting: INTERNAL MEDICINE
Payer: MEDICARE

## 2021-07-26 DIAGNOSIS — I50.32 CHRONIC DIASTOLIC CONGESTIVE HEART FAILURE (H): ICD-10-CM

## 2021-07-26 LAB — LVEF ECHO: NORMAL

## 2021-07-26 PROCEDURE — 93306 TTE W/DOPPLER COMPLETE: CPT | Mod: 26 | Performed by: INTERNAL MEDICINE

## 2021-07-26 PROCEDURE — 93306 TTE W/DOPPLER COMPLETE: CPT

## 2021-08-04 ENCOUNTER — OFFICE VISIT (OUTPATIENT)
Dept: CARDIOLOGY | Facility: CLINIC | Age: 86
End: 2021-08-04
Payer: MEDICARE

## 2021-08-04 VITALS
BODY MASS INDEX: 29.96 KG/M2 | OXYGEN SATURATION: 96 % | DIASTOLIC BLOOD PRESSURE: 75 MMHG | SYSTOLIC BLOOD PRESSURE: 126 MMHG | HEIGHT: 63 IN | WEIGHT: 169.1 LBS | HEART RATE: 74 BPM

## 2021-08-04 DIAGNOSIS — I50.32 CHRONIC DIASTOLIC CONGESTIVE HEART FAILURE (H): Primary | ICD-10-CM

## 2021-08-04 PROCEDURE — 99214 OFFICE O/P EST MOD 30 MIN: CPT | Performed by: PHYSICIAN ASSISTANT

## 2021-08-04 ASSESSMENT — MIFFLIN-ST. JEOR: SCORE: 1158.22

## 2021-08-04 NOTE — LETTER
8/4/2021    Halley Huff MD  [address]  [facility] [address]    RE: Shelly Rogers       Dear Colleague,    I had the pleasure of seeing Shelly Rogers in the [facility].    Cardiology Clinic Progress Note    Shelly Rogers MRN# [id number]   YOB: 1933 Age: 88 year old   Primary cardiologist: Dr. Del Valle         Assessment and Plan:     In summary, Shelly Rogers presents today for an overdue CORE clinic f/u visit, for chronic HFpEF.     1. Chronic HFpEF   - ACC/AHA Stage: C; FC II (stable)   - Etiology: tachyarrhythmias, uncontrolled diabetes, untreated SYLVIE, diastolic dysfn contributing to RV failure   - RV: moderate dilated with mildly reduced fn   - Valves: moderate TR   - Volume: Euvolemic    Current Wt: 169 lbs    Dry Wt: suspect ~ 165-170 lbs (has gained caloric weight over past 1.5 years).    Diuretics: Lasix 20 mg BID   - Rhythm: Rate controlled PAF (13% burden on recent device check), asymptomatic. Anticoagulated on Eliquis.   - Device: Dual chamber PPM, 3 years on battery life.     Plan:  - No changes today. She appears to be doing very well from a cardiac standpoint. I encouraged her to stay as active as possible to avoid further weight gain.   - RTC with me in 6 months, Dr. Del Valle in 1 year.    It was a pleasure seeing Shelly again today!         History of Presenting Illness:      Shelly Rogers is a pleasant 88 year old patient who presents today for an overdue CORE clinic f/u visit.    The patient has a history of the following -   # HFpEF, initially diagnosed in 2013 in the setting of new-onset rapid AF. Admission in October 2018 in the setting of lasix noncompliance and increasing burden of AF.   # PAF/SSS, s/p PPM and cardioversion in 2013. Rate control strategy with Toprol. Anticoagulated on warfarin, INR followed by PCP.   # NSVT, asymptomatic, short runs noted on device  # Hx of R pleural effusion,  s/p thoracentesis 10/2018  # HTN  # DMII  # CKD  # Hypothyroidism, on levothyroxine  # Mild hyponatremia  # Severe mixed SYLVIE, refuses CPAP  # Hx of poor medication compliance  # Social - She lives independently at an assisted living facility and manages her own medications. She has some macular degeneration which has caused some balance issues over time and therefore uses a cane to ambulate when she's out of her home, and doesn't drive. She's had no falls, and uses fall precautions. No ETOH or tobacco.    I've been following Shelly along with Dr. Del Valle since her admission in October 2018. She's been doing well since then, despite some issues with medication confusion and compliance.     She last saw Dr. Del Valle in 12/2020. He started losartan 25 mg daily for better blood pressure control and also for vascular protection due to diabetes. He also switched her from warfarin to Eliquis per her request for convenience.  She was to follow up in six months, but returns now, 1.5 years later.     Today, she presents to clinic with her daughter Sandhya. She's continues to feel really well from a cardiovascular standpoint. She doesn't feel limited by her breathing with ambulation or sleep. Her pedal and ankle edema is minimal. She elevates her legs and wears compression stockings during the day which help this. Weight is up about 10 lbs from last year, which she relates to being sedentary and eating more during COVID-19. She doesn't feel like she's retaining any excess fluid in her systemic. She doesn't really monitor her weight or diet. Shelly tells me she's compliant with her meds and is using a pill box appropriately.     She had an echo performed on July 26, showing an EF of 60-65%, with moderately dilated and reduced RV function, mild MR, moderate TR, and dilated IVC suggestive of of an RAP of 15 mmHg or greater.  There is no significant change from prior.  Her most recent device check took place in June, showing a 3% atrial  "pace and minimal ventricular paced.  She is in atrial fibrillation/flutter 13% of the time, the longest episode lasted 19 hours.  Her heart rates show adequate variability per histogram.  She has 3 years remaining on battery life.  She had labs checked in May, showing a TSH in range, LDL of 92, HDL of 50, triglycerides of 204, total cholesterol 183.  Her creatinine was 1.12, with a BUN of 22, potassium 4.7 and sodium of 135.  CBC was within normal limits.  Hemoglobin A1c was up at 8.1%, and she was referred to the diabetic educator by her PCP. We reviewed these recent studies today.         Review of Systems:     12-pt ROS is negative except for as noted in the HPI.          Physical Exam:     Vitals: /75   Pulse 74   Ht 1.588 m (5' 2.5\")   Wt 76.7 kg (169 lb 1.6 oz)   SpO2 96%   BMI 30.44 kg/m    Wt Readings from Last 10 Encounters:   08/04/21 76.7 kg (169 lb 1.6 oz)   05/07/21 71.7 kg (158 lb)   01/13/20 70.8 kg (156 lb)   09/23/19 70.9 kg (156 lb 4.8 oz)   06/10/19 69.4 kg (153 lb)   05/01/19 73.5 kg (162 lb)   02/07/19 73.5 kg (162 lb)   01/10/19 73.3 kg (161 lb 8 oz)   11/14/18 78.6 kg (173 lb 3.2 oz)   11/12/18 77.8 kg (171 lb 8 oz)       Constitutional:  Patient is pleasant, alert, cooperative, and in NAD.  HEENT:  NCAT. PERRLA. EOM's intact.  Neck:  CVP appears normal  Pulmonary: Normal respiratory effort. CTAB.  Cardiac: RRR, grade 2/6 sm at the LSB  Abdomen:  Non-tender abdomen with normoactive bowel sounds, no hepatosplenomegaly appreciated.   Vascular: Pulses in the upper extremities are 2+ and equal, lower extremity pulses are diminished bilaterally.  Extremities: Trace pitting edema pedal - pretibial region bilaterally.  Skin:  No rashes or lesions appreciated.   Neurological:  No gross motor or sensory deficits.   Psych: Appropriate affect.        Data:   Cardiac Diagnostics reviewed:  Type Date Result   TTE 10/19/18 The left ventricle is normal in size.  The visual ejection fraction is " estimated at 55-60%.  No regional wall motion abnormalities noted.  The right ventricle is moderately dilated.  Mildly decreased right ventricular systolic function  There is moderate (2+) tricuspid regurgitation.  Small pericardial effusion  The rhythm was rapid atrial fibrillation.    5/5/16 Left ventricular systolic function is normal. The visual ejection fraction is  estimated at 60-65%.  There is mild concentric left ventricular hypertrophy. The left atrium is borderline dilated.  There is trace to mild mitral regurgitation.  There is moderate (2+) tricuspid regurgitation.  Right ventricular systolic pressure is elevated, consistent with mild  pulmonary hypertension.  The rhythm was normal sinus.  Contrast was used without apparent complications. TR may be mildly increased  compared to the prior study.   EKG 10/19/18 AF, low voltage QRS     Device  51Talktronic PPM interrogation (inpatient)   ~ 2%, Ap ~ 30%     Sleep study  12/17/18 Respiration: Severe sleep disordered breathing best characterized as mixed (combination of both obstructive and central phenomena). Central events were suggestive of Cheyne Simons periodic breathing consisteint with heart disease. She also had sleep related hypoxemia. Of note the patient did not have REM supine during the study.     Events ? The polysomnogram revealed a presence of 26 obstructive, 66 central, and 23 mixed apneas resulting in an apnea index of 89.6 events per hour. There were 2 obstructive hypopneas and 2 central hypopneas resulting in an obstructive hypopnea index of 1.6 and central hypopnea index of 1.6 events per hour. The combined apnea/hypopnea index was 92.7 events per hour (central apnea/hypopnea index was 53.0 events per hour). The REM AHI was 93.9 events per hour. The supine AHI was - events per hour. The RERA index was - events per hour. The RDI was 92.7 events per hour.      Labs reviewed:  Recent Labs   Lab Test 05/07/21  0858 09/23/19  0913 09/12/19  1402  06/10/19  1426 11/08/18  1206 02/27/18  1541 09/12/16  1709 10/13/15  1454   LDL 92  --   --   --   --  101*  --  88   HDL 50  --   --   --   --  41*  --  44*   NHDL 133*  --   --   --   --  165*  --   --    CHOL 183  --   --   --   --  206*  --  204*   TRIG 204*  --   --   --   --  319*  --  359*   TSH 1.23  --  1.90 5.11*  --  2.32  --  1.20   NTBNP  --  2,029*  --   --   --   --   --   --    IRON  --   --   --   --  57  --   --   --    FEB  --   --   --   --  389  --   --   --    IRONSAT  --   --   --   --  15  --   --   --    DARI  --   --   --   --  34 28   < >  --     < > = values in this interval not displayed.       Lab Results   Component Value Date    WBC 8.1 05/07/2021    RBC 4.38 05/07/2021    HGB 14.2 05/07/2021    HCT 42.9 05/07/2021    MCV 98 05/07/2021    MCH 32.4 05/07/2021    MCHC 33.1 05/07/2021    RDW 14.2 05/07/2021     05/07/2021       Lab Results   Component Value Date     05/07/2021    POTASSIUM 4.7 05/07/2021    CHLORIDE 102 05/07/2021    CO2 31 05/07/2021    ANIONGAP 2 (L) 05/07/2021     (H) 05/07/2021    BUN 22 05/07/2021    CR 1.12 (H) 05/07/2021    GFRESTIMATED 44 (L) 05/07/2021    GFRESTBLACK 51 (L) 05/07/2021    ASTRID 9.3 05/07/2021      Lab Results   Component Value Date    AST 22 06/10/2019    ALT 20 06/10/2019       Lab Results   Component Value Date    A1C 8.1 (H) 05/07/2021       Lab Results   Component Value Date    INR 1.21 (H) 03/17/2021    INR 2.4 (A) 02/25/2020    INR 1.7 (A) 02/06/2020    INR 1.70 (H) 10/22/2018           Problem List:     Patient Active Problem List   Diagnosis     CHF (congestive heart failure) (H)     CARDIOVASCULAR SCREENING; LDL GOAL LESS THAN 100     Health Care Home     Pacemaker     OA (osteoarthritis)     Type 2 diabetes mellitus without complications (H)     DNS (deviated nasal septum)     Atrial fibrillation (H)     Paroxysmal atrial fibrillation (H)     Chronic diastolic congestive heart failure (H)     Hypothyroidism      Long-term (current) use of anticoagulants [Z79.01]     Heart failure (H)     CKD (chronic kidney disease) stage 3, GFR 30-59 ml/min     History of uterine cancer           Medications:     Current Outpatient Medications   Medication Sig Dispense Refill     ACETAMINOPHEN PO Take 500 mg by mouth 3 times daily as needed (Takes at least 6 hours apart).        apixaban ANTICOAGULANT (ELIQUIS ANTICOAGULANT) 5 MG tablet Take 1 tablet (5 mg) by mouth 2 times daily is blood thinner 180 tablet 3     furosemide (LASIX) 20 MG tablet Take 1 tablet (20 mg) by mouth 2 times daily 180 tablet 1     glimepiride (AMARYL) 2 MG tablet TAKE 1 TABLET (2 MG) BY MOUTH 2 TIMES DAILY 180 tablet 1     levothyroxine (SYNTHROID/LEVOTHROID) 100 MCG tablet Take 1 tablet (100 mcg) by mouth daily for thyroid 90 tablet 1     metFORMIN (GLUCOPHAGE) 500 MG tablet Take 1 tablet (500 mg) by mouth 2 times daily (with meals) for diabetes 180 tablet 3     metoprolol succinate ER (TOPROL-XL) 100 MG 24 hr tablet Take 1 tablet (100 mg) by mouth 2 times daily for Blood Pressure and heart 180 tablet 3     multivitamin  with lutein (OCUVITE WITH LTEIN) CAPS per capsule Take 1 capsule by mouth daily       triamcinolone (NASACORT) 55 MCG/ACT nasal aerosol Spray 2 sprays into both nostrils daily       UNABLE TO FIND MEDICATION NAME: [employer] Vegan CBD gummies       blood glucose (ACCU-CHEK SMARTVIEW) test strip 1 strip by In Vitro route daily (Patient not taking: Reported on 8/4/2021) 1 Box prn     blood glucose (NO BRAND SPECIFIED) lancets standard Use to test blood sugar 1 times daily or as directed. (Patient not taking: Reported on 8/4/2021) 100 each 11     blood glucose monitoring (ACCU-CHEK FASTCLIX) lancets Use to check blood sugars daily (Patient not taking: Reported on 8/4/2021) 1 Box prn     Continuous Blood Gluc  (PertinoYLE STELLA 14 DAY READER) VERO 1 each every 14 days (Patient not taking: Reported on 8/4/2021) 1 Device 0     Continuous Blood  Gluc Sensor (FREESTYLE STELLA 14 DAY SENSOR) MISC 1 each every 14 days (Patient not taking: Reported on 2021) 2 each 11     STATIN NOT PRESCRIBED, INTENTIONAL, continuous prn. Statin not prescribed intentionally due to Other: Not needed, LDL at goal <100mg/dL without therapy (Patient not taking: Reported on 2021) 0 each 0           Past Medical History:     Past Medical History:   Diagnosis Date     Atrial fibrillation (H)     Nl LVEF, s/p ablation      Congestive heart failure, unspecified      Diabetes mellitus (H)      Hemorrhoids     intermittent bleeding     Hypertension      Macular degeneration      OA (osteoarthritis)      Pacemaker 3/2013     Uterine cancer (H)     s/p hyst     Past Surgical History:   Procedure Laterality Date     C ANESTH,PACEMAKER INSERTION      dual chamber 3/27/13     CHOLECYSTECTOMY  2004     GYN SURGERY       HYSTERECTOMY      Ut ca      JOINT REPLACEMENT       KERATOTOMY ARCUATE WITH FEMTOSECOND LASER/IMAGING FOR ATIOL Right 2017    Procedure: KERATOTOMY ARCUATE WITH FEMTOSECOND LASER/IMAGING FOR ATIOL;  Surgeon: Calvin Dominguez MD;  Location: [facility]     PHACOEMULSIFICATION CLEAR CORNEA WITH STANDARD INTRAOCULAR LENS IMPLANT Right 2017    Procedure: PHACOEMULSIFICATION CLEAR CORNEA WITH STANDARD INTRAOCULAR LENS IMPLANT;  Surgeon: Calvin Dominguez MD;  Location:  [facility]     TKR      bilat     Family History   Problem Relation Age of Onset     Gastrointestinal Disease Mother         bowel obstruction     Cardiovascular Father      C.A.D. Father      Cardiovascular Brother         CHF     Social History     Socioeconomic History     Marital status: Single     Spouse name: Not on file     Number of children: Not on file     Years of education: Not on file     Highest education level: Not on file   Occupational History     Not on file   Tobacco Use     Smoking status: Former Smoker     Quit date: 1990     Years since quittin.6     Smokeless  tobacco: Never Used   Substance and Sexual Activity     Alcohol use: No     Alcohol/week: 0.0 standard drinks     Drug use: No     Sexual activity: Not Currently     Partners: Male   Other Topics Concern     Parent/sibling w/ CABG, MI or angioplasty before 65F 55M? No      Service Not Asked     Blood Transfusions Not Asked     Caffeine Concern Yes     Comment: daily      Occupational Exposure Not Asked     Hobby Hazards Not Asked     Sleep Concern No     Stress Concern Not Asked     Weight Concern Not Asked     Special Diet Yes     Comment: low sodium, less leafy greens, low sugar     Back Care Not Asked     Exercise No     Bike Helmet Not Asked     Seat Belt Yes     Self-Exams Not Asked   Social History Narrative     Not on file     Social Determinants of Health     Financial Resource Strain:      Difficulty of Paying Living Expenses:    Food Insecurity:      Worried About Running Out of Food in the Last Year:      Ran Out of Food in the Last Year:    Transportation Needs:      Lack of Transportation (Medical):      Lack of Transportation (Non-Medical):    Physical Activity:      Days of Exercise per Week:      Minutes of Exercise per Session:    Stress:      Feeling of Stress :    Social Connections:      Frequency of Communication with Friends and Family:      Frequency of Social Gatherings with Friends and Family:      Attends Gnosticist Services:      Active Member of Clubs or Organizations:      Attends Club or Organization Meetings:      Marital Status:    Intimate Partner Violence:      Fear of Current or Ex-Partner:      Emotionally Abused:      Physically Abused:      Sexually Abused:            Allergies:   Shellfish allergy, Ambien [zolpidem], Cats, Lisinopril, Miscellaneous [no clinical screening - see comments], Seasonal allergies, and Sulfa drugs      Eliz Goodwin PA-C, KIMBERLY  [facility]  Pager: [phone]      Thank you for allowing me to participate in the care of your  patient.      Sincerely,     KIMBERLY [facility]  cc:   No referring provider defined for this encounter.

## 2021-08-04 NOTE — PROGRESS NOTES
Cardiology Clinic Progress Note    Shelly Rogers MRN# [id number]   YOB: 1933 Age: 88 year old   Primary cardiologist: Dr. Del Valle         Assessment and Plan:     In summary, Shelly Rogers presents today for an overdue [facility] clinic f/u visit, for chronic HFpEF.     1. Chronic HFpEF   - ACC/AHA Stage: C; FC II (stable)   - Etiology: tachyarrhythmias, uncontrolled diabetes, untreated SYLVIE, diastolic dysfn contributing to RV failure   - RV: moderate dilated with mildly reduced fn   - Valves: moderate TR   - Volume: Euvolemic    Current Wt: 169 lbs    Dry Wt: suspect ~ 165-170 lbs (has gained caloric weight over past 1.5 years).    Diuretics: Lasix 20 mg BID   - Rhythm: Rate controlled PAF (13% burden on recent device check), asymptomatic. Anticoagulated on Eliquis.   - Device: Dual chamber PPM, 3 years on battery life.     Plan:  - No changes today. She appears to be doing very well from a cardiac standpoint. I encouraged her to stay as active as possible to avoid further weight gain.   - RTC with me in 6 months, Dr. Del Valle in 1 year.    It was a pleasure seeing Shelly again today!         History of Presenting Illness:      Shelly Rogers is a pleasant 88 year old patient who presents today for an overdue [facility] clinic f/u visit.    The patient has a history of the following -   # HFpEF, initially diagnosed in 2013 in the setting of new-onset rapid AF. Admission in October 2018 in the setting of lasix noncompliance and increasing burden of AF.   # PAF/SSS, s/p PPM and cardioversion in 2013. Rate control strategy with Toprol. Anticoagulated on warfarin, INR followed by PCP.   # NSVT, asymptomatic, short runs noted on device  # Hx of R pleural effusion, s/p thoracentesis 10/2018  # HTN  # DMII  # CKD  # Hypothyroidism, on levothyroxine  # Mild hyponatremia  # Severe mixed SYLVIE, refuses CPAP  # Hx of poor medication compliance  # Social - She lives independently at an assisted living facility and manages her own  medications. She has some macular degeneration which has caused some balance issues over time and therefore uses a cane to ambulate when she's out of her home, and doesn't drive. She's had no falls, and uses fall precautions. No ETOH or tobacco.    I've been following Shelly along with Dr. Del Valle since her admission in October 2018. She's been doing well since then, despite some issues with medication confusion and compliance.     She last saw Dr. Del Valle in 12/2020. He started losartan 25 mg daily for better blood pressure control and also for vascular protection due to diabetes. He also switched her from warfarin to Eliquis per her request for convenience.  She was to follow up in six months, but returns now, 1.5 years later.     Today, she presents to clinic with her daughter Sandhya. She's continues to feel really well from a cardiovascular standpoint. She doesn't feel limited by her breathing with ambulation or sleep. Her pedal and ankle edema is minimal. She elevates her legs and wears compression stockings during the day which help this. Weight is up about 10 lbs from last year, which she relates to being sedentary and eating more during COVID-19. She doesn't feel like she's retaining any excess fluid in her systemic. She doesn't really monitor her weight or diet. Shelly tells me she's compliant with her meds and is using a pill box appropriately.     She had an echo performed on July 26, showing an EF of 60-65%, with moderately dilated and reduced RV function, mild MR, moderate TR, and dilated IVC suggestive of of an RAP of 15 mmHg or greater.  There is no significant change from prior.  Her most recent device check took place in June, showing a 3% atrial pace and minimal ventricular paced.  She is in atrial fibrillation/flutter 13% of the time, the longest episode lasted 19 hours.  Her heart rates show adequate variability per histogram.  She has 3 years remaining on battery life.  She had labs checked in May,  "showing a TSH in range, LDL of 92, HDL of 50, triglycerides of 204, total cholesterol 183.  Her creatinine was 1.12, with a BUN of 22, potassium 4.7 and sodium of 135.  CBC was within normal limits.  Hemoglobin A1c was up at 8.1%, and she was referred to the diabetic educator by her PCP. We reviewed these recent studies today.         Review of Systems:     12-pt ROS is negative except for as noted in the HPI.          Physical Exam:     Vitals: /75   Pulse 74   Ht 1.588 m (5' 2.5\")   Wt 76.7 kg (169 lb 1.6 oz)   SpO2 96%   BMI 30.44 kg/m    Wt Readings from Last 10 Encounters:   08/04/21 76.7 kg (169 lb 1.6 oz)   05/07/21 71.7 kg (158 lb)   01/13/20 70.8 kg (156 lb)   09/23/19 70.9 kg (156 lb 4.8 oz)   06/10/19 69.4 kg (153 lb)   05/01/19 73.5 kg (162 lb)   02/07/19 73.5 kg (162 lb)   01/10/19 73.3 kg (161 lb 8 oz)   11/14/18 78.6 kg (173 lb 3.2 oz)   11/12/18 77.8 kg (171 lb 8 oz)       Constitutional:  Patient is pleasant, alert, cooperative, and in NAD.  HEENT:  NCAT. PERRLA. EOM's intact.  Neck:  CVP appears normal  Pulmonary: Normal respiratory effort. CTAB.  Cardiac: RRR, grade 2/6 sm at the LSB  Abdomen:  Non-tender abdomen with normoactive bowel sounds, no hepatosplenomegaly appreciated.   Vascular: Pulses in the upper extremities are 2+ and equal, lower extremity pulses are diminished bilaterally.  Extremities: Trace pitting edema pedal - pretibial region bilaterally.  Skin:  No rashes or lesions appreciated.   Neurological:  No gross motor or sensory deficits.   Psych: Appropriate affect.        Data:   Cardiac Diagnostics reviewed:  Type Date Result   TTE 10/19/18 The left ventricle is normal in size.  The visual ejection fraction is estimated at 55-60%.  No regional wall motion abnormalities noted.  The right ventricle is moderately dilated.  Mildly decreased right ventricular systolic function  There is moderate (2+) tricuspid regurgitation.  Small pericardial effusion  The rhythm was rapid " atrial fibrillation.    5/5/16 Left ventricular systolic function is normal. The visual ejection fraction is  estimated at 60-65%.  There is mild concentric left ventricular hypertrophy. The left atrium is borderline dilated.  There is trace to mild mitral regurgitation.  There is moderate (2+) tricuspid regurgitation.  Right ventricular systolic pressure is elevated, consistent with mild  pulmonary hypertension.  The rhythm was normal sinus.  Contrast was used without apparent complications. TR may be mildly increased  compared to the prior study.   EKG 10/19/18 AF, low voltage QRS     Device  Mix & Meet PPM interrogation (inpatient)   ~ 2%, Ap ~ 30%     Sleep study  12/17/18 Respiration: Severe sleep disordered breathing best characterized as mixed (combination of both obstructive and central phenomena). Central events were suggestive of Cheyne Simons periodic breathing consisteint with heart disease. She also had sleep related hypoxemia. Of note the patient did not have REM supine during the study.     Events ? The polysomnogram revealed a presence of 26 obstructive, 66 central, and 23 mixed apneas resulting in an apnea index of 89.6 events per hour. There were 2 obstructive hypopneas and 2 central hypopneas resulting in an obstructive hypopnea index of 1.6 and central hypopnea index of 1.6 events per hour. The combined apnea/hypopnea index was 92.7 events per hour (central apnea/hypopnea index was 53.0 events per hour). The REM AHI was 93.9 events per hour. The supine AHI was - events per hour. The RERA index was - events per hour. The RDI was 92.7 events per hour.      Labs reviewed:  Recent Labs   Lab Test 05/07/21  0858 09/23/19  0913 09/12/19  1402 06/10/19  1426 11/08/18  1206 02/27/18  1541 09/12/16  1709 10/13/15  1454   LDL 92  --   --   --   --  101*  --  88   HDL 50  --   --   --   --  41*  --  44*   NHDL 133*  --   --   --   --  165*  --   --    CHOL 183  --   --   --   --  206*  --  204*   TRIG 204*   --   --   --   --  319*  --  359*   TSH 1.23  --  1.90 5.11*  --  2.32  --  1.20   NTBNP  --  2,029*  --   --   --   --   --   --    IRON  --   --   --   --  57  --   --   --    FEB  --   --   --   --  389  --   --   --    IRONSAT  --   --   --   --  15  --   --   --    DARI  --   --   --   --  34 28   < >  --     < > = values in this interval not displayed.       Lab Results   Component Value Date    WBC 8.1 05/07/2021    RBC 4.38 05/07/2021    HGB 14.2 05/07/2021    HCT 42.9 05/07/2021    MCV 98 05/07/2021    MCH 32.4 05/07/2021    MCHC 33.1 05/07/2021    RDW 14.2 05/07/2021     05/07/2021       Lab Results   Component Value Date     05/07/2021    POTASSIUM 4.7 05/07/2021    CHLORIDE 102 05/07/2021    CO2 31 05/07/2021    ANIONGAP 2 (L) 05/07/2021     (H) 05/07/2021    BUN 22 05/07/2021    CR 1.12 (H) 05/07/2021    GFRESTIMATED 44 (L) 05/07/2021    GFRESTBLACK 51 (L) 05/07/2021    ASTRID 9.3 05/07/2021      Lab Results   Component Value Date    AST 22 06/10/2019    ALT 20 06/10/2019       Lab Results   Component Value Date    A1C 8.1 (H) 05/07/2021       Lab Results   Component Value Date    INR 1.21 (H) 03/17/2021    INR 2.4 (A) 02/25/2020    INR 1.7 (A) 02/06/2020    INR 1.70 (H) 10/22/2018           Problem List:     Patient Active Problem List   Diagnosis     CHF (congestive heart failure) (H)     CARDIOVASCULAR SCREENING; LDL GOAL LESS THAN 100     Health Care Home     Pacemaker     OA (osteoarthritis)     Type 2 diabetes mellitus without complications (H)     DNS (deviated nasal septum)     Atrial fibrillation (H)     Paroxysmal atrial fibrillation (H)     Chronic diastolic congestive heart failure (H)     Hypothyroidism     Long-term (current) use of anticoagulants [Z79.01]     Heart failure (H)     CKD (chronic kidney disease) stage 3, GFR 30-59 ml/min     History of uterine cancer           Medications:     Current Outpatient Medications   Medication Sig Dispense Refill     ACETAMINOPHEN  PO Take 500 mg by mouth 3 times daily as needed (Takes at least 6 hours apart).        apixaban ANTICOAGULANT (ELIQUIS ANTICOAGULANT) 5 MG tablet Take 1 tablet (5 mg) by mouth 2 times daily is blood thinner 180 tablet 3     furosemide (LASIX) 20 MG tablet Take 1 tablet (20 mg) by mouth 2 times daily 180 tablet 1     glimepiride (AMARYL) 2 MG tablet TAKE 1 TABLET (2 MG) BY MOUTH 2 TIMES DAILY 180 tablet 1     levothyroxine (SYNTHROID/LEVOTHROID) 100 MCG tablet Take 1 tablet (100 mcg) by mouth daily for thyroid 90 tablet 1     metFORMIN (GLUCOPHAGE) 500 MG tablet Take 1 tablet (500 mg) by mouth 2 times daily (with meals) for diabetes 180 tablet 3     metoprolol succinate ER (TOPROL-XL) 100 MG 24 hr tablet Take 1 tablet (100 mg) by mouth 2 times daily for Blood Pressure and heart 180 tablet 3     multivitamin  with lutein (OCUVITE WITH LTEIN) CAPS per capsule Take 1 capsule by mouth daily       triamcinolone (NASACORT) 55 MCG/ACT nasal aerosol Spray 2 sprays into both nostrils daily       UNABLE TO FIND MEDICATION NAME: [employer] Vegan CBD gummies       blood glucose (ACCU-CHEK SMARTVIEW) test strip 1 strip by In Vitro route daily (Patient not taking: Reported on 8/4/2021) 1 Box prn     blood glucose (NO BRAND SPECIFIED) lancets standard Use to test blood sugar 1 times daily or as directed. (Patient not taking: Reported on 8/4/2021) 100 each 11     blood glucose monitoring (ACCU-CHEK FASTCLIX) lancets Use to check blood sugars daily (Patient not taking: Reported on 8/4/2021) 1 Box prn     Continuous Blood Gluc  (FREESTYLE STELLA 14 DAY READER) VERO 1 each every 14 days (Patient not taking: Reported on 8/4/2021) 1 Device 0     Continuous Blood Gluc Sensor (FREESTYLE STELLA 14 DAY SENSOR) MISC 1 each every 14 days (Patient not taking: Reported on 8/4/2021) 2 each 11     STATIN NOT PRESCRIBED, INTENTIONAL, continuous prn. Statin not prescribed intentionally due to Other: Not needed, LDL at goal <100mg/dL without  therapy (Patient not taking: Reported on 2021) 0 each 0           Past Medical History:     Past Medical History:   Diagnosis Date     Atrial fibrillation (H)     Nl LVEF, s/p ablation      Congestive heart failure, unspecified      Diabetes mellitus (H)      Hemorrhoids     intermittent bleeding     Hypertension      Macular degeneration      OA (osteoarthritis)      Pacemaker 3/2013     Uterine cancer (H)     s/p hyst     Past Surgical History:   Procedure Laterality Date     C ANESTH,PACEMAKER INSERTION      dual chamber 3/27/13     CHOLECYSTECTOMY  2004     GYN SURGERY       HYSTERECTOMY      Ut ca      JOINT REPLACEMENT       KERATOTOMY ARCUATE WITH FEMTOSECOND LASER/IMAGING FOR ATIOL Right 2017    Procedure: KERATOTOMY ARCUATE WITH FEMTOSECOND LASER/IMAGING FOR ATIOL;  Surgeon: Calvin Dominguez MD;  Location:  [facility]     PHACOEMULSIFICATION CLEAR CORNEA WITH STANDARD INTRAOCULAR LENS IMPLANT Right 2017    Procedure: PHACOEMULSIFICATION CLEAR CORNEA WITH STANDARD INTRAOCULAR LENS IMPLANT;  Surgeon: Calvin Dominguez MD;  Location: [facility]     TKR      bilat     Family History   Problem Relation Age of Onset     Gastrointestinal Disease Mother         bowel obstruction     Cardiovascular Father      C.A.D. Father      Cardiovascular Brother         CHF     Social History     Socioeconomic History     Marital status: Single     Spouse name: Not on file     Number of children: Not on file     Years of education: Not on file     Highest education level: Not on file   Occupational History     Not on file   Tobacco Use     Smoking status: Former Smoker     Quit date: 1990     Years since quittin.6     Smokeless tobacco: Never Used   Substance and Sexual Activity     Alcohol use: No     Alcohol/week: 0.0 standard drinks     Drug use: No     Sexual activity: Not Currently     Partners: Male   Other Topics Concern     Parent/sibling w/ CABG, MI or angioplasty before 65F 55M? No       Service Not Asked     Blood Transfusions Not Asked     Caffeine Concern Yes     Comment: daily      Occupational Exposure Not Asked     Hobby Hazards Not Asked     Sleep Concern No     Stress Concern Not Asked     Weight Concern Not Asked     Special Diet Yes     Comment: low sodium, less leafy greens, low sugar     Back Care Not Asked     Exercise No     Bike Helmet Not Asked     Seat Belt Yes     Self-Exams Not Asked   Social History Narrative     Not on file     Social Determinants of Health     Financial Resource Strain:      Difficulty of Paying Living Expenses:    Food Insecurity:      Worried About Running Out of Food in the Last Year:      Ran Out of Food in the Last Year:    Transportation Needs:      Lack of Transportation (Medical):      Lack of Transportation (Non-Medical):    Physical Activity:      Days of Exercise per Week:      Minutes of Exercise per Session:    Stress:      Feeling of Stress :    Social Connections:      Frequency of Communication with Friends and Family:      Frequency of Social Gatherings with Friends and Family:      Attends [employer] Services:      Active Member of Clubs or Organizations:      Attends Club or Organization Meetings:      Marital Status:    Intimate Partner Violence:      Fear of Current or Ex-Partner:      Emotionally Abused:      Physically Abused:      Sexually Abused:            Allergies:   Shellfish allergy, Ambien [zolpidem], Cats, Lisinopril, Miscellaneous [no clinical screening - see comments], Seasonal allergies, and Sulfa drugs      Eliz Goodwin PA-C, PAMarthaC  [facility]  Pager: [phone]

## 2021-08-04 NOTE — PATIENT INSTRUCTIONS
Today, we discussed the following:    Everything with your heart looks stable! Try to stay as active as possible to avoid further weight gain.   - Medication changes:  None today.   - Follow up: With me in 6 months with a blood draw prior.     Please, remember to continue the followin.  Weigh yourself daily. Call if your weight is up > than 2 pounds in one day, or 5 pounds in one week; if you feel more short of breath or have worsening swelling in your legs or abdomen.  2.  Eat a low sodium diet (less than 2,000mg or 2g daily). If you eat less salt, you will retain less fluid.   3.  Avoid alcohol, as this can worsen heart failure.   4.  Avoid NSAIDs as able (For example, Ibuprofen / aleve / advil / naprosen / diclofenac).    Please call CORE nurse for any questions or concerns Mon-Fri 8am-4pm:   [phone]  For concerns after hours:   [phone] option 2   Scheduling phone number:   [phone]    Thank you for visiting with us today.   Eliz Goodwin PA-C  ______________________________________________________________

## 2021-09-23 NOTE — LETTER
[facility]  [address] [facility]  [address]  [place]  [address]  Tel: [phone]    April 11, 2017    Shelly Rogers  [address]   [place] [address]        Dear Ms. Rogers,    Ferritin which is iron stores in the body is low.  We want Ferritin level greater than 50  Take OTC Ferrous Sulfate 325 mg po once daily if you tolerate.  Take with vit C as vit C helps to absorb iron.  Iron can make you constipated so take stool softener.  Recheck ferritin in 4 months  She will need work up including colonoscopy and endoscopy  Let me know if you want me to put orders for that for work up of iron deficiency  Low iron could be from hemorrhoidal bleeding.  But work up is still important.  HbA1c which is average glucose during last 3 months is above the goal  Lab Results       Component                Value               Date                       A1C                      8.1                 04/06/2017                 A1C                      8.0                 09/12/2016                 A1C                      7.5                 02/16/2016                 A1C                      9.2                 10/27/2015                 A1C                      7.9                 10/30/2014            Increase the glimepride to 2 mg twice daily  As dose is increased watch for hypoglycemic symptoms  TSH which is thyroid hormone is normal.  Stay on current dose of levothyroxine.  Recheck TSH in 6 months  CBC result which includes white count Hemoglobin and  Platelet Counts is normal.   The testing of your kidney function, liver function and electrolytes was satisfactory   Follow up appointment in 3-4 months    If you have any further questions or problems, please contact our office.      Sincerely,    Halley Huff MD/marifer    Results for orders placed or performed in visit on 04/06/17   TSH   Result Value Ref Range    TSH 2.09 0.40 - 4.00 mU/L   Comprehensive metabolic panel   Result Value Ref Range     Sodium 137 133 - 144 mmol/L    Potassium 4.0 3.4 - 5.3 mmol/L    Chloride 101 94 - 109 mmol/L    Carbon Dioxide 28 20 - 32 mmol/L    Anion Gap 8 3 - 14 mmol/L    Glucose 175 (H) 70 - 99 mg/dL    Urea Nitrogen 18 7 - 30 mg/dL    Creatinine 1.00 0.52 - 1.04 mg/dL    GFR Estimate 53 (L) >60 mL/min/1.7m2    GFR Estimate If Black 64 >60 mL/min/1.7m2    Calcium 9.1 8.5 - 10.1 mg/dL    Bilirubin Total 0.5 0.2 - 1.3 mg/dL    Albumin 3.8 3.4 - 5.0 g/dL    Protein Total 7.4 6.8 - 8.8 g/dL    Alkaline Phosphatase 82 40 - 150 U/L    ALT 17 0 - 50 U/L    AST 8 0 - 45 U/L   CBC with platelets   Result Value Ref Range    WBC 9.4 4.0 - 11.0 10e9/L    RBC Count 4.21 3.8 - 5.2 10e12/L    Hemoglobin 13.3 11.7 - 15.7 g/dL    Hematocrit 40.6 35.0 - 47.0 %    MCV 96 78 - 100 fl    MCH 31.6 26.5 - 33.0 pg    MCHC 32.8 31.5 - 36.5 g/dL    RDW 14.9 10.0 - 15.0 %    Platelet Count 346 150 - 450 10e9/L   Ferritin   Result Value Ref Range    Ferritin 18 8 - 252 ng/mL   Hemoglobin A1c   Result Value Ref Range    Hemoglobin A1C 8.1 (H) 4.3 - 6.0 %           Enclosure: Lab Results       Detail Level: Detailed Detail Level: Generalized Patient Specific Counseling (Will Not Stick From Patient To Patient): SUGGESTED MINERAL BLOCK SPF’s DAILY (for tender skin areas).\\nSUNSCREENS (for body) 30+ Detail Level: Zone

## 2021-11-26 NOTE — TELEPHONE ENCOUNTER
Creatinine   Date Value Ref Range Status   05/07/2021 1.12 (H) 0.52 - 1.04 mg/dL Final     Please refill as appropriate.  Thank you,    Valentina Scott RN  [facility]

## 2022-01-01 ENCOUNTER — APPOINTMENT (OUTPATIENT)
Dept: PHYSICAL THERAPY | Facility: CLINIC | Age: 87
End: 2022-01-01
Attending: HOSPITALIST
Payer: MEDICARE

## 2022-01-01 ENCOUNTER — OFFICE VISIT (OUTPATIENT)
Dept: CARDIOLOGY | Facility: CLINIC | Age: 87
End: 2022-01-01
Payer: MEDICARE

## 2022-01-01 ENCOUNTER — NURSE TRIAGE (OUTPATIENT)
Dept: FAMILY MEDICINE | Facility: CLINIC | Age: 87
End: 2022-01-01

## 2022-01-01 ENCOUNTER — NURSE TRIAGE (OUTPATIENT)
Dept: FAMILY MEDICINE | Facility: CLINIC | Age: 87
End: 2022-01-01
Payer: MEDICARE

## 2022-01-01 ENCOUNTER — MEDICAL CORRESPONDENCE (OUTPATIENT)
Dept: HEALTH INFORMATION MANAGEMENT | Facility: CLINIC | Age: 87
End: 2022-01-01
Payer: MEDICARE

## 2022-01-01 ENCOUNTER — PATIENT OUTREACH (OUTPATIENT)
Dept: CARE COORDINATION | Facility: CLINIC | Age: 87
End: 2022-01-01

## 2022-01-01 ENCOUNTER — PATIENT OUTREACH (OUTPATIENT)
Dept: NURSING | Facility: CLINIC | Age: 87
End: 2022-01-01
Payer: MEDICARE

## 2022-01-01 ENCOUNTER — APPOINTMENT (OUTPATIENT)
Dept: PHYSICAL THERAPY | Facility: CLINIC | Age: 87
DRG: 643 | End: 2022-01-01
Attending: INTERNAL MEDICINE
Payer: MEDICARE

## 2022-01-01 ENCOUNTER — MEDICAL CORRESPONDENCE (OUTPATIENT)
Dept: FAMILY MEDICINE | Facility: CLINIC | Age: 87
End: 2022-01-01

## 2022-01-01 ENCOUNTER — TELEPHONE (OUTPATIENT)
Dept: FAMILY MEDICINE | Facility: CLINIC | Age: 87
End: 2022-01-01
Payer: MEDICARE

## 2022-01-01 ENCOUNTER — ANCILLARY PROCEDURE (OUTPATIENT)
Dept: CARDIOLOGY | Facility: CLINIC | Age: 87
End: 2022-01-01
Attending: INTERNAL MEDICINE
Payer: MEDICARE

## 2022-01-01 ENCOUNTER — LAB (OUTPATIENT)
Dept: LAB | Facility: CLINIC | Age: 87
End: 2022-01-01
Payer: MEDICARE

## 2022-01-01 ENCOUNTER — OFFICE VISIT (OUTPATIENT)
Dept: GERIATRICS | Facility: CLINIC | Age: 87
End: 2022-01-01
Payer: MEDICARE

## 2022-01-01 ENCOUNTER — OFFICE VISIT (OUTPATIENT)
Dept: FAMILY MEDICINE | Facility: CLINIC | Age: 87
End: 2022-01-01
Payer: MEDICARE

## 2022-01-01 ENCOUNTER — LAB REQUISITION (OUTPATIENT)
Dept: LAB | Facility: CLINIC | Age: 87
DRG: 643 | End: 2022-01-01
Payer: MEDICARE

## 2022-01-01 ENCOUNTER — MEDICAL CORRESPONDENCE (OUTPATIENT)
Dept: HEALTH INFORMATION MANAGEMENT | Facility: CLINIC | Age: 87
End: 2022-01-01

## 2022-01-01 ENCOUNTER — PATIENT OUTREACH (OUTPATIENT)
Dept: CARE COORDINATION | Facility: CLINIC | Age: 87
End: 2022-01-01
Payer: MEDICARE

## 2022-01-01 ENCOUNTER — TELEPHONE (OUTPATIENT)
Dept: FAMILY MEDICINE | Facility: CLINIC | Age: 87
End: 2022-01-01

## 2022-01-01 ENCOUNTER — APPOINTMENT (OUTPATIENT)
Dept: PHYSICAL THERAPY | Facility: CLINIC | Age: 87
DRG: 643 | End: 2022-01-01
Payer: MEDICARE

## 2022-01-01 ENCOUNTER — CARE COORDINATION (OUTPATIENT)
Dept: CARDIOLOGY | Facility: CLINIC | Age: 87
End: 2022-01-01
Payer: MEDICARE

## 2022-01-01 ENCOUNTER — TRANSITIONAL CARE UNIT VISIT (OUTPATIENT)
Dept: GERIATRICS | Facility: CLINIC | Age: 87
End: 2022-01-01
Payer: MEDICARE

## 2022-01-01 ENCOUNTER — HOSPITAL ENCOUNTER (INPATIENT)
Facility: CLINIC | Age: 87
LOS: 3 days | Discharge: NURSING, REHAB OR LONG-TERM CARE | DRG: 643 | End: 2022-04-06
Attending: EMERGENCY MEDICINE | Admitting: INTERNAL MEDICINE
Payer: MEDICARE

## 2022-01-01 ENCOUNTER — TELEPHONE (OUTPATIENT)
Dept: GERIATRICS | Facility: CLINIC | Age: 87
End: 2022-01-01

## 2022-01-01 ENCOUNTER — APPOINTMENT (OUTPATIENT)
Dept: CT IMAGING | Facility: CLINIC | Age: 87
End: 2022-01-01
Attending: EMERGENCY MEDICINE
Payer: MEDICARE

## 2022-01-01 ENCOUNTER — TELEPHONE (OUTPATIENT)
Dept: CARDIOLOGY | Facility: CLINIC | Age: 87
End: 2022-01-01
Payer: MEDICARE

## 2022-01-01 ENCOUNTER — DISCHARGE SUMMARY NURSING HOME (OUTPATIENT)
Dept: GERIATRICS | Facility: CLINIC | Age: 87
End: 2022-01-01
Payer: MEDICARE

## 2022-01-01 ENCOUNTER — CARE COORDINATION (OUTPATIENT)
Dept: CARDIOLOGY | Facility: CLINIC | Age: 87
End: 2022-01-01

## 2022-01-01 ENCOUNTER — DOCUMENTATION ONLY (OUTPATIENT)
Dept: CARDIOLOGY | Facility: CLINIC | Age: 87
End: 2022-01-01

## 2022-01-01 ENCOUNTER — HOSPITAL ENCOUNTER (OUTPATIENT)
Facility: CLINIC | Age: 87
Setting detail: OBSERVATION
Discharge: NURSING, REHAB OR LONG-TERM CARE | End: 2022-04-01
Attending: EMERGENCY MEDICINE | Admitting: HOSPITALIST
Payer: MEDICARE

## 2022-01-01 VITALS
BODY MASS INDEX: 26.68 KG/M2 | HEART RATE: 99 BPM | RESPIRATION RATE: 18 BRPM | OXYGEN SATURATION: 95 % | SYSTOLIC BLOOD PRESSURE: 160 MMHG | TEMPERATURE: 97.8 F | WEIGHT: 145 LBS | HEIGHT: 62 IN | DIASTOLIC BLOOD PRESSURE: 84 MMHG

## 2022-01-01 VITALS
TEMPERATURE: 97.5 F | HEART RATE: 91 BPM | DIASTOLIC BLOOD PRESSURE: 88 MMHG | HEIGHT: 62 IN | BODY MASS INDEX: 26.94 KG/M2 | RESPIRATION RATE: 18 BRPM | OXYGEN SATURATION: 93 % | SYSTOLIC BLOOD PRESSURE: 149 MMHG | WEIGHT: 146.4 LBS

## 2022-01-01 VITALS
HEIGHT: 62 IN | RESPIRATION RATE: 18 BRPM | OXYGEN SATURATION: 94 % | DIASTOLIC BLOOD PRESSURE: 72 MMHG | TEMPERATURE: 97.5 F | WEIGHT: 146.4 LBS | BODY MASS INDEX: 26.94 KG/M2 | HEART RATE: 70 BPM | SYSTOLIC BLOOD PRESSURE: 146 MMHG

## 2022-01-01 VITALS
RESPIRATION RATE: 16 BRPM | SYSTOLIC BLOOD PRESSURE: 130 MMHG | TEMPERATURE: 98.8 F | OXYGEN SATURATION: 95 % | HEIGHT: 62 IN | HEART RATE: 84 BPM | WEIGHT: 147 LBS | DIASTOLIC BLOOD PRESSURE: 81 MMHG | BODY MASS INDEX: 27.05 KG/M2

## 2022-01-01 VITALS
HEART RATE: 87 BPM | DIASTOLIC BLOOD PRESSURE: 76 MMHG | HEIGHT: 64 IN | WEIGHT: 155 LBS | BODY MASS INDEX: 26.46 KG/M2 | OXYGEN SATURATION: 97 % | SYSTOLIC BLOOD PRESSURE: 136 MMHG

## 2022-01-01 VITALS
HEART RATE: 85 BPM | HEIGHT: 62 IN | BODY MASS INDEX: 26.94 KG/M2 | WEIGHT: 146.4 LBS | TEMPERATURE: 97.5 F | RESPIRATION RATE: 18 BRPM | OXYGEN SATURATION: 92 % | SYSTOLIC BLOOD PRESSURE: 146 MMHG | DIASTOLIC BLOOD PRESSURE: 79 MMHG

## 2022-01-01 VITALS
TEMPERATURE: 98.6 F | SYSTOLIC BLOOD PRESSURE: 136 MMHG | OXYGEN SATURATION: 95 % | DIASTOLIC BLOOD PRESSURE: 65 MMHG | WEIGHT: 152.3 LBS | BODY MASS INDEX: 25.37 KG/M2 | RESPIRATION RATE: 16 BRPM | HEART RATE: 91 BPM | HEIGHT: 65 IN

## 2022-01-01 VITALS
BODY MASS INDEX: 25.54 KG/M2 | SYSTOLIC BLOOD PRESSURE: 133 MMHG | TEMPERATURE: 97.7 F | HEART RATE: 86 BPM | HEIGHT: 62 IN | DIASTOLIC BLOOD PRESSURE: 80 MMHG | OXYGEN SATURATION: 98 % | RESPIRATION RATE: 14 BRPM | WEIGHT: 138.8 LBS

## 2022-01-01 VITALS
DIASTOLIC BLOOD PRESSURE: 74 MMHG | WEIGHT: 146.4 LBS | BODY MASS INDEX: 26.94 KG/M2 | OXYGEN SATURATION: 94 % | TEMPERATURE: 97.8 F | RESPIRATION RATE: 19 BRPM | SYSTOLIC BLOOD PRESSURE: 126 MMHG | HEART RATE: 79 BPM | HEIGHT: 62 IN

## 2022-01-01 VITALS
HEIGHT: 62 IN | SYSTOLIC BLOOD PRESSURE: 133 MMHG | DIASTOLIC BLOOD PRESSURE: 82 MMHG | OXYGEN SATURATION: 94 % | HEART RATE: 80 BPM | WEIGHT: 146.4 LBS | BODY MASS INDEX: 26.94 KG/M2 | RESPIRATION RATE: 16 BRPM | TEMPERATURE: 98 F

## 2022-01-01 VITALS
HEART RATE: 83 BPM | OXYGEN SATURATION: 97 % | BODY MASS INDEX: 26.94 KG/M2 | TEMPERATURE: 97.6 F | RESPIRATION RATE: 20 BRPM | WEIGHT: 146.4 LBS | SYSTOLIC BLOOD PRESSURE: 142 MMHG | HEIGHT: 62 IN | DIASTOLIC BLOOD PRESSURE: 77 MMHG

## 2022-01-01 VITALS
WEIGHT: 134 LBS | SYSTOLIC BLOOD PRESSURE: 126 MMHG | BODY MASS INDEX: 24.66 KG/M2 | HEIGHT: 62 IN | TEMPERATURE: 97.1 F | HEART RATE: 80 BPM | OXYGEN SATURATION: 98 % | DIASTOLIC BLOOD PRESSURE: 67 MMHG

## 2022-01-01 VITALS
OXYGEN SATURATION: 97 % | RESPIRATION RATE: 16 BRPM | WEIGHT: 147.9 LBS | SYSTOLIC BLOOD PRESSURE: 126 MMHG | BODY MASS INDEX: 27.22 KG/M2 | TEMPERATURE: 98.3 F | DIASTOLIC BLOOD PRESSURE: 78 MMHG | HEIGHT: 62 IN | HEART RATE: 93 BPM

## 2022-01-01 DIAGNOSIS — Z95.0 CARDIAC PACEMAKER IN SITU: ICD-10-CM

## 2022-01-01 DIAGNOSIS — E11.9 TYPE 2 DIABETES MELLITUS WITHOUT COMPLICATION, WITHOUT LONG-TERM CURRENT USE OF INSULIN (H): ICD-10-CM

## 2022-01-01 DIAGNOSIS — I50.32 CHRONIC DIASTOLIC CONGESTIVE HEART FAILURE (H): ICD-10-CM

## 2022-01-01 DIAGNOSIS — I50.22 CHRONIC SYSTOLIC HEART FAILURE (H): Primary | ICD-10-CM

## 2022-01-01 DIAGNOSIS — I10 HYPERTENSION, UNSPECIFIED TYPE: ICD-10-CM

## 2022-01-01 DIAGNOSIS — U07.1 COVID-19: ICD-10-CM

## 2022-01-01 DIAGNOSIS — E11.9 TYPE 2 DIABETES MELLITUS WITHOUT COMPLICATION, WITHOUT LONG-TERM CURRENT USE OF INSULIN (H): Primary | ICD-10-CM

## 2022-01-01 DIAGNOSIS — Z95.0 CARDIAC PACEMAKER IN SITU: Primary | ICD-10-CM

## 2022-01-01 DIAGNOSIS — L72.3 SEBACEOUS CYST: ICD-10-CM

## 2022-01-01 DIAGNOSIS — N39.0 URINARY TRACT INFECTION WITHOUT HEMATURIA, SITE UNSPECIFIED: ICD-10-CM

## 2022-01-01 DIAGNOSIS — I48.20 CHRONIC ATRIAL FIBRILLATION (H): ICD-10-CM

## 2022-01-01 DIAGNOSIS — I50.32 CHRONIC DIASTOLIC CONGESTIVE HEART FAILURE (H): Primary | ICD-10-CM

## 2022-01-01 DIAGNOSIS — S32.010D CLOSED COMPRESSION FRACTURE OF L1 LUMBAR VERTEBRA, WITH ROUTINE HEALING, SUBSEQUENT ENCOUNTER: Primary | ICD-10-CM

## 2022-01-01 DIAGNOSIS — F51.4 NIGHT TERRORS: ICD-10-CM

## 2022-01-01 DIAGNOSIS — Z74.09 DECLINING MOBILITY: ICD-10-CM

## 2022-01-01 DIAGNOSIS — S32.010S CLOSED COMPRESSION FRACTURE OF L1 LUMBAR VERTEBRA, SEQUELA: ICD-10-CM

## 2022-01-01 DIAGNOSIS — C54.9 MALIGNANT NEOPLASM OF CORPUS UTERI, EXCEPT ISTHMUS (H): ICD-10-CM

## 2022-01-01 DIAGNOSIS — F41.9 ANXIETY: ICD-10-CM

## 2022-01-01 DIAGNOSIS — E11.00 TYPE 2 DIABETES MELLITUS WITH HYPEROSMOLARITY WITHOUT COMA, WITHOUT LONG-TERM CURRENT USE OF INSULIN (H): Primary | ICD-10-CM

## 2022-01-01 DIAGNOSIS — S32.010D CLOSED WEDGE COMPRESSION FRACTURE OF L1 VERTEBRA WITH ROUTINE HEALING, SUBSEQUENT ENCOUNTER: ICD-10-CM

## 2022-01-01 DIAGNOSIS — H35.3220 EXUDATIVE AGE-RELATED MACULAR DEGENERATION OF LEFT EYE, UNSPECIFIED STAGE (H): ICD-10-CM

## 2022-01-01 DIAGNOSIS — Z97.8 FOLEY CATHETER PRESENT: ICD-10-CM

## 2022-01-01 DIAGNOSIS — S32.019A CLOSED FRACTURE OF FIRST LUMBAR VERTEBRA, UNSPECIFIED FRACTURE MORPHOLOGY, INITIAL ENCOUNTER (H): ICD-10-CM

## 2022-01-01 DIAGNOSIS — S32.019A CLOSED FRACTURE OF FIRST LUMBAR VERTEBRA, UNSPECIFIED FRACTURE MORPHOLOGY, INITIAL ENCOUNTER (H): Primary | ICD-10-CM

## 2022-01-01 DIAGNOSIS — E87.1 HYPONATREMIA: ICD-10-CM

## 2022-01-01 DIAGNOSIS — S32.019S CLOSED FRACTURE OF FIRST LUMBAR VERTEBRA, UNSPECIFIED FRACTURE MORPHOLOGY, SEQUELA: ICD-10-CM

## 2022-01-01 DIAGNOSIS — R33.9 URINE RETENTION: ICD-10-CM

## 2022-01-01 DIAGNOSIS — N18.30 STAGE 3 CHRONIC KIDNEY DISEASE, UNSPECIFIED WHETHER STAGE 3A OR 3B CKD (H): Primary | ICD-10-CM

## 2022-01-01 DIAGNOSIS — E03.9 HYPOTHYROIDISM, UNSPECIFIED TYPE: ICD-10-CM

## 2022-01-01 DIAGNOSIS — N30.00 ACUTE CYSTITIS WITHOUT HEMATURIA: ICD-10-CM

## 2022-01-01 DIAGNOSIS — S32.019S CLOSED FRACTURE OF FIRST LUMBAR VERTEBRA, UNSPECIFIED FRACTURE MORPHOLOGY, SEQUELA: Primary | ICD-10-CM

## 2022-01-01 DIAGNOSIS — S32.010D CLOSED COMPRESSION FRACTURE OF L1 LUMBAR VERTEBRA, WITH ROUTINE HEALING, SUBSEQUENT ENCOUNTER: ICD-10-CM

## 2022-01-01 DIAGNOSIS — F51.4 NIGHT TERRORS: Primary | ICD-10-CM

## 2022-01-01 DIAGNOSIS — I48.91 ATRIAL FIBRILLATION (H): ICD-10-CM

## 2022-01-01 DIAGNOSIS — N18.30 STAGE 3 CHRONIC KIDNEY DISEASE, UNSPECIFIED WHETHER STAGE 3A OR 3B CKD (H): ICD-10-CM

## 2022-01-01 DIAGNOSIS — N18.31 STAGE 3A CHRONIC KIDNEY DISEASE (H): ICD-10-CM

## 2022-01-01 DIAGNOSIS — E11.22 TYPE 2 DIABETES MELLITUS WITH CHRONIC KIDNEY DISEASE, WITHOUT LONG-TERM CURRENT USE OF INSULIN, UNSPECIFIED CKD STAGE (H): ICD-10-CM

## 2022-01-01 DIAGNOSIS — E11.00 TYPE 2 DIABETES MELLITUS WITH HYPEROSMOLARITY WITHOUT COMA, WITHOUT LONG-TERM CURRENT USE OF INSULIN (H): ICD-10-CM

## 2022-01-01 DIAGNOSIS — I50.813 ACUTE ON CHRONIC RIGHT-SIDED CONGESTIVE HEART FAILURE (H): ICD-10-CM

## 2022-01-01 LAB
ALBUMIN SERPL-MCNC: 3.5 G/DL (ref 3.4–5)
ALBUMIN UR-MCNC: 10 MG/DL
ALBUMIN UR-MCNC: 50 MG/DL
ALP SERPL-CCNC: 109 U/L (ref 40–150)
ALT SERPL W P-5'-P-CCNC: 16 U/L (ref 0–50)
ANION GAP SERPL CALCULATED.3IONS-SCNC: 5 MMOL/L (ref 3–14)
ANION GAP SERPL CALCULATED.3IONS-SCNC: 5 MMOL/L (ref 3–14)
ANION GAP SERPL CALCULATED.3IONS-SCNC: 6 MMOL/L (ref 3–14)
ANION GAP SERPL CALCULATED.3IONS-SCNC: 7 MMOL/L (ref 3–14)
ANION GAP SERPL CALCULATED.3IONS-SCNC: 7 MMOL/L (ref 3–14)
ANION GAP SERPL CALCULATED.3IONS-SCNC: 8 MMOL/L (ref 3–14)
ANION GAP SERPL CALCULATED.3IONS-SCNC: 8 MMOL/L (ref 3–14)
ANION GAP SERPL CALCULATED.3IONS-SCNC: 9 MMOL/L (ref 3–14)
ANION GAP SERPL CALCULATED.3IONS-SCNC: 9 MMOL/L (ref 3–14)
APPEARANCE UR: ABNORMAL
APPEARANCE UR: CLEAR
AST SERPL W P-5'-P-CCNC: 23 U/L (ref 0–45)
BACTERIA #/AREA URNS HPF: ABNORMAL /HPF
BACTERIA #/AREA URNS HPF: ABNORMAL /HPF
BACTERIA UR CULT: ABNORMAL
BACTERIA UR CULT: ABNORMAL
BASOPHILS # BLD AUTO: 0.1 10E3/UL (ref 0–0.2)
BASOPHILS # BLD AUTO: 0.1 10E3/UL (ref 0–0.2)
BASOPHILS NFR BLD AUTO: 1 %
BASOPHILS NFR BLD AUTO: 1 %
BILIRUB SERPL-MCNC: 1.2 MG/DL (ref 0.2–1.3)
BILIRUB UR QL STRIP: NEGATIVE
BILIRUB UR QL STRIP: NEGATIVE
BUN SERPL-MCNC: 10 MG/DL (ref 7–30)
BUN SERPL-MCNC: 10 MG/DL (ref 7–30)
BUN SERPL-MCNC: 12 MG/DL (ref 7–30)
BUN SERPL-MCNC: 12 MG/DL (ref 7–30)
BUN SERPL-MCNC: 7 MG/DL (ref 7–30)
BUN SERPL-MCNC: 8 MG/DL (ref 7–30)
BUN SERPL-MCNC: 9 MG/DL (ref 7–30)
CALCIUM SERPL-MCNC: 8.4 MG/DL (ref 8.5–10.1)
CALCIUM SERPL-MCNC: 8.7 MG/DL (ref 8.5–10.1)
CALCIUM SERPL-MCNC: 8.8 MG/DL (ref 8.5–10.1)
CALCIUM SERPL-MCNC: 8.9 MG/DL (ref 8.5–10.1)
CALCIUM SERPL-MCNC: 9 MG/DL (ref 8.5–10.1)
CALCIUM SERPL-MCNC: 9.1 MG/DL (ref 8.5–10.1)
CALCIUM SERPL-MCNC: 9.4 MG/DL (ref 8.5–10.1)
CHLORIDE BLD-SCNC: 100 MMOL/L (ref 94–109)
CHLORIDE BLD-SCNC: 100 MMOL/L (ref 94–109)
CHLORIDE BLD-SCNC: 101 MMOL/L (ref 94–109)
CHLORIDE BLD-SCNC: 95 MMOL/L (ref 94–109)
CHLORIDE BLD-SCNC: 96 MMOL/L (ref 94–109)
CHLORIDE BLD-SCNC: 97 MMOL/L (ref 94–109)
CHLORIDE BLD-SCNC: 99 MMOL/L (ref 94–109)
CHOLEST SERPL-MCNC: 142 MG/DL
CK SERPL-CCNC: 117 U/L (ref 30–225)
CO2 SERPL-SCNC: 24 MMOL/L (ref 20–32)
CO2 SERPL-SCNC: 25 MMOL/L (ref 20–32)
CO2 SERPL-SCNC: 25 MMOL/L (ref 20–32)
CO2 SERPL-SCNC: 26 MMOL/L (ref 20–32)
CO2 SERPL-SCNC: 28 MMOL/L (ref 20–32)
CO2 SERPL-SCNC: 28 MMOL/L (ref 20–32)
COLOR UR AUTO: ABNORMAL
COLOR UR AUTO: ABNORMAL
CREAT SERPL-MCNC: 0.55 MG/DL (ref 0.52–1.04)
CREAT SERPL-MCNC: 0.56 MG/DL (ref 0.52–1.04)
CREAT SERPL-MCNC: 0.6 MG/DL (ref 0.52–1.04)
CREAT SERPL-MCNC: 0.61 MG/DL (ref 0.52–1.04)
CREAT SERPL-MCNC: 0.61 MG/DL (ref 0.52–1.04)
CREAT SERPL-MCNC: 0.62 MG/DL (ref 0.52–1.04)
CREAT SERPL-MCNC: 0.62 MG/DL (ref 0.52–1.04)
CREAT SERPL-MCNC: 0.65 MG/DL (ref 0.52–1.04)
CREAT SERPL-MCNC: 0.67 MG/DL (ref 0.52–1.04)
CREAT SERPL-MCNC: 0.72 MG/DL (ref 0.52–1.04)
CREAT SERPL-MCNC: 0.92 MG/DL (ref 0.52–1.04)
CREAT UR-MCNC: 25 MG/DL
CREAT UR-MCNC: 63 MG/DL
EOSINOPHIL # BLD AUTO: 0 10E3/UL (ref 0–0.7)
EOSINOPHIL # BLD AUTO: 0.1 10E3/UL (ref 0–0.7)
EOSINOPHIL NFR BLD AUTO: 0 %
EOSINOPHIL NFR BLD AUTO: 1 %
ERYTHROCYTE [DISTWIDTH] IN BLOOD BY AUTOMATED COUNT: 17.1 % (ref 10–15)
ERYTHROCYTE [DISTWIDTH] IN BLOOD BY AUTOMATED COUNT: 17.2 % (ref 10–15)
ERYTHROCYTE [DISTWIDTH] IN BLOOD BY AUTOMATED COUNT: 17.8 % (ref 10–15)
ERYTHROCYTE [DISTWIDTH] IN BLOOD BY AUTOMATED COUNT: 17.9 % (ref 10–15)
FASTING STATUS PATIENT QL REPORTED: YES
FRACT EXCRET NA UR+SERPL-RTO: 0.3 %
GFR SERPL CREATININE-BSD FRML MDRD: 59 ML/MIN/1.73M2
GFR SERPL CREATININE-BSD FRML MDRD: 79 ML/MIN/1.73M2
GFR SERPL CREATININE-BSD FRML MDRD: 83 ML/MIN/1.73M2
GFR SERPL CREATININE-BSD FRML MDRD: 84 ML/MIN/1.73M2
GFR SERPL CREATININE-BSD FRML MDRD: 85 ML/MIN/1.73M2
GFR SERPL CREATININE-BSD FRML MDRD: 87 ML/MIN/1.73M2
GLUCOSE BLD-MCNC: 124 MG/DL (ref 70–99)
GLUCOSE BLD-MCNC: 129 MG/DL (ref 70–99)
GLUCOSE BLD-MCNC: 135 MG/DL (ref 70–99)
GLUCOSE BLD-MCNC: 154 MG/DL (ref 70–99)
GLUCOSE BLD-MCNC: 156 MG/DL (ref 70–99)
GLUCOSE BLD-MCNC: 167 MG/DL (ref 70–99)
GLUCOSE BLD-MCNC: 174 MG/DL (ref 70–99)
GLUCOSE BLD-MCNC: 201 MG/DL (ref 70–99)
GLUCOSE BLD-MCNC: 209 MG/DL (ref 70–99)
GLUCOSE BLDC GLUCOMTR-MCNC: 132 MG/DL (ref 70–99)
GLUCOSE BLDC GLUCOMTR-MCNC: 133 MG/DL (ref 70–99)
GLUCOSE BLDC GLUCOMTR-MCNC: 142 MG/DL (ref 70–99)
GLUCOSE BLDC GLUCOMTR-MCNC: 146 MG/DL (ref 70–99)
GLUCOSE BLDC GLUCOMTR-MCNC: 161 MG/DL (ref 70–99)
GLUCOSE BLDC GLUCOMTR-MCNC: 165 MG/DL (ref 70–99)
GLUCOSE BLDC GLUCOMTR-MCNC: 180 MG/DL (ref 70–99)
GLUCOSE BLDC GLUCOMTR-MCNC: 72 MG/DL (ref 70–99)
GLUCOSE UR STRIP-MCNC: NEGATIVE MG/DL
GLUCOSE UR STRIP-MCNC: NEGATIVE MG/DL
HBA1C MFR BLD: 7.5 % (ref 0–5.6)
HCT VFR BLD AUTO: 35.7 % (ref 35–47)
HCT VFR BLD AUTO: 37.3 % (ref 35–47)
HCT VFR BLD AUTO: 39.2 % (ref 35–47)
HCT VFR BLD AUTO: 40.8 % (ref 35–47)
HDLC SERPL-MCNC: 43 MG/DL
HGB BLD-MCNC: 11.2 G/DL (ref 11.7–15.7)
HGB BLD-MCNC: 11.4 G/DL (ref 11.7–15.7)
HGB BLD-MCNC: 11.8 G/DL (ref 11.7–15.7)
HGB BLD-MCNC: 12 G/DL (ref 11.7–15.7)
HGB BLD-MCNC: 13.1 G/DL (ref 11.7–15.7)
HGB UR QL STRIP: NEGATIVE
HGB UR QL STRIP: NEGATIVE
HOLD SPECIMEN: NORMAL
HOLD SPECIMEN: NORMAL
IMM GRANULOCYTES # BLD: 0.1 10E3/UL
IMM GRANULOCYTES # BLD: 0.1 10E3/UL
IMM GRANULOCYTES NFR BLD: 1 %
IMM GRANULOCYTES NFR BLD: 1 %
KETONES UR STRIP-MCNC: 20 MG/DL
KETONES UR STRIP-MCNC: 40 MG/DL
LDLC SERPL CALC-MCNC: 73 MG/DL
LEUKOCYTE ESTERASE UR QL STRIP: ABNORMAL
LEUKOCYTE ESTERASE UR QL STRIP: NEGATIVE
LYMPHOCYTES # BLD AUTO: 1.3 10E3/UL (ref 0.8–5.3)
LYMPHOCYTES # BLD AUTO: 1.6 10E3/UL (ref 0.8–5.3)
LYMPHOCYTES NFR BLD AUTO: 10 %
LYMPHOCYTES NFR BLD AUTO: 16 %
MCH RBC QN AUTO: 26.5 PG (ref 26.5–33)
MCH RBC QN AUTO: 26.9 PG (ref 26.5–33)
MCH RBC QN AUTO: 27.1 PG (ref 26.5–33)
MCH RBC QN AUTO: 27.7 PG (ref 26.5–33)
MCHC RBC AUTO-ENTMCNC: 30.6 G/DL (ref 31.5–36.5)
MCHC RBC AUTO-ENTMCNC: 31.4 G/DL (ref 31.5–36.5)
MCHC RBC AUTO-ENTMCNC: 31.6 G/DL (ref 31.5–36.5)
MCHC RBC AUTO-ENTMCNC: 32.1 G/DL (ref 31.5–36.5)
MCV RBC AUTO: 85 FL (ref 78–100)
MCV RBC AUTO: 85 FL (ref 78–100)
MCV RBC AUTO: 86 FL (ref 78–100)
MCV RBC AUTO: 89 FL (ref 78–100)
MDC_IDC_LEAD_IMPLANT_DT: NORMAL
MDC_IDC_LEAD_LOCATION: NORMAL
MDC_IDC_LEAD_MFG: NORMAL
MDC_IDC_LEAD_MODEL: NORMAL
MDC_IDC_LEAD_POLARITY_TYPE: NORMAL
MDC_IDC_LEAD_SERIAL: NORMAL
MDC_IDC_MSMT_BATTERY_DTM: NORMAL
MDC_IDC_MSMT_BATTERY_IMPEDANCE: 2079 OHM
MDC_IDC_MSMT_BATTERY_IMPEDANCE: 2142 OHM
MDC_IDC_MSMT_BATTERY_IMPEDANCE: 2357 OHM
MDC_IDC_MSMT_BATTERY_REMAINING_LONGEVITY: 28 MO
MDC_IDC_MSMT_BATTERY_REMAINING_LONGEVITY: 31 MO
MDC_IDC_MSMT_BATTERY_REMAINING_LONGEVITY: 32 MO
MDC_IDC_MSMT_BATTERY_STATUS: NORMAL
MDC_IDC_MSMT_BATTERY_VOLTAGE: 2.74 V
MDC_IDC_MSMT_BATTERY_VOLTAGE: 2.75 V
MDC_IDC_MSMT_BATTERY_VOLTAGE: 2.75 V
MDC_IDC_MSMT_LEADCHNL_RA_IMPEDANCE_VALUE: 413 OHM
MDC_IDC_MSMT_LEADCHNL_RA_IMPEDANCE_VALUE: 441 OHM
MDC_IDC_MSMT_LEADCHNL_RA_IMPEDANCE_VALUE: 474 OHM
MDC_IDC_MSMT_LEADCHNL_RA_PACING_THRESHOLD_AMPLITUDE: 0.88 V
MDC_IDC_MSMT_LEADCHNL_RA_PACING_THRESHOLD_PULSEWIDTH: 0.4 MS
MDC_IDC_MSMT_LEADCHNL_RV_IMPEDANCE_VALUE: 423 OHM
MDC_IDC_MSMT_LEADCHNL_RV_IMPEDANCE_VALUE: 453 OHM
MDC_IDC_MSMT_LEADCHNL_RV_IMPEDANCE_VALUE: 482 OHM
MDC_IDC_MSMT_LEADCHNL_RV_PACING_THRESHOLD_AMPLITUDE: 0.38 V
MDC_IDC_MSMT_LEADCHNL_RV_PACING_THRESHOLD_AMPLITUDE: 0.5 V
MDC_IDC_MSMT_LEADCHNL_RV_PACING_THRESHOLD_AMPLITUDE: 0.5 V
MDC_IDC_MSMT_LEADCHNL_RV_PACING_THRESHOLD_PULSEWIDTH: 0.4 MS
MDC_IDC_PG_IMPLANT_DTM: NORMAL
MDC_IDC_PG_MFG: NORMAL
MDC_IDC_PG_MODEL: NORMAL
MDC_IDC_PG_SERIAL: NORMAL
MDC_IDC_PG_TYPE: NORMAL
MDC_IDC_SESS_CLINIC_NAME: NORMAL
MDC_IDC_SESS_DTM: NORMAL
MDC_IDC_SESS_TYPE: NORMAL
MDC_IDC_SET_BRADY_AT_MODE_SWITCH_MODE: NORMAL
MDC_IDC_SET_BRADY_AT_MODE_SWITCH_RATE: 175 {BEATS}/MIN
MDC_IDC_SET_BRADY_LOWRATE: 60 {BEATS}/MIN
MDC_IDC_SET_BRADY_MAX_SENSOR_RATE: 120 {BEATS}/MIN
MDC_IDC_SET_BRADY_MAX_TRACKING_RATE: 120 {BEATS}/MIN
MDC_IDC_SET_BRADY_MODE: NORMAL
MDC_IDC_SET_BRADY_PAV_DELAY_LOW: 350 MS
MDC_IDC_SET_BRADY_SAV_DELAY_LOW: 350 MS
MDC_IDC_SET_LEADCHNL_RA_PACING_AMPLITUDE: 1.5 V
MDC_IDC_SET_LEADCHNL_RA_PACING_CAPTURE_MODE: NORMAL
MDC_IDC_SET_LEADCHNL_RA_PACING_POLARITY: NORMAL
MDC_IDC_SET_LEADCHNL_RA_PACING_PULSEWIDTH: 0.4 MS
MDC_IDC_SET_LEADCHNL_RA_SENSING_POLARITY: NORMAL
MDC_IDC_SET_LEADCHNL_RA_SENSING_SENSITIVITY: 0.18 MV
MDC_IDC_SET_LEADCHNL_RV_PACING_AMPLITUDE: 2 V
MDC_IDC_SET_LEADCHNL_RV_PACING_CAPTURE_MODE: NORMAL
MDC_IDC_SET_LEADCHNL_RV_PACING_POLARITY: NORMAL
MDC_IDC_SET_LEADCHNL_RV_PACING_PULSEWIDTH: 0.4 MS
MDC_IDC_SET_LEADCHNL_RV_PACING_PULSEWIDTH: 0.4 MS
MDC_IDC_SET_LEADCHNL_RV_PACING_PULSEWIDTH: 0.46 MS
MDC_IDC_SET_LEADCHNL_RV_SENSING_POLARITY: NORMAL
MDC_IDC_SET_LEADCHNL_RV_SENSING_SENSITIVITY: 1.4 MV
MDC_IDC_SET_ZONE_DETECTION_INTERVAL: 342.86 MS
MDC_IDC_SET_ZONE_TYPE: NORMAL
MDC_IDC_STAT_AT_BURDEN_PERCENT: 49.4 %
MDC_IDC_STAT_AT_BURDEN_PERCENT: 65.8 %
MDC_IDC_STAT_AT_BURDEN_PERCENT: 96.1 %
MDC_IDC_STAT_AT_DTM_END: NORMAL
MDC_IDC_STAT_AT_DTM_START: NORMAL
MDC_IDC_STAT_AT_MODE_SW_COUNT: 4494
MDC_IDC_STAT_AT_MODE_SW_COUNT: 5972
MDC_IDC_STAT_AT_MODE_SW_COUNT: 6921
MDC_IDC_STAT_BRADY_AP_VP_PERCENT: 10 %
MDC_IDC_STAT_BRADY_AP_VP_PERCENT: 11 %
MDC_IDC_STAT_BRADY_AP_VP_PERCENT: 9 %
MDC_IDC_STAT_BRADY_AP_VS_PERCENT: 8 %
MDC_IDC_STAT_BRADY_AP_VS_PERCENT: 9 %
MDC_IDC_STAT_BRADY_AP_VS_PERCENT: 9 %
MDC_IDC_STAT_BRADY_AS_VP_PERCENT: 3 %
MDC_IDC_STAT_BRADY_AS_VP_PERCENT: 3 %
MDC_IDC_STAT_BRADY_AS_VP_PERCENT: 4 %
MDC_IDC_STAT_BRADY_AS_VS_PERCENT: 76 %
MDC_IDC_STAT_BRADY_AS_VS_PERCENT: 78 %
MDC_IDC_STAT_BRADY_AS_VS_PERCENT: 81 %
MDC_IDC_STAT_BRADY_DTM_END: NORMAL
MDC_IDC_STAT_BRADY_DTM_START: NORMAL
MDC_IDC_STAT_EPISODE_RECENT_COUNT: 0
MDC_IDC_STAT_EPISODE_RECENT_COUNT: 3144
MDC_IDC_STAT_EPISODE_RECENT_COUNT: 4319
MDC_IDC_STAT_EPISODE_RECENT_COUNT: 5011
MDC_IDC_STAT_EPISODE_RECENT_COUNT_DTM_END: NORMAL
MDC_IDC_STAT_EPISODE_RECENT_COUNT_DTM_START: NORMAL
MDC_IDC_STAT_EPISODE_TYPE: NORMAL
MICROALBUMIN UR-MCNC: 35 MG/L
MICROALBUMIN/CREAT UR: 140 MG/G CR (ref 0–25)
MONOCYTES # BLD AUTO: 1 10E3/UL (ref 0–1.3)
MONOCYTES # BLD AUTO: 1.2 10E3/UL (ref 0–1.3)
MONOCYTES NFR BLD AUTO: 11 %
MONOCYTES NFR BLD AUTO: 9 %
MUCOUS THREADS #/AREA URNS LPF: PRESENT /LPF
NEUTROPHILS # BLD AUTO: 10.2 10E3/UL (ref 1.6–8.3)
NEUTROPHILS # BLD AUTO: 7 10E3/UL (ref 1.6–8.3)
NEUTROPHILS NFR BLD AUTO: 70 %
NEUTROPHILS NFR BLD AUTO: 79 %
NITRATE UR QL: NEGATIVE
NITRATE UR QL: POSITIVE
NONHDLC SERPL-MCNC: 99 MG/DL
NRBC # BLD AUTO: 0 10E3/UL
NRBC # BLD AUTO: 0 10E3/UL
NRBC BLD AUTO-RTO: 0 /100
NRBC BLD AUTO-RTO: 0 /100
NT-PROBNP SERPL-MCNC: 2108 PG/ML (ref 0–450)
NT-PROBNP SERPL-MCNC: 4017 PG/ML (ref 0–1800)
OSMOLALITY SERPL: 275 MMOL/KG (ref 280–301)
OSMOLALITY UR: 363 MMOL/KG (ref 100–1200)
PH UR STRIP: 6 [PH] (ref 5–7)
PH UR STRIP: 7 [PH] (ref 5–7)
PLATELET # BLD AUTO: 315 10E3/UL (ref 150–450)
PLATELET # BLD AUTO: 351 10E3/UL (ref 150–450)
PLATELET # BLD AUTO: 362 10E3/UL (ref 150–450)
PLATELET # BLD AUTO: 407 10E3/UL (ref 150–450)
POTASSIUM BLD-SCNC: 3.5 MMOL/L (ref 3.4–5.3)
POTASSIUM BLD-SCNC: 3.5 MMOL/L (ref 3.4–5.3)
POTASSIUM BLD-SCNC: 3.6 MMOL/L (ref 3.4–5.3)
POTASSIUM BLD-SCNC: 3.8 MMOL/L (ref 3.4–5.3)
POTASSIUM BLD-SCNC: 3.8 MMOL/L (ref 3.4–5.3)
POTASSIUM BLD-SCNC: 4 MMOL/L (ref 3.4–5.3)
POTASSIUM BLD-SCNC: 4 MMOL/L (ref 3.4–5.3)
POTASSIUM BLD-SCNC: 4.1 MMOL/L (ref 3.4–5.3)
POTASSIUM BLD-SCNC: 4.4 MMOL/L (ref 3.4–5.3)
PROCALCITONIN SERPL-MCNC: 0.05 NG/ML
PROT SERPL-MCNC: 7.6 G/DL (ref 6.8–8.8)
RBC # BLD AUTO: 4.22 10E6/UL (ref 3.8–5.2)
RBC # BLD AUTO: 4.38 10E6/UL (ref 3.8–5.2)
RBC # BLD AUTO: 4.42 10E6/UL (ref 3.8–5.2)
RBC # BLD AUTO: 4.73 10E6/UL (ref 3.8–5.2)
RBC URINE: 4 /HPF
RBC URINE: <1 /HPF
SARS-COV-2 RNA RESP QL NAA+PROBE: NEGATIVE
SARS-COV-2 RNA RESP QL NAA+PROBE: NEGATIVE
SODIUM SERPL-SCNC: 128 MMOL/L (ref 133–144)
SODIUM SERPL-SCNC: 129 MMOL/L (ref 133–144)
SODIUM SERPL-SCNC: 130 MMOL/L (ref 133–144)
SODIUM SERPL-SCNC: 131 MMOL/L (ref 133–144)
SODIUM SERPL-SCNC: 132 MMOL/L (ref 133–144)
SODIUM SERPL-SCNC: 133 MMOL/L (ref 133–144)
SODIUM SERPL-SCNC: 134 MMOL/L (ref 133–144)
SODIUM UR-SCNC: 45 MMOL/L
SP GR UR STRIP: 1.01 (ref 1–1.03)
SP GR UR STRIP: 1.01 (ref 1–1.03)
SQUAMOUS EPITHELIAL: 3 /HPF
SQUAMOUS EPITHELIAL: <1 /HPF
TRIGL SERPL-MCNC: 128 MG/DL
TSH SERPL DL<=0.005 MIU/L-ACNC: 1.55 MU/L (ref 0.4–4)
UROBILINOGEN UR STRIP-MCNC: NORMAL MG/DL
UROBILINOGEN UR STRIP-MCNC: NORMAL MG/DL
WBC # BLD AUTO: 12.8 10E3/UL (ref 4–11)
WBC # BLD AUTO: 9.5 10E3/UL (ref 4–11)
WBC # BLD AUTO: 9.8 10E3/UL (ref 4–11)
WBC # BLD AUTO: 9.9 10E3/UL (ref 4–11)
WBC URINE: 0 /HPF
WBC URINE: 37 /HPF

## 2022-01-01 PROCEDURE — 99220 PR INITIAL OBSERVATION CARE,LEVEL III: CPT | Performed by: HOSPITALIST

## 2022-01-01 PROCEDURE — 250N000013 HC RX MED GY IP 250 OP 250 PS 637: Performed by: INTERNAL MEDICINE

## 2022-01-01 PROCEDURE — 99316 NF DSCHRG MGMT 30 MIN+: CPT | Performed by: NURSE PRACTITIONER

## 2022-01-01 PROCEDURE — 99233 SBSQ HOSP IP/OBS HIGH 50: CPT | Performed by: INTERNAL MEDICINE

## 2022-01-01 PROCEDURE — 99309 SBSQ NF CARE MODERATE MDM 30: CPT | Performed by: NURSE PRACTITIONER

## 2022-01-01 PROCEDURE — 80048 BASIC METABOLIC PNL TOTAL CA: CPT | Performed by: PHYSICIAN ASSISTANT

## 2022-01-01 PROCEDURE — 85025 COMPLETE CBC W/AUTO DIFF WBC: CPT | Performed by: INTERNAL MEDICINE

## 2022-01-01 PROCEDURE — 80048 BASIC METABOLIC PNL TOTAL CA: CPT | Performed by: INTERNAL MEDICINE

## 2022-01-01 PROCEDURE — 36415 COLL VENOUS BLD VENIPUNCTURE: CPT | Performed by: INTERNAL MEDICINE

## 2022-01-01 PROCEDURE — 96361 HYDRATE IV INFUSION ADD-ON: CPT

## 2022-01-01 PROCEDURE — 99239 HOSP IP/OBS DSCHRG MGMT >30: CPT | Performed by: INTERNAL MEDICINE

## 2022-01-01 PROCEDURE — 99213 OFFICE O/P EST LOW 20 MIN: CPT | Performed by: INTERNAL MEDICINE

## 2022-01-01 PROCEDURE — 82565 ASSAY OF CREATININE: CPT | Performed by: INTERNAL MEDICINE

## 2022-01-01 PROCEDURE — G0378 HOSPITAL OBSERVATION PER HR: HCPCS

## 2022-01-01 PROCEDURE — 82043 UR ALBUMIN QUANTITATIVE: CPT | Performed by: INTERNAL MEDICINE

## 2022-01-01 PROCEDURE — 97116 GAIT TRAINING THERAPY: CPT | Mod: GP

## 2022-01-01 PROCEDURE — 97116 GAIT TRAINING THERAPY: CPT | Mod: GP | Performed by: PHYSICAL THERAPIST

## 2022-01-01 PROCEDURE — 258N000003 HC RX IP 258 OP 636: Performed by: EMERGENCY MEDICINE

## 2022-01-01 PROCEDURE — 36415 COLL VENOUS BLD VENIPUNCTURE: CPT | Performed by: PHYSICIAN ASSISTANT

## 2022-01-01 PROCEDURE — 93296 REM INTERROG EVL PM/IDS: CPT | Performed by: INTERNAL MEDICINE

## 2022-01-01 PROCEDURE — 84443 ASSAY THYROID STIM HORMONE: CPT | Performed by: INTERNAL MEDICINE

## 2022-01-01 PROCEDURE — 250N000013 HC RX MED GY IP 250 OP 250 PS 637: Performed by: NURSE PRACTITIONER

## 2022-01-01 PROCEDURE — 83036 HEMOGLOBIN GLYCOSYLATED A1C: CPT | Performed by: PHYSICIAN ASSISTANT

## 2022-01-01 PROCEDURE — 97530 THERAPEUTIC ACTIVITIES: CPT | Mod: GP | Performed by: PHYSICAL THERAPIST

## 2022-01-01 PROCEDURE — C9803 HOPD COVID-19 SPEC COLLECT: HCPCS

## 2022-01-01 PROCEDURE — 96372 THER/PROPH/DIAG INJ SC/IM: CPT | Performed by: INTERNAL MEDICINE

## 2022-01-01 PROCEDURE — 120N000001 HC R&B MED SURG/OB

## 2022-01-01 PROCEDURE — 250N000013 HC RX MED GY IP 250 OP 250 PS 637: Performed by: HOSPITALIST

## 2022-01-01 PROCEDURE — 250N000012 HC RX MED GY IP 250 OP 636 PS 637: Performed by: INTERNAL MEDICINE

## 2022-01-01 PROCEDURE — 97161 PT EVAL LOW COMPLEX 20 MIN: CPT | Mod: GP

## 2022-01-01 PROCEDURE — 83880 ASSAY OF NATRIURETIC PEPTIDE: CPT | Performed by: PHYSICIAN ASSISTANT

## 2022-01-01 PROCEDURE — 99214 OFFICE O/P EST MOD 30 MIN: CPT | Performed by: INTERNAL MEDICINE

## 2022-01-01 PROCEDURE — 97530 THERAPEUTIC ACTIVITIES: CPT | Mod: GP

## 2022-01-01 PROCEDURE — 81001 URINALYSIS AUTO W/SCOPE: CPT | Performed by: EMERGENCY MEDICINE

## 2022-01-01 PROCEDURE — 87086 URINE CULTURE/COLONY COUNT: CPT | Performed by: INTERNAL MEDICINE

## 2022-01-01 PROCEDURE — 82550 ASSAY OF CK (CPK): CPT | Performed by: EMERGENCY MEDICINE

## 2022-01-01 PROCEDURE — 82962 GLUCOSE BLOOD TEST: CPT

## 2022-01-01 PROCEDURE — 83930 ASSAY OF BLOOD OSMOLALITY: CPT | Performed by: INTERNAL MEDICINE

## 2022-01-01 PROCEDURE — 99232 SBSQ HOSP IP/OBS MODERATE 35: CPT | Performed by: INTERNAL MEDICINE

## 2022-01-01 PROCEDURE — G1004 CDSM NDSC: HCPCS

## 2022-01-01 PROCEDURE — 83935 ASSAY OF URINE OSMOLALITY: CPT | Performed by: INTERNAL MEDICINE

## 2022-01-01 PROCEDURE — 84145 PROCALCITONIN (PCT): CPT | Performed by: HOSPITALIST

## 2022-01-01 PROCEDURE — 99217 PR OBSERVATION CARE DISCHARGE: CPT | Performed by: INTERNAL MEDICINE

## 2022-01-01 PROCEDURE — 99285 EMERGENCY DEPT VISIT HI MDM: CPT | Mod: 25

## 2022-01-01 PROCEDURE — 99222 1ST HOSP IP/OBS MODERATE 55: CPT | Mod: AI | Performed by: INTERNAL MEDICINE

## 2022-01-01 PROCEDURE — 99215 OFFICE O/P EST HI 40 MIN: CPT | Performed by: PHYSICIAN ASSISTANT

## 2022-01-01 PROCEDURE — 84295 ASSAY OF SERUM SODIUM: CPT | Performed by: INTERNAL MEDICINE

## 2022-01-01 PROCEDURE — 85014 HEMATOCRIT: CPT | Performed by: INTERNAL MEDICINE

## 2022-01-01 PROCEDURE — 72131 CT LUMBAR SPINE W/O DYE: CPT | Mod: MF

## 2022-01-01 PROCEDURE — 80053 COMPREHEN METABOLIC PANEL: CPT | Performed by: EMERGENCY MEDICINE

## 2022-01-01 PROCEDURE — 93294 REM INTERROG EVL PM/LDLS PM: CPT | Performed by: INTERNAL MEDICINE

## 2022-01-01 PROCEDURE — 36415 COLL VENOUS BLD VENIPUNCTURE: CPT | Performed by: HOSPITALIST

## 2022-01-01 PROCEDURE — 96360 HYDRATION IV INFUSION INIT: CPT

## 2022-01-01 PROCEDURE — 85025 COMPLETE CBC W/AUTO DIFF WBC: CPT | Performed by: EMERGENCY MEDICINE

## 2022-01-01 PROCEDURE — 36415 COLL VENOUS BLD VENIPUNCTURE: CPT | Performed by: EMERGENCY MEDICINE

## 2022-01-01 PROCEDURE — 96372 THER/PROPH/DIAG INJ SC/IM: CPT

## 2022-01-01 PROCEDURE — 80061 LIPID PANEL: CPT | Performed by: INTERNAL MEDICINE

## 2022-01-01 PROCEDURE — 81001 URINALYSIS AUTO W/SCOPE: CPT | Performed by: INTERNAL MEDICINE

## 2022-01-01 PROCEDURE — 85018 HEMOGLOBIN: CPT | Performed by: PHYSICIAN ASSISTANT

## 2022-01-01 PROCEDURE — 99221 1ST HOSP IP/OBS SF/LOW 40: CPT | Performed by: PSYCHIATRY & NEUROLOGY

## 2022-01-01 PROCEDURE — 83880 ASSAY OF NATRIURETIC PEPTIDE: CPT | Performed by: INTERNAL MEDICINE

## 2022-01-01 PROCEDURE — 80048 BASIC METABOLIC PNL TOTAL CA: CPT | Performed by: HOSPITALIST

## 2022-01-01 PROCEDURE — 250N000013 HC RX MED GY IP 250 OP 250 PS 637: Performed by: PHYSICIAN ASSISTANT

## 2022-01-01 PROCEDURE — 250N000013 HC RX MED GY IP 250 OP 250 PS 637: Performed by: EMERGENCY MEDICINE

## 2022-01-01 PROCEDURE — 84300 ASSAY OF URINE SODIUM: CPT | Performed by: INTERNAL MEDICINE

## 2022-01-01 PROCEDURE — U0003 INFECTIOUS AGENT DETECTION BY NUCLEIC ACID (DNA OR RNA); SEVERE ACUTE RESPIRATORY SYNDROME CORONAVIRUS 2 (SARS-COV-2) (CORONAVIRUS DISEASE [COVID-19]), AMPLIFIED PROBE TECHNIQUE, MAKING USE OF HIGH THROUGHPUT TECHNOLOGIES AS DESCRIBED BY CMS-2020-01-R: HCPCS | Performed by: EMERGENCY MEDICINE

## 2022-01-01 PROCEDURE — 82310 ASSAY OF CALCIUM: CPT | Performed by: INTERNAL MEDICINE

## 2022-01-01 PROCEDURE — 85027 COMPLETE CBC AUTOMATED: CPT | Performed by: HOSPITALIST

## 2022-01-01 PROCEDURE — 99305 1ST NF CARE MODERATE MDM 35: CPT | Performed by: NURSE PRACTITIONER

## 2022-01-01 PROCEDURE — U0003 INFECTIOUS AGENT DETECTION BY NUCLEIC ACID (DNA OR RNA); SEVERE ACUTE RESPIRATORY SYNDROME CORONAVIRUS 2 (SARS-COV-2) (CORONAVIRUS DISEASE [COVID-19]), AMPLIFIED PROBE TECHNIQUE, MAKING USE OF HIGH THROUGHPUT TECHNOLOGIES AS DESCRIBED BY CMS-2020-01-R: HCPCS | Mod: ORL | Performed by: NURSE PRACTITIONER

## 2022-01-01 PROCEDURE — 99226 PR SUBSEQUENT OBSERVATION CARE,LEVEL III: CPT | Performed by: PHYSICIAN ASSISTANT

## 2022-01-01 RX ORDER — MULTIVIT WITH MINERALS/LUTEIN
1 TABLET ORAL DAILY
COMMUNITY

## 2022-01-01 RX ORDER — ACETAMINOPHEN 500 MG
500 TABLET ORAL 3 TIMES DAILY
Status: DISCONTINUED | OUTPATIENT
Start: 2022-01-01 | End: 2022-01-01

## 2022-01-01 RX ORDER — FUROSEMIDE 20 MG
20 TABLET ORAL EVERY MORNING
Status: DISCONTINUED | OUTPATIENT
Start: 2022-01-01 | End: 2022-01-01 | Stop reason: HOSPADM

## 2022-01-01 RX ORDER — ACETAMINOPHEN 500 MG
1000 TABLET ORAL EVERY 6 HOURS
Status: DISCONTINUED | OUTPATIENT
Start: 2022-01-01 | End: 2022-01-01 | Stop reason: HOSPADM

## 2022-01-01 RX ORDER — TRAMADOL HYDROCHLORIDE 50 MG/1
25-50 TABLET ORAL EVERY 6 HOURS PRN
Qty: 10 TABLET | Refills: 0 | Status: SHIPPED | OUTPATIENT
Start: 2022-01-01 | End: 2022-01-01

## 2022-01-01 RX ORDER — METOPROLOL SUCCINATE 50 MG/1
100 TABLET, EXTENDED RELEASE ORAL 2 TIMES DAILY
Status: DISCONTINUED | OUTPATIENT
Start: 2022-01-01 | End: 2022-01-01 | Stop reason: HOSPADM

## 2022-01-01 RX ORDER — NALOXONE HYDROCHLORIDE 0.4 MG/ML
0.2 INJECTION, SOLUTION INTRAMUSCULAR; INTRAVENOUS; SUBCUTANEOUS
Status: DISCONTINUED | OUTPATIENT
Start: 2022-01-01 | End: 2022-01-01 | Stop reason: HOSPADM

## 2022-01-01 RX ORDER — TRAMADOL HYDROCHLORIDE 50 MG/1
50 TABLET ORAL ONCE
Status: COMPLETED | OUTPATIENT
Start: 2022-01-01 | End: 2022-01-01

## 2022-01-01 RX ORDER — LEVOTHYROXINE SODIUM 100 UG/1
TABLET ORAL
Qty: 90 TABLET | Refills: 2 | Status: SHIPPED | OUTPATIENT
Start: 2022-01-01

## 2022-01-01 RX ORDER — LEVOTHYROXINE SODIUM 100 UG/1
100 TABLET ORAL EVERY EVENING
Status: DISCONTINUED | OUTPATIENT
Start: 2022-01-01 | End: 2022-01-01 | Stop reason: HOSPADM

## 2022-01-01 RX ORDER — FUROSEMIDE 20 MG
20 TABLET ORAL DAILY
Status: DISCONTINUED | OUTPATIENT
Start: 2022-01-01 | End: 2022-01-01

## 2022-01-01 RX ORDER — ACETAMINOPHEN 500 MG
1000 TABLET ORAL 3 TIMES DAILY
Status: DISCONTINUED | OUTPATIENT
Start: 2022-01-01 | End: 2022-01-01

## 2022-01-01 RX ORDER — POLYETHYLENE GLYCOL 3350 17 G/17G
17 POWDER, FOR SOLUTION ORAL DAILY
Status: DISCONTINUED | OUTPATIENT
Start: 2022-01-01 | End: 2022-01-01 | Stop reason: HOSPADM

## 2022-01-01 RX ORDER — BISACODYL 10 MG
10 SUPPOSITORY, RECTAL RECTAL DAILY PRN
Status: DISCONTINUED | OUTPATIENT
Start: 2022-01-01 | End: 2022-01-01 | Stop reason: HOSPADM

## 2022-01-01 RX ORDER — FUROSEMIDE 20 MG
TABLET ORAL
Qty: 180 TABLET | Refills: 1 | COMMUNITY
Start: 2022-01-01

## 2022-01-01 RX ORDER — FUROSEMIDE 20 MG
20 TABLET ORAL DAILY
Status: DISCONTINUED | OUTPATIENT
Start: 2022-01-01 | End: 2022-01-01 | Stop reason: HOSPADM

## 2022-01-01 RX ORDER — ONDANSETRON 4 MG/1
4 TABLET, ORALLY DISINTEGRATING ORAL EVERY 6 HOURS PRN
Status: DISCONTINUED | OUTPATIENT
Start: 2022-01-01 | End: 2022-01-01 | Stop reason: HOSPADM

## 2022-01-01 RX ORDER — TRAMADOL HYDROCHLORIDE 50 MG/1
50 TABLET ORAL EVERY 6 HOURS PRN
Status: DISCONTINUED | OUTPATIENT
Start: 2022-01-01 | End: 2022-01-01

## 2022-01-01 RX ORDER — NALOXONE HYDROCHLORIDE 0.4 MG/ML
0.4 INJECTION, SOLUTION INTRAMUSCULAR; INTRAVENOUS; SUBCUTANEOUS
Status: DISCONTINUED | OUTPATIENT
Start: 2022-01-01 | End: 2022-01-01 | Stop reason: HOSPADM

## 2022-01-01 RX ORDER — LIDOCAINE 4 G/G
1-3 PATCH TOPICAL
Status: DISCONTINUED | OUTPATIENT
Start: 2022-01-01 | End: 2022-01-01

## 2022-01-01 RX ORDER — PROCHLORPERAZINE 25 MG
12.5 SUPPOSITORY, RECTAL RECTAL EVERY 12 HOURS PRN
Status: DISCONTINUED | OUTPATIENT
Start: 2022-01-01 | End: 2022-01-01 | Stop reason: HOSPADM

## 2022-01-01 RX ORDER — LIDOCAINE 4 G/G
1 PATCH TOPICAL
Status: DISCONTINUED | OUTPATIENT
Start: 2022-01-01 | End: 2022-01-01

## 2022-01-01 RX ORDER — DOCUSATE SODIUM 100 MG/1
100 CAPSULE, LIQUID FILLED ORAL 2 TIMES DAILY
Status: DISCONTINUED | OUTPATIENT
Start: 2022-01-01 | End: 2022-01-01 | Stop reason: HOSPADM

## 2022-01-01 RX ORDER — ACETAMINOPHEN 325 MG/1
975 TABLET ORAL EVERY 6 HOURS PRN
Status: DISCONTINUED | OUTPATIENT
Start: 2022-01-01 | End: 2022-01-01 | Stop reason: HOSPADM

## 2022-01-01 RX ORDER — NICOTINE POLACRILEX 4 MG
15-30 LOZENGE BUCCAL
Status: DISCONTINUED | OUTPATIENT
Start: 2022-01-01 | End: 2022-01-01 | Stop reason: HOSPADM

## 2022-01-01 RX ORDER — METOPROLOL SUCCINATE 100 MG/1
100 TABLET, EXTENDED RELEASE ORAL 2 TIMES DAILY
Status: DISCONTINUED | OUTPATIENT
Start: 2022-01-01 | End: 2022-01-01 | Stop reason: HOSPADM

## 2022-01-01 RX ORDER — DULOXETIN HYDROCHLORIDE 60 MG/1
60 CAPSULE, DELAYED RELEASE ORAL DAILY
Qty: 90 CAPSULE | Refills: 3 | Status: SHIPPED | OUTPATIENT
Start: 2022-01-01

## 2022-01-01 RX ORDER — ACETAMINOPHEN 500 MG
1000 TABLET ORAL 3 TIMES DAILY
Qty: 90 TABLET | Refills: 0
Start: 2022-01-01

## 2022-01-01 RX ORDER — OXYCODONE HYDROCHLORIDE 5 MG/1
5 TABLET ORAL EVERY 4 HOURS PRN
Status: DISCONTINUED | OUTPATIENT
Start: 2022-01-01 | End: 2022-01-01 | Stop reason: HOSPADM

## 2022-01-01 RX ORDER — ACETAMINOPHEN 500 MG
500 TABLET ORAL 3 TIMES DAILY
COMMUNITY
Start: 2022-01-01 | End: 2022-01-01

## 2022-01-01 RX ORDER — TRAMADOL HYDROCHLORIDE 50 MG/1
25-50 TABLET ORAL EVERY 6 HOURS PRN
Qty: 20 TABLET | Refills: 0 | Status: SHIPPED | OUTPATIENT
Start: 2022-01-01 | End: 2022-01-01

## 2022-01-01 RX ORDER — LOSARTAN POTASSIUM 25 MG/1
12.5 TABLET ORAL DAILY
Qty: 30 TABLET | Refills: 1 | Status: SHIPPED | OUTPATIENT
Start: 2022-01-01 | End: 2022-09-16

## 2022-01-01 RX ORDER — LEVOTHYROXINE SODIUM 100 UG/1
100 TABLET ORAL
Status: DISCONTINUED | OUTPATIENT
Start: 2022-01-01 | End: 2022-01-01 | Stop reason: HOSPADM

## 2022-01-01 RX ORDER — IBUPROFEN 200 MG
200 TABLET ORAL 2 TIMES DAILY PRN
Status: DISCONTINUED | OUTPATIENT
Start: 2022-01-01 | End: 2022-01-01

## 2022-01-01 RX ORDER — CIPROFLOXACIN 250 MG/1
250 TABLET, FILM COATED ORAL EVERY 12 HOURS SCHEDULED
Status: DISCONTINUED | OUTPATIENT
Start: 2022-01-01 | End: 2022-01-01 | Stop reason: HOSPADM

## 2022-01-01 RX ORDER — CIPROFLOXACIN 250 MG/1
250 TABLET, FILM COATED ORAL EVERY 12 HOURS
DISCHARGE
Start: 2022-01-01 | End: 2022-01-01

## 2022-01-01 RX ORDER — ONDANSETRON 2 MG/ML
4 INJECTION INTRAMUSCULAR; INTRAVENOUS EVERY 6 HOURS PRN
Status: DISCONTINUED | OUTPATIENT
Start: 2022-01-01 | End: 2022-01-01 | Stop reason: HOSPADM

## 2022-01-01 RX ORDER — METOPROLOL SUCCINATE 100 MG/1
100 TABLET, EXTENDED RELEASE ORAL 2 TIMES DAILY
Qty: 180 TABLET | Refills: 2 | Status: SHIPPED | OUTPATIENT
Start: 2022-01-01

## 2022-01-01 RX ORDER — CEFTRIAXONE 1 G/1
1 INJECTION, POWDER, FOR SOLUTION INTRAMUSCULAR; INTRAVENOUS EVERY 24 HOURS
Status: DISCONTINUED | OUTPATIENT
Start: 2022-01-01 | End: 2022-01-01

## 2022-01-01 RX ORDER — DEXTROSE MONOHYDRATE 25 G/50ML
25-50 INJECTION, SOLUTION INTRAVENOUS
Status: DISCONTINUED | OUTPATIENT
Start: 2022-01-01 | End: 2022-01-01 | Stop reason: HOSPADM

## 2022-01-01 RX ORDER — SODIUM CHLORIDE 9 MG/ML
INJECTION, SOLUTION INTRAVENOUS CONTINUOUS
Status: DISCONTINUED | OUTPATIENT
Start: 2022-01-01 | End: 2022-01-01

## 2022-01-01 RX ORDER — DULOXETIN HYDROCHLORIDE 30 MG/1
CAPSULE, DELAYED RELEASE ORAL
Qty: 90 CAPSULE | Refills: 3 | Status: SHIPPED | OUTPATIENT
Start: 2022-01-01 | End: 2022-01-01

## 2022-01-01 RX ORDER — LIDOCAINE 4 G/G
1 PATCH TOPICAL EVERY 24 HOURS
Qty: 30 PATCH | Refills: 0 | Status: SHIPPED | OUTPATIENT
Start: 2022-01-01

## 2022-01-01 RX ORDER — PROCHLORPERAZINE MALEATE 5 MG
5 TABLET ORAL EVERY 6 HOURS PRN
Status: DISCONTINUED | OUTPATIENT
Start: 2022-01-01 | End: 2022-01-01 | Stop reason: HOSPADM

## 2022-01-01 RX ORDER — LIDOCAINE 4 G/G
1-3 PATCH TOPICAL
Status: DISCONTINUED | OUTPATIENT
Start: 2022-01-01 | End: 2022-01-01 | Stop reason: HOSPADM

## 2022-01-01 RX ADMIN — DOCUSATE SODIUM 100 MG: 100 CAPSULE, LIQUID FILLED ORAL at 08:02

## 2022-01-01 RX ADMIN — ACETAMINOPHEN 500 MG: 500 TABLET, FILM COATED ORAL at 21:39

## 2022-01-01 RX ADMIN — METOPROLOL SUCCINATE 100 MG: 100 TABLET, EXTENDED RELEASE ORAL at 22:18

## 2022-01-01 RX ADMIN — LEVOTHYROXINE SODIUM 100 MCG: 100 TABLET ORAL at 06:54

## 2022-01-01 RX ADMIN — BISACODYL 10 MG: 10 SUPPOSITORY RECTAL at 10:47

## 2022-01-01 RX ADMIN — APIXABAN 5 MG: 5 TABLET, FILM COATED ORAL at 08:55

## 2022-01-01 RX ADMIN — APIXABAN 5 MG: 5 TABLET, FILM COATED ORAL at 08:02

## 2022-01-01 RX ADMIN — LIDOCAINE 1 PATCH: 560 PATCH PERCUTANEOUS; TOPICAL; TRANSDERMAL at 08:58

## 2022-01-01 RX ADMIN — METOPROLOL SUCCINATE 100 MG: 100 TABLET, EXTENDED RELEASE ORAL at 20:11

## 2022-01-01 RX ADMIN — DOCUSATE SODIUM 100 MG: 100 CAPSULE, LIQUID FILLED ORAL at 20:12

## 2022-01-01 RX ADMIN — APIXABAN 5 MG: 5 TABLET, FILM COATED ORAL at 10:15

## 2022-01-01 RX ADMIN — ACETAMINOPHEN 975 MG: 325 TABLET ORAL at 10:43

## 2022-01-01 RX ADMIN — FUROSEMIDE 20 MG: 20 TABLET ORAL at 08:01

## 2022-01-01 RX ADMIN — APIXABAN 5 MG: 5 TABLET, FILM COATED ORAL at 08:20

## 2022-01-01 RX ADMIN — METOPROLOL SUCCINATE 100 MG: 100 TABLET, EXTENDED RELEASE ORAL at 08:58

## 2022-01-01 RX ADMIN — METOPROLOL SUCCINATE 100 MG: 50 TABLET, EXTENDED RELEASE ORAL at 20:09

## 2022-01-01 RX ADMIN — ACETAMINOPHEN 975 MG: 325 TABLET ORAL at 15:39

## 2022-01-01 RX ADMIN — TRAMADOL HYDROCHLORIDE 50 MG: 50 TABLET ORAL at 23:24

## 2022-01-01 RX ADMIN — METOPROLOL SUCCINATE 100 MG: 100 TABLET, EXTENDED RELEASE ORAL at 08:55

## 2022-01-01 RX ADMIN — TRAMADOL HYDROCHLORIDE 50 MG: 50 TABLET ORAL at 05:43

## 2022-01-01 RX ADMIN — INSULIN ASPART 1 UNITS: 100 INJECTION, SOLUTION INTRAVENOUS; SUBCUTANEOUS at 12:07

## 2022-01-01 RX ADMIN — APIXABAN 5 MG: 5 TABLET, FILM COATED ORAL at 09:04

## 2022-01-01 RX ADMIN — ACETAMINOPHEN 1000 MG: 500 TABLET, FILM COATED ORAL at 04:15

## 2022-01-01 RX ADMIN — DOCUSATE SODIUM 100 MG: 100 CAPSULE, LIQUID FILLED ORAL at 08:58

## 2022-01-01 RX ADMIN — TRAMADOL HYDROCHLORIDE 50 MG: 50 TABLET ORAL at 11:47

## 2022-01-01 RX ADMIN — LEVOTHYROXINE SODIUM 100 MCG: 100 TABLET ORAL at 20:10

## 2022-01-01 RX ADMIN — LIDOCAINE 1 PATCH: 560 PATCH PERCUTANEOUS; TOPICAL; TRANSDERMAL at 08:02

## 2022-01-01 RX ADMIN — LIDOCAINE 1 PATCH: 560 PATCH PERCUTANEOUS; TOPICAL; TRANSDERMAL at 09:07

## 2022-01-01 RX ADMIN — ACETAMINOPHEN 975 MG: 325 TABLET ORAL at 10:44

## 2022-01-01 RX ADMIN — APIXABAN 5 MG: 5 TABLET, FILM COATED ORAL at 21:38

## 2022-01-01 RX ADMIN — APIXABAN 5 MG: 5 TABLET, FILM COATED ORAL at 08:56

## 2022-01-01 RX ADMIN — ACETAMINOPHEN 500 MG: 500 TABLET, FILM COATED ORAL at 08:56

## 2022-01-01 RX ADMIN — CIPROFLOXACIN HYDROCHLORIDE 250 MG: 250 TABLET, FILM COATED ORAL at 08:01

## 2022-01-01 RX ADMIN — APIXABAN 5 MG: 5 TABLET, FILM COATED ORAL at 20:22

## 2022-01-01 RX ADMIN — METOPROLOL SUCCINATE 100 MG: 100 TABLET, EXTENDED RELEASE ORAL at 20:19

## 2022-01-01 RX ADMIN — TRAMADOL HYDROCHLORIDE 50 MG: 50 TABLET, FILM COATED ORAL at 22:54

## 2022-01-01 RX ADMIN — ACETAMINOPHEN 975 MG: 325 TABLET ORAL at 20:15

## 2022-01-01 RX ADMIN — TRAMADOL HYDROCHLORIDE 50 MG: 50 TABLET ORAL at 13:53

## 2022-01-01 RX ADMIN — DOCUSATE SODIUM 100 MG: 100 CAPSULE, LIQUID FILLED ORAL at 20:22

## 2022-01-01 RX ADMIN — LIDOCAINE 1 PATCH: 560 PATCH PERCUTANEOUS; TOPICAL; TRANSDERMAL at 08:57

## 2022-01-01 RX ADMIN — LEVOTHYROXINE SODIUM 100 MCG: 100 TABLET ORAL at 06:01

## 2022-01-01 RX ADMIN — METOPROLOL SUCCINATE 100 MG: 50 TABLET, EXTENDED RELEASE ORAL at 20:26

## 2022-01-01 RX ADMIN — FUROSEMIDE 20 MG: 20 TABLET ORAL at 12:35

## 2022-01-01 RX ADMIN — ACETAMINOPHEN 1000 MG: 500 TABLET, FILM COATED ORAL at 03:55

## 2022-01-01 RX ADMIN — INSULIN ASPART 2 UNITS: 100 INJECTION, SOLUTION INTRAVENOUS; SUBCUTANEOUS at 09:05

## 2022-01-01 RX ADMIN — LEVOTHYROXINE SODIUM 100 MCG: 100 TABLET ORAL at 05:43

## 2022-01-01 RX ADMIN — DOCUSATE SODIUM 100 MG: 100 CAPSULE, LIQUID FILLED ORAL at 08:54

## 2022-01-01 RX ADMIN — ACETAMINOPHEN 1000 MG: 500 TABLET, FILM COATED ORAL at 16:43

## 2022-01-01 RX ADMIN — FUROSEMIDE 20 MG: 20 TABLET ORAL at 08:20

## 2022-01-01 RX ADMIN — ACETAMINOPHEN 975 MG: 325 TABLET ORAL at 08:54

## 2022-01-01 RX ADMIN — ACETAMINOPHEN 1000 MG: 500 TABLET, FILM COATED ORAL at 22:06

## 2022-01-01 RX ADMIN — ACETAMINOPHEN 500 MG: 500 TABLET, FILM COATED ORAL at 08:58

## 2022-01-01 RX ADMIN — SODIUM CHLORIDE 1000 ML: 9 INJECTION, SOLUTION INTRAVENOUS at 14:25

## 2022-01-01 RX ADMIN — FUROSEMIDE 20 MG: 20 TABLET ORAL at 08:55

## 2022-01-01 RX ADMIN — APIXABAN 5 MG: 5 TABLET, FILM COATED ORAL at 20:10

## 2022-01-01 RX ADMIN — LEVOTHYROXINE SODIUM 100 MCG: 100 TABLET ORAL at 07:30

## 2022-01-01 RX ADMIN — DOCUSATE SODIUM 100 MG: 100 CAPSULE, LIQUID FILLED ORAL at 22:18

## 2022-01-01 RX ADMIN — ACETAMINOPHEN 500 MG: 500 TABLET, FILM COATED ORAL at 15:47

## 2022-01-01 RX ADMIN — FUROSEMIDE 20 MG: 20 TABLET ORAL at 08:56

## 2022-01-01 RX ADMIN — POLYETHYLENE GLYCOL 3350 17 G: 17 POWDER, FOR SOLUTION ORAL at 08:58

## 2022-01-01 RX ADMIN — TRAMADOL HYDROCHLORIDE 50 MG: 50 TABLET ORAL at 06:54

## 2022-01-01 RX ADMIN — POLYETHYLENE GLYCOL 3350 17 G: 17 POWDER, FOR SOLUTION ORAL at 08:56

## 2022-01-01 RX ADMIN — INSULIN ASPART 1 UNITS: 100 INJECTION, SOLUTION INTRAVENOUS; SUBCUTANEOUS at 12:03

## 2022-01-01 RX ADMIN — METOPROLOL SUCCINATE 100 MG: 100 TABLET, EXTENDED RELEASE ORAL at 08:56

## 2022-01-01 RX ADMIN — ACETAMINOPHEN 1000 MG: 500 TABLET, FILM COATED ORAL at 22:21

## 2022-01-01 RX ADMIN — METOPROLOL SUCCINATE 100 MG: 100 TABLET, EXTENDED RELEASE ORAL at 21:38

## 2022-01-01 RX ADMIN — APIXABAN 5 MG: 5 TABLET, FILM COATED ORAL at 22:18

## 2022-01-01 RX ADMIN — LEVOTHYROXINE SODIUM 100 MCG: 100 TABLET ORAL at 20:26

## 2022-01-01 RX ADMIN — METOPROLOL SUCCINATE 100 MG: 100 TABLET, EXTENDED RELEASE ORAL at 09:04

## 2022-01-01 RX ADMIN — APIXABAN 5 MG: 5 TABLET, FILM COATED ORAL at 20:11

## 2022-01-01 RX ADMIN — ACETAMINOPHEN 1000 MG: 500 TABLET, FILM COATED ORAL at 10:15

## 2022-01-01 RX ADMIN — POLYETHYLENE GLYCOL 3350 17 G: 17 POWDER, FOR SOLUTION ORAL at 15:47

## 2022-01-01 RX ADMIN — METOPROLOL SUCCINATE 100 MG: 50 TABLET, EXTENDED RELEASE ORAL at 08:20

## 2022-01-01 RX ADMIN — FUROSEMIDE 20 MG: 20 TABLET ORAL at 08:25

## 2022-01-01 RX ADMIN — APIXABAN 5 MG: 5 TABLET, FILM COATED ORAL at 08:58

## 2022-01-01 RX ADMIN — LIDOCAINE 1 PATCH: 560 PATCH PERCUTANEOUS; TOPICAL; TRANSDERMAL at 08:55

## 2022-01-01 RX ADMIN — ACETAMINOPHEN 975 MG: 325 TABLET ORAL at 17:37

## 2022-01-01 RX ADMIN — LEVOTHYROXINE SODIUM 100 MCG: 100 TABLET ORAL at 06:18

## 2022-01-01 RX ADMIN — METOPROLOL SUCCINATE 100 MG: 50 TABLET, EXTENDED RELEASE ORAL at 08:25

## 2022-01-01 RX ADMIN — POLYETHYLENE GLYCOL 3350 17 G: 17 POWDER, FOR SOLUTION ORAL at 09:06

## 2022-01-01 RX ADMIN — ACETAMINOPHEN 1000 MG: 500 TABLET, FILM COATED ORAL at 10:00

## 2022-01-01 RX ADMIN — METOPROLOL SUCCINATE 100 MG: 100 TABLET, EXTENDED RELEASE ORAL at 08:02

## 2022-01-01 RX ADMIN — DOCUSATE SODIUM 100 MG: 100 CAPSULE, LIQUID FILLED ORAL at 21:39

## 2022-01-01 ASSESSMENT — ACTIVITIES OF DAILY LIVING (ADL)
ADLS_ACUITY_SCORE: 15
ADLS_ACUITY_SCORE: 15
ADLS_ACUITY_SCORE: 16
ADLS_ACUITY_SCORE: 15
ADLS_ACUITY_SCORE: 12
WALKING_OR_CLIMBING_STAIRS_DIFFICULTY: NO
ADLS_ACUITY_SCORE: 15
ADLS_ACUITY_SCORE: 17
ADLS_ACUITY_SCORE: 16
ADLS_ACUITY_SCORE: 12
ADLS_ACUITY_SCORE: 16
ADLS_ACUITY_SCORE: 14
ADLS_ACUITY_SCORE: 12
ADLS_ACUITY_SCORE: 12
ADLS_ACUITY_SCORE: 15
ADLS_ACUITY_SCORE: 16
ADLS_ACUITY_SCORE: 16
ADLS_ACUITY_SCORE: 17
ADLS_ACUITY_SCORE: 16
ADLS_ACUITY_SCORE: 17
ADLS_ACUITY_SCORE: 14
ADLS_ACUITY_SCORE: 15
ADLS_ACUITY_SCORE: 15
ADLS_ACUITY_SCORE: 17
ADLS_ACUITY_SCORE: 17
ADLS_ACUITY_SCORE: 15
ADLS_ACUITY_SCORE: 12
ADLS_ACUITY_SCORE: 17
ADLS_ACUITY_SCORE: 15
ADLS_ACUITY_SCORE: 15
ADLS_ACUITY_SCORE: 12
ADLS_ACUITY_SCORE: 17
ADLS_ACUITY_SCORE: 16
ADLS_ACUITY_SCORE: 12
ADLS_ACUITY_SCORE: 17
ADLS_ACUITY_SCORE: 15
ADLS_ACUITY_SCORE: 17
ADLS_ACUITY_SCORE: 15
ADLS_ACUITY_SCORE: 12
ADLS_ACUITY_SCORE: 19
ADLS_ACUITY_SCORE: 15
ADLS_ACUITY_SCORE: 16
DIFFICULTY_EATING/SWALLOWING: NO
ADLS_ACUITY_SCORE: 15
ADLS_ACUITY_SCORE: 16
ADLS_ACUITY_SCORE: 14
ADLS_ACUITY_SCORE: 15
ADLS_ACUITY_SCORE: 16
ADLS_ACUITY_SCORE: 16
ADLS_ACUITY_SCORE: 12
CHANGE_IN_FUNCTIONAL_STATUS_SINCE_ONSET_OF_CURRENT_ILLNESS/INJURY: NO
ADLS_ACUITY_SCORE: 15
CONCENTRATING,_REMEMBERING_OR_MAKING_DECISIONS_DIFFICULTY: NO
DRESSING/BATHING_DIFFICULTY: NO
ADLS_ACUITY_SCORE: 16
ADLS_ACUITY_SCORE: 17
ADLS_ACUITY_SCORE: 16
TOILETING_ISSUES: NO
ADLS_ACUITY_SCORE: 17
ADLS_ACUITY_SCORE: 16
WEAR_GLASSES_OR_BLIND: NO
ADLS_ACUITY_SCORE: 16
ADLS_ACUITY_SCORE: 19
ADLS_ACUITY_SCORE: 15
ADLS_ACUITY_SCORE: 12
ADLS_ACUITY_SCORE: 17
ADLS_ACUITY_SCORE: 12
ADLS_ACUITY_SCORE: 16
ADLS_ACUITY_SCORE: 19
DOING_ERRANDS_INDEPENDENTLY_DIFFICULTY: NO
ADLS_ACUITY_SCORE: 17
ADLS_ACUITY_SCORE: 12

## 2022-01-01 ASSESSMENT — PAIN SCALES - GENERAL
PAINLEVEL: NO PAIN (0)
PAINLEVEL: MILD PAIN (2)
PAINLEVEL: NO PAIN (0)

## 2022-01-01 ASSESSMENT — ENCOUNTER SYMPTOMS
FEVER: 0
NUMBNESS: 0
NUMBNESS: 0
NECK PAIN: 0
FEVER: 0
WEAKNESS: 1
FREQUENCY: 0
VOMITING: 0
BLOOD IN STOOL: 0
WEAKNESS: 0
HEADACHES: 0
COUGH: 0
SHORTNESS OF BREATH: 0
BACK PAIN: 1
CONSTIPATION: 1
APPETITE CHANGE: 1
ABDOMINAL PAIN: 0
HEADACHES: 0
DIARRHEA: 0
SHORTNESS OF BREATH: 0
BACK PAIN: 1
DIFFICULTY URINATING: 0
HEMATURIA: 0
NAUSEA: 0
DYSURIA: 0
ABDOMINAL PAIN: 0

## 2022-03-02 NOTE — PROGRESS NOTES
Cardiology Clinic Progress Note    Shelly Rogers MRN# [id number]   YOB: 1933 Age: 89 year old   Primary cardiologist: Dr. Del Valle         Assessment and Plan:     In summary, Shelly Rogers is a delightful lady who appears much younger than her stated age. She presents today for a 6 month CORE clinic f/u visit, for chronic HFpEF.     1. Chronic HFpEF. Currently appears very well-compensated.   2. Moderate TR.   3. PAF / tachy-cam syndrome, s/p PPM. Rare AF on device checks, asymptomatic. Anticoagulated with Eliquis.   4. HTN, controlled.   5. DMII.    Plan:  - Labs today are pending including lipid panel, BMP, Hgb, and proBNP (also added on A1c per pt request; will forward to PCP). Will let Shelly know of any abnormalities.   - I told Shelly that she may continue to use just 20 mg daily of furosemide, with an extra 20 mg PRN.   - RTC with Dr. Del Valle in 6 months.     It was a pleasure seeing Shelly again today!         History of Presenting Illness:      Shelly Rogers is a pleasant 89 year old patient who presents today for a 6 month CORE clinic f/u visit.    The patient has a history of the following -   # HFpEF, initially diagnosed in 2013 in the setting of new-onset rapid AF. Admission in October 2018 in the setting of lasix noncompliance and increasing burden of AF.   # PAF/SSS, s/p PPM and cardioversion in 2013. Rate control strategy with Toprol. Anticoagulated on warfarin, INR followed by PCP.   # NSVT, asymptomatic, short runs noted on device  # Hx of R pleural effusion, s/p thoracentesis 10/2018  # HTN  # DMII  # CKD  # Hypothyroidism, on levothyroxine  # Mild hyponatremia  # Severe mixed SYLVIE, refuses CPAP  # Hx of poor medication compliance  # Social - She lives independently at an assisted living facility and manages her own medications. She has some macular degeneration which has caused some balance issues over time and therefore uses a cane to ambulate when she's out of her home, and doesn't  "drive. She's had no falls, and uses fall precautions. No ETOH or tobacco.    I've been following Shelly along with Dr. Del Valle since her admission in October 2018. She's been doing well since then, despite some issues with medication confusion and compliance.     She last saw Dr. Del Valle in 12/2020. He started losartan 25 mg daily for better blood pressure control and also for vascular protection due to diabetes. He also switched her from warfarin to Eliquis per her request for convenience.    1.5 years later, Shelly returned to clinic for an overdue visit in August 2021. She appeared well-compensated at a weight of 160 lbs.     Today, she presents to clinic with her daughter. She continues to appear much younger than her stated age. She's continues to feel really well from a cardiovascular standpoint. She doesn't feel limited by her breathing with ambulation (but \"I don't do anything\" - prefers to keep her mind active) or sleep. Her pedal and ankle edema is minimal. She doesn't feel like she's retaining any excess fluid in her system. She has been taking 20 mg of furosemide in the AM, but only takes the 20 mg in the PM \"some days,\" if she remembers or feels like she needs it. Shelly tells me she's otherwise compliant with her meds and is using a pill box appropriately. She ambulates with a cane due to macular degeneration, but hasn't fallen. Intermittent, rare and brief hemorrhoidal bleeding, but no significant bleeding diathesis on Eliquis. Her weight is down nearly 15 pounds from when I saw her last year, purposely, with portion control.  Blood pressure is controlled.     Her last echo was in July 2021, showing an EF of 60-65%, with moderately dilated and reduced RV function, mild MR, moderate TR, and dilated IVC suggestive of of an RAP of 15 mmHg or greater.  There is no significant change from prior.  Her most recent device check took place in January 2022, showing 20% Ap, 15% . 2,108 mode switch episodes and 1,573 " "atrial high rates logged comprising 96.1% of the time. 3 marker only EGMs and 5 regular EGMs for review show As>Vs for afib longest episode lasted [id number], ventricular rates 80-120bpm. All episodes occurred 1/11/2022 from 11:29am to 3:33pm. Asymptomatic. There are 2.5 years remaining on battery life. Labs today are pending.          Review of Systems:     12-pt ROS is negative except for as noted in the HPI.          Physical Exam:     Vitals: /76   Pulse 87   Ht 1.626 m (5' 4\")   Wt 70.3 kg (155 lb)   SpO2 97%   BMI 26.61 kg/m    Wt Readings from Last 10 Encounters:   03/02/22 70.3 kg (155 lb)   08/04/21 76.7 kg (169 lb 1.6 oz)   05/07/21 71.7 kg (158 lb)   01/13/20 70.8 kg (156 lb)   09/23/19 70.9 kg (156 lb 4.8 oz)   06/10/19 69.4 kg (153 lb)   05/01/19 73.5 kg (162 lb)   02/07/19 73.5 kg (162 lb)   01/10/19 73.3 kg (161 lb 8 oz)   11/14/18 78.6 kg (173 lb 3.2 oz)       Constitutional:  Patient is pleasant, alert, cooperative, and in NAD.  HEENT:  NCAT. PERRLA. EOM's intact.  Neck:  CVP appears normal  Pulmonary: Normal respiratory effort. CTAB.  Cardiac: Irregularly irregular rhythm, grade 3/6 sm at the LSB and RUSB.  Abdomen:  Non-tender abdomen with normoactive bowel sounds, no hepatosplenomegaly appreciated.   Vascular: Pulses in the upper extremities are 2+ and equal, lower extremity pulses are diminished bilaterally.  Extremities: Trace pitting edema ankle bilaterally.  Skin:  No rashes or lesions appreciated.   Neurological:  No gross motor or sensory deficits.   Psych: Appropriate affect.        Data:   Labs reviewed:  Recent Labs   Lab Test 05/07/21  0858 09/23/19  0913 09/12/19  1402 06/10/19  1426 01/07/19  1528 11/08/18  1206 06/12/18  1633 02/27/18  1541 09/12/16  1709 10/13/15  1454   LDL 92  --   --   --   --   --   --  101*  --  88   HDL 50  --   --   --   --   --   --  41*  --  44*   NHDL 133*  --   --   --   --   --   --  165*  --   --    CHOL 183  --   --   --   --   --   --  206*  " --  204*   TRIG 204*  --   --   --   --   --   --  319*  --  359*   TSH 1.23  --  1.90 5.11*   < >  --    < > 2.32   < > 1.20   NTBNP  --  2,029*  --   --   --   --   --   --   --   --    IRON  --   --   --   --   --  57  --   --   --   --    FEB  --   --   --   --   --  389  --   --   --   --    IRONSAT  --   --   --   --   --  15  --   --   --   --    DARI  --   --   --   --   --  34   < > 28   < >  --     < > = values in this interval not displayed.       Lab Results   Component Value Date    WBC 8.1 05/07/2021    RBC 4.38 05/07/2021    HGB 14.2 05/07/2021    HCT 42.9 05/07/2021    MCV 98 05/07/2021    MCH 32.4 05/07/2021    MCHC 33.1 05/07/2021    RDW 14.2 05/07/2021     05/07/2021       Lab Results   Component Value Date     05/07/2021    POTASSIUM 4.7 05/07/2021    CHLORIDE 102 05/07/2021    CO2 31 05/07/2021    ANIONGAP 2 (L) 05/07/2021     (H) 05/07/2021    BUN 22 05/07/2021    CR 1.12 (H) 05/07/2021    GFRESTIMATED 44 (L) 05/07/2021    GFRESTBLACK 51 (L) 05/07/2021    ASTRID 9.3 05/07/2021      Lab Results   Component Value Date    AST 22 06/10/2019    ALT 20 06/10/2019       Lab Results   Component Value Date    A1C 8.1 (H) 05/07/2021       Lab Results   Component Value Date    INR 1.21 (H) 03/17/2021    INR 2.4 (A) 02/25/2020    INR 1.7 (A) 02/06/2020    INR 1.70 (H) 10/22/2018           Problem List:     Patient Active Problem List   Diagnosis     CHF (congestive heart failure) (H)     CARDIOVASCULAR SCREENING; LDL GOAL LESS THAN 100     Health Care Home     Pacemaker     OA (osteoarthritis)     Type 2 diabetes mellitus without complications (H)     DNS (deviated nasal septum)     Atrial fibrillation (H)     Paroxysmal atrial fibrillation (H)     Chronic diastolic congestive heart failure (H)     Hypothyroidism     Long-term (current) use of anticoagulants [Z79.01]     Heart failure (H)     CKD (chronic kidney disease) stage 3, GFR 30-59 ml/min (H)     History of uterine cancer            Medications:     Current Outpatient Medications   Medication Sig Dispense Refill     ACETAMINOPHEN PO Take 500 mg by mouth 3 times daily as needed (Takes at least 6 hours apart).        apixaban ANTICOAGULANT (ELIQUIS ANTICOAGULANT) 5 MG tablet Take 1 tablet (5 mg) by mouth 2 times daily is blood thinner 180 tablet 3     furosemide (LASIX) 20 MG tablet TAKE 1 TABLET BY MOUTH TWICE A  tablet 1     glimepiride (AMARYL) 2 MG tablet TAKE 1 TABLET (2 MG) BY MOUTH 2 TIMES DAILY 180 tablet 1     levothyroxine (SYNTHROID/LEVOTHROID) 100 MCG tablet TAKE 1 TABLET (100 MCG) BY MOUTH DAILY FOR THYROID 90 tablet 0     metFORMIN (GLUCOPHAGE) 500 MG tablet Take 1 tablet (500 mg) by mouth 2 times daily (with meals) for diabetes 180 tablet 3     metoprolol succinate ER (TOPROL-XL) 100 MG 24 hr tablet Take 1 tablet (100 mg) by mouth 2 times daily for Blood Pressure and heart 180 tablet 3     multivitamin  with lutein (OCUVITE WITH LTEIN) CAPS per capsule Take 1 capsule by mouth daily       multivitamin (CENTRUM SILVER) tablet Take 1 tablet by mouth daily       triamcinolone (NASACORT) 55 MCG/ACT nasal aerosol Spray 2 sprays into both nostrils daily       UNABLE TO FIND MEDICATION NAME: CBDprabhakar Tiwarigan CBD gummies       blood glucose (ACCU-CHEK SMARTVIEW) test strip 1 strip by In Vitro route daily (Patient not taking: Reported on 8/4/2021) 1 Box prn     blood glucose (NO BRAND SPECIFIED) lancets standard Use to test blood sugar 1 times daily or as directed. (Patient not taking: Reported on 8/4/2021) 100 each 11     blood glucose monitoring (ACCU-CHEK FASTCLIX) lancets Use to check blood sugars daily (Patient not taking: Reported on 8/4/2021) 1 Box prn     Continuous Blood Gluc  (FREESTYLE STELLA 14 DAY READER) VERO 1 each every 14 days (Patient not taking: Reported on 8/4/2021) 1 Device 0     Continuous Blood Gluc Sensor (FREESTYLE STELLA 14 DAY SENSOR) MISC 1 each every 14 days (Patient not taking: Reported on  2021) 2 each 11     STATIN NOT PRESCRIBED, INTENTIONAL, continuous prn. Statin not prescribed intentionally due to Other: Not needed, LDL at goal <100mg/dL without therapy (Patient not taking: Reported on 2021) 0 each 0           Past Medical History:     Past Medical History:   Diagnosis Date     Atrial fibrillation (H)     Nl LVEF, s/p ablation      Congestive heart failure, unspecified      Diabetes mellitus (H)      Hemorrhoids     intermittent bleeding     Hypertension      Macular degeneration      OA (osteoarthritis)      Pacemaker 3/2013     Uterine cancer (H)     s/p hyst     Past Surgical History:   Procedure Laterality Date     CHOLECYSTECTOMY  2004     GYN SURGERY       HYSTERECTOMY      Ut ca      JOINT REPLACEMENT       KERATOTOMY ARCUATE WITH FEMTOSECOND LASER/IMAGING FOR ATIOL Right 2017    Procedure: KERATOTOMY ARCUATE WITH FEMTOSECOND LASER/IMAGING FOR ATIOL;  Surgeon: Calvin Dominguez MD;  Location: [facility]     PHACOEMULSIFICATION CLEAR CORNEA WITH STANDARD INTRAOCULAR LENS IMPLANT Right 2017    Procedure: PHACOEMULSIFICATION CLEAR CORNEA WITH STANDARD INTRAOCULAR LENS IMPLANT;  Surgeon: Calvin Dominguez MD;  Location: [facility]     TKR      bilat     ZZC ANESTH,PACEMAKER INSERTION      dual chamber 3/27/13     Family History   Problem Relation Age of Onset     Gastrointestinal Disease Mother         bowel obstruction     Cardiovascular Father      C.A.D. Father      Cardiovascular Brother         CHF     Social History     Socioeconomic History     Marital status: Single     Spouse name: Not on file     Number of children: Not on file     Years of education: Not on file     Highest education level: Not on file   Occupational History     Not on file   Tobacco Use     Smoking status: Former Smoker     Quit date: 1990     Years since quittin.1     Smokeless tobacco: Never Used   Substance and Sexual Activity     Alcohol use: No     Alcohol/week: 0.0 standard  drinks     Drug use: No     Sexual activity: Not Currently     Partners: Male   Other Topics Concern     Parent/sibling w/ CABG, MI or angioplasty before 65F 55M? No      Service Not Asked     Blood Transfusions Not Asked     Caffeine Concern Yes     Comment: daily      Occupational Exposure Not Asked     Hobby Hazards Not Asked     Sleep Concern No     Stress Concern Not Asked     Weight Concern Not Asked     Special Diet Yes     Comment: low sodium, less leafy greens, low sugar     Back Care Not Asked     Exercise No     Bike Helmet Not Asked     Seat Belt Yes     Self-Exams Not Asked   Social History Narrative     Not on file     Social Determinants of Health     Financial Resource Strain: Not on file   Food Insecurity: Not on file   Transportation Needs: Not on file   Physical Activity: Not on file   Stress: Not on file   Social Connections: Not on file   Intimate Partner Violence: Not on file   Housing Stability: Not on file           Allergies:   Shellfish allergy, Ambien [zolpidem], Cats, Lisinopril, Miscellaneous [no clinical screening - see comments], Seasonal allergies, and Sulfa drugs      Eliz Goodwin PA-C, [facility]  [facility]  Pager: [phone]    Today's clinic visit entailed:  Review of the result(s) of each unique test - Hemoglobin, BMP, proBNP, lipid panel, device interrogation  Ordering of each unique test  Prescription drug management  I spent a total of 40 minutes on the day of the visit.   Time spent doing chart review, history and exam, documentation and further activities per the note  Provider  Link to [facility] Help Grid     The level of medical decision making during this visit was of moderate complexity.

## 2022-03-02 NOTE — LETTER
March 3, 2022      Shelly Rogers  [address]   [place] [facility] [address]        Nayely rBavo,     This is to inform you regarding your test result.     Your total cholesterol is normal.   HDL which is called good cholesterol is low.   Your LDL cholesterol is normal.  This is often call bad cholesterol and high levels increase the risk for heart attacks and strokes.   Your triglycerides are normal.       Sincerely,       Dr.Nasima Daria MD,FACP      Resulted Orders   Lipid panel reflex to direct LDL Fasting   Result Value Ref Range    Cholesterol 142 <200 mg/dL    Triglycerides 128 <150 mg/dL    Direct Measure HDL 43 (L) >=50 mg/dL    LDL Cholesterol Calculated 73 <=100 mg/dL    Non HDL Cholesterol 99 <130 mg/dL    Patient Fasting > 8hrs? Yes     Narrative    Cholesterol  Desirable:  <200 mg/dL    Triglycerides  Normal:  Less than 150 mg/dL  Borderline High:  150-199 mg/dL  High:  200-499 mg/dL  Very High:  Greater than or equal to 500 mg/dL    Direct Measure HDL  Female:  Greater than or equal to 50 mg/dL   Male:  Greater than or equal to 40 mg/dL    LDL Cholesterol  Desirable:  <100mg/dL  Above Desirable:  100-129 mg/dL   Borderline High:  130-159 mg/dL   High:  160-189 mg/dL   Very High:  >= 190 mg/dL    Non HDL Cholesterol  Desirable:  130 mg/dL  Above Desirable:  130-159 mg/dL  Borderline High:  160-189 mg/dL  High:  190-219 mg/dL  Very High:  Greater than or equal to 220 mg/dL

## 2022-03-02 NOTE — LETTER
3/2/2022    Hlaley Huff MD  [address]  [place]                [place] [address]    RE: Shelly Rogers       Dear Colleague,     I had the pleasure of seeing Shelly Roegrs in the [facility].  Cardiology Clinic Progress Note    Shelly Rogers MRN# [id number]   YOB: 1933 Age: 89 year old   Primary cardiologist: Dr. Del Valle         Assessment and Plan:     In summary, Shelly Rogers is a delightful lady who appears much younger than her stated age. She presents today for a 6 month CORE clinic f/u visit, for chronic HFpEF.     1. Chronic HFpEF. Currently appears very well-compensated.   2. Moderate TR.   3. PAF / tachy-cam syndrome, s/p PPM. Rare AF on device checks, asymptomatic. Anticoagulated with Eliquis.   4. HTN, controlled.   5. DMII.    Plan:  - Labs today are pending including lipid panel, BMP, Hgb, and proBNP (also added on A1c per pt request; will forward to PCP). Will let Shelly know of any abnormalities.   - I told Shelly that she may continue to use just 20 mg daily of furosemide, with an extra 20 mg PRN.   - RTC with Dr. Del Valle in 6 months.     It was a pleasure seeing Shelly again today!         History of Presenting Illness:      Shelly Rogers is a pleasant 89 year old patient who presents today for a 6 month CORE clinic f/u visit.    The patient has a history of the following -   # HFpEF, initially diagnosed in 2013 in the setting of new-onset rapid AF. Admission in October 2018 in the setting of lasix noncompliance and increasing burden of AF.   # PAF/SSS, s/p PPM and cardioversion in 2013. Rate control strategy with Toprol. Anticoagulated on warfarin, INR followed by PCP.   # NSVT, asymptomatic, short runs noted on device  # Hx of R pleural effusion, s/p thoracentesis 10/2018  # HTN  # DMII  # CKD  # Hypothyroidism, on levothyroxine  # Mild hyponatremia  # Severe mixed SYLVIE, refuses CPAP  # Hx of poor medication compliance  # Social - She lives independently  "at an assisted living facility and manages her own medications. She has some macular degeneration which has caused some balance issues over time and therefore uses a cane to ambulate when she's out of her home, and doesn't drive. She's had no falls, and uses fall precautions. No ETOH or tobacco.    I've been following Shelly along with Dr. Del Valle since her admission in October 2018. She's been doing well since then, despite some issues with medication confusion and compliance.     She last saw Dr. Del Valle in 12/2020. He started losartan 25 mg daily for better blood pressure control and also for vascular protection due to diabetes. He also switched her from warfarin to Eliquis per her request for convenience.    1.5 years later, Shelly returned to clinic for an overdue visit in August 2021. She appeared well-compensated at a weight of 160 lbs.     Today, she presents to clinic with her daughter. She continues to appear much younger than her stated age. She's continues to feel really well from a cardiovascular standpoint. She doesn't feel limited by her breathing with ambulation (but \"I don't do anything\" - prefers to keep her mind active) or sleep. Her pedal and ankle edema is minimal. She doesn't feel like she's retaining any excess fluid in her system. She has been taking 20 mg of furosemide in the AM, but only takes the 20 mg in the PM \"some days,\" if she remembers or feels like she needs it. Shelly tells me she's otherwise compliant with her meds and is using a pill box appropriately. She ambulates with a cane due to macular degeneration, but hasn't fallen. Intermittent, rare and brief hemorrhoidal bleeding, but no significant bleeding diathesis on Eliquis. Her weight is down nearly 15 pounds from when I saw her last year, purposely, with portion control.  Blood pressure is controlled.     Her last echo was in July 2021, showing an EF of 60-65%, with moderately dilated and reduced RV function, mild MR, moderate TR, and " "dilated IVC suggestive of of an RAP of 15 mmHg or greater.  There is no significant change from prior.  Her most recent device check took place in January 2022, showing 20% Ap, 15% . 2,108 mode switch episodes and 1,573 atrial high rates logged comprising 96.1% of the time. 3 marker only EGMs and 5 regular EGMs for review show As>Vs for afib longest episode lasted [id number], ventricular rates 80-120bpm. All episodes occurred 1/11/2022 from 11:29am to 3:33pm. Asymptomatic. There are 2.5 years remaining on battery life. Labs today are pending.          Review of Systems:     12-pt ROS is negative except for as noted in the HPI.          Physical Exam:     Vitals: /76   Pulse 87   Ht 1.626 m (5' 4\")   Wt 70.3 kg (155 lb)   SpO2 97%   BMI 26.61 kg/m    Wt Readings from Last 10 Encounters:   03/02/22 70.3 kg (155 lb)   08/04/21 76.7 kg (169 lb 1.6 oz)   05/07/21 71.7 kg (158 lb)   01/13/20 70.8 kg (156 lb)   09/23/19 70.9 kg (156 lb 4.8 oz)   06/10/19 69.4 kg (153 lb)   05/01/19 73.5 kg (162 lb)   02/07/19 73.5 kg (162 lb)   01/10/19 73.3 kg (161 lb 8 oz)   11/14/18 78.6 kg (173 lb 3.2 oz)       Constitutional:  Patient is pleasant, alert, cooperative, and in NAD.  HEENT:  NCAT. PERRLA. EOM's intact.  Neck:  CVP appears normal  Pulmonary: Normal respiratory effort. CTAB.  Cardiac: Irregularly irregular rhythm, grade 3/6 sm at the LSB and RUSB.  Abdomen:  Non-tender abdomen with normoactive bowel sounds, no hepatosplenomegaly appreciated.   Vascular: Pulses in the upper extremities are 2+ and equal, lower extremity pulses are diminished bilaterally.  Extremities: Trace pitting edema ankle bilaterally.  Skin:  No rashes or lesions appreciated.   Neurological:  No gross motor or sensory deficits.   Psych: Appropriate affect.        Data:   Labs reviewed:  Recent Labs   Lab Test 05/07/21  0858 09/23/19  0913 09/12/19  1402 06/10/19  1426 01/07/19  1528 11/08/18  1206 06/12/18  1633 02/27/18  1541 09/12/16  1709 " 10/13/15  1454   LDL 92  --   --   --   --   --   --  101*  --  88   HDL 50  --   --   --   --   --   --  41*  --  44*   NHDL 133*  --   --   --   --   --   --  165*  --   --    CHOL 183  --   --   --   --   --   --  206*  --  204*   TRIG 204*  --   --   --   --   --   --  319*  --  359*   TSH 1.23  --  1.90 5.11*   < >  --    < > 2.32   < > 1.20   NTBNP  --  2,029*  --   --   --   --   --   --   --   --    IRON  --   --   --   --   --  57  --   --   --   --    FEB  --   --   --   --   --  389  --   --   --   --    IRONSAT  --   --   --   --   --  15  --   --   --   --    DARI  --   --   --   --   --  34   < > 28   < >  --     < > = values in this interval not displayed.       Lab Results   Component Value Date    WBC 8.1 05/07/2021    RBC 4.38 05/07/2021    HGB 14.2 05/07/2021    HCT 42.9 05/07/2021    MCV 98 05/07/2021    MCH 32.4 05/07/2021    MCHC 33.1 05/07/2021    RDW 14.2 05/07/2021     05/07/2021       Lab Results   Component Value Date     05/07/2021    POTASSIUM 4.7 05/07/2021    CHLORIDE 102 05/07/2021    CO2 31 05/07/2021    ANIONGAP 2 (L) 05/07/2021     (H) 05/07/2021    BUN 22 05/07/2021    CR 1.12 (H) 05/07/2021    GFRESTIMATED 44 (L) 05/07/2021    GFRESTBLACK 51 (L) 05/07/2021    ASTRID 9.3 05/07/2021      Lab Results   Component Value Date    AST 22 06/10/2019    ALT 20 06/10/2019       Lab Results   Component Value Date    A1C 8.1 (H) 05/07/2021       Lab Results   Component Value Date    INR 1.21 (H) 03/17/2021    INR 2.4 (A) 02/25/2020    INR 1.7 (A) 02/06/2020    INR 1.70 (H) 10/22/2018           Problem List:     Patient Active Problem List   Diagnosis     CHF (congestive heart failure) (H)     CARDIOVASCULAR SCREENING; LDL GOAL LESS THAN 100     Health Care Home     Pacemaker     OA (osteoarthritis)     Type 2 diabetes mellitus without complications (H)     DNS (deviated nasal septum)     Atrial fibrillation (H)     Paroxysmal atrial fibrillation (H)     Chronic diastolic  congestive heart failure (H)     Hypothyroidism     Long-term (current) use of anticoagulants [Z79.01]     Heart failure (H)     CKD (chronic kidney disease) stage 3, GFR 30-59 ml/min (H)     History of uterine cancer           Medications:     Current Outpatient Medications   Medication Sig Dispense Refill     ACETAMINOPHEN PO Take 500 mg by mouth 3 times daily as needed (Takes at least 6 hours apart).        apixaban ANTICOAGULANT (ELIQUIS ANTICOAGULANT) 5 MG tablet Take 1 tablet (5 mg) by mouth 2 times daily is blood thinner 180 tablet 3     furosemide (LASIX) 20 MG tablet TAKE 1 TABLET BY MOUTH TWICE A  tablet 1     glimepiride (AMARYL) 2 MG tablet TAKE 1 TABLET (2 MG) BY MOUTH 2 TIMES DAILY 180 tablet 1     levothyroxine (SYNTHROID/LEVOTHROID) 100 MCG tablet TAKE 1 TABLET (100 MCG) BY MOUTH DAILY FOR THYROID 90 tablet 0     metFORMIN (GLUCOPHAGE) 500 MG tablet Take 1 tablet (500 mg) by mouth 2 times daily (with meals) for diabetes 180 tablet 3     metoprolol succinate ER (TOPROL-XL) 100 MG 24 hr tablet Take 1 tablet (100 mg) by mouth 2 times daily for Blood Pressure and heart 180 tablet 3     multivitamin  with lutein (OCUVITE WITH LTEIN) CAPS per capsule Take 1 capsule by mouth daily       multivitamin (CENTRUM SILVER) tablet Take 1 tablet by mouth daily       triamcinolone (NASACORT) 55 MCG/ACT nasal aerosol Spray 2 sprays into both nostrils daily       UNABLE TO FIND MEDICATION NAME: [employer] Vegan CBD gummies       blood glucose (ACCU-CHEK SMARTVIEW) test strip 1 strip by In Vitro route daily (Patient not taking: Reported on 8/4/2021) 1 Box prn     blood glucose (NO BRAND SPECIFIED) lancets standard Use to test blood sugar 1 times daily or as directed. (Patient not taking: Reported on 8/4/2021) 100 each 11     blood glucose monitoring (ACCU-CHEK FASTCLIX) lancets Use to check blood sugars daily (Patient not taking: Reported on 8/4/2021) 1 Box prn     Continuous Blood Gluc  (FREESTYLE  STELLA 14 DAY READER) VERO 1 each every 14 days (Patient not taking: Reported on 8/4/2021) 1 Device 0     Continuous Blood Gluc Sensor (FREESTYLE STELLA 14 DAY SENSOR) MISC 1 each every 14 days (Patient not taking: Reported on 8/4/2021) 2 each 11     STATIN NOT PRESCRIBED, INTENTIONAL, continuous prn. Statin not prescribed intentionally due to Other: Not needed, LDL at goal <100mg/dL without therapy (Patient not taking: Reported on 8/4/2021) 0 each 0           Past Medical History:     Past Medical History:   Diagnosis Date     Atrial fibrillation (H)     Nl LVEF, s/p ablation      Congestive heart failure, unspecified      Diabetes mellitus (H) 2004     Hemorrhoids     intermittent bleeding     Hypertension      Macular degeneration      OA (osteoarthritis)      Pacemaker 3/2013     Uterine cancer (H)     s/p hyst     Past Surgical History:   Procedure Laterality Date     CHOLECYSTECTOMY  8/2004     GYN SURGERY       HYSTERECTOMY      Ut ca      JOINT REPLACEMENT       KERATOTOMY ARCUATE WITH FEMTOSECOND LASER/IMAGING FOR ATIOL Right 4/11/2017    Procedure: KERATOTOMY ARCUATE WITH FEMTOSECOND LASER/IMAGING FOR ATIOL;  Surgeon: Calvin Dominguez MD;  Location: [facility]     PHACOEMULSIFICATION CLEAR CORNEA WITH STANDARD INTRAOCULAR LENS IMPLANT Right 4/11/2017    Procedure: PHACOEMULSIFICATION CLEAR CORNEA WITH STANDARD INTRAOCULAR LENS IMPLANT;  Surgeon: Calvin Dominguez MD;  Location: [facility]     TKR      bilat     ZZC ANESTH,PACEMAKER INSERTION      dual chamber 3/27/13     Family History   Problem Relation Age of Onset     Gastrointestinal Disease Mother         bowel obstruction     Cardiovascular Father      C.A.D. Father      Cardiovascular Brother         CHF     Social History     Socioeconomic History     Marital status: Single     Spouse name: Not on file     Number of children: Not on file     Years of education: Not on file     Highest education level: Not on file   Occupational History     Not on  file   Tobacco Use     Smoking status: Former Smoker     Quit date: 1990     Years since quittin.1     Smokeless tobacco: Never Used   Substance and Sexual Activity     Alcohol use: No     Alcohol/week: 0.0 standard drinks     Drug use: No     Sexual activity: Not Currently     Partners: Male   Other Topics Concern     Parent/sibling w/ CABG, MI or angioplasty before 65F 55M? No      Service Not Asked     Blood Transfusions Not Asked     Caffeine Concern Yes     Comment: daily      Occupational Exposure Not Asked     Hobby Hazards Not Asked     Sleep Concern No     Stress Concern Not Asked     Weight Concern Not Asked     Special Diet Yes     Comment: low sodium, less leafy greens, low sugar     Back Care Not Asked     Exercise No     Bike Helmet Not Asked     Seat Belt Yes     Self-Exams Not Asked   Social History Narrative     Not on file     Social Determinants of Health     Financial Resource Strain: Not on file   Food Insecurity: Not on file   Transportation Needs: Not on file   Physical Activity: Not on file   Stress: Not on file   Social Connections: Not on file   Intimate Partner Violence: Not on file   Housing Stability: Not on file           Allergies:   Shellfish allergy, Ambien [zolpidem], Cats, Lisinopril, Miscellaneous [no clinical screening - see comments], Seasonal allergies, and Sulfa drugs      Eliz Goodwin PA-C, KIMBERLY  [facility]  Pager: [phone]    Today's clinic visit entailed:  Review of the result(s) of each unique test - Hemoglobin, BMP, proBNP, lipid panel, device interrogation  Ordering of each unique test  Prescription drug management  I spent a total of 40 minutes on the day of the visit.   Time spent doing chart review, history and exam, documentation and further activities per the note  Provider  Link to [facility] Help Grid     The level of medical decision making during this visit was of moderate complexity.    Thank you for allowing me to participate in the care  of your patient.      Sincerely,     KIMBERLY [facility]  cc:   Referred Self, MD

## 2022-03-03 NOTE — RESULT ENCOUNTER NOTE
Please notify patient by sending following letter with copy of test results      Nayely Bravo,    This is to inform you regarding your test result.    Your total cholesterol is normal.  HDL which is called good cholesterol is low.  Your LDL cholesterol is normal.  This is often call bad cholesterol and high levels increase the risk for heart attacks and strokes.  Your triglycerides are normal.      Sincerely,      Dr.Nasima Daria MD,FACP

## 2022-03-21 NOTE — TELEPHONE ENCOUNTER
Patients daughter Sandhya called reporting pt strain her back and having severe pain and pain with walking. No injury. Back pain started after bending a week ago. Pt has used a heating pad with no relief.  Daughter asked if PCP would approve a muscle relaxer. Triage advised pt be seen [facility] at [facility], [facility] or [facility] urgent care. If unable to walk she would need to be trasported via ambulance. Daughter verbalized agreement with plan.     Reason for Disposition    SEVERE back pain (e.g., excruciating, unable to do any normal activities) and not improved after pain medicine and CARE ADVICE    Additional Information    Negative: Passed out (i.e., fainted, collapsed and was not responding)    Negative: Shock suspected (e.g., cold/pale/clammy skin, too weak to stand, low BP, rapid pulse)    Negative: Sounds like a life-threatening emergency to the triager    Negative: Major injury to the back (e.g., MVA, fall > 10 feet or 3 meters, penetrating injury, etc.)    Negative: Pain in the upper back over the ribs (rib cage) that radiates (travels) into the chest    Negative: Pain in the upper back over the ribs (rib cage) and worsened by coughing (or clearly increases with breathing)    Negative: SEVERE back pain of sudden onset and age > 60    Negative: SEVERE abdominal pain (e.g., excruciating)    Negative: Abdominal pain and age > 60    Negative: Unable to urinate (or only a few drops) and bladder feels very full    Negative: Loss of bladder or bowel control (urine or bowel incontinence; wetting self, leaking stool) of new onset    Negative: Numbness (loss of sensation) in groin or rectal area    Negative: Pain radiates into groin, scrotum    Negative: Blood in urine (red, pink, or tea-colored)    Negative: Vomiting and pain over lower ribs of back (i.e., flank - kidney area)    Negative: Weakness of a leg or foot (e.g., unable to bear weight, dragging foot)    Negative: Patient sounds very sick or weak to the triager     Negative: Fever > 100.4 F (38.0 C) and flank pain    Negative: Pain or burning with passing urine (urination)    Protocols used: BACK PAIN-A-[place]

## 2022-03-21 NOTE — PROGRESS NOTES
[facility]    -Received call from pt and daughter Sandhya wanting to see if Eliz could prescribe a muscle relaxer for Shelly's back pain. Shelly has a knot in her upper back. Encouraged Sandhya to reach out to pt's PCP for any none cardiac related questions.     Gricelda Dang RN on 3/21/2022 at 10:35 AM

## 2022-03-30 PROBLEM — N18.30 CKD (CHRONIC KIDNEY DISEASE) STAGE 3, GFR 30-59 ML/MIN (H): Status: ACTIVE | Noted: 2019-06-10

## 2022-03-30 PROBLEM — S32.019A CLOSED FRACTURE OF FIRST LUMBAR VERTEBRA, UNSPECIFIED FRACTURE MORPHOLOGY, INITIAL ENCOUNTER (H): Status: ACTIVE | Noted: 2022-01-01

## 2022-03-30 NOTE — ED PROVIDER NOTES
History   Chief Complaint:  Back Pain       The history is provided by the patient.      Shelly Rogers is a 89 year old female on [facility] with history of atrial fibrillation, chronic CHF, hypertension, osteoarthritis, type 2 diabetes, and uterine cancer who presents with back pain. The patient reports that she began experiencing middle to lower back pain about 3 weeks ago. She notes that she had attempted to lift something the day before onset of her pain. She describes her pain as muscle spasms, which worsen in severity with movement. The patient also endorses decreased appetite and constipation. She reportedly spent last night on the floor due to pain. Ibuprofen and lidocaine patches have reportedly been helping. Of note, the patient reports no history of broken bones in her back. There have been no recent falls. Bilateral leg swelling is reportedly typical for her. She also notes no new cough. At this time, the patient reports no headache or neck pain. She denies numbness, tingling, or weakness of her legs. There has reportedly been no chest pain, shortness of breath, urinary symptoms, or fever. She additionally endorses no abdominal pain, vomiting, diarrhea, or black or bloody stools.    Review of Systems   Constitutional: Positive for appetite change (decresed). Negative for fever.   Respiratory: Negative for shortness of breath.    Cardiovascular: Negative for chest pain.   Gastrointestinal: Positive for constipation. Negative for abdominal pain, blood in stool, diarrhea and vomiting.   Genitourinary: Negative for decreased urine volume, difficulty urinating, dysuria, frequency, hematuria and urgency.   Musculoskeletal: Positive for back pain. Negative for neck pain.   Neurological: Negative for weakness, numbness (legs) and headaches.   All other systems reviewed and are negative.      Allergies:  Ambien [Zolpidem]  Lisinopril  Sulfa  Drugs    Medications:  Eliquis  Lasix  Glimepiride  Levothyroxine  Metformin  Toprol    Past Medical History:     Atrial fibrillation  Chronic CHF  Chronic kidney disease   Hemorrhoids   Hypertension  Hypothyroidism   Osteoarthritis   Type 2 diabetes   Uterine cancer     Past Surgical History:    Bilateral knee replacement  BSO  Cholecystectomy  Hysterectomy  Pacemaker insertion   Right keratotomy with cataract surgery and IOL    Family History:    Mother: Bowel obstruction  Father: CAD, cardiovascular issue  Brother: CHF    Social History:  Presents to the emergency department with her granddaughter and great-grandchild   Arrives via ambulance  Patient lives independently in an assisted living facility    Physical Exam     Patient Vitals for the past 24 hrs:   BP Temp Temp src Pulse Resp SpO2 Weight   03/30/22 1430 (!) 144/84 -- -- 92 -- 96 % --   03/30/22 1415 139/74 98  F (36.7  C) Oral 89 18 98 % 67.1 kg (148 lb)     Physical Exam  SKIN:  Warm, dry.  Atraumatic.  HEMATOLOGIC/IMMUNOLOGIC/LYMPHATIC:  No pallor.  HENT: No facial or scalp trauma.  Painless passive range of motion of head and neck.  Cervical spine palpably nontender.  EYES:  Conjunctivae normal.  Normal extraocular motion.  Pupils equal round and reactive to light.  Visual fields intact.  CARDIOVASCULAR:  Regular rate and rhythm.  No murmur.  RESPIRATORY:  No respiratory distress, breath sounds equal and normal.  GASTROINTESTINAL:  Soft, nontender abdomen with active bowel sounds.  No distention.  No palpable mass.  MUSCULOSKELETAL: Minimal passive range of motion of the torso exacerbates the mid to low back pain.  Painless passive range of motion of the extremities.  NEUROLOGIC:  Alert, conversant.  Oriented to self place and time.  No aphasia.  No dysarthria.  No tactile sensory or motor deficit of the face or extremities.  No limb ataxia.  PSYCHIATRIC:  Normal mood.    Emergency Department Course     Imaging:  Lumbar spine CT w/o contrast   Final  Result   IMPRESSION:   1. Recent-appearing L1 superior endplate fracture with retropulsion of   the posterior superior endplate fragment along the ventral aspect of   the spinal canal, contributing to mild spinal canal stenosis.   2. Multilevel degenerative spondylolisthesis in the coronal plane with   moderate degenerative dextroconvex scoliosis.   3. Multilevel degenerative changes, as described. There may be up to   moderate spinal canal stenosis at L3-L4 and to a slightly lesser   degree at L2-L3. Varying degrees of multilevel neural foraminal   stenosis, including moderate to severe right neural foraminal stenosis   at L4-L5. Please see the body of the report for additional details.   4. Prominent bilateral sacroiliac joint degenerative changes.      FABIOLA JORDAN MD            SYSTEM ID:  [id number]      CT Thoracic Spine w/o Contrast   Final Result   IMPRESSION:   1. Recent-appearing transversely oriented fracture through the L1   superior endplate with mild retropulsion of the posterior superior   endplate fracture fragment along the ventral aspect of the spinal   canal.   2. No other recent fracture.   3. Multilevel moderate to advanced degenerative changes of thoracic   spine, as described.   4. There is at least moderate spinal canal stenosis at T10-T11 and   mild to moderate/moderate spinal canal stenosis at T8-T9 with lesser   degrees of mild/mild to moderate spinal canal narrowing elsewhere.   5. Moderate bilateral T10-T11 neural foraminal stenosis.   6. Incidental extraspinal findings, as described.      FABIOLA JORDAN MD            SYSTEM ID:  [id number]        Report per radiology    Laboratory:  Labs Ordered and Resulted from Time of ED Arrival to Time of ED Departure   ROUTINE UA WITH MICROSCOPIC REFLEX TO CULTURE - Abnormal       Result Value    Color Urine Light Yellow      Appearance Urine Clear      Glucose Urine Negative      Bilirubin Urine Negative      Ketones Urine 40  (*)     Specific  Gravity Urine 1.010      Blood Urine Negative      pH Urine 7.0      Protein Albumin Urine 10  (*)     Urobilinogen Urine Normal      Nitrite Urine Negative      Leukocyte Esterase Urine Negative      Bacteria Urine Few (*)     RBC Urine <1      WBC Urine 0      Squamous Epithelials Urine <1     COMPREHENSIVE METABOLIC PANEL - Abnormal    Sodium 130 (*)     Potassium 3.8      Chloride 95      Carbon Dioxide (CO2) 26      Anion Gap 9      Urea Nitrogen 10      Creatinine 0.72      Calcium 9.4      Glucose 135 (*)     Alkaline Phosphatase 109      AST 23      ALT 16      Protein Total 7.6      Albumin 3.5      Bilirubin Total 1.2      GFR Estimate 79     CBC WITH PLATELETS AND DIFFERENTIAL - Abnormal    WBC Count 12.8 (*)     RBC Count 4.73      Hemoglobin 13.1      Hematocrit 40.8      MCV 86      MCH 27.7      MCHC 32.1      RDW 17.1 (*)     Platelet Count 407      % Neutrophils 79      % Lymphocytes 10      % Monocytes 9      % Eosinophils 0      % Basophils 1      % Immature Granulocytes 1      NRBCs per 100 WBC 0      Absolute Neutrophils 10.2 (*)     Absolute Lymphocytes 1.3      Absolute Monocytes 1.2      Absolute Eosinophils 0.0      Absolute Basophils 0.1      Absolute Immature Granulocytes 0.1      Absolute NRBCs 0.0     CK TOTAL - Normal         COVID-19 VIRUS (CORONAVIRUS) BY PCR        Emergency Department Course:     Reviewed:  I reviewed nursing notes, vitals, past medical history and Care Everywhere    Assessments:  1434 I obtained history and examined the patient as noted above.   1625 I rechecked the patient and explained findings.    Consults:  1655 I spoke with Dr. Zahra Hernandez of the hospitalist service, who agreed to admit the patient.     Interventions:  1425 NS bolus 1L IV     Disposition:  The patient was admitted to the hospital under the care of Dr. Hernandez.     Impression & Plan     CMS Diagnoses: None    Medical Decision Making:  This patient presents with back pain that is now limiting  her function significantly.  She denies fall or injury however the pain started after picking something up a couple months ago.  Progressing since then.  Given her age and these symptoms CT scans performed which revealed a L1 vertebral body endplate fracture which correlates with the patient's pain.  Testing otherwise was relatively reassuring.  Given her functional decline with this injury she will be admitted for further care and possible placement to a higher level of outpatient care.    Diagnosis:    ICD-10-CM    1. Closed fracture of first lumbar vertebra, unspecified fracture morphology, initial encounter (H)  S32.019A        Scribe Disclosure:  I, Terell Beck, am serving as a scribe at 2:34 PM on 3/30/2022 to document services personally performed by Jovanni Haque MD based on my observations and the provider's statements to me.            Jovanni Haque MD  03/30/22 [id number]

## 2022-03-30 NOTE — H&P
[facility]    History and Physical - Hospitalist Service       Date of Admission:  3/30/2022    Assessment & Plan      Shelly Rogers is a 89 year old female admitted on 3/30/2022 for back pain. She has a PMHx significant for congestive heart failure, status post pacemaker placement, type 2 diabetes, atrial fibrillation on anticoagulation, hypothyroidism, CKD 3.      Back pain  Intractable pain  *Patient with history of chronic back pain, now with acute presentation of back pain.  Denies any recent trauma  *MRI of the lumbar thoracic spine reveals a fracture and L1 as well as multilevel degenerative changes  *Patient lives alone, was immobile for up to 12 hours as she was unable to get up from a laying position due to the pain.  Will likely need placement.  Plan  --Admit to observation  --Pain control  --PT/OT  --Consider orthopedic service consult due to multiple degenerative findings on MRI, likely to set up outpatient care.  -- consult for possible placement on discharge.    Congestive heart failure  --Appears to be compensated at this time.  Denies any shortness of breath  --Continue PTA Lasix, Toprol  --Daily weights.    Atrial fibrillation on anticoagulation,  status post pacemaker placement  --Rate controlled  --Continue PTA Toprol and Eliquis.    Type 2 diabetes   --Blood glucose controlled  --Hold PTA glimepiride and Metformin  --Cover with sliding scale insulin for now.    Hypothyroidism  --Continue PTA Synthroid.       Diet:  Moderate consistent carbohydrate diet.  DVT Prophylaxis: DOAC  Patel Catheter: Not present  Central Lines: None  Cardiac Monitoring: None  Code Status:  Full code    Clinically Significant Risk Factors Present on Admission         # Hyponatremia: Na = 130 mmol/L (Ref range: 133 - 144 mmol/L) on admission, will monitor as appropriate       # Coagulation Defect: home medication list includes an anticoagulant medication    # Diabetes, type II:  "last A1C 7.5 % (Ref range: 0.0 - 5.6 %)  # Overweight: Estimated body mass index is 25.4 kg/m  as calculated from the following:    Height as of 3/2/22: 1.626 m (5' 4\").    Weight as of this encounter: 67.1 kg (148 lb).      Disposition Plan   Expected Discharge:    Anticipated discharge location:  Awaiting care coordination huddle  Delays:            The patient's care was discussed with the Patient.    Zahra Hernandez MD  Hospitalist Service  [facility]  Securely message with the Vocera Web Console (learn more here)  Text page via [employer] Paging/Directory         ______________________________________________________________________    Chief Complaint   Back pain    History is obtained from the patient    History of Present Illness   Shelly Rogers is a 89 year old female who presents with complaint of back pain. She has a PMHx significant for chronic back pain congestive heart failure, status post pacemaker placement, type 2 diabetes, atrial fibrillation on anticoagulation, hypothyroidism.  Patient states that she is constantly in pain, localized to her lower back, denies any radiation to her bilateral extremities.  She notes she feels fine yesterday and for the first time was done laying in her bed and not on a recliner.  Unfortunately after laying down, she was unable to do lift herself back up on the bed.  She notes she spent the entire day greater than 12 hours trying to she self out of bed which she eventually did.  She itched her way to the floor, did not fall she says, and did not hit her head.  She was unable to get to her phone, she was unable to have any p.o. intake as she could not get to it.  She basically waited down to her niece came by to visit.  At this time EMS was called and patient was brought back into the ED for evaluation.  On arrival of EMS, she notes she was lightheaded and dizzy when she tried to stand up.  Denies any chest pain, no shortness of breath, no abdominal " pain, no diarrhea (constipation, no nausea vomiting.  No fever or chills.    In the ED, vital signs remained stable.  Labs revealed sodium of 130, WBC 12.8, Covid negative.CT lumbar and thoracic spine reveals multilevel degenerative disc disease and L1 fracture with prevertebral edema.        Review of Systems    The 10 point Review of Systems is negative other than noted in the HPI or here.     Past Medical History    I have reviewed this patient's medical history and updated it with pertinent information if needed.   Past Medical History:   Diagnosis Date     Atrial fibrillation (H)     Nl LVEF, s/p ablation      Congestive heart failure, unspecified      Diabetes mellitus (H)      Hemorrhoids     intermittent bleeding     Hypertension      Macular degeneration      OA (osteoarthritis)      Pacemaker 3/2013     Uterine cancer (H)     s/p hyst       Past Surgical History   I have reviewed this patient's surgical history and updated it with pertinent information if needed.  Past Surgical History:   Procedure Laterality Date     CHOLECYSTECTOMY  2004     GYN SURGERY       HYSTERECTOMY      Ut ca      JOINT REPLACEMENT       KERATOTOMY ARCUATE WITH FEMTOSECOND LASER/IMAGING FOR ATIOL Right 2017    Procedure: KERATOTOMY ARCUATE WITH FEMTOSECOND LASER/IMAGING FOR ATIOL;  Surgeon: Calvin Dominguez MD;  Location: [facility]     PHACOEMULSIFICATION CLEAR CORNEA WITH STANDARD INTRAOCULAR LENS IMPLANT Right 2017    Procedure: PHACOEMULSIFICATION CLEAR CORNEA WITH STANDARD INTRAOCULAR LENS IMPLANT;  Surgeon: Calvin Dominguez MD;  Location: [facility]     TKR      bilat     ZZC ANESTH,PACEMAKER INSERTION      dual chamber 3/27/13       Social History   I have reviewed this patient's social history and updated it with pertinent information if needed.  Social History     Tobacco Use     Smoking status: Former Smoker     Quit date: 1990     Years since quittin.2     Smokeless tobacco: Never Used    Substance Use Topics     Alcohol use: No     Alcohol/week: 0.0 standard drinks     Drug use: No       Family History   I have reviewed this patient's family history and updated it with pertinent information if needed.  Family History   Problem Relation Age of Onset     Gastrointestinal Disease Mother         bowel obstruction     Cardiovascular Father      C.A.D. Father      Cardiovascular Brother         CHF       Prior to Admission Medications   Prior to Admission Medications   Prescriptions Last Dose Informant Patient Reported? Taking?   Continuous Blood Gluc  (FREESTYLE STELLA 14 DAY READER) VERO  Self No No   Si each every 14 days   Patient not taking: Reported on 2021   Continuous Blood Gluc Sensor (FREESTYLE STELLA 14 DAY SENSOR) MISC  Self No No   Si each every 14 days   Patient not taking: Reported on 2021   MAGNESIUM PO  Self Yes Yes   Sig: Take 1 tablet by mouth daily as needed   STATIN NOT PRESCRIBED, INTENTIONAL,  Self Yes No   Sig: continuous prn. Statin not prescribed intentionally due to Other: Not needed, LDL at goal <100mg/dL without therapy   Patient not taking: Reported on 2021   UNABLE TO FIND  at prn Self Yes Yes   Sig: MEDICATION NAME: Jaimee Tiwarigan CBD gummies Takes as needed   acetaminophen (TYLENOL) 500 MG tablet  Self Yes Yes   Sig: Take 500 mg by mouth 3 times daily as needed (Takes at least 6 hours apart)    apixaban ANTICOAGULANT (ELIQUIS ANTICOAGULANT) 5 MG tablet 3/30/2022 at x1 Self No Yes   Sig: Take 1 tablet (5 mg) by mouth 2 times daily is blood thinner   blood glucose (ACCU-CHEK SMARTVIEW) test strip  Self No No   Si strip by In Vitro route daily   Patient not taking: Reported on 2021   blood glucose (NO BRAND SPECIFIED) lancets standard  Self No No   Sig: Use to test blood sugar 1 times daily or as directed.   Patient not taking: Reported on 2021   blood glucose monitoring (ACCU-CHEK FASTCLIX) lancets  Self No No   Sig: Use to check  blood sugars daily   Patient not taking: Reported on 8/4/2021   furosemide (LASIX) 20 MG tablet 3/29/2022 at Unknown time Self Yes Yes   Sig: Take 1 tablet every morning. Take an extra dose as needed for 3+ lb wt gain, shortness of breath, or leg swelling   glimepiride (AMARYL) 2 MG tablet 3/30/2022 at am Self No Yes   Sig: TAKE 1 TABLET (2 MG) BY MOUTH 2 TIMES DAILY   levothyroxine (SYNTHROID/LEVOTHROID) 100 MCG tablet 3/29/2022 at pm Self No Yes   Sig: TAKE 1 TABLET (100 MCG) BY MOUTH DAILY FOR THYROID   metFORMIN (GLUCOPHAGE) 500 MG tablet 3/30/2022 at am Self No Yes   Sig: Take 1 tablet (500 mg) by mouth 2 times daily (with meals) for diabetes   metoprolol succinate ER (TOPROL-XL) 100 MG 24 hr tablet 3/30/2022 at am Self No Yes   Sig: Take 1 tablet (100 mg) by mouth 2 times daily for Blood Pressure and heart   multivitamin (CENTRUM SILVER) tablet 3/30/2022 at am Self Yes Yes   Sig: Take 1 tablet by mouth daily   triamcinolone (NASACORT) 55 MCG/ACT nasal aerosol  Self Yes Yes   Sig: Spray 2 sprays into both nostrils daily as needed       Facility-Administered Medications: None     Allergies   Allergies   Allergen Reactions     Shellfish Allergy Difficulty breathing     Ambien [Zolpidem] Other (See Comments)     Agitation with hallucinations     Cats      Sneezing, watery eyes     Lisinopril Cough     Miscellaneous [No Clinical Screening - See Comments] Other (See Comments)     Pt stated allergic to flowers, pt gets itchy watery eyes and stuffy nose     Seasonal Allergies Itching     Flowers     Sulfa Drugs Hives       Physical Exam   Vital Signs: Temp: 98  F (36.7  C) Temp src: Oral BP: (!) 144/84 Pulse: 92   Resp: 18 SpO2: 96 % O2 Device: None (Room air)    Weight: 148 lbs 0 oz    General Appearance: Well appearing for stated age.  Respiratory: CTAB, no rales or ronchi  Cardiovascular: S1, S2 normal, no murmurs  GI: non-tender on palpation, BS present      Data   Data reviewed today: I reviewed all medications,  new labs and imaging results over the last 24 hours. I personally reviewed the MRI of lumbar and thoracic spine image(s) showing Multilevel degenerative changes, spinal stenosis, L1 fracture..    Recent Labs   Lab 03/30/22  1421   WBC 12.8*   HGB 13.1   MCV 86      *   POTASSIUM 3.8   CHLORIDE 95   CO2 26   BUN 10   CR 0.72   ANIONGAP 9   ASTRID 9.4   *   ALBUMIN 3.5   PROTTOTAL 7.6   BILITOTAL 1.2   ALKPHOS 109   ALT 16   AST 23     Recent Results (from the past 24 hour(s))   CT Thoracic Spine w/o Contrast    Narrative    CT THORACIC SPINE WITHOUT CONTRAST   3/30/2022 3:22 PM     HISTORY: Mid-back pain.     TECHNIQUE: Axial images of the thoracic spine were obtained without  intravenous contrast. Multiplanar reformations were performed.   Radiation dose for this scan was reduced using automated exposure  control, adjustment of the mA and/or kV according to patient size, or  iterative reconstruction technique.    COMPARISON: None available. Correlation is made with previous chest  radiographs dated 10/19/2018 and CT abdomen and pelvis 8/8/2004.    FINDINGS: There is a recent appearing transversely oriented fracture  involving the L1 superior endplate involving the anterior and  posterior vertebral cortex, with mild retropulsion of the posterior  superior endplate fracture fragment along the ventral aspect of the  spinal canal (series 7 image 34). No definite fracture extension into  the pedicle/posterior elements is identified. No other findings  concerning for acute fracture. Minimal degenerative anterolisthesis of  T2 on T3. Mild exaggeration of the normal thoracic kyphosis. Mild  broad dextroconvex curvature centered in the midthoracic spine and  mild levoconvex curvature centered at approximately T11. Partially  visualized mild degenerative left lateral listhesis of L1 on L2.    Moderate to severe multilevel degenerative disc height loss with  marginal endplate osteophytes and multilevel  degenerative facet  disease, which appears moderate to severe in the upper to mid thoracic  region and on the left at T11-T12. There is prominent central disc  extrusion at T10-T11 which along with degenerative facet disease  results in at least moderate spinal canal stenosis with mild  flattening of the normal cord contour. Mild-to-moderate/moderate  spinal canal stenosis at T8-T9 in the setting of the diffuse disc  osteophyte complex and prominent calcified ligamentum flavum  thickening. Lesser degrees of mild and mild to moderate multilevel  spinal canal narrowing seen elsewhere in the setting of multilevel  disc osteophyte complexes. Of note, there is mild spinal canal  narrowing at the level of T12-L1 partially related to mild  retropulsion of the posterior-superior L1 fracture fragment along the  ventral aspect of the spinal canal. Moderate appearing bilateral  T10-T11 neural foraminal stenosis with lesser degrees of neural  foraminal narrowing elsewhere    Partially visualized cardiac enlargement. There appears to be a  partially visualized pericardial effusion. Cardiac pacing wires are  partially seen. Coronary artery calcifications. There is a  moderate-sized hiatal hernia. Small to medium size right greater than  left pleural effusions are present. Scattered atherosclerotic  calcifications of the aorta and its branches.      Impression    IMPRESSION:  1. Recent-appearing transversely oriented fracture through the L1  superior endplate with mild retropulsion of the posterior superior  endplate fracture fragment along the ventral aspect of the spinal  canal.  2. No other recent fracture.  3. Multilevel moderate to advanced degenerative changes of thoracic  spine, as described.  4. There is at least moderate spinal canal stenosis at T10-T11 and  mild to moderate/moderate spinal canal stenosis at T8-T9 with lesser  degrees of mild/mild to moderate spinal canal narrowing elsewhere.  5. Moderate bilateral  T10-T11 neural foraminal stenosis.  6. Incidental extraspinal findings, as described.    FABIOLA JORDAN MD         SYSTEM ID:  [id number]   Lumbar spine CT w/o contrast    Narrative    CT LUMBAR SPINE WITHOUT CONTRAST  3/30/2022 3:23 PM     HISTORY: Low back pain, no red flags.      TECHNIQUE: Axial images of the lumbar spine were obtained without  intravenous contrast. Multiplanar reformations were performed.   Radiation dose for this scan was reduced using automated exposure  control, adjustment of the mA and/or kV according to patient size, or  iterative reconstruction technique.     COMPARISON: Thoracic spine CT of same date.   CT abdomen and pelvis 8/8/2004.    FINDINGS: Nomenclature is based on five lumbar vertebral bodies. There  is a recent-appearing transversely oriented fracture through the L1  superior endplate involving the anterior and posterior vertebral body  cortex with mild retropulsion of the posterior superior endplate  fracture fragment along the ventral aspect of the spinal canal. No  other recent-appearing lumbar spine fracture identified.  Moderate-appearing dextroconvex curvature centered at L3. Mild/mild to  moderate multilevel spondylolisthesis in the coronal plane, including  left lateral listhesis of L1 on L2 and L2 on L3 and right lateral  listhesis of L3 on L4 and L4 on L5. Mild straightening of the normal  lumbar lordosis.    Multilevel degenerative disc disease, including and moderate to  severe/severe disc height loss at L1-L2, L2-L3 and L3-L4. Vacuum disc  phenomenon noted throughout the visualized lower thoracic/lumbar  intervertebral discs. Marginal endplate osteophytes noted at multiple  levels. Multilevel degenerative facet disease, most advanced  bilaterally at L4-L5 and L5-S1 and on the right at L3-L4. Minimal disc  bulges with posterior endplate osteophytes contributing to varying  degrees of spinal canal stenosis, most pronounced at the L2-L3 and  L3-L4 levels where it appears  at least moderate in degree. Varying  degrees of multilevel neural foraminal stenosis also present,  including moderate to severe/severe right L4-L5 neural foraminal  stenosis and at least moderate left L3-L4 and L4-L5 neural foraminal  stenosis.    There is prevertebral edema at the level of T12-L1, most likely due to  the recent L1 fracture. Diffuse atherosclerotic calcifications of the  aortoiliac arteries. Prominent bilateral sacroiliac joint degenerative  changes. Scattered sigmoid colon diverticula.      Impression    IMPRESSION:  1. Recent-appearing L1 superior endplate fracture with retropulsion of  the posterior superior endplate fragment along the ventral aspect of  the spinal canal, contributing to mild spinal canal stenosis.  2. Multilevel degenerative spondylolisthesis in the coronal plane with  moderate degenerative dextroconvex scoliosis.  3. Multilevel degenerative changes, as described. There may be up to  moderate spinal canal stenosis at L3-L4 and to a slightly lesser  degree at L2-L3. Varying degrees of multilevel neural foraminal  stenosis, including moderate to severe right neural foraminal stenosis  at L4-L5. Please see the body of the report for additional details.  4. Prominent bilateral sacroiliac joint degenerative changes.    FABIOLA JORDAN MD         SYSTEM ID:  [id number]

## 2022-03-30 NOTE — PHARMACY-ADMISSION MEDICATION HISTORY
Pharmacy Medication History  Admission medication history interview status for the 3/30/2022  admission is complete. See EPIC admission navigator for prior to admission medications     Location of Interview: Patient room  Medication history sources: Patient, Patient's family/friend (granddaughter), Surescripts and Care Everywhere    Significant changes made to the medication list:  Added: magnesium PRN to restless legs  Removed: ocuvite - pt states only taking Centrum    In the past week, patient estimated taking medication this percent of the time: greater than 90%    Additional medication history information:   None    Medication reconciliation completed by provider prior to medication history? No    Time spent in this activity: 10 mins    Prior to Admission medications    Medication Sig Last Dose Taking? Auth Provider   acetaminophen (TYLENOL) 500 MG tablet Take 500 mg by mouth 3 times daily as needed (Takes at least 6 hours apart)   Yes Unknown, Entered By History   apixaban ANTICOAGULANT (ELIQUIS ANTICOAGULANT) 5 MG tablet Take 1 tablet (5 mg) by mouth 2 times daily is blood thinner 3/30/2022 at x1 Yes Halley Huff MD   furosemide (LASIX) 20 MG tablet Take 1 tablet every morning. Take an extra dose as needed for 3+ lb wt gain, shortness of breath, or leg swelling 3/29/2022 at Unknown time Yes Eliz Goodwin PA-C   glimepiride (AMARYL) 2 MG tablet TAKE 1 TABLET (2 MG) BY MOUTH 2 TIMES DAILY 3/30/2022 at am Yes Halley Huff MD   levothyroxine (SYNTHROID/LEVOTHROID) 100 MCG tablet TAKE 1 TABLET (100 MCG) BY MOUTH DAILY FOR THYROID 3/29/2022 at pm Yes Halley Huff MD   MAGNESIUM PO Take 1 tablet by mouth daily as needed  Yes Unknown, Entered By History   metFORMIN (GLUCOPHAGE) 500 MG tablet Take 1 tablet (500 mg) by mouth 2 times daily (with meals) for diabetes 3/30/2022 at am Yes Halley Huff MD   metoprolol succinate ER (TOPROL-XL) 100 MG 24 hr tablet Take 1 tablet (100 mg) by mouth 2  times daily for Blood Pressure and heart 3/30/2022 at am Yes Halley Huff MD   multivitamin (CENTRUM SILVER) tablet Take 1 tablet by mouth daily 3/30/2022 at am Yes Reported, Patient   triamcinolone (NASACORT) 55 MCG/ACT nasal aerosol Spray 2 sprays into both nostrils daily as needed   Yes Reported, Patient   UNABLE TO FIND MEDICATION NAME: CBDsocorroilleadalberto Vegan CBD gummies Takes as needed  at prn Yes Reported, Patient   blood glucose (ACCU-CHEK SMARTVIEW) test strip 1 strip by In Vitro route daily  Patient not taking: Reported on 8/4/2021   Halley Huff MD   blood glucose (NO BRAND SPECIFIED) lancets standard Use to test blood sugar 1 times daily or as directed.  Patient not taking: Reported on 8/4/2021   Halley Huff MD   blood glucose monitoring (ACCU-CHEK FASTCLIX) lancets Use to check blood sugars daily  Patient not taking: Reported on 8/4/2021   Halley Huff MD   Continuous Blood Gluc  (FREESTYLE STELLA 14 DAY READER) VERO 1 each every 14 days  Patient not taking: Reported on 8/4/2021   Halley Huff MD   Continuous Blood Gluc Sensor (FREESTYLE STELLA 14 DAY SENSOR) MISC 1 each every 14 days  Patient not taking: Reported on 8/4/2021   Halley Huff MD   STATIN NOT PRESCRIBED, INTENTIONAL, continuous prn. Statin not prescribed intentionally due to Other: Not needed, LDL at goal <100mg/dL without therapy  Patient not taking: Reported on 8/4/2021   Vianey Sapp MD       The information provided in this note is only as accurate as the sources available at the time of update(s)

## 2022-03-30 NOTE — PROGRESS NOTES
RECEIVING UNIT ED HANDOFF REVIEW    ED Nurse Handoff Report was reviewed by: Fabi Osman RN on March 30, 2022 at 6:17 PM

## 2022-03-30 NOTE — ED NOTES
[facility]  ED Nurse Handoff Report    ED Chief complaint: Generalized Weakness      ED Diagnosis:   Final diagnoses:   None       Code Status: Full Code    Allergies:   Allergies   Allergen Reactions     Shellfish Allergy Difficulty breathing     Ambien [Zolpidem] Other (See Comments)     Agitation with hallucinations     Cats      Sneezing, watery eyes     Lisinopril Cough     Miscellaneous [No Clinical Screening - See Comments] Other (See Comments)     Pt stated allergic to flowers, pt gets itchy watery eyes and stuffy nose     Seasonal Allergies Itching     Flowers     Sulfa Drugs Hives       Patient Story: Pt has been struggling with bad back pain for the past month, she can't get up for a lying position and has been sleeping in a chair since the middle of February, last night she tried to lay down and could get back up and she slide to the floor, she was on the floor for about 12hrs.     Focused Assessment: extreme back pain, bilateral pressure abrasions on knees, lives independently in a senior center.    Lumbar spine CT w/o contrast   Final Result   IMPRESSION:   1. Recent-appearing L1 superior endplate fracture with retropulsion of   the posterior superior endplate fragment along the ventral aspect of   the spinal canal, contributing to mild spinal canal stenosis.   2. Multilevel degenerative spondylolisthesis in the coronal plane with   moderate degenerative dextroconvex scoliosis.   3. Multilevel degenerative changes, as described. There may be up to   moderate spinal canal stenosis at L3-L4 and to a slightly lesser   degree at L2-L3. Varying degrees of multilevel neural foraminal   stenosis, including moderate to severe right neural foraminal stenosis   at L4-L5. Please see the body of the report for additional details.   4. Prominent bilateral sacroiliac joint degenerative changes.      FABIOLA JORDAN MD            SYSTEM ID:  [id number]      CT Thoracic Spine w/o Contrast   Final Result    IMPRESSION:   1. Recent-appearing transversely oriented fracture through the L1   superior endplate with mild retropulsion of the posterior superior   endplate fracture fragment along the ventral aspect of the spinal   canal.   2. No other recent fracture.   3. Multilevel moderate to advanced degenerative changes of thoracic   spine, as described.   4. There is at least moderate spinal canal stenosis at T10-T11 and   mild to moderate/moderate spinal canal stenosis at T8-T9 with lesser   degrees of mild/mild to moderate spinal canal narrowing elsewhere.   5. Moderate bilateral T10-T11 neural foraminal stenosis.   6. Incidental extraspinal findings, as described.      FABIOLA JORDAN MD            SYSTEM ID:  [id number]          Treatments and/or interventions provided: imaging   Patient's response to treatments and/or interventions: well     To be done/followed up on inpatient unit:  monitor, TCU?    Does this patient have any cognitive concerns?: alert    Activity level - Baseline/Home:  Independent and Cane  Activity Level - Current:   Unknown    Patient's Preferred language: English   Needed?: No    Isolation: None  Infection: Not Applicable  Patient tested for COVID 19 prior to admission: YES  Bariatric?: No    Vital Signs:   Vitals:    03/30/22 1415   BP: 139/74   Pulse: 89   Resp: 18   Temp: 98  F (36.7  C)   TempSrc: Oral   SpO2: 98%   Weight: 67.1 kg (148 lb)       Cardiac Rhythm:     Was the PSS-3 completed:   Yes  What interventions are required if any?               Family Comments: granddaughter at bedside   OBS brochure/video discussed/provided to patient/family: No              Name of person given brochure if not patient:               Relationship to patient:     For the majority of the shift this patient's behavior was Green.   Behavioral interventions performed were .    ED NURSE PHONE NUMBER: *[phone]

## 2022-03-30 NOTE — PATIENT INSTRUCTIONS
Call CORE nurse for any questions or concerns Mon-Fri 8am-4pm:                                                #[phone]                                       For concerns after hours:                                               #[phone]     1: Medication changes: Take your second furosemide with lunch or in the early afternoon to help prevent against having to get up to urinate during the night.    Let me know if you would like to switch from coumadin to Xarelto or Eliquis.     2: Plan from today: Return to see Dr. Del Valle in 3 months.    Start weighing yourself every morning again, call with weight gain.     3: Lab results: see attached;   Component      Latest Ref Rng & Units 6/10/2019 9/23/2019   Sodium      136 - 145 mmol/L 138 137   Potassium      3.5 - 5.1 mmol/L 4.6 4.4   Chloride      98 - 107 mmol/L 102 102   Carbon Dioxide      23 - 29 mmol/L 28 27   Anion Gap      6 - 17 mmol/L 8 12.4   Glucose      70 - 105 mg/dL 108 (H) 150 (H)   Urea Nitrogen      7 - 30 mg/dL 22 17   Creatinine      0.70 - 1.30 mg/dL 0.93 1.18   GFR Estimate      >60 mL/min/1.73:m2 56 (L) 43 (L)   GFR Estimate If Black      >60 mL/min/1.73:m2 65 53 (L)   Calcium      8.5 - 10.5 mg/dL 9.2 9.9   Bilirubin Total      0.2 - 1.3 mg/dL 1.0    Albumin      3.4 - 5.0 g/dL 3.6    Protein Total      6.8 - 8.8 g/dL 7.3    Alkaline Phosphatase      40 - 150 U/L 155 (H)    ALT      0 - 50 U/L 20    AST      0 - 45 U/L 22       Faxed request for medicare prescription drug coverage determination form to PA team

## 2022-03-30 NOTE — ED TRIAGE NOTES
Pt has been struggling with bad back pain for the past month, she can't get up for a lying position and has been sleeping in a chair since the middle of February, last night she tried to lay down and could get back up and she slide to the floor, she was on the floor for about 12hrs.

## 2022-03-30 NOTE — ED NOTES
Bed: ED20  Expected date:   Expected time:   Means of arrival:   Comments:  [facility] - 89 F weakness eta 1340

## 2022-03-31 NOTE — PLAN OF CARE
VSS on room air. A/Ox4. Skin intact. Pain controlled with scheduled tylenol and heat applied to low back. Up with AX1, GB and walker, up in chair x2 on shift.  Regular diet. BS active, Flatus +. Voiding adequately to bathroom. LS clear. Discharge pending TCU placement.       PRIMARY DIAGNOSIS: ACUTE PAIN  OUTPATIENT/OBSERVATION GOALS TO BE MET BEFORE DISCHARGE:  1. Pain Status: Improved-controlled with oral pain medications.     2. Return to near baseline physical activity: No     3. Cleared for discharge by consultants (if involved): No     Discharge Planner Nurse   Safe discharge environment identified: No  Barriers to discharge: Yes, Needs TCU placement           Entered by: Jazmin Munoz 03/31/2022    Please review provider order for any additional goals.   Nurse to notify provider when observation goals have been met and patient is ready for discharge.

## 2022-03-31 NOTE — UTILIZATION REVIEW
"Admission Status; Secondary Review Determination     Admission Date: 3/30/2022  1:56 PM       Under the authority of the Utilization Management Committee, the utilization review process indicated a secondary review on the above patient.  The review outcome is based on review of the medical records, discussions with staff, and applying clinical experience noted on the date of the review.        ()      Inpatient Status Appropriate - This patient's medical care is consistent with medical management for inpatient care and reasonable inpatient medical practice.      (x) Observation Status Appropriate - This patient does not meet hospital inpatient criteria and is placed in observation status. If this patient's primary payer is Medicare and was admitted as an inpatient, Condition Code 44 should be used and patient status changed to \"observation\".   () Admission Status NOT Appropriate - This patient's medical care is not consistent with medical management for Inpatient or Observation Status.        () Outpatient Procedure Status Appropriate - Procedure not on Medicare Inpatient list and no complications at the time of this review       RATIONALE FOR DETERMINATION      Brief clinical presentation, information copied from the chart, abbreviated and edited for relevant content:       Shelly Rogers is a 89 year old female admitted on 3/30/2022 for back pain. She has a PMHx significant for congestive heart failure, status post pacemaker placement, type 2 diabetes, atrial fibrillation on anticoagulation, hypothyroidism, CKD 3 who presented for evaluation of acute on chronic back pain. CT of the lumbar thoracic spine reveals a L1 superior endplate fracture with retropulsion of posterior superior endplate fragment along ventral aspect of spinal canal. Treating pain pending discharge tomorrow to TCU.          In summary, the severity of illness, intensity of service provided, expected length of stay and risk for adverse outcome " make the care complex, high risk and appropriate for hospital admission.        The information on this document is developed by the utilization review team in order for the business office to ensure compliance.  This only denotes the appropriateness of proper admission status and does not reflect the quality of care rendered.         The definitions of Inpatient Status and Observation Status used in making the determination above are those provided in the CMS Coverage Manual, Chapter 1 and Chapter 6, section 70.4.      Sincerely,      Caridad Hernandez MD   Utilization Review/ Case Management  [facility].

## 2022-03-31 NOTE — CONSULTS
Consult Date: 03/31/2022    Shelly Rogers is an 89-year-old woman who I was asked to see in consultation because of back pain.  She is seen in conjunction with her daughter.  Ms. Rogers is a rather poor historian.  She has what sounds like a history of chronic back pain and her daughter states over the last month this pain has been far more pronounced.  Recently, she has been primarily sleeping in a recliner.  The other day, she was in her bed and then was unable to get up and was found unattended for about 12 hours.  She subsequently was admitted here with increased pain.  She does have a significant other.    PAST MEDICAL HISTORY:  Including congestive heart failure, pacemaker placement, type 2 diabetes, atrial fibrillation for which she is on anticoagulation, hypothyroidism and chronic kidney disease.  On admission, she did undergo a CT scan her thoracic and her lumbar spine.  She states she had physical therapy this morning, was able to get up a bit, but still was quite painful.  She was really unable to ambulate.  Typically lives in a fairly independent senior living setting.    PHYSICAL EXAMINATION:  On examination, she was an elderly woman who was lying quietly in bed.  I did not attempt to get her up and about.  She had a normal quad strength, ankle dorsiflexion and plantarflexion.    Review of the CT scan does demonstrate multilevel disk degeneration with mild degenerative scoliosis.  She does have what appears to be a cleft through the superior portion of the L1 vertebral body without any significant collapse.  This is consistent with an insufficiency fracture.    It is likely that Ms. Rogers has some acute on chronic pain secondary to the L1 compression fracture in conjunction with the multilevel disk degeneration.  With the fracture, it really is a matter of just time to allow this to heal.  I would try to mobilize her through physical therapy as tolerated.  I would markedly limit the use of analgesics,  Medication refill information reviewed.     Due date for HYDROcodone-acetaminophen (NORCO)  MG per tablet      Last dispensed from pharmacy on 11/18/21 is 12/20/21     Prescriptions prepped for review.     Will route to provider.     Virgen Garcia RN, BSN, CMSRN  RN Care Coordinator  [facility]         so as to avoid any issues with confusion.  I explained to both Ms. Oliva and her daughter that this pain likely will gradually subside to some extent, at least back to his baseline, over about a 4-month period of time.  I do not think any external orthosis is necessary.  It is quite likely she is going to need a more structured living situation when she leaves the hospital.    Collins Reynolds MD        D: 2022   T: 2022   MT: [id number]    Name:     GABBY OLIVA  MRN:      [id number]        Account:      [id number]   :      1933           Consult Date: 2022     Document: [id number]    cc:  Halley Huff MD

## 2022-03-31 NOTE — PROGRESS NOTES
Care Management Follow Up    Length of Stay (days): 1    Expected Discharge Date: 04/01/2022     Concerns to be Addressed:       Patient plan of care discussed at interdisciplinary rounds: Yes    Anticipated Discharge Disposition: Transitional Care- [facility]     Anticipated Discharge Services: None  Anticipated Discharge DME: None    Patient/family educated on Medicare website which has current facility and service quality ratings: no  Education Provided on the Discharge Plan:  yes  Patient/Family in Agreement with the Plan: yes    Referrals Placed by CM/SW:  TCU  Private pay costs discussed: insurance costs co-pays    Additional Information:  Patient confirmed that she received a COVID booster in Dec, 2021.  Pt accepted at [facility] for admission on Fri 4/1. Patient has Medicare as primary payor therefore does not need an auth for rehab. SW left  for adm's, inquiring about a time for admission tomorrow.    SW to continue to follow and assist with discharge planning.    ALEXIS Mcclain  Daytime (8:00am-4:30pm): [phone]  After-Hours SW Pager (4:30pm-11:30pm): [phone]           ALEXIS Woods

## 2022-03-31 NOTE — PLAN OF CARE
Goal Outcome Evaluation:  Orientation/Cognitive: A&Ox4  Observation Goals (Met/ Not Met): not met  Mobility Level/Assist Equipment: Ax2, not OOB  Fall Risk (Y/N): yes  Behavior Concerns: no  Pain Management: scheduled tylenol  Tele/VS/O2: VSS on RA  ABNL Lab/BG: Na 130, WBC 12.8. BG 72, 161  Diet: mod carb  Bowel/Bladder: pt straight cathed for 750 mL at 0600, purewick in place. incontinent   Skin Concerns: abrasions on knees, rash perineum, scattered bruising, coccyx red - foam in place, turn and repo q2  Drains/Devices: PIV SL  Tests/Procedures for next shift: PT and OT consulted   Anticipated DC date & active delays: pending   Patient Stated Goal for Today: rest    Observation goals prior to discharge:  -diagnostic tests and consults completed and resulted: not met  -vital signs normal or at patient baseline: met   -adequate pain control on oral analgesics: partially met, pt not OOB since arrival  Nurse to notify provider when observation goals have been met and patient is ready for discharge.

## 2022-03-31 NOTE — PROGRESS NOTES
03/31/22 1100   Quick Adds   Type of Visit Initial PT Evaluation   Living Environment   People in Home alone   Current Living Arrangements assisted living;other (see comments)  (lives in [place] but claims to be IND at baseline)   Home Accessibility no concerns   Transportation Anticipated family or friend will provide   Living Environment Comments pt lives in [place] but claims to be IND at baseline. Uses [employer] cooks, dresses and bathes all IND.    Self-Care   Usual Activity Tolerance moderate   Current Activity Tolerance fair   Regular Exercise No   Equipment Currently Used at Home cane, straight   Fall history within last six months yes   Number of times patient has fallen within last six months 1   General Information   Onset of Illness/Injury or Date of Surgery 03/30/22   Referring Physician Zahra Delgado MD   Patient/Family Therapy Goals Statement (PT) agreeable to TCU   Pertinent History of Current Problem (include personal factors and/or comorbidities that impact the POC) 89 year old female admitted on 3/30/2022 for back pain. She has a PMHx significant for congestive heart failure, status post pacemaker placement, type 2 diabetes, atrial fibrillation on anticoagulation, hypothyroidism, CKD 3 who presented for evaluation of acute on chronic back pain.   Existing Precautions/Restrictions fall   Heart Disease Risk Factors Gender;Medical history   Cognition   Orientation Status (Cognition) oriented x 3   Safety Deficit (Cognition) minimal deficit   Memory Deficit (Cognition) minimal deficit   Cognitive Status Comments perseverating on vision changes but unable to relay specifics    Strength (Manual Muscle Testing)   Strength Comments 4/5 grossly BLE, except 3/5 B hip flexion   Bed Mobility   Comment, (Bed Mobility) Supine to sit with mod Ax1`   Transfers   Comment, (Transfers) STS with CGA-min A and FWW   Gait/Stairs (Locomotion)   Comment, (Gait/Stairs) Ambulates 15ft in room with FWW and CGAx1   Balance    Balance Comments good sitting and stading balance   Clinical Impression   Criteria for Skilled Therapeutic Intervention Yes, treatment indicated   PT Diagnosis (PT) impaired functional mobility   Influenced by the following impairments weakness, tremors in UE, lightheaded.    Clinical Presentation (PT Evaluation Complexity) Stable/Uncomplicated   Clinical Presentation Rationale clinical judgement   Clinical Decision Making (Complexity) low complexity   Planned Therapy Interventions (PT) balance training;strengthening;gait training   Anticipated Equipment Needs at Discharge (PT) walker, rolling   Risk & Benefits of therapy have been explained evaluation/treatment results reviewed;care plan/treatment goals reviewed;risks/benefits reviewed;current/potential barriers reviewed;participants voiced agreement with care plan;participants included;patient;daughter   PT Discharge Planning   PT Discharge Recommendation (DC Rec) Transitional Care Facility;home with assist;home with outpatient physical therapy   PT Rationale for DC Rec pt is below baseline, will benefit from TCU in order to increase strength, balance and functional activity tolerance.  If d/c home, would need consistent A with mobility and FWW- falls risk.    PT Brief overview of current status Should be up in chair much of day and amb in room/halls 3x/day   Total Evaluation Time   Total Evaluation Time (Minutes) 10   Physical Therapy Goals   PT Frequency 4x/week   PT Predicated Duration/Target Date for Goal Attainment 04/07/22   PT Goals Bed Mobility;Transfers;Gait   PT: Bed Mobility Independent;Supine to/from sit   PT: Transfers Modified independent;Sit to/from stand;Bed to/from chair;Assistive device   PT: Gait Modified independent;Greater than 200 feet;Rolling walker

## 2022-03-31 NOTE — PROGRESS NOTES
Observation goals  -diagnostic tests and consults completed and resulted: not met  -vital signs normal or at patient baseline: met   -adequate pain control on oral analgesics: partially met, pt not OOB since arrival  Nurse to notify provider when observation goals have been met and patient is ready for discharge.

## 2022-03-31 NOTE — PLAN OF CARE
PRIMARY DIAGNOSIS: ACUTE PAIN  OUTPATIENT/OBSERVATION GOALS TO BE MET BEFORE DISCHARGE:  1. Pain Status: Improved-controlled with oral pain medications.     2. Return to near baseline physical activity: No     3. Cleared for discharge by consultants (if involved): No     Discharge Planner Nurse   Safe discharge environment identified: No  Barriers to discharge: Yes, Needs TCU placement          Entered by: Jazmin Munoz 03/31/2022    Please review provider order for any additional goals.   Nurse to notify provider when observation goals have been met and patient is ready for discharge.

## 2022-03-31 NOTE — PROGRESS NOTES
"Hospitalist Cross Cover  3/30/2022  10:11 PM    Patient admitted with vertebral fracture. Paged regarding tylenol order.    /54   Pulse 90   Temp 98  F (36.7  C) (Oral)   Resp 16   Ht 1.638 m (5' 4.5\")   Wt 69.1 kg (152 lb 4.8 oz)   SpO2 96%   BMI 25.74 kg/m      Plan:  -- d/w admitting provider - plans for tylenol 1 g every 6 hours (verfied no other pain meds containing tylenol ordered)      MERLYN Bennett [facility]  Hospitalist Service  House Officer  Pager: [phone] (7a - 6p)      "

## 2022-03-31 NOTE — PLAN OF CARE
PRIMARY DIAGNOSIS: ACUTE PAIN  OUTPATIENT/OBSERVATION GOALS TO BE MET BEFORE DISCHARGE:  1. Pain Status: Improved-controlled with oral pain medications.    2. Return to near baseline physical activity: No    3. Cleared for discharge by consultants (if involved): No    Discharge Planner Nurse   Safe discharge environment identified: No  Barriers to discharge: Yes, Needs TCU placement as well as Surgery consult       Entered by: Jazmin Munoz 03/31/2022    Please review provider order for any additional goals.   Nurse to notify provider when observation goals have been met and patient is ready for discharge.

## 2022-03-31 NOTE — CONSULTS
Care Management Initial Consult    General Information  Assessment completed with: Patient, Children,    Type of CM/SW Visit: Initial Assessment    Primary Care Provider verified and updated as needed: Yes   Readmission within the last 30 days: no previous admission in last 30 days      Reason for Consult: discharge planning  Advance Care Planning: Advance Care Planning Reviewed: present on chart          Communication Assessment  Patient's communication style: spoken language (English or Bilingual)    Hearing Difficulty or Deaf: no        Cognitive  Cognitive/Neuro/Behavioral: WDL  Level of Consciousness: alert     Orientation: oriented x 4        Speech: logical    Living Environment:   People in home: alone     Current living Arrangements: independent living facility      Able to return to prior arrangements: yes       Family/Social Support:  Care provided by: self  Provides care for: no one  Marital Status: Single  Children          Description of Support System: Involved, Supportive    Support Assessment: Adequate social supports, Adequate family and caregiver support    Current Resources:   Patient receiving home care services: No     Community Resources: None  Equipment currently used at home: cane, straight  Supplies currently used at home: None    Employment/Financial:  Employment Status: retired        Financial Concerns:             Lifestyle & Psychosocial Needs:  Social Determinants of Health     Tobacco Use: Medium Risk     Smoking Tobacco Use: Former Smoker     Smokeless Tobacco Use: Never Used   Alcohol Use: Not on file   Financial Resource Strain: Not on file   Food Insecurity: Not on file   Transportation Needs: Not on file   Physical Activity: Not on file   Stress: Not on file   Social Connections: Not on file   Intimate Partner Violence: Not on file   Depression: Not at risk     PHQ-2 Score: 0   Housing Stability: Not on file       Functional Status:  Prior to admission patient needed assistance:               Mental Health Status:          Chemical Dependency Status:                Values/Beliefs:  Spiritual, Cultural Beliefs, Pent[employer]alism Practices, Values that affect care: no               Additional Information:  Per care management/social work consult for discharge planning, patient was admitted on 3/30/2022 with back pain. Tentative date of discharge is 4/1/2022. Reviewed chart and saw that PT is recommending tcu for patient. Talked to patient and patient's daughter, Esthela. Patient lives at [facility] (independent living). Esthela lives five minutes away from her and is patient's health care directive. Mentioned that TCU is recommended at discharge and she said that she would like a referral sent over to [facility]. Writer said she would send referral to [facility].    Writer sent referral via DOD to [facility].    Will continue to follow.    ALEXIS Stauffer

## 2022-03-31 NOTE — PROGRESS NOTES
[facility]    Medicine Progress Note - Hospitalist Service    Date of Admission:  3/30/2022    Assessment & Plan        Shelly Rogers is a 89 year old female admitted on 3/30/2022 for back pain. She has a PMHx significant for congestive heart failure, status post pacemaker placement, type 2 diabetes, atrial fibrillation on anticoagulation, hypothyroidism, CKD 3 who presented for evaluation of acute on chronic back pain.    L1 superior endplate fracture  *Patient with history of chronic back pain, now with acute presentation of back pain.  Denies any recent trauma  *CT of the lumbar thoracic spine reveals a L1 superior endplate fracture with retropulsion of posterior superior endplate fragment along ventral aspect of spinal canal  *Patient lives alone, was immobile for up to 12 hours as she was unable to get up from a laying position due to the pain.  No red flag s/s.  --Pain control  --PT/OT  --Spine surgery consult  -- consult for possible placement on discharge.    Hyponatremia  Na 130--131. Suspected prerenal given inability to move for ~12h. Received 1L IVF in ED.  --Encourage PO intake  --BMP in AM    Congestive heart failure  --Appears to be compensated at this time.  Denies any shortness of breath  --Continue PTA Lasix, Toprol  --Daily weights.    Atrial fibrillation on anticoagulation, status post pacemaker placement  --Rate controlled  --Continue PTA Toprol and Eliquis.    Type 2 diabetes   Last A1c 7.5%.  --Hold PTA glimepiride and Metformin  --Cover with sliding scale insulin for now.    Hypothyroidism  --Continue PTA Synthroid.     Diet: Moderate Consistent Carb (60 g CHO per Meal) Diet    DVT Prophylaxis: DOAC  Patel Catheter: Not present  Central Lines: None  Cardiac Monitoring: None  Code Status: Full Code      Disposition Plan   Expected Discharge: 04/01/2022     Anticipated discharge location:  Awaiting care coordination huddle  Delays:     PT Disposition  recs needed            The patient's care was discussed with the Attending Physician, Dr. Rocha, Bedside Nurse, Care Coordinator/ and Patient.    KEV GraciaC  Hospitalist Service  [employer]  Securely message with the Vocera Web Console (learn more here)  Text page via [facility] Paging/Directory     ______________________________________________________________________    Interval History   Pt seen & evaluated, resting in bed, just recently ate breakfast without issue. Reports has been minimally able to reposition in bed without significant pain, is hesitant to work with PT. Afebrile.    Data reviewed today: I reviewed all medications, new labs and imaging results over the last 24 hours. I personally reviewed the Lumbar and Thoracic CT image(s) showing L1 superior endplate fracture.    Physical Exam   Vital Signs: Temp: 97.3  F (36.3  C) Temp src: Axillary BP: 114/62 Pulse: 68   Resp: 16 SpO2: 96 % O2 Device: None (Room air)    Weight: 152 lbs 4.8 oz  GEN: well-developed, well-nourished, appears comfortable  HEENT: NCAT, EOM intact bilaterally, sclera clear, conjunctiva normal, nose & mouth patent, mucous membranes moist  CHEST: lungs CTA bilaterally, no increased work of breathing, no wheeze, crackles, rhonchi  HEART: RRR, S1 & S2, no murmur  ABD: soft, nontender, nondistended, no guarding or rigidity, +BS in all 4 quadrants  MSK: AROM bilateral UE/LE  NEURO: awake, alert. Follows commands. CN II-XII grossly intact. Plantar flexion/dorsiflexion 5/5 bilaterally. Sensation grossly intact to light touch.   SKIN: warm & dry without rash, no pedal edema    Data   Recent Labs   Lab 03/31/22  0620 03/31/22  0129 03/30/22  2346 03/30/22  1421   WBC 9.9  --   --  12.8*   HGB 11.2*  --   --  13.1   MCV 85  --   --  86     --   --  407   *  --   --  130*   POTASSIUM 3.5  --   --  3.8   CHLORIDE 99  --   --  95   CO2 24  --   --  26   BUN 9  --   --  10   CR 0.67  --    --  0.72   ANIONGAP 8  --   --  9   ASTRID 8.7  --   --  9.4   * 161* 72 135*   ALBUMIN  --   --   --  3.5   PROTTOTAL  --   --   --  7.6   BILITOTAL  --   --   --  1.2   ALKPHOS  --   --   --  109   ALT  --   --   --  16   AST  --   --   --  23     Recent Results (from the past 24 hour(s))   CT Thoracic Spine w/o Contrast    Narrative    CT THORACIC SPINE WITHOUT CONTRAST   3/30/2022 3:22 PM     HISTORY: Mid-back pain.     TECHNIQUE: Axial images of the thoracic spine were obtained without  intravenous contrast. Multiplanar reformations were performed.   Radiation dose for this scan was reduced using automated exposure  control, adjustment of the mA and/or kV according to patient size, or  iterative reconstruction technique.    COMPARISON: None available. Correlation is made with previous chest  radiographs dated 10/19/2018 and CT abdomen and pelvis 8/8/2004.    FINDINGS: There is a recent appearing transversely oriented fracture  involving the L1 superior endplate involving the anterior and  posterior vertebral cortex, with mild retropulsion of the posterior  superior endplate fracture fragment along the ventral aspect of the  spinal canal (series 7 image 34). No definite fracture extension into  the pedicle/posterior elements is identified. No other findings  concerning for acute fracture. Minimal degenerative anterolisthesis of  T2 on T3. Mild exaggeration of the normal thoracic kyphosis. Mild  broad dextroconvex curvature centered in the midthoracic spine and  mild levoconvex curvature centered at approximately T11. Partially  visualized mild degenerative left lateral listhesis of L1 on L2.    Moderate to severe multilevel degenerative disc height loss with  marginal endplate osteophytes and multilevel degenerative facet  disease, which appears moderate to severe in the upper to mid thoracic  region and on the left at T11-T12. There is prominent central disc  extrusion at T10-T11 which along with  degenerative facet disease  results in at least moderate spinal canal stenosis with mild  flattening of the normal cord contour. Mild-to-moderate/moderate  spinal canal stenosis at T8-T9 in the setting of the diffuse disc  osteophyte complex and prominent calcified ligamentum flavum  thickening. Lesser degrees of mild and mild to moderate multilevel  spinal canal narrowing seen elsewhere in the setting of multilevel  disc osteophyte complexes. Of note, there is mild spinal canal  narrowing at the level of T12-L1 partially related to mild  retropulsion of the posterior-superior L1 fracture fragment along the  ventral aspect of the spinal canal. Moderate appearing bilateral  T10-T11 neural foraminal stenosis with lesser degrees of neural  foraminal narrowing elsewhere    Partially visualized cardiac enlargement. There appears to be a  partially visualized pericardial effusion. Cardiac pacing wires are  partially seen. Coronary artery calcifications. There is a  moderate-sized hiatal hernia. Small to medium size right greater than  left pleural effusions are present. Scattered atherosclerotic  calcifications of the aorta and its branches.      Impression    IMPRESSION:  1. Recent-appearing transversely oriented fracture through the L1  superior endplate with mild retropulsion of the posterior superior  endplate fracture fragment along the ventral aspect of the spinal  canal.  2. No other recent fracture.  3. Multilevel moderate to advanced degenerative changes of thoracic  spine, as described.  4. There is at least moderate spinal canal stenosis at T10-T11 and  mild to moderate/moderate spinal canal stenosis at T8-T9 with lesser  degrees of mild/mild to moderate spinal canal narrowing elsewhere.  5. Moderate bilateral T10-T11 neural foraminal stenosis.  6. Incidental extraspinal findings, as described.    FABIOLA JORDAN MD         SYSTEM ID:  [id number]   Lumbar spine CT w/o contrast    Narrative    CT LUMBAR SPINE  WITHOUT CONTRAST  3/30/2022 3:23 PM     HISTORY: Low back pain, no red flags.      TECHNIQUE: Axial images of the lumbar spine were obtained without  intravenous contrast. Multiplanar reformations were performed.   Radiation dose for this scan was reduced using automated exposure  control, adjustment of the mA and/or kV according to patient size, or  iterative reconstruction technique.     COMPARISON: Thoracic spine CT of same date.   CT abdomen and pelvis 8/8/2004.    FINDINGS: Nomenclature is based on five lumbar vertebral bodies. There  is a recent-appearing transversely oriented fracture through the L1  superior endplate involving the anterior and posterior vertebral body  cortex with mild retropulsion of the posterior superior endplate  fracture fragment along the ventral aspect of the spinal canal. No  other recent-appearing lumbar spine fracture identified.  Moderate-appearing dextroconvex curvature centered at L3. Mild/mild to  moderate multilevel spondylolisthesis in the coronal plane, including  left lateral listhesis of L1 on L2 and L2 on L3 and right lateral  listhesis of L3 on L4 and L4 on L5. Mild straightening of the normal  lumbar lordosis.    Multilevel degenerative disc disease, including and moderate to  severe/severe disc height loss at L1-L2, L2-L3 and L3-L4. Vacuum disc  phenomenon noted throughout the visualized lower thoracic/lumbar  intervertebral discs. Marginal endplate osteophytes noted at multiple  levels. Multilevel degenerative facet disease, most advanced  bilaterally at L4-L5 and L5-S1 and on the right at L3-L4. Minimal disc  bulges with posterior endplate osteophytes contributing to varying  degrees of spinal canal stenosis, most pronounced at the L2-L3 and  L3-L4 levels where it appears at least moderate in degree. Varying  degrees of multilevel neural foraminal stenosis also present,  including moderate to severe/severe right L4-L5 neural foraminal  stenosis and at least moderate  left L3-L4 and L4-L5 neural foraminal  stenosis.    There is prevertebral edema at the level of T12-L1, most likely due to  the recent L1 fracture. Diffuse atherosclerotic calcifications of the  aortoiliac arteries. Prominent bilateral sacroiliac joint degenerative  changes. Scattered sigmoid colon diverticula.      Impression    IMPRESSION:  1. Recent-appearing L1 superior endplate fracture with retropulsion of  the posterior superior endplate fragment along the ventral aspect of  the spinal canal, contributing to mild spinal canal stenosis.  2. Multilevel degenerative spondylolisthesis in the coronal plane with  moderate degenerative dextroconvex scoliosis.  3. Multilevel degenerative changes, as described. There may be up to  moderate spinal canal stenosis at L3-L4 and to a slightly lesser  degree at L2-L3. Varying degrees of multilevel neural foraminal  stenosis, including moderate to severe right neural foraminal stenosis  at L4-L5. Please see the body of the report for additional details.  4. Prominent bilateral sacroiliac joint degenerative changes.    FABIOLA JORDAN MD         SYSTEM ID:  [id number]     Medications       acetaminophen  1,000 mg Oral Q6H     apixaban ANTICOAGULANT  5 mg Oral BID     furosemide  20 mg Oral QAM     insulin aspart  1-7 Units Subcutaneous TID AC     insulin aspart  1-5 Units Subcutaneous At Bedtime     levothyroxine  100 mcg Oral QPM     metoprolol succinate ER  100 mg Oral BID

## 2022-03-31 NOTE — PROVIDER NOTIFICATION
MD Notification    Notified Person: MD    Notified Person Name: verito     Notification Date/Time: 3/30/22 0002    Notification Interaction: text page    Purpose of Notification: Pt is type 2 diabetic, pt has no orders for blood sugar checks or insulin. Can we get some orders?    Orders Received: orders placed    Comments:

## 2022-04-01 PROBLEM — S32.010S CLOSED COMPRESSION FRACTURE OF L1 LUMBAR VERTEBRA, SEQUELA: Status: ACTIVE | Noted: 2022-01-01

## 2022-04-01 NOTE — DISCHARGE SUMMARY
"Discharge Summary    Shelly Rogers MRN# [id number]   YOB: 1933 Age: 89 year old     Date of Admission:  3/30/2022  Date of Discharge:  4/1/2022  Admitting Physician:  Zahra Delgado MD  Discharge Physician:  Bob Rocha MD  Discharging Service:  Hospitalist       Primary Provider: Halley Huff          Admission / Discharge Diagnoses:   1. Back pain with L1 superior endplate fracture  2. Chronic heart failure preserved EF  3. A. fib, s/p PPM on anticoagulation  4. Type 2 diabetes  5. Hypothyroidism             Discharge Disposition:     Discharged to TCU           Condition on Discharge:     Discharge condition: Stable   Discharge vitals: Blood pressure (!) 155/92, pulse 84, temperature 97.5  F (36.4  C), temperature source Oral, resp. rate 16, height 1.638 m (5' 4.5\"), weight 69.1 kg (152 lb 4.8 oz), SpO2 97 %, not currently breastfeeding.   Code status on discharge: Full Code     Discharge Exam  GEN: well-developed, well-nourished, appears comfortable  HEENT: NCAT, EOM intact bilaterally, sclera clear, conjunctiva normal, nose & mouth patent, mucous membranes moist  CHEST: lungs CTA bilaterally, no increased work of breathing, no wheeze, crackles, rhonchi  HEART: RRR, S1 & S2, no murmur  ABD: soft, nontender, nondistended, no guarding or rigidity, +BS in all 4 quadrants  MSK: AROM bilateral UE/LE  NEURO: awake, alert. Follows commands. CN II-XII grossly intact. Plantar flexion/dorsiflexion 5/5 bilaterally. Sensation grossly intact to light touch.   SKIN: warm & dry without rash, no pedal edema          Medications Prior to Admission:     Medications Prior to Admission   Medication Sig Dispense Refill Last Dose     acetaminophen (TYLENOL) 500 MG tablet Take 500 mg by mouth 3 times daily as needed (Takes at least 6 hours apart)         apixaban ANTICOAGULANT (ELIQUIS ANTICOAGULANT) 5 MG tablet Take 1 tablet (5 mg) by mouth 2 times daily is blood thinner 180 tablet 3 3/30/2022 at x1 "     furosemide (LASIX) 20 MG tablet Take 1 tablet every morning. Take an extra dose as needed for 3+ lb wt gain, shortness of breath, or leg swelling 180 tablet 1 3/29/2022 at Unknown time     glimepiride (AMARYL) 2 MG tablet TAKE 1 TABLET (2 MG) BY MOUTH 2 TIMES DAILY 180 tablet 1 3/30/2022 at am     levothyroxine (SYNTHROID/LEVOTHROID) 100 MCG tablet TAKE 1 TABLET (100 MCG) BY MOUTH DAILY FOR THYROID 90 tablet 0 3/29/2022 at pm     MAGNESIUM PO Take 1 tablet by mouth daily as needed        metFORMIN (GLUCOPHAGE) 500 MG tablet Take 1 tablet (500 mg) by mouth 2 times daily (with meals) for diabetes 180 tablet 3 3/30/2022 at am     metoprolol succinate ER (TOPROL-XL) 100 MG 24 hr tablet Take 1 tablet (100 mg) by mouth 2 times daily for Blood Pressure and heart 180 tablet 3 3/30/2022 at am     multivitamin (CENTRUM SILVER) tablet Take 1 tablet by mouth daily   3/30/2022 at am     triamcinolone (NASACORT) 55 MCG/ACT nasal aerosol Spray 2 sprays into both nostrils daily as needed         blood glucose (ACCU-CHEK SMARTVIEW) test strip 1 strip by In Vitro route daily (Patient not taking: Reported on 8/4/2021) 1 Box prn      blood glucose (NO BRAND SPECIFIED) lancets standard Use to test blood sugar 1 times daily or as directed. (Patient not taking: Reported on 8/4/2021) 100 each 11      blood glucose monitoring (ACCU-CHEK FASTCLIX) lancets Use to check blood sugars daily (Patient not taking: Reported on 8/4/2021) 1 Box prn      Continuous Blood Gluc  (FREESTYLE STELLA 14 DAY READER) VERO 1 each every 14 days (Patient not taking: Reported on 8/4/2021) 1 Device 0      Continuous Blood Gluc Sensor (FREESTYLE STELLA 14 DAY SENSOR) MISC 1 each every 14 days (Patient not taking: Reported on 8/4/2021) 2 each 11      STATIN NOT PRESCRIBED, INTENTIONAL, continuous prn. Statin not prescribed intentionally due to Other: Not needed, LDL at goal <100mg/dL without therapy (Patient not taking: Reported on 8/4/2021) 0 each 0       [DISCONTINUED] UNABLE TO FIND MEDICATION NAME: CBDistillery Vegan CBD gummies Takes as needed    at prn             Discharge Medications:     Current Discharge Medication List      CONTINUE these medications which have NOT CHANGED    Details   acetaminophen (TYLENOL) 500 MG tablet Take 500 mg by mouth 3 times daily as needed (Takes at least 6 hours apart)       apixaban ANTICOAGULANT (ELIQUIS ANTICOAGULANT) 5 MG tablet Take 1 tablet (5 mg) by mouth 2 times daily is blood thinner  Qty: 180 tablet, Refills: 3    Associated Diagnoses: Paroxysmal atrial fibrillation (H)      furosemide (LASIX) 20 MG tablet Take 1 tablet every morning. Take an extra dose as needed for 3+ lb wt gain, shortness of breath, or leg swelling  Qty: 180 tablet, Refills: 1    Associated Diagnoses: Chronic diastolic congestive heart failure (H)      glimepiride (AMARYL) 2 MG tablet TAKE 1 TABLET (2 MG) BY MOUTH 2 TIMES DAILY  Qty: 180 tablet, Refills: 1    Associated Diagnoses: Type 2 diabetes mellitus without complication, without long-term current use of insulin (H)      levothyroxine (SYNTHROID/LEVOTHROID) 100 MCG tablet TAKE 1 TABLET (100 MCG) BY MOUTH DAILY FOR THYROID  Qty: 90 tablet, Refills: 0    Associated Diagnoses: Hypothyroidism, unspecified type      MAGNESIUM PO Take 1 tablet by mouth daily as needed      metFORMIN (GLUCOPHAGE) 500 MG tablet Take 1 tablet (500 mg) by mouth 2 times daily (with meals) for diabetes  Qty: 180 tablet, Refills: 3    Associated Diagnoses: Type 2 diabetes mellitus without complication, without long-term current use of insulin (H)      metoprolol succinate ER (TOPROL-XL) 100 MG 24 hr tablet Take 1 tablet (100 mg) by mouth 2 times daily for Blood Pressure and heart  Qty: 180 tablet, Refills: 3    Associated Diagnoses: Chronic diastolic congestive heart failure (H)      multivitamin (CENTRUM SILVER) tablet Take 1 tablet by mouth daily      triamcinolone (NASACORT) 55 MCG/ACT nasal aerosol Spray 2 sprays into  both nostrils daily as needed       blood glucose (ACCU-CHEK SMARTVIEW) test strip 1 strip by In Vitro route daily  Qty: 1 Box, Refills: prn    Associated Diagnoses: Type 2 diabetes, HbA1c goal < 7% (H)      blood glucose (NO BRAND SPECIFIED) lancets standard Use to test blood sugar 1 times daily or as directed.  Qty: 100 each, Refills: 11    Associated Diagnoses: Type 2 diabetes mellitus without complication, without long-term current use of insulin (H)      blood glucose monitoring (ACCU-CHEK FASTCLIX) lancets Use to check blood sugars daily  Qty: 1 Box, Refills: prn    Associated Diagnoses: Type 2 diabetes, HbA1c goal < 7% (H)      Continuous Blood Gluc  (FREESTYLE STELLA 14 DAY READER) VERO 1 each every 14 days  Qty: 1 Device, Refills: 0    Associated Diagnoses: Type 2 diabetes mellitus without complication, without long-term current use of insulin (H)      Continuous Blood Gluc Sensor (FREESTYLE STELLA 14 DAY SENSOR) MISC 1 each every 14 days  Qty: 2 each, Refills: 11    Associated Diagnoses: Type 2 diabetes mellitus without complication, without long-term current use of insulin (H)      STATIN NOT PRESCRIBED, INTENTIONAL, continuous prn. Statin not prescribed intentionally due to Other: Not needed, LDL at goal <100mg/dL without therapy  Qty: 0 each, Refills: 0         STOP taking these medications       UNABLE TO FIND Comments:   Reason for Stopping:                     Consultations:                  Brief History of Illness:     Shelly Rogers is a 89 year old female admitted on 3/30/2022 for back pain. She has a PMHx significant for congestive heart failure, status post pacemaker placement, type 2 diabetes, atrial fibrillation on anticoagulation, hypothyroidism, CKD 3 who presented for evaluation of acute on chronic back pain.  Found to have a L1 superior endplate fracture, nonsurgical conservative treatment recommended, being discharged to TCU for rehab.           Hospital Course:     L1 superior  endplate fracture  *Patient with history of chronic back pain, now with acute presentation of back pain.  Denies any recent trauma  *CT of the lumbar thoracic spine reveals a L1 superior endplate fracture with retropulsion of posterior superior endplate fragment along ventral aspect of spinal canal  *Patient lives alone, was immobile for up to 12 hours as she was unable to get up from a laying position due to the pain.  No red flag s/s.  --Pain control  --PT/OT  --Spine surgery consult  -- consult for possible placement on discharge.       Congestive heart failure  --Appears to be compensated at this time.  Denies any shortness of breath  --Continue PTA Lasix, Toprol  --Daily weights.    Atrial fibrillation on anticoagulation, status post pacemaker placement  --Rate controlled  --Continue PTA Toprol and Eliquis.     Type 2 diabetes   Last A1c 7.5%.  --Hold PTA glimepiride and Metformin  --Cover with sliding scale insulin for now.     Hypothyroidism  --Continue PTA Synthroid.                Pending Results:            Discharge Instructions and Follow-Up:     Discharge diet: Diabetic (1800 ADA)   Discharge activity: Activity as tolerated   Discharge follow-up: Follow up with primary care provider TCU    Outpatient therapy: OT/ PT    Home Care agency: None    Supplies and equipment: None   Lines and drains: None    Wound care: None   Other instructions: None      Total Time: min spent coordinating discharge.   Bob Rocha MD      vital signs vital signs/nurses notes

## 2022-04-01 NOTE — PROGRESS NOTES
Observation goals to be met prior to discharge:      -Diagnostic tests and consults completed and resulted -Met    -Vital signs normal or at patient baseline -Partially met    -Adequate pain control on oral analgesics -Met    Nurse to notify provider when observation goals have been met and patient is ready for discharge

## 2022-04-01 NOTE — PLAN OF CARE
Observation goals  PRIOR TO DISCHARGE          Comments:     -Diagnostic tests and consults completed and resulted -Met    -Vital signs normal or at patient baseline -Met    -Adequate pain control on oral analgesics -Met    Nurse to notify provider when observation goals have been met and patient is ready for discharge.        Goal Outcome Evaluation:      Orientation/Cognitive: A&O  Observation Goals (Met/ Not Met): Met  Mobility Level/Assist Equipment: AX1GB/W  Fall Risk (Y/N): Y  Behavior Concerns: None  Pain Management: Scheduled Tylenol  Tele/VS/O2: VSS on RA  ABNL Lab/BG: None this shift  Diet: MOD CHO  Bowel/Bladder: Continent B&B, ambulated to bathroom  Skin Concerns: Abrasions mera knees, bruising lt hip    Drains/Devices: PIV s/l lt AC  Tests/Procedures for next shift: None  Anticipated DC date & active delays: Accepted at [place] for discharge 4/1, awaits time  Patient Stated Goal for Today:pain control, ambulate

## 2022-04-01 NOTE — PLAN OF CARE
Orientation/Cognitive: A&O  Observation Goals (Met/ Not Met): Partially met, BP slightly elevated. Medically cleared for disch   Mobility Level/Assist Equipment: A1 gb walker  Fall Risk (Y/N): Y  Behavior Concerns: N  Pain Management: scheduled tylenol  Tele/VS/O2: pacemaker, BP slightly elevated. RA.   ABNL Lab/BG: , am insulin given. Pt states metformin at home  Diet: mod carb  Bowel/Bladder: cont  Skin Concerns: scabs bilateral knees, open to air. No intervention needed  Drains/Devices: none  Tests/Procedures for next shift: none, no surg intervention.  Anticipated DC date & active delays: 4-1 to [facility] at 12:30  Patient Stated Goal for Today: disch with daughter

## 2022-04-01 NOTE — PLAN OF CARE
Physical Therapy Discharge Summary    Reason for therapy discharge:    Discharged to transitional care facility.    Progress towards therapy goal(s). See goals on Care Plan in [facility] electronic health record for goal details.  Goals partially met.  Barriers to achieving goals:   discharge from facility.    Therapy recommendation(s):    Continued therapy is recommended.  Rationale/Recommendations:  To further increase independence with mobility.

## 2022-04-01 NOTE — PROGRESS NOTES
Observation goals to be met prior to discharge:      -Diagnostic tests and consults completed and resulted -Met    -Vital signs normal or at patient baseline -Met    -Adequate pain control on oral analgesics -Met    Nurse to notify provider when observation goals have been met and patient is ready for discharge

## 2022-04-01 NOTE — PROVIDER NOTIFICATION
MD Notification    Notified Person: MD    Notified Person Name: Dr. Rocha    Notification Date/Time:4-1 1043    Notification Interaction:web page    Purpose of Notification: Please place discharge orders if appropriate.     Orders Received:    Comments:

## 2022-04-01 NOTE — PROGRESS NOTES
Clinic Care Coordination Contact  Care Team Conversations    Situation: RN Clinical Product Navigator following patient through TCU care progression.     Background: Patient recently admitted to [facility] due to back pain and noted to have a L1 fracture     Assessment: Prior to hospitalization, patient resided alone in an apartment unit of an Assisted Living Facility but is idependent at baseline.  She was recommended to continue rehab and recovery at [facility] and will be transitioning to [facility] TCU    Plan/Recommendations: RN Clinical Product Navigator will send TCU Care Team Care Management hand in communication and request involvement in discharge planning and coordination of care.       Hawa Harris RN  Clinical Product Navigator

## 2022-04-01 NOTE — PLAN OF CARE
Goal Outcome Evaluation:  Orientation/Cognitive: A&O x4  Observation Goals: Met  Mobility Level/Assit Equipment: A1/GB/W  Fall Risk: Yes  Behavior Concerns:None   Pain Management: Scheduled tylenol   Tele/VS/O2: no tele, VSS on RAsat 90s  ABNL Lab/BG:   Diet: MOD CHO  Bowel/Bladder: Continent of B&B  Skin Concerns: Bilateral knees abrasions, Left hip bruise   Drains/Devices: None   Tests/Procedures for next shift:None   Anticipated DC date & active delays: Accepted at [facility], plans for discharge today SW following   Patient Stated Goal for Today: Met for discharge

## 2022-04-01 NOTE — PROGRESS NOTES
Care Management Discharge Note    Discharge Date: 04/01/2022       Discharge Disposition: Transitional Care    Discharge Services: None    Discharge DME: None    Discharge Transportation: family or friend will provide    Private pay costs discussed: insurance costs co-pays    PAS Confirmation Code:    Patient/family educated on Medicare website which has current facility and service quality ratings: no    Education Provided on the Discharge Plan:    Persons Notified of Discharge Plans: pending  Patient/Family in Agreement with the Plan: yes    Handoff Referral Completed: No    Additional Information:  SW left  for [facility], inquiring about an admission time today and transport arrangements. Return call pending.    DC orders: sent via DOD at 1108  Scripts:  PAS: completed, faxed, placed on chart  Trans: 1230    PAS-RR    D: Per DHS regulation, TWAN completed and submitted PAS-RR to [place] [employer] on [employer]ging Direct Connect via the Senior LinkAge Line.  PAS-RR confirmation # is : [id number]    I: TWAN spoke with pt and they are aware a PAS-RR has been submitted.  TWAN reviewed with pt that they may be contacted for a follow up appointment within 10 days of hospital discharge if their SNF stay is < 30 days.  Contact information for [facility] LinkAge Line was also provided.    A: Pt verbalized understanding.    P: Further questions may be directed to [facility] LinkAge Line at #[phone], option #4 for PAS-RR staff.      TWAN has identified no other social service needs at this time. TWAN will remain available should other needs arise.    ALEXIS Mcclain  Daytime (8:00am-4:30pm): [phone]  After-Hours TWAN Pager (4:30pm-11:30pm): [phone]                 ALEXIS Woods

## 2022-04-02 NOTE — PLAN OF CARE
Goal Outcome Evaluation:    A&Ox4, intermittent confusion overnight, VSS on RA, voiding w/ purewick but uses BSC as well. Pain managed w/ tramadol. Baseline BLE numbness and tingling. Bruise on L hip and bilateral knee abrasions. SW consult pending. Will continue to monitor.

## 2022-04-02 NOTE — ED NOTES
[facility]  ED Nurse Handoff Report    ED Chief complaint: Back Pain and Social Work Services      ED Diagnosis:   Final diagnoses:   Closed compression fracture of L1 lumbar vertebra, sequela       Code Status: Full code    Allergies:   Allergies   Allergen Reactions     Shellfish Allergy Difficulty breathing     Ambien [Zolpidem] Other (See Comments)     Agitation with hallucinations     Cats      Sneezing, watery eyes     Lisinopril Cough     Miscellaneous [No Clinical Screening - See Comments] Other (See Comments)     Pt stated allergic to flowers, pt gets itchy watery eyes and stuffy nose     Seasonal Allergies Itching     Flowers     Sulfa Drugs Hives       Patient Story: Pt arrives via private vehicle with daughter from [facility]. Pt was discharged from [facility] earlier today following a closed compression fracture of the L1, but both patient and daughter believe she needs a higher level of care than what [facility] can provide. Daughter is not comfortable taking patient home safely, so they return to [facility] seeking different options.  Focused Assessment:  Pt denies pain currently and has very limited overall mobility due to the pain that mobilizing causes.     Treatments and/or interventions provided: 50 mg tramadol  Patient's response to treatments and/or interventions: Tolerated well    To be done/followed up on inpatient unit:     n/a    Does this patient have any cognitive concerns?: No    Activity level - Baseline/Home:  Wheelchair. Up with multiple staff members  Activity Level - Current:   Wheelchair. Up with multiple staff members    Patient's Preferred language: English   Needed?: No    Isolation: None  Infection: None  Patient tested for COVID 19 prior to admission: Yes  Bariatric?: No    Vital Signs:   Vitals:    04/01/22 2003   BP: (!) 141/84   Pulse: 91   Resp: 14   Temp: 97.6  F (36.4  C)   TempSrc: Oral   SpO2: 96%   Weight: 66.7 kg (147 lb)   Height: 1.575 m (5'  "2\")       Cardiac Rhythm:     Was the PSS-3 completed:   yes  What interventions are required if any?  n/a             Family Comments: DaughterSandhya, is concerned about the safety of her mother and eager to explore other care options besides [facility]  OBS brochure/video discussed/provided to patient/family: Yes              Name of person given brochure if not patient: patient              Relationship to patient: self    For the majority of the shift this patient's behavior was green  Behavioral interventions performed were n/a.    ED NURSE PHONE NUMBER: [phone]       "

## 2022-04-02 NOTE — CONSULTS
Care Management Initial Consult    General Information  Assessment completed with: Patient, Children,  (daughter Esthela (Ginger) and granddafaith)  Type of CM/SW Visit: Initial Assessment    Primary Care Provider verified and updated as needed: Yes   Readmission within the last 30 days:        Reason for Consult: discharge planning  Advance Care Planning: Advance Care Planning Reviewed: present on chart          Communication Assessment  Patient's communication style: spoken language (English or Bilingual)    Hearing Difficulty or Deaf: no        Cognitive  Cognitive/Neuro/Behavioral: WDL  Level of Consciousness: alert     Orientation: oriented x 4     Best Language: 0 - No aphasia  Speech: clear    Living Environment:   People in home: alone, facility resident     Current living Arrangements: independent living facility      Able to return to prior arrangements: yes       Family/Social Support:  Care provided by: self, child(marcie)  Provides care for: no one  Marital Status: Single  Children, Other (specify) (Grandchildren)          Description of Support System: Supportive, Involved    Support Assessment: Adequate family and caregiver support, Adequate social supports    Current Resources:   Patient receiving home care services: No     Community Resources: None  Equipment currently used at home: cane, straight  Supplies currently used at home: None    Employment/Financial:  Employment Status: retired        Financial Concerns: No concerns identified           Lifestyle & Psychosocial Needs:  Social Determinants of Health     Tobacco Use: Medium Risk     Smoking Tobacco Use: Former Smoker     Smokeless Tobacco Use: Never Used   Alcohol Use: Not on file   Financial Resource Strain: Not on file   Food Insecurity: Not on file   Transportation Needs: Not on file   Physical Activity: Not on file   Stress: Not on file   Social Connections: Not on file   Intimate Partner Violence: Not on file   Depression: Not at risk      PHQ-2 Score: 0   Housing Stability: Not on file       Functional Status:  Prior to admission patient needed assistance:              Mental Health Status:          Chemical Dependency Status:                Values/Beliefs:  Spiritual, Cultural Beliefs, Denominational Practices, Values that affect care: no               Additional Information:  Per care management/social work consult for discharge planning.  Patient was admitted on 4-1-22 with a L1 compression fracture that recently discharged to [facility] and returned with concerns for adequate staffing.  The tentative date of discharge is 4-3-22.  Patient is admitted under observation status.  Had a lengthy conversation with patient and her daughter Sandhya.  Patient's granddaughter was also present for the conversation regarding the concerns as well as for discharge planning.  At baseline, patient lives alone in an independent apartment at [facility].  Patient receives no services, but patient can add services and stay in her same apartment.  Patient is independent at baseline with mobility and ADL's.  Patient cooks all of her own meals and manages her own meds.  Patient occasionally uses a straight cane.  Patient discharged to [facility] after her last hospital stay and patient was not happy with the hospital bed.  They expressed concerns to the nurse and felt like they didn't get any help so patient's daughter asked for her to came back to the hospital.  Patient's daughter states that she is not interested in having patient return to a TCU, she would like her to go home with homecare services for RN/PT/OT/HHA.  Offered a choice of homecare agencies.  Patient's daughter would prefer [facility] as all of patient's care is with [facility] and patient lives in an [facility] facility.  Explained that I will make aa referral, but depending on staffing they may have to defer the referral.  Patient's daughter states she understands.  Email referral sent to  [facility] and process explained.  Patient's daughter is asking for the first visit to be arranged with her.  She also has a message in to the nurse at the building to discuss adding services.  Patient's daughter is also asking for a hospital bed and a mattress overlay to be arranged as patient has been sleeping in a recliner for the last 5 weeks as it has been difficult for patient to get in an out of bed.  Explained that we can try and order a bed, but patient may not qualify.  Explained that there is some strict criteria to get a bed, but we can try to get a bed.  Explained that if patient does qualify she will not be able to get the bed unit Monday.  Patient's daughter is in agreement.  Updated Chio, the care coordinator, about the hospital bed.    Will continue to follow.      HAIM Albarran, [facility]    [phone]  [facility]

## 2022-04-02 NOTE — H&P
89 year old female with L1 compression fracture with [facility] was [facility] to [facility]CU who returns back to the hospital due to family concerns of in adequate staffing at TCU and reattempt at new TCU placement vs home with home cares and improved pain control.    Dictation #  [id number]      Naveen Merritt MD

## 2022-04-02 NOTE — PROGRESS NOTES
[facility]      OUTPATIENT PHYSICAL THERAPY EVALUATION  PLAN OF TREATMENT FOR OUTPATIENT REHABILITATION  (COMPLETE FOR INITIAL CLAIMS ONLY)  Patient's Last Name, First Name, M.I.  YOB: 1933  Shelly Rogers                        Provider's Name  [facility] Medical Record No.  [id number]                               Onset Date:  04/01/22   Start of Care Date:  04/02/22      Type:     _X_PT   ___OT   ___SLP Medical Diagnosis:  Closed compression fracture of L1 lumbar vertebra, sequela                        PT Diagnosis:  impaired mobility   Visits from SOC:  1   _________________________________________________________________________________  Plan of Treatment/Functional Goals    Planned Interventions: balance training, bed mobility training, gait training, home exercise program, patient/family education, strengthening, transfer training, postural re-education     Goals: See Physical Therapy Goals on Care Plan in [facility] electronic health record.    Therapy Frequency: 3x/week  Predicted Duration of Therapy Intervention: 04/08/22  _________________________________________________________________________________    I CERTIFY THE NEED FOR THESE SERVICES FURNISHED UNDER        THIS PLAN OF TREATMENT AND WHILE UNDER MY CARE     (Physician co-signature of this document indicates review and certification of the therapy plan).                Certification date from: 04/02/22, Certification date to: 04/08/22    Referring Physician: Naveen Merritt MD            Initial Assessment        See Physical Therapy evaluation dated 04/02/22 in Epic electronic health record.

## 2022-04-02 NOTE — ED TRIAGE NOTES
"Pt presents with daughter for concern for needing re-admission. Pt was originally living independently at [facility], sustained a fall and has a lower back fracture. Pt was discharged today to rehab unit at [facility] earlier this afternoon and family reports they believe pt needs more care. They state pt wasn't even able to get in/out of the rehab bed at the facility without pain. They state the staff there did not feel \"adequate\" to their liking. Pt was not discharged with any pain medications. Daughter states she felt in her gut that the patient needs more care and assistance and she didn't think [facility] had that. She states she would like to see her mother be re-admitted   "

## 2022-04-02 NOTE — ED PROVIDER NOTES
History     Chief Complaint:  Back Pain and Social Work Services       HPI   Shelly Rogers is a 89 year old female who presents from [facility] transitional care [facility] where she was discharged from here today.  Her daughter had her brought back over because she was not happy with the care or the conditions there.  Apparently this patient was admitted on the 30th after she had fallen and was on the ground for over 12 hours.  She was dehydrated and was given fluids and was felt to be okay for discharge to TCU this afternoon.  The daughter states the patient was not getting anything for pain, only Tylenol and that she cannot transfer without severe pain due to her L1 compression fracture.  She does not have any new pains or did not fall again.  Apparently the daughter states that the bed was very hard and the patient could not transfer from her wheelchair to the bed.  Apparently they are short staffed and they had a hard time getting a nurse to help at all.  The daughter refuses to bring her back to [facility] at this time and would like to look at other options in the morning such as getting her back to her senior apartment with in-home nursing help.  The daughter did not feel the patient was ready to be discharged today as she was still quite weak from being on the ground for 12 hours.  No other associated signs or symptoms.      Date of Admission:                  3/30/2022  Date of Discharge:                  4/1/2022  Admitting Physician:               Zahra Delgado MD  Discharge Physician:              Bob Rocha MD  Discharging Service:               Hospitalist        Primary Provider: Halley Huff          Admission / Discharge Diagnoses:   1. Back pain with L1 superior endplate fracture  2. Chronic heart failure preserved EF  3. A. fib, s/p PPM on anticoagulation  4. Type 2 diabetes  5. Hypothyroidism             Discharge Disposition:      Discharged to TCU         ROS:  Review of Systems    Constitutional: Negative for fever.   Respiratory: Negative for cough and shortness of breath.    Cardiovascular: Negative for chest pain.   Gastrointestinal: Negative for abdominal pain and nausea.   Musculoskeletal: Positive for back pain.   Neurological: Positive for weakness. Negative for numbness and headaches.   All other systems reviewed and are negative.         Allergies:  Shellfish Allergy  Ambien [Zolpidem]  Cats  Lisinopril  Miscellaneous [No Clinical Screening - See Comments]  Seasonal Allergies  Sulfa Drugs     Medications:    acetaminophen (TYLENOL) 500 MG tablet  apixaban ANTICOAGULANT (ELIQUIS ANTICOAGULANT) 5 MG tablet  furosemide (LASIX) 20 MG tablet  glimepiride (AMARYL) 2 MG tablet  levothyroxine (SYNTHROID/LEVOTHROID) 100 MCG tablet  MAGNESIUM PO  metFORMIN (GLUCOPHAGE) 500 MG tablet  metoprolol succinate ER (TOPROL-XL) 100 MG 24 hr tablet  multivitamin (CENTRUM SILVER) tablet  blood glucose (ACCU-CHEK SMARTVIEW) test strip  blood glucose (NO BRAND SPECIFIED) lancets standard  blood glucose monitoring (ACCU-CHEK FASTCLIX) lancets  Continuous Blood Gluc  (FREESTYLE STELLA 14 DAY READER) VERO  Continuous Blood Gluc Sensor (FREESTYLE STELLA 14 DAY SENSOR) MISC  STATIN NOT PRESCRIBED, INTENTIONAL,  triamcinolone (NASACORT) 55 MCG/ACT nasal aerosol        Past Medical History:    Past Medical History:   Diagnosis Date     Atrial fibrillation (H)      Congestive heart failure, unspecified      Diabetes mellitus (H) 2004     Hemorrhoids      Hypertension      Macular degeneration      OA (osteoarthritis)      Pacemaker 3/2013     Uterine cancer (H)      Patient Active Problem List   Diagnosis     CHF (congestive heart failure) (H)     CARDIOVASCULAR SCREENING; LDL GOAL LESS THAN 100     Health Care Home     Pacemaker     OA (osteoarthritis)     Type 2 diabetes mellitus without complications (H)     DNS (deviated nasal septum)     Atrial fibrillation (H)     Paroxysmal atrial fibrillation (H)  "    Chronic diastolic congestive heart failure (H)     Hypothyroidism     Long-term (current) use of anticoagulants [Z79.01]     Heart failure (H)     CKD (chronic kidney disease) stage 3, GFR 30-59 ml/min (H)     History of uterine cancer     Closed fracture of first lumbar vertebra, unspecified fracture morphology, initial encounter (H)        Past Surgical History:    Past Surgical History:   Procedure Laterality Date     CHOLECYSTECTOMY  8/2004     GYN SURGERY       HYSTERECTOMY      Ut ca      JOINT REPLACEMENT       KERATOTOMY ARCUATE WITH FEMTOSECOND LASER/IMAGING FOR ATIOL Right 4/11/2017    Procedure: KERATOTOMY ARCUATE WITH FEMTOSECOND LASER/IMAGING FOR ATIOL;  Surgeon: Calvin Dominguez MD;  Location: [facility]     PHACOEMULSIFICATION CLEAR CORNEA WITH STANDARD INTRAOCULAR LENS IMPLANT Right 4/11/2017    Procedure: PHACOEMULSIFICATION CLEAR CORNEA WITH STANDARD INTRAOCULAR LENS IMPLANT;  Surgeon: Calvin Dominguez MD;  Location: [facility]     TKR      bilat     ZZC ANESTH,PACEMAKER INSERTION      dual chamber 3/27/13        Family History:    family history includes C.A.D. in her father; Cardiovascular in her brother and father; Gastrointestinal Disease in her mother.    Social History:   reports that she quit smoking about 32 years ago. She has never used smokeless tobacco. She reports that she does not drink alcohol and does not use drugs.  PCP: Halley Huff     Physical Exam     Patient Vitals for the past 24 hrs:   BP Temp Temp src Pulse Resp SpO2 Height Weight   04/01/22 2003 (!) 141/84 97.6  F (36.4  C) Oral 91 14 96 % 1.575 m (5' 2\") 66.7 kg (147 lb)        Physical Exam  Nursing note and vitals reviewed.    Constitutional:  Appears comfortable in the wheelchair while at rest.  HENT:    Nose normal.  No discharge.      Oral mucosa is moist.  Eyes:    Conjunctivae are normal without injection.  Pupils are equal.  Cardiovascular:  Normal rate, regular rhythm with normal S1 and S2.      Normal " heart sounds and peripheral pulses 2+ and equal.       No murmur or michelle.  Pulmonary:  Effort normal and breath sounds clear to auscultation bilaterally.     No respiratory distress.    GI:    Soft. No distension and no mass. No tenderness.   Musculoskeletal:  Normal range of motion in her upper and lower extremities. No extremity deformity.     She does have chronic stasis changes in the skin in her ankles and chronic 1+ edema.  She does have tenderness over the upper lumbar spine.  Neurological:   Alert and oriented. No focal weakness.  Skin:    Skin is warm and dry. No rash noted.   Psychiatric:   Behavior is normal. Appropriate mood and affect.     Judgment and thought content normal.         Emergency Department Course       Emergency Department Course:      Reviewed:  I reviewed nursing notes, vitals and past medical history    Assessments:  2230 I obtained history and examined the patient as noted above.     Consults:   2255 I talked with the hospitalist Dr Merritt    Interventions:  Medications   traMADol (ULTRAM) tablet 50 mg (50 mg Oral Given 4/1/22 2254)        Disposition:  The patient was admitted to the hospital under the care of Dr. Merritt.     Impression & Plan      Medical Decision Making:  Patient brought in by her daughter from [facility] where she was only there about 8 to 9 hours today.  Apparently the patient is having a lot of pain with any type of transfer and they were having a hard time getting nursing help there.  She was also concerned about the bed that was very hard and not comfortable.  The daughter is refusing for her to go back to [facility].  I ordered tramadol 50 mg orally to see if it helps with pain and to see if she tolerates it.  I will admit her observation overnight and have social service and physical therapy see the patient and talk to the daughter tomorrow about either getting nursing help so she can go back to her apartment or may be looking at a different TCU.  I do not have an  option tonight to look into either of these options as its after 10 PM.  Have talked with the hospitalist Dr. Merritt who will admit the patient observation.      Diagnosis:    ICD-10-CM    1. Closed compression fracture of L1 lumbar vertebra, sequela  S32.010S         Discharge Medications:  New Prescriptions    No medications on file        4/1/2022   Verito Rea MD Powell, Tracy Alan, MD  04/01/22 2715

## 2022-04-02 NOTE — PROGRESS NOTES
04/02/22 1330   Quick Adds   Quick Adds Certification   Type of Visit Initial PT Evaluation   Living Environment   People in Home alone   Current Living Arrangements apartment   Home Accessibility no concerns   Transportation Anticipated family or friend will provide   Living Environment Comments Prior to back injury, patient was independent living apartment.     Self-Care   Usual Activity Tolerance good   Current Activity Tolerance fair   Equipment Currently Used at Home cane, straight   Fall history within last six months yes   Number of times patient has fallen within last six months 1   Activity/Exercise/Self-Care Comment Patient's daughter present during session and assisting with providing PLOF. Patient was completely independent with all mobility and ADLs. About 5 weeks ago, she had onset of low back pain which made movement more difficult.   General Information   Onset of Illness/Injury or Date of Surgery 04/01/22   Referring Physician Naveen Merritt MD   Patient/Family Therapy Goals Statement (PT) to go home   Pertinent History of Current Problem (include personal factors and/or comorbidities that impact the POC) Patient is 88 YO F re-admitted same day after discharging from here to [facility]. She was admitted under Observaton status for discharge placement. Patient was admitted 3/30/22 to 4/1/22 for L1 compression fracture. She has a PMHx significant for congestive heart failure, status post pacemaker placement, type 2 diabetes, atrial fibrillation on anticoagulation, hypothyroidism, CKD 3   Existing Precautions/Restrictions fall   Cognition   Affect/Mental Status (Cognition) WFL   Orientation Status (Cognition) oriented x 3   Cognitive Status Comments Patient intermittently forgetful during session, daughter needed to clarify many details about PLOF and history   Pain Assessment   Patient Currently in Pain No   Posture    Posture Forward head position;Protracted shoulders   Range of Motion (ROM)    Range of Motion ROM is WFL  (B LE)   Strength (Manual Muscle Testing)   Strength Comments Functional weakness during bed mobility and transfers   Bed Mobility   Bed Mobility supine-sit   Supine-Sit [place] (Bed Mobility) moderate assist (50% patient effort)   Assistive Device (Bed Mobility) bed rails   Comment, (Bed Mobility) From HOB flat, increased time, pain with bringing trunk upright   Transfers   Transfers sit-stand transfer   Sit-Stand Transfer   Sit-Stand [place] (Transfers) contact guard   Assistive Device (Sit-Stand Transfers) walker, front-wheeled   Comment, (Sit-Stand Transfer) Sit to stand from EOB with slow transition to upright   Gait/Stairs (Locomotion)   [place] Level (Gait) contact guard   Assistive Device (Gait) walker, front-wheeled   Pattern (Gait) step-through   Deviations/Abnormal Patterns (Gait) gait speed decreased   Comment, (Gait/Stairs) Patient ambulated approximately 16' around room with FWW and CGA, very slow [place]eno and forward lean on walker.    Balance   Balance Comments Good sitting balance, Steady with support of walker in standing   Sensory Examination   Sensory Perception patient reports no sensory changes   Coordination   Coordination no deficits were identified   Clinical Impression   Criteria for Skilled Therapeutic Intervention Yes, treatment indicated   PT Diagnosis (PT) impaired mobility   Influenced by the following impairments pain, weakness, decreased activity tolerance   Functional limitations due to impairments increased difficulty with bed mobility, transfers and ambulation   Clinical Presentation (PT Evaluation Complexity) Stable/Uncomplicated   Clinical Presentation Rationale medical status, clinical judgement   Clinical Decision Making (Complexity) low complexity   Planned Therapy Interventions (PT) balance training;bed mobility training;gait training;home exercise program;patient/family education;strengthening;transfer training;postural  re-education   Anticipated Equipment Needs at Discharge (PT) walker, rolling   Risk & Benefits of therapy have been explained evaluation/treatment results reviewed;care plan/treatment goals reviewed;risks/benefits reviewed;current/potential barriers reviewed;participants voiced agreement with care plan;participants included;patient;daughter   PT Discharge Planning   PT Discharge Recommendation (DC Rec) home with assist;home with home care physical therapy;Leaving home requires significant assistance;Leaving home requires significant taxing effort   PT Rationale for DC Rec Patient was just re-admitted from TCU where the daughter did not feel would be a good fit for patient's needs at this time. Patient is able to perform all mobility with assist of 1 at this time. Family is not interested in another TCU, would like for patient to return home with 24/7 assist which family is willing to arrange. She would benefit from HHPT to progress strength, endurance and independence with mobility. If patient is unable to have 24/7 assist, she would need TCU.    Therapy Certification   Start of care date 04/02/22   Certification date from 04/02/22   Certification date to 04/08/22   Medical Diagnosis Closed compression fracture of L1 lumbar vertebra, sequela   Total Evaluation Time   Total Evaluation Time (Minutes) 12   Physical Therapy Goals   PT Frequency 3x/week   PT Predicated Duration/Target Date for Goal Attainment 04/08/22   PT Goals Bed Mobility;Transfers;Gait   PT: Bed Mobility Independent;Supine to/from sit   PT: Transfers Modified independent;Sit to/from stand;Bed to/from chair;Assistive device  (FWW)   PT: Gait Modified independent;Greater than 200 feet;Rolling walker

## 2022-04-02 NOTE — H&P
Admitted: 04/01/2022    PRIMARY CARE PHYSICIAN:  Halley Huff MD    CHIEF COMPLAINT:  L1 compression fracture with a discharge to a TCU with concerns for adequate staffing.    HISTORY OF PRESENT ILLNESS:  Shelly Rogers is an 89-year-old female who was admitted to [facility] from 03/30/2022-04/01/2022.  She presented with back pain.  She has prior history of CHF, pacemaker placement, diabetes, atrial fibrillation and anticoagulation.  The patient was found to have L1 superior endplate fracture.  It was determined to be nonsurgical conservative treatment.  She was discharged to a TCU.    [facility]  The patient's daughter stated that the bed was very hard and the patient had difficulty transferring from wheelchair to bed and apparently they were very short staffed and they had difficulty getting a nurse to help at all.  The daughter also noted concerns of possible premature discharge as the patient is still quite weak and after being on the ground for almost 12 hours.  The patient came back to [facility] for further assessment.    In the Emergency Department, the patient was seen by Dr. Verito Rea.  The patient was afebrile with stable vital signs.  No blood work was repeated as she had blood work done earlier.  Her vital signs were stable.  She was given 50 mcg of Ultram to help with her back pain.  The patient is being admitted under observation status for reattempt placement to a new TCU versus possibly going home with home care.    PAST MEDICAL HISTORY, SURGICAL HISTORY, SOCIAL HISTORY, MEDICATION LIST:  I refer you to the recent dictated history and physical on  03/30/2022 by Dr. Zahra Hernandez.    PHYSICAL EXAMINATION:    VITAL SIGNS:  Temperature 97.6, heart rate 87, respirations 16, blood pressure 134/85, sats are 93% on room air.  GENERAL:  The patient is alert, no distress.  HEENT:  Normocephalic, atraumatic, pupils equal.  Mucous membranes are  moist.  NECK:  No neck tenderness.  PULMONARY:  Lungs are clear to auscultation.  CARDIOVASCULAR:  S1, S2, regular rhythm.  GASTROINTESTINAL:  Abdomen is soft, nontender, normoactive bowel sounds.  MUSCULOSKELETAL:  Shows some edema and chronic venous stasis disease.  NEUROLOGIC:  She is oriented x3.  Able to move all 4 extremities without any focal weakness.  DERMATOLOGY:  Skin is warm, dry, well perfused.  PSYCHIATRIC:  Mood and affect:  Normal.    LABORATORY AND IMAGING STUDIES:  As dictated in history of present illness.    ASSESSMENT:  Shelly Oliva is 89, who had acute L1 compression fracture, being admitted for reattempted placement versus going to a TCU with a more aggressive pain regimen.    PLAN:  1.  Acute L1 superior endplate fracture.  The patient will be admitted under observation status.  She was treated with p.r.n. Tylenol.  Will attempt Ultram 50 mg every 6 hours for moderate pain control.  We will also place the patient on lidocaine patch during the daytime.  2.  History of atrial fibrillation status post pacemaker placement.  The patient's heart rate is controlled.  We will continue the patient on her Toprol and apixaban.  3.  History of heart failure.  The patient appears to be euvolemic and compensated, not requiring any supplemental oxygen and has clear lungs.  The patient will be continued on her Lasix 20 mg that she takes every day.  4.  Diabetes type 2.  Last A1c was 7.5.  The patient will be continued on her Amaryl, will hold her metformin.  5.  Hypothyroidism.  We will continue the patient on levothyroxine.    DEEP VENOUS THROMBOSIS PROPHYLAXIS:  The patient is already anticoagulated.    CODE STATUS:  FULL.    DISPOSITION:  Observation status.    Naveen Merritt MD        D: 2022   T: 2022   MT: md    Name:     SHELLY OLIVA  MRN:      [id number]        Account:     [id number]   :      1933           Admitted:    2022       Document: [id number]    cc:  Halley  ALICIA Huff MD

## 2022-04-02 NOTE — PROGRESS NOTES
Pt A&O x4, up x1 with walker and GB. VSS except BP is slightly high. Had pain to lower back with movement, no pain when resting. Pain managed with Tramadol and Tyl. Baseline numbness to BLE. Pt voided in commode few times with small amount of urine. Bladder scan 640 ml, straight cath x1. Pt c/o constipation, aminta CANALES, ordered Miralax. On 1500 fluid restriction. Possible discharge home tomorrow.

## 2022-04-02 NOTE — CARE PLAN
RECEIVING UNIT ED HANDOFF REVIEW    ED Nurse Handoff Report was reviewed by: Kim Em RN on April 2, 2022 at 12:16 AM

## 2022-04-02 NOTE — PHARMACY-ADMISSION MEDICATION HISTORY
Pharmacy Medication History  Admission medication history interview status for the 4/1/2022  admission is complete. See EPIC admission navigator for prior to admission medications     Medication history sources: MAR (Joanna on [place] and [place])    Medication reconciliation completed by provider prior to medication history? No    Time spent in this activity: 5 minutes    Prior to Admission medications    Medication Sig Last Dose Taking? Auth Provider   acetaminophen (TYLENOL) 500 MG tablet Take 500 mg by mouth 3 times daily as needed (Takes at least 6 hours apart)  4/1/2022 at Unknown time Yes Unknown, Entered By History   apixaban ANTICOAGULANT (ELIQUIS ANTICOAGULANT) 5 MG tablet Take 1 tablet (5 mg) by mouth 2 times daily is blood thinner 4/1/2022 at hs Yes Halley Huff MD   furosemide (LASIX) 20 MG tablet Take 1 tablet every morning. Take an extra dose as needed for 3+ lb wt gain, shortness of breath, or leg swelling 4/1/2022 at Unknown time Yes Eliz Goodwin PA-C   glimepiride (AMARYL) 2 MG tablet TAKE 1 TABLET (2 MG) BY MOUTH 2 TIMES DAILY 4/1/2022 at hs Yes Halley Huff MD   levothyroxine (SYNTHROID/LEVOTHROID) 100 MCG tablet TAKE 1 TABLET (100 MCG) BY MOUTH DAILY FOR THYROID 3/31/2022 at Unknown time Yes Halley Huff MD   MAGNESIUM PO Take 1 tablet by mouth daily as needed 4/1/2022 at Unknown time Yes Unknown, Entered By History   metFORMIN (GLUCOPHAGE) 500 MG tablet Take 1 tablet (500 mg) by mouth 2 times daily (with meals) for diabetes 4/1/2022 at hs Yes Halley Huff MD   metoprolol succinate ER (TOPROL-XL) 100 MG 24 hr tablet Take 1 tablet (100 mg) by mouth 2 times daily for Blood Pressure and heart 4/1/2022 at hs Yes Halley Huff MD   multivitamin (CENTRUM SILVER) tablet Take 1 tablet by mouth daily 4/1/2022 at Unknown time Yes Reported, Patient   blood glucose (ACCU-CHEK SMARTVIEW) test strip 1 strip by In Vitro route daily  Patient not taking: Reported on 8/4/2021    Halley Huff MD   blood glucose (NO BRAND SPECIFIED) lancets standard Use to test blood sugar 1 times daily or as directed.  Patient not taking: Reported on 8/4/2021   Halley Huff MD   blood glucose monitoring (ACCU-CHEK FASTCLIX) lancets Use to check blood sugars daily  Patient not taking: Reported on 8/4/2021   Halley Huff MD   Continuous Blood Gluc  (FREESTYLE STELLA 14 DAY READER) VERO 1 each every 14 days  Patient not taking: Reported on 8/4/2021   Halley Huff MD   Continuous Blood Gluc Sensor (FREESTYLE STELLA 14 DAY SENSOR) MISC 1 each every 14 days  Patient not taking: Reported on 8/4/2021   Halley Huff MD   STATIN NOT PRESCRIBED, INTENTIONAL, continuous prn. Statin not prescribed intentionally due to Other: Not needed, LDL at goal <100mg/dL without therapy  Patient not taking: Reported on 8/4/2021   Vianey Sapp MD   triamcinolone (NASACORT) 55 MCG/ACT nasal aerosol Spray 2 sprays into both nostrils daily as needed    Reported, Patient       The information provided in this note is only as accurate as the sources available at the time of update(s)     Mitzi Blanton, CaroleD, BCCCP

## 2022-04-02 NOTE — PROGRESS NOTES
[facility]  Hospitalist Progress Note    Admit Date:  4/1/2022  Date of Service (when I saw the patient): 04/02/2022   Provider:  Iliana Elena, DO    Assessment & Plan   Shelly Rogers is a 89 year old female who was recently discharged to [facility] on 4/1/22 after a hospital stay (3/30/22-4/1/22) for worsening of her chronic back pain d/t L1 superior endplate fracture.  She returned to [facility] after transfer to [facility] as family felt there was inadequate staffing to assist her in a timely manner.    Problem List:    1.  Recent, nontraumatic L1 superior endplate fracture.    Acute on chronic LBP d/t fracture    Continue lidoderm patch and prn tramadol po q6hrs prn  Continue with scheduled tylenol    Recent orthopedic consult obtained 3/31/22 that recommended conservative treatment, PT.    PT reconsulted today and recommending 24 hour supervision if will discharge home    2.  History of atrial fibrillation status post pacemaker placement.      The patient's heart rate is controlled.  We will continue the patient on her Toprol and apixaban.    3. Hyponatremia  Sodium has been low during recent admission - 130 on 4/1/22  Will repeat sodium level this afternoon    Sodium noted to have been low in 2018  on further chart review    If still low, will consider a moderate daily fluid restriction  Hold po diuretics, for  now    4.  h/o CHF   The patient appears to be euvolemic and compensated, not requiring any supplemental oxygen and has clear lungs.   Holding scheduled po lasix until repeat sodium level    4.  Diabetes type 2.    Last A1c was 7.5.  The patient will be continued on her Amaryl, will hold her metformin.    5.  Urinary retention  RN paged that she has been unable to fully empty her bladder this re-admission  I have ordered prn straight cath q shift x 3  Continue to get up out of bed and trial voiding    6.   Hypothyroidism.    We will continue the patient on  levothyroxine.     DEEP VENOUS THROMBOSIS PROPHYLAXIS:  The patient is already anticoagulated with apixaban     CODE STATUS:  FULL.     DISPOSITION:  Observation status.       Diet: Moderate Consistent Carb (60 g CHO per Meal) Diet    Patel Catheter: Not present  Code Status: Full Code      Disposition Plan   Expected Discharge: 4/3/22    Entered: Iliana Elena,  04/02/2022, 2:37 PM       The patient's care was discussed with the patient and Sw    We are operating under sub optimal conditions in the setting of a world wide pandemic, hospitals are running at full capacity with limited bed availability. We are providing the best possible patient care with limited resources.    Interval History   Feeling well.  No real pain in her back currently unless trying to get up out of bed.  Appetite good - no N/V.  No HA.  Feels like she gets better care here and is happy to be back in the hospital    -Data reviewed today: I reviewed all new labs and imaging results over the last 24 hours. I personally reviewed no images or EKG's today.    Physical Exam   Temp: 97.3  F (36.3  C) Temp src: Axillary BP: (!) 149/72 Pulse: 80   Resp: 18 SpO2: 95 % O2 Device: None (Room air)    Vitals:    04/01/22 2003   Weight: 66.7 kg (147 lb)     Vital Signs with Ranges  Temp:  [97.3  F (36.3  C)-97.8  F (36.6  C)] 97.3  F (36.3  C)  Pulse:  [80-91] 80  Resp:  [14-18] 18  BP: (134-149)/(72-85) 149/72  SpO2:  [93 %-96 %] 95 %  No intake/output data recorded.    GEN:  Alert, oriented x 3, comfortable, feet are crossed, dressed in bed,  no overt respiratory distress.  HEENT:  Normocephalic/atraumatic, no scleral icterus, no nasal discharge, mouth moist  NECK:  No clear thyromegaly or JVD  CV:  Regular rate and rhythm, no loud murmur to ausc.  S1 + S2 noted, no S3 or S4.  LUNGS:  Clear to auscultation ant/lat bilaterally.  No rales/rhonchi/wheezing auscultated bilaterally.  No costal retractions bilaterally.  Symmetric chest rise on  inhalation noted.  ABD:  Active bowel sounds, soft, non-tender, mildly distended.  No rebound/guarding/rigidity.  No masses palpated.  No obvious HSM to exam.  EXT:  No pretibial edema or cyanosis bilaterally. Chronic venous discoloration changes noted bilaterally. No joint synovitis noted.  No calf-tenderness or asymmetry noted.  SKIN:  Dry to touch, no rashes or jaundice noted.  PSYCH:  Mood appropriate, Not tearful or depressed.  Maintains direct eye contact.  Smiling and interactive  NEURO:  No tremors at rest, speech is clear and appropriate.      Data   Labs:  Recent Labs   Lab 04/02/22  1523 04/02/22  0746 04/01/22  1155 04/01/22  0625 03/31/22  1156 03/31/22  0620 03/30/22  2346 03/30/22  1421   *  --   --  130*  --  131*  --  130*   POTASSIUM  --   --   --  3.5  --  3.5  --  3.8   CHLORIDE  --   --   --  100  --  99  --  95   CO2  --   --   --  25  --  24  --  26   ANIONGAP  --   --   --  5  --  8  --  9   GLC  --  142* 180* 209*   < > 124*   < > 135*   BUN  --   --   --  10  --  9  --  10   CR 0.62  --   --  0.60  --  0.67  --  0.72   GFRESTIMATED 85  --   --  85  --  83  --  79   ASTRID  --   --   --  8.4*  --  8.7  --  9.4    < > = values in this interval not displayed.     Recent Labs   Lab 04/02/22  1523 03/31/22  0620 03/30/22  1421   WBC 9.8 9.9 12.8*   HGB 12.0 11.2* 13.1   HCT 39.2 35.7 40.8   MCV 89 85 86    315 407      Recent Imaging:   No results found for this or any previous visit (from the past 24 hour(s)).    Medications       acetaminophen  500 mg Oral TID     apixaban ANTICOAGULANT  5 mg Oral BID     furosemide  20 mg Oral Daily     levothyroxine  100 mcg Oral QAM AC     lidocaine  1 patch Transdermal Q24H     lidocaine   Transdermal Q8H     metoprolol succinate ER  100 mg Oral BID

## 2022-04-03 PROBLEM — E87.1 HYPONATREMIA: Status: ACTIVE | Noted: 2022-01-01

## 2022-04-03 NOTE — PLAN OF CARE
Goal Outcome Evaluation:    Patient A&O, forgetful at time. Up with assist of 1 and walker, up in chair x2 this shift. Voided x1 minimally in BR, bladder scan was 165, continuing to monitor. Pain managed with PO meds.

## 2022-04-03 NOTE — PROGRESS NOTES
Per Vandana with [facility] [employer] [facility], they could likely deliver hospital bed the morning of 4/4 if all required documents are received.  Writer faxed revised orders and supporting chart note, but was not able to follow up via phone due to store closed for the day.  Care coordinator to follow up on 4/4.  Chio Newell RN  Care Coordinator  [facility]  [phone] (text or call)

## 2022-04-03 NOTE — PLAN OF CARE
Shift summary [id number]:     Patient A&Ox4, forgetful. Reports back pain with activity, tramadol and tylenol given. CMS intact. Only able to void very small amount overnight, straight cath x1 for 550ml. VSS ex hypertension 's. On room air. Slow to get out of bed, but ambulates well with Ax1 and walker.

## 2022-04-03 NOTE — PROGRESS NOTES
"I was asked by  to provide documentation for hospital bed at time of discharge    Hospital Bed Documentation:   Hospital bed is required for body positioning, to allow for safe transfers to wheelchair and standing and frequent changes in body position, not feasible in an ordinary bed       NOTE: Patient must have a \"Yes\" in one of the four following questions to qualify for a hospital bed.     1. Does the patient require positioning of the body in ways not feasible with an ordinary bed due to a medical condition that is expected to last at least 1 month? Yes (Please explain): acute back pain on top of chronic pain syndrome; difficulty getting out of bed     2. Does the patient require, for the alleviation of pain, positioning of the body in ways not feasible with an ordinary bed? Yes (Please explain): severe acute pain d/t compression fracture that is limiting her mobility       3. Does the patient require the head of the bed to be elevated more than 30 degrees most of the time due to congestive heart failure, chronic pulmonary disease, or aspiration? Yes (Please explain): h/o CHF     4. Does the patient require traction that can only be attached to a hospital bed? No     Additional Criteria:     Does the patient require frequent changes in body position and/or have an immediate need for change in body position? Yes - Patient qualifies for Semi Electric Bed       I, the undersigned, certify that the above prescribed supplies are medically necessary for this patient and is both reasonable and necessary in reference to accepted standards of medical and necessary in reference to accepted standards of medical practice in the treatment of this patient's condition and is not prescribed as a convenience     Ordering Provider's NPI: [id number]  Iliana Elena    "

## 2022-04-03 NOTE — PROGRESS NOTES
Pt seen and examined.  Does not feel safe discharging back to her apartment.  Would consider another TCU.  Spoke with RN who will ask SW to readdress safe discharge planning this am.  Awaiting am sodium level.  Noted the need to be straight cathed now x2 (last earlier this am).  May need benitez and outpt urology f/up---will ambulate this am and monitor ability to void.

## 2022-04-03 NOTE — PROGRESS NOTES
[facility]  Hospitalist Progress Note    Admit Date:  4/1/2022  Date of Service (when I saw the patient): 04/03/2022   Provider:  Iliana Elena, DO    Assessment & Plan   Shelly Rogers is a 89 year old female who was recently discharged to [facility] on 4/1/22 after a hospital stay (3/30/22-4/1/22) for worsening of her chronic back pain d/t L1 superior endplate fracture.  She returned to [facility] after transfer to [facility] as family felt there was inadequate staffing to assist her in a timely manner.  She is remaining in the hospital for safe discharge planning.    Problem List:    1.  Recent, nontraumatic L1 superior endplate fracture.    Acute on chronic LBP d/t fracture    Continue lidoderm patch and prn tramadol po q6hrs prn  Continue with scheduled tylenol    Recent orthopedic consult obtained 3/31/22 that recommended conservative treatment, PT.    PT reconsulted this admission and recommending 24 hour supervision    2.  History of atrial fibrillation status post pacemaker placement.      The patient's heart rate is controlled.  We will continue the patient on her Toprol and apixaban.    3. Hyponatremia      Altered mentation  Sodium has been low during recent admission but has remained stable at 130.  She is having altered mentation and there is suspicion that her fairly acute hyponatremia is contributing.  Her daughter denies recent fall prior to recent admission; no clear need for CT imaging unlessmental status worsens despite improvement of hyponatremia.    Last sodium 134 (3/2/22)    Will continue to hold po lasix today  Start moderate daily fluid restriction today  BMP in the am if still in the hospital or early next week    Addendum - 1400  Sodium level slightly lower this afternoon 128 (130)  I have ordered urine sodium, urine osm and serum osm as well as FENA to help decide if she is more volume depleted or has more or a SIADH pattern  Will liberalize fluid  restriction  Repeat sodium this evening    Spoke with daughter, Sandhya, who reports that she was taking her lasix daily on a regular basis with her h/o CHF.  She had been on two diuretics but one was recently stopped by Cardiology.    Addendum - 1630  Spoke with pharmacy----tramadol has been shown to cause hyponatremia  Especially in elderly females during the first week on initiation.  Will HOLD tramadol further d/t her worsening hyponatremia.  Will increase the available dose of tylenol. Continue with topical lidoderm and add aqua-k pad.  Will also order small dose prn advil (with food) cautiously for a few days.  Would like to avoid narcotics d/t her intermittent confusion seen with the tramadol, but this is also can be considered if pain becomes poorly controlled.    4.  h/o CHF  Clinically still appears euvolemic and compensated  She is not hypoxic or in any respiratory distress     Holding scheduled po lasix, for now, as above     4.  Diabetes type 2.    Last A1c was 7.5.  Continue diabetic diet  Ok to resume her PTA amaryl and metformin today    5.  Urinary retention  Spoke with bedside RN and pt about retention  She has voided in-between two straight caths for retention  Will get pt up ambulating more and monitor voiding closely   If needing a third straight cath today will need to consider placing benitez and have f/up outpt urology consult    6.   Hypothyroidism.    We will continue the patient on levothyroxine.     DEEP VENOUS THROMBOSIS PROPHYLAXIS:  The patient is already anticoagulated with apixaban     CODE STATUS:  FULL.     DISPOSITION;  > 2 additional midnights anticipated.  Will change to INPATIENT status.       Diet: Moderate Consistent Carb (60 g CHO per Meal) Diet  Diet  Fluid restriction 1800 ML FLUID    Benitez Catheter: Not present  Code Status: Full Code      Disposition Plan   Expected Discharge: several more days anticipated    Entered: Iliana Elena,  04/03/2022, 11:59 AM        We are operating under sub optimal conditions in the setting of a world wide pandemic, hospitals are running at full capacity with limited bed availability. We are providing the best possible patient care with limited resources.    Interval History      She slept well.  States that she has had some difficulty with urinary retention after a surgical procedure in the past.  She denies HA, N/V, CP or lightheadedness currently. She hasn't yet been up out of bed.  No Fevers overnight.      -Data reviewed today: I reviewed all new labs and imaging results over the last 24 hours. I personally reviewed no images or EKG's today.    Physical Exam   Temp: 97.6  F (36.4  C) Temp src: Axillary BP: 127/89 Pulse: 85   Resp: 16 SpO2: 95 % O2 Device: None (Room air)    Vitals:    04/01/22 2003   Weight: 66.7 kg (147 lb)     Vital Signs with Ranges  Temp:  [97.1  F (36.2  C)-99.3  F (37.4  C)] 97.6  F (36.4  C)  Pulse:  [75-98] 85  Resp:  [16-18] 16  BP: (127-158)/(79-93) 127/89  SpO2:  [92 %-98 %] 95 %  I/O last 3 completed shifts:  In: 540 [P.O.:540]  Out: 550 [Urine:550]    GEN:  Alert, awake, comfortable, easily conversant and in no overt respiratory distress.  HEENT:  Normocephalic/atraumatic, no scleral icterus, no nasal discharge, mouth and membranes appear fairly moist  NECK:  No clear thyromegaly or JVD  CV:  Regular rate and rhythm, no loud murmur to ausc.  S1 + S2 noted, no S3 or S4.  LUNGS:  Clear to auscultation ant/lat bilaterally.  No rales/rhonchi/wheezing auscultated bilaterally.  No costal retractions bilaterally.  Symmetric chest rise on inhalation noted.  ABD:  Active bowel sounds, soft, non-tender, still mildly distended.  No rebound/guarding/rigidity.  No masses palpated.  No obvious HSM to exam.  EXT:  No pretibial edema or cyanosis bilaterally. Chronic venous discoloration changes noted bilaterally. No joint synovitis noted.  No calf-tenderness or asymmetry noted.  SKIN:  Dry to touch, no rashes or  jaundice noted.  PSYCH:  Mood appropriate, Not tearful or depressed.  Maintains direct eye contact.  Smiling and interactive yet this am  NEURO:  No tremors at rest, speech is clear and appropriate.      Data   Labs:  Recent Labs   Lab 04/03/22  0859 04/02/22  1523 04/02/22  0746 04/01/22  1155 04/01/22  0625 03/31/22  1156 03/31/22  0620   * 130*  --   --  130*  --  131*   POTASSIUM 4.1  --   --   --  3.5  --  3.5   CHLORIDE 97  --   --   --  100  --  99   CO2 26  --   --   --  25  --  24   ANIONGAP 7  --   --   --  5  --  8   *  --  142* 180* 209*   < > 124*   BUN 9  --   --   --  10  --  9   CR 0.65 0.62  --   --  0.60  --  0.67   GFRESTIMATED 84 85  --   --  85  --  83   ASTRID 9.0  --   --   --  8.4*  --  8.7    < > = values in this interval not displayed.     Recent Labs   Lab 04/02/22  1523 03/31/22  0620 03/30/22  1421   WBC 9.8 9.9 12.8*   HGB 12.0 11.2* 13.1   HCT 39.2 35.7 40.8   MCV 89 85 86    315 407      Recent Imaging:   No results found for this or any previous visit (from the past 24 hour(s)).    Medications       acetaminophen  500 mg Oral TID     apixaban ANTICOAGULANT  5 mg Oral BID     docusate sodium  100 mg Oral BID     [Held by provider] furosemide  20 mg Oral Daily     levothyroxine  100 mcg Oral QAM AC     lidocaine  1 patch Transdermal Q24H     lidocaine   Transdermal Q8H     metoprolol succinate ER  100 mg Oral BID     polyethylene glycol  17 g Oral Daily

## 2022-04-03 NOTE — PROGRESS NOTES
Care Management Follow Up    Length of Stay (days): 0    Expected Discharge Date:       Concerns to be Addressed:       Patient plan of care discussed at interdisciplinary rounds: Yes    Anticipated Discharge Disposition:  home     Anticipated Discharge Services:  [facility]  Anticipated Discharge DME:  Hospital bed with gel overlay    Patient/family educated on Medicare website which has current facility and service quality ratings:    Education Provided on the Discharge Plan:    Patient/Family in Agreement with the Plan:      Referrals Placed by CM/SW:    Private pay costs discussed: Pulsating mattress-not covered by medicare    Additional Information:  Referral for homecare services sent to [facility] and [facility].   [facility] accepted patient and facesheet and H/P faxed to Kary at intake.  Care coordinator will need to fax discharge orders once received to: [phone].  Writer updated patient's daughter Esthela with agency and contact info.    Care coordination team will continue to work with [employer] [facility] on hospital bed and gel overlay.  Daughter Esthela to buy pulsating mattress off [employer]. Per Vandana with [employer], mattress would likely not be covered by medicare due to patient not having any pressure wounds.    Chio Newell RN  Care Coordinator  [facility]  [phone] (text or call)

## 2022-04-03 NOTE — PROGRESS NOTES
Care Management Follow Up    Length of Stay (days): 0    Expected Discharge Date:       Concerns to be Addressed:       Patient plan of care discussed at interdisciplinary rounds: Yes    Anticipated Discharge Disposition:  Home vs TCU     Anticipated Discharge Services:  homecare  Anticipated Discharge DME:  Hospital bed and pulsating mattress if discharges home    Patient/family educated on Medicare website which has current facility and service quality ratings:    Education Provided on the Discharge Plan:    Patient/Family in Agreement with the Plan:      Referrals Placed by CM/SW:    Private pay costs discussed: Will discuss cost of hospital bed if needed.    Additional Information:  Per SW initial consult patient declining TCU and wants to discharge home with HHC and pulsating mattress/hospital bed. Writer contacted MD to enter order for bed/mattress and order sent to [facility] via fax.  Still awaiting supporting documentation for necessity from MD.    Chio Newell RN  Care Coordinator  [facility]  [phone] (text or call)

## 2022-04-03 NOTE — PROGRESS NOTES
Care Management Follow Up    Length of Stay (days): 0    Expected Discharge Date:  TBA     Concerns to be Addressed:     Discharge planning  Patient plan of care discussed at interdisciplinary rounds: Yes    Anticipated Discharge Disposition:  Home with homecare and a hospital bed     Anticipated Discharge Services:  homecare RN/PT/OT/HHA  Anticipated Discharge DME:  linda    Patient/family educated on Medicare website which has current facility and service quality ratings:  No  Education Provided on the Discharge Plan:  yes  Patient/Family in Agreement with the Plan:  yes    Referrals Placed by CM/SW:  [facility], [employer] [facility]  Private pay costs discussed: Not applicable    Additional Information:  Call placed to patient's daughter Sandhya to discuss discharge plans.  Patient's daughter states that she in no way wants patient to go to a TCU on discharge.  Patient's daughter states that she feels as if patient didn't hear the doctor correctly this morning because she is so hard of hearing.  Patient's daughter also states that she feels that patient is not ready to discharge from the hospital yet.  Patient's daughter states that she is still interested in homecare on discharge.  Explained that [facility] as well as [facility] for the bed.  Patient's daughter states that her son is at patient's apartment now moving the bed to make room for the hospital bed.  Updated patient's MD.    Will continue to follow.      HAIM Albarran, [facility]    [phone]  [facility]

## 2022-04-03 NOTE — CONSULTS
Pharmacy was consulted by MD to investigates potential medications causing pt's hyponatremia. The only two medications that the is currently or has taken recently that can cause it are loop diuretics(lasix) and tramadol(Hyponatremia, including severe cases, has been reported, particularly in females over the age of 65 and within the first week of therapy per literature.)    Abhinav Aguilar [facility]

## 2022-04-04 NOTE — PROGRESS NOTES
Care Management Follow Up    Length of Stay (days): 1    Expected Discharge Date: 04/04/2022     Concerns to be Addressed:       Patient plan of care discussed at interdisciplinary rounds: Yes    Anticipated Discharge Disposition:  Home vs TCU     Anticipated Discharge Services:  HHC, Hospital bed vs TCU  Anticipated Discharge DME:  Hospital bed, mattress overlay    Patient/family educated on Medicare website which has current facility and service quality ratings:  yes  Education Provided on the Discharge Plan:  yes  Patient/Family in Agreement with the Plan:  Yes/ wavering on decsion    Referrals Placed by CM/SW:    Private pay costs discussed: TBD    Additional Information:  Received update from charge RN through MD that daughter Esthela is now wanting TCU.     CC called and talked with Esthela.  She is feeling overwhelmed with bringing her mom home and is wanting to look at TCU.  She is concerned with the mattress/bed at TCU and wanting to make sure it is suitable for her mom. Discussed how CC does not know the quality of mattress at facility but often facility can work to make changes.   Pt/family was provided with the Medicare Compare list for SNF.  Discussed associated Medicare star ratings to assist with choice for referrals/discharge planning Yes    Education was given to pt/family that star ratings are updated/maintained by Medicare and can be reviewed by visiting www.medicare.gov Yes    Esthela would like referrals sent to  1. [facility]  [facility]  3. [facility]    She will look into facilities to see their beds. Esthela also has speciality overlay she ordered that could be used at facility.   Esthela also has concerns about Pt's mental health. Requesting Psychiatry consult  Dr. Fragoso updated.  He will order [facility] consult.     Per Esthela's request left message for Janie at [employer] ( [phone])    [facility] updated.  Per [facility] [place] supervisor. Mattress overlay could be added to orders at discharge and facility  "would work to get mattress.     Addendum 1400:  Daughter called back and stated they \"want rehab\" and to \"cancel bed\"  Message left for Vandana at [facility] ( [phone], extension [phone]) to hold on bed now as Pt going to TCU.  Daughter states her preference would be [facility]. SW updated and will follow.     Marcelle Rubi RN      "

## 2022-04-04 NOTE — PROVIDER NOTIFICATION
MD Notification    Notified Person: MD    Notified Person Name: Janna Sim    Notification Date/Time: 0237    Notification Interaction: messenger    Purpose of Notification: discontinue ibuprofen per patient    Orders Received: awaiting    Comments:

## 2022-04-04 NOTE — PLAN OF CARE
Goal Outcome Evaluation:    Plan of Care Reviewed With: patient, daughter   Patient vital signs are at baseline: Yes  Patient able to ambulate as they were prior to admission or with assist devices provided by therapies during their stay:  Yes, but pt states severe pain when ambulating.  Patient MUST void prior to discharge: No, bladder protocol in place.  Patient able to tolerate oral intake:  Yes  Pain has adequate pain control using Oral analgesics: Yes, pain managed with acetaminophen.  Does patient have an identified :  yes, daughter Sandhya  Pt AOX4, CMS intact. Pt requested a psych/mental health consult due to pt stating she is experiencing PTSD from laying on the floor all night and unable to reach anyone after her fall/fx event. A sticky note was placed in physician notes to be addressed on 4/4 22.

## 2022-04-04 NOTE — PROGRESS NOTES
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 Multiple questions answered. We discussed dispo at length including home vs TCU and daughter had concerns about TCU including the type of bed.    Assessment & Plan   Summary: Shelly Rogers is a 89 year old female with PMH recently [facility] after a hospital stay (3/30/22-4/1/22) for worsening of her chronic back pain d/t L1 superior endplate fracture. She was readmitted to [facility] on 4/1/2022.  She is remaining in the hospital for safe discharge planning.    Recent, nontraumatic L1 superior endplate fracture.    Acute on chronic LBP d/t fracture  Back pain on off since Feb 2022, worsened since sliding to ground end of March. Recent orthopedic consult obtained 3/31/22 that recommended conservative treatment, P  - Continue lidoderm patch and prn tramadol po q6hrs prn  - Continue with scheduled tylenol  - PT reconsulted this admission and recommending 24 hour supervision  - Discharge home vs TCU    Hyponatremia  Acute metabolic encephalopathy, stable  Sodium has been low during recent admission but has remained stable at 130.  Sodium remains around 130 this admission. Urine studies and clinical exam suggest euvolemia to mild hypervolemia, and mild ADH effect. 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  - Monitor sodium  - 1.8L fluid restriction  - Restart lasix    Urinary retention  Spoke with bedside RN and pt about retention. Benitez placed 4/3 due to multipel straight caths, last PVR 650mL. Likely related to fracture, pain, deconditioning, will likely improve with time.  - Continue benitez, trial to void in about 1 week  - Consider outpatient gynecology follow up    History of atrial  fibrillation status post pacemaker placement.    - Continue Toprol and apixaban.    H/o CHF  Clinically still appears euvolemic and compensated, maybe slightly hypervolemic. She is not hypoxic or in any respiratory distress.  - Lasix restarted 4/4    Diabetes type 2. Last A1c was 7.5.  - Continue diabetic diet  - Continue amaryl and metformin today    Hypothyroidism: PTA levothyroxine    DVT Prophylaxis: Pneumatic Compression Devices  Code Status: Full Code  PT/OT: ordered  Diet: Moderate Consistent Carb (60 g CHO per Meal) Diet  Diet      Disposition: Expected discharge to TCU vs home tomorrow    - I spent 35 minutes, with greater than 50% spent in counseling and coordination of care with the patient and daughter.    Santiago Fragoso MD  Text Page  (7am to 6pm)  -Data reviewed today: I reviewed all new labs and imaging results over the last 24 hours.    Physical Exam   Temp: 97.4  F (36.3  C) Temp src: Oral BP: (!) 140/80 Pulse: 91   Resp: 16 SpO2: 96 % O2 Device: None (Room air)    Vitals:    04/01/22 2003   Weight: 66.7 kg (147 lb)     Vital Signs with Ranges  Temp:  [97.2  F (36.2  C)-98.4  F (36.9  C)] 97.4  F (36.3  C)  Pulse:  [78-91] 91  Resp:  [16-17] 16  BP: (123-152)/(74-80) 140/80  SpO2:  [93 %-97 %] 96 %  I/O last 3 completed shifts:  In: 240 [P.O.:240]  Out: 800 [Urine:800]  O2 requirements: none    Constitutional: Female in NAD  HEENT: Eyes nonicteric, oral mucosa moist  Cardiovascular: RRR, normal S1/2, no m/r/g  Respiratory: CTAB, no wheezing or crackles  Vascular: Nonpitting LE edema  GI: Normoactive bowel sounds, nontender  Skin/Integumen: No rashes  Neuro/Psych: Appropriate affect and mood. A&Ox3, moves all extremities    Medications       apixaban ANTICOAGULANT  5 mg Oral BID     docusate sodium  100 mg Oral BID     [Held by provider] furosemide  20 mg Oral Daily     levothyroxine  100 mcg Oral QAM AC     lidocaine  1-3 patch Transdermal Q24H     lidocaine   Transdermal Q8H     metoprolol  succinate ER  100 mg Oral BID     polyethylene glycol  17 g Oral Daily       Data   Recent Labs   Lab 04/04/22  1025 04/04/22  0648 04/03/22  2114 04/03/22  1307 04/03/22  0859 04/02/22  1523 04/02/22  0746 04/01/22  1155 04/01/22  0625 03/31/22  1156 03/31/22  0620 03/30/22  2346 03/30/22  1421   WBC  --  9.5  --   --   --  9.8  --   --   --   --  9.9  --  12.8*   HGB  --  11.8  --   --   --  12.0  --   --   --   --  11.2*  --  13.1   MCV  --  85  --   --   --  89  --   --   --   --  85  --  86   PLT  --  351  --   --   --  362  --   --   --   --  315  --  407   *  --  129* 128* 130* 130*  --   --  130*  --  131*  --  130*   POTASSIUM 4.4  --   --   --  4.1  --   --   --  3.5  --  3.5  --  3.8   CHLORIDE 97  --   --   --  97  --   --   --  100  --  99  --  95   CO2 28  --   --   --  26  --   --   --  25  --  24  --  26   BUN 9  --   --   --  9  --   --   --  10  --  9  --  10   CR 0.61  --   --  0.55 0.65 0.62  --   --  0.60  --  0.67  --  0.72   ANIONGAP 5  --   --   --  7  --   --   --  5  --  8  --  9   ASTRID 9.1  --   --   --  9.0  --   --   --  8.4*  --  8.7  --  9.4   *  --   --   --  154*  --  142*   < > 209*   < > 124*   < > 135*   ALBUMIN  --   --   --   --   --   --   --   --   --   --   --   --  3.5   PROTTOTAL  --   --   --   --   --   --   --   --   --   --   --   --  7.6   BILITOTAL  --   --   --   --   --   --   --   --   --   --   --   --  1.2   ALKPHOS  --   --   --   --   --   --   --   --   --   --   --   --  109   ALT  --   --   --   --   --   --   --   --   --   --   --   --  16   AST  --   --   --   --   --   --   --   --   --   --   --   --  23    < > = values in this interval not displayed.       Imaging:   No results found for this or any previous visit (from the past 24 hour(s)).

## 2022-04-04 NOTE — PROVIDER NOTIFICATION
MD Notification    Notified Person: MD    Notified Person Name: Janna Sim     Notification Date/Time: 0420     Notification Interaction: messenger    Purpose of Notification: Straight Cath order    Orders Received: awaiting     Comments:

## 2022-04-04 NOTE — PROGRESS NOTES
Clinic Care Coordination Contact  Ambulatory Care Coordination to Inpatient Care Management   Hand-In Communication    Date:  April 4, 2022  Name: Shelly Rogers is enrolled in Ambulatory Care Coordination program and I am the Lead Care Coordinator.  CC Contact Information: Epic InMakeblocksket + phone  Payor Source: Payor: [employer] / Plan: [employer] [facility] HEALTH / Product Type: HMO /   Current services in place:     Please see the CC Snaphot and Care Management Flowsheets for specific  details of this Shelly Rogers care plan.   Additional details/specific concerns r/t this admission:    Readmission Risk with severity of pain, and now new benitez catheter patient at high risk for readmission/complications.     I will follow this admission in Epic. Please feel free to contact me with questions or for further collaboration in discharge planning.

## 2022-04-04 NOTE — PROGRESS NOTES
Care Management Follow Up    Length of Stay (days): 1    Expected Discharge Date: 04/04/2022     Concerns to be Addressed:       Patient plan of care discussed at interdisciplinary rounds: Yes    Anticipated Discharge Disposition:       Anticipated Discharge Services:    Anticipated Discharge DME:      Patient/family educated on Medicare website which has current facility and service quality ratings:    Education Provided on the Discharge Plan:    Patient/Family in Agreement with the Plan:      Referrals Placed by CM/SW:    Private pay costs discussed: TBD    Additional Information:  Following for discharge planning to home w/ HH and Hospital bed.   CC called [employer] ( [phone]).  They will check the status of the referral/equipment and call CC back.   Charge RN updated that CC will follow for discharge.       Marcelle Rubi RN

## 2022-04-04 NOTE — UTILIZATION REVIEW
"  Admission Status; Secondary Review Determination       Under the authority of the Utilization Management Committee, the utilization review process indicated a secondary review on the above patient. The review outcome is based on review of the medical records, discussions with staff, and applying clinical experience noted on the date of the review.     (x) Inpatient Status Appropriate - This patient's medical care is consistent with medical management for inpatient care and reasonable inpatient medical practice.     RATIONALE FOR DETERMINATION    89 year old female who was recently discharged to [facility] on 4/1/22 after a hospital stay (3/30/22-4/1/22) for worsening of her chronic back pain d/t L1 superior endplate fracture.  She returned to [facility] after transfer to [facility] as family felt there was inadequate staffing to assist her   Patient requires inpatient admission versus short stay observation or outpatient treatment for the following reasons: Initially placed on observation after being on observation for more than 2 nights she had worsening hyponatremia per attending physician was concerned about SIADH further work-up was ordered and documentation of the following:\" She is having altered mentation and there is suspicion that her fairly acute hyponatremia is contributing.\"  The expected length of stay at the time of admission was more than 2 nights because of the severity of illness, intensity of service provided, and risk for adverse outcome. Inpatient admission is appropriate.         This document was produced using voice recognition software       The information on this document is developed by the utilization review team in order for the business office to ensure compliance. This only denotes the appropriateness of proper admission status and does not reflect the quality of care rendered.   The definitions of Inpatient Status and Observation Status used in making the determination above are " those provided in the CMS Coverage Manual, Chapter 1 and Chapter 6, section 70.4.   Sincerely,   EMILIA COVARRUBIAS MD   System Medical Director   Utilization Management   [facility].

## 2022-04-04 NOTE — SIGNIFICANT EVENT
Significant Event Note    Urinary retention    Patient was found to have 650ml on bladder scan. On review of chart, she has required straight catheterization multiple times during this stay. Due to need for recurrent catheterization, I have ordered a benitez catheter.     Maxi Sim MD

## 2022-04-04 NOTE — PROGRESS NOTES
Care Management Follow Up    Length of Stay (days): 1    Expected Discharge Date: 04/04/2022     Concerns to be Addressed:       Patient plan of care discussed at interdisciplinary rounds: Yes    Anticipated Discharge Disposition:       Anticipated Discharge Services:    Anticipated Discharge DME:      Patient/family educated on Medicare website which has current facility and service quality ratings:    Education Provided on the Discharge Plan:    Patient/Family in Agreement with the Plan:      Referrals Placed by CM/SW:    Private pay costs discussed: Not applicable    Additional Information:  SW spoke with patients daughter who has some concerns about patient being outpatient instead of inpatient. SW reviewed chart and noted patient is now inpatient. SW informed daughter Sandhya of inpatient status. TWAN let Sandhya know we are waiting on a call back from [facility] and will be in touch with her regarding discharge plans. TWAN will continue to follow.      ALEXIS Dee    [facility]

## 2022-04-04 NOTE — PROGRESS NOTES
Pt A&O x4 but forget at time. Assist of 1 GB/Walker, not oob this shift. Patel catheter inserted this shift per order.Will continue to follow care plan.

## 2022-04-05 NOTE — PROGRESS NOTES
Care Management Follow Up    Length of Stay (days): 2    Expected Discharge Date: 04/06/2022     Concerns to be Addressed:       Patient plan of care discussed at interdisciplinary rounds: Yes    Anticipated Discharge Disposition:  TCU     Anticipated Discharge Services:  None  Anticipated Discharge DME:  None    Patient/family educated on Medicare website which has current facility and service quality ratings:  N/A  Education Provided on the Discharge Plan:  N/A  Patient/Family in Agreement with the Plan: N/A     Referrals Placed by CM/SW:  TCU  Private pay costs discussed: private room/amenity fees    Additional Information:  SW received call from [facility] indicating they can accept patient tomorrow and requested discharge time of 1200. SW was informed they only have a shared room at this time but could move her to a private later in the week for $40/day. TWAN spoke with patient and daughter Sandhya who are in agreement with discharge plans and shared room. SW was informed they would like to be put on wait list for private room and are in agreement with fee's. SW was asked if family should bring mattress overlay and sw explained that the dr could include it in the discharge orders but SW will check with TCU about bringing their own. TWAN discussed transport and was informed [facility] would like to provide and can transport at 1200. TWAN called [facility] and left a message informing of transport time, wait list for private room and inquiring about mattress overlay. TWAN will continue to follow.    Addendum: TWAN completed PAS and faxed to [facility]     PAS-RR    D: Per DHS regulation, TWAN completed and submitted PAS-RR to [place] [employer] via the [employer].  PAS-RR confirmation # is : [id number]    I: TWAN spoke with [facility] and they are aware a PAS-RR has been submitted.  TWAN reviewed with [facility] that they may be contacted for a follow up appointment within 10 days of  hospital discharge if their SNF stay is < 30 days.  Contact information for [employer] was also provided.    A: Daughter Sandhya verbalized understanding.    P: Further questions may be directed to [employer] at #[phone], option #4 for [facility]- staff.      Yan Simpson, POPPYW    [facility]

## 2022-04-05 NOTE — PROGRESS NOTES
A&Ox4 although forgetful at times. emmanuel patent, pt drank 680 ml this shift. A1GBW. Emmanuel patent. Tylenol given 1x for pain. Bed alarm on.

## 2022-04-05 NOTE — PROGRESS NOTES
[facility]    Hospitalist Progress Note    Interval History   - Doing well, no acute concerns. Reports she had a nightmare overnight  - Per SW plan disccharge to TCU tomorrow    Assessment & Plan   Summary: Shelly Rogers is a 89 year old female with PMH recently [facility] after a hospital stay (3/30/22-4/1/22) for worsening of her chronic back pain d/t L1 superior endplate fracture. She was readmitted to [facility] on 4/1/2022.  She is remaining in the hospital for safe discharge planning.    Recent, nontraumatic L1 superior endplate fracture.    Acute on chronic LBP d/t fracture  Back pain on off since Feb 2022, worsened since sliding to ground end of March. Recent orthopedic consult obtained 3/31/22 that recommended conservative treatment, P  - Continue lidoderm patch and prn tramadol po q6hrs prn  - Continue with scheduled tylenol  - PT reconsulted this admission and recommending 24 hour supervision    Hyponatremia  Acute metabolic encephalopathy, stable  Sodium has been low during recent admission but has remained stable at 130.  Sodium remains around 130 this admission. Urine studies and clinical exam suggest euvolemia to mild hypervolemia, and mild ADH effect. 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  - Monitor sodium  - Continue 1.8L fluid restriction  - Continue lasix    Urinary retention  Spoke with bedside RN and pt about retention. Benitez placed 4/3 due to multipel straight caths, last PVR 650mL. Likely related to fracture, pain, deconditioning, will likely improve with time.  - Continue benitez, trial to void in about 1 week  - Consider outpatient gynecology follow up    History of atrial fibrillation status post pacemaker placement.    - Continue Toprol and apixaban.    H/o CHF  Clinically still appears euvolemic and compensated, maybe slightly hypervolemic. She is not hypoxic or in any respiratory distress.  - Lasix restarted  4/4    Diabetes type 2. Last A1c was 7.5.  - Continue diabetic diet  - Continue amaryl and metformin today    Hypothyroidism: PTA levothyroxine    DVT Prophylaxis: Pneumatic Compression Devices  Code Status: Full Code  PT/OT: ordered  Diet: Moderate Consistent Carb (60 g CHO per Meal) Diet  Diet  Fluid restriction 1800 ML FLUID      Disposition: Expected discharge to TCU vs home tomorrow    Santiago Fragoso MD  Text Page  (7am to 6pm)  -Data reviewed today: I reviewed all new labs and imaging results over the last 24 hours.    Physical Exam   Temp: 97.7  F (36.5  C) Temp src: Oral BP: (!) 147/91 Pulse: 85   Resp: 16 SpO2: 96 % O2 Device: None (Room air)    Vitals:    04/01/22 2003   Weight: 66.7 kg (147 lb)     Vital Signs with Ranges  Temp:  [97.5  F (36.4  C)-99  F (37.2  C)] 97.7  F (36.5  C)  Pulse:  [81-88] 85  Resp:  [16-20] 16  BP: (131-156)/(69-91) 147/91  SpO2:  [94 %-98 %] 96 %  I/O last 3 completed shifts:  In: 1340 [P.O.:1340]  Out: 1450 [Urine:1450]  O2 requirements: none    Constitutional: Female in NAD  HEENT: Eyes nonicteric, oral mucosa moist  Cardiovascular: RRR, normal S1/2, no m/r/g  Respiratory: CTAB, no wheezing or crackles  Vascular: Nonpitting LE edema  GI: Normoactive bowel sounds, nontender  Skin/Integumen: No rashes  Neuro/Psych: Appropriate affect and mood. A&Ox3, moves all extremities    Medications       apixaban ANTICOAGULANT  5 mg Oral BID     docusate sodium  100 mg Oral BID     furosemide  20 mg Oral Daily     levothyroxine  100 mcg Oral QAM AC     lidocaine  1-3 patch Transdermal Q24H     lidocaine   Transdermal Q8H     metoprolol succinate ER  100 mg Oral BID     polyethylene glycol  17 g Oral Daily       Data   Recent Labs   Lab 04/05/22  0639 04/04/22  1025 04/04/22  0648 04/03/22  2114 04/03/22  1307 04/03/22  0859 04/02/22  1523 03/31/22  1156 03/31/22  0620 03/30/22  2346 03/30/22  1421   WBC  --   --  9.5  --   --   --  9.8  --  9.9  --  12.8*   HGB  --   --  11.8  --    --   --  12.0  --  11.2*  --  13.1   MCV  --   --  85  --   --   --  89  --  85  --  86   PLT  --   --  351  --   --   --  362  --  315  --  407   * 130*  --  129* 128* 130* 130*   < > 131*  --  130*   POTASSIUM 4.0 4.4  --   --   --  4.1  --    < > 3.5  --  3.8   CHLORIDE 97 97  --   --   --  97  --    < > 99  --  95   CO2 26 28  --   --   --  26  --    < > 24  --  26   BUN 8 9  --   --   --  9  --    < > 9  --  10   CR 0.62 0.61  --   --  0.55 0.65 0.62   < > 0.67  --  0.72   ANIONGAP 7 5  --   --   --  7  --    < > 8  --  9   ASTRID 8.8 9.1  --   --   --  9.0  --    < > 8.7  --  9.4   * 201*  --   --   --  154*  --    < > 124*   < > 135*   ALBUMIN  --   --   --   --   --   --   --   --   --   --  3.5   PROTTOTAL  --   --   --   --   --   --   --   --   --   --  7.6   BILITOTAL  --   --   --   --   --   --   --   --   --   --  1.2   ALKPHOS  --   --   --   --   --   --   --   --   --   --  109   ALT  --   --   --   --   --   --   --   --   --   --  16   AST  --   --   --   --   --   --   --   --   --   --  23    < > = values in this interval not displayed.       Imaging:   No results found for this or any previous visit (from the past 24 hour(s)).

## 2022-04-05 NOTE — PLAN OF CARE
A/Ox4 can be forgetful when gets awaken from sleep. VSS. Report intermittent lower back pain only when move.  Pain manage with scheduled tylenol.  Fluid restriction of 1800 ml.   Reported this AM of abd. Discomfort (constipation) suppository given.    Plan to discharge tomorrow at 12 noon-TCU

## 2022-04-05 NOTE — PROVIDER NOTIFICATION
Pt states she is trying to lose weight. She tries to eat less and she lost about 5 lbs and she felt good about that.

## 2022-04-05 NOTE — PROGRESS NOTES
Pt A&O x4, up x1 with walker and GB, VSS. Patel patent, urine is dark. Denied pain. Numbness to L fingers. TCU replacement pending.

## 2022-04-05 NOTE — CONSULTS
Consult Date: 04/05/2022    PSYCHIATRY CONSULTATION    REFERRING PHYSICIAN:  Santiago Fragoso MD    REASON FOR CONSULTATION:  Anxiety and stress.    HISTORY OF PRESENT ILLNESS:  Ms. Shelly Rogers is a very pleasant 89-year-old woman who was readmitted from [facility] as she had not been able to tolerate the rehabilitation program at [facility].  It was noted the patient had difficulty being comfortable in their bed, has difficulty transferring from wheelchair to bed, and that they were short staffed.  Reportedly, there was also concern that possibly she had been discharged while she was still recovering and weak from the prior admission.  She was admitted between 03/30/2022 and 04/01/2022 after she had been found down at home for 12 hours after a fall.  She was found to have an L1 superior endplate fracture.  Psychiatry is asked to assess in the above setting as well as the patient was reporting episodes of anxiety.    The patient does report that it was quite traumatic for her to go through the 12 hours on the ground.  She remembers it quite vividly.  She is able to describe in detail today and process it.  Describes that it gave her granddaughter quite a scare to find her on the ground.  The patient has been talking with her family and wondering if it is possible that she could develop some posttraumatic stress disorder symptoms from this experience.  The patient denies that she has had prior concerns with PTSD or any history of anxiety concerns or depression.  She does not believe that she has suffered from depression or anxiety historically.  Generally, she has had a hard time sleeping in recent years.  It is not anything new.  She has had some struggles here sleeping intermittently here in the hospital in the above circumstances.  Nursing did note that she was sleeping soundly this morning, and I found the patient sleeping soundly this morning too.  The patient denies hopelessness or suicidal ideation.  She does  have appetite reduction in the setting of the medical circumstances described above.  She denies any history of bipolar or osvaldo symptomatology.  Denies a history of psychosis, including auditory or visual hallucinations or paranoia.  Denies a history of OCD-type symptoms, eating disorder symptoms, prior ADHD, or learning disorder symptoms.    PAST PSYCHIATRIC HISTORY:  She did have an evaluation, possibly with a psychiatrist or some other higher level psychologist per her recollection, when she was going through the process of possible divorce with her . Describes that she was essentially told that there was nothing wrong with her and that she was in a difficult marriage with her  not treating her well.  She did eventually got .  She has 4 children.  She has 8 grandchildren and 8 great-grandchildren.  She speaks about them glowingly.  She was living at an apartment at [facility] in [place].  She raised her family in [place].    PAST MEDICAL HISTORY:  Congestive heart failure, status post pacemaker, type 2 diabetes mellitus, atrial fibrillation, on anticoagulation, hypothyroidism, chronic kidney disease, stage III, hypothyroidism.    SOCIAL HISTORY:  Former smoker.  No alcohol, no drug use.    FAMILY HISTORY:  Reviewed from the EMR.    PRIOR TO ADMISSION MEDICATIONS:  Apixaban, furosemide, glimepiride, levothyroxine, metformin, metoprolol, triamcinolone, acetaminophen.    REVIEW OF SYSTEMS:  A 10-point review of systems was completed, negative other than described in HPI.    PHYSICAL EXAMINATION:  VITAL SIGNS:  Blood pressure 129/66, temperature 97.6, pulse 82, respiratory rate 16, SpO2 of 94% on room air.    MENTAL STATUS EXAMINATION:  She is dressed in a hospital gown, lying down in bed in a fetal position.  She is a bit uncomfortable from her back pain.  She is very pleasant and cooperative.  No involuntary movements.  Muscle tone appears grossly normal, with just general movements in bed.  She  was not asked to walk, so gait and station not evaluated as she is recovering from a back fracture.  Speech is nonpressured, normal volume, tone, and prosody.  Language without aphasia.  She is reporting mood is good, some intermittent anxiety described.  Affect is euthymic and pleasant.  Thought form linear, logical, goal directed.  No flight of ideas.  Not responding to internal stimuli.  Denies perceptual disturbances.  Denies any suicidal or homicidal ideation.  She does seem to have good level of orientation of circumstances, location, time, place, able to recall the president, able to recite the days of the week in reverse.  Insight and judgment intact.  She is able to be accepting of medical help and evaluation.  She is demonstrating cooperative behavior without agitation or aggression.  Attendance and concentration intact.  She is able to maintain conversation.  Fund of knowledge appears average for age.    IMPRESSION AND PLAN:  1.  Unspecified anxiety disorder.  2.  Recent fall with back injury.    FORMULATION:  This is a pleasant 89-year-old woman with no formal psychiatric history, who presented with difficulty recovering from a recent back fracture of the L1.  Also noted to have multilevel degenerative changes and has been struggling after she was found down at home in late March, which prompted an initial admission to [facility] at that time, and she did not tolerate a trial at [facility] for TCU management.  She does describe some anxiety that is episodic, especially here in the hospital, especially in the setting of the above medical circumstances. She could have a start of an adjustment disorder occurring as well as processing a recent subjective traumatic experience of being down on the ground so long.  She was wondering about if this is PTSD.  We had a good conversation about PTSD.  Symptoms can be expected and normal for up to a month after a traumatic event.  If the symptoms are severe or if  they are occurring longer than a month, then there could be concern for posttraumatic stress disorder and potentially would need medication intervention.  The patient was agreeable with watchful waiting, where she will monitor for PTSD type symptoms and anxiety as she is recovering physically and notify her team if she has any further concerns from a psychiatric standpoint as she is physically and mentally recovering from the experience.    RESULTS REVIEW:  BMP notable creatinine 0.65.  UA did show greater than 100,000 CFU of Enterobacter cloacae complex.  ProBNP 4000.  Sodium 130.    PLAN:  1.  Continue watchful waiting, monitoring anxiety and trauma response as she is medically stabilized.  The patient is likely going to be going to TCU in the near future and is fully in agreement with this and hopeful that she will continue to improve.  She was provided validation for her experience of anxiety and trauma with the above experience.  She agrees to monitor the symptoms and have follow up with outpatient medical providers, and, if needed, request psychiatric followup if symptoms worsen or do not improve.  2.  Agree with melatonin as needed for insomnia.  3.  If PTSD type/anxiety symptoms become severe, causing substantial reduction of quality of life or persist beyond 1 month after the traumatic experience, a trial of an SSRI such as sertraline at 25 mg could be considered with outpatient followup.  4.  Alternatively, a cautious dose of buspirone 5 mg twice a day could also be considered if anxiety is clinically impairing, and, if absolutely necessary, a trial of lorazepam 0.25-0.5 mg up to twice a day as needed for severe anxiety could be considered if the anxiety is severe and an immediate effect is required.  There would be certainly concerns about risk of sedation or fall risk that would need to be taken into consideration as well as a possible interaction with opioid pain medication that might be required as she  is recovering from her vertebral fracture.    Gopi Kohler MD        D: 2022   T: 2022   MT: dakota    Name:     GABBY OLIVA  MRN:      [id number]        Account:      [id number]   :      1933           Consult Date: 2022     Document: [id number]

## 2022-04-05 NOTE — PROGRESS NOTES
Care Management Follow Up    Length of Stay (days): 2    Expected Discharge Date: 04/04/2022     Concerns to be Addressed:       Patient plan of care discussed at interdisciplinary rounds: Yes    Anticipated Discharge Disposition:  TCU     Anticipated Discharge Services:  None  Anticipated Discharge DME:  None    Patient/family educated on Medicare website which has current facility and service quality ratings:  N/A  Education Provided on the Discharge Plan:  N/A  Patient/Family in Agreement with the Plan:   N/A    Referrals Placed by CM/SW:  TCU  Private pay costs discussed: Not applicable    Additional Information:  SW called [facility] and was informed they will review referral and get back to SW. TWAN called [facility] and left a message requesting a call back. [facility]. TWAN will continue to follow.    ALEXIS Dee    [facility]

## 2022-04-06 NOTE — PROGRESS NOTES
X-cover    Urine culture with >100k enterobacter, 10-50k e coli. Both sensitive to ciprofloxacin, will start cipro 250 mg BID x 3 days.     Galindo Cortez MD

## 2022-04-06 NOTE — PROGRESS NOTES
Care Management Discharge Note    Discharge Date: 04/06/2022    Discharge Disposition:  [facility]    Discharge Services:    Discharge DME:      Discharge Transportation: family or friend will provide    Private pay costs discussed: Not applicable    PAS Confirmation Code:  [id number]  Patient/family educated on Medicare website which has current facility and service quality ratings:      Education Provided on the Discharge Plan:    Persons Notified of Discharge Plans: Hospitalist, Charge, HUC  Patient/Family in Agreement with the Plan:      Handoff Referral Completed: Yes    Additional Information:  Patient set to discharge today at noon via sister driving to [facility].    Olesya Garcia, MSW, LGSW

## 2022-04-06 NOTE — PLAN OF CARE
Goal Outcome Evaluation:  Alert and oriented X4, VSS on RA, PRN given for lowe back pain.  Pt is on fluid restriction of 1800 and 120 ml of flied given during the shift. Pt has Large BM, there is no sigh of anxiety during the shift.

## 2022-04-06 NOTE — PROGRESS NOTES
VSS on RA. AxOx4 for a majority of the night, around 0300 pt disoriented to situation, having night terrors, confused about benitez? Pt reoriented. UC came back positive for enterobacteria, E Coli, not placed on abx. Cross cover placed, placed on Cipro BID - pt has discharge set up for today at 1200. Pt anxious at times. Ambulating w Ax1, GB, walker. Benitez patent - will discharge with it and follow up outpatient with urology due to retention. Denies pain this shift.

## 2022-04-06 NOTE — PLAN OF CARE
Physical Therapy Discharge Summary    Reason for therapy discharge:    Discharged to transitional care facility.    Progress towards therapy goal(s). See goals on Care Plan in [facility] electronic health record for goal details.  Goals partially met.  Barriers to achieving goals:   discharge from facility.    Therapy recommendation(s):    Continued therapy is recommended.  Rationale/Recommendations:  To further increase independence with mobility.

## 2022-04-06 NOTE — DISCHARGE SUMMARY
[facility]    Hospitalist Discharge Summary       Date of Admission:  4/1/2022  Date of Discharge:  4/6/2022  Discharging Provider: Santiago Fragoso MD      Discharge Diagnoses   Chronic L1 superior endplate fracture  Chronic lower back pain due to above  Hyponatremia, probably SIADH, improving  Urinary retention  UTI    Follow-ups Needed After Discharge   Follow-up Appointments     Follow Up and recommended labs and tests      Follow up with [facility] physician.  The following labs/tests are   recommended: basic metabolic panel in one week.  - Recommend trial to void in 4-5 days  - Follow up with outpatient psychiatry within a month           Hospital Course   Shelly Rogers is a 89 year old female with PMH recent hospital stay (3/30/22-4/1/22) for L1 superior endplate fracture, discharged to TCU, who was readmitted to [facility] on 4/1/2022 due to ongoing back pain and placement. Orthopedic consult obtained 3/31/22 that recommended conservative treatment. Patient was managed conservatively here. She had hyponatremia and UTI and some adjustment symptoms treated see below. Patient returning to TCU for ongoing rehabilitation.  - Continue Tramadol and tylenol for pain  - PT reconsulted this admission and recommending 24 hour supervision    Hyponatremia, improving  Sodium has been low during recent admission but has remained stable at 130.  Sodium remains around 130 this admission. Urine studies and clinical exam suggest euvolemia with mild ADH effect. 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  - Continue home lasix  - Recheck BMP in one week  - Continue 1.8L fluid restriction until sodium has normalized    Urinary retention  UTI  Patel placed 4/3 due to multipel straight caths, last PVR 650mL. Likely related to fracture, pain, deconditioning, will likely improve with time. Urine culture 4/3 did grow Enterobacter and E coli so treat empirically.  - Cipro x5  days  - Continue benitez, trial to void in about 1 week    History of atrial fibrillation status post pacemaker placement.    - Continue Toprol and apixaban.    H/o CHF  Clinically still appears euvolemic and compensated, maybe slightly hypervolemic. She is not hypoxic or in any respiratory distress.  - Continue PTA lasix    Diabetes type 2. Last A1c was 7.5. Blood sugars have been well controlled here < 200 with diabetic diet  - Resume metformin at discharge but discontinued glimepiride. Restart glimepiride per outpatient provider    Unspecified anxiety disorder  Seen by psychiatry here, patient has anxiety, crying episodes, related to her ongoing back pain and recovery. Seen by Psychiatry 4/5. Family asked about benzos and do not recommend given side effects and prior past reaction to Ambien.  - Outpt psychiatry follow up    Consultations This Hospital Stay   SOCIAL WORK IP CONSULT  PHYSICAL THERAPY ADULT IP CONSULT  CARE MANAGEMENT / SOCIAL WORK IP CONSULT  PHARMACY IP CONSULT  PSYCHIATRY IP CONSULT  PHYSICAL THERAPY ADULT IP CONSULT  OCCUPATIONAL THERAPY ADULT IP CONSULT    Code Status   Full Code    Time Spent on this Encounter   I, Santiago Fragoso, personally saw the patient today and spent approximately 35 minutes discharging this patient, includes discussion with daughter.       Santiago Fragoso MD  [facility]  ______________________________________________________________________    Physical Exam   Vital Signs: Temp: 98.5  F (36.9  C) Temp src: Oral BP: (!) 162/85 Pulse: 86   Resp: 16 SpO2: 96 % O2 Device: None (Room air)    Weight: 147 lbs 0 oz    Constitutional: Female in NAD  HEENT: Eyes nonicteric, oral mucosa moist  Cardiovascular: RRR, normal S1/2, no m/r/g  Respiratory: CTAB, no wheezing or crackles  Vascular: Nonpitting LE edema  GI: Normoactive bowel sounds, nontender  Skin/Integumen: No rashes  Neuro/Psych: Appropriate affect and mood. A&Ox3, moves all extremities        Primary Care Physician   Halley Huff    Discharge Disposition   Discharged to short-term care facility  Condition at discharge: Stable    Significant Results and Procedures   Most Recent 3 CBC's:  Recent Labs   Lab Test 04/04/22  0648 04/02/22  1523 03/31/22  0620   WBC 9.5 9.8 9.9   HGB 11.8 12.0 11.2*   MCV 85 89 85    362 315     Most Recent 3 BMP's:  Recent Labs   Lab Test 04/06/22  0739 04/05/22  0639 04/04/22  1025   * 130* 130*   POTASSIUM 3.8 4.0 4.4   CHLORIDE 100 97 97   CO2 26 26 28   BUN 7 8 9   CR 0.56 0.62 0.61   ANIONGAP 6 7 5   ASTRID 9.0 8.8 9.1   * 156* 201*     Most Recent 2 LFT's:  Recent Labs   Lab Test 03/30/22  1421 06/10/19  1426   AST 23 22   ALT 16 20   ALKPHOS 109 155*   BILITOTAL 1.2 1.0     Most Recent 3 INR's:  Recent Labs   Lab Test 03/17/21  0929 02/25/20  0000 02/06/20  0000   INR 1.21* 2.4* 1.7*     Most Recent 3 Troponin's:  Recent Labs   Lab Test 10/19/18  0620   TROPI <0.015     Most Recent 3 BNP's:  Recent Labs   Lab Test 04/03/22  2114 03/02/22  0808 09/23/19  0913 10/19/18  0620   NTBNPI 4,017*  --   --  2,356*   NTBNP  --  2,108* 2,029*  --      Most Recent D-dimer:No lab results found.  Most Recent Cholesterol Panel:  Recent Labs   Lab Test 03/02/22  0808   CHOL 142   LDL 73   HDL 43*   TRIG 128       Results for orders placed or performed during the hospital encounter of 03/30/22   CT Thoracic Spine w/o Contrast    Narrative    CT THORACIC SPINE WITHOUT CONTRAST   3/30/2022 3:22 PM     HISTORY: Mid-back pain.     TECHNIQUE: Axial images of the thoracic spine were obtained without  intravenous contrast. Multiplanar reformations were performed.   Radiation dose for this scan was reduced using automated exposure  control, adjustment of the mA and/or kV according to patient size, or  iterative reconstruction technique.    COMPARISON: None available. Correlation is made with previous chest  radiographs dated 10/19/2018 and CT abdomen and pelvis  8/8/2004.    FINDINGS: There is a recent appearing transversely oriented fracture  involving the L1 superior endplate involving the anterior and  posterior vertebral cortex, with mild retropulsion of the posterior  superior endplate fracture fragment along the ventral aspect of the  spinal canal (series 7 image 34). No definite fracture extension into  the pedicle/posterior elements is identified. No other findings  concerning for acute fracture. Minimal degenerative anterolisthesis of  T2 on T3. Mild exaggeration of the normal thoracic kyphosis. Mild  broad dextroconvex curvature centered in the midthoracic spine and  mild levoconvex curvature centered at approximately T11. Partially  visualized mild degenerative left lateral listhesis of L1 on L2.    Moderate to severe multilevel degenerative disc height loss with  marginal endplate osteophytes and multilevel degenerative facet  disease, which appears moderate to severe in the upper to mid thoracic  region and on the left at T11-T12. There is prominent central disc  extrusion at T10-T11 which along with degenerative facet disease  results in at least moderate spinal canal stenosis with mild  flattening of the normal cord contour. Mild-to-moderate/moderate  spinal canal stenosis at T8-T9 in the setting of the diffuse disc  osteophyte complex and prominent calcified ligamentum flavum  thickening. Lesser degrees of mild and mild to moderate multilevel  spinal canal narrowing seen elsewhere in the setting of multilevel  disc osteophyte complexes. Of note, there is mild spinal canal  narrowing at the level of T12-L1 partially related to mild  retropulsion of the posterior-superior L1 fracture fragment along the  ventral aspect of the spinal canal. Moderate appearing bilateral  T10-T11 neural foraminal stenosis with lesser degrees of neural  foraminal narrowing elsewhere    Partially visualized cardiac enlargement. There appears to be a  partially visualized pericardial  effusion. Cardiac pacing wires are  partially seen. Coronary artery calcifications. There is a  moderate-sized hiatal hernia. Small to medium size right greater than  left pleural effusions are present. Scattered atherosclerotic  calcifications of the aorta and its branches.      Impression    IMPRESSION:  1. Recent-appearing transversely oriented fracture through the L1  superior endplate with mild retropulsion of the posterior superior  endplate fracture fragment along the ventral aspect of the spinal  canal.  2. No other recent fracture.  3. Multilevel moderate to advanced degenerative changes of thoracic  spine, as described.  4. There is at least moderate spinal canal stenosis at T10-T11 and  mild to moderate/moderate spinal canal stenosis at T8-T9 with lesser  degrees of mild/mild to moderate spinal canal narrowing elsewhere.  5. Moderate bilateral T10-T11 neural foraminal stenosis.  6. Incidental extraspinal findings, as described.    FABIOLA JORDAN MD         SYSTEM ID:  [id number]   Lumbar spine CT w/o contrast    Narrative    CT LUMBAR SPINE WITHOUT CONTRAST  3/30/2022 3:23 PM     HISTORY: Low back pain, no red flags.      TECHNIQUE: Axial images of the lumbar spine were obtained without  intravenous contrast. Multiplanar reformations were performed.   Radiation dose for this scan was reduced using automated exposure  control, adjustment of the mA and/or kV according to patient size, or  iterative reconstruction technique.     COMPARISON: Thoracic spine CT of same date.   CT abdomen and pelvis 8/8/2004.    FINDINGS: Nomenclature is based on five lumbar vertebral bodies. There  is a recent-appearing transversely oriented fracture through the L1  superior endplate involving the anterior and posterior vertebral body  cortex with mild retropulsion of the posterior superior endplate  fracture fragment along the ventral aspect of the spinal canal. No  other recent-appearing lumbar spine fracture  identified.  Moderate-appearing dextroconvex curvature centered at L3. Mild/mild to  moderate multilevel spondylolisthesis in the coronal plane, including  left lateral listhesis of L1 on L2 and L2 on L3 and right lateral  listhesis of L3 on L4 and L4 on L5. Mild straightening of the normal  lumbar lordosis.    Multilevel degenerative disc disease, including and moderate to  severe/severe disc height loss at L1-L2, L2-L3 and L3-L4. Vacuum disc  phenomenon noted throughout the visualized lower thoracic/lumbar  intervertebral discs. Marginal endplate osteophytes noted at multiple  levels. Multilevel degenerative facet disease, most advanced  bilaterally at L4-L5 and L5-S1 and on the right at L3-L4. Minimal disc  bulges with posterior endplate osteophytes contributing to varying  degrees of spinal canal stenosis, most pronounced at the L2-L3 and  L3-L4 levels where it appears at least moderate in degree. Varying  degrees of multilevel neural foraminal stenosis also present,  including moderate to severe/severe right L4-L5 neural foraminal  stenosis and at least moderate left L3-L4 and L4-L5 neural foraminal  stenosis.    There is prevertebral edema at the level of T12-L1, most likely due to  the recent L1 fracture. Diffuse atherosclerotic calcifications of the  aortoiliac arteries. Prominent bilateral sacroiliac joint degenerative  changes. Scattered sigmoid colon diverticula.      Impression    IMPRESSION:  1. Recent-appearing L1 superior endplate fracture with retropulsion of  the posterior superior endplate fragment along the ventral aspect of  the spinal canal, contributing to mild spinal canal stenosis.  2. Multilevel degenerative spondylolisthesis in the coronal plane with  moderate degenerative dextroconvex scoliosis.  3. Multilevel degenerative changes, as described. There may be up to  moderate spinal canal stenosis at L3-L4 and to a slightly lesser  degree at L2-L3. Varying degrees of multilevel neural  "foraminal  stenosis, including moderate to severe right neural foraminal stenosis  at L4-L5. Please see the body of the report for additional details.  4. Prominent bilateral sacroiliac joint degenerative changes.    FABIOLA JORDAN MD         SYSTEM ID:  [id number]       Discharge Orders      General info for SNF    Length of Stay Estimate: Short Term Care: Estimated # of Days <30  Condition at Discharge: Improving  Level of care:skilled   Rehabilitation Potential: Good  Admission H&P remains valid and up-to-date: Yes  Recent Chemotherapy: N/A  Use Nursing Home Standing Orders: Yes     Mantoux instructions    Give two-step Mantoux (PPD) Per Facility Policy Yes     Follow Up and recommended labs and tests    Follow up with [facility] physician.  The following labs/tests are recommended: basic metabolic panel in one week.  - Recommend trial to void in 4-5 days  - Follow up with outpatient psychiatry within a month     Reason for your hospital stay    You were hospitalized for low sodium and UTI, and are discharging back to rehabilitation facility.     Activity - Up with nursing assistance     Patel catheter    To straight gravity drainage. Change catheter every 2 weeks and PRN for leaking or decreased uring output with signs of bladder distention. DO NOT change catheter without a specific MD order IF diagnosis of benign prostatic hypertrophy (BPH), neurogenic bladder, or other urological conditions     Physical Therapy Adult Consult    Evaluate and treat as clinically indicated.    Reason:  deconditoning, back fx     Occupational Therapy Adult Consult    Evaluate and treat as clinically indicated.    Reason:  deconditioning, back fx     Hospital Bed    Hospital Bed Documentation:   Hospital bed is required for body positioning, to allow for safe transfers to wheelchair and standing and frequent changes in body position, not feasible in an ordinary bed     NOTE: Patient must have a \"Yes\" in one of the four following " questions to qualify for a hospital bed.    1. Does the patient require positioning of the body in ways not feasible with an ordinary bed due to a medical condition that is expected to last at least 1 month? Yes (Please explain): acute back pain on top of chronic pain syndrome; difficulty getting out of bed     2. Does the patient require, for the alleviation of pain, positioning of the body in ways not feasible with an ordinary bed? Yes (Please explain): severe acute pain d/t compression fracture that is limiting her mobility     3. Does the patient require the head of the bed to be elevated more than 30 degrees most of the time due to congestive heart failure, chronic pulmonary disease, or aspiration? Yes (Please explain): h/o CHF    4. Does the patient require traction that can only be attached to a hospital bed? No    Additional Criteria:    Does the patient require frequent changes in body position and/or have an immediate need for change in body position? Yes - Patient qualifies for Semi Electric Bed     Trapeze Criteria:  (Patient must meet standard hospital bed criteria also)   1. Does patient need this device to sit up because of a respiratory condition, for change in body position for other medical reasons, or to get in or out of bed? Yes (Please explain): unable to get out of bed without this assisted, semi-electric bed    I, the undersigned, certify that the above prescribed supplies are medically necessary for this patient and is both reasonable and necessary in reference to accepted standards of medical and necessary in reference to accepted standards of medical practice in the treatment of this patient's condition and is not prescribed as a convenience.     Diet    Follow this diet upon discharge:       Fluid restriction 1800 ML FLUID      Moderate Consistent Carb (60 g CHO per Meal) Diet      Diet     Discharge Medications   Current Discharge Medication List      START taking these medications     Details   ciprofloxacin (CIPRO) 250 MG tablet Take 1 tablet (250 mg) by mouth every 12 hours for 4 days    Associated Diagnoses: Acute cystitis without hematuria      Lidocaine (LIDOCARE) 4 % Patch Place 1 patch onto the skin every 24 hours To prevent lidocaine toxicity, patient should be patch free for 12 hrs daily.  Qty: 30 patch, Refills: 0    Associated Diagnoses: Closed fracture of first lumbar vertebra, unspecified fracture morphology, initial encounter (H)      traMADol (ULTRAM) 50 MG tablet Take 0.5-1 tablets (25-50 mg) by mouth every 6 hours as needed for moderate pain  Qty: 10 tablet, Refills: 0    Associated Diagnoses: Closed fracture of first lumbar vertebra, unspecified fracture morphology, initial encounter (H)         CONTINUE these medications which have CHANGED    Details   acetaminophen (TYLENOL) 500 MG tablet Take 1 tablet (500 mg) by mouth 3 times daily    Associated Diagnoses: Closed fracture of first lumbar vertebra, unspecified fracture morphology, initial encounter (H)         CONTINUE these medications which have NOT CHANGED    Details   apixaban ANTICOAGULANT (ELIQUIS ANTICOAGULANT) 5 MG tablet Take 1 tablet (5 mg) by mouth 2 times daily is blood thinner  Qty: 180 tablet, Refills: 3    Associated Diagnoses: Paroxysmal atrial fibrillation (H)      furosemide (LASIX) 20 MG tablet Take 1 tablet every morning. Take an extra dose as needed for 3+ lb wt gain, shortness of breath, or leg swelling  Qty: 180 tablet, Refills: 1    Associated Diagnoses: Chronic diastolic congestive heart failure (H)      levothyroxine (SYNTHROID/LEVOTHROID) 100 MCG tablet TAKE 1 TABLET (100 MCG) BY MOUTH DAILY FOR THYROID  Qty: 90 tablet, Refills: 0    Associated Diagnoses: Hypothyroidism, unspecified type      metFORMIN (GLUCOPHAGE) 500 MG tablet Take 1 tablet (500 mg) by mouth 2 times daily (with meals) for diabetes  Qty: 180 tablet, Refills: 3    Associated Diagnoses: Type 2 diabetes mellitus without complication,  without long-term current use of insulin (H)      metoprolol succinate ER (TOPROL-XL) 100 MG 24 hr tablet Take 1 tablet (100 mg) by mouth 2 times daily for Blood Pressure and heart  Qty: 180 tablet, Refills: 3    Associated Diagnoses: Chronic diastolic congestive heart failure (H)      multivitamin (CENTRUM SILVER) tablet Take 1 tablet by mouth daily      blood glucose (ACCU-CHEK SMARTVIEW) test strip 1 strip by In Vitro route daily  Qty: 1 Box, Refills: prn    Associated Diagnoses: Type 2 diabetes, HbA1c goal < 7% (H)      blood glucose (NO BRAND SPECIFIED) lancets standard Use to test blood sugar 1 times daily or as directed.  Qty: 100 each, Refills: 11    Associated Diagnoses: Type 2 diabetes mellitus without complication, without long-term current use of insulin (H)      blood glucose monitoring (ACCU-CHEK FASTCLIX) lancets Use to check blood sugars daily  Qty: 1 Box, Refills: prn    Associated Diagnoses: Type 2 diabetes, HbA1c goal < 7% (H)      Continuous Blood Gluc  (FREESTYLE STELLA 14 DAY READER) VERO 1 each every 14 days  Qty: 1 Device, Refills: 0    Associated Diagnoses: Type 2 diabetes mellitus without complication, without long-term current use of insulin (H)      Continuous Blood Gluc Sensor (FREESTYLE STELLA 14 DAY SENSOR) MISC 1 each every 14 days  Qty: 2 each, Refills: 11    Associated Diagnoses: Type 2 diabetes mellitus without complication, without long-term current use of insulin (H)      STATIN NOT PRESCRIBED, INTENTIONAL, continuous prn. Statin not prescribed intentionally due to Other: Not needed, LDL at goal <100mg/dL without therapy  Qty: 0 each, Refills: 0      triamcinolone (NASACORT) 55 MCG/ACT nasal aerosol Spray 2 sprays into both nostrils daily as needed          STOP taking these medications       glimepiride (AMARYL) 2 MG tablet Comments:   Reason for Stopping:         MAGNESIUM PO Comments:   Reason for Stopping:             Allergies   Allergies   Allergen Reactions      Shellfish Allergy Difficulty breathing     Ambien [Zolpidem] Other (See Comments)     Agitation with hallucinations     Cats      Sneezing, watery eyes     Lisinopril Cough     Miscellaneous [No Clinical Screening - See Comments] Other (See Comments)     Pt stated allergic to flowers, pt gets itchy watery eyes and stuffy nose     Seasonal Allergies Itching     Flowers     Sulfa Drugs Hives

## 2022-04-06 NOTE — PROGRESS NOTES
Patient left the floor via Wheelchair in stable conditions. Discharge papers provided /explained . All belongings present.

## 2022-04-06 NOTE — TELEPHONE ENCOUNTER
"warfarin ANTICOAGULANT (COUMADIN) 5 MG tablet    Last Written Prescription Date:  11/12/2019  Last Fill Quantity: 100,  # refills: 0   Last office visit: 6/10/2019 with prescribing provider:  Daria   Future Office Visit:   Unknown     Requested Prescriptions   Pending Prescriptions Disp Refills     warfarin ANTICOAGULANT (COUMADIN) 5 MG tablet [Pharmacy Med Name: WARFARIN SODIUM 5 MG TABLET] 90 tablet 1     Sig: TAKE 1 TABLET (5 MG) ON MONDAYS, WEDNESDAYS, FRIDAYS AND 0.5 TABLET (2.5 MG) ALL THE OTHER DAYS OR AS DIRECTED BY THE INR CLINIC       Vitamin K Antagonists Failed - 2/3/2020  1:38 AM        Failed - INR is within goal in the past 6 weeks     Confirm INR is within goal in the past 6 weeks.     Recent Labs   Lab Test 01/20/20   INR 1.9*                       Passed - Recent (12 mo) or future (30 days) visit within the authorizing provider's specialty     Patient has had an office visit with the authorizing provider or a provider within the authorizing providers department within the previous 12 mos or has a future within next 30 days. See \"Patient Info\" tab in inbasket, or \"Choose Columns\" in Meds & Orders section of the refill encounter.              Passed - Medication is active on med list        Passed - Patient is 18 years of age or older        Passed - Patient is not pregnant        Passed - No positive pregnancy on file in past 12 months          " Depression IB sent  [facility] 75  coding opportunities          Chart Reviewed number of suggestions sent to Provider: 1     Patients Insurance     Medicare Insurance: Senait Luevano

## 2022-04-07 PROBLEM — S32.010D CLOSED COMPRESSION FRACTURE OF L1 LUMBAR VERTEBRA, WITH ROUTINE HEALING, SUBSEQUENT ENCOUNTER: Status: ACTIVE | Noted: 2022-01-01

## 2022-04-07 NOTE — PROGRESS NOTES
[facility] GERIATRICS  Primary Care Provider & Clinic: Halley Huff MD, [address] / [place] [address]; Phone: [phone]; Pager: [phone]; Fax: [phone]  Chief Complaint   Patient presents with     Hospital F/U     [facility] 4/1/2022 - 4/6/2022     [place] Medical Record Number: [id number]  Place of Service Where Encounter Took Place: [facility] ([facility]) [[id number]]    Shelly Rogers is a 89 year old (1/18/1933), admitted to the above facility from  [facility]. Hospital stay 4/1/22 through 4/6/22.    HPI:    Shelly Rogers is a 89 year old female, [facility] stay 3/30-4/1/22 for L1 superior endplate fracture, [facility], then readmitted to [facility] with ongoing back pain and weakness, conservative treatment for L1 Fx, tramadol and APAP for pain, hyponatremia stable at 130, and UTI Ecoli and enterobacter started on cipro, benitez placed for retention, has pacemaker, BNP on 4/3 was 4017, continues lasix, last A1C 7.5 and DC'd amaryl,has unspecified anxiety and seen by psychiatry on 4/5 for crying episodes without medication intervention, transfer to TCU.    Patient today pleasant, appears healthy, limited historian, L knee abrasion healing, mild back pain, afebrile, benitez patent with urine output clear yellow, on cipro x4 more days, T 97.8, VS WNL, states not sleeping well but baseline along with vivid dreams, self transfers, no distress.       CODE STATUS/ADVANCE DIRECTIVES DISCUSSION: Prior - CPR/Full code   Patient's living condition: lives alone in an apartment  ALLERGIES:   Allergies   Allergen Reactions     Cats Shortness Of Breath     Sneezing and watery eyes     Shellfish Allergy Anaphylaxis and Difficulty breathing     Shellfish-Derived Products Anaphylaxis and Difficulty breathing     Ambien [Zolpidem] Visual Disturbance     And increased agitation     Lisinopril Cough     Miscellaneous [No Clinical Screening - See Comments] Other  (See Comments)     Pt stated allergic to flowers - gets itchy, watery eyes and stuffy nose     Seasonal Allergies Itching     Sulfa Drugs Hives and Rash      PAST MEDICAL HISTORY:   Past Medical History:   Diagnosis Date     Atrial fibrillation (H)     Nl LVEF, s/p ablation      Congestive heart failure, unspecified      Diabetes mellitus (H) 2004     Hemorrhoids     intermittent bleeding     Hypertension      Macular degeneration      OA (osteoarthritis)      Pacemaker 3/2013     Uterine cancer (H)     s/p hyst      PAST SURGICAL HISTORY:  has a past surgical history that includes joint replacement; GYN surgery; Hysterectomy; Cholecystectomy (8/2004); ANESTH,PACEMAKER INSERTION; TKR; Keratotomy Arcuate With Femtosecond Laser/Imaging For Atiol (Right, 4/11/2017); and Phacoemulsification clear cornea with standard intraocular lens implant (Right, 4/11/2017).  FAMILY HISTORY: family history includes C.A.D. in her father; Cardiovascular in her brother and father; Gastrointestinal Disease in her mother.  SOCIAL HISTORY:  reports that she quit smoking about 32 years ago. She has never used smokeless tobacco. She reports that she does not drink alcohol and does not use drugs.    Post Discharge Medication Reconciliation Status: discharge medications reconciled and changed, per note/orders  Current Outpatient Medications   Medication Sig     acetaminophen (TYLENOL) 500 MG tablet Take 2 tablets (1,000 mg) by mouth 3 times daily     apixaban ANTICOAGULANT (ELIQUIS ANTICOAGULANT) 5 MG tablet Take 1 tablet (5 mg) by mouth 2 times daily is blood thinner     blood glucose (ACCU-CHEK SMARTVIEW) test strip 1 strip by In Vitro route daily (Patient not taking: Reported on 8/4/2021)     blood glucose (NO BRAND SPECIFIED) lancets standard Use to test blood sugar 1 times daily or as directed. (Patient not taking: Reported on 8/4/2021)     blood glucose monitoring (ACCU-CHEK FASTCLIX) lancets Use to check blood sugars daily (Patient not  "taking: Reported on 8/4/2021)     ciprofloxacin (CIPRO) 250 MG tablet Take 1 tablet (250 mg) by mouth every 12 hours for 4 days     Continuous Blood Gluc  (FREESTYLE STELLA 14 DAY READER) VERO 1 each every 14 days (Patient not taking: Reported on 8/4/2021)     Continuous Blood Gluc Sensor (FREESTYLE STELLA 14 DAY SENSOR) MISC 1 each every 14 days (Patient not taking: Reported on 8/4/2021)     furosemide (LASIX) 20 MG tablet Take 1 tablet every morning. Take an extra dose as needed for 3+ lb wt gain, shortness of breath, or leg swelling     levothyroxine (SYNTHROID/LEVOTHROID) 100 MCG tablet TAKE 1 TABLET (100 MCG) BY MOUTH DAILY FOR THYROID     Lidocaine (LIDOCARE) 4 % Patch Place 1 patch onto the skin every 24 hours To prevent lidocaine toxicity, patient should be patch free for 12 hrs daily.     metFORMIN (GLUCOPHAGE) 500 MG tablet Take 1 tablet (500 mg) by mouth 2 times daily (with meals) for diabetes     metoprolol succinate ER (TOPROL-XL) 100 MG 24 hr tablet Take 1 tablet (100 mg) by mouth 2 times daily for Blood Pressure and heart     multivitamin (CENTRUM SILVER) tablet Take 1 tablet by mouth daily     STATIN NOT PRESCRIBED, INTENTIONAL, continuous prn. Statin not prescribed intentionally due to Other: Not needed, LDL at goal <100mg/dL without therapy (Patient not taking: Reported on 8/4/2021)     traMADol (ULTRAM) 50 MG tablet Take 0.5-1 tablets (25-50 mg) by mouth every 6 hours as needed for moderate pain     triamcinolone (NASACORT) 55 MCG/ACT nasal aerosol Spray 2 sprays into both nostrils daily as needed      No current facility-administered medications for this visit.     ROS:  4 point ROS including Respiratory, CV, GI and , other than that noted in the HPI,  is negative    Vitals:  BP (!) 160/84   Pulse 99   Temp 97.8  F (36.6  C)   Resp 18   Ht 1.575 m (5' 2\")   Wt 65.8 kg (145 lb)   SpO2 95%   BMI 26.52 kg/m    Exam:  GENERAL APPEARANCE:  in no distress, appears healthy  ENT:  Mouth " and posterior oropharynx normal, moist mucous membranes  RESP:  lungs clear to auscultation   CV:  no edema, rate-normal  ABDOMEN:  bowel sounds normal  M/S:   Gait and station abnormal unsteady gait  SKIN:  Inspection of skin and subcutaneous tissue baseline  NEURO:   Examination of sensation by touch normal  PSYCH:  oriented X 3    Lab/Diagnostic data:  [facility]. Please refer to them using [employer]/Care Everywhere.    ASSESSMENT/PLAN:  (S32.452Y) Closed compression fracture of L1 lumbar vertebra, with routine healing, subsequent encounter  (primary encounter diagnosis)  Comment: conservative management approach  Plan: changed tramadol to 25mg Q6h pain 1-5/10, 50mg pain 6-10/10  -changed APAP from 500 to 1000mg TID scheduled  -add miralax daily PRN  -PT/OT initiated  -CBC, BMP, BNP on 4/11      (N39.0) Urinary tract infection without hematuria, site unspecified  (Z97.8) Patel catheter present  (R33.9) Urine retention  Comment: previous to L1 Fx, pain meds and UTI was urinating WNL  Plan: cipro 250mg BID x4 more days (end 4/11)  -trial void on 4/11    (I50.32) Chronic diastolic congestive heart failure (H)  Comment: BNP 4/3 was 4017, asymptomatic  Plan: continue lasix +PRN for weight gain  -BNP on 4/11    (I48.20) Chronic atrial fibrillation (H)  Comment: today RRR  Plan: continue eliquis 5mg BID  -follow up cardiology PRN    (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

## 2022-04-07 NOTE — PROGRESS NOTES
Chart Reviewed; patient has discharged to an outside facility [facility]CU, [facility].     Will defer to primary care CC team to complete standard TCU outreach and collaboration.     Discussed with  CC, Cheryl Oviedo.     Hawa Harris, SARINA  Clinical Product Navigator   Ambulatory Care Coordination  [phone]

## 2022-04-07 NOTE — LETTER
[facility]   To:   [facility]          Please give to facility    From:   Nakia Oviedo  Care Coordinator [facility]     Patient Name:  Shelly Rogers  YOB: 1933   Admit date: 4/6/2022      *Information Needed:  Please contact me when the patient will discharge (or if they will move to long term care)- include the discharge date, disposition, and main diagnosis   - If the patient is discharged with home care services, please provide the name of the agency    Phone, Fax or Email with information       Thank You,   Nakia Oviedo, ISACC, MSW  Clinic Care Coordinator  [facility] [place] [place]  Ph: [phone]  [email or web address]

## 2022-04-07 NOTE — PROGRESS NOTES
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 RN/SW Care Coordinator will review chart and outreach to facility staff every 4 weeks and as needed. Fax sent to TCU with my contact information requesting notification upon discharge.      Nakia Oviedo [facility]  Clinic Care Coordinator  [facility] [place]  [facility]  [phone]  [email or web address]

## 2022-04-07 NOTE — LETTER
4/7/2022        RE: Shelly Rogers  [address]  [place] [address]        [facility] GERIATRICS  Primary Care Provider & Clinic: Halley Huff MD, [address] / [place] [address]; Phone: [phone]; Pager: [phone]; Fax: [phone]  Chief Complaint   Patient presents with     Hospital F/U     [facility] 4/1/2022 - 4/6/2022     [place] Medical Record Number: [id number]  Place of Service Where Encounter Took Place: [facility] ([facility]) [[id number]]    Shelly Rogers is a 89 year old (1/18/1933), admitted to the above facility from  [facility]. Hospital stay 4/1/22 through 4/6/22.    HPI:    Shelly Rogers is a 89 year old female, [facility] stay 3/30-4/1/22 for L1 superior endplate fracture, [facility], then readmitted to [facility] with ongoing back pain and weakness, conservative treatment for L1 Fx, tramadol and APAP for pain, hyponatremia stable at 130, and UTI Ecoli and enterobacter started on cipro, benitez placed for retention, has pacemaker, BNP on 4/3 was 4017, continues lasix, last A1C 7.5 and DC'd amaryl,has unspecified anxiety and seen by psychiatry on 4/5 for crying episodes without medication intervention, transfer to U.    Patient today pleasant, appears healthy, limited historian, L knee abrasion healing, mild back pain, afebrile, benitez patent with urine output clear yellow, on cipro x4 more days, T 97.8, VS WNL, states not sleeping well but baseline along with vivid dreams, self transfers, no distress.       CODE STATUS/ADVANCE DIRECTIVES DISCUSSION: Prior - CPR/Full code   Patient's living condition: lives alone in an apartment  ALLERGIES:   Allergies   Allergen Reactions     Cats Shortness Of Breath     Sneezing and watery eyes     Shellfish Allergy Anaphylaxis and Difficulty breathing     Shellfish-Derived Products Anaphylaxis and Difficulty breathing     Ambien [Zolpidem] Visual Disturbance     And  increased agitation     Lisinopril Cough     Miscellaneous [No Clinical Screening - See Comments] Other (See Comments)     Pt stated allergic to flowers - gets itchy, watery eyes and stuffy nose     Seasonal Allergies Itching     Sulfa Drugs Hives and Rash      PAST MEDICAL HISTORY:   Past Medical History:   Diagnosis Date     Atrial fibrillation (H)     Nl LVEF, s/p ablation      Congestive heart failure, unspecified      Diabetes mellitus (H) 2004     Hemorrhoids     intermittent bleeding     Hypertension      Macular degeneration      OA (osteoarthritis)      Pacemaker 3/2013     Uterine cancer (H)     s/p hyst      PAST SURGICAL HISTORY:  has a past surgical history that includes joint replacement; GYN surgery; Hysterectomy; Cholecystectomy (8/2004); ANESTH,PACEMAKER INSERTION; TKR; Keratotomy Arcuate With Femtosecond Laser/Imaging For Atiol (Right, 4/11/2017); and Phacoemulsification clear cornea with standard intraocular lens implant (Right, 4/11/2017).  FAMILY HISTORY: family history includes C.A.D. in her father; Cardiovascular in her brother and father; Gastrointestinal Disease in her mother.  SOCIAL HISTORY:  reports that she quit smoking about 32 years ago. She has never used smokeless tobacco. She reports that she does not drink alcohol and does not use drugs.    Post Discharge Medication Reconciliation Status: discharge medications reconciled and changed, per note/orders  Current Outpatient Medications   Medication Sig     acetaminophen (TYLENOL) 500 MG tablet Take 2 tablets (1,000 mg) by mouth 3 times daily     apixaban ANTICOAGULANT (ELIQUIS ANTICOAGULANT) 5 MG tablet Take 1 tablet (5 mg) by mouth 2 times daily is blood thinner     blood glucose (ACCU-CHEK SMARTVIEW) test strip 1 strip by In Vitro route daily (Patient not taking: Reported on 8/4/2021)     blood glucose (NO BRAND SPECIFIED) lancets standard Use to test blood sugar 1 times daily or as directed. (Patient not taking: Reported on 8/4/2021)  "    blood glucose monitoring (ACCU-CHEK FASTCLIX) lancets Use to check blood sugars daily (Patient not taking: Reported on 8/4/2021)     ciprofloxacin (CIPRO) 250 MG tablet Take 1 tablet (250 mg) by mouth every 12 hours for 4 days     Continuous Blood Gluc  (FREESTYLE STELLA 14 DAY READER) VERO 1 each every 14 days (Patient not taking: Reported on 8/4/2021)     Continuous Blood Gluc Sensor (FREESTYLE STELLA 14 DAY SENSOR) MISC 1 each every 14 days (Patient not taking: Reported on 8/4/2021)     furosemide (LASIX) 20 MG tablet Take 1 tablet every morning. Take an extra dose as needed for 3+ lb wt gain, shortness of breath, or leg swelling     levothyroxine (SYNTHROID/LEVOTHROID) 100 MCG tablet TAKE 1 TABLET (100 MCG) BY MOUTH DAILY FOR THYROID     Lidocaine (LIDOCARE) 4 % Patch Place 1 patch onto the skin every 24 hours To prevent lidocaine toxicity, patient should be patch free for 12 hrs daily.     metFORMIN (GLUCOPHAGE) 500 MG tablet Take 1 tablet (500 mg) by mouth 2 times daily (with meals) for diabetes     metoprolol succinate ER (TOPROL-XL) 100 MG 24 hr tablet Take 1 tablet (100 mg) by mouth 2 times daily for Blood Pressure and heart     multivitamin (CENTRUM SILVER) tablet Take 1 tablet by mouth daily     STATIN NOT PRESCRIBED, INTENTIONAL, continuous prn. Statin not prescribed intentionally due to Other: Not needed, LDL at goal <100mg/dL without therapy (Patient not taking: Reported on 8/4/2021)     traMADol (ULTRAM) 50 MG tablet Take 0.5-1 tablets (25-50 mg) by mouth every 6 hours as needed for moderate pain     triamcinolone (NASACORT) 55 MCG/ACT nasal aerosol Spray 2 sprays into both nostrils daily as needed      No current facility-administered medications for this visit.     ROS:  4 point ROS including Respiratory, CV, GI and , other than that noted in the HPI,  is negative    Vitals:  BP (!) 160/84   Pulse 99   Temp 97.8  F (36.6  C)   Resp 18   Ht 1.575 m (5' 2\")   Wt 65.8 kg (145 lb)   " SpO2 95%   BMI 26.52 kg/m    Exam:  GENERAL APPEARANCE:  in no distress, appears healthy  ENT:  Mouth and posterior oropharynx normal, moist mucous membranes  RESP:  lungs clear to auscultation   CV:  no edema, rate-normal  ABDOMEN:  bowel sounds normal  M/S:   Gait and station abnormal unsteady gait  SKIN:  Inspection of skin and subcutaneous tissue baseline  NEURO:   Examination of sensation by touch normal  PSYCH:  oriented X 3    Lab/Diagnostic data:  Labs done in SNF are in [facility]. Please refer to them using [employer]/Care Everywhere.    ASSESSMENT/PLAN:  (S32.010D) Closed compression fracture of L1 lumbar vertebra, with routine healing, subsequent encounter  (primary encounter diagnosis)  Comment: conservative management approach  Plan: changed tramadol to 25mg Q6h pain 1-5/10, 50mg pain 6-10/10  -changed APAP from 500 to 1000mg TID scheduled  -add miralax daily PRN  -PT/OT initiated  -CBC, BMP, BNP on 4/11      (N39.0) Urinary tract infection without hematuria, site unspecified  (Z97.8) Patel catheter present  (R33.9) Urine retention  Comment: previous to L1 Fx, pain meds and UTI was urinating WNL  Plan: cipro 250mg BID x4 more days (end 4/11)  -trial void on 4/11    (I50.32) Chronic diastolic congestive heart failure (H)  Comment: BNP 4/3 was 4017, asymptomatic  Plan: continue lasix +PRN for weight gain  -BNP on 4/11    (I48.20) Chronic atrial fibrillation (H)  Comment: today RRR  Plan: continue eliquis 5mg BID  -follow up cardiology PRN    (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

## 2022-04-08 NOTE — PROGRESS NOTES
Patient vital signs are at baseline: Yes  Patient able to ambulate as they were prior to admission or with assist devices provided by therapies during their stay:  No,  Reason:  Not up this shift.   Patient MUST void prior to discharge:  No,  Reason: Patel.  Patient able to tolerate oral intake:  Yes  Pain has adequate pain control using Oral analgesics:  Yes  Does patient have an identified :  Yes  Has goal D/C date and time been discussed with patient:  No,  Reason:  TBD.      Stable

## 2022-04-11 NOTE — PROGRESS NOTES
"[facility] GERIATRICS    Chief Complaint   Patient presents with     RECHECK     HPI:  Shelly Rogers is a 89 year old  (1/18/1933), who is being seen today for an episodic care visit at: [facility] ([facility]) [[id number]]. Today's concern is:   1. Chronic diastolic congestive heart failure (H)    2. Hypertension, unspecified type    3. Benitez catheter present      Patient see for follow up, at [facility] 4/1-6 for UTI, L1 compression Fx, was retaining urine, benitez placed, no s/s UTI today, urine output light yellow, benitez patent, weights stable, mild edema, lungs clear, able to complete therapies, of note BNP on 4/3 was 4017, SBP's in the 130-150 range, overall appears healthy, did speak with daughter Esthela today about the intermittent \"night terrors\", had one bout in hospital and another yesterday evening, wakes up scared, screaming and crying, was going to consider potential medication intervention but will defer to PCP as she will be discharging soon.    Allergies, and PMH/PSH reviewed in EPIC today.  REVIEW OF SYSTEMS:  4 point ROS including Respiratory, CV, GI and , other than that noted in the HPI,  is negative    Objective:   /82   Pulse 80   Temp 98  F (36.7  C)   Resp 16   Ht 1.575 m (5' 2\")   Wt 66.4 kg (146 lb 6.4 oz)   SpO2 94%   BMI 26.78 kg/m    GENERAL APPEARANCE:  in no distress, appears healthy  ENT:  Mouth and posterior oropharynx normal, moist mucous membranes  RESP:  lungs clear to auscultation   CV:  peripheral edema mild+ in lower legs  ABDOMEN:  bowel sounds normal  M/S:   Gait and station abnormal unsteady gait  SKIN:  Inspection of skin and subcutaneous tissue baseline  NEURO:   Examination of sensation by touch normal  PSYCH:  oriented X 3    Labs done in SNF are in [facility]. Please refer to them using [employer]/Care Everywhere.    Assessment/Plan:  (I50.32) Chronic diastolic congestive heart failure (H)  (primary encounter diagnosis)  Comment: BNP was 4017, no overt s/s " CHF exacerbation  Plan:   -continue lasix 20mg  -PT/OT working with   -pending BMP, BNP today   -follow up cardiology PRN    (I10) Hypertension, unspecified type  Comment: SBP's in the 130-150 range  Plan:   -continue lasix, metoprolol  -staff to check VS daily    (Z97.8) Benitez catheter present  Comment: retention in hospital  Plan: remove benitez today  -PVR's Qshift, cath if PVR >400  -probable discharge soon, may need to re-insert benitez if retaining urine      Electronically signed by: MERLYN Shrestha CNP

## 2022-04-11 NOTE — LETTER
"    4/11/2022        RE: Shelly Rogers  [address]  [address]  [place] [address]        [facility] GERIATRICS    Chief Complaint   Patient presents with     RECHECK     HPI:  Shelly Rogers is a 89 year old  (1/18/1933), who is being seen today for an episodic care visit at: [facility] ([facility]) [[id number]]. Today's concern is:   1. Chronic diastolic congestive heart failure (H)    2. Hypertension, unspecified type    3. Benitez catheter present      Patient see for follow up, at [facility] 4/1-6 for UTI, L1 compression Fx, was retaining urine, benitez placed, no s/s UTI today, urine output light yellow, benitez patent, weights stable, mild edema, lungs clear, able to complete therapies, of note BNP on 4/3 was 4017, SBP's in the 130-150 range, overall appears healthy, did speak with daughter Esthela today about the intermittent \"night terrors\", had one bout in hospital and another yesterday evening, wakes up scared, screaming and crying, was going to consider potential medication intervention but will defer to PCP as she will be discharging soon.    Allergies, and PMH/PSH reviewed in EPIC today.  REVIEW OF SYSTEMS:  4 point ROS including Respiratory, CV, GI and , other than that noted in the HPI,  is negative    Objective:   /82   Pulse 80   Temp 98  F (36.7  C)   Resp 16   Ht 1.575 m (5' 2\")   Wt 66.4 kg (146 lb 6.4 oz)   SpO2 94%   BMI 26.78 kg/m    GENERAL APPEARANCE:  in no distress, appears healthy  ENT:  Mouth and posterior oropharynx normal, moist mucous membranes  RESP:  lungs clear to auscultation   CV:  peripheral edema mild+ in lower legs  ABDOMEN:  bowel sounds normal  M/S:   Gait and station abnormal unsteady gait  SKIN:  Inspection of skin and subcutaneous tissue baseline  NEURO:   Examination of sensation by touch normal  PSYCH:  oriented X 3    Labs done in SNF are in [facility]. Please refer to them using EPIC/Care Everywhere.    Assessment/Plan:  (I50.32) " Chronic diastolic congestive heart failure (H)  (primary encounter diagnosis)  Comment: BNP was 4017, no overt s/s CHF exacerbation  Plan:   -continue lasix 20mg  -PT/OT working with   -pending BMP, BNP today   -follow up cardiology PRN    (I10) Hypertension, unspecified type  Comment: SBP's in the 130-150 range  Plan:   -continue lasix, metoprolol  -staff to check VS daily    (Z97.8) Benitez catheter present  Comment: retention in hospital  Plan: remove benitez today  -PVR's Qshift, cath if PVR >400  -probable discharge soon, may need to re-insert benitez if retaining urine      Electronically signed by: MERLYN Shrestha CNP           Sincerely,        MERLYN Shrestha CNP

## 2022-04-12 NOTE — TELEPHONE ENCOUNTER
Pt's daughter Esthela (also goes by Sandhya) left VM asking if it is okay for pt to use a [employer] device (medical alert bracelet) because she has a pacemaker. Called Esthela back, explained that it is just fine to use as long as it does not have a magnet in it. Esthela states understanding, she will call the [employer] company to verify that the bracelet does not contain any magnets.

## 2022-04-13 NOTE — LETTER
"    4/13/2022        RE: Shelly Rogers  [address]  [address]  [place] [address]        M [facility] GERIATRICS    Chief Complaint   Patient presents with     RECHECK     HPI:  Shelly Rogers is a 89 year old  (1/18/1933), who is being seen today for an episodic care visit at: [facility] ([facility]) [[id number]]. Today's concern is:   1. Chronic diastolic congestive heart failure (H)    2. Urine retention    3. Closed compression fracture of L1 lumbar vertebra, with routine healing, subsequent encounter    4. Hyponatremia      Patient seen for follow up, no over s/s CHF, no SOB nor edema, BNP on 4/11 was 422, new urine retention with UTI and benitez placement, removed benitez on 4/11 and voiding well, denies abdominal pain, back pain chronic and mild, using lido patch and APAP versus tramadol, Na on 4/11 was 129, on NA restricted diet, previous NA's in the 130-132 range, overall appears healthy.    Allergies, and PMH/PSH reviewed in EPIC today.  REVIEW OF SYSTEMS:  4 point ROS including Respiratory, CV, GI and , other than that noted in the HPI,  is negative    Objective:   BP (!) 142/77   Pulse 83   Temp 97.6  F (36.4  C)   Resp 20   Ht 1.575 m (5' 2\")   Wt 66.4 kg (146 lb 6.4 oz)   SpO2 97%   BMI 26.78 kg/m    GENERAL APPEARANCE:  in no distress, appears healthy  ENT:  Mouth and posterior oropharynx normal, moist mucous membranes  RESP:  lungs clear to auscultation   CV:  no edema, rate-normal  ABDOMEN:  bowel sounds normal  M/S:   Gait and station abnormal using walker  SKIN:  Inspection of skin and subcutaneous tissue baseline  NEURO:   Examination of sensation by touch normal  PSYCH:  oriented X 3    Labs done in SNF are in [facility]. Please refer to them using [employer]/Care Everywhere.    Assessment/Plan:  (I50.32) Chronic diastolic congestive heart failure (H)  (primary encounter diagnosis)  Comment: no s/s acute exacerbation,   Plan: continue lasix 20/day plus PRN " for weight gain  -follow up cardiology PRN    (R33.9) Urine retention  Comment: had UTI, treated with cipro, benitez placed all in hospital  Plan: trial void 4/11 successful  -PVR's Q shift  -monitor VS/symptoms  -of note, if having another episode of night terrors consider repeat UA/UC    (S32.010D) Closed compression fracture of L1 lumbar vertebra, with routine healing, subsequent encounter  Comment: pain 2/10, consider healing  Plan: continue lidocaine and APAP scheduled  -has tramadol PRN; suggest using only if pain >5/10    (E87.1) Hyponatremia  Comment: baseline NA's 129-132  Plan: no plan for Na supplement  -OK for general diet with limited Na intake        Electronically signed by: MERLYN Shrestha CNP             Sincerely,        MERLYN Shrestha CNP

## 2022-04-13 NOTE — PROGRESS NOTES
"[facility] GERIATRICS    Chief Complaint   Patient presents with     RECHECK     HPI:  Shelly Rogers is a 89 year old  (1/18/1933), who is being seen today for an episodic care visit at: [facility] ([facility]) [[id number]]. Today's concern is:   1. Chronic diastolic congestive heart failure (H)    2. Urine retention    3. Closed compression fracture of L1 lumbar vertebra, with routine healing, subsequent encounter    4. Hyponatremia      Patient seen for follow up, no over s/s CHF, no SOB nor edema, BNP on 4/11 was 422, new urine retention with UTI and benitez placement, removed benitez on 4/11 and voiding well, denies abdominal pain, back pain chronic and mild, using lido patch and APAP versus tramadol, Na on 4/11 was 129, on NA restricted diet, previous NA's in the 130-132 range, overall appears healthy.    Allergies, and PMH/PSH reviewed in EPIC today.  REVIEW OF SYSTEMS:  4 point ROS including Respiratory, CV, GI and , other than that noted in the HPI,  is negative    Objective:   BP (!) 142/77   Pulse 83   Temp 97.6  F (36.4  C)   Resp 20   Ht 1.575 m (5' 2\")   Wt 66.4 kg (146 lb 6.4 oz)   SpO2 97%   BMI 26.78 kg/m    GENERAL APPEARANCE:  in no distress, appears healthy  ENT:  Mouth and posterior oropharynx normal, moist mucous membranes  RESP:  lungs clear to auscultation   CV:  no edema, rate-normal  ABDOMEN:  bowel sounds normal  M/S:   Gait and station abnormal using walker  SKIN:  Inspection of skin and subcutaneous tissue baseline  NEURO:   Examination of sensation by touch normal  PSYCH:  oriented X 3    Labs done in SNF are in [facility]. Please refer to them using [facility]/Care Everywhere.    Assessment/Plan:  (I50.32) Chronic diastolic congestive heart failure (H)  (primary encounter diagnosis)  Comment: no s/s acute exacerbation,   Plan: continue lasix 20/day plus PRN for weight gain  -follow up cardiology PRN    (R33.9) Urine retention  Comment: had UTI, treated with cipro, " benitez placed all in hospital  Plan: trial void 4/11 successful  -PVR's Q shift  -monitor VS/symptoms  -of note, if having another episode of night terrors consider repeat UA/UC    (S32.010D) Closed compression fracture of L1 lumbar vertebra, with routine healing, subsequent encounter  Comment: pain 2/10, consider healing  Plan: continue lidocaine and APAP scheduled  -has tramadol PRN; suggest using only if pain >5/10    (E87.1) Hyponatremia  Comment: baseline NA's 129-132  Plan: no plan for Na supplement  -OK for general diet with limited Na intake        Electronically signed by: MERLYN Shrestha CNP

## 2022-04-15 PROBLEM — F51.4 NIGHT TERRORS: Status: ACTIVE | Noted: 2022-01-01

## 2022-04-15 PROBLEM — F41.9 ANXIETY: Status: ACTIVE | Noted: 2022-01-01

## 2022-04-15 PROBLEM — N39.0 URINARY TRACT INFECTION WITHOUT HEMATURIA, SITE UNSPECIFIED: Status: ACTIVE | Noted: 2022-01-01

## 2022-04-15 NOTE — LETTER
"    4/15/2022        RE: Shelly Rogers  [address]  [address]  [place] [address]        M [facility] GERIATRICS    Chief Complaint   Patient presents with     RECHECK     HPI:  Shelly Rogers is a 89 year old  (1/18/1933), who is being seen today for an episodic care visit at: [facility] ([facility]) [[id number]]. Today's concern is:   1. Night terrors    2. Urinary tract infection without hematuria, site unspecified    3. Closed wedge compression fracture of L1 vertebra with routine healing, subsequent encounter    4. Anxiety      Patient seen for follow up, also contacted daughter Esthela RE: status and plan on 4/11, at [facility] 4/1-6 for UTI, retention, benitez, ABX, Li comp Fx pain, then had night terrors once in hospital and once in TCU, nothing since 4/11, considering potential etiology was either tramadol use, having UTI or treatment with cipro, currently no s/s UTI, benitez trial void successful, pain in lower back and L1, moderate anxiety may be contributing to pain.    Allergies, and PMH/PSH reviewed in EPIC today.  REVIEW OF SYSTEMS:  4 point ROS including Respiratory, CV, GI and , other than that noted in the HPI,  is negative    Objective:   /74   Pulse 79   Temp 97.8  F (36.6  C)   Resp 19   Ht 1.575 m (5' 2\")   Wt 66.4 kg (146 lb 6.4 oz)   SpO2 94%   BMI 26.78 kg/m    GENERAL APPEARANCE:  in no distress, appears healthy  ENT:  Mouth and posterior oropharynx normal, moist mucous membranes  RESP:  lungs clear to auscultation   CV:  no edema, rate-normal  ABDOMEN:  bowel sounds normal  M/S:   Gait and station abnormal transfer assist  SKIN:  Inspection of skin and subcutaneous tissue baseline  NEURO:   Examination of sensation by touch normal  PSYCH:  oriented X 3    Labs done in SNF are in [facility]. Please refer to them using [employer]/Care Everywhere.    Assessment/Plan:  (F51.4) Night terrors  (primary encounter diagnosis)  Comment: new onset, 2x in past 2 weeks, " unsure if from UTI, cipro or tramadol  Plan: staff to monitor  -if having terrors again inquire when last tramadol dose given  -consider UA/UC with next episode    (N39.0) Urinary tract infection without hematuria, site unspecified  Comment: treated with cipro, course completed, benitez removed  Plan: continue self-voiding   -if having increased confusion or terrors plan to check UA/UC     (S32.010D) Closed wedge compression fracture of L1 vertebra with routine healing, subsequent encounter  Comment: moderate pain, transfer assist  Plan: continue tramadol 25-50mg PRN  -caution with use as may cause confusion and/or terrors at night  -therapies to continue    (F41.9) Anxiety  Comment: spoke with daughter 4/11 regarding  Plan: defer potential Tx for PCP after TCU discharge        Electronically signed by: MERLYN Shrestha CNP             Sincerely,        MERLYN Shrestha CNP

## 2022-04-15 NOTE — PROGRESS NOTES
"[facility] GERIATRICS    Chief Complaint   Patient presents with     RECHECK     HPI:  Shelly Rogers is a 89 year old  (1/18/1933), who is being seen today for an episodic care visit at: [facility] ([facility]) [[id number]]. Today's concern is:   1. Night terrors    2. Urinary tract infection without hematuria, site unspecified    3. Closed wedge compression fracture of L1 vertebra with routine healing, subsequent encounter    4. Anxiety      Patient seen for follow up, also contacted daughter Esthela RE: status and plan on 4/11, at [facility] 4/1-6 for UTI, retention, benitez, ABX, Li comp Fx pain, then had night terrors once in hospital and once in TCU, nothing since 4/11, considering potential etiology was either tramadol use, having UTI or treatment with cipro, currently no s/s UTI, benitez trial void successful, pain in lower back and L1, moderate anxiety may be contributing to pain.    Allergies, and PMH/PSH reviewed in EPIC today.  REVIEW OF SYSTEMS:  4 point ROS including Respiratory, CV, GI and , other than that noted in the HPI,  is negative    Objective:   /74   Pulse 79   Temp 97.8  F (36.6  C)   Resp 19   Ht 1.575 m (5' 2\")   Wt 66.4 kg (146 lb 6.4 oz)   SpO2 94%   BMI 26.78 kg/m    GENERAL APPEARANCE:  in no distress, appears healthy  ENT:  Mouth and posterior oropharynx normal, moist mucous membranes  RESP:  lungs clear to auscultation   CV:  no edema, rate-normal  ABDOMEN:  bowel sounds normal  M/S:   Gait and station abnormal transfer assist  SKIN:  Inspection of skin and subcutaneous tissue baseline  NEURO:   Examination of sensation by touch normal  PSYCH:  oriented X 3    Labs done in SNF are in [facility]. Please refer to them using [employer]/Care Everywhere.    Assessment/Plan:  (F51.4) Night terrors  (primary encounter diagnosis)  Comment: new onset, 2x in past 2 weeks, unsure if from UTI, cipro or tramadol  Plan: staff to monitor  -if having terrors again inquire when last tramadol " dose given  -consider UA/UC with next episode    (N39.0) Urinary tract infection without hematuria, site unspecified  Comment: treated with cipro, course completed, benitez removed  Plan: continue self-voiding   -if having increased confusion or terrors plan to check UA/UC     (S32.010D) Closed wedge compression fracture of L1 vertebra with routine healing, subsequent encounter  Comment: moderate pain, transfer assist  Plan: continue tramadol 25-50mg PRN  -caution with use as may cause confusion and/or terrors at night  -therapies to continue    (F41.9) Anxiety  Comment: spoke with daughter 4/11 regarding  Plan: defer potential Tx for PCP after TCU discharge        Electronically signed by: MERLYN Shrestha CNP

## 2022-04-18 NOTE — LETTER
"    4/18/2022        RE: Shelly Rogers  [address]  [address]  [place] [address]        M [facility] GERIATRICS    Chief Complaint   Patient presents with     RECHECK     HPI:  Shelly Rogers is a 89 year old  (1/18/1933), who is being seen today for an episodic care visit at: [facility] ([facility]) [[id number]]. Today's concern is:   1. Chronic diastolic congestive heart failure (H)    2. Closed compression fracture of L1 lumbar vertebra, with routine healing, subsequent encounter    3. Night terrors      Patient seen for follow up, also spoke with daughter on phone about prolia injection inquiry, appears healthy, mild cognitive limitations, recently treated for UTI, benitez also removed, denies abdominal pain, urinating often but without issue, unsure of causative factor for 2 bouts of night terrors that wake her up screaming but thought is potential tramadol use, having UTI, ABX use or all three, no night terrors since last week, has mild edema, BNP 4/3/22 was 4017, recheck on 4/11 down to 422, no distress.     Allergies, and PMH/PSH reviewed in EPIC today.  REVIEW OF SYSTEMS:  4 point ROS including Respiratory, CV, GI and , other than that noted in the HPI,  is negative    Objective:   BP (!) 146/79   Pulse 85   Temp 97.5  F (36.4  C)   Resp 18   Ht 1.575 m (5' 2\")   Wt 66.4 kg (146 lb 6.4 oz)   SpO2 92%   BMI 26.78 kg/m    GENERAL APPEARANCE:  in no distress, appears healthy  ENT:  Mouth and posterior oropharynx normal, moist mucous membranes  RESP:  lungs clear to auscultation   CV:  peripheral edema mild+ in lower legs, rate-normal  ABDOMEN:  bowel sounds normal  M/S:   Gait and station abnormal unsteady gait  SKIN:  Inspection of skin and subcutaneous tissue baseline  NEURO:   Examination of sensation by touch normal  PSYCH:  oriented X 3, memory impaired     Labs done in SNF are in [facility]. Please refer to them using EPIC/Care " Everywhere.    Assessment/Plan:  (I50.32) Chronic diastolic congestive heart failure (H)  (primary encounter diagnosis)  Comment: no s/s CHF exacerbation, BNP down to 422  Plan: continue lasix 20mg +PRN for weight gain  -of note Na was 129 on 4/11; normal range 130ish  -follow up BMP, BNP next week if still at TCU    (S32.010D) Closed compression fracture of L1 lumbar vertebra, with routine healing, subsequent encounter  Comment: moderate pain with sit to stand, no pain faces scale today  Plan: continue APAP scheduled TID  -continue tramadol PRN, prefer to not use  -follow up ortho PRN  -probable discharge home this week    (F51.4) Night terrors  Comment: treated UTI with cipro, taking tramadol randomly for pain  Plan: thought is terrors caused by UTI or ABX or tramadol  -staff to monitor for additional terrors  -if having another episode may need to discontinue tramadol        Electronically signed by: MERLYN Shrestha CNP             Sincerely,        MERLYN Shrestha CNP

## 2022-04-18 NOTE — PROGRESS NOTES
"[facility] GERIATRICS    Chief Complaint   Patient presents with     RECHECK     HPI:  Shelly Rogers is a 89 year old  (1/18/1933), who is being seen today for an episodic care visit at: [facility] ([facility]) [[id number]]. Today's concern is:   1. Chronic diastolic congestive heart failure (H)    2. Closed compression fracture of L1 lumbar vertebra, with routine healing, subsequent encounter    3. Night terrors      Patient seen for follow up, also spoke with daughter on phone about prolia injection inquiry, appears healthy, mild cognitive limitations, recently treated for UTI, benitez also removed, denies abdominal pain, urinating often but without issue, unsure of causative factor for 2 bouts of night terrors that wake her up screaming but thought is potential tramadol use, having UTI, ABX use or all three, no night terrors since last week, has mild edema, BNP 4/3/22 was 4017, recheck on 4/11 down to 422, no distress.     Allergies, and PMH/PSH reviewed in EPIC today.  REVIEW OF SYSTEMS:  4 point ROS including Respiratory, CV, GI and , other than that noted in the HPI,  is negative    Objective:   BP (!) 146/79   Pulse 85   Temp 97.5  F (36.4  C)   Resp 18   Ht 1.575 m (5' 2\")   Wt 66.4 kg (146 lb 6.4 oz)   SpO2 92%   BMI 26.78 kg/m    GENERAL APPEARANCE:  in no distress, appears healthy  ENT:  Mouth and posterior oropharynx normal, moist mucous membranes  RESP:  lungs clear to auscultation   CV:  peripheral edema mild+ in lower legs, rate-normal  ABDOMEN:  bowel sounds normal  M/S:   Gait and station abnormal unsteady gait  SKIN:  Inspection of skin and subcutaneous tissue baseline  NEURO:   Examination of sensation by touch normal  PSYCH:  oriented X 3, memory impaired     Labs done in SNF are in [facility]. Please refer to them using [employer]/Care Everywhere.    Assessment/Plan:  (I50.32) Chronic diastolic congestive heart failure (H)  (primary encounter diagnosis)  Comment: no s/s CHF " exacerbation, BNP down to 422  Plan: continue lasix 20mg +PRN for weight gain  -of note Na was 129 on 4/11; normal range 130ish  -follow up BMP, BNP next week if still at TCU    (S32.010D) Closed compression fracture of L1 lumbar vertebra, with routine healing, subsequent encounter  Comment: moderate pain with sit to stand, no pain faces scale today  Plan: continue APAP scheduled TID  -continue tramadol PRN, prefer to not use  -follow up ortho PRN  -probable discharge home this week    (F51.4) Night terrors  Comment: treated UTI with cipro, taking tramadol randomly for pain  Plan: thought is terrors caused by UTI or ABX or tramadol  -staff to monitor for additional terrors  -if having another episode may need to discontinue tramadol        Electronically signed by: MERLYN Shrestha CNP

## 2022-04-20 NOTE — LETTER
"    4/20/2022        RE: Shelly Rogers  [address]  [address]  [place] [address]        M [facility] GERIATRICS    Chief Complaint   Patient presents with     RECHECK     HPI:  Shelly Rogers is a 89 year old  (1/18/1933), who is being seen today for an episodic care visit at: [facility] ([facility]) [[id number]]. Today's concern is:   1. Chronic systolic heart failure (H)    2. Urinary tract infection without hematuria, site unspecified    3. Night terrors    4. Closed wedge compression fracture of L1 vertebra with routine healing, subsequent encounter      Patient seen for follow up, status slowly improving with less SOB, BNP in hospital 4017 and now sown to 422, mild edema with marcela legs, denies CP/palpitations, also treated for UTI with cipro and benitez removed, denies s/s dysuria, of note night terrors have subsided and thought is UTI was causative factor versus urine retention or use of tramadol, back pain + and needs tramadol 1-2x daily for relief, no distress, plans to return home soon with daughters assistance.    Allergies, and PMH/PSH reviewed in EPIC today.  REVIEW OF SYSTEMS:  4 point ROS including Respiratory, CV, GI and , other than that noted in the HPI,  is negative    Objective:   BP (!) 149/88   Pulse 91   Temp 97.5  F (36.4  C)   Resp 18   Ht 1.575 m (5' 2\")   Wt 66.4 kg (146 lb 6.4 oz)   SpO2 93%   BMI 26.78 kg/m    GENERAL APPEARANCE:  in no distress, appears healthy  ENT:  Mouth and posterior oropharynx normal, moist mucous membranes  RESP:  lungs clear to auscultation   CV:  peripheral edema mild+ in lower legs, rate-normal  ABDOMEN:  bowel sounds normal  M/S:   Gait and station abnormal unsteady gait  SKIN:  Inspection of skin and subcutaneous tissue baseline  NEURO:   Examination of sensation by touch normal  PSYCH:  oriented X 3, memory impaired     Labs done in SNF are in [facility]. Please refer to them using EPIC/Care " Everywhere.    Assessment/Plan:  (I50.22) Chronic systolic heart failure (H)  (primary encounter diagnosis)  Comment: BNP still elevated but down to 422  Plan: elevate legs as possible  -continue eliquis, furosemide   -if having increased edema will start compression    (N39.0) Urinary tract infection without hematuria, site unspecified  (F51.4) Night terrors  Comment: thought is UTI causative factor for terrors  Plan: treated with cipro and benitez removed  -PVR's PRN  -follow up urology if having symptoms    (S32.010D) Closed wedge compression fracture of L1 vertebra with routine healing, subsequent encounter  Comment: chronic pain, gait instability  Plan: continue therapies  -has APAP TID plus tramadol PRN  -plan for home care therapies at discharge  -probable discharge within one week  -follow up ortho PRN        Electronically signed by: MERLYN Shrestha CNP             Sincerely,        MERLYN Shrestha CNP

## 2022-04-20 NOTE — PROGRESS NOTES
"[facility] GERIATRICS    Chief Complaint   Patient presents with     RECHECK     HPI:  Shelly Rogers is a 89 year old  (1/18/1933), who is being seen today for an episodic care visit at: [facility] ([facility]) [[id number]]. Today's concern is:   1. Chronic systolic heart failure (H)    2. Urinary tract infection without hematuria, site unspecified    3. Night terrors    4. Closed wedge compression fracture of L1 vertebra with routine healing, subsequent encounter      Patient seen for follow up, status slowly improving with less SOB, BNP in hospital 4017 and now sown to 422, mild edema with marcela legs, denies CP/palpitations, also treated for UTI with cipro and benitez removed, denies s/s dysuria, of note night terrors have subsided and thought is UTI was causative factor versus urine retention or use of tramadol, back pain + and needs tramadol 1-2x daily for relief, no distress, plans to return home soon with daughters assistance.    Allergies, and PMH/PSH reviewed in EPIC today.  REVIEW OF SYSTEMS:  4 point ROS including Respiratory, CV, GI and , other than that noted in the HPI,  is negative    Objective:   BP (!) 149/88   Pulse 91   Temp 97.5  F (36.4  C)   Resp 18   Ht 1.575 m (5' 2\")   Wt 66.4 kg (146 lb 6.4 oz)   SpO2 93%   BMI 26.78 kg/m    GENERAL APPEARANCE:  in no distress, appears healthy  ENT:  Mouth and posterior oropharynx normal, moist mucous membranes  RESP:  lungs clear to auscultation   CV:  peripheral edema mild+ in lower legs, rate-normal  ABDOMEN:  bowel sounds normal  M/S:   Gait and station abnormal unsteady gait  SKIN:  Inspection of skin and subcutaneous tissue baseline  NEURO:   Examination of sensation by touch normal  PSYCH:  oriented X 3, memory impaired     Labs done in SNF are in [facility]. Please refer to them using [employer]/Care Everywhere.    Assessment/Plan:  (I50.22) Chronic systolic heart failure (H)  (primary encounter diagnosis)  Comment: BNP still elevated " but down to 422  Plan: elevate legs as possible  -continue eliquis, furosemide   -if having increased edema will start compression    (N39.0) Urinary tract infection without hematuria, site unspecified  (F51.4) Night terrors  Comment: thought is UTI causative factor for terrors  Plan: treated with cipro and benitez removed  -PVR's PRN  -follow up urology if having symptoms    (S32.010D) Closed wedge compression fracture of L1 vertebra with routine healing, subsequent encounter  Comment: chronic pain, gait instability  Plan: continue therapies  -has APAP TID plus tramadol PRN  -plan for home care therapies at discharge  -probable discharge within one week  -follow up ortho PRN        Electronically signed by: MERLYN Shrestha CNP

## 2022-04-22 NOTE — PROGRESS NOTES
[facility] GERIATRICS DISCHARGE SUMMARY  PATIENT'S NAME: Shelly Rogers  YOB: 1933  MEDICAL RECORD NUMBER:  [id number]  Place of Service where encounter took place:  [facility] ([facility]) [[id number]]    PRIMARY CARE PROVIDER AND CLINIC RESPONSIBLE AFTER TRANSFER:   Halley Huff MD, [address] / [place]                [place] [address]    Non-FMG Provider     Transferring providers: MERLYN Shrestha CNP  Recent Hospitalization/ED:  [facility] stay 4/1/22 to 4/6/22.  Date of SNF Admission: April 06, 2022  Date of SNF (anticipated) Discharge: April 22, 2022  Discharged to: previous independent home  Cognitive Scores: NA  Physical Function: Pivot transfers  DME: [facility]    CODE STATUS/ADVANCE DIRECTIVES DISCUSSION:  Prior   ALLERGIES: Cats, Shellfish allergy, Shellfish-derived products, Ambien [zolpidem], Lisinopril, Miscellaneous [no clinical screening - see comments], Seasonal allergies, and Sulfa drugs    NURSING FACILITY COURSE   Medication Changes/Rationale:     See notes    Summary of nursing facility stay:      Closed compression fracture of L1 lumbar vertebra, with routine healing, subsequent encounter  Urinary tract infection without hematuria, site unspecified  Urine retention  Chronic diastolic congestive heart failure (H)  Chronic atrial fibrillation (H)  Type 2 diabetes mellitus with hyperosmolarity without coma, without long-term current use of insulin (H)  Stage 3 chronic kidney disease, unspecified whether stage 3a or 3b CKD (H)  Night terrors     (S32.010D) Closed compression fracture of L1 lumbar vertebra, with routine healing, subsequent encounter  (primary encounter diagnosis)  Comment: conservative management approach  Plan: changed tramadol to 25mg Q6h pain 1-5/10, 50mg pain 6-10/10  -changed APAP from 500 to 1000mg TID scheduled  -add miralax daily PRN  -PT/OT home care        (N39.0) Urinary tract infection  without hematuria, site unspecified  (R33.9) Urine retention  Comment: previous to L1 Fx, pain meds and UTI was urinating WNL  Plan: cipro 250mg BID end 4/11  -trial void on 4/11 successful     (I50.32) Chronic diastolic congestive heart failure (H)  Comment: BNP 4/3 was 4017, asymptomatic  Plan: continue lasix +PRN for weight gain  -BNP on 4/11 was 422  -follow up cardiology PRN     (I48.20) Chronic atrial fibrillation (H)  Comment: today RRR  Plan: continue eliquis 5mg BID  -follow up cardiology PRN     (E11.22) Type 2 diabetes mellitus with chronic kidney disease, without long-term current use of insulin, unspecified CKD stage (H)  Comment: recent A1C 7.5, amaryl DC'd in hospital  Plan: continue metformin 500mg BID  -check BG's BID  -consider restarting amaryl if indicated     (N18.31) Stage 3a chronic kidney disease (H)  Comment: recent creats all <1.0  Plan: BMP on 4/11 with GFR >60  -follow up PCP    (F51.4) Night terrors  Comment: treated UTI with cipro, taking tramadol randomly for pain  Plan: thought is terrors caused by UTI or ABX  -monitor for additional terrors, current thought is resolved post UTI      Discharge Medications:    Current Outpatient Medications   Medication Sig Dispense Refill     ACE/ARB/ARNI NOT PRESCRIBED (INTENTIONAL) Please choose reason not prescribed from choices below.       acetaminophen (TYLENOL) 500 MG tablet Take 2 tablets (1,000 mg) by mouth 3 times daily 90 tablet 0     apixaban ANTICOAGULANT (ELIQUIS ANTICOAGULANT) 5 MG tablet Take 1 tablet (5 mg) by mouth 2 times daily is blood thinner 180 tablet 3     blood glucose (ACCU-CHEK SMARTVIEW) test strip 1 strip by In Vitro route daily (Patient not taking: Reported on 8/4/2021) 1 Box prn     blood glucose (NO BRAND SPECIFIED) lancets standard Use to test blood sugar 1 times daily or as directed. (Patient not taking: Reported on 8/4/2021) 100 each 11     blood glucose monitoring (ACCU-CHEK FASTCLIX) lancets Use to check blood  sugars daily (Patient not taking: Reported on 8/4/2021) 1 Box prn     Continuous Blood Gluc  (FREESTYLE STELLA 14 DAY READER) VERO 1 each every 14 days (Patient not taking: Reported on 8/4/2021) 1 Device 0     Continuous Blood Gluc Sensor (FREESTYLE STELLA 14 DAY SENSOR) MISC 1 each every 14 days (Patient not taking: Reported on 8/4/2021) 2 each 11     furosemide (LASIX) 20 MG tablet Take 1 tablet every morning. Take an extra dose as needed for 3+ lb wt gain, shortness of breath, or leg swelling 180 tablet 1     levothyroxine (SYNTHROID/LEVOTHROID) 100 MCG tablet TAKE 1 TABLET (100 MCG) BY MOUTH DAILY FOR THYROID 90 tablet 0     Lidocaine (LIDOCARE) 4 % Patch Place 1 patch onto the skin every 24 hours To prevent lidocaine toxicity, patient should be patch free for 12 hrs daily. 30 patch 0     metFORMIN (GLUCOPHAGE) 500 MG tablet Take 1 tablet (500 mg) by mouth 2 times daily (with meals) for diabetes 180 tablet 3     metoprolol succinate ER (TOPROL-XL) 100 MG 24 hr tablet Take 1 tablet (100 mg) by mouth 2 times daily for Blood Pressure and heart 180 tablet 3     multivitamin (CENTRUM SILVER) tablet Take 1 tablet by mouth daily       STATIN NOT PRESCRIBED, INTENTIONAL, continuous prn. Statin not prescribed intentionally due to Other: Not needed, LDL at goal <100mg/dL without therapy (Patient not taking: Reported on 8/4/2021) 0 each 0     traMADol (ULTRAM) 50 MG tablet Take 0.5-1 tablets (25-50 mg) by mouth every 6 hours as needed for moderate pain 20 tablet 0     triamcinolone (NASACORT) 55 MCG/ACT nasal aerosol Spray 2 sprays into both nostrils daily as needed           Controlled medications:   current tramadol in cart to be sent with patient     Past Medical History:   Past Medical History:   Diagnosis Date     Atrial fibrillation (H)     Nl LVEF, s/p ablation      Congestive heart failure, unspecified      Diabetes mellitus (H) 2004     Hemorrhoids     intermittent bleeding     Hypertension      Macular  "degeneration      OA (osteoarthritis)      Pacemaker 3/2013     Uterine cancer (H)     s/p hyst     Physical Exam:   Vitals: BP (!) 146/72   Pulse 70   Temp 97.5  F (36.4  C)   Resp 18   Ht 1.575 m (5' 2\")   Wt 66.4 kg (146 lb 6.4 oz)   SpO2 94%   BMI 26.78 kg/m    BMI: Body mass index is 26.78 kg/m .  GENERAL APPEARANCE:  in no distress, appears healthy  ENT:  Mouth and posterior oropharynx normal, moist mucous membranes  RESP:  lungs clear to auscultation   CV:  no edema, rate-normal  ABDOMEN:  bowel sounds normal  M/S:   Gait and station abnormal unsteady gait  SKIN:  Inspection of skin and subcutaneous tissue baseline  NEURO:   Examination of sensation by touch normal  PSYCH:  oriented X 3     SNF labs: Labs done in SNF are in [place] EPIC. Please refer to them using [facility]/Care Everywhere.    DISCHARGE PLAN:    Follow up labs: No labs orders/due    Medical Follow Up:      Follow up with primary care provider in 1 weeks    Current [place] scheduled appointments:  Next 5 appointments (look out 90 days)    Jun 23, 2022  2:00 PM  (Arrive by 1:40 PM)  Provider Visit with Halley Huff MD  [facility] ([facility] ) [address], [address]  [place] [address] [phone]           Discharge Services: Home Care:  Occupational Therapy, Physical Therapy and Home Health Aide    Discharge Instructions Verbalized to Patient at Discharge:     None    TOTAL DISCHARGE TIME:   Greater than 30 minutes  Electronically signed by:  MERLYN Shrestha CNP         Documentation of Face-to-Face and Certification for Home Health Services     Patient: Shelly Rogers   YOB: 1933  MR Number: [id number]  Today's Date: 4/22/2022    I certify that patient: Shelly Rogers is under my care and that I, or a nurse practitioner or physician's assistant working with me, had a face-to-face encounter that meets the physician face-to-face encounter requirements " with this patient on: 4/22/2022.    This encounter with the patient was in whole, or in part, for the following medical condition, which is the primary reason for home health care:   1. Closed compression fracture of L1 lumbar vertebra, with routine healing, subsequent encounter    2. Urinary tract infection without hematuria, site unspecified    3. Urine retention    4. Chronic diastolic congestive heart failure (H)    5. Chronic atrial fibrillation (H)    6. Type 2 diabetes mellitus with hyperosmolarity without coma, without long-term current use of insulin (H)    7. Stage 3 chronic kidney disease, unspecified whether stage 3a or 3b CKD (H)    8. Night terrors        I certify that, based on my findings, the following services are medically necessary home health services: Occupational Therapy, Physical Therapy and HHA.    My clinical findings support the need for the above services because: Occupational Therapy Services are needed to assess and treat cognitive ability and address ADL safety due to impairment in transfers, DME use., Physical Therapy Services are needed to assess and treat the following functional impairments: gait instability. and HHA for bathing safety    Further, I certify that my clinical findings support that this patient is homebound (i.e. absences from home require considerable and taxing effort and are for medical reasons or Confucian[employer] services or infrequently or of short duration when for other reasons) because: Requires assistance of another person or specialized equipment to access medical services because patient: Is unable to operate assistive equipment on their own...    Based on the above findings. I certify that this patient is confined to the home and needs intermittent skilled nursing care, physical therapy and/or speech therapy.  The patient is under my care, and I have initiated the establishment of the plan of care.  This patient will be followed by a physician who will  periodically review the plan of care.  Physician/Provider to provide follow up care: Halley Huff    Responsible Medicare certified PECOS Physician: Sid Bernal  Electronic Physician Signature  Date: 4/22/2022

## 2022-04-22 NOTE — LETTER
4/22/2022        RE: Shelly Rogers  [address]  [place] [address]        [facility] GERIATRICS DISCHARGE SUMMARY  PATIENT'S NAME: Shelly Rogers  YOB: 1933  MEDICAL RECORD NUMBER:  [id number]  Place of Service where encounter took place:  [facility] [[id number]]    PRIMARY CARE PROVIDER AND CLINIC RESPONSIBLE AFTER TRANSFER:   Halley Huff MD, [address] / [place]                [place] [address]    Non-FMG Provider     Transferring providers: MERLYN Shrestha CNP  Recent Hospitalization/ED:  [facility] stay 4/1/22 to 4/6/22.  Date of SNF Admission: April 06, 2022  Date of SNF (anticipated) Discharge: April 22, 2022  Discharged to: previous independent home  Cognitive Scores: NA  Physical Function: Pivot transfers  DME: [facility]    CODE STATUS/ADVANCE DIRECTIVES DISCUSSION:  Prior   ALLERGIES: Cats, Shellfish allergy, Shellfish-derived products, Ambien [zolpidem], Lisinopril, Miscellaneous [no clinical screening - see comments], Seasonal allergies, and Sulfa drugs    NURSING FACILITY COURSE   Medication Changes/Rationale:     See notes    Summary of nursing facility stay:      Closed compression fracture of L1 lumbar vertebra, with routine healing, subsequent encounter  Urinary tract infection without hematuria, site unspecified  Urine retention  Chronic diastolic congestive heart failure (H)  Chronic atrial fibrillation (H)  Type 2 diabetes mellitus with hyperosmolarity without coma, without long-term current use of insulin (H)  Stage 3 chronic kidney disease, unspecified whether stage 3a or 3b CKD (H)  Night terrors     (S32.010D) Closed compression fracture of L1 lumbar vertebra, with routine healing, subsequent encounter  (primary encounter diagnosis)  Comment: conservative management approach  Plan: changed tramadol to 25mg Q6h pain 1-5/10, 50mg pain 6-10/10  -changed APAP from 500  to 1000mg TID scheduled  -add miralax daily PRN  -PT/OT home care        (N39.0) Urinary tract infection without hematuria, site unspecified  (R33.9) Urine retention  Comment: previous to L1 Fx, pain meds and UTI was urinating WNL  Plan: cipro 250mg BID end 4/11  -trial void on 4/11 successful     (I50.32) Chronic diastolic congestive heart failure (H)  Comment: BNP 4/3 was 4017, asymptomatic  Plan: continue lasix +PRN for weight gain  -BNP on 4/11 was 422  -follow up cardiology PRN     (I48.20) Chronic atrial fibrillation (H)  Comment: today RRR  Plan: continue eliquis 5mg BID  -follow up cardiology PRN     (E11.22) Type 2 diabetes mellitus with chronic kidney disease, without long-term current use of insulin, unspecified CKD stage (H)  Comment: recent A1C 7.5, amaryl DC'd in hospital  Plan: continue metformin 500mg BID  -check BG's BID  -consider restarting amaryl if indicated     (N18.31) Stage 3a chronic kidney disease (H)  Comment: recent creats all <1.0  Plan: BMP on 4/11 with GFR >60  -follow up PCP    (F51.4) Night terrors  Comment: treated UTI with cipro, taking tramadol randomly for pain  Plan: thought is terrors caused by UTI or ABX  -monitor for additional terrors, current thought is resolved post UTI      Discharge Medications:    Current Outpatient Medications   Medication Sig Dispense Refill     ACE/ARB/ARNI NOT PRESCRIBED (INTENTIONAL) Please choose reason not prescribed from choices below.       acetaminophen (TYLENOL) 500 MG tablet Take 2 tablets (1,000 mg) by mouth 3 times daily 90 tablet 0     apixaban ANTICOAGULANT (ELIQUIS ANTICOAGULANT) 5 MG tablet Take 1 tablet (5 mg) by mouth 2 times daily is blood thinner 180 tablet 3     blood glucose (ACCU-CHEK SMARTVIEW) test strip 1 strip by In Vitro route daily (Patient not taking: Reported on 8/4/2021) 1 Box prn     blood glucose (NO BRAND SPECIFIED) lancets standard Use to test blood sugar 1 times daily or as directed. (Patient not taking: Reported  on 8/4/2021) 100 each 11     blood glucose monitoring (ACCU-CHEK FASTCLIX) lancets Use to check blood sugars daily (Patient not taking: Reported on 8/4/2021) 1 Box prn     Continuous Blood Gluc  (FREESTYLE STELLA 14 DAY READER) VERO 1 each every 14 days (Patient not taking: Reported on 8/4/2021) 1 Device 0     Continuous Blood Gluc Sensor (FREESTYLE STELLA 14 DAY SENSOR) MISC 1 each every 14 days (Patient not taking: Reported on 8/4/2021) 2 each 11     furosemide (LASIX) 20 MG tablet Take 1 tablet every morning. Take an extra dose as needed for 3+ lb wt gain, shortness of breath, or leg swelling 180 tablet 1     levothyroxine (SYNTHROID/LEVOTHROID) 100 MCG tablet TAKE 1 TABLET (100 MCG) BY MOUTH DAILY FOR THYROID 90 tablet 0     Lidocaine (LIDOCARE) 4 % Patch Place 1 patch onto the skin every 24 hours To prevent lidocaine toxicity, patient should be patch free for 12 hrs daily. 30 patch 0     metFORMIN (GLUCOPHAGE) 500 MG tablet Take 1 tablet (500 mg) by mouth 2 times daily (with meals) for diabetes 180 tablet 3     metoprolol succinate ER (TOPROL-XL) 100 MG 24 hr tablet Take 1 tablet (100 mg) by mouth 2 times daily for Blood Pressure and heart 180 tablet 3     multivitamin (CENTRUM SILVER) tablet Take 1 tablet by mouth daily       STATIN NOT PRESCRIBED, INTENTIONAL, continuous prn. Statin not prescribed intentionally due to Other: Not needed, LDL at goal <100mg/dL without therapy (Patient not taking: Reported on 8/4/2021) 0 each 0     traMADol (ULTRAM) 50 MG tablet Take 0.5-1 tablets (25-50 mg) by mouth every 6 hours as needed for moderate pain 20 tablet 0     triamcinolone (NASACORT) 55 MCG/ACT nasal aerosol Spray 2 sprays into both nostrils daily as needed           Controlled medications:   current tramadol in cart to be sent with patient     Past Medical History:   Past Medical History:   Diagnosis Date     Atrial fibrillation (H)     Nl LVEF, s/p ablation      Congestive heart failure, unspecified       "Diabetes mellitus (H) 2004     Hemorrhoids     intermittent bleeding     Hypertension      Macular degeneration      OA (osteoarthritis)      Pacemaker 3/2013     Uterine cancer (H)     s/p hyst     Physical Exam:   Vitals: BP (!) 146/72   Pulse 70   Temp 97.5  F (36.4  C)   Resp 18   Ht 1.575 m (5' 2\")   Wt 66.4 kg (146 lb 6.4 oz)   SpO2 94%   BMI 26.78 kg/m    BMI: Body mass index is 26.78 kg/m .  GENERAL APPEARANCE:  in no distress, appears healthy  ENT:  Mouth and posterior oropharynx normal, moist mucous membranes  RESP:  lungs clear to auscultation   CV:  no edema, rate-normal  ABDOMEN:  bowel sounds normal  M/S:   Gait and station abnormal unsteady gait  SKIN:  Inspection of skin and subcutaneous tissue baseline  NEURO:   Examination of sensation by touch normal  PSYCH:  oriented X 3     SNF labs: Labs done in SNF are in [place] EPIC. Please refer to them using [employer]/Care Everywhere.    DISCHARGE PLAN:    Follow up labs: No labs orders/due    Medical Follow Up:      Follow up with primary care provider in 1 weeks    Current [place] scheduled appointments:  Next 5 appointments (look out 90 days)    Jun 23, 2022  2:00 PM  (Arrive by 1:40 PM)  Provider Visit with Halley Huff MD  [facility] ([facility] ) [address] [place] [address], [address]  [facility] [id number] [phone]           Discharge Services: Home Care:  Occupational Therapy, Physical Therapy and Home Health Aide    Discharge Instructions Verbalized to Patient at Discharge:     None    TOTAL DISCHARGE TIME:   Greater than 30 minutes  Electronically signed by:  MERLYN Shrestha CNP         Documentation of Face-to-Face and Certification for Home Health Services     Patient: Shelly Rogers   YOB: 1933  MR Number: [id number]  Today's Date: 4/22/2022    I certify that patient: Shelly Rogers is under my care and that I, or a nurse practitioner or physician's assistant working " with me, had a face-to-face encounter that meets the physician face-to-face encounter requirements with this patient on: 4/22/2022.    This encounter with the patient was in whole, or in part, for the following medical condition, which is the primary reason for home health care:   1. Closed compression fracture of L1 lumbar vertebra, with routine healing, subsequent encounter    2. Urinary tract infection without hematuria, site unspecified    3. Urine retention    4. Chronic diastolic congestive heart failure (H)    5. Chronic atrial fibrillation (H)    6. Type 2 diabetes mellitus with hyperosmolarity without coma, without long-term current use of insulin (H)    7. Stage 3 chronic kidney disease, unspecified whether stage 3a or 3b CKD (H)    8. Night terrors        I certify that, based on my findings, the following services are medically necessary home health services: Occupational Therapy, Physical Therapy and HHA.    My clinical findings support the need for the above services because: Occupational Therapy Services are needed to assess and treat cognitive ability and address ADL safety due to impairment in transfers, DME use., Physical Therapy Services are needed to assess and treat the following functional impairments: gait instability. and HHA for bathing safety    Further, I certify that my clinical findings support that this patient is homebound (i.e. absences from home require considerable and taxing effort and are for medical reasons or [employer] services or infrequently or of short duration when for other reasons) because: Requires assistance of another person or specialized equipment to access medical services because patient: Is unable to operate assistive equipment on their own...    Based on the above findings. I certify that this patient is confined to the home and needs intermittent skilled nursing care, physical therapy and/or speech therapy.  The patient is under my care, and I have initiated the  establishment of the plan of care.  This patient will be followed by a physician who will periodically review the plan of care.  Physician/Provider to provide follow up care: Halley Huff    Responsible Medicare certified PECOS Physician: Sid Bernal  Electronic Physician Signature  Date: 4/22/2022                  Sincerely,        Sid Bernal, MERLYN CNP

## 2022-04-27 PROBLEM — L72.3 SEBACEOUS CYST: Status: ACTIVE | Noted: 2022-01-01

## 2022-04-27 NOTE — PROGRESS NOTES
"  Assessment & Plan     Type 2 diabetes mellitus with hyperosmolarity without coma, without long-term current use of insulin (H)  Patient does not check blood sugars at home. Does not want to get a glucose monitoring machine. Will check glucose as she is fasting at this time. glimepiride was stopped at the hospital.   - Albumin Random Urine Quantitative with Creat Ratio; Future  - Basic metabolic panel  (Ca, Cl, CO2, Creat, Gluc, K, Na, BUN); Future    Exudative age-related macular degeneration of left eye, unspecified stage (H)  Follow up with ophthalmology    Malignant neoplasm of corpus uteri, except isthmus (H)  Hx of uterine cancer.     Hypothyroidism, unspecified type  Discussed about taking levothyroxine on empty stomach without other medications.   - TSH; Future    Chronic atrial fibrillation (H)  continue AC with eliquis    Acute on chronic right-sided congestive heart failure (H)  Stable. Currently on lasix 20 mg daily. Very mild b/l lower extremity swelling.     Stage 3 chronic kidney disease, unspecified whether stage 3a or 3b CKD (H)  Stable.    Sebaceous cyst  Refer to derm for removal.  - Adult Dermatology Referral; Future     BMI:   Estimated body mass index is 25.39 kg/m  as calculated from the following:    Height as of this encounter: 1.575 m (5' 2\").    Weight as of this encounter: 63 kg (138 lb 12.8 oz).       See Patient Instructions    Return in about 6 months (around 10/27/2022) for Follow up, with any available provider, Routine preventive.    RENÉ RENAE MD  [facility] SAVANNA Bravo is a 89 year old who presents for the following health issues  accompanied by her daughter.    TEAGAN Bravo is an 89 year old lady who presents to the clinic for hospital follow up.     She lives in independent living facility. She is back now from rehab   She has homecare nurse, PT and OT scheduled.   She is hard of hearing, daughter decided to get hearing aids later  Daughter " "questions about Prolia. No DEXA scan on file. Discussed about what dexa scan is, decided to get this done later.   Medications reviewed, patient was taking levothyroxine in the evening. Discussed that levothyroxine should be taken in the morning, empty stomach.   Patient has a cyst in the back, non tender, most likely sebaceous cyst, superficial.   She is due for diabetic eye exam, discussed with them to follow up with their eye doc for diabetic exam.  She received shingles vaccine recently.  Glimiperide was stopped at the hospital. She was taking 2 mg bid. She doesn't check BG at home. It can be overwhelming for her.   Patient was seen by psych in the hospital. They recommended starting her on sertraline 25 mg daily for anxiety. Discussed with patient about this medication. Will follow up later as we are checking TSH.     Hospital Follow-up Visit:    Hospital/Nursing Home/IP Rehab Facility: [facility]  Date of Admission: 04/06/2022  Date of Discharge: 04/22/2022   Reason(s) for Admission: Closed compression fracture of L1 lumbar vertebra, with routine healing, subsequent encounter         Was your hospitalization related to COVID-19? No   Problems taking medications regularly:  None  Medication changes since discharge: None  Problems adhering to non-medication therapy:  None    Summary of hospitalization:  [facility] hospital discharge summary reviewed  Diagnostic Tests/Treatments reviewed.  Follow up needed: none  Other Healthcare Providers Involved in Patient s Care:         Homecare  Update since discharge: improved.       Post Discharge Medication Reconciliation: discharge medications reconciled, continue medications without change.  Plan of care communicated with patient              Review of Systems       Objective    /80 (BP Location: Left arm, Cuff Size: Adult Regular)   Pulse 86   Temp 97.7  F (36.5  C) (Tympanic)   Resp 14   Ht 1.575 m (5' 2\")   Wt 63 kg (138 lb 12.8 oz)  "  SpO2 98%   BMI 25.39 kg/m    Body mass index is 25.39 kg/m .  Physical Exam  Vitals reviewed.   Musculoskeletal:      Right lower leg: Edema present.      Left lower leg: Edema present.      Comments: Mild LE edema.

## 2022-04-27 NOTE — PATIENT INSTRUCTIONS
Take Levothyroxine 100 mcg daily in the morning, on empty stomach, without other medicines.     There is this is a new shingles vaccine available called shingrex  It is a series of 2 shots 2-6 months apart.  Considered more than 90% effective.  Please go to any pharmacy to get the  vaccine;

## 2022-04-28 NOTE — TELEPHONE ENCOUNTER
Spoke with Sandhya     Notified of provider message below    Odilia ABEL, Triage RN  [facility] Internal Medicine [facility]

## 2022-04-28 NOTE — TELEPHONE ENCOUNTER
Venita RN called to request   SN 1w4 for cardio pulmonary assessment  Pain assessment   Heart failure teaching and safety     PT and OT eval and Treat     Routing for okay to give verbal orders    Alexander Stover RN

## 2022-04-28 NOTE — TELEPHONE ENCOUNTER
Please call patient for test results:  Normal TSH  Elevated urine micro albumin which means she is leaking proteins in her urine. She will benefit from ARB (losartan) low dose. Will send losartan to her pharmacy start taking half a pill of 25 mg daily.   Electrolytes normal.

## 2022-04-29 NOTE — PROGRESS NOTES
Clinic Care Coordination Contact  [facility]/Voicemail       Clinical Data: Care Coordinator Outreach    Outreach attempted x 1.  Left message on patient's voicemail with call back information and requested return call.    Plan: Care Coordinator will try to reach patient again in 1-2 business days.    Nakia Oviedo [facility]  Clinic Care Coordinator  [facility] Women's [facility] Chanelle Tippah  [facility]  [phone]  [email or web address]

## 2022-05-02 NOTE — PROGRESS NOTES
Clinic Care Coordination Contact  [facility]: Post-Discharge Note  SITUATION                                                      Admission:    Admission Date: 04/01/22   Reason for Admission: Back pain  Discharge:   Discharge Date: 04/06/22  Discharge Diagnosis: closed fratcure of L1 Lumbar vertebra, UTI    BACKGROUND                                                      Per hospital discharge summary and inpatient provider notes:    Shelly Rogers is a 89 year old female with [facility] recent hospital stay (3/30/22-4/1/22) for L1 superior endplate fracture, discharged to TCU, who was readmitted to [facility] on 4/1/2022 due to ongoing back pain and placement. Orthopedic consult obtained 3/31/22 that recommended conservative treatment. Patient was managed conservatively here. She had hyponatremia and UTI and some adjustment symptoms treated see below. Patient returning to TCU for ongoing rehabilitation.  - Continue Tramadol and tylenol for pain  - PT reconsulted this admission and recommending 24 hour supervision     Hyponatremia, improving  Sodium has been low during recent admission but has remained stable at 130.  Sodium remains around 130 this admission. Urine studies and clinical exam suggest euvolemia with mild ADH effect. 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  - Continue home lasix  - Recheck BMP in one week  - Continue 1.8L fluid restriction until sodium has normalized     Urinary retention  UTI  Benitez placed 4/3 due to multipel straight caths, last PVR 650mL. Likely related to fracture, pain, deconditioning, will likely improve with time. Urine culture 4/3 did grow Enterobacter and E coli so treat empirically.  - Cipro x5 days  - Continue benitez, trial to void in about 1 week    ASSESSMENT      Enrollment  Primary Care Care Coordination Status: Not a Candidate    Discharge Assessment  How are you doing now that you are home?: Doing well. Moving slow but she is getting better each  day. Home Care is going well, PT/OT have now started. Her pain is very low but somewhat uncomfortable. She has a PCA supporting her with ADLs but hopefully as she gets stronger this support can decrease.  How are your symptoms? (Red Flag symptoms escalate to triage hotline per guidelines): Improved  Do you feel your condition is stable enough to be safe at home until your provider visit?: Yes  Does the patient have their discharge instructions? : Yes  Does the patient have questions regarding their discharge instructions? : No  Were you started on any new medications or were there changes to any of your previous medications? : No  Does the patient have all of their medications?: Yes  Do you have questions regarding any of your medications? : No  Do you have all of your needed medical supplies or equipment (DME)?  (i.e. oxygen tank, CPAP, cane, etc.): Yes  Discharge follow-up appointment scheduled within 14 calendar days? : No  Is patient agreeable to assistance with scheduling? : No         Post-op (Clinicians Only)  Did the patient have surgery or a procedure: No  Fever: No  Chills: No  Eating & Drinking: eating and drinking without complaints/concerns    PLAN                                                      Outpatient Plan:      Future Appointments   Date Time Provider Department Center   6/23/2022  2:00 PM Hlaley Huff MD [facility]    7/21/2022 12:00 AM LEVY TECH [facility] PSA CLIN         For any urgent concerns, please contact our 24 hour nurse triage line: [phone] ([phone])       Nakia Oviedo [facility]  Clinic Care Coordinator  [facility] Women's [facility] Chanelle Ramsey  [facility]  [phone]  [email or web address]

## 2022-05-02 NOTE — LETTER
[facility] CARE COORDINATION  [address] [place] [address]    May 2, 2022    Shelly Marcumcker  [address] [place] [id number]      Dear Esthela,    I am a clinic care coordinator who works with Halley Huff MD at [facility]. I wanted to thank you for spending the time to talk with me.  Below is a description of clinic care coordination and how I can further assist you.      The clinic care coordination team is made up of a registered nurse,  and community health worker who understand the health care system. The goal of clinic care coordination is to help you manage your health and improve access to the health care system in the most efficient manner. The team can assist you in meeting your health care goals by providing education, coordinating services, strengthening the communication among your providers and supporting you with any resource needs.    Please feel free to contact me at [phone] with any questions or concerns. We are focused on providing you with the highest-quality healthcare experience possible and that all starts with you.     Sincerely,     JESUS Bennett

## 2022-05-05 NOTE — TELEPHONE ENCOUNTER
Home care called to confirm losartan dose.    Camelia BRANCH also wanted to let Dr. Huff know that patient stopped taking the Furosamide as she felt it was causing her to gain wait patient reported she was gaining 3 lbs a week and has not since stopping the medication    Alexander Stover RN

## 2022-05-05 NOTE — TELEPHONE ENCOUNTER
Dr. Latham has prescribed losartan 25 mg half tablet a day on April 28  She saw her on April 27  Dr.Nasima Daria MD

## 2022-05-06 NOTE — TELEPHONE ENCOUNTER
Left voice message for Camelia with providers message. Advised Camelia to call triage back if further questions.

## 2022-06-23 NOTE — TELEPHONE ENCOUNTER
"Ginger Terwilliger, Daughter C2C     1. Requesting outpatient PT order- pended, please review/advise. Patient discharged from Homecare PT, pre homecare PT request patient should be seen for outpatient physical therapy. Being seeing by PT for strength and mobility d/t fx in lower back.    2. Reporting Weight loss- \"on steady decline since March\" Per daughter patient has lost 40 lbs since march. \"Trying to encourage food intake\"Denies other symptoms (ie: diarrhea, nausea, vomiting, abdominal pain etc) only decreased appetite. - writer scheduled OV with Dr. Dubon on 6/30/22, patient looking to transition care to Dr. Ling Arthur's Callback: [phone] to leave detailed VM       Mao Vidal RN  [facility]    Additional Information    Negative: Sounds like a life-threatening emergency to the triager    Negative: Information only call about a Well Adult (no illness or injury)    Negative: Nursing judgment    Negative: Nursing judgment    Negative: Nursing judgment    Negative: Nursing judgment    Negative: Nursing judgment    Negative: Nursing judgment    Negative: Patient wants to be seen    Negative: Nursing judgment    Negative: Nursing judgment    Negative: Nursing judgment    Negative: Nursing judgment    Nursing judgment    Protocols used: NO PROTOCOL AVAILABLE - SICK ADULT-A-OH      "

## 2022-06-23 NOTE — TELEPHONE ENCOUNTER
Returned call to Sandhya     Left her a detailed message to notify her of referral placed and scheduling number for PT: [phone]     Odilia ABEL, Triage RN  [facility] Internal Medicine Clinic

## 2022-06-30 NOTE — PROGRESS NOTES
"  Assessment & Plan     Closed fracture of first lumbar vertebra, unspecified fracture morphology, sequela  We could try adding duloxetine for pain benefit  We had a careful conversation about potential risks and side effects   Follow up in 3-4 weeks to assess therapy and adjust dose if needed ; we can recheck weight then too   - DULoxetine (CYMBALTA) 30 MG capsule; One capsule once daily for one week, then increase to two capsules once daily      32 minutes spent on the date of the encounter doing chart review, history and exam, documentation and further activities per the note           Return in about 1 month (around 7/30/2022) for recheck pain.   Patient instructed to return to clinic or contact us sooner if symptoms worsen or new symptoms develop.     Gopi Dubon MD  [facility] SAVANNA Bravo is a 89 year old accompanied by her daughter., presenting for the following health issues:  Back Pain (L1 fracture , ongoing pain) and Weight Loss      HPI       Spine fracture diagnosed in April  Improving pain since then  Using topical lidocaine and APAP   Daughter wants to know if there is more that can be done for pain  Symptoms most noticeable when patient standing   No radiation of pain to legs and no new leg weakness or numbness  No new bowel or bladder symptoms reported  Lives in nursing home  Doing PT   Patient was losing weight until several weeks ago when weight stabilized according to daughter     Review of Systems         Objective    /67 (BP Location: Right arm, Patient Position: Sitting, Cuff Size: Adult Regular)   Pulse 80   Temp 97.1  F (36.2  C) (Temporal)   Ht 1.575 m (5' 2\")   Wt 60.8 kg (134 lb)   SpO2 98%   BMI 24.51 kg/m    Body mass index is 24.51 kg/m .  Physical Exam   GEN:  Pleasant, mildly frail, comfortable appearing, elderly female in wheelchair  BACK:  No tenderness to palpation over the spinous processes of the thoracic and lumbar spine, there is 5 out of 5 " muscle strength in all lower extremity muscle groups, there is normal sensation to light touch in all lower extremity dermatomes, straight leg raise does not elicit radicular symptoms bilaterally.  No midline spine bruising.                  .  ..

## 2022-08-04 NOTE — PROGRESS NOTES
Assessment & Plan     Closed fracture of first lumbar vertebra, unspecified fracture morphology, sequela  Continue duloxetine switch to 60 mg capsule for a ease of administration   - DULoxetine (CYMBALTA) 60 MG capsule; Take 1 capsule (60 mg) by mouth daily      15 minutes spent on the date of the encounter doing chart review, history and exam, documentation and further activities per the note           Return in about 4 weeks (around 9/1/2022) for recheck.  Patient instructed to return to clinic or contact us sooner if symptoms worsen or new symptoms develop.     Gopi Dubon MD  [facility] SAVANNA Bravo is a 89 year old accompanied by her daughter, presenting for the following health issues:  RECHECK (pain)      History of Present Illness       Back Pain:  She presents for follow up of back pain. Patient's back pain is a recurring problem.  Location of back pain:  Right middle of back  Description of back pain: stabbing  Back pain spreads: nowhere    Since patient first noticed back pain, pain is: gradually improving  Does back pain interfere with her job:  Not applicable      She eats 2-3 servings of fruits and vegetables daily.She consumes 0 sweetened beverage(s) daily.She exercises with enough effort to increase her heart rate 9 or less minutes per day.  She exercises with enough effort to increase her heart rate 3 or less days per week.   She is taking medications regularly.         The cymbalta helped the pain and is well tolerated  She still has some pain with prolonged standing, but when she walks she feels well      Review of Systems         Objective    There were no vitals taken for this visit.  There is no height or weight on file to calculate BMI.  Physical Exam   Well appearing no distress  Improved   Weight is up from previous visit too                     .  ..

## 2022-09-02 NOTE — TELEPHONE ENCOUNTER
I reviewed her coronary angiogram from [facility].  She has severe proximal multivessel coronary disease, including 90% stenosis within the left main, proximal circumflex, and proximal right coronary artery.  Coronary artery bypass surgery is the only reasonable choice for definitive treatment in this situation, but that option would be very difficult on her with a prolonged and difficult recovery.  She would not likely recover to her full capacity again.  For that reason, a more conservative approach such as hospice is very reasonable, but obviously based on preferences by the patient and her family. EE

## 2022-09-02 NOTE — PROGRESS NOTES
"[facility] Heart - CORE Clinic    -Received a phone call from Shelly's daughter Sandhya stating Shelly is currently admitted at [facility] with a mild MI. They did an angiogram with showed \"severe heart disease\" and she \"has blockages in all chambers\".  Surgery is recommended but given age they are planning on doing medication changes and with plans for eventual hospice. Sandhya is requesting Dr. Del Valle review her chart and give his opinion on the plan.  She plans to currently flow the plan presented by [facility] in the meantime. Lives at [facility] independently. Currently planning on meeting with hospice. Per Sandhya, Shelly cannot return home and needs additional care.  Discussed with Sandhya that I can send an FYI to Dr. Del Valle about Shelly's admission. I encouraged her to call scheduling to arrange post hospital follow-up with PRAVIN Fink.  If they do go the hospice route she can always cancel the appointment.         Future Appointments   Date Time Provider Department Center   10/24/2022  1:00 PM CAM MEDRANO [facility] PSA CLIN   11/9/2022  1:45 PM Galindo Del Valle MD [facility] PSA CLIN       Gricelda Dang RN on 9/2/2022 at 12:56 PM      "

## 2022-09-06 NOTE — TELEPHONE ENCOUNTER
KIM Barrera calling from [facility] ([phone]).     Holly wants Dr. Mac to be aware that the patient is enrolled in hospice with [facility] and Dr. Mac is listed as hospice attending.     Olesya Jones, RN BSN MSN  [facility]

## 2022-09-07 NOTE — PROGRESS NOTES
[facility] Heart-CORE Clinic    I spoke to Ms. Rogers's daughter Esthela. She shared that Ms. Rogers is now on hospice at [facility], with likely several days left of her life.     I reviewed with Esthela that Dr. Del Valle agreed that hospice was an appropriate choice. Esthela expressed gratitude that family can spend time with Ms. Rogers as she passes.     I wished them a peaceful transition.     FYI to Dr. Del Valle and Eliz Goodwin PAC.      Mayra Garrett RN BSN   10:19 AM 09/07/22  CORE nurse line M-F 8a-4p: [phone]

## 2022-09-08 NOTE — TELEPHONE ENCOUNTER
Hospice called back in and state the patients pcp is actually Dr. Dubon    Hospice wanting to know if Dr. Dubon is okay with them admitting patient to hospice and certifying that she is terminally ill?    They are needing call back today if possible    Alexander Stover RN

## 2022-09-08 NOTE — TELEPHONE ENCOUNTER
Writer called Ciarra,   Writer reviewed that provider was ok with patient being admitted into hospice. Ciarra will be sending over some paperwork for PCP to fill out (writer Notifed Ciarra that PCP will be back in office next week, per Ciarra ok to wait). Writer provided Ciarra with [facility] fax number: [phone]    24/7 help line for hospice Mahi Black if pcp has any questions- [phone].       Mao Vidal RN  [facility]

## 2022-09-12 ENCOUNTER — TELEPHONE (OUTPATIENT)
Dept: FAMILY MEDICINE | Facility: CLINIC | Age: 87
End: 2022-09-12

## 2022-09-12 NOTE — TELEPHONE ENCOUNTER
FYI - Status Update    Who is Calling:  is calling, patient passed away 22, [facility]     Update: Email was sent to PCP to sign Death Certificate    Does caller want a call/response back: No       Jen Everett/[place]-  [facility]

## 2022-09-12 NOTE — TELEPHONE ENCOUNTER
Please let them know that I did not get any email about this patient 's death certificate   Her last visit with me was on 5/7/21  She was followed by hospice   May be it was sent to hospice   Dr.Nasima Daria MD

## 2022-09-12 NOTE — TELEPHONE ENCOUNTER
See 2022 encounter - hospice called and stated PCP was Dr Dubon.    Last two OV were with Dr Dubon (2022 and 2022) before hospital admission then hospice.    Dr Dubon received request on  at about noon to fill out cause of death.     home has now called along with subsequent email at about 2pm today as well asking for cause of death.  I will call  home and also talk to Dr Dubon.    Esthela West, RT (R)

## 2022-09-13 DIAGNOSIS — N18.30 STAGE 3 CHRONIC KIDNEY DISEASE, UNSPECIFIED WHETHER STAGE 3A OR 3B CKD (H): ICD-10-CM

## 2022-09-16 RX ORDER — LOSARTAN POTASSIUM 25 MG/1
TABLET ORAL
Qty: 45 TABLET | Refills: 1 | Status: SHIPPED | OUTPATIENT
Start: 2022-09-16

## 2022-11-11 NOTE — PROGRESS NOTES
Late Entry - [facility] from 8/29/2022. Pt presented to [facility] for shortness of breath    [employer] Sensia (D) Remote PPM Device Check  AP: 16 %    : 11 %    Mode: DDDR   Presenting Rhythm:  AF with rapid V rate, 80's - 160's bpm    Heart Rate: excellent variability     Sensing: WNL in A, not obtained in V    Pacing Threshold: WNL    Impedance: WNL    Battery Status: 25 months (8-43 months) estimated longevity     Atrial Arrhythmia: mode switch burden 45% since 6/2021. 13 episodes saved to logbook since last remote on 7/21/2022, all of these episodes occurred on August 25-29th. Average V rates . 4 of these EGMs show AF, and one shows SVT. Pt has history of AF and is on eliquis.   Ventricular Arrhythmia: 0    Care Plan: remote check done in ER. Pt was admitted to the hospital. She was then discharged on 9/6/2022 with home hospice care.    JIL BRANCH

## 2023-05-29 NOTE — TELEPHONE ENCOUNTER
Prescription approved per [facility], [facility] or [facility] refill protocol.  Mariely Moon RN - Triage  [facility]      
Private car

## 2025-06-23 NOTE — PROGRESS NOTES
"Goal Outcome Evaluation:       Arely was up ad sophia, attended unit programming. Pt was med adherent, her food and fluid intake were WNL.   Arely denied having suicidal ideation or self harm thoughts. Pt reported feeling anxious \"I always feel that way, that's why I take the Gabapentin and Propanolol\"     At 0955, Arely requested Immodium for loose stools. By 10:45, Pt reported her diarrhea had resolved after the one dose.   Arely's MSSA scores were 1 and 2, she reports feeling better physically.                      " ANTICOAGULATION FOLLOW-UP CLINIC VISIT    Patient Name:  Shelly Rogers  Date:  2019  Contact Type:  Face to Face    SUBJECTIVE:  Patient Findings     Comments:   The patient was assessed for diet, medication, and activity level changes, missed or extra doses, bruising or bleeding, with no problem findings.          Clinical Outcomes     Comments:   The patient was assessed for diet, medication, and activity level changes, missed or extra doses, bruising or bleeding, with no problem findings.             OBJECTIVE    INR Protime   Date Value Ref Range Status   2019 2.4 (A) 0.86 - 1.14 Final       ASSESSMENT / PLAN  INR assessment THER    Recheck INR In: 4 WEEKS    INR Location Clinic      Anticoagulation Summary  As of 2019    INR goal:   2.0-3.0   TTR:   92.1 % (1 y)   INR used for dosin.4 (2019)   Warfarin maintenance plan:   5 mg (5 mg x 1) every Mon, Wed, Fri; 2.5 mg (5 mg x 0.5) all other days   Full warfarin instructions:   5 mg every Mon, Wed, Fri; 2.5 mg all other days   Weekly warfarin total:   25 mg   No change documented:   Thao Harvey, RN   Plan last modified:   Bree Delgadillo RN (2019)   Next INR check:   1/3/2020   Priority:   INR   Target end date:       Indications    Long-term (current) use of anticoagulants [Z79.01] [Z79.01]  Atrial fibrillation (H) [I48.91]             Anticoagulation Episode Summary     INR check location:       Preferred lab:       Send INR reminders to:   MIRNA CORRALES    Comments:         Anticoagulation Care Providers     Provider Role Specialty Phone number    Halley Huff MD [facility] [phone]            See the Encounter Report to view Anticoagulation Flowsheet and Dosing Calendar (Go to Encounters tab in chart review, and find the Anticoagulation Therapy Visit)    Dosage adjustment made based on physician directed care plan.    Thao Harvey RN

## (undated) DEVICE — TAPE SILICONE KIND REMOVAL 2" 2770S-2

## (undated) DEVICE — PACK CATARACT CUSTOM SO DALE SEY32CTFCX

## (undated) DEVICE — GLOVE PROTEXIS MICRO 7.0  2D73PM70

## (undated) DEVICE — EYE TIP IRRIGATION & ASPIRATION POLYMER 35D BENT 8065751511

## (undated) DEVICE — GLOVE PROTEXIS POWDER FREE SMT 8.5 2D72PT85X

## (undated) DEVICE — GLOVE PROTEXIS MICRO 6.0  2D73PM60

## (undated) DEVICE — EYE KNIFE SLIT XSTAR VISITEC 2.5MM 45DEG DBL BEVEL 370825

## (undated) DEVICE — EYE SHIELD PLASTIC

## (undated) DEVICE — EYE PACK CUSTOM ANTERIOR 30DEG TIP CENTURION PPK6682-04

## (undated) DEVICE — EYE SOL BSS 500ML

## (undated) DEVICE — EYE PACK BVI READYPAK KIT #2

## (undated) DEVICE — LINEN TOWEL PACK X5 5464